# Patient Record
Sex: FEMALE | Race: WHITE | Employment: OTHER | URBAN - METROPOLITAN AREA
[De-identification: names, ages, dates, MRNs, and addresses within clinical notes are randomized per-mention and may not be internally consistent; named-entity substitution may affect disease eponyms.]

---

## 2017-01-24 ENCOUNTER — TELEPHONE (OUTPATIENT)
Dept: FAMILY MEDICINE CLINIC | Age: 77
End: 2017-01-24

## 2017-01-25 ENCOUNTER — NURSE ONLY (OUTPATIENT)
Dept: FAMILY MEDICINE CLINIC | Age: 77
End: 2017-01-25

## 2017-01-25 DIAGNOSIS — R73.9 HYPERGLYCEMIA: Primary | ICD-10-CM

## 2017-01-25 LAB — GLUCOSE BLD-MCNC: 109 MG/DL

## 2017-01-25 PROCEDURE — 82962 GLUCOSE BLOOD TEST: CPT | Performed by: FAMILY MEDICINE

## 2017-04-19 ENCOUNTER — OFFICE VISIT (OUTPATIENT)
Dept: FAMILY MEDICINE CLINIC | Age: 77
End: 2017-04-19

## 2017-04-19 VITALS
HEIGHT: 67 IN | SYSTOLIC BLOOD PRESSURE: 124 MMHG | RESPIRATION RATE: 16 BRPM | BODY MASS INDEX: 37.08 KG/M2 | WEIGHT: 236.25 LBS | HEART RATE: 68 BPM | DIASTOLIC BLOOD PRESSURE: 64 MMHG

## 2017-04-19 DIAGNOSIS — Z99.89 OBSTRUCTIVE SLEEP APNEA ON CPAP: ICD-10-CM

## 2017-04-19 DIAGNOSIS — I25.10 CORONARY ARTERY DISEASE INVOLVING NATIVE CORONARY ARTERY OF NATIVE HEART WITHOUT ANGINA PECTORIS: ICD-10-CM

## 2017-04-19 DIAGNOSIS — G47.33 OBSTRUCTIVE SLEEP APNEA ON CPAP: ICD-10-CM

## 2017-04-19 DIAGNOSIS — E78.5 HYPERLIPIDEMIA, UNSPECIFIED HYPERLIPIDEMIA TYPE: ICD-10-CM

## 2017-04-19 DIAGNOSIS — K21.9 GASTROESOPHAGEAL REFLUX DISEASE, ESOPHAGITIS PRESENCE NOT SPECIFIED: ICD-10-CM

## 2017-04-19 DIAGNOSIS — I10 ESSENTIAL HYPERTENSION: ICD-10-CM

## 2017-04-19 PROCEDURE — 99213 OFFICE O/P EST LOW 20 MIN: CPT | Performed by: FAMILY MEDICINE

## 2017-04-19 ASSESSMENT — ENCOUNTER SYMPTOMS
SHORTNESS OF BREATH: 0
VOMITING: 0
COUGH: 0
CONSTIPATION: 0
EYES NEGATIVE: 1
CHEST TIGHTNESS: 0
DIARRHEA: 0
ABDOMINAL PAIN: 0
NAUSEA: 0

## 2017-04-19 ASSESSMENT — PATIENT HEALTH QUESTIONNAIRE - PHQ9
SUM OF ALL RESPONSES TO PHQ QUESTIONS 1-9: 1
2. FEELING DOWN, DEPRESSED OR HOPELESS: 1
SUM OF ALL RESPONSES TO PHQ9 QUESTIONS 1 & 2: 1
1. LITTLE INTEREST OR PLEASURE IN DOING THINGS: 0

## 2017-06-09 ENCOUNTER — TELEPHONE (OUTPATIENT)
Dept: CARDIOLOGY | Age: 77
End: 2017-06-09

## 2017-06-09 DIAGNOSIS — I49.9 IRREGULAR HEART RHYTHM: ICD-10-CM

## 2017-06-09 DIAGNOSIS — Z98.890 S/P CARDIAC CATH: Primary | ICD-10-CM

## 2017-06-23 RX ORDER — LORAZEPAM 0.5 MG/1
0.5 TABLET ORAL EVERY 8 HOURS PRN
Qty: 20 TABLET | Refills: 0 | Status: SHIPPED | OUTPATIENT
Start: 2017-06-23 | End: 2017-08-16 | Stop reason: SDUPTHER

## 2017-07-13 ENCOUNTER — OFFICE VISIT (OUTPATIENT)
Dept: CARDIOLOGY | Age: 77
End: 2017-07-13

## 2017-07-13 VITALS
WEIGHT: 237.3 LBS | HEART RATE: 72 BPM | BODY MASS INDEX: 37.25 KG/M2 | DIASTOLIC BLOOD PRESSURE: 82 MMHG | SYSTOLIC BLOOD PRESSURE: 138 MMHG | HEIGHT: 67 IN

## 2017-07-13 DIAGNOSIS — I10 ESSENTIAL HYPERTENSION: ICD-10-CM

## 2017-07-13 DIAGNOSIS — I49.3 PVC (PREMATURE VENTRICULAR CONTRACTION): ICD-10-CM

## 2017-07-13 DIAGNOSIS — I42.9 CARDIOMYOPATHY (HCC): Primary | ICD-10-CM

## 2017-07-13 DIAGNOSIS — R94.30 EJECTION FRACTION < 50%: ICD-10-CM

## 2017-07-13 DIAGNOSIS — E66.01 MORBID OBESITY DUE TO EXCESS CALORIES (HCC): ICD-10-CM

## 2017-07-13 PROCEDURE — 1123F ACP DISCUSS/DSCN MKR DOCD: CPT | Performed by: NUCLEAR MEDICINE

## 2017-07-13 PROCEDURE — G8417 CALC BMI ABV UP PARAM F/U: HCPCS | Performed by: NUCLEAR MEDICINE

## 2017-07-13 PROCEDURE — 1036F TOBACCO NON-USER: CPT | Performed by: NUCLEAR MEDICINE

## 2017-07-13 PROCEDURE — G8400 PT W/DXA NO RESULTS DOC: HCPCS | Performed by: NUCLEAR MEDICINE

## 2017-07-13 PROCEDURE — 4040F PNEUMOC VAC/ADMIN/RCVD: CPT | Performed by: NUCLEAR MEDICINE

## 2017-07-13 PROCEDURE — 1090F PRES/ABSN URINE INCON ASSESS: CPT | Performed by: NUCLEAR MEDICINE

## 2017-07-13 PROCEDURE — G8427 DOCREV CUR MEDS BY ELIG CLIN: HCPCS | Performed by: NUCLEAR MEDICINE

## 2017-07-13 PROCEDURE — G8598 ASA/ANTIPLAT THER USED: HCPCS | Performed by: NUCLEAR MEDICINE

## 2017-07-13 PROCEDURE — 99214 OFFICE O/P EST MOD 30 MIN: CPT | Performed by: NUCLEAR MEDICINE

## 2017-07-13 RX ORDER — SIMVASTATIN 40 MG
TABLET ORAL
Qty: 90 TABLET | Refills: 3 | Status: SHIPPED | OUTPATIENT
Start: 2017-07-13 | End: 2017-08-28 | Stop reason: SDUPTHER

## 2017-07-13 RX ORDER — LOSARTAN POTASSIUM 25 MG/1
TABLET ORAL
Qty: 90 TABLET | Refills: 3 | Status: SHIPPED | OUTPATIENT
Start: 2017-07-13 | End: 2017-07-19

## 2017-07-13 RX ORDER — METOPROLOL SUCCINATE 25 MG/1
25 TABLET, EXTENDED RELEASE ORAL DAILY
Qty: 90 TABLET | Refills: 3 | Status: SHIPPED | OUTPATIENT
Start: 2017-07-13 | End: 2017-08-28 | Stop reason: SDUPTHER

## 2017-07-13 RX ORDER — ISOSORBIDE MONONITRATE 30 MG/1
15 TABLET, EXTENDED RELEASE ORAL DAILY
Qty: 45 TABLET | Refills: 3 | Status: SHIPPED | OUTPATIENT
Start: 2017-07-13 | End: 2017-08-28 | Stop reason: SDUPTHER

## 2017-07-17 ENCOUNTER — HOSPITAL ENCOUNTER (OUTPATIENT)
Dept: NON INVASIVE DIAGNOSTICS | Age: 77
Discharge: HOME | End: 2017-07-17
Payer: MEDICARE

## 2017-07-17 DIAGNOSIS — I49.3 PVC (PREMATURE VENTRICULAR CONTRACTION): ICD-10-CM

## 2017-07-17 DIAGNOSIS — I42.9 CARDIOMYOPATHY (HCC): ICD-10-CM

## 2017-07-17 DIAGNOSIS — E66.01 MORBID OBESITY DUE TO EXCESS CALORIES (HCC): ICD-10-CM

## 2017-07-17 LAB
LV EF: 33 %
LVEF MODALITY: NORMAL

## 2017-07-17 PROCEDURE — 93306 TTE W/DOPPLER COMPLETE: CPT

## 2017-07-17 PROCEDURE — 93307 TTE W/O DOPPLER COMPLETE: CPT

## 2017-07-24 ENCOUNTER — TELEPHONE (OUTPATIENT)
Dept: CARDIOLOGY CLINIC | Age: 77
End: 2017-07-24

## 2017-08-05 ENCOUNTER — APPOINTMENT (OUTPATIENT)
Dept: CT IMAGING | Age: 77
DRG: 389 | End: 2017-08-05
Payer: MEDICARE

## 2017-08-05 ENCOUNTER — HOSPITAL ENCOUNTER (INPATIENT)
Age: 77
LOS: 3 days | Discharge: HOME | DRG: 389 | End: 2017-08-08
Attending: INTERNAL MEDICINE | Admitting: INTERNAL MEDICINE
Payer: MEDICARE

## 2017-08-05 DIAGNOSIS — N39.0 URINARY TRACT INFECTION WITHOUT HEMATURIA, SITE UNSPECIFIED: ICD-10-CM

## 2017-08-05 DIAGNOSIS — R06.02 SHORTNESS OF BREATH: Primary | ICD-10-CM

## 2017-08-05 DIAGNOSIS — K56.609 UNSPECIFIED INTESTINAL OBSTRUCTION: ICD-10-CM

## 2017-08-05 DIAGNOSIS — D72.829 LEUKOCYTOSIS, UNSPECIFIED TYPE: ICD-10-CM

## 2017-08-05 PROBLEM — J44.9 COPD (CHRONIC OBSTRUCTIVE PULMONARY DISEASE) (HCC): Status: ACTIVE | Noted: 2017-08-05

## 2017-08-05 LAB
ALBUMIN SERPL-MCNC: 4 G/DL (ref 3.5–5.1)
ALLEN TEST: POSITIVE
ALP BLD-CCNC: 50 U/L (ref 38–126)
ALT SERPL-CCNC: 14 U/L (ref 11–66)
ANION GAP SERPL CALCULATED.3IONS-SCNC: 14 MEQ/L (ref 8–16)
AST SERPL-CCNC: 18 U/L (ref 5–40)
BACTERIA: ABNORMAL
BASE EXCESS (CALCULATED): 2.9 MMOL/L (ref -2.5–2.5)
BASOPHILS # BLD: 0.3 %
BASOPHILS # BLD: 0.4 %
BASOPHILS ABSOLUTE: 0 THOU/MM3 (ref 0–0.1)
BASOPHILS ABSOLUTE: 0.1 THOU/MM3 (ref 0–0.1)
BILIRUB SERPL-MCNC: 0.7 MG/DL (ref 0.3–1.2)
BILIRUBIN URINE: ABNORMAL
BLOOD, URINE: ABNORMAL
BUN BLDV-MCNC: 21 MG/DL (ref 7–22)
CALCIUM SERPL-MCNC: 9.2 MG/DL (ref 8.5–10.5)
CASTS: ABNORMAL /LPF
CASTS: ABNORMAL /LPF
CHARACTER, URINE: ABNORMAL
CHLORIDE BLD-SCNC: 103 MEQ/L (ref 98–111)
CO2: 25 MEQ/L (ref 23–33)
COLLECTED BY:: ABNORMAL
COLOR: ABNORMAL
CREAT SERPL-MCNC: 0.5 MG/DL (ref 0.4–1.2)
CRYSTALS: ABNORMAL
D-DIMER QUANTITATIVE: 556 NG/ML FEU (ref 0–500)
DEVICE: ABNORMAL
EOSINOPHIL # BLD: 0 %
EOSINOPHIL # BLD: 0.2 %
EOSINOPHILS ABSOLUTE: 0 THOU/MM3 (ref 0–0.4)
EOSINOPHILS ABSOLUTE: 0 THOU/MM3 (ref 0–0.4)
EPITHELIAL CELLS, UA: ABNORMAL /HPF
FECAL LEUKOCYTES: NORMAL
GFR SERPL CREATININE-BSD FRML MDRD: > 90 ML/MIN/1.73M2
GLUCOSE BLD-MCNC: 124 MG/DL (ref 70–108)
GLUCOSE, URINE: NEGATIVE MG/DL
HCO3: 26 MMOL/L (ref 23–28)
HCT VFR BLD CALC: 46.3 % (ref 37–47)
HCT VFR BLD CALC: 48 % (ref 37–47)
HEMOGLOBIN: 15.8 GM/DL (ref 12–16)
HEMOGLOBIN: 16.3 GM/DL (ref 12–16)
ICTOTEST: NEGATIVE
IFIO2: 4
INR BLD: 0.97 (ref 0.85–1.13)
KETONES, URINE: 15
LACTIC ACID: 1.1 MMOL/L (ref 0.5–2.2)
LEUKOCYTE EST, POC: ABNORMAL
LIPASE: 15.6 U/L (ref 5.6–51.3)
LYMPHOCYTES # BLD: 3 %
LYMPHOCYTES # BLD: 4.4 %
LYMPHOCYTES ABSOLUTE: 0.4 THOU/MM3 (ref 1–4.8)
LYMPHOCYTES ABSOLUTE: 0.7 THOU/MM3 (ref 1–4.8)
MCH RBC QN AUTO: 31.5 PG (ref 27–31)
MCH RBC QN AUTO: 31.7 PG (ref 27–31)
MCHC RBC AUTO-ENTMCNC: 34.1 GM/DL (ref 33–37)
MCHC RBC AUTO-ENTMCNC: 34.2 GM/DL (ref 33–37)
MCV RBC AUTO: 92.5 FL (ref 81–99)
MCV RBC AUTO: 92.8 FL (ref 81–99)
MISCELLANEOUS LAB TEST RESULT: ABNORMAL
MONOCYTES # BLD: 2.8 %
MONOCYTES # BLD: 3.6 %
MONOCYTES ABSOLUTE: 0.4 THOU/MM3 (ref 0.4–1.3)
MONOCYTES ABSOLUTE: 0.6 THOU/MM3 (ref 0.4–1.3)
NITRITE, URINE: POSITIVE
NUCLEATED RED BLOOD CELLS: 0 /100 WBC
NUCLEATED RED BLOOD CELLS: 0 /100 WBC
O2 SATURATION: 98 %
OSMOLALITY CALCULATION: 287.5 MOSMOL/KG (ref 275–300)
PCO2: 35 MMHG (ref 35–45)
PDW BLD-RTO: 13.2 % (ref 11.5–14.5)
PDW BLD-RTO: 13.5 % (ref 11.5–14.5)
PH BLOOD GAS: 7.48 (ref 7.35–7.45)
PH UA: 5.5
PLATELET # BLD: 151 THOU/MM3 (ref 130–400)
PLATELET # BLD: 173 THOU/MM3 (ref 130–400)
PMV BLD AUTO: 9.5 MCM (ref 7.4–10.4)
PMV BLD AUTO: 9.6 MCM (ref 7.4–10.4)
PO2: 98 MMHG (ref 71–104)
POTASSIUM SERPL-SCNC: 3.8 MEQ/L (ref 3.5–5.2)
PRO-BNP: 956.9 PG/ML (ref 0–1800)
PROTEIN UA: 30 MG/DL
RBC # BLD: 4.99 MILL/MM3 (ref 4.2–5.4)
RBC # BLD: 5.19 MILL/MM3 (ref 4.2–5.4)
RBC # BLD: NORMAL 10*6/UL
RBC # BLD: NORMAL 10*6/UL
RBC URINE: ABNORMAL /HPF
RENAL EPITHELIAL, UA: ABNORMAL
ROTAVIRUS ANTIGEN: NEGATIVE
SEG NEUTROPHILS: 91.4 %
SEG NEUTROPHILS: 93.9 %
SEGMENTED NEUTROPHILS ABSOLUTE COUNT: 11.8 THOU/MM3 (ref 1.8–7.7)
SEGMENTED NEUTROPHILS ABSOLUTE COUNT: 15.5 THOU/MM3 (ref 1.8–7.7)
SODIUM BLD-SCNC: 142 MEQ/L (ref 135–145)
SOURCE, BLOOD GAS: ABNORMAL
SPECIFIC GRAVITY UA: > 1.03 (ref 1–1.03)
TOTAL PROTEIN: 6.4 G/DL (ref 6.1–8)
TROPONIN T: < 0.01 NG/ML
UROBILINOGEN, URINE: 1 EU/DL
WBC # BLD: 12.6 THOU/MM3 (ref 4.8–10.8)
WBC # BLD: 17 THOU/MM3 (ref 4.8–10.8)
WBC UA: ABNORMAL /HPF
YEAST: ABNORMAL

## 2017-08-05 PROCEDURE — 74176 CT ABD & PELVIS W/O CONTRAST: CPT

## 2017-08-05 PROCEDURE — 85025 COMPLETE CBC W/AUTO DIFF WBC: CPT

## 2017-08-05 PROCEDURE — 93005 ELECTROCARDIOGRAM TRACING: CPT

## 2017-08-05 PROCEDURE — 6360000002 HC RX W HCPCS: Performed by: INTERNAL MEDICINE

## 2017-08-05 PROCEDURE — 80053 COMPREHEN METABOLIC PANEL: CPT

## 2017-08-05 PROCEDURE — 85610 PROTHROMBIN TIME: CPT

## 2017-08-05 PROCEDURE — 87186 SC STD MICRODIL/AGAR DIL: CPT

## 2017-08-05 PROCEDURE — 87040 BLOOD CULTURE FOR BACTERIA: CPT

## 2017-08-05 PROCEDURE — 96375 TX/PRO/DX INJ NEW DRUG ADDON: CPT

## 2017-08-05 PROCEDURE — 2580000003 HC RX 258: Performed by: INTERNAL MEDICINE

## 2017-08-05 PROCEDURE — 87077 CULTURE AEROBIC IDENTIFY: CPT

## 2017-08-05 PROCEDURE — 96361 HYDRATE IV INFUSION ADD-ON: CPT

## 2017-08-05 PROCEDURE — 89055 LEUKOCYTE ASSESSMENT FECAL: CPT

## 2017-08-05 PROCEDURE — 94640 AIRWAY INHALATION TREATMENT: CPT

## 2017-08-05 PROCEDURE — 83880 ASSAY OF NATRIURETIC PEPTIDE: CPT

## 2017-08-05 PROCEDURE — 83605 ASSAY OF LACTIC ACID: CPT

## 2017-08-05 PROCEDURE — 36600 WITHDRAWAL OF ARTERIAL BLOOD: CPT

## 2017-08-05 PROCEDURE — 85379 FIBRIN DEGRADATION QUANT: CPT

## 2017-08-05 PROCEDURE — 1200000003 HC TELEMETRY R&B

## 2017-08-05 PROCEDURE — 6370000000 HC RX 637 (ALT 250 FOR IP): Performed by: INTERNAL MEDICINE

## 2017-08-05 PROCEDURE — 87184 SC STD DISK METHOD PER PLATE: CPT

## 2017-08-05 PROCEDURE — 99285 EMERGENCY DEPT VISIT HI MDM: CPT

## 2017-08-05 PROCEDURE — 84484 ASSAY OF TROPONIN QUANT: CPT

## 2017-08-05 PROCEDURE — 96365 THER/PROPH/DIAG IV INF INIT: CPT

## 2017-08-05 PROCEDURE — 87086 URINE CULTURE/COLONY COUNT: CPT

## 2017-08-05 PROCEDURE — 81001 URINALYSIS AUTO W/SCOPE: CPT

## 2017-08-05 PROCEDURE — 83690 ASSAY OF LIPASE: CPT

## 2017-08-05 PROCEDURE — 36415 COLL VENOUS BLD VENIPUNCTURE: CPT

## 2017-08-05 PROCEDURE — 82803 BLOOD GASES ANY COMBINATION: CPT

## 2017-08-05 PROCEDURE — 87425 ROTAVIRUS AG IA: CPT

## 2017-08-05 RX ORDER — CIPROFLOXACIN 2 MG/ML
400 INJECTION, SOLUTION INTRAVENOUS EVERY 12 HOURS
Status: DISCONTINUED | OUTPATIENT
Start: 2017-08-06 | End: 2017-08-06 | Stop reason: SINTOL

## 2017-08-05 RX ORDER — 0.9 % SODIUM CHLORIDE 0.9 %
1000 INTRAVENOUS SOLUTION INTRAVENOUS ONCE
Status: COMPLETED | OUTPATIENT
Start: 2017-08-05 | End: 2017-08-05

## 2017-08-05 RX ORDER — IPRATROPIUM BROMIDE AND ALBUTEROL SULFATE 2.5; .5 MG/3ML; MG/3ML
1 SOLUTION RESPIRATORY (INHALATION) ONCE
Status: COMPLETED | OUTPATIENT
Start: 2017-08-05 | End: 2017-08-05

## 2017-08-05 RX ORDER — LEVOFLOXACIN 5 MG/ML
500 INJECTION, SOLUTION INTRAVENOUS ONCE
Status: COMPLETED | OUTPATIENT
Start: 2017-08-05 | End: 2017-08-05

## 2017-08-05 RX ORDER — PANTOPRAZOLE SODIUM 40 MG/10ML
40 INJECTION, POWDER, LYOPHILIZED, FOR SOLUTION INTRAVENOUS DAILY
Status: DISCONTINUED | OUTPATIENT
Start: 2017-08-06 | End: 2017-08-08 | Stop reason: HOSPADM

## 2017-08-05 RX ORDER — DEXTROSE, SODIUM CHLORIDE, SODIUM LACTATE, POTASSIUM CHLORIDE, AND CALCIUM CHLORIDE 5; .6; .31; .03; .02 G/100ML; G/100ML; G/100ML; G/100ML; G/100ML
INJECTION, SOLUTION INTRAVENOUS CONTINUOUS
Status: DISCONTINUED | OUTPATIENT
Start: 2017-08-05 | End: 2017-08-08

## 2017-08-05 RX ORDER — ONDANSETRON 2 MG/ML
INJECTION INTRAMUSCULAR; INTRAVENOUS
Status: DISPENSED
Start: 2017-08-05 | End: 2017-08-06

## 2017-08-05 RX ORDER — SODIUM CHLORIDE 0.9 % (FLUSH) 0.9 %
10 SYRINGE (ML) INJECTION EVERY 12 HOURS SCHEDULED
Status: DISCONTINUED | OUTPATIENT
Start: 2017-08-05 | End: 2017-08-08 | Stop reason: HOSPADM

## 2017-08-05 RX ORDER — ONDANSETRON 2 MG/ML
4 INJECTION INTRAMUSCULAR; INTRAVENOUS ONCE
Status: COMPLETED | OUTPATIENT
Start: 2017-08-05 | End: 2017-08-05

## 2017-08-05 RX ORDER — METOPROLOL TARTRATE 5 MG/5ML
5 INJECTION INTRAVENOUS EVERY 12 HOURS
Status: DISCONTINUED | OUTPATIENT
Start: 2017-08-05 | End: 2017-08-08 | Stop reason: HOSPADM

## 2017-08-05 RX ORDER — IPRATROPIUM BROMIDE AND ALBUTEROL SULFATE 2.5; .5 MG/3ML; MG/3ML
1 SOLUTION RESPIRATORY (INHALATION)
Status: DISCONTINUED | OUTPATIENT
Start: 2017-08-06 | End: 2017-08-08 | Stop reason: HOSPADM

## 2017-08-05 RX ORDER — SODIUM CHLORIDE 0.9 % (FLUSH) 0.9 %
10 SYRINGE (ML) INJECTION PRN
Status: DISCONTINUED | OUTPATIENT
Start: 2017-08-05 | End: 2017-08-08 | Stop reason: HOSPADM

## 2017-08-05 RX ORDER — ONDANSETRON 2 MG/ML
4 INJECTION INTRAMUSCULAR; INTRAVENOUS EVERY 6 HOURS PRN
Status: DISCONTINUED | OUTPATIENT
Start: 2017-08-05 | End: 2017-08-08 | Stop reason: HOSPADM

## 2017-08-05 RX ADMIN — LEVOFLOXACIN 500 MG: 5 INJECTION, SOLUTION INTRAVENOUS at 21:08

## 2017-08-05 RX ADMIN — CIPROFLOXACIN 400 MG: 2 INJECTION, SOLUTION INTRAVENOUS at 23:56

## 2017-08-05 RX ADMIN — IPRATROPIUM BROMIDE AND ALBUTEROL SULFATE 1 AMPULE: .5; 3 SOLUTION RESPIRATORY (INHALATION) at 18:12

## 2017-08-05 RX ADMIN — ONDANSETRON 4 MG: 2 INJECTION INTRAMUSCULAR; INTRAVENOUS at 18:10

## 2017-08-05 RX ADMIN — SODIUM CHLORIDE, SODIUM LACTATE, POTASSIUM CHLORIDE, CALCIUM CHLORIDE AND DEXTROSE MONOHYDRATE: 5; 600; 310; 30; 20 INJECTION, SOLUTION INTRAVENOUS at 23:56

## 2017-08-05 RX ADMIN — SODIUM CHLORIDE 1000 ML: 9 INJECTION, SOLUTION INTRAVENOUS at 18:10

## 2017-08-05 ASSESSMENT — ENCOUNTER SYMPTOMS
SORE THROAT: 0
VOMITING: 0
EYE PAIN: 0
DIARRHEA: 1
SHORTNESS OF BREATH: 1
ABDOMINAL PAIN: 0
CONSTIPATION: 0
WHEEZING: 0
COUGH: 0
RHINORRHEA: 0
BACK PAIN: 0
EYE DISCHARGE: 0
NAUSEA: 1

## 2017-08-06 LAB
ANION GAP SERPL CALCULATED.3IONS-SCNC: 11 MEQ/L (ref 8–16)
BASOPHILS # BLD: 0.1 %
BASOPHILS ABSOLUTE: 0 THOU/MM3 (ref 0–0.1)
BUN BLDV-MCNC: 18 MG/DL (ref 7–22)
CALCIUM SERPL-MCNC: 7.9 MG/DL (ref 8.5–10.5)
CHLORIDE BLD-SCNC: 102 MEQ/L (ref 98–111)
CO2: 27 MEQ/L (ref 23–33)
CREAT SERPL-MCNC: 0.4 MG/DL (ref 0.4–1.2)
EKG ATRIAL RATE: 86 BPM
EKG P AXIS: 61 DEGREES
EKG P-R INTERVAL: 188 MS
EKG Q-T INTERVAL: 428 MS
EKG QRS DURATION: 164 MS
EKG QTC CALCULATION (BAZETT): 512 MS
EKG R AXIS: 1 DEGREES
EKG T AXIS: 150 DEGREES
EKG VENTRICULAR RATE: 86 BPM
EOSINOPHIL # BLD: 0.1 %
EOSINOPHILS ABSOLUTE: 0 THOU/MM3 (ref 0–0.4)
GFR SERPL CREATININE-BSD FRML MDRD: > 90 ML/MIN/1.73M2
GLUCOSE BLD-MCNC: 112 MG/DL (ref 70–108)
HCT VFR BLD CALC: 45.1 % (ref 37–47)
HEMOGLOBIN: 15.5 GM/DL (ref 12–16)
LYMPHOCYTES # BLD: 5.2 %
LYMPHOCYTES ABSOLUTE: 0.5 THOU/MM3 (ref 1–4.8)
MAGNESIUM: 1.4 MG/DL (ref 1.6–2.4)
MCH RBC QN AUTO: 31.8 PG (ref 27–31)
MCHC RBC AUTO-ENTMCNC: 34.2 GM/DL (ref 33–37)
MCV RBC AUTO: 92.8 FL (ref 81–99)
MONOCYTES # BLD: 4.4 %
MONOCYTES ABSOLUTE: 0.4 THOU/MM3 (ref 0.4–1.3)
NUCLEATED RED BLOOD CELLS: 0 /100 WBC
PDW BLD-RTO: 13.3 % (ref 11.5–14.5)
PLATELET # BLD: 158 THOU/MM3 (ref 130–400)
PMV BLD AUTO: 9.6 MCM (ref 7.4–10.4)
POTASSIUM SERPL-SCNC: 3.7 MEQ/L (ref 3.5–5.2)
RBC # BLD: 4.86 MILL/MM3 (ref 4.2–5.4)
RBC # BLD: NORMAL 10*6/UL
SEG NEUTROPHILS: 90.2 %
SEGMENTED NEUTROPHILS ABSOLUTE COUNT: 8.1 THOU/MM3 (ref 1.8–7.7)
SODIUM BLD-SCNC: 140 MEQ/L (ref 135–145)
WBC # BLD: 9 THOU/MM3 (ref 4.8–10.8)

## 2017-08-06 PROCEDURE — 85025 COMPLETE CBC W/AUTO DIFF WBC: CPT

## 2017-08-06 PROCEDURE — 80048 BASIC METABOLIC PNL TOTAL CA: CPT

## 2017-08-06 PROCEDURE — 6360000002 HC RX W HCPCS: Performed by: INTERNAL MEDICINE

## 2017-08-06 PROCEDURE — 83735 ASSAY OF MAGNESIUM: CPT

## 2017-08-06 PROCEDURE — 2500000003 HC RX 250 WO HCPCS: Performed by: NURSE PRACTITIONER

## 2017-08-06 PROCEDURE — 36415 COLL VENOUS BLD VENIPUNCTURE: CPT

## 2017-08-06 PROCEDURE — 94640 AIRWAY INHALATION TREATMENT: CPT

## 2017-08-06 PROCEDURE — 99221 1ST HOSP IP/OBS SF/LOW 40: CPT | Performed by: SURGERY

## 2017-08-06 PROCEDURE — 1200000003 HC TELEMETRY R&B

## 2017-08-06 PROCEDURE — 2580000003 HC RX 258: Performed by: INTERNAL MEDICINE

## 2017-08-06 PROCEDURE — 87086 URINE CULTURE/COLONY COUNT: CPT

## 2017-08-06 PROCEDURE — 2500000003 HC RX 250 WO HCPCS: Performed by: INTERNAL MEDICINE

## 2017-08-06 PROCEDURE — C9113 INJ PANTOPRAZOLE SODIUM, VIA: HCPCS | Performed by: INTERNAL MEDICINE

## 2017-08-06 PROCEDURE — 6370000000 HC RX 637 (ALT 250 FOR IP): Performed by: INTERNAL MEDICINE

## 2017-08-06 RX ORDER — MORPHINE SULFATE 2 MG/ML
1 INJECTION, SOLUTION INTRAMUSCULAR; INTRAVENOUS
Status: DISCONTINUED | OUTPATIENT
Start: 2017-08-06 | End: 2017-08-08 | Stop reason: HOSPADM

## 2017-08-06 RX ORDER — MORPHINE SULFATE 2 MG/ML
2 INJECTION, SOLUTION INTRAMUSCULAR; INTRAVENOUS
Status: DISCONTINUED | OUTPATIENT
Start: 2017-08-06 | End: 2017-08-08 | Stop reason: HOSPADM

## 2017-08-06 RX ORDER — POTASSIUM CHLORIDE 7.45 MG/ML
10 INJECTION INTRAVENOUS
Status: COMPLETED | OUTPATIENT
Start: 2017-08-06 | End: 2017-08-06

## 2017-08-06 RX ORDER — DIPHENHYDRAMINE HYDROCHLORIDE 50 MG/ML
25 INJECTION INTRAMUSCULAR; INTRAVENOUS EVERY 6 HOURS PRN
Status: DISCONTINUED | OUTPATIENT
Start: 2017-08-06 | End: 2017-08-08 | Stop reason: HOSPADM

## 2017-08-06 RX ADMIN — POTASSIUM CHLORIDE 10 MEQ: 7.46 INJECTION, SOLUTION INTRAVENOUS at 16:44

## 2017-08-06 RX ADMIN — IPRATROPIUM BROMIDE AND ALBUTEROL SULFATE 1 AMPULE: .5; 3 SOLUTION RESPIRATORY (INHALATION) at 09:23

## 2017-08-06 RX ADMIN — METRONIDAZOLE 500 MG: 500 INJECTION, SOLUTION INTRAVENOUS at 23:23

## 2017-08-06 RX ADMIN — NITROGLYCERIN 0.5 INCH: 20 OINTMENT TOPICAL at 19:48

## 2017-08-06 RX ADMIN — IPRATROPIUM BROMIDE AND ALBUTEROL SULFATE 1 AMPULE: .5; 3 SOLUTION RESPIRATORY (INHALATION) at 20:26

## 2017-08-06 RX ADMIN — IPRATROPIUM BROMIDE AND ALBUTEROL SULFATE 1 AMPULE: .5; 3 SOLUTION RESPIRATORY (INHALATION) at 17:01

## 2017-08-06 RX ADMIN — NITROGLYCERIN 0.5 INCH: 20 OINTMENT TOPICAL at 00:40

## 2017-08-06 RX ADMIN — IPRATROPIUM BROMIDE AND ALBUTEROL SULFATE 1 AMPULE: .5; 3 SOLUTION RESPIRATORY (INHALATION) at 13:31

## 2017-08-06 RX ADMIN — ENOXAPARIN SODIUM 40 MG: 40 INJECTION SUBCUTANEOUS at 10:41

## 2017-08-06 RX ADMIN — POTASSIUM CHLORIDE 10 MEQ: 7.46 INJECTION, SOLUTION INTRAVENOUS at 19:04

## 2017-08-06 RX ADMIN — NITROGLYCERIN 0.5 INCH: 20 OINTMENT TOPICAL at 07:11

## 2017-08-06 RX ADMIN — MAGNESIUM SULFATE HEPTAHYDRATE 2 G: 500 INJECTION, SOLUTION INTRAMUSCULAR; INTRAVENOUS at 13:36

## 2017-08-06 RX ADMIN — MEROPENEM 1 G: 1 INJECTION, POWDER, FOR SOLUTION INTRAVENOUS at 23:20

## 2017-08-06 RX ADMIN — Medication 10 ML: at 00:41

## 2017-08-06 RX ADMIN — NITROGLYCERIN 0.5 INCH: 20 OINTMENT TOPICAL at 12:10

## 2017-08-06 RX ADMIN — METOPROLOL TARTRATE 5 MG: 5 INJECTION INTRAVENOUS at 00:40

## 2017-08-06 RX ADMIN — SODIUM CHLORIDE, SODIUM LACTATE, POTASSIUM CHLORIDE, CALCIUM CHLORIDE AND DEXTROSE MONOHYDRATE: 5; 600; 310; 30; 20 INJECTION, SOLUTION INTRAVENOUS at 17:41

## 2017-08-06 RX ADMIN — CIPROFLOXACIN 400 MG: 2 INJECTION, SOLUTION INTRAVENOUS at 11:14

## 2017-08-06 RX ADMIN — NITROGLYCERIN 0.5 INCH: 20 OINTMENT TOPICAL at 23:35

## 2017-08-06 RX ADMIN — PANTOPRAZOLE SODIUM 40 MG: 40 INJECTION, POWDER, FOR SOLUTION INTRAVENOUS at 10:41

## 2017-08-06 RX ADMIN — METRONIDAZOLE 500 MG: 500 INJECTION, SOLUTION INTRAVENOUS at 15:41

## 2017-08-07 ENCOUNTER — APPOINTMENT (OUTPATIENT)
Dept: GENERAL RADIOLOGY | Age: 77
DRG: 389 | End: 2017-08-07
Payer: MEDICARE

## 2017-08-07 LAB
ANION GAP SERPL CALCULATED.3IONS-SCNC: 10 MEQ/L (ref 8–16)
BASOPHILS # BLD: 0.2 %
BASOPHILS ABSOLUTE: 0 THOU/MM3 (ref 0–0.1)
BUN BLDV-MCNC: 14 MG/DL (ref 7–22)
CALCIUM SERPL-MCNC: 7.8 MG/DL (ref 8.5–10.5)
CHLORIDE BLD-SCNC: 103 MEQ/L (ref 98–111)
CO2: 28 MEQ/L (ref 23–33)
CREAT SERPL-MCNC: 0.4 MG/DL (ref 0.4–1.2)
EOSINOPHIL # BLD: 0.8 %
EOSINOPHILS ABSOLUTE: 0 THOU/MM3 (ref 0–0.4)
GFR SERPL CREATININE-BSD FRML MDRD: > 90 ML/MIN/1.73M2
GLUCOSE BLD-MCNC: 98 MG/DL (ref 70–108)
HCT VFR BLD CALC: 39.3 % (ref 37–47)
HEMOGLOBIN: 13.6 GM/DL (ref 12–16)
LYMPHOCYTES # BLD: 23.9 %
LYMPHOCYTES ABSOLUTE: 1 THOU/MM3 (ref 1–4.8)
MAGNESIUM: 1.8 MG/DL (ref 1.6–2.4)
MCH RBC QN AUTO: 31.9 PG (ref 27–31)
MCHC RBC AUTO-ENTMCNC: 34.6 GM/DL (ref 33–37)
MCV RBC AUTO: 92.2 FL (ref 81–99)
MONOCYTES # BLD: 11.7 %
MONOCYTES ABSOLUTE: 0.5 THOU/MM3 (ref 0.4–1.3)
NUCLEATED RED BLOOD CELLS: 0 /100 WBC
ORGANISM: ABNORMAL
ORGANISM: ABNORMAL
PDW BLD-RTO: 13.9 % (ref 11.5–14.5)
PLATELET # BLD: 143 THOU/MM3 (ref 130–400)
PMV BLD AUTO: 9.1 MCM (ref 7.4–10.4)
POTASSIUM SERPL-SCNC: 3.6 MEQ/L (ref 3.5–5.2)
POTASSIUM SERPL-SCNC: 3.7 MEQ/L (ref 3.5–5.2)
RBC # BLD: 4.26 MILL/MM3 (ref 4.2–5.4)
RBC # BLD: NORMAL 10*6/UL
SEG NEUTROPHILS: 63.4 %
SEGMENTED NEUTROPHILS ABSOLUTE COUNT: 2.6 THOU/MM3 (ref 1.8–7.7)
SODIUM BLD-SCNC: 141 MEQ/L (ref 135–145)
URINE CULTURE, ROUTINE: ABNORMAL
URINE CULTURE, ROUTINE: ABNORMAL
WBC # BLD: 4.1 THOU/MM3 (ref 4.8–10.8)

## 2017-08-07 PROCEDURE — 2580000003 HC RX 258: Performed by: INTERNAL MEDICINE

## 2017-08-07 PROCEDURE — 74000 XR ABDOMEN LIMITED (KUB): CPT

## 2017-08-07 PROCEDURE — 6370000000 HC RX 637 (ALT 250 FOR IP): Performed by: INTERNAL MEDICINE

## 2017-08-07 PROCEDURE — 80048 BASIC METABOLIC PNL TOTAL CA: CPT

## 2017-08-07 PROCEDURE — 84132 ASSAY OF SERUM POTASSIUM: CPT

## 2017-08-07 PROCEDURE — 83735 ASSAY OF MAGNESIUM: CPT

## 2017-08-07 PROCEDURE — C9113 INJ PANTOPRAZOLE SODIUM, VIA: HCPCS | Performed by: INTERNAL MEDICINE

## 2017-08-07 PROCEDURE — 36415 COLL VENOUS BLD VENIPUNCTURE: CPT

## 2017-08-07 PROCEDURE — 94640 AIRWAY INHALATION TREATMENT: CPT

## 2017-08-07 PROCEDURE — 2500000003 HC RX 250 WO HCPCS: Performed by: INTERNAL MEDICINE

## 2017-08-07 PROCEDURE — 2500000003 HC RX 250 WO HCPCS: Performed by: NURSE PRACTITIONER

## 2017-08-07 PROCEDURE — 99232 SBSQ HOSP IP/OBS MODERATE 35: CPT | Performed by: SURGERY

## 2017-08-07 PROCEDURE — 1200000003 HC TELEMETRY R&B

## 2017-08-07 PROCEDURE — 6360000002 HC RX W HCPCS: Performed by: INTERNAL MEDICINE

## 2017-08-07 PROCEDURE — 85025 COMPLETE CBC W/AUTO DIFF WBC: CPT

## 2017-08-07 RX ORDER — POTASSIUM CHLORIDE 7.45 MG/ML
10 INJECTION INTRAVENOUS
Status: COMPLETED | OUTPATIENT
Start: 2017-08-07 | End: 2017-08-07

## 2017-08-07 RX ADMIN — MEROPENEM 1 G: 1 INJECTION, POWDER, FOR SOLUTION INTRAVENOUS at 17:50

## 2017-08-07 RX ADMIN — Medication 10 ML: at 08:41

## 2017-08-07 RX ADMIN — PANTOPRAZOLE SODIUM 40 MG: 40 INJECTION, POWDER, FOR SOLUTION INTRAVENOUS at 08:41

## 2017-08-07 RX ADMIN — Medication 10 ML: at 23:25

## 2017-08-07 RX ADMIN — POTASSIUM CHLORIDE 10 MEQ: 7.46 INJECTION, SOLUTION INTRAVENOUS at 14:02

## 2017-08-07 RX ADMIN — ENOXAPARIN SODIUM 40 MG: 40 INJECTION SUBCUTANEOUS at 08:41

## 2017-08-07 RX ADMIN — METOPROLOL TARTRATE 5 MG: 5 INJECTION INTRAVENOUS at 23:25

## 2017-08-07 RX ADMIN — POTASSIUM CHLORIDE 10 MEQ: 7.46 INJECTION, SOLUTION INTRAVENOUS at 11:07

## 2017-08-07 RX ADMIN — NITROGLYCERIN 0.5 INCH: 20 OINTMENT TOPICAL at 11:12

## 2017-08-07 RX ADMIN — IPRATROPIUM BROMIDE AND ALBUTEROL SULFATE 1 AMPULE: .5; 3 SOLUTION RESPIRATORY (INHALATION) at 08:08

## 2017-08-07 RX ADMIN — IPRATROPIUM BROMIDE AND ALBUTEROL SULFATE 1 AMPULE: .5; 3 SOLUTION RESPIRATORY (INHALATION) at 12:22

## 2017-08-07 RX ADMIN — IPRATROPIUM BROMIDE AND ALBUTEROL SULFATE 1 AMPULE: .5; 3 SOLUTION RESPIRATORY (INHALATION) at 20:41

## 2017-08-07 RX ADMIN — IPRATROPIUM BROMIDE AND ALBUTEROL SULFATE 1 AMPULE: .5; 3 SOLUTION RESPIRATORY (INHALATION) at 16:26

## 2017-08-07 RX ADMIN — NITROGLYCERIN 0.5 INCH: 20 OINTMENT TOPICAL at 23:33

## 2017-08-07 RX ADMIN — MEROPENEM 1 G: 1 INJECTION, POWDER, FOR SOLUTION INTRAVENOUS at 07:13

## 2017-08-07 RX ADMIN — SODIUM CHLORIDE, SODIUM LACTATE, POTASSIUM CHLORIDE, CALCIUM CHLORIDE AND DEXTROSE MONOHYDRATE: 5; 600; 310; 30; 20 INJECTION, SOLUTION INTRAVENOUS at 17:50

## 2017-08-07 RX ADMIN — METOPROLOL TARTRATE 5 MG: 5 INJECTION INTRAVENOUS at 11:12

## 2017-08-07 RX ADMIN — NITROGLYCERIN 0.5 INCH: 20 OINTMENT TOPICAL at 06:00

## 2017-08-07 RX ADMIN — METRONIDAZOLE 500 MG: 500 INJECTION, SOLUTION INTRAVENOUS at 07:14

## 2017-08-07 RX ADMIN — NITROGLYCERIN 0.5 INCH: 20 OINTMENT TOPICAL at 17:50

## 2017-08-08 VITALS
HEIGHT: 67 IN | RESPIRATION RATE: 16 BRPM | SYSTOLIC BLOOD PRESSURE: 145 MMHG | BODY MASS INDEX: 37.21 KG/M2 | TEMPERATURE: 98.1 F | DIASTOLIC BLOOD PRESSURE: 79 MMHG | OXYGEN SATURATION: 95 % | HEART RATE: 68 BPM | WEIGHT: 237.1 LBS

## 2017-08-08 LAB
ANION GAP SERPL CALCULATED.3IONS-SCNC: 7 MEQ/L (ref 8–16)
BUN BLDV-MCNC: 8 MG/DL (ref 7–22)
CALCIUM SERPL-MCNC: 8.1 MG/DL (ref 8.5–10.5)
CHLORIDE BLD-SCNC: 107 MEQ/L (ref 98–111)
CO2: 29 MEQ/L (ref 23–33)
CREAT SERPL-MCNC: 0.4 MG/DL (ref 0.4–1.2)
GFR SERPL CREATININE-BSD FRML MDRD: > 90 ML/MIN/1.73M2
GLUCOSE BLD-MCNC: 92 MG/DL (ref 70–108)
POTASSIUM SERPL-SCNC: 3.7 MEQ/L (ref 3.5–5.2)
SODIUM BLD-SCNC: 143 MEQ/L (ref 135–145)

## 2017-08-08 PROCEDURE — 6370000000 HC RX 637 (ALT 250 FOR IP): Performed by: INTERNAL MEDICINE

## 2017-08-08 PROCEDURE — C9113 INJ PANTOPRAZOLE SODIUM, VIA: HCPCS | Performed by: INTERNAL MEDICINE

## 2017-08-08 PROCEDURE — 99232 SBSQ HOSP IP/OBS MODERATE 35: CPT | Performed by: SURGERY

## 2017-08-08 PROCEDURE — 36415 COLL VENOUS BLD VENIPUNCTURE: CPT

## 2017-08-08 PROCEDURE — 2500000003 HC RX 250 WO HCPCS: Performed by: INTERNAL MEDICINE

## 2017-08-08 PROCEDURE — 6360000002 HC RX W HCPCS: Performed by: INTERNAL MEDICINE

## 2017-08-08 PROCEDURE — 2580000003 HC RX 258: Performed by: INTERNAL MEDICINE

## 2017-08-08 PROCEDURE — 80048 BASIC METABOLIC PNL TOTAL CA: CPT

## 2017-08-08 PROCEDURE — 94640 AIRWAY INHALATION TREATMENT: CPT

## 2017-08-08 RX ORDER — POTASSIUM CHLORIDE 20 MEQ/1
20 TABLET, EXTENDED RELEASE ORAL ONCE
Status: COMPLETED | OUTPATIENT
Start: 2017-08-08 | End: 2017-08-08

## 2017-08-08 RX ADMIN — NITROGLYCERIN 0.5 INCH: 20 OINTMENT TOPICAL at 06:05

## 2017-08-08 RX ADMIN — METOPROLOL TARTRATE 5 MG: 5 INJECTION INTRAVENOUS at 11:37

## 2017-08-08 RX ADMIN — IPRATROPIUM BROMIDE AND ALBUTEROL SULFATE 1 AMPULE: .5; 3 SOLUTION RESPIRATORY (INHALATION) at 10:14

## 2017-08-08 RX ADMIN — IPRATROPIUM BROMIDE AND ALBUTEROL SULFATE 1 AMPULE: .5; 3 SOLUTION RESPIRATORY (INHALATION) at 14:30

## 2017-08-08 RX ADMIN — MEROPENEM 1 G: 1 INJECTION, POWDER, FOR SOLUTION INTRAVENOUS at 06:05

## 2017-08-08 RX ADMIN — NITROGLYCERIN 0.5 INCH: 20 OINTMENT TOPICAL at 11:37

## 2017-08-08 RX ADMIN — PANTOPRAZOLE SODIUM 40 MG: 40 INJECTION, POWDER, FOR SOLUTION INTRAVENOUS at 08:23

## 2017-08-08 RX ADMIN — POTASSIUM CHLORIDE 20 MEQ: 1500 TABLET, EXTENDED RELEASE ORAL at 06:39

## 2017-08-08 RX ADMIN — Medication 10 ML: at 08:23

## 2017-08-08 RX ADMIN — ENOXAPARIN SODIUM 40 MG: 40 INJECTION SUBCUTANEOUS at 08:23

## 2017-08-11 LAB
BLOOD CULTURE, ROUTINE: NORMAL
BLOOD CULTURE, ROUTINE: NORMAL

## 2017-08-16 ENCOUNTER — OFFICE VISIT (OUTPATIENT)
Dept: FAMILY MEDICINE CLINIC | Age: 77
End: 2017-08-16

## 2017-08-16 VITALS
HEART RATE: 72 BPM | BODY MASS INDEX: 35.69 KG/M2 | HEIGHT: 67 IN | DIASTOLIC BLOOD PRESSURE: 76 MMHG | SYSTOLIC BLOOD PRESSURE: 130 MMHG | WEIGHT: 227.4 LBS | RESPIRATION RATE: 16 BRPM

## 2017-08-16 DIAGNOSIS — E78.5 HYPERLIPIDEMIA, UNSPECIFIED HYPERLIPIDEMIA TYPE: ICD-10-CM

## 2017-08-16 DIAGNOSIS — K21.9 GASTROESOPHAGEAL REFLUX DISEASE, ESOPHAGITIS PRESENCE NOT SPECIFIED: ICD-10-CM

## 2017-08-16 DIAGNOSIS — I25.10 CORONARY ARTERY DISEASE INVOLVING NATIVE CORONARY ARTERY OF NATIVE HEART WITHOUT ANGINA PECTORIS: Primary | ICD-10-CM

## 2017-08-16 DIAGNOSIS — F41.9 ANXIETY: ICD-10-CM

## 2017-08-16 PROCEDURE — 99495 TRANSJ CARE MGMT MOD F2F 14D: CPT | Performed by: FAMILY MEDICINE

## 2017-08-16 RX ORDER — LORAZEPAM 0.5 MG/1
0.5 TABLET ORAL EVERY 8 HOURS PRN
Qty: 30 TABLET | Refills: 0 | Status: SHIPPED | OUTPATIENT
Start: 2017-08-16 | End: 2017-09-12 | Stop reason: SDUPTHER

## 2017-08-16 ASSESSMENT — ENCOUNTER SYMPTOMS
COUGH: 0
CONSTIPATION: 0
NAUSEA: 0
VOMITING: 0
SHORTNESS OF BREATH: 0
EYES NEGATIVE: 1
DIARRHEA: 0
ABDOMINAL PAIN: 0
CHEST TIGHTNESS: 0

## 2017-08-28 ENCOUNTER — OFFICE VISIT (OUTPATIENT)
Dept: CARDIOLOGY CLINIC | Age: 77
End: 2017-08-28
Payer: MEDICARE

## 2017-08-28 VITALS
WEIGHT: 229.9 LBS | BODY MASS INDEX: 36.01 KG/M2 | DIASTOLIC BLOOD PRESSURE: 60 MMHG | SYSTOLIC BLOOD PRESSURE: 118 MMHG | HEART RATE: 72 BPM

## 2017-08-28 DIAGNOSIS — E78.01 FAMILIAL HYPERCHOLESTEROLEMIA: ICD-10-CM

## 2017-08-28 DIAGNOSIS — I10 ESSENTIAL HYPERTENSION: ICD-10-CM

## 2017-08-28 DIAGNOSIS — I42.0 DILATED CARDIOMYOPATHY (HCC): Primary | ICD-10-CM

## 2017-08-28 DIAGNOSIS — I25.10 CORONARY ARTERY DISEASE INVOLVING NATIVE CORONARY ARTERY OF NATIVE HEART WITHOUT ANGINA PECTORIS: ICD-10-CM

## 2017-08-28 PROCEDURE — 99214 OFFICE O/P EST MOD 30 MIN: CPT | Performed by: NUCLEAR MEDICINE

## 2017-08-28 PROCEDURE — 1111F DSCHRG MED/CURRENT MED MERGE: CPT | Performed by: NUCLEAR MEDICINE

## 2017-08-28 PROCEDURE — G8427 DOCREV CUR MEDS BY ELIG CLIN: HCPCS | Performed by: NUCLEAR MEDICINE

## 2017-08-28 PROCEDURE — 1090F PRES/ABSN URINE INCON ASSESS: CPT | Performed by: NUCLEAR MEDICINE

## 2017-08-28 PROCEDURE — G8400 PT W/DXA NO RESULTS DOC: HCPCS | Performed by: NUCLEAR MEDICINE

## 2017-08-28 PROCEDURE — G8417 CALC BMI ABV UP PARAM F/U: HCPCS | Performed by: NUCLEAR MEDICINE

## 2017-08-28 PROCEDURE — 1123F ACP DISCUSS/DSCN MKR DOCD: CPT | Performed by: NUCLEAR MEDICINE

## 2017-08-28 PROCEDURE — 1036F TOBACCO NON-USER: CPT | Performed by: NUCLEAR MEDICINE

## 2017-08-28 PROCEDURE — 4040F PNEUMOC VAC/ADMIN/RCVD: CPT | Performed by: NUCLEAR MEDICINE

## 2017-08-28 PROCEDURE — G8598 ASA/ANTIPLAT THER USED: HCPCS | Performed by: NUCLEAR MEDICINE

## 2017-08-28 RX ORDER — SIMVASTATIN 40 MG
TABLET ORAL
Qty: 90 TABLET | Refills: 3 | Status: SHIPPED | OUTPATIENT
Start: 2017-08-28 | End: 2018-01-17 | Stop reason: SDUPTHER

## 2017-08-28 RX ORDER — METOPROLOL SUCCINATE 25 MG/1
25 TABLET, EXTENDED RELEASE ORAL DAILY
Qty: 90 TABLET | Refills: 3 | Status: SHIPPED | OUTPATIENT
Start: 2017-08-28 | End: 2018-01-17 | Stop reason: SDUPTHER

## 2017-08-28 RX ORDER — ISOSORBIDE MONONITRATE 30 MG/1
15 TABLET, EXTENDED RELEASE ORAL DAILY
Qty: 45 TABLET | Refills: 3 | Status: SHIPPED | OUTPATIENT
Start: 2017-08-28 | End: 2018-01-17 | Stop reason: SDUPTHER

## 2017-09-12 RX ORDER — LORAZEPAM 0.5 MG/1
0.5 TABLET ORAL EVERY 8 HOURS PRN
Qty: 30 TABLET | Refills: 2 | Status: SHIPPED | OUTPATIENT
Start: 2017-09-12 | End: 2017-10-12

## 2017-09-29 ENCOUNTER — TELEPHONE (OUTPATIENT)
Dept: FAMILY MEDICINE CLINIC | Age: 77
End: 2017-09-29

## 2017-09-29 DIAGNOSIS — R35.0 URINARY FREQUENCY: Primary | ICD-10-CM

## 2017-10-02 ENCOUNTER — HOSPITAL ENCOUNTER (OUTPATIENT)
Age: 77
Discharge: HOME | End: 2017-10-02
Payer: MEDICARE

## 2017-10-02 LAB
AMORPHOUS: ABNORMAL
BACTERIA: ABNORMAL /HPF
BILIRUBIN URINE: NEGATIVE
BLOOD, URINE: ABNORMAL
CASTS UA: ABNORMAL /LPF
CHARACTER, URINE: ABNORMAL
COLOR: YELLOW
CRYSTALS, UA: ABNORMAL
EPITHELIAL CELLS, UA: ABNORMAL /HPF
GLUCOSE URINE: NEGATIVE MG/DL
KETONES, URINE: NEGATIVE
LEUKOCYTE ESTERASE, URINE: ABNORMAL
MUCUS: ABNORMAL
NITRITE, URINE: NEGATIVE
PH UA: 6.5
PROTEIN UA: 30
RBC URINE: ABNORMAL /HPF
SPECIFIC GRAVITY, URINE: 1.02 (ref 1–1.03)
UROBILINOGEN, URINE: 1 EU/DL
WBC UA: > 200 /HPF

## 2017-10-02 PROCEDURE — 81001 URINALYSIS AUTO W/SCOPE: CPT

## 2017-10-02 PROCEDURE — 87086 URINE CULTURE/COLONY COUNT: CPT

## 2017-10-03 LAB
ORGANISM: ABNORMAL
URINE CULTURE REFLEX: ABNORMAL

## 2017-10-05 ENCOUNTER — TELEPHONE (OUTPATIENT)
Dept: FAMILY MEDICINE CLINIC | Age: 77
End: 2017-10-05

## 2017-10-05 DIAGNOSIS — R31.9 HEMATURIA: Primary | ICD-10-CM

## 2017-10-10 ENCOUNTER — TELEPHONE (OUTPATIENT)
Dept: UROLOGY | Age: 77
End: 2017-10-10

## 2017-10-10 NOTE — TELEPHONE ENCOUNTER
Spoke with pt regarding an appointment, she will call me back when she gets home to schedule an appointment.

## 2017-10-19 ENCOUNTER — HOSPITAL ENCOUNTER (OUTPATIENT)
Dept: NON INVASIVE DIAGNOSTICS | Age: 77
Discharge: HOME | End: 2017-10-19
Payer: MEDICARE

## 2017-10-19 DIAGNOSIS — I42.0 DILATED CARDIOMYOPATHY (HCC): ICD-10-CM

## 2017-10-19 DIAGNOSIS — E78.01 FAMILIAL HYPERCHOLESTEROLEMIA: ICD-10-CM

## 2017-10-19 DIAGNOSIS — I10 ESSENTIAL HYPERTENSION: ICD-10-CM

## 2017-10-19 DIAGNOSIS — I25.10 CORONARY ARTERY DISEASE INVOLVING NATIVE CORONARY ARTERY OF NATIVE HEART WITHOUT ANGINA PECTORIS: ICD-10-CM

## 2017-10-19 PROCEDURE — 93307 TTE W/O DOPPLER COMPLETE: CPT

## 2017-10-20 ENCOUNTER — TELEPHONE (OUTPATIENT)
Dept: CARDIOLOGY CLINIC | Age: 77
End: 2017-10-20

## 2017-10-24 ENCOUNTER — OFFICE VISIT (OUTPATIENT)
Dept: UROLOGY | Age: 77
End: 2017-10-24
Payer: MEDICARE

## 2017-10-24 ENCOUNTER — TELEPHONE (OUTPATIENT)
Dept: UROLOGY | Age: 77
End: 2017-10-24

## 2017-10-24 VITALS
WEIGHT: 228 LBS | BODY MASS INDEX: 35.79 KG/M2 | HEART RATE: 67 BPM | SYSTOLIC BLOOD PRESSURE: 121 MMHG | DIASTOLIC BLOOD PRESSURE: 66 MMHG | HEIGHT: 67 IN

## 2017-10-24 DIAGNOSIS — R31.29 MICROSCOPIC HEMATURIA: Primary | ICD-10-CM

## 2017-10-24 DIAGNOSIS — N39.46 MIXED INCONTINENCE: ICD-10-CM

## 2017-10-24 LAB
BILIRUBIN URINE: NEGATIVE
BLOOD URINE, POC: NORMAL
CHARACTER, URINE: CLEAR
COLOR, URINE: YELLOW
GLUCOSE URINE: NEGATIVE MG/DL
KETONES, URINE: NEGATIVE
LEUKOCYTE CLUMPS, URINE: NORMAL
NITRITE, URINE: NEGATIVE
PH, URINE: 5.5
POST VOID RESIDUAL (PVR): 38 ML
PROTEIN, URINE: NEGATIVE MG/DL
SPECIFIC GRAVITY, URINE: 1.02 (ref 1–1.03)
UROBILINOGEN, URINE: 1 EU/DL

## 2017-10-24 PROCEDURE — G8484 FLU IMMUNIZE NO ADMIN: HCPCS | Performed by: NURSE PRACTITIONER

## 2017-10-24 PROCEDURE — 51798 US URINE CAPACITY MEASURE: CPT | Performed by: NURSE PRACTITIONER

## 2017-10-24 PROCEDURE — G8400 PT W/DXA NO RESULTS DOC: HCPCS | Performed by: NURSE PRACTITIONER

## 2017-10-24 PROCEDURE — 1090F PRES/ABSN URINE INCON ASSESS: CPT | Performed by: NURSE PRACTITIONER

## 2017-10-24 PROCEDURE — 0509F URINE INCON PLAN DOCD: CPT | Performed by: NURSE PRACTITIONER

## 2017-10-24 PROCEDURE — 81003 URINALYSIS AUTO W/O SCOPE: CPT | Performed by: NURSE PRACTITIONER

## 2017-10-24 PROCEDURE — G8598 ASA/ANTIPLAT THER USED: HCPCS | Performed by: NURSE PRACTITIONER

## 2017-10-24 PROCEDURE — 1123F ACP DISCUSS/DSCN MKR DOCD: CPT | Performed by: NURSE PRACTITIONER

## 2017-10-24 PROCEDURE — G8427 DOCREV CUR MEDS BY ELIG CLIN: HCPCS | Performed by: NURSE PRACTITIONER

## 2017-10-24 PROCEDURE — G8417 CALC BMI ABV UP PARAM F/U: HCPCS | Performed by: NURSE PRACTITIONER

## 2017-10-24 PROCEDURE — 4040F PNEUMOC VAC/ADMIN/RCVD: CPT | Performed by: NURSE PRACTITIONER

## 2017-10-24 PROCEDURE — 99204 OFFICE O/P NEW MOD 45 MIN: CPT | Performed by: NURSE PRACTITIONER

## 2017-10-24 PROCEDURE — 1036F TOBACCO NON-USER: CPT | Performed by: NURSE PRACTITIONER

## 2017-10-24 RX ORDER — LORAZEPAM 0.5 MG/1
0.5 TABLET ORAL PRN
Status: ON HOLD | COMMUNITY
End: 2018-02-04 | Stop reason: HOSPADM

## 2017-10-24 NOTE — PROGRESS NOTES
[facility] PROFESSIONAL SERVS  [facility]  [address] [address] [address]  Dept: [phone]  Loc: [phone]  Visit Date: 10/24/2017      HPI:     Radha Dash is a 65 y.o. with past medical history of incontinence, osteopenia, hypertension, hyperlipidemia, COPD and CAD who present today for microhematuria and incontinence. Onset of microhematuria is unknown. She denies ever seeing any gross hematuria. Urinalysis today shows trace blood. Denies any history of kidney stones. She had a CT abdomen and pelvis without contrast on 8/5/17 which revealed no kidney stones, hydronephrosis or renal masses. Denies any history of recurrent UTI, had positive UTI on 08/05/17, culture grew Klebsiella pneumoniae. She reports that she leaks urine often throughout the day, with continuously leakage of urine. She does report worsening leakage of urine with sneezing, laughing or cough. She does wear protection, and has worn protection for greater than 20 years. History of 4 vaginal deliveries, with hysterectomy greater then 20-25 years ago. She also reports some type of surgery many years ago to help with the incontinence, however there was no improvement. Denies ever trying any medications for it. History is obtained from the patient and the medical record. She comes in today by herself.        Current Outpatient Prescriptions   Medication Sig Dispense Refill    LORazepam (ATIVAN) 0.5 MG tablet Take 0.5 mg by mouth as needed for Anxiety      Mirabegron ER (MYRBETRIQ) 25 MG TB24 Take 1 tablet by mouth daily 28 tablet 0    metoprolol succinate (TOPROL XL) 25 MG extended release tablet Take 1 tablet by mouth daily 90 tablet 3    simvastatin (ZOCOR) 40 MG tablet TAKE 1 TABLET BY MOUTH EVERY EVENING 90 tablet 3    isosorbide mononitrate (IMDUR) 30 MG extended release tablet Take 0.5 tablets by mouth daily 45 tablet 3    sacubitril-valsartan (ENTRESTO) 24-26 MG per tablet Take 1 tablet by mouth 2 10/24/17 1330   BP: 121/66   Pulse: 67   Weight: 228 lb (103.4 kg)   Height: 5' 7\" (1.702 m)       Constitutional: Alert and oriented times 3, no acute distress and cooperative to examination with appropriate mood and affect. HENT:   Head:        Normocephalic and atraumatic. Mouth/Throat:         Mucous membranes are normal.   Eyes:         EOM are normal. No scleral icterus. PERRLA. Neck:        Supple, symmetrical, trachea midline  Pulmonary/Chest:      Chest symmetric with normal A/P diameter,  CTA with no wheezes, rales, or rhonchi noted. Normal respiratory rate and rhthym. No use of accessory muscles. Abdominal:         Soft. No tenderness. Bowel sounds present. Musculoskeletal:         Normal range of motion. No edema or tenderness of lower extremities. Extremities: No cyanosis, clubbing, or edema present. Neurological:        Alert and oriented. No cranial nerve deficit. Canary Malady Psychiatric:        Normal mood and affect. Labs   Urine dip demonstrates   Results for POC orders placed in visit on 10/24/17   POCT Urinalysis No Micro (Auto)   Result Value Ref Range    Glucose, Ur Negative NEGATIVE mg/dl    Bilirubin Urine Negative     Ketones, Urine Negative NEGATIVE    Specific Gravity, Urine 1.020 1.002 - 1.03    Blood, UA POC Trace-intact NEGATIVE    pH, Urine 5.50 5.0 - 9.0    Protein, Urine Negative NEGATIVE mg/dl    Urobilinogen, Urine 1.00 0.0 - 1.0 eu/dl    Nitrite, Urine Negative NEGATIVE    Leukocyte Clumps, Urine Small NEGATIVE    Color, Urine Yellow YELLOW-STR    Character, Urine Clear [id number]   poct post void residual   Result Value Ref Range    post void residual 38 ml        Recent BUN/Creatinine:  Lab Results   Component Value Date    BUN 8 08/08/2017    CREATININE 0.4 08/08/2017       Radiology  No recent imaging        Assessment & Plan:     Microhematuria  Mixed incontinence     Estel Severs is a 65year old female with micro-scopic hematuria.  Denies ever seeing any gross hematuria. Unknown how long micro hematuria has been present. Denies recurrent UTI's, had + UTI in 08/17 which was treated. Urinalysis today shows trace blood, will send for bladderCX. No history of stone disease. Will order CT Urogram for further evaluation, and follow up in a few weeks to discuss results. May need cysto in the future to complete work up. Mixed incontinence-- will give samples of Myrbetriq 25 mg daily to see if this offers her improvement. Urine sent for culture as well. Return in about 4 weeks (around 11/21/2017) for Micro hematuria, CT prior, incontinence .     Juanita Gold [address] [facility]

## 2017-10-26 LAB
ORGANISM: ABNORMAL
URINE CULTURE, ROUTINE: ABNORMAL

## 2017-10-27 ENCOUNTER — TELEPHONE (OUTPATIENT)
Dept: UROLOGY | Age: 77
End: 2017-10-27

## 2017-10-27 RX ORDER — DOXYCYCLINE HYCLATE 100 MG
100 TABLET ORAL 2 TIMES DAILY
Qty: 20 TABLET | Refills: 0 | Status: SHIPPED | OUTPATIENT
Start: 2017-10-27 | End: 2017-11-06

## 2017-11-01 ENCOUNTER — OFFICE VISIT (OUTPATIENT)
Dept: CARDIOLOGY CLINIC | Age: 77
End: 2017-11-01
Payer: MEDICARE

## 2017-11-01 VITALS
SYSTOLIC BLOOD PRESSURE: 130 MMHG | DIASTOLIC BLOOD PRESSURE: 62 MMHG | BODY MASS INDEX: 35.72 KG/M2 | HEART RATE: 80 BPM | HEIGHT: 67 IN | WEIGHT: 227.6 LBS

## 2017-11-01 DIAGNOSIS — E78.5 HYPERLIPIDEMIA, UNSPECIFIED HYPERLIPIDEMIA TYPE: Primary | ICD-10-CM

## 2017-11-01 DIAGNOSIS — I25.10 CORONARY ARTERY DISEASE INVOLVING NATIVE CORONARY ARTERY OF NATIVE HEART WITHOUT ANGINA PECTORIS: ICD-10-CM

## 2017-11-01 PROCEDURE — G8598 ASA/ANTIPLAT THER USED: HCPCS | Performed by: INTERNAL MEDICINE

## 2017-11-01 PROCEDURE — G8417 CALC BMI ABV UP PARAM F/U: HCPCS | Performed by: INTERNAL MEDICINE

## 2017-11-01 PROCEDURE — 99213 OFFICE O/P EST LOW 20 MIN: CPT | Performed by: INTERNAL MEDICINE

## 2017-11-01 PROCEDURE — 1123F ACP DISCUSS/DSCN MKR DOCD: CPT | Performed by: INTERNAL MEDICINE

## 2017-11-01 PROCEDURE — G8484 FLU IMMUNIZE NO ADMIN: HCPCS | Performed by: INTERNAL MEDICINE

## 2017-11-01 PROCEDURE — 1090F PRES/ABSN URINE INCON ASSESS: CPT | Performed by: INTERNAL MEDICINE

## 2017-11-01 PROCEDURE — G8427 DOCREV CUR MEDS BY ELIG CLIN: HCPCS | Performed by: INTERNAL MEDICINE

## 2017-11-01 PROCEDURE — 4040F PNEUMOC VAC/ADMIN/RCVD: CPT | Performed by: INTERNAL MEDICINE

## 2017-11-01 PROCEDURE — G8400 PT W/DXA NO RESULTS DOC: HCPCS | Performed by: INTERNAL MEDICINE

## 2017-11-01 PROCEDURE — 1036F TOBACCO NON-USER: CPT | Performed by: INTERNAL MEDICINE

## 2017-11-01 NOTE — PROGRESS NOTES
Patient here to discuss ICD. Patient of Dr. Donna Sloan. Has Life vest.  Patient didn't wear Life vest to appointment today. Informed patient she needs to wear Life vest at all times. Patient denies SOB. Chest pain. Palpitations.

## 2017-11-01 NOTE — PROGRESS NOTES
[facility] PROFESSIONAL SERVS  [facility] [place]  [address]  [address]  [address] [facility] [address] [address] [address]  Dept: [phone]  Dept Fax: [phone]  Loc: [phone]    Visit Date: 11/1/2017      Ms. Jeremias Alfaro is a 65 y.o. who is here for follow up of Patient here to discuss ICD. Patient of Dr. Anthony Calderón. Has Life vest.  Patient didn't wear Life vest to appointment today. Informed patient she needs to wear Life vest at all times. Patient denies SOB. Chest pain. Palpitations  HPI:   Ms. Jeremias Alfaro is a 65 y.o. female who is seen today for follow up on the cardiac problems mentioned below.      MEDICAL DIAGNOSES  Patient Active Problem List    Diagnosis Date Noted    Obstructive sleep apnea on CPAP 11/05/2015     Priority: High    Morbid obesity with BMI of 40.0-44.9, [facility] 07/25/2013     Priority: Medium    S/P cardiac cath-Mild Nonobstructive CAD-patent Mid LAD stent 07/17/2012     Priority: Medium    Epigastric abdominal pain 07/16/2012     Priority: Medium    Stable angina ([facility] 07/16/2012     Priority: Medium    CAD (coronary artery disease) s/p PCI and stent Multilink 3 x 18  mm mid L[facility] 07/16/2012     Priority: Medium    Hyperlipidemia      Priority: Medium    SBO (small bowel obstruction) 08/05/2017    UTI (urinary tract infection) 08/05/2017    COPD (chronic obstructive pulmonary disease) ([facility] 08/05/2017    Breast CA ([facility] 07/16/2012    Stress incontinence, female        Allergies   Allergen Reactions    Ciprofloxacin Itching    Neomycin     Pcn [Penicillins] Hives     Pt reports having reaction 50 years ago    Levaquin [Levofloxacin] Itching     Benadryl was given for tx       Current Outpatient Prescriptions   Medication Sig Dispense Refill    doxycycline hyclate (VIBRA-TABS) 100 MG tablet Take 1 tablet by mouth 2 times daily for 10 days 20 tablet 0    LORazepam (ATIVAN) 0.5 MG tablet Take 0.5 mg by mouth as needed for Anxiety      Mirabegron ER (MYRBETRIQ) 25 MG

## 2017-11-01 NOTE — PROGRESS NOTES
extended release tablet Take 1 tablet by mouth daily 90 tablet 3    simvastatin (ZOCOR) 40 MG tablet TAKE 1 TABLET BY MOUTH EVERY EVENING 90 tablet 3    isosorbide mononitrate (IMDUR) 30 MG extended release tablet Take 0.5 tablets by mouth daily 45 tablet 3    sacubitril-valsartan (ENTRESTO) 24-26 MG per tablet Take 1 tablet by mouth 2 times daily 60 tablet 0    Multiple Vitamins-Minerals (HAIR/SKIN/NAILS PO) Take 2 capsules by mouth daily       omeprazole (PRILOSEC) 20 MG capsule Take 1 capsule by mouth daily. 90 capsule 1    Misc Natural Products (OSTEO BI-FLEX ADV TRIPLE ST) TABS Take 1 tablet by mouth daily       Calcium Carbonate-Vitamin D (CALCIUM 600 + D PO) Take by mouth daily       aspirin 81 MG EC tablet Take 81 mg by mouth daily. No current facility-administered medications for this visit. EKG: NSR with PVC. LBBB. Subjective:      Review of Systems   Constitutional: Negative. HENT: Negative. Eyes: Negative. Respiratory: Positive for shortness of breath. Cardiovascular: Negative. Gastrointestinal: Negative. Genitourinary: Negative. Musculoskeletal: Negative. Skin: Negative. Neurological: Negative. Psychiatric/Behavioral: Negative. Objective:     Physical Exam   Constitutional: She is oriented to person, place, and time. She appears well-developed and well-nourished. No distress. HENT:   Head: Normocephalic and atraumatic. Mouth/Throat: Oropharynx is clear and moist.   Eyes: Conjunctivae and EOM are normal. Pupils are equal, round, and reactive to light. No scleral icterus. Neck: Normal range of motion. Neck supple. No JVD present. No thyromegaly present. Cardiovascular: Normal rate, regular rhythm and normal heart sounds. Pulses:       Radial pulses are 2+ on the right side, and 2+ on the left side. Femoral pulses are 2+ on the right side, and 2+ on the left side.        Popliteal pulses are 2+ on the right side, and 2+ on the left side. Dorsalis pedis pulses are 2+ on the right side, and 2+ on the left side. Posterior tibial pulses are 2+ on the right side, and 2+ on the left side. Pulmonary/Chest: Effort normal and breath sounds normal.   Abdominal: Soft. Bowel sounds are normal. She exhibits no mass. There is no tenderness. Musculoskeletal: She exhibits no edema. Lymphadenopathy:     She has no cervical adenopathy. Neurological: She is alert and oriented to person, place, and time. She has normal reflexes. No cranial nerve deficit. Skin: Skin is warm. No rash noted. Psychiatric: She has a normal mood and affect.        /62   Pulse 80   Ht 5' 7\" (1.702 m)   Wt 227 lb 9.6 oz (103.2 kg)   BMI 35.65 kg/m²   Wt Readings from Last 3 Encounters:   11/01/17 227 lb 9.6 oz (103.2 kg)   10/24/17 228 lb (103.4 kg)   08/28/17 229 lb 14.4 oz (104.3 kg)     BP Readings from Last 3 Encounters:   11/01/17 130/62   10/24/17 121/66   08/28/17 118/60       Lab Data  CBC:   Lab Results   Component Value Date    WBC 4.1 08/07/2017    RBC 4.26 08/07/2017    HGB 13.6 08/07/2017    HGB 15.5 08/06/2017    HGB 15.8 08/05/2017    HCT 39.3 08/07/2017    MCV 92.2 08/07/2017    MCH 31.9 08/07/2017    MCHC 34.6 08/07/2017    RDW 13.9 08/07/2017     08/07/2017     08/06/2017     08/05/2017    MPV 9.1 08/07/2017     CMP:    Lab Results   Component Value Date     08/08/2017    K 3.7 08/08/2017     08/08/2017    CO2 29 08/08/2017    BUN 8 08/08/2017    CREATININE 0.4 08/08/2017    CREATININE 0.4 08/07/2017    CREATININE 0.4 08/06/2017    LABGLOM >90 08/08/2017    GLUCOSE 92 08/08/2017    PROT 6.4 08/05/2017    LABALBU 4.0 08/05/2017    CALCIUM 8.1 08/08/2017    BILITOT 0.7 08/05/2017    ALKPHOS 50 08/05/2017    AST 18 08/05/2017    ALT 14 08/05/2017     Hepatic Function Panel:    Lab Results   Component Value Date    ALKPHOS 50 08/05/2017    ALT 14 08/05/2017    AST 18 08/05/2017    PROT 6.4 08/05/2017

## 2017-11-02 ENCOUNTER — OFFICE VISIT (OUTPATIENT)
Dept: CARDIOLOGY CLINIC | Age: 77
End: 2017-11-02
Payer: MEDICARE

## 2017-11-02 VITALS
DIASTOLIC BLOOD PRESSURE: 72 MMHG | HEIGHT: 67 IN | SYSTOLIC BLOOD PRESSURE: 110 MMHG | HEART RATE: 83 BPM | WEIGHT: 230 LBS | BODY MASS INDEX: 36.1 KG/M2

## 2017-11-02 DIAGNOSIS — I42.8 NONISCHEMIC CARDIOMYOPATHY (HCC): Primary | ICD-10-CM

## 2017-11-02 PROCEDURE — 1036F TOBACCO NON-USER: CPT | Performed by: INTERNAL MEDICINE

## 2017-11-02 PROCEDURE — G8484 FLU IMMUNIZE NO ADMIN: HCPCS | Performed by: INTERNAL MEDICINE

## 2017-11-02 PROCEDURE — 1090F PRES/ABSN URINE INCON ASSESS: CPT | Performed by: INTERNAL MEDICINE

## 2017-11-02 PROCEDURE — G8400 PT W/DXA NO RESULTS DOC: HCPCS | Performed by: INTERNAL MEDICINE

## 2017-11-02 PROCEDURE — G8598 ASA/ANTIPLAT THER USED: HCPCS | Performed by: INTERNAL MEDICINE

## 2017-11-02 PROCEDURE — G8417 CALC BMI ABV UP PARAM F/U: HCPCS | Performed by: INTERNAL MEDICINE

## 2017-11-02 PROCEDURE — 99205 OFFICE O/P NEW HI 60 MIN: CPT | Performed by: INTERNAL MEDICINE

## 2017-11-02 PROCEDURE — 93000 ELECTROCARDIOGRAM COMPLETE: CPT | Performed by: INTERNAL MEDICINE

## 2017-11-02 PROCEDURE — 4040F PNEUMOC VAC/ADMIN/RCVD: CPT | Performed by: INTERNAL MEDICINE

## 2017-11-02 PROCEDURE — G8427 DOCREV CUR MEDS BY ELIG CLIN: HCPCS | Performed by: INTERNAL MEDICINE

## 2017-11-02 PROCEDURE — 1123F ACP DISCUSS/DSCN MKR DOCD: CPT | Performed by: INTERNAL MEDICINE

## 2017-11-02 ASSESSMENT — ENCOUNTER SYMPTOMS
CONSTIPATION: 0
DIARRHEA: 0
LEFT EYE: 0
ABDOMINAL PAIN: 0
VOMITING: 0
BLURRED VISION: 0
RIGHT EYE: 0
NAUSEA: 0
WHEEZING: 0
DOUBLE VISION: 0
SHORTNESS OF BREATH: 0
COUGH: 0
SORE THROAT: 0
BACK PAIN: 0

## 2017-11-02 NOTE — PROGRESS NOTES
[facility] [address] [place] [address], [address] [facility]  Dept: [phone]  Dept Fax: [phone]      CARDIAC ELECTROPHYSIOLOGY  CONSULTATION    11/2/2017      REFERRING PROVIDER:  Dr. Cary Osorio:  I have heart failure and need an ICD  Chief Complaint   Patient presents with   Edgar Grajeda Doctor     discuss biv icd Dr Jaskaran Mallory pt wearing life vest        HPI:  HPI  The patient is a 64 y/o female that [facility] [address] She is a patient of Dr. Alexx Inman and he has been treating her for nonischemic cardiomyopathy for several years. The patient is active and does well for the most part from a cardiac standpoint. She has not required any hospitalizations or ER visit for heart failure exacerbations. She does report having occasional shortness of breath with exertion, but she will become tired easily and has to rest frequently. The patient has had an EF that has been slowly declining despite medical therapy with Toprol XL, and Entresto. The patient was referred to Dr. Durell Halsted initially for an ICD, but she was noted to have a LBBB with a QRS duration of 170 ms on her ECG. She was therefore referred to me for evaluation for a biventricular pacemaker/ ICD. PMH:  History obtained from chart review and the patient.   Past Medical History:   Diagnosis Date    Breast CA ([address] 12/03    right    CAD (coronary artery disease) 2009    stent in [place]    Colon polyps 8/97; 3/11    COPD (chronic obstructive pulmonary disease) ([facility] 8/5/2017    Diverticulosis 1/06    Erosive gastritis 11/06    Esophageal stricture 3/11    Hyperlipidemia     Hypertension     Internal hemorrhoid 1/06    LUISA on CPAP     Osteopenia 1999    Stress incontinence, female        PSH:  Past Surgical History:   Procedure Laterality Date    BREAST [facility] axillary dissection    CHOLECYSTECTOMY  1993    COLONOSCOPY  3/2011    CORONARY ANGIOPLASTY WITH STENT tablet Take 81 mg by mouth daily. No current facility-administered medications for this visit. REVIEW OF SYSTEMS:    Review of Systems   Constitution: Positive for malaise/fatigue. Negative for fever, weight gain and weight loss. HENT: Negative for hearing loss and sore throat. Eyes: Negative for blurred vision, double vision, vision loss in left eye and vision loss in right eye. Cardiovascular: Positive for dyspnea on exertion. Negative for chest pain, irregular heartbeat, near-syncope, palpitations and syncope. Respiratory: Negative for cough, shortness of breath and wheezing. Endocrine: Negative for cold intolerance, heat intolerance and polyuria. Hematologic/Lymphatic: Negative for bleeding problem. Does not bruise/bleed easily. Skin: Negative for dry skin, itching and rash. Musculoskeletal: Negative for arthritis, back pain, joint pain and myalgias. Gastrointestinal: Negative for abdominal pain, constipation, diarrhea, nausea and vomiting. Genitourinary: Negative for dysuria, frequency, hematuria and urgency. Neurological: Negative for dizziness, headaches, light-headedness, numbness, paresthesias, seizures and vertigo. Psychiatric/Behavioral: Negative for depression and memory loss. The patient is not nervous/anxious. PHYSICAL EXAM:  /72   Pulse 83   Ht 5' 7\" (1.702 m)   Wt 230 lb (104.3 kg)   BMI 36.02 kg/m²     Physical Exam   Constitutional: She is oriented to person, place, and time. No distress. HENT:   Head: Normocephalic and atraumatic. Head is without contusion. Right Ear: Hearing and external ear normal. No decreased hearing is noted. Left Ear: Hearing and external ear normal. No decreased hearing is noted. Nose: Nose normal. No sinus tenderness. No epistaxis. Mouth/Throat: Oropharynx is clear and moist. Mucous membranes are not dry. No oropharyngeal exudate.    Eyes: Conjunctivae and EOM are normal. Pupils are equal, round, and a depressed mood. Nursing note and vitals reviewed. DIAGNOSTIC STUDIES & LABORATORY DATA:    I have personally reviewed and interpreted the results of the following diagnostic testing  CARDIAC HISTORY:  CATH: 2014  CONCLUSION:      1. Nonobstructive coronary artery disease. 2.    Cardiomyopathy of 35-40% with left bundle branch block and  mildly to moderately dilated ventricle. 3.    No complications. ECHO: 10/19/2017  Summary   Ejection fraction is visually estimated at 30-35%.   There was moderate global hypokinesis of the left ventricle.   Left Ventricular size is Mildly increased .   The aortic valve leaflets were not well visualized.   Aortic valve leaflets are somewhat thickened. EC2017 NSR, LBBB, QRS duration 170 ms, PVC  STRESS TEST: 07/15/2017  IMPRESSIONS:   -  Abnormal study. Sherwin Weinberg was a moderate-sized infarct in the anteroseptal region.  -  Left ventricular systolic function was reduced, with regional wall motion  abnormalities. -  Clinical correlation is recommended. Lab Results   Component Value Date    WBC 4.1 (L) 2017    HGB 13.6 2017    HCT 39.3 2017     2017    CHOL 153 2017    TRIG 149 2017    HDL 58 2017    ALT 14 2017    AST 18 2017     2017    K 3.7 2017     2017    CREATININE 0.4 2017    BUN 8 2017    CO2 29 2017    INR 0.97 2017          IMPRESSION:  Nonischemic cardiomyopathy:  The patient is a 64 y/o female with a history of nonischemic cardiomyopathy. She is on guideline directed medical therapy and her most recent ECHO shows an EF of 30-35%. The patient is a NYHA functional class II and has a LBBB with a QRS duration of 170 ms. I discussed with the patient the indication for an ICD and also the indication for a resynchronization device.   I explained to the patient the differences between the two devices and the advantages of

## 2017-11-06 ENCOUNTER — HOSPITAL ENCOUNTER (OUTPATIENT)
Age: 77
Discharge: HOME | End: 2017-11-06
Payer: MEDICARE

## 2017-11-06 DIAGNOSIS — N39.46 MIXED INCONTINENCE: ICD-10-CM

## 2017-11-06 DIAGNOSIS — R31.29 MICROSCOPIC HEMATURIA: ICD-10-CM

## 2017-11-06 LAB
CREAT SERPL-MCNC: 0.5 MG/DL (ref 0.4–1.2)
GFR SERPL CREATININE-BSD FRML MDRD: > 90 ML/MIN/1.73M2

## 2017-11-06 PROCEDURE — 82565 ASSAY OF CREATININE: CPT

## 2017-11-06 PROCEDURE — 36415 COLL VENOUS BLD VENIPUNCTURE: CPT

## 2017-11-09 ENCOUNTER — HOSPITAL ENCOUNTER (OUTPATIENT)
Dept: CT IMAGING | Age: 77
Discharge: HOME | End: 2017-11-09
Payer: MEDICARE

## 2017-11-09 DIAGNOSIS — N39.46 MIXED INCONTINENCE: ICD-10-CM

## 2017-11-09 DIAGNOSIS — R31.29 MICROSCOPIC HEMATURIA: ICD-10-CM

## 2017-11-09 PROCEDURE — 74178 CT ABD&PLV WO CNTR FLWD CNTR: CPT

## 2017-11-09 PROCEDURE — 6360000004 HC RX CONTRAST MEDICATION: Performed by: NURSE PRACTITIONER

## 2017-11-09 RX ADMIN — IOPAMIDOL 85 ML: 755 INJECTION, SOLUTION INTRAVENOUS at 08:21

## 2017-11-12 ASSESSMENT — ENCOUNTER SYMPTOMS
EYES NEGATIVE: 1
GASTROINTESTINAL NEGATIVE: 1
SHORTNESS OF BREATH: 1

## 2017-11-21 ENCOUNTER — OFFICE VISIT (OUTPATIENT)
Dept: UROLOGY | Age: 77
End: 2017-11-21
Payer: MEDICARE

## 2017-11-21 VITALS — WEIGHT: 228 LBS | BODY MASS INDEX: 35.79 KG/M2 | HEIGHT: 67 IN

## 2017-11-21 DIAGNOSIS — N39.46 MIXED INCONTINENCE: ICD-10-CM

## 2017-11-21 DIAGNOSIS — R31.29 OTHER MICROSCOPIC HEMATURIA: Primary | ICD-10-CM

## 2017-11-21 DIAGNOSIS — N39.0 RECURRENT UTI: ICD-10-CM

## 2017-11-21 LAB
BILIRUBIN URINE: NORMAL
BLOOD URINE, POC: NORMAL
CHARACTER, URINE: CLEAR
COLOR, URINE: YELLOW
GLUCOSE URINE: NEGATIVE MG/DL
KETONES, URINE: NORMAL
LEUKOCYTE CLUMPS, URINE: NORMAL
NITRITE, URINE: NEGATIVE
PH, URINE: 5.5
PROTEIN, URINE: NEGATIVE MG/DL
SPECIFIC GRAVITY, URINE: 1.01 (ref 1–1.03)
UROBILINOGEN, URINE: 2 EU/DL

## 2017-11-21 PROCEDURE — G8400 PT W/DXA NO RESULTS DOC: HCPCS | Performed by: NURSE PRACTITIONER

## 2017-11-21 PROCEDURE — G8427 DOCREV CUR MEDS BY ELIG CLIN: HCPCS | Performed by: NURSE PRACTITIONER

## 2017-11-21 PROCEDURE — 1090F PRES/ABSN URINE INCON ASSESS: CPT | Performed by: NURSE PRACTITIONER

## 2017-11-21 PROCEDURE — G8417 CALC BMI ABV UP PARAM F/U: HCPCS | Performed by: NURSE PRACTITIONER

## 2017-11-21 PROCEDURE — G8598 ASA/ANTIPLAT THER USED: HCPCS | Performed by: NURSE PRACTITIONER

## 2017-11-21 PROCEDURE — G8484 FLU IMMUNIZE NO ADMIN: HCPCS | Performed by: NURSE PRACTITIONER

## 2017-11-21 PROCEDURE — 81003 URINALYSIS AUTO W/O SCOPE: CPT | Performed by: NURSE PRACTITIONER

## 2017-11-21 PROCEDURE — 1123F ACP DISCUSS/DSCN MKR DOCD: CPT | Performed by: NURSE PRACTITIONER

## 2017-11-21 PROCEDURE — 99213 OFFICE O/P EST LOW 20 MIN: CPT | Performed by: NURSE PRACTITIONER

## 2017-11-21 PROCEDURE — 1036F TOBACCO NON-USER: CPT | Performed by: NURSE PRACTITIONER

## 2017-11-21 PROCEDURE — 4040F PNEUMOC VAC/ADMIN/RCVD: CPT | Performed by: NURSE PRACTITIONER

## 2017-11-21 PROCEDURE — 0509F URINE INCON PLAN DOCD: CPT | Performed by: NURSE PRACTITIONER

## 2017-11-21 RX ORDER — SULFAMETHOXAZOLE AND TRIMETHOPRIM 800; 160 MG/1; MG/1
1 TABLET ORAL 2 TIMES DAILY
Qty: 20 TABLET | Refills: 0 | Status: SHIPPED | OUTPATIENT
Start: 2017-11-21 | End: 2017-12-01

## 2017-11-21 NOTE — PROGRESS NOTES
[facility] PROFESSIONAL SERVS  [facility]  [address] [address] [address]  Dept: [phone]  Loc: [phone]  Visit Date: 11/21/2017      HPI:     Angel Trujillo follows up today for microhematuria, UTI and incontinence. Previous urine culture was positive for infection, and was treated with doxycycline. She still reports some intermittent dysuria, frequency, urgency and incontinence. Urine today shows small leukocytes, trace blood. She has taken Myrbetriq 25 mg daily, unknown if she reported benefit to the medication. She has dealt with incontinence for many years, has had 2 surgeries in the past to help, however no benefit was noted. She had CT urogram performed which showed mild diffuse circumferential bladder wall thickening of indeterminate etiology, cannot exclude cystitis. No evidence of nephrolithiasis. No focal renal masses identified. 8 mm left adrenal gland adenoma appears unchanged from prior examination from December 2010. History is obtained from the patient and the medical record. She comes in today by herself.        Current Outpatient Prescriptions   Medication Sig Dispense Refill    sulfamethoxazole-trimethoprim (BACTRIM DS) 800-160 MG per tablet Take 1 tablet by mouth 2 times daily for 10 days 20 tablet 0    Mirabegron ER (MYRBETRIQ) 50 MG TB24 Take 50 mg by mouth daily 28 tablet 0    metoprolol succinate (TOPROL XL) 25 MG extended release tablet Take 1 tablet by mouth daily 90 tablet 3    simvastatin (ZOCOR) 40 MG tablet TAKE 1 TABLET BY MOUTH EVERY EVENING 90 tablet 3    isosorbide mononitrate (IMDUR) 30 MG extended release tablet Take 0.5 tablets by mouth daily 45 tablet 3    sacubitril-valsartan (ENTRESTO) 24-26 MG per tablet Take 1 tablet by mouth 2 times daily 60 tablet 0    Multiple Vitamins-Minerals (HAIR/SKIN/NAILS PO) Take 2 capsules by mouth daily       Misc Natural Products (OSTEO BI-FLEX ADV TRIPLE ST) TABS Take 1 tablet by mouth daily       Calcium Carbonate-Vitamin D (CALCIUM 600 + D PO) Take by mouth daily       aspirin 81 MG EC tablet Take 81 mg by mouth daily.  LORazepam (ATIVAN) 0.5 MG tablet Take 0.5 mg by mouth as needed for Anxiety       No current facility-administered medications for this visit. Past Medical History  Warren Mccrary  has a past medical history of Breast CA [facility]); CAD (coronary artery disease); Colon polyps; COPD (chronic obstructive pulmonary disease) ([address] Diverticulosis; Erosive gastritis; Esophageal stricture; Hyperlipidemia; Hypertension; Internal hemorrhoid; LUISA on CPAP; Osteopenia; and Stress incontinence, female. Past Surgical History  The patient  has a past surgical history that includes Hysterectomy (1976 or 1977); Cholecystectomy (1993); Tonsillectomy (childhood); Coronary angioplasty with stent (2009); Breast lumpectomy (1/04); Colonoscopy (3/2011); and joint replacement (shoulder left). Family History  This patient's family history includes Cancer in her brother and father; Heart Disease in her brother, mother, and sister; High Blood Pressure in her mother. Social History  Warren Mccrary  reports that she quit smoking about 19 years ago. Her smoking use included Cigarettes. She has a 20.00 pack-year smoking history. She has never used smokeless tobacco. She reports that she drinks about 1.2 oz of alcohol per week . She reports that she does not use drugs. Subjective:     Review of Systems  No problems with ears, nose or throat. No problems with eyes. No chest pain, shortness of breath, abdominal pain, extremity pain or weakness, and no neurological deficits. No rashes.  symptoms per HPI. The remainder of the review of symptoms is negative. Objective:     PE:   Vitals:    11/21/17 0859   Weight: 228 lb (103.4 kg)   Height: 5' 7\" (1.702 m)       Constitutional: Alert and oriented times 3, no acute distress and cooperative to examination with appropriate mood and affect.    HENT: Head:        Normocephalic and atraumatic. Mouth/Throat:         Mucous membranes are normal.   Eyes:         EOM are normal. No scleral icterus. PERRLA. Neck:        Supple, symmetrical, trachea midline  Pulmonary/Chest:      Chest symmetric with normal A/P diameter, Normal respiratory rate and rhthym. No use of accessory muscles. Abdominal:         Soft. No tenderness. Bowel sounds present. Musculoskeletal:         Normal range of motion. No edema or tenderness of lower extremities. Extremities: No cyanosis, clubbing, or edema present. Neurological:        Alert and oriented. No cranial nerve deficit. [facility]:        Normal mood and affect. Labs   Urine dip demonstrates   Results for POC orders placed in visit on 11/21/17   POCT Urinalysis No Micro (Auto)   Result Value Ref Range    Glucose, Ur Negative NEGATIVE mg/dl    Bilirubin Urine Small     Ketones, Urine Trace NEGATIVE    Specific Gravity, Urine 1.015 1.002 - 1.03    Blood, UA POC Trace-intact NEGATIVE    pH, Urine 5.50 5.0 - 9.0    Protein, Urine Negative NEGATIVE mg/dl    Urobilinogen, Urine 2.00 0.0 - 1.0 eu/dl    Nitrite, Urine Negative NEGATIVE    Leukocyte Clumps, Urine Small NEGATIVE    Color, Urine Yellow YELLOW-STR    Character, Urine Clear [id number]        Recent BUN/Creatinine:  Lab Results   Component Value Date    BUN 8 08/08/2017    CREATININE 0.5 11/06/2017       Radiology  CT urogram 11/09/2017  FINDINGS:        Limited evaluation of the lung bases appears unremarkable.       The liver, spleen, and pancreas appear within normal limits. The gallbladder is surgically absent.       There is an 8 mm left adrenal gland nodule demonstrated on axial image 4 series 4. This demonstrates an average attenuation of 14 Hounsfield units. This appears unchanged from December 2010 and likely represents a lipid poor adrenal adenoma.       The kidneys enhance normally bilaterally. No definite renal masses are identified.  No hydronephrosis is seen. No hydroureter is demonstrated. No definite nephrolithiasis or ureterolithiasis is seen.       There is mild diffuse circumferential bladder wall thickening.       There is sigmoid diverticulosis without evidence of diverticulitis.       There is no evidence of bowel obstruction. No free air or free fluid is seen.       The uterus is surgically absent. The aorta demonstrates mild fusiform ectasia of the infrarenal abdominal aorta measuring 2.6 cm in diameter. Moderate partially calcified atherosclerotic plaque is demonstrated within the abdominal aorta and iliac    arteries.       No acute osseous findings are demonstrated. Lower lumbar facet arthrosis and degenerative disc disease is demonstrated. There is grade 1 anterolisthesis of L4 on L5 measuring 4 mm.       Calcified phleboliths are seen within the pelvis. Impression   1. Mild diffuse circumferential bladder wall thickening of indeterminate etiology. Correlate clinically. Cannot exclude cystitis. 2. No evidence of nephrolithiasis or ureterolithiasis. No hydronephrosis or hydroureter. 3. No focal renal masses are identified. 4. 8 mm left adrenal gland adenoma appears unchanged from prior examination from December 2010. Assessment & Plan:     Microhematuria  Mixed incontinence  Recurrent UTI     Estel Severs follows up today for microhematuria , incontinence and recurrent UTI. She is doing about the same. Complains of incontinence, unknown if Myrbetriq offered her relief, will give additional samples of Myrbetriq 50 mg daily to see if she notices improvement. CT Urogram was performed, suggested possible cystitis which she had positive UTI and treated with antibiotic. Urinalysis today shows small leukocytes and trace blood, will send for culture and start her on Bactrim as she has been experiencing  some dysuria. Return in about 3 months (around 2/21/2018) for Incontinence .     Monique Hood [address] [facility]

## 2017-12-06 ENCOUNTER — OFFICE VISIT (OUTPATIENT)
Dept: PULMONOLOGY | Age: 77
End: 2017-12-06
Payer: MEDICARE

## 2017-12-06 VITALS
HEART RATE: 76 BPM | WEIGHT: 222 LBS | SYSTOLIC BLOOD PRESSURE: 110 MMHG | DIASTOLIC BLOOD PRESSURE: 60 MMHG | RESPIRATION RATE: 16 BRPM | HEIGHT: 67 IN | BODY MASS INDEX: 34.84 KG/M2 | OXYGEN SATURATION: 95 %

## 2017-12-06 DIAGNOSIS — I50.1 HEART FAILURE, LEFT, WITH LVEF 31-40% (HCC): ICD-10-CM

## 2017-12-06 DIAGNOSIS — G47.33 OBSTRUCTIVE SLEEP APNEA ON CPAP: Primary | ICD-10-CM

## 2017-12-06 DIAGNOSIS — Z99.89 OBSTRUCTIVE SLEEP APNEA ON CPAP: Primary | ICD-10-CM

## 2017-12-06 PROCEDURE — G8484 FLU IMMUNIZE NO ADMIN: HCPCS | Performed by: PHYSICIAN ASSISTANT

## 2017-12-06 PROCEDURE — 4040F PNEUMOC VAC/ADMIN/RCVD: CPT | Performed by: PHYSICIAN ASSISTANT

## 2017-12-06 PROCEDURE — G8417 CALC BMI ABV UP PARAM F/U: HCPCS | Performed by: PHYSICIAN ASSISTANT

## 2017-12-06 PROCEDURE — G8400 PT W/DXA NO RESULTS DOC: HCPCS | Performed by: PHYSICIAN ASSISTANT

## 2017-12-06 PROCEDURE — 99213 OFFICE O/P EST LOW 20 MIN: CPT | Performed by: PHYSICIAN ASSISTANT

## 2017-12-06 PROCEDURE — G8427 DOCREV CUR MEDS BY ELIG CLIN: HCPCS | Performed by: PHYSICIAN ASSISTANT

## 2017-12-06 PROCEDURE — 1090F PRES/ABSN URINE INCON ASSESS: CPT | Performed by: PHYSICIAN ASSISTANT

## 2017-12-06 PROCEDURE — G8598 ASA/ANTIPLAT THER USED: HCPCS | Performed by: PHYSICIAN ASSISTANT

## 2017-12-06 PROCEDURE — 1036F TOBACCO NON-USER: CPT | Performed by: PHYSICIAN ASSISTANT

## 2017-12-06 PROCEDURE — 1123F ACP DISCUSS/DSCN MKR DOCD: CPT | Performed by: PHYSICIAN ASSISTANT

## 2017-12-06 NOTE — PROGRESS NOTES
[facility]      Emily Baig                                         [id number]  12/6/2017   Chief Complaint   Patient presents with    Sleep Apnea     LUISA on CPAP- 1 yr f/u with D/L. Pt wearing life vest. Have ICD placed January 8th. Pt of Dr. Kt Monroe    PAP Download:   Original or initial  AHI: 39.5     Date of initial study: 5/7/03    [x] Compliant  100%   []  Noncompliant 0 %     PAP Type CPAP   Level  9.5 cmH2O   Avg Hrs/Day 7:33:26  AHI: 1.1   Recorded compliance dates , 11/6/17 to 12/5/17  Machine/Mfg: Resmed Interface: Nasal    Provider:  [x][facility]  []Gee  []Lu  []Sylvie         [][facility] []Other:   Neck Size: 18  Mallampati 3  ESS:  5    Here is a scan of the most recent download:              Presentation:   Sinai Becker presents for sleep medicine follow up for obstructive sleep apnea  Since the last visit, Sinai Becker is doing reasonably well with their sleep machine. Other comments: She is doing well with PAP. She has a life vest and has been wearing it since July waiting on an AICD    Equipment issues: The pressure is  acceptable, the mask is acceptable and she  is  using the humidity. Sleep issues:  Do you feel better? Yes  More rested? Yes   Better concentration? yes    Progress History:   Since last visit any new medical issues? Yes LV failure  New ER or hospitlal visits? Yes Cardiopathy  Any new or changes in medicines? Yes   Any new sleep medicines?  No        Past Medical History:   Diagnosis Date    Breast CA ([facility] [address] 12/03    right    CAD (coronary artery disease) 2009    stent in [place]    Colon polyps 8/97; 3/11    COPD (chronic obstructive pulmonary disease) ([facility] [address] 8/5/2017    Diverticulosis 1/06    Erosive gastritis 11/06    Esophageal stricture 3/11    Hyperlipidemia     Hypertension     Internal hemorrhoid 1/06    LUISA on CPAP     Osteopenia 1999    Stress incontinence, female        Past Surgical History:   Procedure Laterality Date    BREAST LUMPECTOMY  1/04    wt axillary dissection    CHOLECYSTECTOMY  1993    COLONOSCOPY  3/2011    CORONARY ANGIOPLASTY WITH STENT PLACEMENT  2009    HYSTERECTOMY  1976 or 1977    bleeding    JOINT REPLACEMENT  shoulder left    TONSILLECTOMY  childhood       Social History   Substance Use Topics    Smoking status: Former Smoker     Packs/day: 0.50     Years: 40.00     Types: Cigarettes     Quit date: 1/1/1998    Smokeless tobacco: Never Used    Alcohol use 1.2 oz/week     2 Standard drinks or equivalent per week      Comment: occasional        Allergies   Allergen Reactions    Ciprofloxacin Itching    Neomycin     Pcn [Penicillins] Hives     Pt reports having reaction 50 years ago    Levaquin [Levofloxacin] Itching     Benadryl was given for tx       Current Outpatient Prescriptions   Medication Sig Dispense Refill    CALCIUM PO Take by mouth Raw Calcium      Mirabegron ER (MYRBETRIQ) 50 MG TB24 Take 50 mg by mouth daily 28 tablet 0    LORazepam (ATIVAN) 0.5 MG tablet Take 0.5 mg by mouth as needed for Anxiety      metoprolol succinate (TOPROL XL) 25 MG extended release tablet Take 1 tablet by mouth daily 90 tablet 3    simvastatin (ZOCOR) 40 MG tablet TAKE 1 TABLET BY MOUTH EVERY EVENING 90 tablet 3    isosorbide mononitrate (IMDUR) 30 MG extended release tablet Take 0.5 tablets by mouth daily 45 tablet 3    sacubitril-valsartan (ENTRESTO) 24-26 MG per tablet Take 1 tablet by mouth 2 times daily 60 tablet 0    Multiple Vitamins-Minerals (HAIR/SKIN/NAILS PO) Take 2 capsules by mouth daily       Misc Natural Products (OSTEO BI-FLEX ADV TRIPLE ST) TABS Take 1 tablet by mouth daily       Calcium Carbonate-Vitamin D (CALCIUM 600 + D PO) Take by mouth daily       aspirin 81 MG EC tablet Take 81 mg by mouth daily. No current facility-administered medications for this visit.         Family History   Problem Relation Age of Onset    Heart Disease Mother     High Blood Pressure Mother     Cancer Father     Heart Disease Sister     Cancer Brother     Heart Disease Brother         Review of Systems -   General ROS: lost 8 lbs over 1 month  ENT ROS: negative for - nasal congestion, oral lesions or sore throat  Hematological and Lymphatic ROS: negative  Endocrine ROS: negative  Respiratory ROS: no cough, shortness of breath, or wheezing  Cardiovascular ROS: no chest pain   Gastrointestinal ROS: no abdominal pain, change in bowel habits, or black or bloody stools  Musculoskeletal ROS: negative  Neurological ROS: negative    Physical Exam:    BMI:  Body mass index is 34.77 kg/m². Wt Readings from Last 3 Encounters:   12/06/17 222 lb (100.7 kg)   11/21/17 228 lb (103.4 kg)   11/02/17 230 lb (104.3 kg)     Vitals: /60   Pulse 76   Resp 16   Ht 5' 7\" (1.702 m)   Wt 222 lb (100.7 kg)   SpO2 95% Comment: R/A at rest  BMI 34.77 kg/m²       General appearance: alert and oriented to person, place and time, well-developed and well-nourished, in no acute distress  Nose: Nares normal. Septum midline. Mucosa normal. No drainage or sinus tenderness. Oropharynx:  negative  Lungs: clear to auscultation bilaterally  Heart: regular rate and rhythm, S1, S2 normal, no murmur, click, rub or gallop  Extremities: extremities normal, atraumatic, no cyanosis or edema  Neurologic: Mental status: Alert, oriented, thought content appropriate      ASSESSMENT/DIAGNOSIS    1. Obstructive sleep apnea on CPAP              Plan   Do you need any equipment today? Yes update supplies    - She  was advised to continue current positive airway pressure therapy with above described pressure. - She  advised to keep good compliance with current recommended pressure to get optimal results and clinical improvement  - Recommend 7-9 hours of sleep with PAP  - She was advised to call [employer] regarding supplies if needed.    - She call my office for earlier appointment if needed for worsening of sleep symptoms.   - She was instructed on weight loss  - Areli Ernandez was educated about my impression and plan. Patient verbalizes understanding.   We will see Suly Manriquez back in: 1 year with download    Bob Faustin PA-C, MPAS  12/6/2017

## 2017-12-18 ENCOUNTER — TELEPHONE (OUTPATIENT)
Dept: FAMILY MEDICINE CLINIC | Age: 77
End: 2017-12-18

## 2018-01-02 ENCOUNTER — HOSPITAL ENCOUNTER (OUTPATIENT)
Age: 78
Discharge: HOME | End: 2018-01-02
Payer: MEDICARE

## 2018-01-02 DIAGNOSIS — I42.8 NONISCHEMIC CARDIOMYOPATHY (HCC): ICD-10-CM

## 2018-01-02 LAB
ANION GAP SERPL CALCULATED.3IONS-SCNC: 12 MEQ/L (ref 8–16)
BASOPHILS # BLD: 0.5 %
BASOPHILS ABSOLUTE: 0 THOU/MM3 (ref 0–0.1)
BUN BLDV-MCNC: 22 MG/DL (ref 7–22)
CALCIUM SERPL-MCNC: 9.2 MG/DL (ref 8.5–10.5)
CHLORIDE BLD-SCNC: 105 MEQ/L (ref 98–111)
CO2: 27 MEQ/L (ref 23–33)
CREAT SERPL-MCNC: 0.6 MG/DL (ref 0.4–1.2)
EOSINOPHIL # BLD: 0.7 %
EOSINOPHILS ABSOLUTE: 0.1 THOU/MM3 (ref 0–0.4)
GFR SERPL CREATININE-BSD FRML MDRD: > 90 ML/MIN/1.73M2
GLUCOSE BLD-MCNC: 145 MG/DL (ref 70–108)
HCT VFR BLD CALC: 43.7 % (ref 37–47)
HEMOGLOBIN: 14.9 GM/DL (ref 12–16)
LYMPHOCYTES # BLD: 20.8 %
LYMPHOCYTES ABSOLUTE: 1.7 THOU/MM3 (ref 1–4.8)
MCH RBC QN AUTO: 32.5 PG (ref 27–31)
MCHC RBC AUTO-ENTMCNC: 34.2 GM/DL (ref 33–37)
MCV RBC AUTO: 95.1 FL (ref 81–99)
MONOCYTES # BLD: 4.7 %
MONOCYTES ABSOLUTE: 0.4 THOU/MM3 (ref 0.4–1.3)
NUCLEATED RED BLOOD CELLS: 0 /100 WBC
PDW BLD-RTO: 13 % (ref 11.5–14.5)
PLATELET # BLD: 193 THOU/MM3 (ref 130–400)
PMV BLD AUTO: 9.7 MCM (ref 7.4–10.4)
POTASSIUM SERPL-SCNC: 4.7 MEQ/L (ref 3.5–5.2)
RBC # BLD: 4.6 MILL/MM3 (ref 4.2–5.4)
SEG NEUTROPHILS: 73.3 %
SEGMENTED NEUTROPHILS ABSOLUTE COUNT: 5.9 THOU/MM3 (ref 1.8–7.7)
SODIUM BLD-SCNC: 144 MEQ/L (ref 135–145)
WBC # BLD: 8.1 THOU/MM3 (ref 4.8–10.8)

## 2018-01-02 PROCEDURE — 80048 BASIC METABOLIC PNL TOTAL CA: CPT

## 2018-01-02 PROCEDURE — 85025 COMPLETE CBC W/AUTO DIFF WBC: CPT

## 2018-01-02 PROCEDURE — 36415 COLL VENOUS BLD VENIPUNCTURE: CPT

## 2018-01-03 ENCOUNTER — OFFICE VISIT (OUTPATIENT)
Dept: FAMILY MEDICINE CLINIC | Age: 78
End: 2018-01-03

## 2018-01-03 VITALS
HEIGHT: 67 IN | WEIGHT: 228.4 LBS | RESPIRATION RATE: 14 BRPM | SYSTOLIC BLOOD PRESSURE: 138 MMHG | HEART RATE: 74 BPM | DIASTOLIC BLOOD PRESSURE: 88 MMHG | BODY MASS INDEX: 35.85 KG/M2

## 2018-01-03 DIAGNOSIS — I42.0 DILATED CARDIOMYOPATHY (HCC): ICD-10-CM

## 2018-01-03 DIAGNOSIS — G47.33 OBSTRUCTIVE SLEEP APNEA ON CPAP: ICD-10-CM

## 2018-01-03 DIAGNOSIS — R73.9 HYPERGLYCEMIA: ICD-10-CM

## 2018-01-03 DIAGNOSIS — Z99.89 OBSTRUCTIVE SLEEP APNEA ON CPAP: ICD-10-CM

## 2018-01-03 DIAGNOSIS — E78.5 HYPERLIPIDEMIA, UNSPECIFIED HYPERLIPIDEMIA TYPE: ICD-10-CM

## 2018-01-03 DIAGNOSIS — N39.3 STRESS INCONTINENCE, FEMALE: Primary | ICD-10-CM

## 2018-01-03 PROCEDURE — 99213 OFFICE O/P EST LOW 20 MIN: CPT | Performed by: FAMILY MEDICINE

## 2018-01-03 ASSESSMENT — ENCOUNTER SYMPTOMS
DIARRHEA: 0
ABDOMINAL PAIN: 0
NAUSEA: 0
VOMITING: 0
COUGH: 0
EYES NEGATIVE: 1
SHORTNESS OF BREATH: 1
CONSTIPATION: 0
CHEST TIGHTNESS: 0

## 2018-01-03 NOTE — PROGRESS NOTES
Visit Date: 1/3/2018    Jill Jonas is a 77 y.o. female who presents today for:  Chief Complaint   Patient presents with    Hypertension    Hyperlipidemia    Coronary Artery Disease  She is having surgery on Monday insertion of a biventricular pacemaker/ICD by Dr Dominga Sterling       HPI:     HPI    has a current medication list which includes the following prescription(s): calcium, lorazepam, metoprolol succinate, simvastatin, isosorbide mononitrate, sacubitril-valsartan, biotin w/ vitamins c & e, osteo bi-flex adv triple st, calcium carb-cholecalciferol, and aspirin.     Allergies   Allergen Reactions    Ciprofloxacin Itching    Neomycin     Pcn [Penicillins] Hives     Pt reports having reaction 50 years ago    Levaquin [Levofloxacin] Itching     Benadryl was given for tx       Social History   Substance Use Topics    Smoking status: Former Smoker     Packs/day: 0.50     Years: 40.00     Types: Cigarettes     Quit date: 1/1/1998    Smokeless tobacco: Never Used    Alcohol use 1.2 oz/week     2 Standard drinks or equivalent per week      Comment: occasional        Past Medical History:   Diagnosis Date    Breast CA ([facility] [address] 12/03    right    CAD (coronary artery disease) 2009    stent in [place]    Colon polyps 8/97; 3/11    COPD (chronic obstructive pulmonary disease) ([facility] [address] 8/5/2017    Diverticulosis 1/06    Erosive gastritis 11/06    Esophageal stricture 3/11    Hyperlipidemia     Hypertension     Internal hemorrhoid 1/06    LUISA on CPAP     Osteopenia 1999    Stress incontinence, female        Past Surgical History:   Procedure Laterality Date    BREAST [facility]UM[facility]  1/04    [facility] axillary dissection    CHOLECYSTECTOMY  1993    COLONOSCOPY  3/2011    CORONARY ANGIOPLASTY WITH STENT PLACEMENT  2009    HYSTERECTOMY  1976 or 1977    bleeding    JOINT REPLACEMENT  shoulder left    TONSILLECTOMY  childhood       Family History   Problem Relation Age of Onset    Heart Disease Mother     High Blood Pressure Mother     Cancer Father     Heart Disease Sister     Cancer Brother     Heart Disease Brother      Subjective:      Review of Systems   Constitutional: Negative for activity change, appetite change, diaphoresis, fatigue and fever. HENT: Negative. Eyes: Negative. Respiratory: Positive for shortness of breath (at times). Negative for cough and chest tightness. Cardiovascular: Negative for chest pain, palpitations and leg swelling. Gastrointestinal: Negative for abdominal pain, constipation, diarrhea, nausea and vomiting. Genitourinary: Negative. Musculoskeletal: Negative. Skin: Negative. Negative for rash. Neurological: Negative for dizziness, syncope, weakness, light-headedness, numbness and headaches. Psychiatric/Behavioral: Negative. Objective:   /88 (Site: Left Arm, Position: Sitting, Cuff Size: Large Adult)   Pulse 74   Resp 14   Ht 5' 7\" (1.702 m)   Wt 228 lb 6.4 oz (103.6 kg)   Breastfeeding? No   BMI 35.77 kg/m²   Wt Readings from Last 3 Encounters:   01/03/18 228 lb 6.4 oz (103.6 kg)   12/06/17 222 lb (100.7 kg)   11/21/17 228 lb (103.4 kg)     Physical Exam   Constitutional: She is oriented to person, place, and time. She appears well-developed and well-nourished. No distress. HENT:   Head: Normocephalic and atraumatic. Eyes: Conjunctivae and EOM are normal. Pupils are equal, round, and reactive to light. Right eye exhibits no discharge. Left eye exhibits no discharge. No scleral icterus. Neck: Normal range of motion. Neck supple. No JVD present. No thyromegaly present. Cardiovascular: Normal rate, regular rhythm and normal heart sounds. No murmur heard. Pulmonary/Chest: Breath sounds normal. No respiratory distress. She has no wheezes. She has no rhonchi. She has no rales. Abdominal: Soft. Bowel sounds are normal. She exhibits no distension and no mass. There is no hepatosplenomegaly. There is no tenderness.  There is no rebound

## 2018-01-08 ENCOUNTER — APPOINTMENT (OUTPATIENT)
Dept: CARDIAC CATH/INVASIVE PROCEDURES | Age: 78
End: 2018-01-08
Attending: INTERNAL MEDICINE
Payer: MEDICARE

## 2018-01-08 ENCOUNTER — ANESTHESIA (OUTPATIENT)
Dept: CARDIAC CATH/INVASIVE PROCEDURES | Age: 78
End: 2018-01-08
Payer: MEDICARE

## 2018-01-08 ENCOUNTER — HOSPITAL ENCOUNTER (OUTPATIENT)
Dept: INPATIENT UNIT | Age: 78
Discharge: HOME | End: 2018-01-09
Attending: INTERNAL MEDICINE | Admitting: INTERNAL MEDICINE
Payer: MEDICARE

## 2018-01-08 ENCOUNTER — ANESTHESIA EVENT (OUTPATIENT)
Dept: CARDIAC CATH/INVASIVE PROCEDURES | Age: 78
End: 2018-01-08
Payer: MEDICARE

## 2018-01-08 ENCOUNTER — APPOINTMENT (OUTPATIENT)
Dept: GENERAL RADIOLOGY | Age: 78
End: 2018-01-08
Attending: INTERNAL MEDICINE
Payer: MEDICARE

## 2018-01-08 VITALS — TEMPERATURE: 97.7 F | SYSTOLIC BLOOD PRESSURE: 155 MMHG | OXYGEN SATURATION: 98 % | DIASTOLIC BLOOD PRESSURE: 83 MMHG

## 2018-01-08 PROBLEM — Z95.810 S/P ICD (INTERNAL CARDIAC DEFIBRILLATOR) PROCEDURE: Status: ACTIVE | Noted: 2018-01-08

## 2018-01-08 PROCEDURE — 2500000003 HC RX 250 WO HCPCS

## 2018-01-08 PROCEDURE — 3700000001 HC ADD 15 MINUTES (ANESTHESIA)

## 2018-01-08 PROCEDURE — 2500000003 HC RX 250 WO HCPCS: Performed by: NURSE ANESTHETIST, CERTIFIED REGISTERED

## 2018-01-08 PROCEDURE — 6360000002 HC RX W HCPCS: Performed by: NURSE ANESTHETIST, CERTIFIED REGISTERED

## 2018-01-08 PROCEDURE — A4565 SLINGS: HCPCS

## 2018-01-08 PROCEDURE — 33225 L VENTRIC PACING LEAD ADD-ON: CPT | Performed by: INTERNAL MEDICINE

## 2018-01-08 PROCEDURE — 7100000001 HC PACU RECOVERY - ADDTL 15 MIN

## 2018-01-08 PROCEDURE — C1898 LEAD, PMKR, OTHER THAN TRANS: HCPCS

## 2018-01-08 PROCEDURE — C1895 LEAD, AICD, ENDO DUAL COIL: HCPCS

## 2018-01-08 PROCEDURE — 6360000002 HC RX W HCPCS: Performed by: ANESTHESIOLOGY

## 2018-01-08 PROCEDURE — C1893 INTRO/SHEATH, FIXED,NON-PEEL: HCPCS

## 2018-01-08 PROCEDURE — 93641 EP EVL 1/2CHMB PAC CVDFB TST: CPT | Performed by: INTERNAL MEDICINE

## 2018-01-08 PROCEDURE — 2720000010 HC SURG SUPPLY STERILE

## 2018-01-08 PROCEDURE — 2580000003 HC RX 258: Performed by: INTERNAL MEDICINE

## 2018-01-08 PROCEDURE — C1773 RET DEV, INSERTABLE: HCPCS

## 2018-01-08 PROCEDURE — 7100000000 HC PACU RECOVERY - FIRST 15 MIN

## 2018-01-08 PROCEDURE — 3700000000 HC ANESTHESIA ATTENDED CARE

## 2018-01-08 PROCEDURE — 6360000002 HC RX W HCPCS: Performed by: INTERNAL MEDICINE

## 2018-01-08 PROCEDURE — C1900 LEAD, CORONARY VENOUS: HCPCS

## 2018-01-08 PROCEDURE — 71045 X-RAY EXAM CHEST 1 VIEW: CPT

## 2018-01-08 PROCEDURE — C1894 INTRO/SHEATH, NON-LASER: HCPCS

## 2018-01-08 PROCEDURE — 6370000000 HC RX 637 (ALT 250 FOR IP): Performed by: INTERNAL MEDICINE

## 2018-01-08 PROCEDURE — 33249 INSJ/RPLCMT DEFIB W/LEAD(S): CPT

## 2018-01-08 PROCEDURE — 33249 INSJ/RPLCMT DEFIB W/LEAD(S): CPT | Performed by: INTERNAL MEDICINE

## 2018-01-08 PROCEDURE — C1769 GUIDE WIRE: HCPCS

## 2018-01-08 PROCEDURE — A6258 TRANSPARENT FILM >16<=48 IN: HCPCS

## 2018-01-08 PROCEDURE — 33225 L VENTRIC PACING LEAD ADD-ON: CPT

## 2018-01-08 PROCEDURE — C1882 AICD, OTHER THAN SING/DUAL: HCPCS

## 2018-01-08 RX ORDER — FENTANYL CITRATE 50 UG/ML
INJECTION, SOLUTION INTRAMUSCULAR; INTRAVENOUS
Status: DISPENSED
Start: 2018-01-08 | End: 2018-01-09

## 2018-01-08 RX ORDER — VANCOMYCIN HYDROCHLORIDE 1 G/200ML
1000 INJECTION, SOLUTION INTRAVENOUS ONCE
Status: COMPLETED | OUTPATIENT
Start: 2018-01-08 | End: 2018-01-08

## 2018-01-08 RX ORDER — METOPROLOL SUCCINATE 25 MG/1
25 TABLET, EXTENDED RELEASE ORAL DAILY
Status: DISCONTINUED | OUTPATIENT
Start: 2018-01-09 | End: 2018-01-09 | Stop reason: HOSPADM

## 2018-01-08 RX ORDER — PROPOFOL 10 MG/ML
INJECTION, EMULSION INTRAVENOUS PRN
Status: DISCONTINUED | OUTPATIENT
Start: 2018-01-08 | End: 2018-01-08 | Stop reason: SDUPTHER

## 2018-01-08 RX ORDER — SUCCINYLCHOLINE CHLORIDE 20 MG/ML
INJECTION INTRAMUSCULAR; INTRAVENOUS PRN
Status: DISCONTINUED | OUTPATIENT
Start: 2018-01-08 | End: 2018-01-08 | Stop reason: SDUPTHER

## 2018-01-08 RX ORDER — SODIUM CHLORIDE 0.9 % (FLUSH) 0.9 %
10 SYRINGE (ML) INJECTION EVERY 12 HOURS SCHEDULED
Status: DISCONTINUED | OUTPATIENT
Start: 2018-01-08 | End: 2018-01-08

## 2018-01-08 RX ORDER — NEOSTIGMINE METHYLSULFATE 1 MG/ML
INJECTION, SOLUTION INTRAVENOUS PRN
Status: DISCONTINUED | OUTPATIENT
Start: 2018-01-08 | End: 2018-01-08 | Stop reason: SDUPTHER

## 2018-01-08 RX ORDER — GLYCOPYRROLATE 0.2 MG/ML
INJECTION INTRAMUSCULAR; INTRAVENOUS PRN
Status: DISCONTINUED | OUTPATIENT
Start: 2018-01-08 | End: 2018-01-08 | Stop reason: SDUPTHER

## 2018-01-08 RX ORDER — ACETAMINOPHEN 325 MG/1
650 TABLET ORAL EVERY 4 HOURS PRN
Status: DISCONTINUED | OUTPATIENT
Start: 2018-01-08 | End: 2018-01-09 | Stop reason: HOSPADM

## 2018-01-08 RX ORDER — ONDANSETRON 2 MG/ML
INJECTION INTRAMUSCULAR; INTRAVENOUS PRN
Status: DISCONTINUED | OUTPATIENT
Start: 2018-01-08 | End: 2018-01-08 | Stop reason: SDUPTHER

## 2018-01-08 RX ORDER — DEXAMETHASONE SODIUM PHOSPHATE 4 MG/ML
INJECTION, SOLUTION INTRA-ARTICULAR; INTRALESIONAL; INTRAMUSCULAR; INTRAVENOUS; SOFT TISSUE PRN
Status: DISCONTINUED | OUTPATIENT
Start: 2018-01-08 | End: 2018-01-08 | Stop reason: SDUPTHER

## 2018-01-08 RX ORDER — LIDOCAINE HYDROCHLORIDE 20 MG/ML
INJECTION, SOLUTION INFILTRATION; PERINEURAL PRN
Status: DISCONTINUED | OUTPATIENT
Start: 2018-01-08 | End: 2018-01-08 | Stop reason: SDUPTHER

## 2018-01-08 RX ORDER — SODIUM CHLORIDE 0.9 % (FLUSH) 0.9 %
10 SYRINGE (ML) INJECTION PRN
Status: DISCONTINUED | OUTPATIENT
Start: 2018-01-08 | End: 2018-01-08

## 2018-01-08 RX ORDER — FENTANYL CITRATE 50 UG/ML
50 INJECTION, SOLUTION INTRAMUSCULAR; INTRAVENOUS EVERY 5 MIN PRN
Status: DISCONTINUED | OUTPATIENT
Start: 2018-01-08 | End: 2018-01-09 | Stop reason: HOSPADM

## 2018-01-08 RX ORDER — HYDROCODONE BITARTRATE AND ACETAMINOPHEN 5; 325 MG/1; MG/1
1 TABLET ORAL EVERY 4 HOURS PRN
Status: DISCONTINUED | OUTPATIENT
Start: 2018-01-08 | End: 2018-01-09 | Stop reason: HOSPADM

## 2018-01-08 RX ORDER — FENTANYL CITRATE 50 UG/ML
INJECTION, SOLUTION INTRAMUSCULAR; INTRAVENOUS PRN
Status: DISCONTINUED | OUTPATIENT
Start: 2018-01-08 | End: 2018-01-08 | Stop reason: SDUPTHER

## 2018-01-08 RX ORDER — ISOSORBIDE MONONITRATE 30 MG/1
15 TABLET, EXTENDED RELEASE ORAL DAILY
Status: DISCONTINUED | OUTPATIENT
Start: 2018-01-09 | End: 2018-01-08

## 2018-01-08 RX ORDER — HYDROCODONE BITARTRATE AND ACETAMINOPHEN 5; 325 MG/1; MG/1
2 TABLET ORAL EVERY 4 HOURS PRN
Status: DISCONTINUED | OUTPATIENT
Start: 2018-01-08 | End: 2018-01-09 | Stop reason: HOSPADM

## 2018-01-08 RX ORDER — LORAZEPAM 0.5 MG/1
0.5 TABLET ORAL PRN
Status: DISCONTINUED | OUTPATIENT
Start: 2018-01-08 | End: 2018-01-08 | Stop reason: CLARIF

## 2018-01-08 RX ORDER — METOPROLOL SUCCINATE 25 MG/1
25 TABLET, EXTENDED RELEASE ORAL DAILY
Status: DISCONTINUED | OUTPATIENT
Start: 2018-01-09 | End: 2018-01-08

## 2018-01-08 RX ORDER — ISOSORBIDE MONONITRATE 30 MG/1
15 TABLET, EXTENDED RELEASE ORAL DAILY
Status: DISCONTINUED | OUTPATIENT
Start: 2018-01-09 | End: 2018-01-09 | Stop reason: HOSPADM

## 2018-01-08 RX ORDER — EPHEDRINE SULFATE 50 MG/ML
INJECTION INTRAVENOUS PRN
Status: DISCONTINUED | OUTPATIENT
Start: 2018-01-08 | End: 2018-01-08 | Stop reason: SDUPTHER

## 2018-01-08 RX ORDER — ROCURONIUM BROMIDE 10 MG/ML
INJECTION, SOLUTION INTRAVENOUS PRN
Status: DISCONTINUED | OUTPATIENT
Start: 2018-01-08 | End: 2018-01-08 | Stop reason: SDUPTHER

## 2018-01-08 RX ORDER — SODIUM CHLORIDE 0.9 % (FLUSH) 0.9 %
10 SYRINGE (ML) INJECTION PRN
Status: DISCONTINUED | OUTPATIENT
Start: 2018-01-08 | End: 2018-01-09 | Stop reason: HOSPADM

## 2018-01-08 RX ORDER — SODIUM CHLORIDE 0.9 % (FLUSH) 0.9 %
10 SYRINGE (ML) INJECTION EVERY 12 HOURS SCHEDULED
Status: DISCONTINUED | OUTPATIENT
Start: 2018-01-08 | End: 2018-01-09 | Stop reason: HOSPADM

## 2018-01-08 RX ORDER — FENTANYL CITRATE 50 UG/ML
25 INJECTION, SOLUTION INTRAMUSCULAR; INTRAVENOUS EVERY 5 MIN PRN
Status: DISCONTINUED | OUTPATIENT
Start: 2018-01-08 | End: 2018-01-09 | Stop reason: HOSPADM

## 2018-01-08 RX ORDER — SODIUM CHLORIDE 9 MG/ML
INJECTION, SOLUTION INTRAVENOUS CONTINUOUS
Status: DISCONTINUED | OUTPATIENT
Start: 2018-01-08 | End: 2018-01-08

## 2018-01-08 RX ORDER — ONDANSETRON 2 MG/ML
4 INJECTION INTRAMUSCULAR; INTRAVENOUS EVERY 6 HOURS PRN
Status: DISCONTINUED | OUTPATIENT
Start: 2018-01-08 | End: 2018-01-09 | Stop reason: HOSPADM

## 2018-01-08 RX ADMIN — GLYCOPYRROLATE 0.6 MG: 0.2 INJECTION, SOLUTION INTRAMUSCULAR; INTRAVENOUS at 15:26

## 2018-01-08 RX ADMIN — Medication 10 ML: at 20:50

## 2018-01-08 RX ADMIN — NEOSTIGMINE METHYLSULFATE 3 MG: 1 INJECTION, SOLUTION INTRAVENOUS at 15:26

## 2018-01-08 RX ADMIN — LIDOCAINE HYDROCHLORIDE 40 MG: 20 INJECTION, SOLUTION INFILTRATION; PERINEURAL at 13:12

## 2018-01-08 RX ADMIN — EPHEDRINE SULFATE 5 MG: 50 INJECTION, SOLUTION INTRAVENOUS at 14:41

## 2018-01-08 RX ADMIN — EPHEDRINE SULFATE 5 MG: 50 INJECTION, SOLUTION INTRAVENOUS at 13:54

## 2018-01-08 RX ADMIN — FENTANYL CITRATE 50 MCG: 50 INJECTION INTRAMUSCULAR; INTRAVENOUS at 13:38

## 2018-01-08 RX ADMIN — Medication 5 MG: at 13:12

## 2018-01-08 RX ADMIN — FENTANYL CITRATE 50 MCG: 50 INJECTION INTRAMUSCULAR; INTRAVENOUS at 13:12

## 2018-01-08 RX ADMIN — ONDANSETRON 4 MG: 2 INJECTION INTRAMUSCULAR; INTRAVENOUS at 13:18

## 2018-01-08 RX ADMIN — PHENYLEPHRINE HYDROCHLORIDE 100 MCG: 10 INJECTION INTRAMUSCULAR; INTRAVENOUS; SUBCUTANEOUS at 14:11

## 2018-01-08 RX ADMIN — PHENYLEPHRINE HYDROCHLORIDE 100 MCG: 10 INJECTION INTRAMUSCULAR; INTRAVENOUS; SUBCUTANEOUS at 13:56

## 2018-01-08 RX ADMIN — SODIUM CHLORIDE: 9 INJECTION, SOLUTION INTRAVENOUS at 12:00

## 2018-01-08 RX ADMIN — Medication 5 MG: at 14:00

## 2018-01-08 RX ADMIN — FENTANYL CITRATE 25 MCG: 50 INJECTION, SOLUTION INTRAMUSCULAR; INTRAVENOUS at 16:42

## 2018-01-08 RX ADMIN — NEOSTIGMINE METHYLSULFATE 1 MG: 1 INJECTION, SOLUTION INTRAVENOUS at 15:42

## 2018-01-08 RX ADMIN — GLYCOPYRROLATE 0.2 MG: 0.2 INJECTION, SOLUTION INTRAMUSCULAR; INTRAVENOUS at 15:42

## 2018-01-08 RX ADMIN — Medication 20 MG: at 13:38

## 2018-01-08 RX ADMIN — EPHEDRINE SULFATE 10 MG: 50 INJECTION, SOLUTION INTRAVENOUS at 13:32

## 2018-01-08 RX ADMIN — PHENYLEPHRINE HYDROCHLORIDE 50 MCG: 10 INJECTION INTRAMUSCULAR; INTRAVENOUS; SUBCUTANEOUS at 13:54

## 2018-01-08 RX ADMIN — HYDROCODONE BITARTRATE AND ACETAMINOPHEN 1 TABLET: 5; 325 TABLET ORAL at 21:47

## 2018-01-08 RX ADMIN — PROPOFOL 140 MG: 10 INJECTION, EMULSION INTRAVENOUS at 13:12

## 2018-01-08 RX ADMIN — PHENYLEPHRINE HYDROCHLORIDE 50 MCG: 10 INJECTION INTRAMUSCULAR; INTRAVENOUS; SUBCUTANEOUS at 14:44

## 2018-01-08 RX ADMIN — EPHEDRINE SULFATE 5 MG: 50 INJECTION, SOLUTION INTRAVENOUS at 13:56

## 2018-01-08 RX ADMIN — PHENYLEPHRINE HYDROCHLORIDE 150 MCG: 10 INJECTION INTRAMUSCULAR; INTRAVENOUS; SUBCUTANEOUS at 13:23

## 2018-01-08 RX ADMIN — VANCOMYCIN HYDROCHLORIDE 1000 MG: 1 INJECTION, SOLUTION INTRAVENOUS at 12:21

## 2018-01-08 RX ADMIN — HYDROCODONE BITARTRATE AND ACETAMINOPHEN 1 TABLET: 5; 325 TABLET ORAL at 18:29

## 2018-01-08 RX ADMIN — DEXAMETHASONE SODIUM PHOSPHATE 8 MG: 4 INJECTION, SOLUTION INTRAMUSCULAR; INTRAVENOUS at 13:18

## 2018-01-08 RX ADMIN — SUCCINYLCHOLINE CHLORIDE 100 MG: 20 INJECTION, SOLUTION INTRAMUSCULAR; INTRAVENOUS at 13:12

## 2018-01-08 RX ADMIN — EPHEDRINE SULFATE 5 MG: 50 INJECTION, SOLUTION INTRAVENOUS at 14:44

## 2018-01-08 ASSESSMENT — PULMONARY FUNCTION TESTS
PIF_VALUE: 24
PIF_VALUE: 22
PIF_VALUE: 24
PIF_VALUE: 25
PIF_VALUE: 23
PIF_VALUE: 24
PIF_VALUE: 25
PIF_VALUE: 25
PIF_VALUE: 23
PIF_VALUE: 24
PIF_VALUE: 24
PIF_VALUE: 19
PIF_VALUE: 24
PIF_VALUE: 19
PIF_VALUE: 25
PIF_VALUE: 24
PIF_VALUE: 25
PIF_VALUE: 1
PIF_VALUE: 25
PIF_VALUE: 25
PIF_VALUE: 24
PIF_VALUE: 25
PIF_VALUE: 25
PIF_VALUE: 19
PIF_VALUE: 24
PIF_VALUE: 24
PIF_VALUE: 1
PIF_VALUE: 25
PIF_VALUE: 24
PIF_VALUE: 2
PIF_VALUE: 23
PIF_VALUE: 24
PIF_VALUE: 24
PIF_VALUE: 25
PIF_VALUE: 24
PIF_VALUE: 22
PIF_VALUE: 14
PIF_VALUE: 24
PIF_VALUE: 22
PIF_VALUE: 26
PIF_VALUE: 25
PIF_VALUE: 24
PIF_VALUE: 24
PIF_VALUE: 25
PIF_VALUE: 24
PIF_VALUE: 14
PIF_VALUE: 24
PIF_VALUE: 24
PIF_VALUE: 1
PIF_VALUE: 24
PIF_VALUE: 25
PIF_VALUE: 24
PIF_VALUE: 22
PIF_VALUE: 24
PIF_VALUE: 24
PIF_VALUE: 25
PIF_VALUE: 25
PIF_VALUE: 24
PIF_VALUE: 24
PIF_VALUE: 25
PIF_VALUE: 25
PIF_VALUE: 24
PIF_VALUE: 24
PIF_VALUE: 23
PIF_VALUE: 23
PIF_VALUE: 22
PIF_VALUE: 24
PIF_VALUE: 25
PIF_VALUE: 23
PIF_VALUE: 24
PIF_VALUE: 23
PIF_VALUE: 24
PIF_VALUE: 25
PIF_VALUE: 24
PIF_VALUE: 22
PIF_VALUE: 24
PIF_VALUE: 1
PIF_VALUE: 24
PIF_VALUE: 25
PIF_VALUE: 25
PIF_VALUE: 24
PIF_VALUE: 23
PIF_VALUE: 24
PIF_VALUE: 25
PIF_VALUE: 24
PIF_VALUE: 25
PIF_VALUE: 22
PIF_VALUE: 25
PIF_VALUE: 24
PIF_VALUE: 24
PIF_VALUE: 25
PIF_VALUE: 24
PIF_VALUE: 25
PIF_VALUE: 24
PIF_VALUE: 29
PIF_VALUE: 24
PIF_VALUE: 23
PIF_VALUE: 24
PIF_VALUE: 23
PIF_VALUE: 15
PIF_VALUE: 24
PIF_VALUE: 24
PIF_VALUE: 22
PIF_VALUE: 23
PIF_VALUE: 25
PIF_VALUE: 25
PIF_VALUE: 1
PIF_VALUE: 24
PIF_VALUE: 25
PIF_VALUE: 23
PIF_VALUE: 24
PIF_VALUE: 24
PIF_VALUE: 25
PIF_VALUE: 24
PIF_VALUE: 25
PIF_VALUE: 26
PIF_VALUE: 27
PIF_VALUE: 25
PIF_VALUE: 24
PIF_VALUE: 25
PIF_VALUE: 24
PIF_VALUE: 25
PIF_VALUE: 2
PIF_VALUE: 25
PIF_VALUE: 25
PIF_VALUE: 24
PIF_VALUE: 2
PIF_VALUE: 25
PIF_VALUE: 24
PIF_VALUE: 25
PIF_VALUE: 23
PIF_VALUE: 24
PIF_VALUE: 42
PIF_VALUE: 24
PIF_VALUE: 24

## 2018-01-08 ASSESSMENT — PAIN SCALES - GENERAL
PAINLEVEL_OUTOF10: 5
PAINLEVEL_OUTOF10: 3
PAINLEVEL_OUTOF10: 4
PAINLEVEL_OUTOF10: 5
PAINLEVEL_OUTOF10: 6

## 2018-01-08 NOTE — H&P
CHOLECYSTECTOMY   1993    COLONOSCOPY   3/2011    CORONARY ANGIOPLASTY WITH STENT PLACEMENT   2009    HYSTERECTOMY   1976 or 1977     bleeding    JOINT REPLACEMENT   shoulder left    TONSILLECTOMY   childhood            FAMILY HISTORY:  Family History         Family History   Problem Relation Age of Onset    Heart Disease Mother      High Blood Pressure Mother      Cancer Father      Heart Disease Sister      Cancer Brother      Heart Disease Brother              SOCIAL HISTORY:  Social History   Social History            Social History    Marital status:         Spouse name: N/A    Number of children: N/A    Years of education: N/A             Social History Main Topics    Smoking status: Former Smoker       Packs/day: 0.50       Years: 40.00       Types: Cigarettes       Quit date: 1/1/1998    Smokeless tobacco: Never Used    Alcohol use 1.2 oz/week       2 Standard drinks or equivalent per week         Comment: occasional     Drug use: No    Sexual activity: No           Other Topics Concern    None          Social History Narrative    None            MEDICATIONS:  Current Facility-Administered Medications          Current Outpatient Prescriptions   Medication Sig Dispense Refill    doxycycline hyclate (VIBRA-TABS) 100 MG tablet Take 1 tablet by mouth 2 times daily for 10 days 20 tablet 0    LORazepam (ATIVAN) 0.5 MG tablet Take 0.5 mg by mouth as needed for Anxiety        Mirabegron ER (MYRBETRIQ) 25 MG TB24 Take 1 tablet by mouth daily 28 tablet 0    metoprolol succinate (TOPROL XL) 25 MG extended release tablet Take 1 tablet by mouth daily 90 tablet 3    simvastatin (ZOCOR) 40 MG tablet TAKE 1 TABLET BY MOUTH EVERY EVENING 90 tablet 3    isosorbide mononitrate (IMDUR) 30 MG extended release tablet Take 0.5 tablets by mouth daily 45 tablet 3    sacubitril-valsartan (ENTRESTO) 24-26 MG per tablet Take 1 tablet by mouth 2 times daily 60 tablet 0    Multiple Vitamins-Minerals decreased hearing is noted. Nose: Nose normal. No sinus tenderness. No epistaxis. Mouth/Throat: Oropharynx is clear and moist. Mucous membranes are not dry. No oropharyngeal exudate. Eyes: Conjunctivae and EOM are normal. Pupils are equal, round, and reactive to light. Right eye exhibits no discharge. Left eye exhibits no discharge. Neck: Trachea normal. Neck supple. No JVD present. No edema present. No thyroid mass present. Cardiovascular: Normal rate, regular rhythm, normal heart sounds and normal pulses. No extrasystoles are present. Pulmonary/Chest: Effort normal and breath sounds normal. She exhibits no tenderness. Breasts are symmetrical.   Abdominal: Soft. Normal appearance and bowel sounds are normal. She exhibits no mass. There is no hepatosplenomegaly. There is no tenderness. No hernia. Musculoskeletal: Normal range of motion. Right shoulder: Normal. She exhibits normal range of motion and no swelling. Left shoulder: Normal. She exhibits normal range of motion and no swelling. Right hip: Normal. She exhibits normal range of motion and no swelling. Left hip: Normal. She exhibits normal range of motion and no swelling. Right knee: Normal. She exhibits normal range of motion and no swelling. Left knee: Normal. She exhibits normal range of motion and no swelling. Right upper arm: Normal.        Left upper arm: Normal.        Right upper leg: Normal.        Left upper leg: Normal.        Right lower leg: Normal.        Left lower leg: Normal.   Lymphadenopathy:        Head (right side): No submandibular adenopathy present. Head (left side): No submandibular adenopathy present. Neurological: She is alert and oriented to person, place, and time. She has normal strength. She displays normal reflexes. No cranial nerve deficit or sensory deficit. Coordination and gait normal.   Skin: Skin is warm and dry. No lesion and no rash noted.  She is is a NYHA functional class II and has a LBBB with a QRS duration of 170 ms. I discussed with the patient the indication for an ICD and also the indication for a resynchronization device. I explained to the patient the differences between the two devices and the advantages of each. The patient understood the information and consents to the plan of care.     PLAN:  Schedule the patient for insertion of a biventricular pacemaker/ ICD with [employer].

## 2018-01-08 NOTE — ANESTHESIA PRE PROCEDURE
left    TONSILLECTOMY  childhood       Social History:    Social History   Substance Use Topics    Smoking status: Former Smoker     Packs/day: 0.50     Years: 40.00     Types: Cigarettes     Quit date: 1/1/1998    Smokeless tobacco: Never Used    Alcohol use 1.2 oz/week     2 Standard drinks or equivalent per week      Comment: occasional                                 Counseling given: Not Answered      Vital Signs (Current):   Vitals:    01/04/18 0941   Weight: 228 lb (103.4 kg)   Height: 5' 7\" (1.702 m)                                              BP Readings from Last 3 Encounters:   01/03/18 138/88   12/06/17 110/60   11/02/17 110/72       NPO Status:                                                                                 BMI:   Wt Readings from Last 3 Encounters:   01/04/18 228 lb (103.4 kg)   01/03/18 228 lb 6.4 oz (103.6 kg)   12/06/17 222 lb (100.7 kg)     Body mass index is 35.71 kg/m². CBC:   Lab Results   Component Value Date    WBC 8.1 01/02/2018    RBC 4.60 01/02/2018    HGB 14.9 01/02/2018    HCT 43.7 01/02/2018    MCV 95.1 01/02/2018    RDW 13.0 01/02/2018     01/02/2018       CMP:   Lab Results   Component Value Date     01/02/2018    K 4.7 01/02/2018     01/02/2018    CO2 27 01/02/2018    BUN 22 01/02/2018    CREATININE 0.6 01/02/2018    LABGLOM >90 01/02/2018    GLUCOSE 145 01/02/2018    PROT 6.4 08/05/2017    CALCIUM 9.2 01/02/2018    BILITOT 0.7 08/05/2017    ALKPHOS 50 08/05/2017    AST 18 08/05/2017    ALT 14 08/05/2017       POC Tests: No results for input(s): POCGLU, POCNA, POCK, POCCL, POCBUN, POCHEMO, POCHCT in the last 72 hours.     Coags:   Lab Results   Component Value Date    INR 0.97 08/05/2017    APTT 37.1 07/16/2012       HCG (If Applicable): No results found for: PREGTESTUR, PREGSERUM, HCG, HCGQUANT     ABGs: No results found for: PHART, PO2ART, APK0PYF, BFC4BMF, BEART, C4UJTURS     Type & Screen (If Applicable):  Lab Results   Component Value Date    [address] 08/27/2014       Anesthesia Evaluation  Patient summary reviewed  Airway: Mallampati: III  TM distance: >3 FB   Neck ROM: full  Mouth opening: < 3 FB Dental:          Pulmonary:   (+) COPD:  sleep apnea:                             Cardiovascular:    (+) hypertension:, angina:, CAD:, CHF:,       ECG reviewed      Echocardiogram reviewed    Cleared by cardiology              Neuro/Psych:               GI/Hepatic/Renal:   (+) PUD,           Endo/Other:                     Abdominal:           Vascular:                                        Anesthesia Plan      general     ASA 3       Induction: intravenous and rapid sequence. MIPS: Prophylactic antiemetics administered. Anesthetic plan and risks discussed with patient and child/children. Plan discussed with CRNA. Renaldo Barney DO   1/8/2018

## 2018-01-08 NOTE — BRIEF OP NOTE
Brief Postoperative Note    Irina Barahona  YOB: 1940  [id number]    Pre-operative Diagnosis: Nonischemic Cardiomyopathy    Post-operative Diagnosis: Same    Procedure: Insertion of a Biventricular pacemaker/ ICD    Anesthesia: General    Surgeons/Assistants: Monie    Estimated Blood Loss: less than 50     Complications: None    Specimens: Was Not Obtained    Findings: Successful insertion of a Biventricular pacemaker/ ICD                  Successful device and lead testing w/ programming at the time of implant.     Electronically signed by Ulysses Buoy, MD on 1/8/2018 at 4:06 PM

## 2018-01-09 ENCOUNTER — APPOINTMENT (OUTPATIENT)
Dept: GENERAL RADIOLOGY | Age: 78
End: 2018-01-09
Attending: INTERNAL MEDICINE
Payer: MEDICARE

## 2018-01-09 VITALS
TEMPERATURE: 98.5 F | HEIGHT: 67 IN | HEART RATE: 67 BPM | OXYGEN SATURATION: 97 % | WEIGHT: 228 LBS | DIASTOLIC BLOOD PRESSURE: 69 MMHG | RESPIRATION RATE: 18 BRPM | BODY MASS INDEX: 35.79 KG/M2 | SYSTOLIC BLOOD PRESSURE: 153 MMHG

## 2018-01-09 LAB
EKG ATRIAL RATE: 67 BPM
EKG P AXIS: -23 DEGREES
EKG P-R INTERVAL: 128 MS
EKG Q-T INTERVAL: 480 MS
EKG QRS DURATION: 106 MS
EKG QTC CALCULATION (BAZETT): 507 MS
EKG R AXIS: -74 DEGREES
EKG T AXIS: 26 DEGREES
EKG VENTRICULAR RATE: 67 BPM

## 2018-01-09 PROCEDURE — 6360000002 HC RX W HCPCS: Performed by: INTERNAL MEDICINE

## 2018-01-09 PROCEDURE — 71046 X-RAY EXAM CHEST 2 VIEWS: CPT

## 2018-01-09 PROCEDURE — 93005 ELECTROCARDIOGRAM TRACING: CPT

## 2018-01-09 PROCEDURE — 2580000003 HC RX 258: Performed by: INTERNAL MEDICINE

## 2018-01-09 PROCEDURE — 6370000000 HC RX 637 (ALT 250 FOR IP): Performed by: INTERNAL MEDICINE

## 2018-01-09 RX ADMIN — METOPROLOL SUCCINATE 25 MG: 25 TABLET, EXTENDED RELEASE ORAL at 08:50

## 2018-01-09 RX ADMIN — ACETAMINOPHEN 650 MG: 325 TABLET ORAL at 00:25

## 2018-01-09 RX ADMIN — VANCOMYCIN HYDROCHLORIDE 1500 MG: 500 INJECTION, POWDER, LYOPHILIZED, FOR SOLUTION INTRAVENOUS at 00:20

## 2018-01-09 RX ADMIN — ACETAMINOPHEN 650 MG: 325 TABLET ORAL at 08:54

## 2018-01-09 RX ADMIN — ISOSORBIDE MONONITRATE 15 MG: 30 TABLET, EXTENDED RELEASE ORAL at 08:49

## 2018-01-09 RX ADMIN — Medication 10 ML: at 00:20

## 2018-01-09 ASSESSMENT — PAIN SCALES - GENERAL
PAINLEVEL_OUTOF10: 2
PAINLEVEL_OUTOF10: 2
PAINLEVEL_OUTOF10: 3

## 2018-01-09 NOTE — DISCHARGE SUMMARY
CARDIOLOGY ELECTROPHYSIOLOGY   DISCHARGE NOTE    DATE:  1/9/2018    SUBJECTIVE:  The patient did well overnight. There were no events reported by the nursing staff. The patient denies having any chest pain, pleurisy, palpitations, or dyspnea. EXAM:  Vitals:    01/08/18 2000 01/08/18 2100 01/08/18 2200 01/09/18 0500   BP: (!) 144/61 (!) 156/65 (!) 154/77 (!) 153/69   Pulse: 74 73 73 67   Resp: 18 16 14 18   Temp:    98.5 °F (36.9 °C)   TempSrc:       SpO2: 97% 95% 97% 97%   Weight:       Height:         Op Site: Dressing is dry, no hematomas  CV: RRR  RESPIRATIONS: CTA-B  EX: No edma    DIAGNOSTIC STUDIES:    ECG: Biventricular pacing    CXR PA/LAT:   NO pneumothorax, leads in good position. IMPRESSION:  The patient is s/p insertion of a biventricular pacemaker/ ICD. PLAN:  1. D/C: Home    2. F/U: 1. [facility] Call ([phone]) the office upon arrival at home to make a 7-10 day follow up appointment and a 6 week follow-up appointment will be made for a device check. 2. When visiting any hospital or physician, inform them that you have a device    3. MEDS:     Medication List      CONTINUE taking these medications    aspirin 81 MG EC tablet     CALCIUM 600 + D PO     CALCIUM PO     HAIR/SKIN/NAILS PO     isosorbide mononitrate 30 MG extended release tablet  Commonly known as:  IMDUR  Take 0.5 tablets by mouth daily     LORazepam 0.5 MG tablet  Commonly known as:  ATIVAN     metoprolol succinate 25 MG extended release tablet  Commonly known as:  TOPROL XL  Take 1 tablet by mouth daily     OSTEO BI-FLEX ADV TRIPLE ST Tabs     sacubitril-valsartan 24-26 MG per tablet  Commonly known as:  ENTRESTO  Take 1 tablet by mouth 2 times daily     simvastatin 40 MG tablet  Commonly known as:  ZOCOR  TAKE 1 TABLET BY MOUTH EVERY EVENING     UNABLE TO FIND            4. ACTIVITY: Follow discharge activity and wound care instructions. 5. DIET:  Resume previous diet.

## 2018-01-09 NOTE — PLAN OF CARE
Problem: Discharge Planning:  Goal: Discharged to appropriate level of care  Discharged to appropriate level of care   Outcome: Completed Date Met: 01/09/18  Discharge instructions given.      Problem: Gas Exchange - Impaired:  Goal: Ability to maintain normal pulse oximetry readings will improve to within specified parameters  Ability to maintain normal pulse oximetry readings will improve to within specified parameters   Outcome: Completed Date Met: 01/09/18  Pulse ox stable  Goal: Absence of hypoxia  Absence of hypoxia   Outcome: Completed Date Met: 01/09/18  Pulse ox stable  Goal: Will be able to breathe spontaneously, without ventilator support  Will be able to breathe spontaneously, without ventilator support   Respirations easy

## 2018-01-10 ENCOUNTER — TELEPHONE (OUTPATIENT)
Dept: CARDIOLOGY CLINIC | Age: 78
End: 2018-01-10

## 2018-01-11 ENCOUNTER — NURSE ONLY (OUTPATIENT)
Dept: CARDIOLOGY CLINIC | Age: 78
End: 2018-01-11

## 2018-01-11 ENCOUNTER — TELEPHONE (OUTPATIENT)
Dept: CARDIOLOGY CLINIC | Age: 78
End: 2018-01-11

## 2018-01-11 NOTE — TELEPHONE ENCOUNTER
Pt called the office and said she doesn't know if it is just her or it is in her head but she does not know if her pacer site is swollen. Ask pt if it looks infected. Is it red,warm to the touch, any drainage, any pain. Pt said she does not know. Told pt it she thinks that it is swollen she can use an ice pack and take ibuprofen or tylenol if she needs to .  Did offer this pt to come into our office today to have her site assessed and she said she thinks that she will try the tylenol, ice and ibuprofen first and if she thinks it is swollen, she will contact this office tomorrow

## 2018-01-16 ENCOUNTER — NURSE ONLY (OUTPATIENT)
Dept: CARDIOLOGY CLINIC | Age: 78
End: 2018-01-16
Payer: MEDICARE

## 2018-01-16 DIAGNOSIS — Z95.810 S/P ICD (INTERNAL CARDIAC DEFIBRILLATOR) PROCEDURE: Primary | ICD-10-CM

## 2018-01-16 PROCEDURE — 93284 PRGRMG EVAL IMPLANTABLE DFB: CPT | Performed by: INTERNAL MEDICINE

## 2018-01-17 ENCOUNTER — OFFICE VISIT (OUTPATIENT)
Dept: CARDIOLOGY CLINIC | Age: 78
End: 2018-01-17
Payer: MEDICARE

## 2018-01-17 VITALS
BODY MASS INDEX: 34.84 KG/M2 | WEIGHT: 222 LBS | HEIGHT: 67 IN | SYSTOLIC BLOOD PRESSURE: 116 MMHG | HEART RATE: 84 BPM | DIASTOLIC BLOOD PRESSURE: 72 MMHG

## 2018-01-17 DIAGNOSIS — E78.00 PURE HYPERCHOLESTEROLEMIA: ICD-10-CM

## 2018-01-17 DIAGNOSIS — I42.0 DILATED CARDIOMYOPATHY (HCC): Primary | ICD-10-CM

## 2018-01-17 DIAGNOSIS — I10 ESSENTIAL HYPERTENSION: ICD-10-CM

## 2018-01-17 PROCEDURE — 4040F PNEUMOC VAC/ADMIN/RCVD: CPT | Performed by: NUCLEAR MEDICINE

## 2018-01-17 PROCEDURE — G8484 FLU IMMUNIZE NO ADMIN: HCPCS | Performed by: NUCLEAR MEDICINE

## 2018-01-17 PROCEDURE — 99213 OFFICE O/P EST LOW 20 MIN: CPT | Performed by: NUCLEAR MEDICINE

## 2018-01-17 PROCEDURE — G8417 CALC BMI ABV UP PARAM F/U: HCPCS | Performed by: NUCLEAR MEDICINE

## 2018-01-17 PROCEDURE — 1036F TOBACCO NON-USER: CPT | Performed by: NUCLEAR MEDICINE

## 2018-01-17 PROCEDURE — G8427 DOCREV CUR MEDS BY ELIG CLIN: HCPCS | Performed by: NUCLEAR MEDICINE

## 2018-01-17 PROCEDURE — G8598 ASA/ANTIPLAT THER USED: HCPCS | Performed by: NUCLEAR MEDICINE

## 2018-01-17 PROCEDURE — 1090F PRES/ABSN URINE INCON ASSESS: CPT | Performed by: NUCLEAR MEDICINE

## 2018-01-17 PROCEDURE — 1123F ACP DISCUSS/DSCN MKR DOCD: CPT | Performed by: NUCLEAR MEDICINE

## 2018-01-17 PROCEDURE — G8400 PT W/DXA NO RESULTS DOC: HCPCS | Performed by: NUCLEAR MEDICINE

## 2018-01-17 RX ORDER — SIMVASTATIN 40 MG
TABLET ORAL
Qty: 90 TABLET | Refills: 3 | Status: ON HOLD | OUTPATIENT
Start: 2018-01-17 | End: 2018-03-06

## 2018-01-17 RX ORDER — METOPROLOL SUCCINATE 25 MG/1
25 TABLET, EXTENDED RELEASE ORAL DAILY
Qty: 90 TABLET | Refills: 3 | Status: ON HOLD | OUTPATIENT
Start: 2018-01-17 | End: 2018-03-06

## 2018-01-17 RX ORDER — ISOSORBIDE MONONITRATE 30 MG/1
15 TABLET, EXTENDED RELEASE ORAL DAILY
Qty: 45 TABLET | Refills: 3 | Status: ON HOLD | OUTPATIENT
Start: 2018-01-17 | End: 2018-03-06

## 2018-01-17 NOTE — PROGRESS NOTES
[facility]  [facility]  [address]  [address]  [address] [address]  Dept: [phone]  Dept Fax: [phone]  Loc: [phone]    Visit Date: 1/17/2018    Darius Doss is a 77 y.o. female who presents today for:  Chief Complaint   Patient presents with    6 Month Follow-Up    Coronary Artery Disease    Cardiomyopathy    Hypertension     Had a BIV ICD lately   Low Ef   No chest pain  Cardiomyopathy   No changes in breathing  Active  No complication from the ICD     HPI:  HPI  Past Medical History:   Diagnosis Date    Arthritis     general    Breast CA ([facility] 12/03    right    CAD (coronary artery disease) 2009    stent in [place]    CHF (congestive heart failure) ([facility]     Colon polyps 8/97; 3/11    COPD (chronic obstructive pulmonary disease) ([facility] [address] 8/5/2017    Diverticulosis 1/06    Erosive gastritis 11/06    Esophageal stricture 3/11    Hyperlipidemia     Hypertension     Internal hemorrhoid 1/06    LUISA on CPAP     Osteopenia 1999    Stress incontinence, female       Past Surgical History:   Procedure Laterality Date    BREAST LUMPECTOMY  1/04    wtih axillary dissection    CARDIAC DEFIBRILLATOR PLACEMENT      CHOLECYSTECTOMY  1993    COLONOSCOPY  3/2011    CORONARY ANGIOPLASTY WITH STENT PLACEMENT  2009    DILATATION, ESOPHAGUS  2011    HYSTERECTOMY  1976 or 1977    bleeding    JOINT REPLACEMENT  shoulder left    TONSILLECTOMY  childhood     Family History   Problem Relation Age of Onset    Heart Disease Mother     High Blood Pressure Mother     Cancer Father     Heart Disease Sister     Cancer Brother     Heart Disease Brother      Social History   Substance Use Topics    Smoking status: Former Smoker     Packs/day: 0.50     Years: 40.00     Types: Cigarettes     Quit date: 1/1/1998    Smokeless tobacco: Never Used    Alcohol use 1.2 oz/week     2 Standard drinks or equivalent per week      Comment: occasional       Current pulses      Objective:  Physical Exam  /72   Pulse 84   Ht 5' 7\" (1.702 m)   Wt 222 lb (100.7 kg)   BMI 34.77 kg/m²   General:   Well developed, well nourished  Lungs:   Clear to auscultation  Heart:    Normal S1 S2, Slight murmur. no rubs, no gallops  Abdomen:   Soft, non tender, no organomegalies, positive bowel sounds  Extremities:   No edema, no cyanosis, good peripheral pulses  Neurological:   Awake, alert, oriented. No obvious focal deficits  Musculoskelatal:  No obvious deformities    Assessment:     1. Dilated cardiomyopathy ([address]     2. Essential hypertension     3. Pure hypercholesterolemia     cardiac fair for now     Plan:  No Follow-up on file. As above  Continue risk factor modification and medical management  Thank you for allowing me to participate in the care of your patient. Please don't hesitate to contact me regarding any further issues related to the patient care    Orders Placed:  No orders of the defined types were placed in this encounter. Medications Prescribed:  No orders of the defined types were placed in this encounter. Discussed use, benefit, and side effects of prescribed medications. All patient questions answered. Pt voiced understanding. Instructed to continue current medications, diet and exercise. Continue risk factor modification and medical management. Patient agreed with treatment plan. Follow up as directed.     Electronically signed by Mariah Trujillo MD on 1/17/2018 at 10:49 AM

## 2018-01-25 ENCOUNTER — HOSPITAL ENCOUNTER (EMERGENCY)
Age: 78
Discharge: HOME | End: 2018-01-26
Attending: EMERGENCY MEDICINE
Payer: COMMERCIAL

## 2018-01-25 DIAGNOSIS — S29.012A UPPER BACK STRAIN, INITIAL ENCOUNTER: ICD-10-CM
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
## 2018-01-25 DIAGNOSIS — I10 PRIMARY HYPERTENSION: ICD-10-CM

## 2018-01-25 LAB
EKG ATRIAL RATE: 82 BPM
EKG P AXIS: 90 DEGREES
EKG P-R INTERVAL: 222 MS
EKG Q-T INTERVAL: 446 MS
EKG QRS DURATION: 154 MS
EKG QTC CALCULATION (BAZETT): 521 MS
EKG R AXIS: -88 DEGREES
EKG T AXIS: 81 DEGREES
EKG VENTRICULAR RATE: 82 BPM

## 2018-01-25 PROCEDURE — 99284 EMERGENCY DEPT VISIT MOD MDM: CPT

## 2018-01-25 PROCEDURE — 93005 ELECTROCARDIOGRAM TRACING: CPT

## 2018-01-25 ASSESSMENT — PAIN DESCRIPTION - PAIN TYPE: TYPE: ACUTE PAIN

## 2018-01-25 ASSESSMENT — PAIN DESCRIPTION - LOCATION: LOCATION: ABDOMEN;BACK

## 2018-01-25 ASSESSMENT — PAIN DESCRIPTION - DESCRIPTORS: DESCRIPTORS: ACHING

## 2018-01-25 ASSESSMENT — PAIN DESCRIPTION - ORIENTATION: ORIENTATION: RIGHT

## 2018-01-25 ASSESSMENT — PAIN SCALES - GENERAL: PAINLEVEL_OUTOF10: 8

## 2018-01-25 ASSESSMENT — PAIN DESCRIPTION - FREQUENCY: FREQUENCY: CONTINUOUS

## 2018-01-26 ENCOUNTER — APPOINTMENT (OUTPATIENT)
Dept: CT IMAGING | Age: 78
End: 2018-01-26
Payer: COMMERCIAL

## 2018-01-26 VITALS
HEART RATE: 74 BPM | BODY MASS INDEX: 34.84 KG/M2 | WEIGHT: 222 LBS | RESPIRATION RATE: 17 BRPM | SYSTOLIC BLOOD PRESSURE: 157 MMHG | HEIGHT: 67 IN | OXYGEN SATURATION: 96 % | DIASTOLIC BLOOD PRESSURE: 69 MMHG | TEMPERATURE: 97.4 F

## 2018-01-26 LAB
ANION GAP SERPL CALCULATED.3IONS-SCNC: 12 MEQ/L (ref 8–16)
BASOPHILS # BLD: 0.2 %
BASOPHILS ABSOLUTE: 0 THOU/MM3 (ref 0–0.1)
BUN BLDV-MCNC: 21 MG/DL (ref 7–22)
CALCIUM SERPL-MCNC: 9 MG/DL (ref 8.5–10.5)
CHLORIDE BLD-SCNC: 104 MEQ/L (ref 98–111)
CO2: 28 MEQ/L (ref 23–33)
CREAT SERPL-MCNC: 0.5 MG/DL (ref 0.4–1.2)
EOSINOPHIL # BLD: 0.7 %
EOSINOPHILS ABSOLUTE: 0.1 THOU/MM3 (ref 0–0.4)
GFR SERPL CREATININE-BSD FRML MDRD: > 90 ML/MIN/1.73M2
GLUCOSE BLD-MCNC: 104 MG/DL (ref 70–108)
HCT VFR BLD CALC: 40.5 % (ref 37–47)
HEMOGLOBIN: 13.7 GM/DL (ref 12–16)
LYMPHOCYTES # BLD: 14.2 %
LYMPHOCYTES ABSOLUTE: 1.4 THOU/MM3 (ref 1–4.8)
MCH RBC QN AUTO: 31.3 PG (ref 27–31)
MCHC RBC AUTO-ENTMCNC: 33.8 GM/DL (ref 33–37)
MCV RBC AUTO: 92.5 FL (ref 81–99)
MONOCYTES # BLD: 6.4 %
MONOCYTES ABSOLUTE: 0.6 THOU/MM3 (ref 0.4–1.3)
NUCLEATED RED BLOOD CELLS: 0 /100 WBC
OSMOLALITY CALCULATION: 290.1 MOSMOL/KG (ref 275–300)
PDW BLD-RTO: 13.8 % (ref 11.5–14.5)
PLATELET # BLD: 191 THOU/MM3 (ref 130–400)
PMV BLD AUTO: 8.9 MCM (ref 7.4–10.4)
POTASSIUM SERPL-SCNC: 4 MEQ/L (ref 3.5–5.2)
RBC # BLD: 4.38 MILL/MM3 (ref 4.2–5.4)
SEG NEUTROPHILS: 78.5 %
SEGMENTED NEUTROPHILS ABSOLUTE COUNT: 7.9 THOU/MM3 (ref 1.8–7.7)
SODIUM BLD-SCNC: 144 MEQ/L (ref 135–145)
WBC # BLD: 10.1 THOU/MM3 (ref 4.8–10.8)

## 2018-01-26 PROCEDURE — 6370000000 HC RX 637 (ALT 250 FOR IP): Performed by: EMERGENCY MEDICINE

## 2018-01-26 PROCEDURE — 36415 COLL VENOUS BLD VENIPUNCTURE: CPT

## 2018-01-26 PROCEDURE — 85025 COMPLETE CBC W/AUTO DIFF WBC: CPT

## 2018-01-26 PROCEDURE — 71260 CT THORAX DX C+: CPT

## 2018-01-26 PROCEDURE — 6360000004 HC RX CONTRAST MEDICATION: Performed by: EMERGENCY MEDICINE

## 2018-01-26 PROCEDURE — 76376 3D RENDER W/INTRP POSTPROCES: CPT

## 2018-01-26 PROCEDURE — 80048 BASIC METABOLIC PNL TOTAL CA: CPT

## 2018-01-26 RX ORDER — ACETAMINOPHEN 500 MG
1000 TABLET ORAL ONCE
Status: COMPLETED | OUTPATIENT
Start: 2018-01-26 | End: 2018-01-26

## 2018-01-26 RX ADMIN — ACETAMINOPHEN 1000 MG: 500 TABLET ORAL at 03:16

## 2018-01-26 RX ADMIN — IOPAMIDOL 80 ML: 755 INJECTION, SOLUTION INTRAVENOUS at 01:52

## 2018-01-26 ASSESSMENT — ENCOUNTER SYMPTOMS
EYE PAIN: 0
DIARRHEA: 0
COUGH: 0
NAUSEA: 0
ABDOMINAL PAIN: 0
BACK PAIN: 1
SORE THROAT: 0
VOMITING: 0
WHEEZING: 0
SHORTNESS OF BREATH: 0
EYE DISCHARGE: 0
RHINORRHEA: 0

## 2018-01-26 ASSESSMENT — PAIN SCALES - GENERAL: PAINLEVEL_OUTOF10: 8

## 2018-01-26 NOTE — ED NOTES
Pt resting on cot, respires easy and unlabored. Updated on POC. Will continue to monitor.       Hazel Davis RN  01/26/18 7200
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
## 2018-01-26 NOTE — ED NOTES
Pt resting on cot, respires easy and unlabored. Family at bedside.       Dalia Cazares RN  01/26/18 0025

## 2018-01-30 ENCOUNTER — PROCEDURE VISIT (OUTPATIENT)
Dept: CARDIOLOGY CLINIC | Age: 78
End: 2018-01-30

## 2018-01-30 DIAGNOSIS — Z95.810 S/P ICD (INTERNAL CARDIAC DEFIBRILLATOR) PROCEDURE: Primary | ICD-10-CM

## 2018-01-31 ENCOUNTER — OFFICE VISIT (OUTPATIENT)
Dept: FAMILY MEDICINE CLINIC | Age: 78
End: 2018-01-31

## 2018-01-31 VITALS
RESPIRATION RATE: 16 BRPM | HEIGHT: 67 IN | WEIGHT: 224.6 LBS | DIASTOLIC BLOOD PRESSURE: 74 MMHG | SYSTOLIC BLOOD PRESSURE: 122 MMHG | HEART RATE: 86 BPM | BODY MASS INDEX: 35.25 KG/M2

## 2018-01-31 DIAGNOSIS — I10 ESSENTIAL HYPERTENSION: ICD-10-CM

## 2018-01-31 DIAGNOSIS — M54.9 UPPER BACK PAIN: ICD-10-CM
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
## 2018-01-31 PROCEDURE — 99213 OFFICE O/P EST LOW 20 MIN: CPT | Performed by: FAMILY MEDICINE

## 2018-01-31 RX ORDER — TIZANIDINE 2 MG/1
2 TABLET ORAL EVERY 8 HOURS PRN
Qty: 30 TABLET | Refills: 0 | Status: SHIPPED | OUTPATIENT
Start: 2018-01-31 | End: 2018-01-31 | Stop reason: SDUPTHER

## 2018-01-31 RX ORDER — TIZANIDINE 2 MG/1
2 TABLET ORAL EVERY 8 HOURS PRN
Qty: 30 TABLET | Refills: 0 | Status: ON HOLD | OUTPATIENT
Start: 2018-01-31 | End: 2018-02-04 | Stop reason: HOSPADM

## 2018-01-31 ASSESSMENT — ENCOUNTER SYMPTOMS
ABDOMINAL PAIN: 0
SHORTNESS OF BREATH: 0
VOMITING: 0
NAUSEA: 0
CONSTIPATION: 0
EYES NEGATIVE: 1
BACK PAIN: 1
DIARRHEA: 0
COUGH: 0
CHEST TIGHTNESS: 0

## 2018-01-31 NOTE — PROGRESS NOTES
 Stress incontinence, female        Past Surgical History:   Procedure Laterality Date    BREAST LUMPECTOMY  1/04    wtih axillary dissection    CARDIAC DEFIBRILLATOR PLACEMENT      CHOLECYSTECTOMY  1993    COLONOSCOPY  3/2011    CORONARY ANGIOPLASTY WITH STENT PLACEMENT  2009    DILATATION, ESOPHAGUS  2011    HYSTERECTOMY  1976 or 1977    bleeding    JOINT REPLACEMENT  shoulder left    TONSILLECTOMY  childhood       Family History   Problem Relation Age of Onset    Heart Disease Mother     High Blood Pressure Mother     Cancer Father     Heart Disease Sister     Cancer Brother     Heart Disease Brother      Subjective:      Review of Systems   Constitutional: Negative for activity change, appetite change, diaphoresis, fatigue and fever. HENT: Negative. Eyes: Negative. Respiratory: Negative for cough, chest tightness and shortness of breath. Cardiovascular: Negative for chest pain, palpitations and leg swelling. Gastrointestinal: Negative for abdominal pain, constipation, diarrhea, nausea and vomiting. Genitourinary: Negative. Musculoskeletal: Positive for arthralgias and back pain. Skin: Negative. Negative for rash. Neurological: Negative for dizziness, syncope, weakness, light-headedness, numbness and headaches. Psychiatric/Behavioral: The patient is nervous/anxious. Objective:   /74 (Site: Left Arm, Position: Sitting, Cuff Size: Large Adult)   Pulse 86   Resp 16   Ht 5' 7\" (1.702 m)   Wt 224 lb 9.6 oz (101.9 kg)   Breastfeeding? No   BMI 35.18 kg/m²   Wt Readings from Last 3 Encounters:   01/31/18 224 lb 9.6 oz (101.9 kg)   01/25/18 222 lb (100.7 kg)   01/17/18 222 lb (100.7 kg)     Physical Exam   Constitutional: She is oriented to person, place, and time. She appears well-developed and well-nourished. No distress. HENT:   Head: Normocephalic and atraumatic. Eyes: Conjunctivae and EOM are normal. Pupils are equal, round, and reactive to light.

## 2018-02-03 ENCOUNTER — APPOINTMENT (OUTPATIENT)
Dept: GENERAL RADIOLOGY | Age: 78
End: 2018-02-03
Payer: MEDICARE

## 2018-02-03 ENCOUNTER — HOSPITAL ENCOUNTER (OUTPATIENT)
Age: 78
Setting detail: OBSERVATION
Discharge: HOME | End: 2018-02-04
Attending: INTERNAL MEDICINE | Admitting: FAMILY MEDICINE
Payer: MEDICARE

## 2018-02-03 ENCOUNTER — APPOINTMENT (OUTPATIENT)
Dept: CT IMAGING | Age: 78
End: 2018-02-03
Payer: MEDICARE

## 2018-02-03 DIAGNOSIS — S09.90XA CLOSED HEAD INJURY, INITIAL ENCOUNTER: ICD-10-CM

## 2018-02-03 DIAGNOSIS — S09.90XA INJURY OF HEAD, INITIAL ENCOUNTER: Primary | ICD-10-CM

## 2018-02-03 PROBLEM — S00.91XA ABRASION OF HEAD: Status: ACTIVE | Noted: 2018-02-03

## 2018-02-03 PROBLEM — S30.1XXA CONTUSION OF FLANK AND BACK: Status: ACTIVE | Noted: 2018-02-03

## 2018-02-03 PROBLEM — S20.229A CONTUSION OF FLANK AND BACK: Status: ACTIVE | Noted: 2018-02-03

## 2018-02-03 PROBLEM — S40.021A TRAUMATIC ECCHYMOSIS OF RIGHT UPPER ARM: Status: ACTIVE | Noted: 2018-02-03

## 2018-02-03 PROBLEM — S06.0XAA CLOSED HEAD INJURY WITH CONCUSSION: Status: ACTIVE | Noted: 2018-02-03

## 2018-02-03 PROBLEM — S00.03XA HEMATOMA OF SCALP: Status: ACTIVE | Noted: 2018-02-03

## 2018-02-03 LAB
ANION GAP SERPL CALCULATED.3IONS-SCNC: 14 MEQ/L (ref 8–16)
BASOPHILS # BLD: 0 %
BASOPHILS ABSOLUTE: 0 THOU/MM3 (ref 0–0.1)
BUN BLDV-MCNC: 18 MG/DL (ref 7–22)
CALCIUM SERPL-MCNC: 8.9 MG/DL (ref 8.5–10.5)
CHLORIDE BLD-SCNC: 103 MEQ/L (ref 98–111)
CO2: 25 MEQ/L (ref 23–33)
CREAT SERPL-MCNC: 0.3 MG/DL (ref 0.4–1.2)
EKG ATRIAL RATE: 74 BPM
EKG P AXIS: 62 DEGREES
EKG P-R INTERVAL: 98 MS
EKG Q-T INTERVAL: 472 MS
EKG QRS DURATION: 148 MS
EKG QTC CALCULATION (BAZETT): 523 MS
EKG R AXIS: -90 DEGREES
EKG T AXIS: 78 DEGREES
EKG VENTRICULAR RATE: 74 BPM
EOSINOPHIL # BLD: 0.6 %
EOSINOPHILS ABSOLUTE: 0.1 THOU/MM3 (ref 0–0.4)
GFR SERPL CREATININE-BSD FRML MDRD: > 90 ML/MIN/1.73M2
GLUCOSE BLD-MCNC: 122 MG/DL (ref 70–108)
HCT VFR BLD CALC: 38.5 % (ref 37–47)
HEMOGLOBIN: 13.3 GM/DL (ref 12–16)
INR BLD: 1 (ref 0.85–1.13)
LYMPHOCYTES # BLD: 7.5 %
LYMPHOCYTES ABSOLUTE: 0.9 THOU/MM3 (ref 1–4.8)
MCH RBC QN AUTO: 31.5 PG (ref 27–31)
MCHC RBC AUTO-ENTMCNC: 34.4 GM/DL (ref 33–37)
MCV RBC AUTO: 91.6 FL (ref 81–99)
MONOCYTES # BLD: 4.6 %
MONOCYTES ABSOLUTE: 0.5 THOU/MM3 (ref 0.4–1.3)
NUCLEATED RED BLOOD CELLS: 0 /100 WBC
OSMOLALITY CALCULATION: 286.3 MOSMOL/KG (ref 275–300)
PDW BLD-RTO: 13.4 % (ref 11.5–14.5)
PLATELET # BLD: 185 THOU/MM3 (ref 130–400)
PMV BLD AUTO: 9.2 FL (ref 7.4–10.4)
POTASSIUM SERPL-SCNC: 4 MEQ/L (ref 3.5–5.2)
RBC # BLD: 4.21 MILL/MM3 (ref 4.2–5.4)
SEG NEUTROPHILS: 87.3 %
SEGMENTED NEUTROPHILS ABSOLUTE COUNT: 10.3 THOU/MM3 (ref 1.8–7.7)
SODIUM BLD-SCNC: 142 MEQ/L (ref 135–145)
TROPONIN T: < 0.01 NG/ML
WBC # BLD: 11.8 THOU/MM3 (ref 4.8–10.8)

## 2018-02-03 PROCEDURE — 2580000003 HC RX 258: Performed by: FAMILY MEDICINE

## 2018-02-03 PROCEDURE — 70450 CT HEAD/BRAIN W/O DYE: CPT

## 2018-02-03 PROCEDURE — 6370000000 HC RX 637 (ALT 250 FOR IP): Performed by: FAMILY MEDICINE

## 2018-02-03 PROCEDURE — 96375 TX/PRO/DX INJ NEW DRUG ADDON: CPT

## 2018-02-03 PROCEDURE — A6252 ABSORPT DRG >16 <=48 W/O BDR: HCPCS

## 2018-02-03 PROCEDURE — 99219 PR INITIAL OBSERVATION CARE/DAY 50 MINUTES: CPT | Performed by: FAMILY MEDICINE

## 2018-02-03 PROCEDURE — A6446 CONFORM BAND S W>=3" <5"/YD: HCPCS

## 2018-02-03 PROCEDURE — 84484 ASSAY OF TROPONIN QUANT: CPT

## 2018-02-03 PROCEDURE — 73060 X-RAY EXAM OF HUMERUS: CPT

## 2018-02-03 PROCEDURE — 72125 CT NECK SPINE W/O DYE: CPT

## 2018-02-03 PROCEDURE — G0378 HOSPITAL OBSERVATION PER HR: HCPCS

## 2018-02-03 PROCEDURE — 71045 X-RAY EXAM CHEST 1 VIEW: CPT

## 2018-02-03 PROCEDURE — 96376 TX/PRO/DX INJ SAME DRUG ADON: CPT

## 2018-02-03 PROCEDURE — 99285 EMERGENCY DEPT VISIT HI MDM: CPT

## 2018-02-03 PROCEDURE — 90471 IMMUNIZATION ADMIN: CPT | Performed by: INTERNAL MEDICINE

## 2018-02-03 PROCEDURE — 6360000002 HC RX W HCPCS: Performed by: INTERNAL MEDICINE

## 2018-02-03 PROCEDURE — A6402 STERILE GAUZE <= 16 SQ IN: HCPCS

## 2018-02-03 PROCEDURE — 85610 PROTHROMBIN TIME: CPT

## 2018-02-03 PROCEDURE — 93000 ELECTROCARDIOGRAM COMPLETE: CPT | Performed by: INTERNAL MEDICINE

## 2018-02-03 PROCEDURE — 6360000002 HC RX W HCPCS: Performed by: FAMILY MEDICINE

## 2018-02-03 PROCEDURE — 93005 ELECTROCARDIOGRAM TRACING: CPT

## 2018-02-03 PROCEDURE — 96374 THER/PROPH/DIAG INJ IV PUSH: CPT

## 2018-02-03 PROCEDURE — 36415 COLL VENOUS BLD VENIPUNCTURE: CPT

## 2018-02-03 PROCEDURE — 90715 TDAP VACCINE 7 YRS/> IM: CPT | Performed by: INTERNAL MEDICINE

## 2018-02-03 PROCEDURE — 73030 X-RAY EXAM OF SHOULDER: CPT

## 2018-02-03 PROCEDURE — 6370000000 HC RX 637 (ALT 250 FOR IP): Performed by: INTERNAL MEDICINE

## 2018-02-03 PROCEDURE — 85025 COMPLETE CBC W/AUTO DIFF WBC: CPT

## 2018-02-03 PROCEDURE — 73502 X-RAY EXAM HIP UNI 2-3 VIEWS: CPT

## 2018-02-03 PROCEDURE — 80048 BASIC METABOLIC PNL TOTAL CA: CPT

## 2018-02-03 RX ORDER — HYDROCODONE BITARTRATE AND ACETAMINOPHEN 5; 325 MG/1; MG/1
2 TABLET ORAL EVERY 4 HOURS PRN
Status: DISCONTINUED | OUTPATIENT
Start: 2018-02-03 | End: 2018-02-03

## 2018-02-03 RX ORDER — POTASSIUM CHLORIDE 7.45 MG/ML
10 INJECTION INTRAVENOUS PRN
Status: DISCONTINUED | OUTPATIENT
Start: 2018-02-03 | End: 2018-02-04 | Stop reason: HOSPADM

## 2018-02-03 RX ORDER — KETOROLAC TROMETHAMINE 30 MG/ML
15 INJECTION, SOLUTION INTRAMUSCULAR; INTRAVENOUS EVERY 6 HOURS
Status: DISCONTINUED | OUTPATIENT
Start: 2018-02-03 | End: 2018-02-04 | Stop reason: HOSPADM

## 2018-02-03 RX ORDER — TIZANIDINE 4 MG/1
2 TABLET ORAL EVERY 8 HOURS PRN
Status: DISCONTINUED | OUTPATIENT
Start: 2018-02-03 | End: 2018-02-03

## 2018-02-03 RX ORDER — SIMVASTATIN 40 MG
40 TABLET ORAL NIGHTLY
Status: DISCONTINUED | OUTPATIENT
Start: 2018-02-03 | End: 2018-02-04 | Stop reason: HOSPADM

## 2018-02-03 RX ORDER — BUTALBITAL, ACETAMINOPHEN AND CAFFEINE 50; 325; 40 MG/1; MG/1; MG/1
1 TABLET ORAL EVERY 4 HOURS PRN
Status: DISCONTINUED | OUTPATIENT
Start: 2018-02-03 | End: 2018-02-04 | Stop reason: HOSPADM

## 2018-02-03 RX ORDER — TIZANIDINE 4 MG/1
1 TABLET ORAL EVERY 8 HOURS PRN
Status: DISCONTINUED | OUTPATIENT
Start: 2018-02-03 | End: 2018-02-04 | Stop reason: HOSPADM

## 2018-02-03 RX ORDER — ISOSORBIDE MONONITRATE 30 MG/1
15 TABLET, EXTENDED RELEASE ORAL DAILY
Status: DISCONTINUED | OUTPATIENT
Start: 2018-02-04 | End: 2018-02-04 | Stop reason: HOSPADM

## 2018-02-03 RX ORDER — LORAZEPAM 0.5 MG/1
0.5 TABLET ORAL PRN
Status: DISCONTINUED | OUTPATIENT
Start: 2018-02-03 | End: 2018-02-03

## 2018-02-03 RX ORDER — LORAZEPAM 0.5 MG/1
0.5 TABLET ORAL 2 TIMES DAILY PRN
Status: DISCONTINUED | OUTPATIENT
Start: 2018-02-03 | End: 2018-02-04

## 2018-02-03 RX ORDER — ONDANSETRON 2 MG/ML
4 INJECTION INTRAMUSCULAR; INTRAVENOUS EVERY 6 HOURS PRN
Status: DISCONTINUED | OUTPATIENT
Start: 2018-02-03 | End: 2018-02-04 | Stop reason: HOSPADM

## 2018-02-03 RX ORDER — OYSTER SHELL CALCIUM WITH VITAMIN D 500; 200 MG/1; [IU]/1
1 TABLET, FILM COATED ORAL DAILY
Status: DISCONTINUED | OUTPATIENT
Start: 2018-02-04 | End: 2018-02-04 | Stop reason: HOSPADM

## 2018-02-03 RX ORDER — MECLIZINE HYDROCHLORIDE CHEWABLE TABLETS 25 MG/1
25 TABLET, CHEWABLE ORAL ONCE
Status: COMPLETED | OUTPATIENT
Start: 2018-02-03 | End: 2018-02-03

## 2018-02-03 RX ORDER — ACETAMINOPHEN 325 MG/1
650 TABLET ORAL EVERY 4 HOURS PRN
Status: DISCONTINUED | OUTPATIENT
Start: 2018-02-03 | End: 2018-02-04 | Stop reason: HOSPADM

## 2018-02-03 RX ORDER — ASPIRIN 81 MG/1
81 TABLET ORAL DAILY
Status: DISCONTINUED | OUTPATIENT
Start: 2018-02-04 | End: 2018-02-04 | Stop reason: HOSPADM

## 2018-02-03 RX ORDER — KETOROLAC TROMETHAMINE 30 MG/ML
15 INJECTION, SOLUTION INTRAMUSCULAR; INTRAVENOUS ONCE
Status: COMPLETED | OUTPATIENT
Start: 2018-02-03 | End: 2018-02-03

## 2018-02-03 RX ORDER — SODIUM CHLORIDE 0.9 % (FLUSH) 0.9 %
10 SYRINGE (ML) INJECTION EVERY 12 HOURS SCHEDULED
Status: DISCONTINUED | OUTPATIENT
Start: 2018-02-03 | End: 2018-02-04 | Stop reason: HOSPADM

## 2018-02-03 RX ORDER — SODIUM CHLORIDE 0.9 % (FLUSH) 0.9 %
10 SYRINGE (ML) INJECTION PRN
Status: DISCONTINUED | OUTPATIENT
Start: 2018-02-03 | End: 2018-02-04 | Stop reason: HOSPADM

## 2018-02-03 RX ORDER — LORAZEPAM 2 MG/ML
0.5 INJECTION INTRAMUSCULAR ONCE
Status: COMPLETED | OUTPATIENT
Start: 2018-02-03 | End: 2018-02-03

## 2018-02-03 RX ORDER — METOPROLOL SUCCINATE 25 MG/1
25 TABLET, EXTENDED RELEASE ORAL DAILY
Status: DISCONTINUED | OUTPATIENT
Start: 2018-02-03 | End: 2018-02-04 | Stop reason: HOSPADM

## 2018-02-03 RX ORDER — HYDROCODONE BITARTRATE AND ACETAMINOPHEN 5; 325 MG/1; MG/1
1 TABLET ORAL EVERY 4 HOURS PRN
Status: DISCONTINUED | OUTPATIENT
Start: 2018-02-03 | End: 2018-02-03

## 2018-02-03 RX ADMIN — KETOROLAC TROMETHAMINE 15 MG: 30 INJECTION, SOLUTION INTRAMUSCULAR at 17:10

## 2018-02-03 RX ADMIN — KETOROLAC TROMETHAMINE 15 MG: 30 INJECTION, SOLUTION INTRAMUSCULAR at 20:52

## 2018-02-03 RX ADMIN — TETANUS TOXOID, REDUCED DIPHTHERIA TOXOID AND ACELLULAR PERTUSSIS VACCINE, ADSORBED 0.5 ML: 5; 2.5; 8; 8; 2.5 SUSPENSION INTRAMUSCULAR at 16:42

## 2018-02-03 RX ADMIN — SACUBITRIL AND VALSARTAN 1 TABLET: 24; 26 TABLET, FILM COATED ORAL at 21:12

## 2018-02-03 RX ADMIN — Medication 10 ML: at 21:05

## 2018-02-03 RX ADMIN — SIMVASTATIN 40 MG: 40 TABLET, FILM COATED ORAL at 21:12

## 2018-02-03 RX ADMIN — MECLIZINE HCL 25 MG: 25 TABLET, CHEWABLE ORAL at 16:41

## 2018-02-03 RX ADMIN — LORAZEPAM 0.5 MG: 2 INJECTION INTRAMUSCULAR; INTRAVENOUS at 14:20

## 2018-02-03 ASSESSMENT — PAIN DESCRIPTION - ORIENTATION
ORIENTATION: LEFT;POSTERIOR
ORIENTATION: MID

## 2018-02-03 ASSESSMENT — PAIN DESCRIPTION - DESCRIPTORS
DESCRIPTORS: SHARP
DESCRIPTORS: ACHING

## 2018-02-03 ASSESSMENT — PAIN SCALES - GENERAL
PAINLEVEL_OUTOF10: 10
PAINLEVEL_OUTOF10: 10
PAINLEVEL_OUTOF10: 7
PAINLEVEL_OUTOF10: 8

## 2018-02-03 ASSESSMENT — ENCOUNTER SYMPTOMS
NAUSEA: 0
WHEEZING: 0
DIARRHEA: 0
EYE DISCHARGE: 0
VOMITING: 0
COUGH: 0
SORE THROAT: 0
BACK PAIN: 1
SHORTNESS OF BREATH: 0
ABDOMINAL PAIN: 0
RHINORRHEA: 0
EYE PAIN: 0

## 2018-02-03 ASSESSMENT — PAIN DESCRIPTION - LOCATION
LOCATION: BACK
LOCATION: HEAD
LOCATION: BACK

## 2018-02-03 ASSESSMENT — PAIN DESCRIPTION - PAIN TYPE
TYPE: ACUTE PAIN
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
## 2018-02-04 VITALS
HEIGHT: 67 IN | HEART RATE: 74 BPM | OXYGEN SATURATION: 91 % | SYSTOLIC BLOOD PRESSURE: 102 MMHG | TEMPERATURE: 98.2 F | RESPIRATION RATE: 18 BRPM | DIASTOLIC BLOOD PRESSURE: 59 MMHG | WEIGHT: 224.6 LBS | BODY MASS INDEX: 35.25 KG/M2

## 2018-02-04 LAB
ALBUMIN SERPL-MCNC: 3.3 G/DL (ref 3.5–5.1)
ALP BLD-CCNC: 56 U/L (ref 38–126)
ALT SERPL-CCNC: 10 U/L (ref 11–66)
ANION GAP SERPL CALCULATED.3IONS-SCNC: 10 MEQ/L (ref 8–16)
AST SERPL-CCNC: 16 U/L (ref 5–40)
BASOPHILS # BLD: 0.4 %
BASOPHILS ABSOLUTE: 0 THOU/MM3 (ref 0–0.1)
BILIRUB SERPL-MCNC: 1.1 MG/DL (ref 0.3–1.2)
BUN BLDV-MCNC: 21 MG/DL (ref 7–22)
CALCIUM SERPL-MCNC: 8.8 MG/DL (ref 8.5–10.5)
CHLORIDE BLD-SCNC: 106 MEQ/L (ref 98–111)
CO2: 27 MEQ/L (ref 23–33)
CREAT SERPL-MCNC: 0.4 MG/DL (ref 0.4–1.2)
EOSINOPHIL # BLD: 1.6 %
EOSINOPHILS ABSOLUTE: 0.1 THOU/MM3 (ref 0–0.4)
GFR SERPL CREATININE-BSD FRML MDRD: > 90 ML/MIN/1.73M2
GLUCOSE BLD-MCNC: 165 MG/DL (ref 70–108)
GLUCOSE BLD-MCNC: 96 MG/DL (ref 70–108)
HCT VFR BLD CALC: 36.4 % (ref 37–47)
HEMOGLOBIN: 12.9 GM/DL (ref 12–16)
LYMPHOCYTES # BLD: 19.5 %
LYMPHOCYTES ABSOLUTE: 1.3 THOU/MM3 (ref 1–4.8)
MCH RBC QN AUTO: 32.8 PG (ref 27–31)
MCHC RBC AUTO-ENTMCNC: 35.5 GM/DL (ref 33–37)
MCV RBC AUTO: 92.5 FL (ref 81–99)
MONOCYTES # BLD: 7.9 %
MONOCYTES ABSOLUTE: 0.5 THOU/MM3 (ref 0.4–1.3)
NUCLEATED RED BLOOD CELLS: 0 /100 WBC
PDW BLD-RTO: 13.2 % (ref 11.5–14.5)
PLATELET # BLD: 158 THOU/MM3 (ref 130–400)
PMV BLD AUTO: 8.7 FL (ref 7.4–10.4)
POTASSIUM REFLEX MAGNESIUM: 3.6 MEQ/L (ref 3.5–5.2)
RBC # BLD: 3.93 MILL/MM3 (ref 4.2–5.4)
SEG NEUTROPHILS: 70.6 %
SEGMENTED NEUTROPHILS ABSOLUTE COUNT: 4.7 THOU/MM3 (ref 1.8–7.7)
SODIUM BLD-SCNC: 143 MEQ/L (ref 135–145)
TOTAL PROTEIN: 5.3 G/DL (ref 6.1–8)
WBC # BLD: 6.7 THOU/MM3 (ref 4.8–10.8)

## 2018-02-04 PROCEDURE — 96376 TX/PRO/DX INJ SAME DRUG ADON: CPT

## 2018-02-04 PROCEDURE — 6360000002 HC RX W HCPCS: Performed by: FAMILY MEDICINE

## 2018-02-04 PROCEDURE — 6370000000 HC RX 637 (ALT 250 FOR IP): Performed by: FAMILY MEDICINE

## 2018-02-04 PROCEDURE — 97530 THERAPEUTIC ACTIVITIES: CPT

## 2018-02-04 PROCEDURE — 99217 PR OBSERVATION CARE DISCHARGE MANAGEMENT: CPT | Performed by: FAMILY MEDICINE

## 2018-02-04 PROCEDURE — G0378 HOSPITAL OBSERVATION PER HR: HCPCS

## 2018-02-04 PROCEDURE — 36415 COLL VENOUS BLD VENIPUNCTURE: CPT

## 2018-02-04 PROCEDURE — 97165 OT EVAL LOW COMPLEX 30 MIN: CPT

## 2018-02-04 PROCEDURE — 82948 REAGENT STRIP/BLOOD GLUCOSE: CPT

## 2018-02-04 PROCEDURE — 97110 THERAPEUTIC EXERCISES: CPT

## 2018-02-04 PROCEDURE — G8987 SELF CARE CURRENT STATUS: HCPCS

## 2018-02-04 PROCEDURE — G8988 SELF CARE GOAL STATUS: HCPCS

## 2018-02-04 PROCEDURE — 80053 COMPREHEN METABOLIC PANEL: CPT

## 2018-02-04 PROCEDURE — 85025 COMPLETE CBC W/AUTO DIFF WBC: CPT

## 2018-02-04 PROCEDURE — 2580000003 HC RX 258: Performed by: FAMILY MEDICINE

## 2018-02-04 RX ORDER — ACETAMINOPHEN 325 MG/1
650 TABLET ORAL EVERY 4 HOURS PRN
Status: ON HOLD | COMMUNITY
Start: 2018-02-04 | End: 2018-02-08

## 2018-02-04 RX ORDER — TIZANIDINE 2 MG/1
1 TABLET ORAL EVERY 8 HOURS PRN
Status: ON HOLD | COMMUNITY
Start: 2018-02-04 | End: 2018-03-06 | Stop reason: HOSPADM

## 2018-02-04 RX ORDER — LORAZEPAM 0.5 MG/1
0.5 TABLET ORAL 2 TIMES DAILY
Status: ON HOLD | COMMUNITY
Start: 2018-02-04 | End: 2018-03-06 | Stop reason: HOSPADM

## 2018-02-04 RX ORDER — LORAZEPAM 0.5 MG/1
0.25 TABLET ORAL 2 TIMES DAILY
Status: DISCONTINUED | OUTPATIENT
Start: 2018-02-04 | End: 2018-02-04 | Stop reason: HOSPADM

## 2018-02-04 RX ORDER — KETOROLAC TROMETHAMINE 10 MG/1
10 TABLET, FILM COATED ORAL 3 TIMES DAILY
Qty: 15 TABLET | Refills: 0 | Status: ON HOLD | OUTPATIENT
Start: 2018-02-04 | End: 2018-02-14

## 2018-02-04 RX ORDER — KETOROLAC TROMETHAMINE 10 MG/1
10 TABLET, FILM COATED ORAL 3 TIMES DAILY
Qty: 15 TABLET | Refills: 0 | Status: SHIPPED | OUTPATIENT
Start: 2018-02-04 | End: 2018-02-04

## 2018-02-04 RX ADMIN — KETOROLAC TROMETHAMINE 15 MG: 30 INJECTION, SOLUTION INTRAMUSCULAR at 02:09

## 2018-02-04 RX ADMIN — TIZANIDINE 1 MG: 4 TABLET ORAL at 09:54

## 2018-02-04 RX ADMIN — KETOROLAC TROMETHAMINE 15 MG: 30 INJECTION, SOLUTION INTRAMUSCULAR at 14:49

## 2018-02-04 RX ADMIN — ASPIRIN 81 MG: 81 TABLET ORAL at 09:47

## 2018-02-04 RX ADMIN — ISOSORBIDE MONONITRATE 15 MG: 30 TABLET, EXTENDED RELEASE ORAL at 09:48

## 2018-02-04 RX ADMIN — Medication 10 ML: at 09:49

## 2018-02-04 RX ADMIN — LORAZEPAM 0.25 MG: 0.5 TABLET ORAL at 13:08

## 2018-02-04 RX ADMIN — KETOROLAC TROMETHAMINE 15 MG: 30 INJECTION, SOLUTION INTRAMUSCULAR at 09:50

## 2018-02-04 RX ADMIN — SACUBITRIL AND VALSARTAN 1 TABLET: 24; 26 TABLET, FILM COATED ORAL at 09:49

## 2018-02-04 RX ADMIN — METOPROLOL SUCCINATE 25 MG: 25 TABLET, EXTENDED RELEASE ORAL at 09:48

## 2018-02-04 ASSESSMENT — PAIN SCALES - GENERAL
PAINLEVEL_OUTOF10: 6
PAINLEVEL_OUTOF10: 4
PAINLEVEL_OUTOF10: 8
PAINLEVEL_OUTOF10: 6

## 2018-02-04 ASSESSMENT — PAIN DESCRIPTION - ORIENTATION: ORIENTATION: MID

## 2018-02-04 ASSESSMENT — PAIN DESCRIPTION - PAIN TYPE
TYPE: ACUTE PAIN
TYPE: ACUTE PAIN

## 2018-02-04 ASSESSMENT — PAIN DESCRIPTION - FREQUENCY
FREQUENCY: CONTINUOUS
FREQUENCY: CONTINUOUS

## 2018-02-04 ASSESSMENT — PAIN DESCRIPTION - LOCATION
LOCATION: BACK
LOCATION: BACK

## 2018-02-04 ASSESSMENT — PAIN DESCRIPTION - DESCRIPTORS: DESCRIPTORS: CONSTANT

## 2018-02-04 NOTE — PROGRESS NOTES
Patient: Yane Harmon  Unit/Bed: 8B-25/025-A  YOB: 1940  MRN: [id number] Acct: [de-identified]   Admitting Diagnosis: Closed head injury with concussion, with loss of consciousness of 30 minutes or less, initial encounter [S06.0X1A]  Admit Date:  2/3/2018  Hospital Day: 0    Assessment:     Active Problems:    CAD (coronary artery disease) s/p PCI and stent Multilink 3 x 18  mm mid LAD- in 07/2009 at [facility]    Stress incontinence, female    COPD (chronic obstructive pulmonary disease) ([facility] [address]    S/P ICD (internal cardiac defibrillator) procedure    [facility]    Closed head injury with concussion    Class 2 obesity due to excess calories with serious comorbidity and body mass index (BMI) of 35.0 to 35.9 in adult    Hematoma of scalp    Abrasion of head    Traumatic ecchymosis of right upper arm    Contusion of flank and back    Head injury      Plan:     Continue the current meds but make the ativan a lower dose and scheduled bid to see if it helps with anxiety without worsening other symptoms  OT working with her now and she was able to sit on the side of the bed with a great deal of dizziness and anxiety but eventually was able to settle down. Use a heating pad at the suggestion of OT  I asked the nurse to call me later this afternoon with an update for possible discharge. It may be that with her age, BMI, medical problems and need for meds that may have side effects of sedation/balance, she would not be able to go home today. Yane Harmon was evaluated today and a DME order was entered for a standard walker because she requires this to successfully complete daily living tasks of personal cares and ambulating. A standard walker is necessary due to the patient's impaired ambulation and mobility restrictions and she can ambulate only by using a walker instead of a lesser assistive device, such as a cane or crutch.   The need for this equipment was discussed Oral Nightly    sodium chloride flush  10 mL Intravenous 2 times per day    enoxaparin  40 mg Subcutaneous Q24H    ketorolac  15 mg Intravenous Q6H     Continuous Infusions:   PRN Meds:butalbital-acetaminophen-caffeine, sodium chloride flush, potassium chloride, acetaminophen, magnesium hydroxide, ondansetron, HYDROmorphone, tiZANidine, LORazepam    Review of Systems  Pertinent items are noted in HPI. Objective:     Patient Vitals for the past 8 hrs:   BP Temp Temp src Pulse Resp SpO2   02/04/18 0930 (!) 141/62 98 °F (36.7 °C) Oral 77 18 92 %     I/O last 3 completed shifts: In: 150 [P.O.:150]  Out: -   I/O this shift:  In: 10 [I.V.:10]  Out: 100 [Urine:100]    BP (!) 141/62   Pulse 77   Temp 98 °F (36.7 °C) (Oral)   Resp 18   Ht 5' 7\" (1.702 m)   Wt 224 lb 9.6 oz (101.9 kg)   SpO2 92%   BMI 35.18 kg/m²     BP (!) 141/62   Pulse 77   Temp 98 °F (36.7 °C) (Oral)   Resp 18   Ht 5' 7\" (1.702 m)   Wt 224 lb 9.6 oz (101.9 kg)   SpO2 92%   BMI 35.18 kg/m²   General appearance: alert, appears stated age, cooperative, moderately obese and anxious/fearful  Head: Normocephalic, without obvious abnormality, scalp contusion to the posterior scalp with out bleeding and the hematoma from yesterday is decreased in size  Lungs: clear to auscultation bilaterally  Heart: regular rate and rhythm, S1, S2 normal, no murmur, click, rub or gallop  Abdomen: soft, non-tender; bowel sounds normal; no masses,  no organomegaly  Extremities: extremities normal, atraumatic, no cyanosis or edema  Skin: Skin color, texture, turgor normal. No rashes or lesions  Neurologic: weak and anxious no skin changes across the back seen    ECG:   tele NSR    Data Review:   Results for Michael Rodriguez (MRN [id number]) as of 2/4/2018 12:10   Ref.  Range 2/3/2018 15:05 2/4/2018 06:00   Sodium Latest Ref Range: 135 - 145 meq/L 142 143   Potassium Latest Ref Range: 3.5 - 5.2 meq/L 4.0 3.6   Chloride Latest Ref Range: 98 - 111 meq/L 103 106   CO2 Latest Ref Range: 23 - 33 meq/L 25 27   BUN Latest Ref Range: 7 - 22 mg/dL 18 21   Creatinine Latest Ref Range: 0.4 - 1.2 mg/dL 0.3 (L) 0.4   Anion Gap Latest Ref Range: 8.0 - 16.0 meq/L 14.0 10.0   Est, Glom Filt Rate Latest Units: ml/min/1.73m2 >90 >90   Glucose Latest Ref Range: 70 - 108 mg/dL 122 (H) 96   Calcium Latest Ref Range: 8.5 - 10.5 mg/dL 8.9 8.8   Osmolality Calc Latest Ref Range: 275.0 - 300 mOsmol/kg 286. 3    Total Protein Latest Ref Range: 6.1 - 8.0 g/dL  5.3 (L)   Troponin T Latest Units: ng/ml < 0.010    Albumin Latest Ref Range: 3.5 - 5.1 g/dL  3.3 (L)   Alk Phos Latest Ref Range: 38 - 126 U/L  56   ALT Latest Ref Range: 11 - 66 U/L  10 (L)   AST Latest Ref Range: 5 - 40 U/L  16   Bilirubin Latest Ref Range: 0.3 - 1.2 mg/dL  1.1   WBC Latest Ref Range: 4.8 - 10.8 thou/mm3 11.8 (H) 6.7   RBC Latest Ref Range: 4.20 - 5.40 mill/mm3 4.21 3.93 (L)   Hemoglobin Quant Latest Ref Range: 12.0 - 16.0 gm/dl 13.3 12.9   Hematocrit Latest Ref Range: 37.0 - 47.0 % 38.5 36.4 (L)   MCV Latest Ref Range: 81.0 - 99.0 fL 91.6 92.5   MCH Latest Ref Range: 27.0 - 31.0 pg 31.5 (H) 32.8 (H)   MCHC Latest Ref Range: 33.0 - 37.0 gm/dl 34.4 35.5   MPV Latest Ref Range: 7.4 - 10.4 fL 9.2 8.7   RDW Latest Ref Range: 11.5 - 14.5 % 13.4 13.2   Platelet Count Latest Ref Range: 130 - 400 thou/mm3 185 158   Lymphocytes # Latest Ref Range: 1.0 - 4.8 thou/mm3 0.9 (L) 1.3   Monocytes # Latest Ref Range: 0.4 - 1.3 thou/mm3 0.5 0.5   Eosinophils # Latest Ref Range: 0.0 - 0.4 thou/mm3 0.1 0.1   Basophils # Latest Ref Range: 0.0 - 0.1 thou/mm3 0.0 0.0   Seg Neutrophils Latest Units: % 87.3 70.6   Segs Absolute Latest Ref Range: 1.8 - 7.7 thou/mm3 10.3 (H) 4.7   Lymphocytes Latest Units: % 7.5 19.5   Monocytes Latest Units: % 4.6 7.9   Eosinophils Latest Units: % 0.6 1.6   Basophils Latest Units: % 0.0 0.4   Nucleated Red Blood Cells Latest Units: /100 wbc 0 0   INR Latest Ref Range: 0.85 - 1.13  1.00        Electronically

## 2018-02-04 NOTE — H&P
tablet by mouth every 8 hours as needed (muscle spasms)  aspirin 81 MG EC tablet, Take 81 mg by mouth daily. Allergies:  Ciprofloxacin; Neomycin; Pcn [penicillins]; and Levaquin [levofloxacin]    Social History:   MARITAL STATUS:      Family History:       Problem Relation Age of Onset    Heart Disease Mother     High Blood Pressure Mother     Cancer Father     Heart Disease Sister     Cancer Brother     Heart Disease Brother      REVIEW OF SYSTEMS:  CONSTITUTIONAL:  negative for  fevers and chills  EYES:  negative for  blurred vision  HEENT:  negative for  nasal congestion  RESPIRATORY:  negative for  dyspnea  CARDIOVASCULAR:  negative for  chest pain  GASTROINTESTINAL:  negative for abdominal pain  GENITOURINARY:  negative for frequency  INTEGUMENT/BREAST:  negative for rash  HEMATOLOGIC/LYMPHATIC:  negative for easy bruising  ALLERGIC/IMMUNOLOGIC:  negative for recurrent infections  ENDOCRINE:  negative for tremor  MUSCULOSKELETAL:  positive for  myalgias, arthralgias, pain, decreased range of motion, muscle weakness and bone pain  NEUROLOGICAL:  positive for weakness and pain  BEHAVIOR/PSYCH:  negative for increased agitation  PHYSICAL EXAM:    Vitals:  BP (!) 159/95   Pulse 77   Temp 97.9 °F (36.6 °C) (Oral)   Resp 16   Ht 5' 7\" (1.702 m)   SpO2 93%     CONSTITUTIONAL:  awake, alert, cooperative and mild distress  EYES:  sclera clear and conjunctiva normal  ENT:  normocepalic, without obvious abnormality. Abrasion to the posterior scalp without foreign body or active bleeding, hematoma approx 2 cm in diameter  NECK:  supple, symmetrical, trachea midline  HEMATOLOGIC/LYMPHATICS:  no cervical lymphadenopathy and no supraclavicular lymphadenopathy  BACK:  symmetric and no point tenderness on heavy palpation.  There was soreness to the soft tissues of the lower flanks  LUNGS:  no increased work of breathing and clear to auscultation  CARDIOVASCULAR:  normal S1 and S2, no murmur noted and no Latest Units: % 4.6   Eosinophils Latest Units: % 0.6   Basophils Latest Units: % 0.0   Nucleated Red Blood Cells Latest Units: /100 wbc 0   INR Latest Ref Range: 0.85 - 1.13  1.00     Narrative   PROCEDURE: CT HEAD WO CONTRAST       CLINICAL INFORMATION: Trauma. Fall.       COMPARISON: None available.       TECHNIQUE: Noncontrast 5 mm axial images were obtained through the brain.       All CT scans at this facility use dose modulation, iterative reconstruction, and/or weight-based dosing when appropriate to reduce radiation dose to as low as reasonably achievable.       FINDINGS:       There is prominence of the sulcal spaces and ventricular system secondary to generalized, age-related parenchymal volume loss. Patchy low-attenuation is present within the periventricular and deep cerebral white matter which likely corresponds to sequela    of chronic microvascular ischemic change. Focal fat is noted along the falx. No intracranial hemorrhage is seen. No mass, mass effect or extra-axial fluid collection is identified. The ventricles are midline without evidence of hydrocephalus. The basal    cisterns are patent. Vascular calcifications are present at the skull base.       The visualized orbits, temporal bone structures are unremarkable. Mild mucosal thickening is present within the paranasal sinuses. The calvarium is intact without acute fracture or aggressive, bony destructive process. There is a large scalp contusion at    the left parietal region with subcutaneous emphysema and hematoma suggesting associated scalp laceration.           Impression       1. No acute intracranial traumatic abnormality is identified. Senescent changes are present as described in the findings.       2. There is a large left parietal scalp contusion and hematoma. Subcutaneous emphysema is also noted suggesting an associated scalp laceration.                **This report has been created using voice recognition software.  It may contain
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
## 2018-02-05 ENCOUNTER — OFFICE VISIT (OUTPATIENT)
Dept: FAMILY MEDICINE CLINIC | Age: 78
End: 2018-02-05

## 2018-02-05 ENCOUNTER — HOSPITAL ENCOUNTER (OUTPATIENT)
Age: 78
Discharge: HOME | DRG: 493 | End: 2018-02-05
Payer: MEDICARE

## 2018-02-05 ENCOUNTER — HOSPITAL ENCOUNTER (OUTPATIENT)
Dept: GENERAL RADIOLOGY | Age: 78
Discharge: HOME | DRG: 493 | End: 2018-02-05
Payer: MEDICARE

## 2018-02-05 VITALS
HEIGHT: 67 IN | DIASTOLIC BLOOD PRESSURE: 70 MMHG | RESPIRATION RATE: 20 BRPM | SYSTOLIC BLOOD PRESSURE: 130 MMHG | HEART RATE: 68 BPM

## 2018-02-05 DIAGNOSIS — S30.1XXD CONTUSION OF FLANK, SUBSEQUENT ENCOUNTER: ICD-10-CM
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
## 2018-02-05 DIAGNOSIS — G47.33 OBSTRUCTIVE SLEEP APNEA ON CPAP: ICD-10-CM

## 2018-02-05 DIAGNOSIS — S00.03XD HEMATOMA OF SCALP, SUBSEQUENT ENCOUNTER: ICD-10-CM

## 2018-02-05 DIAGNOSIS — M54.50 ACUTE BILATERAL LOW BACK PAIN WITHOUT SCIATICA: ICD-10-CM
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
## 2018-02-05 DIAGNOSIS — Z99.89 OBSTRUCTIVE SLEEP APNEA ON CPAP: ICD-10-CM

## 2018-02-05 PROCEDURE — 99214 OFFICE O/P EST MOD 30 MIN: CPT | Performed by: FAMILY MEDICINE

## 2018-02-05 PROCEDURE — 72072 X-RAY EXAM THORAC SPINE 3VWS: CPT

## 2018-02-05 PROCEDURE — 72100 X-RAY EXAM L-S SPINE 2/3 VWS: CPT

## 2018-02-05 ASSESSMENT — ENCOUNTER SYMPTOMS
DIARRHEA: 0
SHORTNESS OF BREATH: 0
CONSTIPATION: 0
CHEST TIGHTNESS: 0
BACK PAIN: 1
VOMITING: 0
NAUSEA: 0
COUGH: 0
EYES NEGATIVE: 1
ABDOMINAL PAIN: 0
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
## 2018-02-06 ENCOUNTER — TELEPHONE (OUTPATIENT)
Dept: FAMILY MEDICINE CLINIC | Age: 78
End: 2018-02-06

## 2018-02-06 ENCOUNTER — HOSPITAL ENCOUNTER (OUTPATIENT)
Dept: CT IMAGING | Age: 78
Discharge: HOME | DRG: 493 | End: 2018-02-06
Payer: MEDICARE

## 2018-02-06 DIAGNOSIS — S32.010D CLOSED COMPRESSION FRACTURE OF L1 LUMBAR VERTEBRA WITH ROUTINE HEALING, SUBSEQUENT ENCOUNTER: Primary | ICD-10-CM

## 2018-02-06 DIAGNOSIS — S32.010D CLOSED COMPRESSION FRACTURE OF L1 LUMBAR VERTEBRA WITH ROUTINE HEALING, SUBSEQUENT ENCOUNTER: ICD-10-CM

## 2018-02-06 PROCEDURE — 72131 CT LUMBAR SPINE W/O DYE: CPT

## 2018-02-07 ENCOUNTER — APPOINTMENT (OUTPATIENT)
Dept: CT IMAGING | Age: 78
DRG: 493 | End: 2018-02-07
Payer: MEDICARE

## 2018-02-07 ENCOUNTER — APPOINTMENT (OUTPATIENT)
Dept: GENERAL RADIOLOGY | Age: 78
DRG: 493 | End: 2018-02-07
Payer: MEDICARE

## 2018-02-07 ENCOUNTER — HOSPITAL ENCOUNTER (INPATIENT)
Age: 78
LOS: 7 days | Discharge: NURSING, REHAB OR LONG-TERM CARE | DRG: 493 | End: 2018-02-14
Attending: EMERGENCY MEDICINE | Admitting: EMERGENCY MEDICINE
Payer: MEDICARE

## 2018-02-07 ENCOUNTER — TELEPHONE (OUTPATIENT)
Dept: CARDIOLOGY CLINIC | Age: 78
End: 2018-02-07

## 2018-02-07 DIAGNOSIS — Z91.89 AT HIGH RISK FOR PAIN FROM PROCEDURE: ICD-10-CM

## 2018-02-07 DIAGNOSIS — S42.352A CLOSED DISPLACED COMMINUTED FRACTURE OF SHAFT OF LEFT HUMERUS, INITIAL ENCOUNTER: ICD-10-CM
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
## 2018-02-07 DIAGNOSIS — S09.90XA CLOSED HEAD INJURY, INITIAL ENCOUNTER: ICD-10-CM

## 2018-02-07 DIAGNOSIS — S42.322A CLOSED DISPLACED TRANSVERSE FRACTURE OF SHAFT OF LEFT HUMERUS, INITIAL ENCOUNTER: ICD-10-CM

## 2018-02-07 DIAGNOSIS — S32.010A CLOSED COMPRESSION FRACTURE OF FIRST LUMBAR VERTEBRA, INITIAL ENCOUNTER: ICD-10-CM

## 2018-02-07 PROBLEM — S00.91XA ABRASION OF HEAD: Status: RESOLVED | Noted: 2018-02-03 | Resolved: 2018-02-07

## 2018-02-07 PROBLEM — R55 SYNCOPE AND COLLAPSE: Status: ACTIVE | Noted: 2018-02-07

## 2018-02-07 PROBLEM — S42.302A CLOSED FRACTURE OF SHAFT OF LEFT HUMERUS: Status: ACTIVE | Noted: 2018-02-07

## 2018-02-07 PROBLEM — K56.609 SBO (SMALL BOWEL OBSTRUCTION) (HCC): Status: RESOLVED | Noted: 2017-08-05 | Resolved: 2018-02-07

## 2018-02-07 PROBLEM — R29.6 RECURRENT FALLS: Status: ACTIVE | Noted: 2018-02-07

## 2018-02-07 PROBLEM — R55 SYNCOPE AND COLLAPSE: Status: RESOLVED | Noted: 2018-02-07 | Resolved: 2018-02-07

## 2018-02-07 PROBLEM — N39.0 UTI (URINARY TRACT INFECTION): Status: RESOLVED | Noted: 2017-08-05 | Resolved: 2018-02-07

## 2018-02-07 LAB
BASOPHILS # BLD: 0.2 %
BASOPHILS ABSOLUTE: 0 THOU/MM3 (ref 0–0.1)
EKG ATRIAL RATE: 96 BPM
EKG P AXIS: 79 DEGREES
EKG P-R INTERVAL: 150 MS
EKG Q-T INTERVAL: 430 MS
EKG QRS DURATION: 152 MS
EKG QTC CALCULATION (BAZETT): 543 MS
EKG R AXIS: -72 DEGREES
EKG T AXIS: 81 DEGREES
EKG VENTRICULAR RATE: 96 BPM
EOSINOPHIL # BLD: 0.1 %
EOSINOPHILS ABSOLUTE: 0 THOU/MM3 (ref 0–0.4)
HCT VFR BLD CALC: 36.2 % (ref 37–47)
HEMOGLOBIN: 12.7 GM/DL (ref 12–16)
LYMPHOCYTES # BLD: 8.2 %
LYMPHOCYTES ABSOLUTE: 0.8 THOU/MM3 (ref 1–4.8)
MCH RBC QN AUTO: 32.4 PG (ref 27–31)
MCHC RBC AUTO-ENTMCNC: 35 GM/DL (ref 33–37)
MCV RBC AUTO: 92.6 FL (ref 81–99)
MONOCYTES # BLD: 5.2 %
MONOCYTES ABSOLUTE: 0.5 THOU/MM3 (ref 0.4–1.3)
NUCLEATED RED BLOOD CELLS: 0 /100 WBC
PDW BLD-RTO: 13.1 % (ref 11.5–14.5)
PLATELET # BLD: 182 THOU/MM3 (ref 130–400)
PMV BLD AUTO: 8.8 FL (ref 7.4–10.4)
RBC # BLD: 3.91 MILL/MM3 (ref 4.2–5.4)
SEG NEUTROPHILS: 86.3 %
SEGMENTED NEUTROPHILS ABSOLUTE COUNT: 8.8 THOU/MM3 (ref 1.8–7.7)
WBC # BLD: 10.2 THOU/MM3 (ref 4.8–10.8)

## 2018-02-07 PROCEDURE — 99285 EMERGENCY DEPT VISIT HI MDM: CPT

## 2018-02-07 PROCEDURE — 85025 COMPLETE CBC W/AUTO DIFF WBC: CPT

## 2018-02-07 PROCEDURE — 96374 THER/PROPH/DIAG INJ IV PUSH: CPT

## 2018-02-07 PROCEDURE — 70450 CT HEAD/BRAIN W/O DYE: CPT

## 2018-02-07 PROCEDURE — 73060 X-RAY EXAM OF HUMERUS: CPT

## 2018-02-07 PROCEDURE — 72125 CT NECK SPINE W/O DYE: CPT

## 2018-02-07 PROCEDURE — 93005 ELECTROCARDIOGRAM TRACING: CPT | Performed by: PHYSICIAN ASSISTANT

## 2018-02-07 PROCEDURE — 1200000003 HC TELEMETRY R&B

## 2018-02-07 PROCEDURE — 6370000000 HC RX 637 (ALT 250 FOR IP): Performed by: EMERGENCY MEDICINE

## 2018-02-07 PROCEDURE — 2580000003 HC RX 258: Performed by: EMERGENCY MEDICINE

## 2018-02-07 PROCEDURE — 73090 X-RAY EXAM OF FOREARM: CPT

## 2018-02-07 PROCEDURE — 36415 COLL VENOUS BLD VENIPUNCTURE: CPT

## 2018-02-07 PROCEDURE — 72131 CT LUMBAR SPINE W/O DYE: CPT

## 2018-02-07 PROCEDURE — 96372 THER/PROPH/DIAG INJ SC/IM: CPT

## 2018-02-07 PROCEDURE — 6360000002 HC RX W HCPCS: Performed by: FAMILY MEDICINE

## 2018-02-07 PROCEDURE — 6360000002 HC RX W HCPCS: Performed by: PHYSICIAN ASSISTANT

## 2018-02-07 PROCEDURE — 93010 ELECTROCARDIOGRAM REPORT: CPT | Performed by: INTERNAL MEDICINE

## 2018-02-07 PROCEDURE — 71045 X-RAY EXAM CHEST 1 VIEW: CPT

## 2018-02-07 RX ORDER — SODIUM CHLORIDE 0.9 % (FLUSH) 0.9 %
10 SYRINGE (ML) INJECTION PRN
Status: DISCONTINUED | OUTPATIENT
Start: 2018-02-07 | End: 2018-02-14 | Stop reason: HOSPADM

## 2018-02-07 RX ORDER — OXYCODONE HYDROCHLORIDE AND ACETAMINOPHEN 5; 325 MG/1; MG/1
2 TABLET ORAL EVERY 6 HOURS PRN
Status: DISCONTINUED | OUTPATIENT
Start: 2018-02-07 | End: 2018-02-14 | Stop reason: HOSPADM

## 2018-02-07 RX ORDER — OYSTER SHELL CALCIUM WITH VITAMIN D 500; 200 MG/1; [IU]/1
1 TABLET, FILM COATED ORAL 2 TIMES DAILY
Status: DISCONTINUED | OUTPATIENT
Start: 2018-02-07 | End: 2018-02-14 | Stop reason: HOSPADM

## 2018-02-07 RX ORDER — METOPROLOL SUCCINATE 25 MG/1
25 TABLET, EXTENDED RELEASE ORAL DAILY
Status: DISCONTINUED | OUTPATIENT
Start: 2018-02-07 | End: 2018-02-09

## 2018-02-07 RX ORDER — TIZANIDINE 4 MG/1
1 TABLET ORAL EVERY 8 HOURS PRN
Status: DISCONTINUED | OUTPATIENT
Start: 2018-02-07 | End: 2018-02-14 | Stop reason: HOSPADM

## 2018-02-07 RX ORDER — SIMVASTATIN 40 MG
40 TABLET ORAL NIGHTLY
Status: DISCONTINUED | OUTPATIENT
Start: 2018-02-07 | End: 2018-02-14 | Stop reason: HOSPADM

## 2018-02-07 RX ORDER — ONDANSETRON 2 MG/ML
4 INJECTION INTRAMUSCULAR; INTRAVENOUS EVERY 6 HOURS PRN
Status: DISCONTINUED | OUTPATIENT
Start: 2018-02-07 | End: 2018-02-14 | Stop reason: HOSPADM

## 2018-02-07 RX ORDER — ISOSORBIDE MONONITRATE 30 MG/1
15 TABLET, EXTENDED RELEASE ORAL DAILY
Status: DISCONTINUED | OUTPATIENT
Start: 2018-02-07 | End: 2018-02-09

## 2018-02-07 RX ORDER — OXYCODONE HYDROCHLORIDE AND ACETAMINOPHEN 5; 325 MG/1; MG/1
1 TABLET ORAL EVERY 6 HOURS PRN
Status: DISCONTINUED | OUTPATIENT
Start: 2018-02-07 | End: 2018-02-07 | Stop reason: SDUPTHER

## 2018-02-07 RX ORDER — ACETAMINOPHEN 325 MG/1
650 TABLET ORAL EVERY 4 HOURS PRN
Status: DISCONTINUED | OUTPATIENT
Start: 2018-02-07 | End: 2018-02-14 | Stop reason: HOSPADM

## 2018-02-07 RX ORDER — HYDROMORPHONE HYDROCHLORIDE 2 MG/1
1 TABLET ORAL
Status: DISCONTINUED | OUTPATIENT
Start: 2018-02-07 | End: 2018-02-07

## 2018-02-07 RX ORDER — ONDANSETRON 4 MG/1
4 TABLET, ORALLY DISINTEGRATING ORAL ONCE
Status: COMPLETED | OUTPATIENT
Start: 2018-02-07 | End: 2018-02-07

## 2018-02-07 RX ORDER — SODIUM CHLORIDE 0.9 % (FLUSH) 0.9 %
10 SYRINGE (ML) INJECTION EVERY 12 HOURS SCHEDULED
Status: DISCONTINUED | OUTPATIENT
Start: 2018-02-07 | End: 2018-02-14 | Stop reason: HOSPADM

## 2018-02-07 RX ORDER — ASPIRIN 81 MG/1
81 TABLET ORAL DAILY
Status: DISCONTINUED | OUTPATIENT
Start: 2018-02-07 | End: 2018-02-14 | Stop reason: HOSPADM

## 2018-02-07 RX ORDER — LORAZEPAM 0.5 MG/1
0.25 TABLET ORAL 2 TIMES DAILY
Status: DISCONTINUED | OUTPATIENT
Start: 2018-02-07 | End: 2018-02-10

## 2018-02-07 RX ORDER — HYDROMORPHONE HYDROCHLORIDE 2 MG/1
1 TABLET ORAL
Status: ACTIVE | OUTPATIENT
Start: 2018-02-07 | End: 2018-02-08

## 2018-02-07 RX ORDER — DEXTROSE AND SODIUM CHLORIDE 5; .9 G/100ML; G/100ML
INJECTION, SOLUTION INTRAVENOUS CONTINUOUS
Status: DISCONTINUED | OUTPATIENT
Start: 2018-02-07 | End: 2018-02-10

## 2018-02-07 RX ADMIN — SACUBITRIL AND VALSARTAN 1 TABLET: 24; 26 TABLET, FILM COATED ORAL at 21:46

## 2018-02-07 RX ADMIN — METOPROLOL SUCCINATE 25 MG: 25 TABLET, FILM COATED, EXTENDED RELEASE ORAL at 21:47

## 2018-02-07 RX ADMIN — CALCIUM CARBONATE-VITAMIN D TAB 500 MG-200 UNIT 1 TABLET: 500-200 TAB at 21:46

## 2018-02-07 RX ADMIN — HYDROMORPHONE HYDROCHLORIDE 1 MG: 1 INJECTION, SOLUTION INTRAMUSCULAR; INTRAVENOUS; SUBCUTANEOUS at 13:13

## 2018-02-07 RX ADMIN — SIMVASTATIN 40 MG: 40 TABLET, FILM COATED ORAL at 21:47

## 2018-02-07 RX ADMIN — LORAZEPAM 0.25 MG: 0.5 TABLET ORAL at 23:33

## 2018-02-07 RX ADMIN — HYDROMORPHONE HYDROCHLORIDE 0.5 MG: 1 INJECTION, SOLUTION INTRAMUSCULAR; INTRAVENOUS; SUBCUTANEOUS at 16:51

## 2018-02-07 RX ADMIN — HYDROMORPHONE HYDROCHLORIDE 1 MG: 1 INJECTION, SOLUTION INTRAMUSCULAR; INTRAVENOUS; SUBCUTANEOUS at 09:09

## 2018-02-07 RX ADMIN — DEXTROSE AND SODIUM CHLORIDE: 5; 900 INJECTION, SOLUTION INTRAVENOUS at 19:29

## 2018-02-07 RX ADMIN — ISOSORBIDE MONONITRATE 15 MG: 30 TABLET ORAL at 21:47

## 2018-02-07 RX ADMIN — OXYCODONE HYDROCHLORIDE AND ACETAMINOPHEN 1 TABLET: 5; 325 TABLET ORAL at 19:32

## 2018-02-07 RX ADMIN — ASPIRIN 81 MG: 81 TABLET, COATED ORAL at 21:47

## 2018-02-07 RX ADMIN — ONDANSETRON 4 MG: 4 TABLET, ORALLY DISINTEGRATING ORAL at 09:10

## 2018-02-07 ASSESSMENT — ENCOUNTER SYMPTOMS
VOMITING: 0
SHORTNESS OF BREATH: 0
DIARRHEA: 0
SORE THROAT: 0
NAUSEA: 0
RHINORRHEA: 0
COUGH: 0
EYE DISCHARGE: 0
WHEEZING: 0
EYE PAIN: 0
BACK PAIN: 1

## 2018-02-07 ASSESSMENT — PAIN DESCRIPTION - ORIENTATION
ORIENTATION: LEFT
ORIENTATION: LEFT

## 2018-02-07 ASSESSMENT — PAIN DESCRIPTION - DESCRIPTORS
DESCRIPTORS: ACHING;CONSTANT
DESCRIPTORS: ACHING;CONSTANT
DESCRIPTORS: ACHING

## 2018-02-07 ASSESSMENT — PAIN SCALES - GENERAL
PAINLEVEL_OUTOF10: 4
PAINLEVEL_OUTOF10: 2
PAINLEVEL_OUTOF10: 8
PAINLEVEL_OUTOF10: 7
PAINLEVEL_OUTOF10: 8
PAINLEVEL_OUTOF10: 8
PAINLEVEL_OUTOF10: 4
PAINLEVEL_OUTOF10: 4

## 2018-02-07 ASSESSMENT — PAIN DESCRIPTION - FREQUENCY
FREQUENCY: CONTINUOUS

## 2018-02-07 ASSESSMENT — PAIN DESCRIPTION - LOCATION
LOCATION: SHOULDER
LOCATION: SHOULDER
LOCATION: ARM;BACK

## 2018-02-07 ASSESSMENT — PAIN DESCRIPTION - PAIN TYPE
TYPE: ACUTE PAIN

## 2018-02-08 ENCOUNTER — TELEPHONE (OUTPATIENT)
Dept: FAMILY MEDICINE CLINIC | Age: 78
End: 2018-02-08

## 2018-02-08 ENCOUNTER — APPOINTMENT (OUTPATIENT)
Dept: INTERVENTIONAL RADIOLOGY/VASCULAR | Age: 78
DRG: 493 | End: 2018-02-08
Payer: MEDICARE

## 2018-02-08 PROCEDURE — 51702 INSERT TEMP BLADDER CATH: CPT

## 2018-02-08 PROCEDURE — 97166 OT EVAL MOD COMPLEX 45 MIN: CPT

## 2018-02-08 PROCEDURE — 6360000002 HC RX W HCPCS: Performed by: EMERGENCY MEDICINE

## 2018-02-08 PROCEDURE — 99211 OFF/OP EST MAY X REQ PHY/QHP: CPT

## 2018-02-08 PROCEDURE — 2580000003 HC RX 258: Performed by: EMERGENCY MEDICINE

## 2018-02-08 PROCEDURE — 1200000003 HC TELEMETRY R&B

## 2018-02-08 PROCEDURE — 97110 THERAPEUTIC EXERCISES: CPT

## 2018-02-08 PROCEDURE — 97116 GAIT TRAINING THERAPY: CPT

## 2018-02-08 PROCEDURE — 6360000002 HC RX W HCPCS: Performed by: FAMILY MEDICINE

## 2018-02-08 PROCEDURE — 2700000000 HC OXYGEN THERAPY PER DAY

## 2018-02-08 PROCEDURE — G8987 SELF CARE CURRENT STATUS: HCPCS

## 2018-02-08 PROCEDURE — G8988 SELF CARE GOAL STATUS: HCPCS

## 2018-02-08 PROCEDURE — G8979 MOBILITY GOAL STATUS: HCPCS

## 2018-02-08 PROCEDURE — 6370000000 HC RX 637 (ALT 250 FOR IP): Performed by: EMERGENCY MEDICINE

## 2018-02-08 PROCEDURE — 6820000001 HC L2 TRAUMA SURGERY EVALUATION

## 2018-02-08 PROCEDURE — G8978 MOBILITY CURRENT STATUS: HCPCS

## 2018-02-08 PROCEDURE — 97162 PT EVAL MOD COMPLEX 30 MIN: CPT

## 2018-02-08 RX ADMIN — HYDROMORPHONE HYDROCHLORIDE 0.5 MG: 1 INJECTION, SOLUTION INTRAMUSCULAR; INTRAVENOUS; SUBCUTANEOUS at 04:22

## 2018-02-08 RX ADMIN — DEXTROSE AND SODIUM CHLORIDE: 5; 900 INJECTION, SOLUTION INTRAVENOUS at 10:06

## 2018-02-08 RX ADMIN — SIMVASTATIN 40 MG: 40 TABLET, FILM COATED ORAL at 22:06

## 2018-02-08 RX ADMIN — METOPROLOL SUCCINATE 25 MG: 25 TABLET, FILM COATED, EXTENDED RELEASE ORAL at 10:07

## 2018-02-08 RX ADMIN — OXYCODONE HYDROCHLORIDE AND ACETAMINOPHEN 2 TABLET: 5; 325 TABLET ORAL at 07:32

## 2018-02-08 RX ADMIN — LORAZEPAM 0.25 MG: 0.5 TABLET ORAL at 22:14

## 2018-02-08 RX ADMIN — Medication 10 ML: at 10:10

## 2018-02-08 RX ADMIN — ASPIRIN 81 MG: 81 TABLET, COATED ORAL at 10:07

## 2018-02-08 RX ADMIN — HYDROMORPHONE HYDROCHLORIDE 0.5 MG: 1 INJECTION, SOLUTION INTRAMUSCULAR; INTRAVENOUS; SUBCUTANEOUS at 10:06

## 2018-02-08 RX ADMIN — OXYCODONE HYDROCHLORIDE AND ACETAMINOPHEN 2 TABLET: 5; 325 TABLET ORAL at 18:14

## 2018-02-08 RX ADMIN — SACUBITRIL AND VALSARTAN 1 TABLET: 24; 26 TABLET, FILM COATED ORAL at 22:06

## 2018-02-08 RX ADMIN — ISOSORBIDE MONONITRATE 15 MG: 30 TABLET ORAL at 10:07

## 2018-02-08 RX ADMIN — CALCIUM CARBONATE-VITAMIN D TAB 500 MG-200 UNIT 1 TABLET: 500-200 TAB at 22:06

## 2018-02-08 RX ADMIN — CALCIUM CARBONATE-VITAMIN D TAB 500 MG-200 UNIT 1 TABLET: 500-200 TAB at 10:07

## 2018-02-08 RX ADMIN — ENOXAPARIN SODIUM 30 MG: 30 INJECTION SUBCUTANEOUS at 22:21

## 2018-02-08 RX ADMIN — LORAZEPAM 0.25 MG: 0.5 TABLET ORAL at 10:12

## 2018-02-08 RX ADMIN — SACUBITRIL AND VALSARTAN 1 TABLET: 24; 26 TABLET, FILM COATED ORAL at 10:07

## 2018-02-08 ASSESSMENT — PAIN DESCRIPTION - PAIN TYPE: TYPE: ACUTE PAIN

## 2018-02-08 ASSESSMENT — PAIN SCALES - GENERAL
PAINLEVEL_OUTOF10: 6
PAINLEVEL_OUTOF10: 6
PAINLEVEL_OUTOF10: 8
PAINLEVEL_OUTOF10: 4
PAINLEVEL_OUTOF10: 4
PAINLEVEL_OUTOF10: 8
PAINLEVEL_OUTOF10: 6
PAINLEVEL_OUTOF10: 4
PAINLEVEL_OUTOF10: 8
PAINLEVEL_OUTOF10: 7

## 2018-02-08 ASSESSMENT — PAIN DESCRIPTION - LOCATION: LOCATION: SHOULDER

## 2018-02-09 PROCEDURE — 99024 POSTOP FOLLOW-UP VISIT: CPT | Performed by: NURSE PRACTITIONER

## 2018-02-09 PROCEDURE — 6370000000 HC RX 637 (ALT 250 FOR IP): Performed by: EMERGENCY MEDICINE

## 2018-02-09 PROCEDURE — 99222 1ST HOSP IP/OBS MODERATE 55: CPT | Performed by: INTERNAL MEDICINE

## 2018-02-09 PROCEDURE — 2700000000 HC OXYGEN THERAPY PER DAY

## 2018-02-09 PROCEDURE — 97110 THERAPEUTIC EXERCISES: CPT

## 2018-02-09 PROCEDURE — 1200000003 HC TELEMETRY R&B

## 2018-02-09 PROCEDURE — 2580000003 HC RX 258: Performed by: EMERGENCY MEDICINE

## 2018-02-09 PROCEDURE — 6360000002 HC RX W HCPCS: Performed by: EMERGENCY MEDICINE

## 2018-02-09 PROCEDURE — 97535 SELF CARE MNGMENT TRAINING: CPT

## 2018-02-09 PROCEDURE — 97116 GAIT TRAINING THERAPY: CPT

## 2018-02-09 RX ORDER — METOPROLOL SUCCINATE 50 MG/1
50 TABLET, EXTENDED RELEASE ORAL DAILY
Status: DISCONTINUED | OUTPATIENT
Start: 2018-02-10 | End: 2018-02-14 | Stop reason: HOSPADM

## 2018-02-09 RX ADMIN — SACUBITRIL AND VALSARTAN 1 TABLET: 24; 26 TABLET, FILM COATED ORAL at 22:13

## 2018-02-09 RX ADMIN — LORAZEPAM 0.25 MG: 0.5 TABLET ORAL at 08:39

## 2018-02-09 RX ADMIN — OXYCODONE HYDROCHLORIDE AND ACETAMINOPHEN 2 TABLET: 5; 325 TABLET ORAL at 01:55

## 2018-02-09 RX ADMIN — ENOXAPARIN SODIUM 30 MG: 30 INJECTION SUBCUTANEOUS at 22:14

## 2018-02-09 RX ADMIN — OXYCODONE HYDROCHLORIDE AND ACETAMINOPHEN 2 TABLET: 5; 325 TABLET ORAL at 17:37

## 2018-02-09 RX ADMIN — CALCIUM CARBONATE-VITAMIN D TAB 500 MG-200 UNIT 1 TABLET: 500-200 TAB at 08:36

## 2018-02-09 RX ADMIN — LORAZEPAM 0.25 MG: 0.5 TABLET ORAL at 22:13

## 2018-02-09 RX ADMIN — SIMVASTATIN 40 MG: 40 TABLET, FILM COATED ORAL at 22:15

## 2018-02-09 RX ADMIN — METOPROLOL SUCCINATE 25 MG: 25 TABLET, FILM COATED, EXTENDED RELEASE ORAL at 08:39

## 2018-02-09 RX ADMIN — DEXTROSE AND SODIUM CHLORIDE: 5; 900 INJECTION, SOLUTION INTRAVENOUS at 16:02

## 2018-02-09 RX ADMIN — DEXTROSE AND SODIUM CHLORIDE: 5; 900 INJECTION, SOLUTION INTRAVENOUS at 01:56

## 2018-02-09 RX ADMIN — CALCIUM CARBONATE-VITAMIN D TAB 500 MG-200 UNIT 1 TABLET: 500-200 TAB at 22:13

## 2018-02-09 RX ADMIN — ASPIRIN 81 MG: 81 TABLET, COATED ORAL at 08:39

## 2018-02-09 RX ADMIN — TIZANIDINE 1 MG: 4 TABLET ORAL at 06:29

## 2018-02-09 RX ADMIN — SACUBITRIL AND VALSARTAN 1 TABLET: 24; 26 TABLET, FILM COATED ORAL at 08:39

## 2018-02-09 RX ADMIN — ISOSORBIDE MONONITRATE 15 MG: 30 TABLET ORAL at 08:38

## 2018-02-09 RX ADMIN — OXYCODONE HYDROCHLORIDE AND ACETAMINOPHEN 2 TABLET: 5; 325 TABLET ORAL at 11:36

## 2018-02-09 ASSESSMENT — PAIN DESCRIPTION - FREQUENCY
FREQUENCY: CONTINUOUS

## 2018-02-09 ASSESSMENT — PAIN DESCRIPTION - LOCATION
LOCATION: BACK
LOCATION: BACK
LOCATION: SHOULDER

## 2018-02-09 ASSESSMENT — PAIN DESCRIPTION - ORIENTATION
ORIENTATION: LOWER
ORIENTATION: LOWER
ORIENTATION: LEFT
ORIENTATION: LEFT

## 2018-02-09 ASSESSMENT — PAIN SCALES - GENERAL
PAINLEVEL_OUTOF10: 4
PAINLEVEL_OUTOF10: 7
PAINLEVEL_OUTOF10: 6
PAINLEVEL_OUTOF10: 3
PAINLEVEL_OUTOF10: 2
PAINLEVEL_OUTOF10: 4
PAINLEVEL_OUTOF10: 7
PAINLEVEL_OUTOF10: 2

## 2018-02-09 ASSESSMENT — PAIN DESCRIPTION - PAIN TYPE
TYPE: ACUTE PAIN

## 2018-02-09 ASSESSMENT — PAIN DESCRIPTION - DESCRIPTORS
DESCRIPTORS: ACHING

## 2018-02-10 PROCEDURE — 6360000002 HC RX W HCPCS: Performed by: EMERGENCY MEDICINE

## 2018-02-10 PROCEDURE — 2580000003 HC RX 258: Performed by: EMERGENCY MEDICINE

## 2018-02-10 PROCEDURE — 6370000000 HC RX 637 (ALT 250 FOR IP): Performed by: NURSE PRACTITIONER

## 2018-02-10 PROCEDURE — 1200000003 HC TELEMETRY R&B

## 2018-02-10 PROCEDURE — 6370000000 HC RX 637 (ALT 250 FOR IP): Performed by: EMERGENCY MEDICINE

## 2018-02-10 RX ORDER — LORAZEPAM 0.5 MG/1
0.25 TABLET ORAL NIGHTLY
Status: DISCONTINUED | OUTPATIENT
Start: 2018-02-11 | End: 2018-02-14

## 2018-02-10 RX ADMIN — ASPIRIN 81 MG: 81 TABLET, COATED ORAL at 08:19

## 2018-02-10 RX ADMIN — Medication 10 ML: at 20:17

## 2018-02-10 RX ADMIN — TIZANIDINE 1 MG: 4 TABLET ORAL at 16:54

## 2018-02-10 RX ADMIN — ENOXAPARIN SODIUM 30 MG: 30 INJECTION SUBCUTANEOUS at 20:17

## 2018-02-10 RX ADMIN — CALCIUM CARBONATE-VITAMIN D TAB 500 MG-200 UNIT 1 TABLET: 500-200 TAB at 20:16

## 2018-02-10 RX ADMIN — SACUBITRIL AND VALSARTAN 1 TABLET: 24; 26 TABLET, FILM COATED ORAL at 08:19

## 2018-02-10 RX ADMIN — CALCIUM CARBONATE-VITAMIN D TAB 500 MG-200 UNIT 1 TABLET: 500-200 TAB at 08:19

## 2018-02-10 RX ADMIN — SACUBITRIL AND VALSARTAN 1 TABLET: 24; 26 TABLET, FILM COATED ORAL at 20:16

## 2018-02-10 RX ADMIN — OXYCODONE HYDROCHLORIDE AND ACETAMINOPHEN 2 TABLET: 5; 325 TABLET ORAL at 13:36

## 2018-02-10 RX ADMIN — OXYCODONE HYDROCHLORIDE AND ACETAMINOPHEN 2 TABLET: 5; 325 TABLET ORAL at 20:16

## 2018-02-10 RX ADMIN — OXYCODONE HYDROCHLORIDE AND ACETAMINOPHEN 2 TABLET: 5; 325 TABLET ORAL at 05:10

## 2018-02-10 RX ADMIN — METOPROLOL SUCCINATE 50 MG: 50 TABLET, FILM COATED, EXTENDED RELEASE ORAL at 08:19

## 2018-02-10 RX ADMIN — TIZANIDINE 1 MG: 4 TABLET ORAL at 08:37

## 2018-02-10 RX ADMIN — DEXTROSE AND SODIUM CHLORIDE: 5; 900 INJECTION, SOLUTION INTRAVENOUS at 08:21

## 2018-02-10 RX ADMIN — SIMVASTATIN 40 MG: 40 TABLET, FILM COATED ORAL at 20:16

## 2018-02-10 ASSESSMENT — PAIN DESCRIPTION - FREQUENCY
FREQUENCY: CONTINUOUS

## 2018-02-10 ASSESSMENT — PAIN DESCRIPTION - PAIN TYPE
TYPE: ACUTE PAIN

## 2018-02-10 ASSESSMENT — PAIN SCALES - GENERAL
PAINLEVEL_OUTOF10: 5
PAINLEVEL_OUTOF10: 1
PAINLEVEL_OUTOF10: 6
PAINLEVEL_OUTOF10: 3
PAINLEVEL_OUTOF10: 3
PAINLEVEL_OUTOF10: 8

## 2018-02-10 ASSESSMENT — PAIN DESCRIPTION - DESCRIPTORS
DESCRIPTORS: ACHING

## 2018-02-10 ASSESSMENT — PAIN DESCRIPTION - LOCATION
LOCATION: BACK

## 2018-02-10 ASSESSMENT — PAIN DESCRIPTION - ORIENTATION
ORIENTATION: LOWER

## 2018-02-11 LAB
ANION GAP SERPL CALCULATED.3IONS-SCNC: 9 MEQ/L (ref 8–16)
BASOPHILS # BLD: 0.7 %
BASOPHILS ABSOLUTE: 0 THOU/MM3 (ref 0–0.1)
BUN BLDV-MCNC: 10 MG/DL (ref 7–22)
CALCIUM SERPL-MCNC: 9.3 MG/DL (ref 8.5–10.5)
CHLORIDE BLD-SCNC: 103 MEQ/L (ref 98–111)
CO2: 30 MEQ/L (ref 23–33)
CREAT SERPL-MCNC: 0.3 MG/DL (ref 0.4–1.2)
EOSINOPHIL # BLD: 3.7 %
EOSINOPHILS ABSOLUTE: 0.2 THOU/MM3 (ref 0–0.4)
GFR SERPL CREATININE-BSD FRML MDRD: > 90 ML/MIN/1.73M2
GLUCOSE BLD-MCNC: 89 MG/DL (ref 70–108)
HCT VFR BLD CALC: 34.5 % (ref 37–47)
HEMOGLOBIN: 11.9 GM/DL (ref 12–16)
LYMPHOCYTES # BLD: 28 %
LYMPHOCYTES ABSOLUTE: 1.8 THOU/MM3 (ref 1–4.8)
MCH RBC QN AUTO: 31.9 PG (ref 27–31)
MCHC RBC AUTO-ENTMCNC: 34.5 GM/DL (ref 33–37)
MCV RBC AUTO: 92.5 FL (ref 81–99)
MONOCYTES # BLD: 9 %
MONOCYTES ABSOLUTE: 0.6 THOU/MM3 (ref 0.4–1.3)
NUCLEATED RED BLOOD CELLS: 0 /100 WBC
PDW BLD-RTO: 13.1 % (ref 11.5–14.5)
PLATELET # BLD: 196 THOU/MM3 (ref 130–400)
PMV BLD AUTO: 8.2 FL (ref 7.4–10.4)
POTASSIUM SERPL-SCNC: 3.9 MEQ/L (ref 3.5–5.2)
RBC # BLD: 3.73 MILL/MM3 (ref 4.2–5.4)
SEG NEUTROPHILS: 58.6 %
SEGMENTED NEUTROPHILS ABSOLUTE COUNT: 3.8 THOU/MM3 (ref 1.8–7.7)
SODIUM BLD-SCNC: 142 MEQ/L (ref 135–145)
WBC # BLD: 6.4 THOU/MM3 (ref 4.8–10.8)

## 2018-02-11 PROCEDURE — 80048 BASIC METABOLIC PNL TOTAL CA: CPT

## 2018-02-11 PROCEDURE — 2580000003 HC RX 258: Performed by: EMERGENCY MEDICINE

## 2018-02-11 PROCEDURE — 1200000003 HC TELEMETRY R&B

## 2018-02-11 PROCEDURE — 6370000000 HC RX 637 (ALT 250 FOR IP): Performed by: FAMILY MEDICINE

## 2018-02-11 PROCEDURE — 6360000002 HC RX W HCPCS: Performed by: ORTHOPAEDIC SURGERY

## 2018-02-11 PROCEDURE — 6370000000 HC RX 637 (ALT 250 FOR IP): Performed by: EMERGENCY MEDICINE

## 2018-02-11 PROCEDURE — 85025 COMPLETE CBC W/AUTO DIFF WBC: CPT

## 2018-02-11 PROCEDURE — 36415 COLL VENOUS BLD VENIPUNCTURE: CPT

## 2018-02-11 PROCEDURE — 6370000000 HC RX 637 (ALT 250 FOR IP): Performed by: NURSE PRACTITIONER

## 2018-02-11 RX ORDER — AMLODIPINE BESYLATE 5 MG/1
5 TABLET ORAL ONCE
Status: COMPLETED | OUTPATIENT
Start: 2018-02-11 | End: 2018-02-11

## 2018-02-11 RX ADMIN — CALCIUM CARBONATE-VITAMIN D TAB 500 MG-200 UNIT 1 TABLET: 500-200 TAB at 08:00

## 2018-02-11 RX ADMIN — SACUBITRIL AND VALSARTAN 1 TABLET: 24; 26 TABLET, FILM COATED ORAL at 08:00

## 2018-02-11 RX ADMIN — LORAZEPAM 0.25 MG: 0.5 TABLET ORAL at 20:35

## 2018-02-11 RX ADMIN — Medication 10 ML: at 20:36

## 2018-02-11 RX ADMIN — TIZANIDINE 1 MG: 4 TABLET ORAL at 16:08

## 2018-02-11 RX ADMIN — OXYCODONE HYDROCHLORIDE AND ACETAMINOPHEN 2 TABLET: 5; 325 TABLET ORAL at 23:35

## 2018-02-11 RX ADMIN — TIZANIDINE 1 MG: 4 TABLET ORAL at 07:54

## 2018-02-11 RX ADMIN — ASPIRIN 81 MG: 81 TABLET, COATED ORAL at 07:59

## 2018-02-11 RX ADMIN — SACUBITRIL AND VALSARTAN 1 TABLET: 49; 51 TABLET, FILM COATED ORAL at 20:36

## 2018-02-11 RX ADMIN — CEFAZOLIN SODIUM 2 G: 2 SOLUTION INTRAVENOUS at 13:03

## 2018-02-11 RX ADMIN — CALCIUM CARBONATE-VITAMIN D TAB 500 MG-200 UNIT 1 TABLET: 500-200 TAB at 20:35

## 2018-02-11 RX ADMIN — OXYCODONE HYDROCHLORIDE AND ACETAMINOPHEN 2 TABLET: 5; 325 TABLET ORAL at 11:14

## 2018-02-11 RX ADMIN — AMLODIPINE BESYLATE 5 MG: 5 TABLET ORAL at 02:09

## 2018-02-11 RX ADMIN — METOPROLOL SUCCINATE 50 MG: 50 TABLET, FILM COATED, EXTENDED RELEASE ORAL at 07:59

## 2018-02-11 RX ADMIN — OXYCODONE HYDROCHLORIDE AND ACETAMINOPHEN 2 TABLET: 5; 325 TABLET ORAL at 04:35

## 2018-02-11 RX ADMIN — Medication 10 ML: at 13:07

## 2018-02-11 RX ADMIN — OXYCODONE HYDROCHLORIDE AND ACETAMINOPHEN 2 TABLET: 5; 325 TABLET ORAL at 17:34

## 2018-02-11 RX ADMIN — SIMVASTATIN 40 MG: 40 TABLET, FILM COATED ORAL at 20:35

## 2018-02-11 ASSESSMENT — PAIN SCALES - GENERAL
PAINLEVEL_OUTOF10: 6
PAINLEVEL_OUTOF10: 7
PAINLEVEL_OUTOF10: 7
PAINLEVEL_OUTOF10: 6
PAINLEVEL_OUTOF10: 6
PAINLEVEL_OUTOF10: 4
PAINLEVEL_OUTOF10: 8

## 2018-02-11 ASSESSMENT — PAIN DESCRIPTION - ORIENTATION
ORIENTATION: LOWER

## 2018-02-11 ASSESSMENT — PAIN DESCRIPTION - DESCRIPTORS
DESCRIPTORS: ACHING

## 2018-02-11 ASSESSMENT — PAIN DESCRIPTION - LOCATION
LOCATION: BACK

## 2018-02-11 ASSESSMENT — PAIN DESCRIPTION - PAIN TYPE
TYPE: ACUTE PAIN

## 2018-02-11 ASSESSMENT — PAIN DESCRIPTION - FREQUENCY
FREQUENCY: CONTINUOUS
FREQUENCY: CONTINUOUS

## 2018-02-12 ENCOUNTER — APPOINTMENT (OUTPATIENT)
Dept: GENERAL RADIOLOGY | Age: 78
DRG: 493 | End: 2018-02-12
Payer: MEDICARE

## 2018-02-12 ENCOUNTER — ANESTHESIA (OUTPATIENT)
Dept: OPERATING ROOM | Age: 78
DRG: 493 | End: 2018-02-12
Payer: MEDICARE

## 2018-02-12 ENCOUNTER — ANESTHESIA EVENT (OUTPATIENT)
Dept: OPERATING ROOM | Age: 78
DRG: 493 | End: 2018-02-12
Payer: MEDICARE

## 2018-02-12 VITALS
RESPIRATION RATE: 12 BRPM | TEMPERATURE: 98.6 F | OXYGEN SATURATION: 99 % | SYSTOLIC BLOOD PRESSURE: 156 MMHG | DIASTOLIC BLOOD PRESSURE: 68 MMHG

## 2018-02-12 PROCEDURE — 3700000001 HC ADD 15 MINUTES (ANESTHESIA): Performed by: ORTHOPAEDIC SURGERY

## 2018-02-12 PROCEDURE — A4565 SLINGS: HCPCS | Performed by: ORTHOPAEDIC SURGERY

## 2018-02-12 PROCEDURE — 2500000003 HC RX 250 WO HCPCS: Performed by: NURSE ANESTHETIST, CERTIFIED REGISTERED

## 2018-02-12 PROCEDURE — 6360000002 HC RX W HCPCS: Performed by: NURSE PRACTITIONER

## 2018-02-12 PROCEDURE — 6370000000 HC RX 637 (ALT 250 FOR IP): Performed by: INTERNAL MEDICINE

## 2018-02-12 PROCEDURE — 7100000000 HC PACU RECOVERY - FIRST 15 MIN: Performed by: ORTHOPAEDIC SURGERY

## 2018-02-12 PROCEDURE — 2720000010 HC SURG SUPPLY STERILE: Performed by: ORTHOPAEDIC SURGERY

## 2018-02-12 PROCEDURE — 2700000000 HC OXYGEN THERAPY PER DAY

## 2018-02-12 PROCEDURE — 2500000003 HC RX 250 WO HCPCS: Performed by: ORTHOPAEDIC SURGERY

## 2018-02-12 PROCEDURE — 6360000002 HC RX W HCPCS

## 2018-02-12 PROCEDURE — 71045 X-RAY EXAM CHEST 1 VIEW: CPT

## 2018-02-12 PROCEDURE — 97530 THERAPEUTIC ACTIVITIES: CPT

## 2018-02-12 PROCEDURE — 6370000000 HC RX 637 (ALT 250 FOR IP): Performed by: EMERGENCY MEDICINE

## 2018-02-12 PROCEDURE — 6360000002 HC RX W HCPCS: Performed by: ANESTHESIOLOGY

## 2018-02-12 PROCEDURE — 2580000003 HC RX 258: Performed by: EMERGENCY MEDICINE

## 2018-02-12 PROCEDURE — 6370000000 HC RX 637 (ALT 250 FOR IP): Performed by: NURSE PRACTITIONER

## 2018-02-12 PROCEDURE — 6360000002 HC RX W HCPCS: Performed by: NURSE ANESTHETIST, CERTIFIED REGISTERED

## 2018-02-12 PROCEDURE — 97116 GAIT TRAINING THERAPY: CPT

## 2018-02-12 PROCEDURE — 3600000014 HC SURGERY LEVEL 4 ADDTL 15MIN: Performed by: ORTHOPAEDIC SURGERY

## 2018-02-12 PROCEDURE — 97110 THERAPEUTIC EXERCISES: CPT

## 2018-02-12 PROCEDURE — C1713 ANCHOR/SCREW BN/BN,TIS/BN: HCPCS | Performed by: ORTHOPAEDIC SURGERY

## 2018-02-12 PROCEDURE — 6370000000 HC RX 637 (ALT 250 FOR IP): Performed by: FAMILY MEDICINE

## 2018-02-12 PROCEDURE — 0PSG04Z REPOSITION LEFT HUMERAL SHAFT WITH INTERNAL FIXATION DEVICE, OPEN APPROACH: ICD-10-PCS | Performed by: ORTHOPAEDIC SURGERY

## 2018-02-12 PROCEDURE — 73060 X-RAY EXAM OF HUMERUS: CPT

## 2018-02-12 PROCEDURE — 7100000001 HC PACU RECOVERY - ADDTL 15 MIN: Performed by: ORTHOPAEDIC SURGERY

## 2018-02-12 PROCEDURE — 3209999900 FLUORO FOR SURGICAL PROCEDURES

## 2018-02-12 PROCEDURE — 94762 N-INVAS EAR/PLS OXIMTRY CONT: CPT

## 2018-02-12 PROCEDURE — 3700000000 HC ANESTHESIA ATTENDED CARE: Performed by: ORTHOPAEDIC SURGERY

## 2018-02-12 PROCEDURE — 2500000003 HC RX 250 WO HCPCS: Performed by: ANESTHESIOLOGY

## 2018-02-12 PROCEDURE — 1200000003 HC TELEMETRY R&B

## 2018-02-12 PROCEDURE — 2580000003 HC RX 258: Performed by: NURSE ANESTHETIST, CERTIFIED REGISTERED

## 2018-02-12 PROCEDURE — 3600000004 HC SURGERY LEVEL 4 BASE: Performed by: ORTHOPAEDIC SURGERY

## 2018-02-12 DEVICE — PERI-LOC 3.5MM PROXIMAL HUMERUS                                    LOCKING PLATE 11 HOLE LEFT 191MM
Type: IMPLANTABLE DEVICE | Status: FUNCTIONAL
Brand: PERI-LOC

## 2018-02-12 DEVICE — PERI-LOC 3.5MM T20 LOCKING SCREW                                    10MM SELF-TAPPING
Type: IMPLANTABLE DEVICE | Status: FUNCTIONAL
Brand: PERI-LOC

## 2018-02-12 DEVICE — PERI-LOC 3.5MM T20 CORTEX SCREW                                    18MM SELF-TAPPING
Type: IMPLANTABLE DEVICE | Status: FUNCTIONAL
Brand: PERI-LOC

## 2018-02-12 DEVICE — PERI-LOC 3.5MM T20 LOCKING SCREW                                    20MM SELF-TAPPING
Type: IMPLANTABLE DEVICE | Status: FUNCTIONAL
Brand: PERI-LOC

## 2018-02-12 DEVICE — PERI-LOC 3.5MM T20 LOCKING SCREW                                    12MM SELF-TAPPING
Type: IMPLANTABLE DEVICE | Status: FUNCTIONAL
Brand: PERI-LOC

## 2018-02-12 DEVICE — PERI-LOC 3.5MM T20 LOCKING SCREW                                    28MM SELF-TAPPING
Type: IMPLANTABLE DEVICE | Status: FUNCTIONAL
Brand: PERI-LOC

## 2018-02-12 DEVICE — PERI-LOC 3.5MM T20 CORTEX SCREW                                    20MM SELF-TAPPING
Type: IMPLANTABLE DEVICE | Status: FUNCTIONAL
Brand: PERI-LOC

## 2018-02-12 DEVICE — PERI-LOC 3.5MM T20 LOCKING SCREW                                    18MM SELF-TAPPING
Type: IMPLANTABLE DEVICE | Status: FUNCTIONAL
Brand: PERI-LOC

## 2018-02-12 DEVICE — PERI-LOC 3.5MM T20 LOCKING SCREW                                    16MM SELF-TAPPING
Type: IMPLANTABLE DEVICE | Status: FUNCTIONAL
Brand: PERI-LOC

## 2018-02-12 RX ORDER — HYDRALAZINE HYDROCHLORIDE 50 MG/1
50 TABLET, FILM COATED ORAL 3 TIMES DAILY PRN
Status: DISCONTINUED | OUTPATIENT
Start: 2018-02-12 | End: 2018-02-14 | Stop reason: HOSPADM

## 2018-02-12 RX ORDER — DEXAMETHASONE SODIUM PHOSPHATE 4 MG/ML
INJECTION, SOLUTION INTRA-ARTICULAR; INTRALESIONAL; INTRAMUSCULAR; INTRAVENOUS; SOFT TISSUE PRN
Status: DISCONTINUED | OUTPATIENT
Start: 2018-02-12 | End: 2018-02-12 | Stop reason: SDUPTHER

## 2018-02-12 RX ORDER — OXYCODONE HYDROCHLORIDE AND ACETAMINOPHEN 5; 325 MG/1; MG/1
1-2 TABLET ORAL EVERY 6 HOURS PRN
Qty: 70 TABLET | Refills: 0 | Status: ON HOLD | OUTPATIENT
Start: 2018-02-12 | End: 2018-03-06 | Stop reason: HOSPADM

## 2018-02-12 RX ORDER — HYDRALAZINE HYDROCHLORIDE 50 MG/1
50 TABLET, FILM COATED ORAL 3 TIMES DAILY PRN
Status: DISCONTINUED | OUTPATIENT
Start: 2018-02-12 | End: 2018-02-12

## 2018-02-12 RX ORDER — LIDOCAINE HYDROCHLORIDE 20 MG/ML
INJECTION, SOLUTION INFILTRATION; PERINEURAL PRN
Status: DISCONTINUED | OUTPATIENT
Start: 2018-02-12 | End: 2018-02-12 | Stop reason: SDUPTHER

## 2018-02-12 RX ORDER — ONDANSETRON 2 MG/ML
INJECTION INTRAMUSCULAR; INTRAVENOUS PRN
Status: DISCONTINUED | OUTPATIENT
Start: 2018-02-12 | End: 2018-02-12 | Stop reason: SDUPTHER

## 2018-02-12 RX ORDER — SODIUM CHLORIDE 9 MG/ML
INJECTION, SOLUTION INTRAVENOUS CONTINUOUS PRN
Status: DISCONTINUED | OUTPATIENT
Start: 2018-02-12 | End: 2018-02-12 | Stop reason: SDUPTHER

## 2018-02-12 RX ORDER — BUPIVACAINE HYDROCHLORIDE AND EPINEPHRINE 5; 5 MG/ML; UG/ML
INJECTION, SOLUTION EPIDURAL; INTRACAUDAL; PERINEURAL PRN
Status: DISCONTINUED | OUTPATIENT
Start: 2018-02-12 | End: 2018-02-12 | Stop reason: HOSPADM

## 2018-02-12 RX ORDER — CEFAZOLIN SODIUM 1 G/3ML
INJECTION, POWDER, FOR SOLUTION INTRAMUSCULAR; INTRAVENOUS PRN
Status: DISCONTINUED | OUTPATIENT
Start: 2018-02-12 | End: 2018-02-12 | Stop reason: SDUPTHER

## 2018-02-12 RX ORDER — FENTANYL CITRATE 50 UG/ML
25 INJECTION, SOLUTION INTRAMUSCULAR; INTRAVENOUS EVERY 5 MIN PRN
Status: DISCONTINUED | OUTPATIENT
Start: 2018-02-12 | End: 2018-02-12 | Stop reason: HOSPADM

## 2018-02-12 RX ORDER — PROPOFOL 10 MG/ML
INJECTION, EMULSION INTRAVENOUS PRN
Status: DISCONTINUED | OUTPATIENT
Start: 2018-02-12 | End: 2018-02-12 | Stop reason: SDUPTHER

## 2018-02-12 RX ORDER — FENTANYL CITRATE 50 UG/ML
INJECTION, SOLUTION INTRAMUSCULAR; INTRAVENOUS PRN
Status: DISCONTINUED | OUTPATIENT
Start: 2018-02-12 | End: 2018-02-12 | Stop reason: SDUPTHER

## 2018-02-12 RX ORDER — MORPHINE SULFATE 2 MG/ML
2 INJECTION, SOLUTION INTRAMUSCULAR; INTRAVENOUS
Status: DISCONTINUED | OUTPATIENT
Start: 2018-02-12 | End: 2018-02-14 | Stop reason: HOSPADM

## 2018-02-12 RX ORDER — FENTANYL CITRATE 50 UG/ML
50 INJECTION, SOLUTION INTRAMUSCULAR; INTRAVENOUS EVERY 5 MIN PRN
Status: DISCONTINUED | OUTPATIENT
Start: 2018-02-12 | End: 2018-02-12 | Stop reason: HOSPADM

## 2018-02-12 RX ORDER — MEPERIDINE HYDROCHLORIDE 25 MG/ML
12.5 INJECTION INTRAMUSCULAR; INTRAVENOUS; SUBCUTANEOUS EVERY 5 MIN PRN
Status: DISCONTINUED | OUTPATIENT
Start: 2018-02-12 | End: 2018-02-12 | Stop reason: HOSPADM

## 2018-02-12 RX ORDER — MORPHINE SULFATE 2 MG/ML
4 INJECTION, SOLUTION INTRAMUSCULAR; INTRAVENOUS
Status: DISCONTINUED | OUTPATIENT
Start: 2018-02-12 | End: 2018-02-14 | Stop reason: HOSPADM

## 2018-02-12 RX ORDER — LABETALOL HYDROCHLORIDE 5 MG/ML
5 INJECTION, SOLUTION INTRAVENOUS EVERY 10 MIN PRN
Status: DISCONTINUED | OUTPATIENT
Start: 2018-02-12 | End: 2018-02-12 | Stop reason: HOSPADM

## 2018-02-12 RX ORDER — SUCCINYLCHOLINE CHLORIDE 20 MG/ML
INJECTION INTRAMUSCULAR; INTRAVENOUS PRN
Status: DISCONTINUED | OUTPATIENT
Start: 2018-02-12 | End: 2018-02-12 | Stop reason: SDUPTHER

## 2018-02-12 RX ORDER — PROMETHAZINE HYDROCHLORIDE 25 MG/ML
12.5 INJECTION, SOLUTION INTRAMUSCULAR; INTRAVENOUS
Status: DISCONTINUED | OUTPATIENT
Start: 2018-02-12 | End: 2018-02-12 | Stop reason: HOSPADM

## 2018-02-12 RX ADMIN — METOPROLOL SUCCINATE 50 MG: 50 TABLET, FILM COATED, EXTENDED RELEASE ORAL at 08:33

## 2018-02-12 RX ADMIN — PROPOFOL 90 MG: 10 INJECTION, EMULSION INTRAVENOUS at 14:12

## 2018-02-12 RX ADMIN — SACUBITRIL AND VALSARTAN 1 TABLET: 49; 51 TABLET, FILM COATED ORAL at 20:35

## 2018-02-12 RX ADMIN — SUCCINYLCHOLINE CHLORIDE 140 MG: 20 INJECTION, SOLUTION INTRAMUSCULAR; INTRAVENOUS at 14:12

## 2018-02-12 RX ADMIN — DEXAMETHASONE SODIUM PHOSPHATE 8 MG: 4 INJECTION, SOLUTION INTRAMUSCULAR; INTRAVENOUS at 14:25

## 2018-02-12 RX ADMIN — CEFAZOLIN SODIUM 1 G: 1 INJECTION, SOLUTION INTRAVENOUS at 23:06

## 2018-02-12 RX ADMIN — SACUBITRIL AND VALSARTAN 1 TABLET: 49; 51 TABLET, FILM COATED ORAL at 08:33

## 2018-02-12 RX ADMIN — HYDROMORPHONE HYDROCHLORIDE 0.5 MG: 1 INJECTION, SOLUTION INTRAMUSCULAR; INTRAVENOUS; SUBCUTANEOUS at 15:45

## 2018-02-12 RX ADMIN — PROPOFOL 50 MG: 10 INJECTION, EMULSION INTRAVENOUS at 14:45

## 2018-02-12 RX ADMIN — CALCIUM CARBONATE-VITAMIN D TAB 500 MG-200 UNIT 1 TABLET: 500-200 TAB at 08:33

## 2018-02-12 RX ADMIN — ONDANSETRON 4 MG: 2 INJECTION INTRAMUSCULAR; INTRAVENOUS at 14:25

## 2018-02-12 RX ADMIN — Medication 10 ML: at 08:33

## 2018-02-12 RX ADMIN — LABETALOL HYDROCHLORIDE 5 MG: 5 INJECTION INTRAVENOUS at 15:50

## 2018-02-12 RX ADMIN — CEFAZOLIN 2000 MG: 1 INJECTION, POWDER, FOR SOLUTION INTRAMUSCULAR; INTRAVENOUS; PARENTERAL at 14:25

## 2018-02-12 RX ADMIN — FENTANYL CITRATE 100 MCG: 50 INJECTION INTRAMUSCULAR; INTRAVENOUS at 14:12

## 2018-02-12 RX ADMIN — HYDROMORPHONE HYDROCHLORIDE 0.5 MG: 1 INJECTION, SOLUTION INTRAMUSCULAR; INTRAVENOUS; SUBCUTANEOUS at 15:50

## 2018-02-12 RX ADMIN — LIDOCAINE HYDROCHLORIDE 60 MG: 20 INJECTION, SOLUTION INFILTRATION; PERINEURAL at 14:12

## 2018-02-12 RX ADMIN — MORPHINE SULFATE 4 MG: 2 INJECTION, SOLUTION INTRAMUSCULAR; INTRAVENOUS at 17:50

## 2018-02-12 RX ADMIN — FENTANYL CITRATE 50 MCG: 50 INJECTION INTRAMUSCULAR; INTRAVENOUS at 15:25

## 2018-02-12 RX ADMIN — TIZANIDINE 1 MG: 4 TABLET ORAL at 04:18

## 2018-02-12 RX ADMIN — SIMVASTATIN 40 MG: 40 TABLET, FILM COATED ORAL at 20:35

## 2018-02-12 RX ADMIN — OXYCODONE HYDROCHLORIDE AND ACETAMINOPHEN 2 TABLET: 5; 325 TABLET ORAL at 08:33

## 2018-02-12 RX ADMIN — FENTANYL CITRATE 25 MCG: 50 INJECTION INTRAMUSCULAR; INTRAVENOUS at 15:15

## 2018-02-12 RX ADMIN — HYDRALAZINE HYDROCHLORIDE 50 MG: 25 TABLET, FILM COATED ORAL at 17:49

## 2018-02-12 RX ADMIN — FENTANYL CITRATE 25 MCG: 50 INJECTION INTRAMUSCULAR; INTRAVENOUS at 14:56

## 2018-02-12 RX ADMIN — PHENYLEPHRINE HYDROCHLORIDE 100 MCG: 10 INJECTION INTRAMUSCULAR; INTRAVENOUS; SUBCUTANEOUS at 15:11

## 2018-02-12 RX ADMIN — MORPHINE SULFATE 4 MG: 2 INJECTION, SOLUTION INTRAMUSCULAR; INTRAVENOUS at 20:36

## 2018-02-12 RX ADMIN — FENTANYL CITRATE 50 MCG: 50 INJECTION INTRAMUSCULAR; INTRAVENOUS at 16:10

## 2018-02-12 RX ADMIN — LABETALOL HYDROCHLORIDE 5 MG: 5 INJECTION INTRAVENOUS at 15:55

## 2018-02-12 RX ADMIN — SODIUM CHLORIDE: 9 INJECTION, SOLUTION INTRAVENOUS at 14:12

## 2018-02-12 RX ADMIN — TIZANIDINE 1 MG: 4 TABLET ORAL at 12:54

## 2018-02-12 RX ADMIN — MORPHINE SULFATE 4 MG: 2 INJECTION, SOLUTION INTRAMUSCULAR; INTRAVENOUS at 23:06

## 2018-02-12 ASSESSMENT — PAIN DESCRIPTION - FREQUENCY
FREQUENCY: CONTINUOUS

## 2018-02-12 ASSESSMENT — PULMONARY FUNCTION TESTS
PIF_VALUE: 2
PIF_VALUE: 20
PIF_VALUE: 21
PIF_VALUE: 25
PIF_VALUE: 14
PIF_VALUE: 16
PIF_VALUE: 21
PIF_VALUE: 1
PIF_VALUE: 16
PIF_VALUE: 21
PIF_VALUE: 16
PIF_VALUE: 2
PIF_VALUE: 21
PIF_VALUE: 21
PIF_VALUE: 24
PIF_VALUE: 21
PIF_VALUE: 20
PIF_VALUE: 21
PIF_VALUE: 24
PIF_VALUE: 1
PIF_VALUE: 2
PIF_VALUE: 24
PIF_VALUE: 16
PIF_VALUE: 21
PIF_VALUE: 24
PIF_VALUE: 6
PIF_VALUE: 24
PIF_VALUE: 16
PIF_VALUE: 21
PIF_VALUE: 21
PIF_VALUE: 16
PIF_VALUE: 24
PIF_VALUE: 21
PIF_VALUE: 16
PIF_VALUE: 17
PIF_VALUE: 21
PIF_VALUE: 20
PIF_VALUE: 24
PIF_VALUE: 21
PIF_VALUE: 2
PIF_VALUE: 21
PIF_VALUE: 21
PIF_VALUE: 24
PIF_VALUE: 20
PIF_VALUE: 21
PIF_VALUE: 21
PIF_VALUE: 24
PIF_VALUE: 21
PIF_VALUE: 2
PIF_VALUE: 21
PIF_VALUE: 19
PIF_VALUE: 21
PIF_VALUE: 21
PIF_VALUE: 24
PIF_VALUE: 22
PIF_VALUE: 11
PIF_VALUE: 21
PIF_VALUE: 3
PIF_VALUE: 24
PIF_VALUE: 21
PIF_VALUE: 2
PIF_VALUE: 21
PIF_VALUE: 2
PIF_VALUE: 1
PIF_VALUE: 19
PIF_VALUE: 21
PIF_VALUE: 19
PIF_VALUE: 24
PIF_VALUE: 19
PIF_VALUE: 2
PIF_VALUE: 1
PIF_VALUE: 23

## 2018-02-12 ASSESSMENT — PAIN SCALES - GENERAL
PAINLEVEL_OUTOF10: 7
PAINLEVEL_OUTOF10: 10
PAINLEVEL_OUTOF10: 3
PAINLEVEL_OUTOF10: 10
PAINLEVEL_OUTOF10: 6
PAINLEVEL_OUTOF10: 5
PAINLEVEL_OUTOF10: 10
PAINLEVEL_OUTOF10: 6
PAINLEVEL_OUTOF10: 10
PAINLEVEL_OUTOF10: 8
PAINLEVEL_OUTOF10: 10
PAINLEVEL_OUTOF10: 7
PAINLEVEL_OUTOF10: 2
PAINLEVEL_OUTOF10: 6
PAINLEVEL_OUTOF10: 7

## 2018-02-12 ASSESSMENT — PAIN DESCRIPTION - DESCRIPTORS
DESCRIPTORS: ACHING

## 2018-02-12 ASSESSMENT — PAIN DESCRIPTION - ORIENTATION
ORIENTATION: LEFT
ORIENTATION: LOWER
ORIENTATION: LEFT
ORIENTATION: LOWER

## 2018-02-12 ASSESSMENT — PAIN DESCRIPTION - PAIN TYPE
TYPE: ACUTE PAIN
TYPE: SURGICAL PAIN
TYPE: ACUTE PAIN
TYPE: SURGICAL PAIN
TYPE: ACUTE PAIN
TYPE: ACUTE PAIN
TYPE: SURGICAL PAIN
TYPE: ACUTE PAIN
TYPE: SURGICAL PAIN

## 2018-02-12 ASSESSMENT — PAIN DESCRIPTION - LOCATION
LOCATION: BACK
LOCATION: ARM
LOCATION: ARM
LOCATION: BACK
LOCATION: ARM
LOCATION: BACK
LOCATION: BACK
LOCATION: ARM
LOCATION: BACK
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
## 2018-02-13 LAB
ANION GAP SERPL CALCULATED.3IONS-SCNC: 13 MEQ/L (ref 8–16)
BASOPHILS # BLD: 0.1 %
BASOPHILS ABSOLUTE: 0 THOU/MM3 (ref 0–0.1)
BUN BLDV-MCNC: 19 MG/DL (ref 7–22)
CALCIUM SERPL-MCNC: 8.6 MG/DL (ref 8.5–10.5)
CHLORIDE BLD-SCNC: 98 MEQ/L (ref 98–111)
CO2: 26 MEQ/L (ref 23–33)
CREAT SERPL-MCNC: 0.4 MG/DL (ref 0.4–1.2)
EOSINOPHIL # BLD: 0 %
EOSINOPHILS ABSOLUTE: 0 THOU/MM3 (ref 0–0.4)
GFR SERPL CREATININE-BSD FRML MDRD: > 90 ML/MIN/1.73M2
GLUCOSE BLD-MCNC: 117 MG/DL (ref 70–108)
HCT VFR BLD CALC: 36.7 % (ref 37–47)
HEMOGLOBIN: 12.5 GM/DL (ref 12–16)
LYMPHOCYTES # BLD: 7.9 %
LYMPHOCYTES ABSOLUTE: 0.7 THOU/MM3 (ref 1–4.8)
MCH RBC QN AUTO: 31.7 PG (ref 27–31)
MCHC RBC AUTO-ENTMCNC: 34.1 GM/DL (ref 33–37)
MCV RBC AUTO: 92.8 FL (ref 81–99)
MONOCYTES # BLD: 5.4 %
MONOCYTES ABSOLUTE: 0.5 THOU/MM3 (ref 0.4–1.3)
NUCLEATED RED BLOOD CELLS: 0 /100 WBC
PDW BLD-RTO: 13.1 % (ref 11.5–14.5)
PLATELET # BLD: 258 THOU/MM3 (ref 130–400)
PMV BLD AUTO: 8 FL (ref 7.4–10.4)
POTASSIUM SERPL-SCNC: 4.2 MEQ/L (ref 3.5–5.2)
RBC # BLD: 3.96 MILL/MM3 (ref 4.2–5.4)
SEG NEUTROPHILS: 86.6 %
SEGMENTED NEUTROPHILS ABSOLUTE COUNT: 8.1 THOU/MM3 (ref 1.8–7.7)
SODIUM BLD-SCNC: 137 MEQ/L (ref 135–145)
WBC # BLD: 9.3 THOU/MM3 (ref 4.8–10.8)

## 2018-02-13 PROCEDURE — 95992 CANALITH REPOSITIONING PROC: CPT

## 2018-02-13 PROCEDURE — 2580000003 HC RX 258: Performed by: EMERGENCY MEDICINE

## 2018-02-13 PROCEDURE — 6370000000 HC RX 637 (ALT 250 FOR IP): Performed by: NURSE PRACTITIONER

## 2018-02-13 PROCEDURE — 6370000000 HC RX 637 (ALT 250 FOR IP): Performed by: EMERGENCY MEDICINE

## 2018-02-13 PROCEDURE — 97110 THERAPEUTIC EXERCISES: CPT

## 2018-02-13 PROCEDURE — 97530 THERAPEUTIC ACTIVITIES: CPT

## 2018-02-13 PROCEDURE — 97535 SELF CARE MNGMENT TRAINING: CPT

## 2018-02-13 PROCEDURE — 97116 GAIT TRAINING THERAPY: CPT

## 2018-02-13 PROCEDURE — 6370000000 HC RX 637 (ALT 250 FOR IP): Performed by: FAMILY MEDICINE

## 2018-02-13 PROCEDURE — 80048 BASIC METABOLIC PNL TOTAL CA: CPT

## 2018-02-13 PROCEDURE — 6360000002 HC RX W HCPCS: Performed by: NURSE PRACTITIONER

## 2018-02-13 PROCEDURE — 85025 COMPLETE CBC W/AUTO DIFF WBC: CPT

## 2018-02-13 PROCEDURE — 92523 SPEECH SOUND LANG COMPREHEN: CPT

## 2018-02-13 PROCEDURE — 1200000003 HC TELEMETRY R&B

## 2018-02-13 PROCEDURE — 36415 COLL VENOUS BLD VENIPUNCTURE: CPT

## 2018-02-13 RX ORDER — POLYETHYLENE GLYCOL 3350 17 G/17G
17 POWDER, FOR SOLUTION ORAL DAILY
Status: DISCONTINUED | OUTPATIENT
Start: 2018-02-13 | End: 2018-02-14 | Stop reason: HOSPADM

## 2018-02-13 RX ORDER — SENNA AND DOCUSATE SODIUM 50; 8.6 MG/1; MG/1
2 TABLET, FILM COATED ORAL DAILY PRN
Status: DISCONTINUED | OUTPATIENT
Start: 2018-02-13 | End: 2018-02-14 | Stop reason: HOSPADM

## 2018-02-13 RX ADMIN — DOCUSATE SODIUM AND SENNOSIDES 2 TABLET: 8.6; 5 TABLET, FILM COATED ORAL at 12:30

## 2018-02-13 RX ADMIN — MORPHINE SULFATE 4 MG: 2 INJECTION, SOLUTION INTRAMUSCULAR; INTRAVENOUS at 04:19

## 2018-02-13 RX ADMIN — OXYCODONE HYDROCHLORIDE AND ACETAMINOPHEN 2 TABLET: 5; 325 TABLET ORAL at 22:24

## 2018-02-13 RX ADMIN — OXYCODONE HYDROCHLORIDE AND ACETAMINOPHEN 2 TABLET: 5; 325 TABLET ORAL at 16:13

## 2018-02-13 RX ADMIN — MORPHINE SULFATE 2 MG: 2 INJECTION, SOLUTION INTRAMUSCULAR; INTRAVENOUS at 09:39

## 2018-02-13 RX ADMIN — SACUBITRIL AND VALSARTAN 1 TABLET: 49; 51 TABLET, FILM COATED ORAL at 20:47

## 2018-02-13 RX ADMIN — TIZANIDINE 1 MG: 4 TABLET ORAL at 20:45

## 2018-02-13 RX ADMIN — SACUBITRIL AND VALSARTAN 1 TABLET: 49; 51 TABLET, FILM COATED ORAL at 08:20

## 2018-02-13 RX ADMIN — Medication 10 ML: at 08:30

## 2018-02-13 RX ADMIN — MORPHINE SULFATE 2 MG: 2 INJECTION, SOLUTION INTRAMUSCULAR; INTRAVENOUS at 14:31

## 2018-02-13 RX ADMIN — SIMVASTATIN 40 MG: 40 TABLET, FILM COATED ORAL at 20:45

## 2018-02-13 RX ADMIN — METOPROLOL SUCCINATE 50 MG: 50 TABLET, FILM COATED, EXTENDED RELEASE ORAL at 08:19

## 2018-02-13 RX ADMIN — MORPHINE SULFATE 4 MG: 2 INJECTION, SOLUTION INTRAMUSCULAR; INTRAVENOUS at 01:48

## 2018-02-13 RX ADMIN — Medication 10 ML: at 20:48

## 2018-02-13 RX ADMIN — ASPIRIN 81 MG: 81 TABLET, COATED ORAL at 08:19

## 2018-02-13 RX ADMIN — POLYETHYLENE GLYCOL (3350) 17 G: 17 POWDER, FOR SOLUTION ORAL at 14:24

## 2018-02-13 RX ADMIN — CALCIUM CARBONATE-VITAMIN D TAB 500 MG-200 UNIT 1 TABLET: 500-200 TAB at 08:19

## 2018-02-13 RX ADMIN — OXYCODONE HYDROCHLORIDE AND ACETAMINOPHEN 2 TABLET: 5; 325 TABLET ORAL at 08:13

## 2018-02-13 RX ADMIN — CEFAZOLIN SODIUM 1 G: 1 INJECTION, SOLUTION INTRAVENOUS at 06:13

## 2018-02-13 RX ADMIN — TIZANIDINE 1 MG: 4 TABLET ORAL at 12:21

## 2018-02-13 ASSESSMENT — PAIN DESCRIPTION - DESCRIPTORS
DESCRIPTORS: ACHING
DESCRIPTORS: ACHING

## 2018-02-13 ASSESSMENT — PAIN SCALES - GENERAL
PAINLEVEL_OUTOF10: 10
PAINLEVEL_OUTOF10: 4
PAINLEVEL_OUTOF10: 9
PAINLEVEL_OUTOF10: 6
PAINLEVEL_OUTOF10: 8
PAINLEVEL_OUTOF10: 6
PAINLEVEL_OUTOF10: 7
PAINLEVEL_OUTOF10: 2
PAINLEVEL_OUTOF10: 7
PAINLEVEL_OUTOF10: 4

## 2018-02-13 ASSESSMENT — PAIN DESCRIPTION - ORIENTATION
ORIENTATION: LEFT

## 2018-02-13 ASSESSMENT — PAIN DESCRIPTION - LOCATION
LOCATION: ARM
LOCATION: BACK
LOCATION: ARM
LOCATION: ARM
LOCATION: BACK
LOCATION: ARM

## 2018-02-13 ASSESSMENT — PAIN DESCRIPTION - FREQUENCY
FREQUENCY: CONTINUOUS

## 2018-02-13 ASSESSMENT — PAIN DESCRIPTION - PAIN TYPE
TYPE: SURGICAL PAIN

## 2018-02-14 ENCOUNTER — HOSPITAL ENCOUNTER (INPATIENT)
Age: 78
LOS: 20 days | Discharge: NURSING, REHAB OR LONG-TERM CARE | DRG: 560 | End: 2018-03-06
Attending: PHYSICAL MEDICINE & REHABILITATION | Admitting: PHYSICAL MEDICINE & REHABILITATION
Payer: MEDICARE

## 2018-02-14 ENCOUNTER — APPOINTMENT (OUTPATIENT)
Dept: INTERVENTIONAL RADIOLOGY/VASCULAR | Age: 78
DRG: 493 | End: 2018-02-14
Payer: MEDICARE

## 2018-02-14 VITALS
HEART RATE: 72 BPM | HEIGHT: 67 IN | OXYGEN SATURATION: 92 % | TEMPERATURE: 97.6 F | DIASTOLIC BLOOD PRESSURE: 53 MMHG | BODY MASS INDEX: 37.18 KG/M2 | SYSTOLIC BLOOD PRESSURE: 114 MMHG | WEIGHT: 236.9 LBS | RESPIRATION RATE: 16 BRPM

## 2018-02-14 PROBLEM — S06.9X0A TRAUMATIC BRAIN INJURY WITHOUT LOSS OF CONSCIOUSNESS (HCC): Status: ACTIVE | Noted: 2018-02-14

## 2018-02-14 PROCEDURE — 6370000000 HC RX 637 (ALT 250 FOR IP): Performed by: NURSE PRACTITIONER

## 2018-02-14 PROCEDURE — 97110 THERAPEUTIC EXERCISES: CPT

## 2018-02-14 PROCEDURE — 6370000000 HC RX 637 (ALT 250 FOR IP): Performed by: EMERGENCY MEDICINE

## 2018-02-14 PROCEDURE — 2580000003 HC RX 258: Performed by: EMERGENCY MEDICINE

## 2018-02-14 PROCEDURE — 97530 THERAPEUTIC ACTIVITIES: CPT

## 2018-02-14 PROCEDURE — 95992 CANALITH REPOSITIONING PROC: CPT

## 2018-02-14 PROCEDURE — 6370000000 HC RX 637 (ALT 250 FOR IP): Performed by: PHYSICAL MEDICINE & REHABILITATION

## 2018-02-14 PROCEDURE — 93970 EXTREMITY STUDY: CPT

## 2018-02-14 PROCEDURE — 1180000000 HC REHAB R&B

## 2018-02-14 PROCEDURE — 6370000000 HC RX 637 (ALT 250 FOR IP): Performed by: FAMILY MEDICINE

## 2018-02-14 RX ORDER — SENNA AND DOCUSATE SODIUM 50; 8.6 MG/1; MG/1
2 TABLET, FILM COATED ORAL DAILY PRN
Status: CANCELLED | OUTPATIENT
Start: 2018-02-14

## 2018-02-14 RX ORDER — POLYETHYLENE GLYCOL 3350 17 G/17G
17 POWDER, FOR SOLUTION ORAL DAILY
Status: CANCELLED | OUTPATIENT
Start: 2018-02-15

## 2018-02-14 RX ORDER — ASPIRIN 81 MG/1
81 TABLET ORAL DAILY
Status: CANCELLED | OUTPATIENT
Start: 2018-02-15

## 2018-02-14 RX ORDER — LIDOCAINE 50 MG/G
1 PATCH TOPICAL DAILY
Status: DISCONTINUED | OUTPATIENT
Start: 2018-02-15 | End: 2018-03-06 | Stop reason: HOSPADM

## 2018-02-14 RX ORDER — METOPROLOL SUCCINATE 50 MG/1
50 TABLET, EXTENDED RELEASE ORAL DAILY
Status: DISCONTINUED | OUTPATIENT
Start: 2018-02-15 | End: 2018-03-06 | Stop reason: HOSPADM

## 2018-02-14 RX ORDER — TIZANIDINE 4 MG/1
1 TABLET ORAL EVERY 8 HOURS PRN
Status: DISCONTINUED | OUTPATIENT
Start: 2018-02-14 | End: 2018-02-17

## 2018-02-14 RX ORDER — OYSTER SHELL CALCIUM WITH VITAMIN D 500; 200 MG/1; [IU]/1
1 TABLET, FILM COATED ORAL 2 TIMES DAILY
Status: DISCONTINUED | OUTPATIENT
Start: 2018-02-14 | End: 2018-03-06 | Stop reason: HOSPADM

## 2018-02-14 RX ORDER — SODIUM CHLORIDE 0.9 % (FLUSH) 0.9 %
10 SYRINGE (ML) INJECTION EVERY 12 HOURS SCHEDULED
Status: DISCONTINUED | OUTPATIENT
Start: 2018-02-14 | End: 2018-02-19

## 2018-02-14 RX ORDER — METOPROLOL SUCCINATE 50 MG/1
50 TABLET, EXTENDED RELEASE ORAL DAILY
Status: CANCELLED | OUTPATIENT
Start: 2018-02-15

## 2018-02-14 RX ORDER — POLYETHYLENE GLYCOL 3350 17 G/17G
17 POWDER, FOR SOLUTION ORAL 2 TIMES DAILY
Status: DISCONTINUED | OUTPATIENT
Start: 2018-02-14 | End: 2018-02-17

## 2018-02-14 RX ORDER — SENNA AND DOCUSATE SODIUM 50; 8.6 MG/1; MG/1
2 TABLET, FILM COATED ORAL DAILY PRN
Status: DISCONTINUED | OUTPATIENT
Start: 2018-02-14 | End: 2018-02-14

## 2018-02-14 RX ORDER — OYSTER SHELL CALCIUM WITH VITAMIN D 500; 200 MG/1; [IU]/1
1 TABLET, FILM COATED ORAL 2 TIMES DAILY
Status: CANCELLED | OUTPATIENT
Start: 2018-02-14

## 2018-02-14 RX ORDER — ASPIRIN 81 MG/1
81 TABLET ORAL DAILY
Status: DISCONTINUED | OUTPATIENT
Start: 2018-02-15 | End: 2018-03-06 | Stop reason: HOSPADM

## 2018-02-14 RX ORDER — POLYETHYLENE GLYCOL 3350 17 G/17G
17 POWDER, FOR SOLUTION ORAL DAILY
Status: DISCONTINUED | OUTPATIENT
Start: 2018-02-15 | End: 2018-02-14

## 2018-02-14 RX ORDER — SODIUM CHLORIDE 0.9 % (FLUSH) 0.9 %
10 SYRINGE (ML) INJECTION PRN
Status: CANCELLED | OUTPATIENT
Start: 2018-02-14

## 2018-02-14 RX ORDER — OXYCODONE HYDROCHLORIDE AND ACETAMINOPHEN 5; 325 MG/1; MG/1
2 TABLET ORAL EVERY 6 HOURS PRN
Status: CANCELLED | OUTPATIENT
Start: 2018-02-14

## 2018-02-14 RX ORDER — ACETAMINOPHEN 325 MG/1
650 TABLET ORAL EVERY 4 HOURS PRN
Status: CANCELLED | OUTPATIENT
Start: 2018-02-14

## 2018-02-14 RX ORDER — SIMVASTATIN 40 MG
40 TABLET ORAL NIGHTLY
Status: DISCONTINUED | OUTPATIENT
Start: 2018-02-14 | End: 2018-03-06 | Stop reason: HOSPADM

## 2018-02-14 RX ORDER — SODIUM CHLORIDE 0.9 % (FLUSH) 0.9 %
10 SYRINGE (ML) INJECTION PRN
Status: DISCONTINUED | OUTPATIENT
Start: 2018-02-14 | End: 2018-03-06 | Stop reason: HOSPADM

## 2018-02-14 RX ORDER — DOCUSATE SODIUM 100 MG/1
100 CAPSULE, LIQUID FILLED ORAL 3 TIMES DAILY
Status: DISCONTINUED | OUTPATIENT
Start: 2018-02-14 | End: 2018-02-17

## 2018-02-14 RX ORDER — OXYCODONE HYDROCHLORIDE AND ACETAMINOPHEN 5; 325 MG/1; MG/1
2 TABLET ORAL EVERY 6 HOURS PRN
Status: DISCONTINUED | OUTPATIENT
Start: 2018-02-14 | End: 2018-02-15

## 2018-02-14 RX ORDER — SENNA PLUS 8.6 MG/1
4 TABLET ORAL NIGHTLY
Status: DISCONTINUED | OUTPATIENT
Start: 2018-02-14 | End: 2018-02-17

## 2018-02-14 RX ORDER — ONDANSETRON 2 MG/ML
4 INJECTION INTRAMUSCULAR; INTRAVENOUS EVERY 6 HOURS PRN
Status: CANCELLED | OUTPATIENT
Start: 2018-02-14

## 2018-02-14 RX ORDER — SIMVASTATIN 40 MG
40 TABLET ORAL NIGHTLY
Status: CANCELLED | OUTPATIENT
Start: 2018-02-14

## 2018-02-14 RX ORDER — TIZANIDINE 4 MG/1
1 TABLET ORAL EVERY 8 HOURS PRN
Status: CANCELLED | OUTPATIENT
Start: 2018-02-14

## 2018-02-14 RX ORDER — ONDANSETRON 2 MG/ML
4 INJECTION INTRAMUSCULAR; INTRAVENOUS EVERY 6 HOURS PRN
Status: DISCONTINUED | OUTPATIENT
Start: 2018-02-14 | End: 2018-02-14

## 2018-02-14 RX ORDER — SODIUM CHLORIDE 0.9 % (FLUSH) 0.9 %
10 SYRINGE (ML) INJECTION EVERY 12 HOURS SCHEDULED
Status: CANCELLED | OUTPATIENT
Start: 2018-02-14

## 2018-02-14 RX ORDER — ACETAMINOPHEN 325 MG/1
650 TABLET ORAL EVERY 4 HOURS PRN
Status: DISCONTINUED | OUTPATIENT
Start: 2018-02-14 | End: 2018-03-01

## 2018-02-14 RX ADMIN — OXYCODONE HYDROCHLORIDE AND ACETAMINOPHEN 2 TABLET: 5; 325 TABLET ORAL at 17:07

## 2018-02-14 RX ADMIN — SIMVASTATIN 40 MG: 40 TABLET, FILM COATED ORAL at 22:41

## 2018-02-14 RX ADMIN — Medication 10 ML: at 13:44

## 2018-02-14 RX ADMIN — ASPIRIN 81 MG: 81 TABLET, COATED ORAL at 09:59

## 2018-02-14 RX ADMIN — POLYETHYLENE GLYCOL (3350) 17 G: 17 POWDER, FOR SOLUTION ORAL at 09:59

## 2018-02-14 RX ADMIN — POLYETHYLENE GLYCOL (3350) 17 G: 17 POWDER, FOR SOLUTION ORAL at 22:40

## 2018-02-14 RX ADMIN — METOPROLOL SUCCINATE 50 MG: 50 TABLET, FILM COATED, EXTENDED RELEASE ORAL at 09:59

## 2018-02-14 RX ADMIN — DOCUSATE SODIUM 100 MG: 100 CAPSULE ORAL at 22:41

## 2018-02-14 RX ADMIN — OXYCODONE HYDROCHLORIDE AND ACETAMINOPHEN 2 TABLET: 5; 325 TABLET ORAL at 09:58

## 2018-02-14 RX ADMIN — Medication 10 ML: at 22:56

## 2018-02-14 RX ADMIN — TIZANIDINE 1 MG: 4 TABLET ORAL at 22:39

## 2018-02-14 RX ADMIN — SENNOSIDES 34.4 MG: 8.6 TABLET, FILM COATED ORAL at 22:41

## 2018-02-14 RX ADMIN — TIZANIDINE 1 MG: 4 TABLET ORAL at 13:43

## 2018-02-14 RX ADMIN — SACUBITRIL AND VALSARTAN 1 TABLET: 49; 51 TABLET, FILM COATED ORAL at 22:53

## 2018-02-14 RX ADMIN — SACUBITRIL AND VALSARTAN 1 TABLET: 49; 51 TABLET, FILM COATED ORAL at 10:00

## 2018-02-14 RX ADMIN — OXYCODONE HYDROCHLORIDE AND ACETAMINOPHEN 2 TABLET: 5; 325 TABLET ORAL at 23:31

## 2018-02-14 RX ADMIN — CALCIUM CARBONATE-VITAMIN D TAB 500 MG-200 UNIT 1 TABLET: 500-200 TAB at 22:41

## 2018-02-14 ASSESSMENT — PAIN DESCRIPTION - FREQUENCY: FREQUENCY: CONTINUOUS

## 2018-02-14 ASSESSMENT — PAIN SCALES - GENERAL
PAINLEVEL_OUTOF10: 4
PAINLEVEL_OUTOF10: 7
PAINLEVEL_OUTOF10: 6
PAINLEVEL_OUTOF10: 4

## 2018-02-14 ASSESSMENT — PAIN DESCRIPTION - PAIN TYPE: TYPE: SURGICAL PAIN

## 2018-02-14 ASSESSMENT — PAIN DESCRIPTION - LOCATION: LOCATION: ARM

## 2018-02-14 ASSESSMENT — PAIN DESCRIPTION - ORIENTATION: ORIENTATION: LEFT

## 2018-02-14 ASSESSMENT — PAIN DESCRIPTION - DESCRIPTORS: DESCRIPTORS: ACHING

## 2018-02-15 ENCOUNTER — APPOINTMENT (OUTPATIENT)
Dept: GENERAL RADIOLOGY | Age: 78
DRG: 560 | End: 2018-02-15
Attending: PHYSICAL MEDICINE & REHABILITATION
Payer: MEDICARE

## 2018-02-15 LAB
BACTERIA: ABNORMAL /HPF
BILIRUBIN URINE: NEGATIVE
BLOOD, URINE: NEGATIVE
CASTS 2: ABNORMAL /LPF
CASTS UA: ABNORMAL /LPF
CHARACTER, URINE: CLEAR
COLOR: YELLOW
CRYSTALS, UA: ABNORMAL
EPITHELIAL CELLS, UA: ABNORMAL /HPF
GLUCOSE URINE: NEGATIVE MG/DL
KETONES, URINE: NEGATIVE
LEUKOCYTE ESTERASE, URINE: ABNORMAL
MISCELLANEOUS 2: ABNORMAL
NITRITE, URINE: NEGATIVE
PH UA: 8.5
PROTEIN UA: NEGATIVE
RBC URINE: ABNORMAL /HPF
RENAL EPITHELIAL, UA: ABNORMAL
SPECIFIC GRAVITY, URINE: 1.02 (ref 1–1.03)
UROBILINOGEN, URINE: 1 EU/DL
WBC UA: ABNORMAL /HPF
YEAST: ABNORMAL

## 2018-02-15 PROCEDURE — 1180000000 HC REHAB R&B

## 2018-02-15 PROCEDURE — 97163 PT EVAL HIGH COMPLEX 45 MIN: CPT

## 2018-02-15 PROCEDURE — 97110 THERAPEUTIC EXERCISES: CPT

## 2018-02-15 PROCEDURE — 87086 URINE CULTURE/COLONY COUNT: CPT

## 2018-02-15 PROCEDURE — 74018 RADEX ABDOMEN 1 VIEW: CPT

## 2018-02-15 PROCEDURE — 97530 THERAPEUTIC ACTIVITIES: CPT

## 2018-02-15 PROCEDURE — 6360000002 HC RX W HCPCS: Performed by: PHYSICAL MEDICINE & REHABILITATION

## 2018-02-15 PROCEDURE — 2580000003 HC RX 258: Performed by: EMERGENCY MEDICINE

## 2018-02-15 PROCEDURE — 97167 OT EVAL HIGH COMPLEX 60 MIN: CPT

## 2018-02-15 PROCEDURE — 97535 SELF CARE MNGMENT TRAINING: CPT

## 2018-02-15 PROCEDURE — 97112 NEUROMUSCULAR REEDUCATION: CPT

## 2018-02-15 PROCEDURE — 97116 GAIT TRAINING THERAPY: CPT

## 2018-02-15 PROCEDURE — 6370000000 HC RX 637 (ALT 250 FOR IP): Performed by: PHYSICAL MEDICINE & REHABILITATION

## 2018-02-15 PROCEDURE — 81001 URINALYSIS AUTO W/SCOPE: CPT

## 2018-02-15 PROCEDURE — 92523 SPEECH SOUND LANG COMPREHEN: CPT | Performed by: SPEECH-LANGUAGE PATHOLOGIST

## 2018-02-15 PROCEDURE — 6370000000 HC RX 637 (ALT 250 FOR IP): Performed by: EMERGENCY MEDICINE

## 2018-02-15 RX ORDER — OXYCODONE HYDROCHLORIDE AND ACETAMINOPHEN 5; 325 MG/1; MG/1
1 TABLET ORAL EVERY 6 HOURS PRN
Status: DISCONTINUED | OUTPATIENT
Start: 2018-02-15 | End: 2018-02-19

## 2018-02-15 RX ORDER — FAMOTIDINE 20 MG/1
20 TABLET, FILM COATED ORAL 2 TIMES DAILY
Status: DISCONTINUED | OUTPATIENT
Start: 2018-02-15 | End: 2018-03-06 | Stop reason: HOSPADM

## 2018-02-15 RX ORDER — OXYCODONE HYDROCHLORIDE AND ACETAMINOPHEN 5; 325 MG/1; MG/1
2 TABLET ORAL EVERY 6 HOURS PRN
Status: DISCONTINUED | OUTPATIENT
Start: 2018-02-15 | End: 2018-02-19

## 2018-02-15 RX ADMIN — DOCUSATE SODIUM 100 MG: 100 CAPSULE ORAL at 13:30

## 2018-02-15 RX ADMIN — SACUBITRIL AND VALSARTAN 1 TABLET: 49; 51 TABLET, FILM COATED ORAL at 08:04

## 2018-02-15 RX ADMIN — SENNOSIDES 34.4 MG: 8.6 TABLET, FILM COATED ORAL at 22:01

## 2018-02-15 RX ADMIN — OXYCODONE HYDROCHLORIDE AND ACETAMINOPHEN 2 TABLET: 5; 325 TABLET ORAL at 13:30

## 2018-02-15 RX ADMIN — SACUBITRIL AND VALSARTAN 1 TABLET: 49; 51 TABLET, FILM COATED ORAL at 22:01

## 2018-02-15 RX ADMIN — METOPROLOL SUCCINATE 50 MG: 50 TABLET, FILM COATED, EXTENDED RELEASE ORAL at 08:04

## 2018-02-15 RX ADMIN — ASPIRIN 81 MG: 81 TABLET ORAL at 08:04

## 2018-02-15 RX ADMIN — OXYCODONE HYDROCHLORIDE AND ACETAMINOPHEN 2 TABLET: 5; 325 TABLET ORAL at 07:07

## 2018-02-15 RX ADMIN — DOCUSATE SODIUM 100 MG: 100 CAPSULE ORAL at 08:04

## 2018-02-15 RX ADMIN — POLYETHYLENE GLYCOL (3350) 17 G: 17 POWDER, FOR SOLUTION ORAL at 08:04

## 2018-02-15 RX ADMIN — METHYLNALTREXONE BROMIDE 12 MG: 12 INJECTION, SOLUTION SUBCUTANEOUS at 15:20

## 2018-02-15 RX ADMIN — Medication 10 ML: at 22:03

## 2018-02-15 RX ADMIN — SIMVASTATIN 40 MG: 40 TABLET, FILM COATED ORAL at 22:01

## 2018-02-15 RX ADMIN — DOCUSATE SODIUM 100 MG: 100 CAPSULE ORAL at 22:01

## 2018-02-15 RX ADMIN — FAMOTIDINE 20 MG: 20 TABLET, FILM COATED ORAL at 22:01

## 2018-02-15 RX ADMIN — OXYCODONE HYDROCHLORIDE AND ACETAMINOPHEN 2 TABLET: 5; 325 TABLET ORAL at 20:02

## 2018-02-15 RX ADMIN — Medication 10 ML: at 08:04

## 2018-02-15 RX ADMIN — CALCIUM CARBONATE-VITAMIN D TAB 500 MG-200 UNIT 1 TABLET: 500-200 TAB at 08:04

## 2018-02-15 ASSESSMENT — PAIN DESCRIPTION - ORIENTATION
ORIENTATION: LEFT

## 2018-02-15 ASSESSMENT — PAIN DESCRIPTION - PAIN TYPE
TYPE: SURGICAL PAIN
TYPE: SURGICAL PAIN;ACUTE PAIN
TYPE: SURGICAL PAIN

## 2018-02-15 ASSESSMENT — PAIN DESCRIPTION - DESCRIPTORS
DESCRIPTORS: ACHING

## 2018-02-15 ASSESSMENT — PAIN DESCRIPTION - FREQUENCY: FREQUENCY: CONTINUOUS

## 2018-02-15 ASSESSMENT — PAIN DESCRIPTION - LOCATION
LOCATION: BACK
LOCATION: BACK
LOCATION: ARM
LOCATION: BACK
LOCATION: BACK;ARM
LOCATION: BACK
LOCATION: BACK

## 2018-02-15 ASSESSMENT — PAIN SCALES - GENERAL
PAINLEVEL_OUTOF10: 10
PAINLEVEL_OUTOF10: 9
PAINLEVEL_OUTOF10: 5
PAINLEVEL_OUTOF10: 4
PAINLEVEL_OUTOF10: 4
PAINLEVEL_OUTOF10: 6
PAINLEVEL_OUTOF10: 6
PAINLEVEL_OUTOF10: 8
PAINLEVEL_OUTOF10: 4
PAINLEVEL_OUTOF10: 7

## 2018-02-15 NOTE — PROGRESS NOTES
accuracy noted with basic math tasks. Immediate recall and divergent thinking are relative strengths. Verbal expression and auditory comprehension are relatively intact. Pt would benefit from continued ST services to improve cognition to a supervision/modified independent level to return to home environment. FIMS: Communication  Comprehension: 5 - Patient understands basic needs (hungry/hot/pain)  Expression: 5 - Expresses basic ideas/needs only (hungry/hot/pain)  Social Cognition  Social Interaction: 5 - Patient is appropriate with supervision/cues  Problem Solvin - Patient solves simple/routine tasks 75-90%+   Memory: 4 - Patient remembers 75-90%+ of the time      REHAB POTENTIAL: [x] Excellent [x] Good [] Fair  [] Poor    EDUCATION:   Learner: [x]Patient [] Significant other [] Son/Daughter [] Parent     [] Other:   Education: [x] Reviewed results and recommendations of this evaluation     [] Reviewed diet and strategies     [] Reviewed signs, symptoms and risk of aspiration     [] Demonstrated how to thick liquid appropriately. [x] Reviewed goals and Plan of Care     [] OTHER:   Method: [x] Discussion [] Demonstration [] Hand-out     [] OTHER:   Evaluation of Education:     [x] Verbalizes understanding [] Demonstrates with assistance     [] Demonstrates without assistance [x]Needs further instruction     [] No evidence of learning  [x] Family not present    PLAN / TREATMENT RECOMMENDATIONS:  [] No further speech therapy services indicated. [] Speech Therapy evaluation to assess speech, language, cognition and/or voice  [] Recommendations pending MBS  [x] Skilled SLP intervention on IP Rehab or TCU 30 minutes per day 5 days per week.   Specific interventions for next session may include: Cognitive intervention       SHORT TERM GOALS:  Short-term Goals  Timeframe for Short-term Goals: 2 weeks  Goal 1: Pt will complete working memory and delayed recall  tasks w/ 70% accuracy and mod cues in order to
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
## 2018-02-15 NOTE — PLAN OF CARE
Problem: Nutrition  Goal: Optimal nutrition therapy  Outcome: Ongoing  Nutrition Problem: Increased nutrient needs (protein, MVI & minerals)  Intervention: Food and/or Nutrient Delivery: Continue current ONS  Nutritional Goals: 75% or more of meal intake during LOS

## 2018-02-15 NOTE — PROGRESS NOTES
Assistance (for controlled descent. Multiple trials to commode and wc. Verbal cues for hand placement. )  Stand Pivot Transfers: Contact guard assistance (with hemiwalker. Slow moving, steady with AD. Verbal cues for technique. )    WB Status: NWB LUE in sling  Ambulation 1  Surface: level tile  Device: Hemiwalker  Assistance: Contact guard assistance  Quality of Gait: Decreased gordon with short, shuffling steps requiring hemiwalker advancement every 4-5 steps. No dizziness or LOB noted. Distance: 27'    EXERCISES:  The following exercises were completed to improve functional mobility:   All therex performed in supine with verbal cues for technique  Heel slides x10 BLE  Ankle pumps x10 BLE  Hip Abd/add x10 BLE  Short arc quads x10 L LE with 3\" hold    Activity Tolerance:  Activity Tolerance: Patient limited by pain; Patient limited by endurance    Treatment Initiated: Transfers, balance, therex    Assessment: Body structures, Functions, Activity limitations: Decreased functional mobility , Decreased ROM, Decreased strength, Decreased endurance, Decreased balance  Assessment: Fair tolerance of session due to pain limitations and constipation. Pt is slow moving and very anxious, requiring extra time and reassurance for all activities. Pt will benefit from cont skilled therapy to improve strength, balance, gait mechanics and functional mobility.    Prognosis: Good    Clinical Presentation: Moderate - Evolving with Changing Characteristics: Based on past medical history, levels of assist on evaluation and current diagnosis,    Decision Making: High Complexitybased on patient assessment and decision making process of determining plan of care and establishing reasonable expectations for measurable functional outcomes    Discharge Recommendations: Continue to assess pending progress, Patient would benefit from continued therapy after discharge    Patient Education:  Patient Education: POC, transfers, balance, bed

## 2018-02-15 NOTE — PROGRESS NOTES
Admitted to the Inpatient Rehabilitation Unit via wheelchair. Patient was then oriented to room and unit. Education provided on the rehabilitation routine: three hours of therapy five days per week and dining room for lunch. Care plan was created with patient's input and goals were agreed upon. Admission folder provided with education regarding patients diagnoses, fall prevention, skin care, and M in the box. \"Data Collection Information Summary for Patients in Inpatient Rehabilitation Facilities\" and \"Privacy Act Statement - Health Care Records\" provided. Please refer to the admission navigator for further information.
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
## 2018-02-15 NOTE — PROGRESS NOTES
Family Medicine Progress Notes/Coverage    Today's Date: 2/15/18  [id number]  Medical Record # [id number]  Account # [de-identified]      Ms. Bonifacio Weldon admitted on 2/14/2018        Subjective / Interval History :     Patient being transferred from acute care to rehabilitation yesterday  Patient's feels overwhelmed, wore off  Denies any shortness of breath or chest pain  Continued to have left posterior parietal hematoma, bruises on the face and neck is improving  Reviewed notes, consults, laboratory and radiology results,    Objective:       Physical Exam:  Patient Vitals for the past 24 hrs:   BP Temp Temp src Pulse Resp SpO2 Height Weight   02/15/18 0755 (!) 159/75 98 °F (36.7 °C) Oral 80 18 94 % - -   02/14/18 2220 (!) 154/70 - - - - - - -   02/14/18 2218 (!) 171/75 97.2 °F (36.2 °C) Oral 78 17 92 % - -   02/14/18 1730 (!) 119/58 97.9 °F (36.6 °C) Oral 77 16 92 % 5' 7\" (1.702 m) 229 lb 11.2 oz (104.2 kg)       General Appearance:  Alert, cooperative, no distress, appears stated age   HEENT:  Neck: Posterior parietal hematoma, bruises on the face and neck improving  No neck rigidity   Chest/Lungs:  Heart: Clear to auscultation  Regular rate and rhythm   Abdomen:  Back: Globular soft and nontender  Not assessed   Extremities:  Neurological Exam: No edema , left upper extremity positive dressing  Within normal limits       Assessment:     Active Hospital Problems    Diagnosis Date Noted    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

## 2018-02-15 NOTE — PROGRESS NOTES
Pt c/o pain in lower back,percocet given at HS,pillow support given,states still having a lot of pain. L arm remains in sling,incision,sutures intact,no s/s of infection,dry drsg applied. pt states no BM in last 10 days,colace,senna,miralax given per orders. pt has Hx of bowel obstruction. patient slept t/o night,cpap in place,garza patent,draining yellow colored urine. Pt is a R limb alert,B/P to be done in L lower leg only. .bedside table,call light within reach

## 2018-02-15 NOTE — PLAN OF CARE
Problem: Pain Control  Goal: Maintain pain level at or below patient's acceptable level (or 5 if patient is unable to determine acceptable level)  Outcome: Ongoing  Patient with complaints of pain. PRN medications given per patient request. Encouraging ice and repositioning for better pain control. Problem: Bleeding:  Goal: Will show no signs and symptoms of excessive bleeding  Will show no signs and symptoms of excessive bleeding   Outcome: Ongoing  Patient on baby aspirin. No active bleeding noted. Problem: Mobility - Impaired:  Goal: Able to ambulate with minimal assistance  Able to ambulate with minimal assistance   Outcome: Ongoing  Patient participates with therapy to improve mobility. Problem: Respiratory Function - Compromised:  Goal: Ability to maintain a clear airway will improve  Ability to maintain a clear airway will improve   Outcome: Ongoing  Lung sounds clear but diminished, encouraging incentive spirometry. Problem: Bowel Function - Altered:  Goal: Bowel elimination is within specified parameters  Bowel elimination is within specified parameters   Outcome: Ongoing  Patient with very small bowel movement today. Scheduled medications given as ordered. Problem: Self-Care Deficit:  Goal: Able to perform ADL with assistance  Able to perform ADL with assistance   Outcome: Ongoing  Patient participates with OT to complete ADLs. Problem: Pain:  Goal: Pain level will decrease  Pain level will decrease   Outcome: Completed Date Met: 02/15/18    Goal: Control of acute pain  Control of acute pain   Outcome: Completed Date Met: 02/15/18    Goal: Control of chronic pain  Control of chronic pain   Outcome: Completed Date Met: 02/15/18      Problem: Falls - Risk of  Goal: Absence of falls  Outcome: Ongoing  No falls this shift. Falling star program and alarms in use. Patient uses call light appropriately.        Problem: Infection - Surgical Site:  Goal: Will show no infection signs and symptoms  Will show no infection signs and symptoms   Outcome: Ongoing  Afebrile. Patient shows no signs or symptoms of infection. Problem: GI  Goal: No bowel complications  Outcome: Completed Date Met: 02/15/18    Goal: Bowel movement at least every other day  Outcome: Completed Date Met: 02/15/18      Problem: Skin Integrity/Risk  Goal: No skin breakdown during hospitalization  Outcome: Ongoing  Skin assessed every shift. Encouraging repositioning for pressure ulcer prevention. Waffle cushion to chair. Comments: Care plan reviewed with patient. Patient verbalizes understanding of the plan of care and contributes to goal setting.
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
## 2018-02-15 NOTE — PLAN OF CARE
Minutes per day at least 5 out of 7 days per week for a total of 8 days during estimated length of stay    Anticipated interventions may include speech/language/communication therapy, cognitive training, group therapy, education, and/or dysphagia therapy based on the above goals. CASE MANAGEMENT:  Goals:   Assist patient/family with discharge planning, patient/family counseling,  and coordination with insurance during the inpatient rehabilitation stay. Other members of the multidisciplinary rehabilitation team that will be involved in the patient's plan of care include recreational therapy, dietary, respiratory therapy, and neuropsychology. Medical issues being managed closely and that require 24 hour availability of a physician:  Bowel/Bladder function, Weight bearing precautions, Wound care, Pain management, Infection protection, DVT prophylaxis, Fall precautions, Nutritional status, Respiratory needs and History of heart disease                                           Physician anticipated functional outcomes: Improved independence with functional measures   Estimated length of stay for this admission 10 days. Medical Prognosis: Good  Anticipated disposition: Home with [address] [place] [address]. The potential to achieve the above medical and rehabilitative goals is very good. This plan of care has been developed with the assistance and input of the multidisciplinary rehabilitation team.  The plan was reviewed with the patient. The patient has had the opportunity to provide input to the therapy team.    I have reviewed this Individualized Plan of Care and agree with its contents. Above documentation has been expanded, modified, adjusted to reflect the findings of my evaluations and goals for the patient.     Physician:  Nicholas Pham MD

## 2018-02-15 NOTE — PROGRESS NOTES
In bed, resting after therapy and visit with dietician. States pain of 4 in lower back. Denies any needs at this time.

## 2018-02-16 ENCOUNTER — APPOINTMENT (OUTPATIENT)
Dept: GENERAL RADIOLOGY | Age: 78
DRG: 560 | End: 2018-02-16
Attending: PHYSICAL MEDICINE & REHABILITATION
Payer: MEDICARE

## 2018-02-16 LAB
ALBUMIN SERPL-MCNC: 3.7 G/DL (ref 3.5–5.1)
ALP BLD-CCNC: 90 U/L (ref 38–126)
ALT SERPL-CCNC: 8 U/L (ref 11–66)
ANION GAP SERPL CALCULATED.3IONS-SCNC: 13 MEQ/L (ref 8–16)
AST SERPL-CCNC: 19 U/L (ref 5–40)
BILIRUB SERPL-MCNC: 0.9 MG/DL (ref 0.3–1.2)
BUN BLDV-MCNC: 19 MG/DL (ref 7–22)
CALCIUM SERPL-MCNC: 9.1 MG/DL (ref 8.5–10.5)
CHLORIDE BLD-SCNC: 99 MEQ/L (ref 98–111)
CO2: 27 MEQ/L (ref 23–33)
CREAT SERPL-MCNC: 0.3 MG/DL (ref 0.4–1.2)
GFR SERPL CREATININE-BSD FRML MDRD: > 90 ML/MIN/1.73M2
GLUCOSE BLD-MCNC: 149 MG/DL (ref 70–108)
HCT VFR BLD CALC: 35.7 % (ref 37–47)
HEMOGLOBIN: 12.1 GM/DL (ref 12–16)
MCH RBC QN AUTO: 31.3 PG (ref 27–31)
MCHC RBC AUTO-ENTMCNC: 34.1 GM/DL (ref 33–37)
MCV RBC AUTO: 91.9 FL (ref 81–99)
ORGANISM: ABNORMAL
PDW BLD-RTO: 14.2 % (ref 11.5–14.5)
PLATELET # BLD: 263 THOU/MM3 (ref 130–400)
PMV BLD AUTO: 8.3 FL (ref 7.4–10.4)
POTASSIUM SERPL-SCNC: 4.3 MEQ/L (ref 3.5–5.2)
RBC # BLD: 3.88 MILL/MM3 (ref 4.2–5.4)
SODIUM BLD-SCNC: 139 MEQ/L (ref 135–145)
TOTAL PROTEIN: 5.8 G/DL (ref 6.1–8)
URINE CULTURE REFLEX: ABNORMAL
VITAMIN D 25-HYDROXY: 30 NG/ML (ref 30–100)
WBC # BLD: 9.8 THOU/MM3 (ref 4.8–10.8)

## 2018-02-16 PROCEDURE — 80053 COMPREHEN METABOLIC PANEL: CPT

## 2018-02-16 PROCEDURE — 97535 SELF CARE MNGMENT TRAINING: CPT

## 2018-02-16 PROCEDURE — 1180000000 HC REHAB R&B

## 2018-02-16 PROCEDURE — 71045 X-RAY EXAM CHEST 1 VIEW: CPT

## 2018-02-16 PROCEDURE — 6360000002 HC RX W HCPCS: Performed by: PHYSICAL MEDICINE & REHABILITATION

## 2018-02-16 PROCEDURE — 97110 THERAPEUTIC EXERCISES: CPT

## 2018-02-16 PROCEDURE — 6370000000 HC RX 637 (ALT 250 FOR IP): Performed by: PHYSICAL MEDICINE & REHABILITATION

## 2018-02-16 PROCEDURE — 97116 GAIT TRAINING THERAPY: CPT

## 2018-02-16 PROCEDURE — 97530 THERAPEUTIC ACTIVITIES: CPT

## 2018-02-16 PROCEDURE — G0515 COGNITIVE SKILLS DEVELOPMENT: HCPCS | Performed by: SPEECH-LANGUAGE PATHOLOGIST

## 2018-02-16 PROCEDURE — 36415 COLL VENOUS BLD VENIPUNCTURE: CPT

## 2018-02-16 PROCEDURE — 6370000000 HC RX 637 (ALT 250 FOR IP): Performed by: EMERGENCY MEDICINE

## 2018-02-16 PROCEDURE — 82306 VITAMIN D 25 HYDROXY: CPT

## 2018-02-16 PROCEDURE — 94640 AIRWAY INHALATION TREATMENT: CPT

## 2018-02-16 PROCEDURE — 2580000003 HC RX 258: Performed by: EMERGENCY MEDICINE

## 2018-02-16 PROCEDURE — 85027 COMPLETE CBC AUTOMATED: CPT

## 2018-02-16 RX ORDER — VALSARTAN 80 MG/1
40 TABLET ORAL DAILY
Status: DISCONTINUED | OUTPATIENT
Start: 2018-02-16 | End: 2018-03-06 | Stop reason: HOSPADM

## 2018-02-16 RX ORDER — LISINOPRIL 5 MG/1
5 TABLET ORAL DAILY
Status: DISCONTINUED | OUTPATIENT
Start: 2018-02-16 | End: 2018-02-16 | Stop reason: ALTCHOICE

## 2018-02-16 RX ORDER — HYDRALAZINE HYDROCHLORIDE 25 MG/1
25 TABLET, FILM COATED ORAL ONCE
Status: COMPLETED | OUTPATIENT
Start: 2018-02-16 | End: 2018-02-16

## 2018-02-16 RX ORDER — ALBUTEROL SULFATE 2.5 MG/3ML
2.5 SOLUTION RESPIRATORY (INHALATION) ONCE
Status: COMPLETED | OUTPATIENT
Start: 2018-02-16 | End: 2018-02-16

## 2018-02-16 RX ADMIN — SENNOSIDES 34.4 MG: 8.6 TABLET, FILM COATED ORAL at 22:13

## 2018-02-16 RX ADMIN — POLYETHYLENE GLYCOL (3350) 17 G: 17 POWDER, FOR SOLUTION ORAL at 08:07

## 2018-02-16 RX ADMIN — DOCUSATE SODIUM 100 MG: 100 CAPSULE ORAL at 13:10

## 2018-02-16 RX ADMIN — OXYCODONE HYDROCHLORIDE AND ACETAMINOPHEN 2 TABLET: 5; 325 TABLET ORAL at 05:18

## 2018-02-16 RX ADMIN — FAMOTIDINE 20 MG: 20 TABLET, FILM COATED ORAL at 08:07

## 2018-02-16 RX ADMIN — Medication 10 ML: at 08:22

## 2018-02-16 RX ADMIN — TIZANIDINE 1 MG: 4 TABLET ORAL at 13:30

## 2018-02-16 RX ADMIN — Medication 10 ML: at 22:08

## 2018-02-16 RX ADMIN — FAMOTIDINE 20 MG: 20 TABLET, FILM COATED ORAL at 22:07

## 2018-02-16 RX ADMIN — DOCUSATE SODIUM 100 MG: 100 CAPSULE ORAL at 22:07

## 2018-02-16 RX ADMIN — METOPROLOL SUCCINATE 50 MG: 50 TABLET, FILM COATED, EXTENDED RELEASE ORAL at 08:07

## 2018-02-16 RX ADMIN — ALBUTEROL SULFATE 2.5 MG: 2.5 SOLUTION RESPIRATORY (INHALATION) at 16:12

## 2018-02-16 RX ADMIN — CALCIUM CARBONATE-VITAMIN D TAB 500 MG-200 UNIT 1 TABLET: 500-200 TAB at 08:07

## 2018-02-16 RX ADMIN — SACUBITRIL AND VALSARTAN 1 TABLET: 49; 51 TABLET, FILM COATED ORAL at 08:07

## 2018-02-16 RX ADMIN — SIMVASTATIN 40 MG: 40 TABLET, FILM COATED ORAL at 22:07

## 2018-02-16 RX ADMIN — DOCUSATE SODIUM 100 MG: 100 CAPSULE ORAL at 08:07

## 2018-02-16 RX ADMIN — OXYCODONE HYDROCHLORIDE AND ACETAMINOPHEN 2 TABLET: 5; 325 TABLET ORAL at 11:19

## 2018-02-16 RX ADMIN — TIZANIDINE 1 MG: 4 TABLET ORAL at 22:07

## 2018-02-16 RX ADMIN — HYDRALAZINE HYDROCHLORIDE 25 MG: 25 TABLET, FILM COATED ORAL at 22:08

## 2018-02-16 RX ADMIN — VALSARTAN 40 MG: 80 TABLET ORAL at 13:27

## 2018-02-16 RX ADMIN — NALOXEGOL OXALATE 25 MG: 25 TABLET, FILM COATED ORAL at 08:22

## 2018-02-16 RX ADMIN — ASPIRIN 81 MG: 81 TABLET ORAL at 08:07

## 2018-02-16 RX ADMIN — OXYCODONE HYDROCHLORIDE AND ACETAMINOPHEN 1 TABLET: 5; 325 TABLET ORAL at 15:43

## 2018-02-16 RX ADMIN — SACUBITRIL AND VALSARTAN 1 TABLET: 49; 51 TABLET, FILM COATED ORAL at 22:08

## 2018-02-16 ASSESSMENT — PAIN DESCRIPTION - LOCATION
LOCATION: BACK
LOCATION: BACK;SHOULDER

## 2018-02-16 ASSESSMENT — PAIN SCALES - GENERAL
PAINLEVEL_OUTOF10: 0
PAINLEVEL_OUTOF10: 5
PAINLEVEL_OUTOF10: 5
PAINLEVEL_OUTOF10: 8
PAINLEVEL_OUTOF10: 5
PAINLEVEL_OUTOF10: 5
PAINLEVEL_OUTOF10: 4
PAINLEVEL_OUTOF10: 8
PAINLEVEL_OUTOF10: 7
PAINLEVEL_OUTOF10: 5
PAINLEVEL_OUTOF10: 2
PAINLEVEL_OUTOF10: 2

## 2018-02-16 ASSESSMENT — PAIN DESCRIPTION - DESCRIPTORS
DESCRIPTORS: ACHING;DISCOMFORT
DESCRIPTORS: ACHING

## 2018-02-16 ASSESSMENT — PAIN DESCRIPTION - PAIN TYPE
TYPE: SURGICAL PAIN
TYPE: ACUTE PAIN
TYPE: ACUTE PAIN

## 2018-02-16 ASSESSMENT — PAIN DESCRIPTION - FREQUENCY
FREQUENCY: CONTINUOUS
FREQUENCY: CONTINUOUS

## 2018-02-16 ASSESSMENT — PAIN DESCRIPTION - ORIENTATION: ORIENTATION: LEFT

## 2018-02-16 NOTE — PROGRESS NOTES
Alert, oriented to person, place time. Pupil, pinpoint, no reaction. Mucous membranes pink moist. Bilateral caitlin orbital edema. Skin warm, dry. Dark purple, yellow ecchymosis from occipital down side of neck on left side. Small nodule on left back side of head, no drainage noted. Heart rate normal. Respirations easy, deep, unlabored. Lung sounds wheezes upper lobes. Diminished in lower lobes. Abdomen is soft, rounded, slight tenderness with palpation. Bowel sounds in all 4 quadrants. Last unassisted bowel movement 12 days ago. Hesitant to take pain medication. Radial pulses equal and strong. Hand grasp strong bilaterally. Capillary refill less than 3 seconds. Skin turgor, brisk. Pedal pulses equal and strong. Non pitting edema bilateral lower extremities. Pedal push and pull strong bilaterally. Negative Lisa sign. Up to chair for breakfast. Call light and bedside table within reach. Denies any needs at this time.

## 2018-02-16 NOTE — PROGRESS NOTES
reps to improve distal strength to 4+/5. Long term goals  Time Frame for Long term goals : 3 weeks  Long term goal 1: Pt will don UB dressing tasks with Min A and min vcs for NWBing for ease of dressing tasks. Long term goal 2: Pt will complete grooming tasks standing at sink x4 minutes with SBA to improve indep with self care tasks.
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
## 2018-02-16 NOTE — PROGRESS NOTES
seatbelt. Past Medical History:   Diagnosis Date    Arthritis     general    Breast CA ([facility] [address] 12/03    right    CAD (coronary artery disease) 2009    stent in [place]    CHF (congestive heart failure) ([facility])     Closed compression fracture of first lumbar vertebra ([facility] [address] 2/7/2018    Colon polyps 8/97; 3/11    COPD (chronic obstructive pulmonary disease) ([facility] [address] 8/5/2017    Diverticulosis 1/06    Erosive gastritis 11/06    Esophageal stricture 3/11    Hyperlipidemia     Hypertension     Internal hemorrhoid 1/06    LUISA on CPAP     Osteopenia determined by x-ray 1999    Stress incontinence, female      Past Surgical History:   Procedure Laterality Date    BREAST LUMPECTOMY  1/04    wtih axillary dissection    CARDIAC DEFIBRILLATOR PLACEMENT  01/08/2018    Biventricular pacemaker ICD, Dr. Christie Suero  3/2011    CORONARY ANGIOPLASTY WITH STENT PLACEMENT  2009    DILATATION, ESOPHAGUS  2011   [address] or 1977    bleeding    JOINT REPLACEMENT Left     Shoulder    [place] OPEN FIXATN MID HUMERUS FRACTURE Right 2/12/2018    LEFT HUMERUS OPEN REDUCTION INTERNAL FIXATION performed by Tito Sharma MD at [facility]       Restrictions/Precautions:  Restrictions/Precautions: General Precautions, Fall Risk, Weight Bearing       Left Upper Extremity Weight Bearing: Non Weight Bearing    Position Activity Restriction  Other position/activity restrictions: L1 compression fx    Required Braces or Orthoses  Left Upper Extremity Brace/Splint: Sling    Subjective:     Subjective: Patient sitting up in bedside chair, pleasant and agreeable to session. Less anxious today vs yesterday. Pain:  Yes.   Pain Assessment  Pain Assessment: 0-10  Pain Level: 2 (left arm and low back)       Social/Functional:  Lives With: Alone  Type of Home: House  Home Layout: Two level, Performs ADL's on one level  Home Access: Stairs to enter with rails  Entrance

## 2018-02-16 NOTE — PROGRESS NOTES
SHORT TERM GOAL #3:  Goal 3: Patient will complete selective and alternating attention and tasks w/ 70% accuracy and mod cues in order to improve overall functioning. INTERVENTIONS: Did not address due to focus on other goals. SHORT TERM GOAL #4:   Goal 4: Complete BSE and add goals as appropriate. INTERVENTIONS: Eucharistic  in to offer patient communion. Pt agreed but asked for a 1/4 piece of the host due to difficulty with swallowing. When asked about swallow function, patient stated she was doing fine, but tends to get \"phlegmy. \" Recommend formal assessment.       Communication  Comprehension: 4 - Patient understands basic needs 75-90%+ of the time  Expression: 5 - Expresses basic ideas/needs only (hungry/hot/pain)  Social Cognition  Social Interaction: 4 - Patient appropriate 75-90%+ of the time  Problem Solving: 3 - Patient solves simple/routine tasks 50%-74%  Memory: 4 - Patient remembers 75-90%+ of the time    ASSESSMENT:  Assessment: [x]Progressing towards goals          []Not Progressing towards goals       Patient Tolerance of Treatment:   [x]Tolerated well []Tolerated fair []Required rest breaks []Fatigued  Plan for Next Session: BSE  Education:  Learner:  [x]Patient          []Significant Other          []Other  Education provided regarding:  [x]Goals and POC   []Diet and swallowing precautions    []Home Exercise Program  [x]Progress and/or discharge information  Method of Education:  [x]Discussion          []Demonstration          []Handout          []Other  Evaluation of Education:   [x]Verbalized understanding   []Demonstrates without assistance  []Demonstrates with assistance  [x]Needs further instruction     []No evidence of learning                  [x]No family present      Plan: [x]Continue with current plan of care    []Modify current plan of care as follows:    []Discharge patient    Discharge Location:    Services/Supervision Recommended:     [x]Patient continues to require

## 2018-02-16 NOTE — PROGRESS NOTES
Obesity  · BMI Classification: BMI 35.0 - 39.9 Obese Class II (35.6)  · Comparative Standards (Estimated Nutrition Needs):  · Estimated Daily Total Kcal: 5062-0383 kcals  · Estimated Daily Protein (g): 109 -120 grams    Estimated Intake vs Estimated Needs: Intake Less Than Needs    Nutrition Risk Level: Moderate    Nutrition Interventions:   Continue current diet, Modify current ONS (Modified ONS to Ensure Clear TID. Recommend starting an appetite stimulant daily d/t ongoing poor po intake)  Continued Inpatient Monitoring, Education Initiated (Encouraged adeqaute po intake of meals at best effort)    Nutrition Evaluation:   · Evaluation: Progressing toward goals   · Goals: 75% or more of meal intake during LOS     · Monitoring: Meal Intake, Supplement Intake, Diet Tolerance, Skin Integrity, Wound Healing, Ascites/Edema, Fluid Balance, Weight, Pertinent Labs, Constipation    See Adult Nutrition Doc Flowsheet for more detail.      Electronically signed by Christal Cole RD, SHANTE on 2/16/18 at 2:08 PM    Contact Number: [phone]

## 2018-02-16 NOTE — PLAN OF CARE
Problem: Nutrition  Goal: Optimal nutrition therapy  Outcome: Ongoing  Nutrition Problem: Increased nutrient needs (protein, MVI & minerals)  Intervention: Food and/or Nutrient Delivery: Continue current diet, Modify current ONS (Modified ONS to Ensure Clear TID.  Recommend starting an appetite stimulant daily d/t ongoing poor po intake)  Nutritional Goals: 75% or more of meal intake during LOS

## 2018-02-17 PROCEDURE — 97530 THERAPEUTIC ACTIVITIES: CPT

## 2018-02-17 PROCEDURE — 6370000000 HC RX 637 (ALT 250 FOR IP): Performed by: PHYSICAL MEDICINE & REHABILITATION

## 2018-02-17 PROCEDURE — 6370000000 HC RX 637 (ALT 250 FOR IP): Performed by: EMERGENCY MEDICINE

## 2018-02-17 PROCEDURE — 97542 WHEELCHAIR MNGMENT TRAINING: CPT

## 2018-02-17 PROCEDURE — 97110 THERAPEUTIC EXERCISES: CPT

## 2018-02-17 PROCEDURE — 1180000000 HC REHAB R&B

## 2018-02-17 PROCEDURE — 97116 GAIT TRAINING THERAPY: CPT

## 2018-02-17 PROCEDURE — 97535 SELF CARE MNGMENT TRAINING: CPT

## 2018-02-17 PROCEDURE — 92610 EVALUATE SWALLOWING FUNCTION: CPT

## 2018-02-17 RX ORDER — SENNA PLUS 8.6 MG/1
2 TABLET ORAL NIGHTLY
Status: DISCONTINUED | OUTPATIENT
Start: 2018-02-17 | End: 2018-02-19

## 2018-02-17 RX ORDER — DOCUSATE SODIUM 100 MG/1
100 CAPSULE, LIQUID FILLED ORAL 2 TIMES DAILY
Status: DISCONTINUED | OUTPATIENT
Start: 2018-02-17 | End: 2018-02-19

## 2018-02-17 RX ORDER — POLYETHYLENE GLYCOL 3350 17 G/17G
17 POWDER, FOR SOLUTION ORAL DAILY PRN
Status: DISCONTINUED | OUTPATIENT
Start: 2018-02-17 | End: 2018-02-19

## 2018-02-17 RX ORDER — TIZANIDINE 4 MG/1
1 TABLET ORAL
Status: DISCONTINUED | OUTPATIENT
Start: 2018-02-17 | End: 2018-02-23

## 2018-02-17 RX ADMIN — SIMVASTATIN 40 MG: 40 TABLET, FILM COATED ORAL at 22:11

## 2018-02-17 RX ADMIN — SACUBITRIL AND VALSARTAN 1 TABLET: 49; 51 TABLET, FILM COATED ORAL at 22:11

## 2018-02-17 RX ADMIN — OXYCODONE HYDROCHLORIDE AND ACETAMINOPHEN 2 TABLET: 5; 325 TABLET ORAL at 00:16

## 2018-02-17 RX ADMIN — SACUBITRIL AND VALSARTAN 1 TABLET: 49; 51 TABLET, FILM COATED ORAL at 09:01

## 2018-02-17 RX ADMIN — TIZANIDINE 1 MG: 4 TABLET ORAL at 13:52

## 2018-02-17 RX ADMIN — FAMOTIDINE 20 MG: 20 TABLET, FILM COATED ORAL at 09:01

## 2018-02-17 RX ADMIN — DOCUSATE SODIUM 100 MG: 100 CAPSULE ORAL at 09:01

## 2018-02-17 RX ADMIN — VALSARTAN 40 MG: 80 TABLET ORAL at 09:00

## 2018-02-17 RX ADMIN — FAMOTIDINE 20 MG: 20 TABLET, FILM COATED ORAL at 22:11

## 2018-02-17 RX ADMIN — OXYCODONE HYDROCHLORIDE AND ACETAMINOPHEN 2 TABLET: 5; 325 TABLET ORAL at 09:02

## 2018-02-17 RX ADMIN — TIZANIDINE 1 MG: 4 TABLET ORAL at 22:11

## 2018-02-17 RX ADMIN — CALCIUM CARBONATE-VITAMIN D TAB 500 MG-200 UNIT 1 TABLET: 500-200 TAB at 09:01

## 2018-02-17 RX ADMIN — OXYCODONE HYDROCHLORIDE AND ACETAMINOPHEN 2 TABLET: 5; 325 TABLET ORAL at 22:11

## 2018-02-17 RX ADMIN — ASPIRIN 81 MG: 81 TABLET ORAL at 09:00

## 2018-02-17 RX ADMIN — NALOXEGOL OXALATE 25 MG: 25 TABLET, FILM COATED ORAL at 09:02

## 2018-02-17 RX ADMIN — METOPROLOL SUCCINATE 50 MG: 50 TABLET, FILM COATED, EXTENDED RELEASE ORAL at 09:01

## 2018-02-17 ASSESSMENT — PAIN DESCRIPTION - FREQUENCY
FREQUENCY: CONTINUOUS
FREQUENCY: CONTINUOUS

## 2018-02-17 ASSESSMENT — PAIN SCALES - GENERAL
PAINLEVEL_OUTOF10: 10
PAINLEVEL_OUTOF10: 7
PAINLEVEL_OUTOF10: 10
PAINLEVEL_OUTOF10: 6
PAINLEVEL_OUTOF10: 9
PAINLEVEL_OUTOF10: 9
PAINLEVEL_OUTOF10: 10

## 2018-02-17 ASSESSMENT — PAIN DESCRIPTION - DESCRIPTORS
DESCRIPTORS: ACHING
DESCRIPTORS: ACHING

## 2018-02-17 ASSESSMENT — PAIN DESCRIPTION - LOCATION
LOCATION: BACK
LOCATION: BACK;SHOULDER
LOCATION: BACK

## 2018-02-17 ASSESSMENT — PAIN DESCRIPTION - ORIENTATION
ORIENTATION: LOWER
ORIENTATION: LOWER

## 2018-02-17 ASSESSMENT — PAIN DESCRIPTION - PAIN TYPE
TYPE: CHRONIC PAIN
TYPE: CHRONIC PAIN
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
## 2018-02-17 NOTE — PROGRESS NOTES
(1.702 m)   Wt 227 lb 1.6 oz (103 kg)   SpO2 96%   BMI 35.57 kg/m²     awake  Orientation:   person, place, time, situation  Mood: dysthymic  Affect: calm  General appearance: in no acute distress, LEFT arm sling and +Martinez, up in chair     Memory:   grossly normal  Attention/Concentration: normal  Language:  abnormal - occasional paraphasias, decreased over interval     ROM:  abnormal - LEFT humeral fracture  Motor Exam:  Motor exam is symmetrical 5 out of 5 upper extremities bilaterally  Motor exam is symmetrical 5 out of 5 lower extremities bilaterally     Tone:  Normal LEFT arm not tested  Coordination:   Normal, LEFT arm not tested  Deep Tendon Reflexes:  Reflexes are intact and symmetrical bilaterally, LEFT arm not tested     Skin: warm and dry, no rash or erythema and ecchymoses noted on RIGHT frontal and LEFT posterior scalp and neck  Peripheral vascular: Pulses: Normal upper and lower extremity pulses; Edema: 2+     Diagnostics:   Recent Results (from the past 24 hour(s))   CBC    Collection Time: 02/16/18  9:18 AM   Result Value Ref Range    WBC 9.8 4.8 - 10.8 thou/mm3    RBC 3.88 (L) 4.20 - 5.40 mill/mm3    Hemoglobin 12.1 12.0 - 16.0 gm/dl    Hematocrit 35.7 (L) 37.0 - 47.0 %    MCV 91.9 81.0 - 99.0 fL    MCH 31.3 (H) 27.0 - 31.0 pg    MCHC 34.1 33.0 - 37.0 gm/dl    RDW 14.2 11.5 - 14.5 %    Platelets 991 058 - 156 thou/mm3    MPV 8.3 7.4 - 10.4 fL   Vitamin D 25 Hydroxy    Collection Time: 02/16/18  9:18 AM   Result Value Ref Range    Vit D, 25-Hydroxy 30 30 - 100 ng/ml   Comprehensive Metabolic Panel    Collection Time: 02/16/18  9:18 AM   Result Value Ref Range    Glucose 149 (H) 70 - 108 mg/dL    CREATININE 0.3 (L) 0.4 - 1.2 mg/dL    BUN 19 7 - 22 mg/dL    Sodium 139 135 - 145 meq/L    Potassium 4.3 3.5 - 5.2 meq/L    Chloride 99 98 - 111 meq/L    CO2 27 23 - 33 meq/L    Calcium 9.1 8.5 - 10.5 mg/dL    AST 19 5 - 40 U/L    Alkaline Phosphatase 90 38 - 126 U/L    Total Protein 5.8 (L) 6.1 - 8.0 multiple family members with noted issues; will defer TBI protocol for now. 5. Frequent falls secondary to syncopal events. 1. BIV/ICD in place. 6.  Constipation. 1. Senna 4 tablets. 2. Glycolax BID. 3. Colace TID. 4. Decrease once having BMs. 5. Relistor given 2/15 with 3 small BMS (smears)  6. Movantik daily starting 2/16 x 4 days. 7. Nutrition:  Consultation to dietician for nutritional counseling and recommendations. TotP 5.8, alb 3.7, Vitamin 25OH level of 30 on 2/16. 1. OsCal-500 BID. 8. Bladder: Martinez in place due to poor ability to mobilize. 1. UA on 2/15 was positive only for small LE. 2. Discontinue as mobility improves. 9. Bowel: Senna, colace, MOM Last BM 2/16 after disimpaction  10. Rehabilitation nursing will be involved for bowel, bladder, skin, and pain management. Nursing will also provide education and training to patient and family. 11. Prophylaxis:  DVT: SCDs. GI: Pepcid. 12.  and case management consultations for coordination of care and discharge planning.     Chronic Problems:  1. Lumbar spondylosis with stable 4 mm anterolisthesis of L5 on L6; multilevel disc space narrowing most severe at L6-S1; multilevel broad-based disc bulges are seen throughout the lumbar spine; multilevel posterior facet arthropathy most severe at L5-L6 and L6-S1.  1. Pain control as above. 2. Left bundle branch block/Nonischemic dilated cardiomyopathy/Chronic systolic congestive heart failure. 1. Placement of biventricular pacemaker/ICD on 1/8/2018 by Dr. José Miguel Porter. 2. Family to bring in interrogation unit  3. Coronary artery disease status post PCI and stent/Hypertension/Hyperlipidemia. 1. Toprol XL. 2. Entresto. 3. Zocor. 4. Diovan added 2/16. Due to persistent HTN. 4. Stress incontinence. 1. Monitor. 2. Evaluate after Martinez removal.  5. Obstructive sleep apnea on CPAP. 1. Home CPAP. 6. Erosive gastritis with history of esophageal stricture.   1. Pepcid BID.  7. Osteopenia

## 2018-02-18 PROCEDURE — 1180000000 HC REHAB R&B

## 2018-02-18 PROCEDURE — 6370000000 HC RX 637 (ALT 250 FOR IP): Performed by: PHYSICAL MEDICINE & REHABILITATION

## 2018-02-18 PROCEDURE — 6370000000 HC RX 637 (ALT 250 FOR IP): Performed by: EMERGENCY MEDICINE

## 2018-02-18 RX ADMIN — NALOXEGOL OXALATE 25 MG: 25 TABLET, FILM COATED ORAL at 09:48

## 2018-02-18 RX ADMIN — OXYCODONE HYDROCHLORIDE AND ACETAMINOPHEN 2 TABLET: 5; 325 TABLET ORAL at 06:16

## 2018-02-18 RX ADMIN — DOCUSATE SODIUM 100 MG: 100 CAPSULE ORAL at 09:48

## 2018-02-18 RX ADMIN — FAMOTIDINE 20 MG: 20 TABLET, FILM COATED ORAL at 22:10

## 2018-02-18 RX ADMIN — FAMOTIDINE 20 MG: 20 TABLET, FILM COATED ORAL at 09:48

## 2018-02-18 RX ADMIN — SIMVASTATIN 40 MG: 40 TABLET, FILM COATED ORAL at 21:44

## 2018-02-18 RX ADMIN — TIZANIDINE 1 MG: 4 TABLET ORAL at 14:07

## 2018-02-18 RX ADMIN — TIZANIDINE 1 MG: 4 TABLET ORAL at 06:16

## 2018-02-18 RX ADMIN — VALSARTAN 40 MG: 80 TABLET ORAL at 09:49

## 2018-02-18 RX ADMIN — METOPROLOL SUCCINATE 50 MG: 50 TABLET, FILM COATED, EXTENDED RELEASE ORAL at 09:48

## 2018-02-18 RX ADMIN — OXYCODONE HYDROCHLORIDE AND ACETAMINOPHEN 2 TABLET: 5; 325 TABLET ORAL at 10:22

## 2018-02-18 RX ADMIN — ASPIRIN 81 MG: 81 TABLET ORAL at 09:48

## 2018-02-18 RX ADMIN — SENNOSIDES 17.2 MG: 8.6 TABLET, FILM COATED ORAL at 21:38

## 2018-02-18 RX ADMIN — DOCUSATE SODIUM 100 MG: 100 CAPSULE ORAL at 21:38

## 2018-02-18 RX ADMIN — SACUBITRIL AND VALSARTAN 1 TABLET: 49; 51 TABLET, FILM COATED ORAL at 21:37

## 2018-02-18 RX ADMIN — CALCIUM CARBONATE-VITAMIN D TAB 500 MG-200 UNIT 1 TABLET: 500-200 TAB at 21:43

## 2018-02-18 RX ADMIN — TIZANIDINE 1 MG: 4 TABLET ORAL at 22:10

## 2018-02-18 RX ADMIN — OXYCODONE HYDROCHLORIDE AND ACETAMINOPHEN 1 TABLET: 5; 325 TABLET ORAL at 21:35

## 2018-02-18 RX ADMIN — SACUBITRIL AND VALSARTAN 1 TABLET: 49; 51 TABLET, FILM COATED ORAL at 09:49

## 2018-02-18 ASSESSMENT — PAIN SCALES - GENERAL
PAINLEVEL_OUTOF10: 4
PAINLEVEL_OUTOF10: 7
PAINLEVEL_OUTOF10: 4
PAINLEVEL_OUTOF10: 7
PAINLEVEL_OUTOF10: 6
PAINLEVEL_OUTOF10: 0

## 2018-02-18 ASSESSMENT — PAIN DESCRIPTION - ORIENTATION
ORIENTATION: LOWER
ORIENTATION: LOWER

## 2018-02-18 ASSESSMENT — PAIN DESCRIPTION - DESCRIPTORS
DESCRIPTORS: ACHING
DESCRIPTORS: ACHING

## 2018-02-18 ASSESSMENT — PAIN DESCRIPTION - FREQUENCY
FREQUENCY: CONTINUOUS
FREQUENCY: CONTINUOUS

## 2018-02-18 ASSESSMENT — PAIN DESCRIPTION - LOCATION
LOCATION: BACK
LOCATION: BACK

## 2018-02-18 ASSESSMENT — PAIN DESCRIPTION - PAIN TYPE
TYPE: CHRONIC PAIN
TYPE: CHRONIC PAIN

## 2018-02-18 NOTE — PLAN OF CARE
Problem: Pain Control  Goal: Maintain pain level at or below patient's acceptable level (or 5 if patient is unable to determine acceptable level)  Outcome: Ongoing      Problem: Bleeding:  Goal: Will show no signs and symptoms of excessive bleeding  Will show no signs and symptoms of excessive bleeding   Outcome: Met This Shift  No signs or symptoms of  Excessive bleeding noted    Problem: Mobility - Impaired:  Goal: Able to ambulate with minimal assistance  Able to ambulate with minimal assistance   Outcome: Ongoing  Working with therapies for maximin improvement      Problem: Respiratory Function - Compromised:  Goal: Ability to maintain a clear airway will improve  Ability to maintain a clear airway will improve   Outcome: Ongoing  Receives breathing treatments  Does well with them    Problem: Bowel Function - Altered:  Goal: Bowel elimination is within specified parameters  Bowel elimination is within specified parameters   Outcome: Ongoing  Taking bowel medications and encouraged to drink water and keep active       Problem: Falls - Risk of  Goal: Absence of falls  Outcome: Not Met This Shift  Fall precautions in place. Alarms on both the bed and chair. Two hours bathroom checks. Falling star on door and fall armband on pt. Problem: Infection - Surgical Site:  Goal: Will show no infection signs and symptoms  Will show no infection signs and symptoms   Outcome: Met This Shift  No signs or symptoms of infection noted      Problem: Skin Integrity/Risk  Goal: No skin breakdown during hospitalization  Outcome: Ongoing  Skin assessment done every shift and bottom checked after each bathroom use. Encouraged to turned every two hours and too shift weight while in the chair. Comments: Care plan reviewed with patient . Patient  verbalize understanding of the plan of care and contribute to goal setting.

## 2018-02-18 NOTE — PROGRESS NOTES
Family Medicine Progress Notes/Coverage    Today's Date: 2/18/18  7E-66/066-A  Medical Record # [id number]  Account # [de-identified]      Ms. Martín Membreno admitted on 2/14/2018        Subjective / Interval History :     Sleeping when seen  Reviewed notes, consults, laboratory and radiology results,    Objective:       Physical Exam:  Patient Vitals for the past 24 hrs:   BP Temp Temp src Pulse Resp SpO2   02/18/18 0802 (!) 143/69 97.1 °F (36.2 °C) Oral 69 16 98 %   02/17/18 2205 (!) 149/67 97.6 °F (36.4 °C) Oral 75 18 94 %   02/17/18 0900 (!) 175/89 96.5 °F (35.8 °C) Oral 87 16 -       General Appearance:  Alert, cooperative, no distress, appears stated age   [de-identified]:  Neck:      Chest/Lungs:  Heart:    Abdomen:  Back:    Extremities:  Neurological Exam:        Assessment:     Active Hospital Problems    Diagnosis Date Noted    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 ([address] [S06.9X0A] 02/14/2018     Priority: High    Syncope and collapse [R55] 02/07/2018     Priority: High    Recurrent falls [R29.6] 02/07/2018     Priority: High    Closed fracture of shaft of left humerus [S42.302A] 02/07/2018     Priority: High    Closed head injury with concussion [S06.0X9A] 02/03/2018     Priority: High    Hypertension [I10]      Priority: Medium    Closed compression fracture of first lumbar vertebra ([facility] [S32.010A] 02/07/2018     Priority: Medium    Traumatic ecchymosis of right upper arm [S40.021A] 02/03/2018     Priority: Medium    Hematoma of scalp [S00.03XA] 02/03/2018     Priority: Medium    Class 2 obesity due to excess calories with serious comorbidity and body mass index (BMI) of 35.0 to 35.9 in adult [E66.09, Z68.35] 02/03/2018     Priority: Medium    Contusion of flank and back [S30.1XXA] 02/03/2018     Priority: Medium    Head injury [S09.90XA]      Priority: Medium    S/P ICD (internal cardiac defibrillator) procedure [Z95.810] 01/08/2018     Priority: Medium    Nonischemic cardiomyopathy ([facility] [I42.8]      Priority: Medium    COPD (chronic obstructive pulmonary disease) ([facility] [J44.9] 08/05/2017     Priority: Medium    Obstructive sleep apnea on CPAP [G47.33, Z99.89] 11/05/2015     Priority: Medium    CAD (coronary artery disease) s/p PCI and stent Multilink 3 x 18  mm mid LAD- in 07/2009 at [facility] [I25.10] 07/16/2012     Priority: Medium       Plan:     Discussed plans with the nursing staff  Continue  rehab    Medications, Laboratories and Imaging results:    Scheduled Meds:   senna  2 tablet Oral Nightly    docusate sodium  100 mg Oral BID    tiZANidine  1 mg Oral 3 times per day    magnesium citrate  296 mL Oral Once    valsartan  40 mg Oral Daily    naloxegol  25 mg Oral QAM    famotidine  20 mg Oral BID    sodium chloride flush  10 mL Intravenous 2 times per day    aspirin  81 mg Oral Daily    calcium-vitamin D  1 tablet Oral BID    metoprolol succinate  50 mg Oral Daily    sacubitril-valsartan  1 tablet Oral BID    simvastatin  40 mg Oral Nightly    lidocaine  1 patch Transdermal Daily     Continuous Infusions:   PRN Meds:polyethylene glycol, oxyCODONE-acetaminophen **OR** oxyCODONE-acetaminophen, acetaminophen, sodium chloride flush, magnesium hydroxide    Imaging:    Lab Review :    Lab Results   Component Value Date    WBC 9.8 02/16/2018    HGB 12.1 02/16/2018    HCT 35.7 (L) 02/16/2018    MCV 91.9 02/16/2018     02/16/2018     Lab Results   Component Value Date    CREATININE 0.3 (L) 02/16/2018    BUN 19 02/16/2018     02/16/2018    K 4.3 02/16/2018    CL 99 02/16/2018    CO2 27 02/16/2018     Lab Results   Component Value Date    CKTOTAL 26 (L) 04/22/2013    TROPONINI <0.006 07/16/2012     Lab Results   Component Value Date    ALT 8 (L) 02/16/2018    AST 19 02/16/2018    ALKPHOS 90 02/16/2018    BILITOT 0.9 02/16/2018     Lab

## 2018-02-19 ENCOUNTER — APPOINTMENT (OUTPATIENT)
Dept: GENERAL RADIOLOGY | Age: 78
DRG: 560 | End: 2018-02-19
Attending: PHYSICAL MEDICINE & REHABILITATION
Payer: MEDICARE

## 2018-02-19 PROCEDURE — 97116 GAIT TRAINING THERAPY: CPT

## 2018-02-19 PROCEDURE — 97110 THERAPEUTIC EXERCISES: CPT

## 2018-02-19 PROCEDURE — 6370000000 HC RX 637 (ALT 250 FOR IP): Performed by: PHYSICAL MEDICINE & REHABILITATION

## 2018-02-19 PROCEDURE — 74230 X-RAY XM SWLNG FUNCJ C+: CPT

## 2018-02-19 PROCEDURE — G0515 COGNITIVE SKILLS DEVELOPMENT: HCPCS

## 2018-02-19 PROCEDURE — 92526 ORAL FUNCTION THERAPY: CPT

## 2018-02-19 PROCEDURE — 1180000000 HC REHAB R&B

## 2018-02-19 PROCEDURE — 97535 SELF CARE MNGMENT TRAINING: CPT

## 2018-02-19 PROCEDURE — 92611 MOTION FLUOROSCOPY/SWALLOW: CPT

## 2018-02-19 PROCEDURE — 6370000000 HC RX 637 (ALT 250 FOR IP): Performed by: EMERGENCY MEDICINE

## 2018-02-19 PROCEDURE — 2500000003 HC RX 250 WO HCPCS: Performed by: PHYSICAL MEDICINE & REHABILITATION

## 2018-02-19 PROCEDURE — 97530 THERAPEUTIC ACTIVITIES: CPT

## 2018-02-19 RX ORDER — DOCUSATE SODIUM 100 MG/1
100 CAPSULE, LIQUID FILLED ORAL 2 TIMES DAILY
Status: DISCONTINUED | OUTPATIENT
Start: 2018-02-19 | End: 2018-02-20

## 2018-02-19 RX ORDER — POLYETHYLENE GLYCOL 3350 17 G/17G
17 POWDER, FOR SOLUTION ORAL DAILY
Status: DISCONTINUED | OUTPATIENT
Start: 2018-02-19 | End: 2018-02-19

## 2018-02-19 RX ORDER — POLYETHYLENE GLYCOL 3350 17 G/17G
17 POWDER, FOR SOLUTION ORAL DAILY PRN
Status: DISCONTINUED | OUTPATIENT
Start: 2018-02-19 | End: 2018-02-20

## 2018-02-19 RX ORDER — SENNA PLUS 8.6 MG/1
2 TABLET ORAL NIGHTLY
Status: DISCONTINUED | OUTPATIENT
Start: 2018-02-19 | End: 2018-02-20

## 2018-02-19 RX ORDER — TRAMADOL HYDROCHLORIDE 50 MG/1
50 TABLET ORAL
Status: DISCONTINUED | OUTPATIENT
Start: 2018-02-19 | End: 2018-02-24

## 2018-02-19 RX ORDER — DOCUSATE SODIUM 100 MG/1
100 CAPSULE, LIQUID FILLED ORAL 2 TIMES DAILY PRN
Status: DISCONTINUED | OUTPATIENT
Start: 2018-02-19 | End: 2018-02-19

## 2018-02-19 RX ORDER — SENNA PLUS 8.6 MG/1
2 TABLET ORAL NIGHTLY PRN
Status: DISCONTINUED | OUTPATIENT
Start: 2018-02-19 | End: 2018-02-19

## 2018-02-19 RX ORDER — HYDROCODONE BITARTRATE AND ACETAMINOPHEN 7.5; 325 MG/1; MG/1
1 TABLET ORAL
Status: DISCONTINUED | OUTPATIENT
Start: 2018-02-19 | End: 2018-02-24

## 2018-02-19 RX ADMIN — OXYCODONE HYDROCHLORIDE AND ACETAMINOPHEN 1 TABLET: 5; 325 TABLET ORAL at 07:51

## 2018-02-19 RX ADMIN — TIZANIDINE 1 MG: 4 TABLET ORAL at 06:40

## 2018-02-19 RX ADMIN — DOCUSATE SODIUM 100 MG: 100 CAPSULE, LIQUID FILLED ORAL at 21:31

## 2018-02-19 RX ADMIN — METOPROLOL SUCCINATE 50 MG: 50 TABLET, FILM COATED, EXTENDED RELEASE ORAL at 07:52

## 2018-02-19 RX ADMIN — SIMVASTATIN 40 MG: 40 TABLET, FILM COATED ORAL at 21:10

## 2018-02-19 RX ADMIN — TIZANIDINE 1 MG: 4 TABLET ORAL at 13:48

## 2018-02-19 RX ADMIN — TRAMADOL HYDROCHLORIDE 50 MG: 50 TABLET, COATED ORAL at 16:14

## 2018-02-19 RX ADMIN — BARIUM SULFATE 20 ML: 400 PASTE ORAL at 10:35

## 2018-02-19 RX ADMIN — SACUBITRIL AND VALSARTAN 1 TABLET: 49; 51 TABLET, FILM COATED ORAL at 07:51

## 2018-02-19 RX ADMIN — ASPIRIN 81 MG: 81 TABLET ORAL at 07:51

## 2018-02-19 RX ADMIN — NALOXEGOL OXALATE 25 MG: 25 TABLET, FILM COATED ORAL at 07:51

## 2018-02-19 RX ADMIN — TIZANIDINE 1 MG: 4 TABLET ORAL at 21:31

## 2018-02-19 RX ADMIN — FAMOTIDINE 20 MG: 20 TABLET, FILM COATED ORAL at 07:52

## 2018-02-19 RX ADMIN — TRAMADOL HYDROCHLORIDE 50 MG: 50 TABLET, COATED ORAL at 21:30

## 2018-02-19 RX ADMIN — BARIUM SULFATE 20 ML: 400 SUSPENSION ORAL at 10:36

## 2018-02-19 RX ADMIN — SACUBITRIL AND VALSARTAN 1 TABLET: 49; 51 TABLET, FILM COATED ORAL at 21:30

## 2018-02-19 RX ADMIN — ACETAMINOPHEN 650 MG: 325 TABLET ORAL at 13:48

## 2018-02-19 RX ADMIN — CALCIUM CARBONATE-VITAMIN D TAB 500 MG-200 UNIT 1 TABLET: 500-200 TAB at 21:10

## 2018-02-19 RX ADMIN — DOCUSATE SODIUM 100 MG: 100 CAPSULE ORAL at 07:52

## 2018-02-19 RX ADMIN — CALCIUM CARBONATE-VITAMIN D TAB 500 MG-200 UNIT 1 TABLET: 500-200 TAB at 07:51

## 2018-02-19 RX ADMIN — FAMOTIDINE 20 MG: 20 TABLET, FILM COATED ORAL at 21:10

## 2018-02-19 RX ADMIN — SENNOSIDES 17.2 MG: 8.6 TABLET, FILM COATED ORAL at 21:10

## 2018-02-19 RX ADMIN — VALSARTAN 40 MG: 80 TABLET ORAL at 07:52

## 2018-02-19 RX ADMIN — BARIUM SULFATE 60 ML: 0.81 POWDER, FOR SUSPENSION ORAL at 10:35

## 2018-02-19 RX ADMIN — HYDROCODONE BITARTRATE AND ACETAMINOPHEN 1 TABLET: 7.5; 325 TABLET ORAL at 18:25

## 2018-02-19 ASSESSMENT — PAIN SCALES - GENERAL
PAINLEVEL_OUTOF10: 4
PAINLEVEL_OUTOF10: 7
PAINLEVEL_OUTOF10: 3
PAINLEVEL_OUTOF10: 10
PAINLEVEL_OUTOF10: 4
PAINLEVEL_OUTOF10: 0
PAINLEVEL_OUTOF10: 5
PAINLEVEL_OUTOF10: 6
PAINLEVEL_OUTOF10: 7
PAINLEVEL_OUTOF10: 4
PAINLEVEL_OUTOF10: 10
PAINLEVEL_OUTOF10: 10

## 2018-02-19 ASSESSMENT — PAIN DESCRIPTION - LOCATION
LOCATION: SHOULDER
LOCATION: SHOULDER
LOCATION: BACK

## 2018-02-19 ASSESSMENT — PAIN DESCRIPTION - ORIENTATION
ORIENTATION: LEFT
ORIENTATION: LEFT
ORIENTATION: LOWER

## 2018-02-19 ASSESSMENT — PAIN DESCRIPTION - FREQUENCY: FREQUENCY: CONTINUOUS

## 2018-02-19 ASSESSMENT — PAIN DESCRIPTION - PAIN TYPE: TYPE: CHRONIC PAIN

## 2018-02-19 ASSESSMENT — PAIN DESCRIPTION - DESCRIPTORS: DESCRIPTORS: ACHING
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
## 2018-02-19 NOTE — PROGRESS NOTES
right   [] Residue in the lateral sulcus  left [x] Uncoordinated AP movement   [x] Slow AP movement   [] Decreased lingual elevation   [] Decreased tongue base retraction [] Uncontrolled bolus / diffuse fall lover tongue base   [] Piecemeal deglutition    [] OTHER:     ORAL PHASE IGGY SCORE: (Dysphagia outcome and severity scale)  [x] 6 = WFL / Mod I; Normal diet; May have mild oral delay    PHARYNGEAL PHASE: [] WFL  [x] Impaired   [] Absent Swallow  [] Delayed Swallow [x] Decreased airway protection   [x] Decreased epiglottic inversion [x] Decreased hyolaryngeal elevation   [x] Residue in the valleculae - min  [] Residue in the pyriform sinus    [] Cricopharyngeal dysfunction  [] Residue along posterior pharyngeal wall   [] Residue along the ariepiglottic folds. [] OTHER:    PHARYNGEAL PHASE IGGY SCORE: (Dysphagia outcome and severity scale)  [x] 4 = Mild  Moderate dysphagia: One or two consistencies restricted; May exhibit one or more of the following:  Residue clears with cue; Aspiration of one consistency with weak or no reflexive cough; Laryngeal penetration to the vocal cords with cough with two consistencies; Laryngeal penetration to the vocal cords without cough on one consistency.       SIGNS AND SYMPTOMS OF LARYNGEAL PENETRATION / ASPIRATION:  [x] No evidence of aspiration  [x] Laryngeal penetration evident with: thin liquids via cup    PENETRATION-ASPIRATION SCALE (PAS):  [x] 4 = Material enters the airway, contacts the vocal folds, and is ejected from the airway    ESOPHAGEAL PHASE:   [x] No significant findings  [] See Radiology Report for details  [] Recommend further esophageal testing    ATTEMPTED TECHNIQUES:      Comments:  [x]Small bolus size [x] Effective [] Not Effective    []Straw [] Effective [] Not Effective    [x]Cup [] Effective [x] Not Effective    [x]Chin tuck [x] Effective [] Not Effective    []Head Turn [] Effective [] Not Effective    [x]Spoon presentations [x] Effective [] Not Effective    [x] Volitional cough [x] Effective [] Not Effective    [] Spontaneous cough [] Effective [] Not Effective    []OTHER: [] Effective [] Not Effective      DIAGNOSTIC IMPRESSIONS:  Pt presents with oral phase that is essentially WFL, mild pharyngeal dysphagia as outlined by findings above. Appropriate oral initiation with adequate labial seal on utensil and cup for transferring bolus to oral cavity. Min deficits related to cohesive bolus formation and manipulation, resulting in delayed AP transit. Min oral residuals spread diffusely throughout cavity, clearance of stasis upon liquid assist. Min premature spillage to the level of the level of the valleculae. Decreased hyolaryngeal elevation and anterior excursion, resulting in incomplete epiglottic inversion and UES opening. Consistent laryngeal penetration of thin liquids via cup with multiple trials presented; pt NOT able to sense material at the level of the vocal folds and required cues to cough and re-swallow for clearance. Improved safe toleration of thin liquids utilizing strategy of chin tuck to maximize airway protection, however, decreased coordination and timeliness for strategy to be utilized independently. No observations of aspiration evidenced within this study. Recommend diet level of regular solids with nectar thick liquids at this time. Anticipate diet advancement to thin liquids given instruction/strategy training for usage of chin tuck to promote generalization across all settings.          DIET RECOMMENDATIONS:  Regular with nectar thick liquids    STRATEGIES:   [x] Full upright position  [x] Small bite/sip   [x] No Straw   [] Multiple Swallow  [x] Chin tuck   [] Head turn   [x] Pulmonary monitoring [] Oral care after all meals  [] Supervision   [] Medication in applesauce   []Direct 1:1 Supervision [] Spoon all liquids   [x] Alternate solid / liquid [x] Limit distractions   [] Monitor for fatigue  [] PMV in place for all po   []

## 2018-02-19 NOTE — PROGRESS NOTES
[address]    TIME   SLP Individual Minutes  Time In: 1200  Time Out: 4927  Minutes: 30  Timed Code Treatment Minutes: 16 Minutes   Dysphagia therapy: 9864-1794  Cognitive therapy: 8825-9585       []Daily Note  [x]Progress Note  []Discharge Note    Date: 2018  Patient Name: Iam Dougherty        MRN: [id number]    : 1940  (77 y.o.)  Gender: female   Primary Provider: Fredy Ernandez MD  Admitting Diagnosis: TBI  Secondary Diagnosis: Cognitive deficits, dysphagia  Precautions: Fall risk  Swallowing Status/Diet: Regular diet with nectar thick liquids  Swallowing Strategies: Full upright position, no straw, chin tuck, pulmonary monitoring, alternate solid/liquids, small bite/sip, limit distractions  DATE of last MBS:  18  Pain:  5/10 in back/shoulder    Subjective: Pt awake and alert, continued complaint of back/shoulder pain. High level of tearfulness due to current changes s/p TBI; pt verbalized, \"I just want to go home\"; counseling within scope of practice provided with improved affect obtained. Son present at bedside, permitting education as necessary. Cooperative with POC. *Education provided to son re: results from formal instrumentation MBS recommending liquid levels of nectar thick viscosity; concerns related to aspiration conveyed with improved airway protection observed with implementation of chin tuck strategy; verbal comprehension obtained with son receptive for education provided    SHORT TERM GOAL #1:  Goal 1: Pt will complete working memory and delayed recall  tasks w/ 70% accuracy and mod cues in order to improve retention of functional and novel information.  GOAL NOT MET, CONTINUE  INTERVENTIONS: Orientation:  independently  Biographical information: pt independent for verbalizing birthdate, address, and phone number    Recall of clinician's name and department title:  Immediate memory:  below    SHORT TERM GOAL #5:  Goal 5: Pt will safely tolerate advanced liquid trials x10 utilizing strategy of CHIN TUCK to permit potential advancement to least restrictive diet. GOAL NOT MET, CONTINUE  INTERVENTIONS: Advanced liquid trials of thin conducted at snack. Physical maneuvering in recliner completed to permit 90 degree postural positioning/hip flexion to maximize safety of intake. Trials x10 consumed with pt requiring mod modeling and cues from clinician for coordinating chin tuck strategy and permitting appropriate placement; min carryover exhibited as trials progressed. Immediate cough on trial number 8/10 with pt encouraged to continue \"coughing\" as necessary; no changes in vocal quality with no further s/s aspiration evidenced. Recommend continuation of nectar thick liquids at this time with further focus to be on thin liquid trials in attempts to establish least restrictive diet. Long-term Goals  Timeframe for Long-term Goals: 3 weeks  Goal 1: Pt will improve cognitive skills to a supervision/modified independent level to maximize level of independence for return to independent living in home environment. ONGOING  Goal 2: Pt will safely tolerate least restrictive diet 98% of the time given compensatory swallowing strategies from trained staff/caregivers to maximize nutrition/hydration measures.  ONGOING    ST FIM ASSESSMENT:     Admission score Current score Goal Status   COMMUNICATION 5 - Patient understands basic needs (hungry/hot/pain)   4 - Patient understands basic needs 75-90%+ of the time   Not progressing, decline in performance; anticipate progress   EXPRESSION 5 - Expresses basic ideas/needs only (hungry/hot/pain)     5 - Expresses basic ideas/needs only (hungry/hot/pain)   Stable performance   SOCIAL INTERACTION 5 - Patient is appropriate with supervision/cues   4 - Patient appropriate 75-90%+ of the time   Not progressing, decline in performance; anticipate progress   PROBLEM SOLVING
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
## 2018-02-19 NOTE — PROGRESS NOTES
further, and achieving more activity within the hour sessions. Pt will continue to benefit from skilled therapy to improve overall functional mobility for safe home going. Equipment Recommendations:  Equipment Needed: Yes  Other: Cont to assess    SPEECH THERAPY  Communication  Comprehension: 4 - Patient understands basic needs 75-90%+ of the time  Expression: 5 - Expresses basic ideas/needs only (hungry/hot/pain)  Social Cognition  Social Interaction: 4 - Patient appropriate 75-90%+ of the time  Problem Solving: 3 - Patient solves simple/routine tasks 50%-74%  Memory: 4 - Patient remembers 75-90%+ of the time    Summary: For this therapy reporting, 0/5 STGs achieved; recent POC developed on 2/15/18 impacting potential goal attainment. Pt continues to present with at least mild-moderate cognitive linguistic deficits related to recall, working memory, reasoning, problem solving, thought organization, executive function, and complex attention. Basic spoken language comprehension/expression skills relatively intact for wants/needs. Hyperverbal and tangential speech tendencies exhibited during conversational speech with pt requiring re-direction cues as necessary. Additionally, pt presents with mild pharyngeal dysphagia as evidenced though MBS completed on 2/19/18. Formal instrumentation revealed consistent penetration of thin liquids to the level of the vocal folds with pt NOT able to sense material; strategy of chin tuck with improved airway protection, however, pt will require instruction for generalization of strategy across contexts. Current diet level of regular with nectar thick liquids established. Progress foreseen with continued services; barriers to progress include high level of anxiety. Recommendations at this time include assistance for management of medications/finacnes upon discharge with further recommendations to be conveyed.  Skilled ST services are medically necessary to decrease risk of medical
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
## 2018-02-19 NOTE — PROGRESS NOTES
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 Dior Watkins:    []Hold treatment per nursing request  []Patient refusal  []Family declined treatment  []Patient at testing and/or off the floor  []Patient unavailable, with PT/OT/nursing, etc.  [x]Other attempted RT evaluation but pt just finished with P.T.-she has not finished her lunch completely yet and family in visiting and she has O.T. Soon- will attempt again on Wednesday -pt will see if her beautician can come in and do her hair while she is here  Edwar Sheffield, CTRS    2/19/2018

## 2018-02-20 PROBLEM — F43.23 ADJUSTMENT DISORDER WITH MIXED ANXIETY AND DEPRESSED MOOD: Status: ACTIVE | Noted: 2018-02-20

## 2018-02-20 PROBLEM — F43.21 COMPLICATED GRIEF: Status: ACTIVE | Noted: 2018-02-20

## 2018-02-20 LAB
BACTERIA: ABNORMAL /HPF
BILIRUBIN URINE: NEGATIVE
BLOOD, URINE: ABNORMAL
CASTS 2: ABNORMAL /LPF
CASTS UA: ABNORMAL /LPF
CHARACTER, URINE: ABNORMAL
COLOR: YELLOW
CRYSTALS, UA: ABNORMAL
EPITHELIAL CELLS, UA: ABNORMAL /HPF
GLUCOSE URINE: NEGATIVE MG/DL
KETONES, URINE: NEGATIVE
LEUKOCYTE ESTERASE, URINE: ABNORMAL
MISCELLANEOUS 2: ABNORMAL
NITRITE, URINE: NEGATIVE
PH UA: 6
PROTEIN UA: NEGATIVE
RBC URINE: ABNORMAL /HPF
RENAL EPITHELIAL, UA: ABNORMAL
SPECIFIC GRAVITY, URINE: 1.01 (ref 1–1.03)
UROBILINOGEN, URINE: 0.2 EU/DL
WBC UA: ABNORMAL /HPF
YEAST: ABNORMAL

## 2018-02-20 PROCEDURE — 97535 SELF CARE MNGMENT TRAINING: CPT

## 2018-02-20 PROCEDURE — 87184 SC STD DISK METHOD PER PLATE: CPT

## 2018-02-20 PROCEDURE — 90791 PSYCH DIAGNOSTIC EVALUATION: CPT | Performed by: PSYCHOLOGIST

## 2018-02-20 PROCEDURE — 1180000000 HC REHAB R&B

## 2018-02-20 PROCEDURE — 87086 URINE CULTURE/COLONY COUNT: CPT

## 2018-02-20 PROCEDURE — 87186 SC STD MICRODIL/AGAR DIL: CPT

## 2018-02-20 PROCEDURE — 97110 THERAPEUTIC EXERCISES: CPT

## 2018-02-20 PROCEDURE — 97530 THERAPEUTIC ACTIVITIES: CPT

## 2018-02-20 PROCEDURE — G0515 COGNITIVE SKILLS DEVELOPMENT: HCPCS | Performed by: SPEECH-LANGUAGE PATHOLOGIST

## 2018-02-20 PROCEDURE — 6370000000 HC RX 637 (ALT 250 FOR IP): Performed by: PHYSICAL MEDICINE & REHABILITATION

## 2018-02-20 PROCEDURE — 87077 CULTURE AEROBIC IDENTIFY: CPT

## 2018-02-20 PROCEDURE — 81001 URINALYSIS AUTO W/SCOPE: CPT

## 2018-02-20 PROCEDURE — 97116 GAIT TRAINING THERAPY: CPT

## 2018-02-20 PROCEDURE — 92526 ORAL FUNCTION THERAPY: CPT | Performed by: SPEECH-LANGUAGE PATHOLOGIST

## 2018-02-20 PROCEDURE — 6370000000 HC RX 637 (ALT 250 FOR IP): Performed by: EMERGENCY MEDICINE

## 2018-02-20 RX ORDER — POLYETHYLENE GLYCOL 3350 17 G/17G
17 POWDER, FOR SOLUTION ORAL DAILY
Status: DISCONTINUED | OUTPATIENT
Start: 2018-02-21 | End: 2018-02-21

## 2018-02-20 RX ORDER — SENNA PLUS 8.6 MG/1
2 TABLET ORAL NIGHTLY
Status: DISCONTINUED | OUTPATIENT
Start: 2018-02-20 | End: 2018-02-21

## 2018-02-20 RX ORDER — DOCUSATE SODIUM 100 MG/1
100 CAPSULE, LIQUID FILLED ORAL 2 TIMES DAILY
Status: DISCONTINUED | OUTPATIENT
Start: 2018-02-20 | End: 2018-02-21

## 2018-02-20 RX ORDER — DOCUSATE SODIUM 100 MG/1
100 CAPSULE, LIQUID FILLED ORAL 3 TIMES DAILY
Status: DISCONTINUED | OUTPATIENT
Start: 2018-02-20 | End: 2018-02-20

## 2018-02-20 RX ORDER — SENNA PLUS 8.6 MG/1
4 TABLET ORAL NIGHTLY
Status: DISCONTINUED | OUTPATIENT
Start: 2018-02-20 | End: 2018-02-20

## 2018-02-20 RX ORDER — POLYETHYLENE GLYCOL 3350 17 G/17G
17 POWDER, FOR SOLUTION ORAL 2 TIMES DAILY
Status: DISCONTINUED | OUTPATIENT
Start: 2018-02-20 | End: 2018-02-20

## 2018-02-20 RX ADMIN — NALOXEGOL OXALATE 25 MG: 25 TABLET, FILM COATED ORAL at 07:55

## 2018-02-20 RX ADMIN — TIZANIDINE 1 MG: 4 TABLET ORAL at 06:28

## 2018-02-20 RX ADMIN — TRAMADOL HYDROCHLORIDE 50 MG: 50 TABLET, COATED ORAL at 14:55

## 2018-02-20 RX ADMIN — METOPROLOL SUCCINATE 50 MG: 50 TABLET, FILM COATED, EXTENDED RELEASE ORAL at 07:55

## 2018-02-20 RX ADMIN — POLYETHYLENE GLYCOL 3350 17 G: 17 POWDER, FOR SOLUTION ORAL at 07:57

## 2018-02-20 RX ADMIN — SACUBITRIL AND VALSARTAN 1 TABLET: 49; 51 TABLET, FILM COATED ORAL at 20:27

## 2018-02-20 RX ADMIN — TRAMADOL HYDROCHLORIDE 50 MG: 50 TABLET, COATED ORAL at 03:01

## 2018-02-20 RX ADMIN — VALSARTAN 40 MG: 80 TABLET ORAL at 07:56

## 2018-02-20 RX ADMIN — DOCUSATE SODIUM 100 MG: 100 CAPSULE ORAL at 07:56

## 2018-02-20 RX ADMIN — SACUBITRIL AND VALSARTAN 1 TABLET: 49; 51 TABLET, FILM COATED ORAL at 11:52

## 2018-02-20 RX ADMIN — FAMOTIDINE 20 MG: 20 TABLET, FILM COATED ORAL at 20:27

## 2018-02-20 RX ADMIN — SIMVASTATIN 40 MG: 40 TABLET, FILM COATED ORAL at 20:27

## 2018-02-20 RX ADMIN — SENNOSIDES 17.2 MG: 8.6 TABLET, FILM COATED ORAL at 20:27

## 2018-02-20 RX ADMIN — FAMOTIDINE 20 MG: 20 TABLET, FILM COATED ORAL at 07:56

## 2018-02-20 RX ADMIN — HYDROCODONE BITARTRATE AND ACETAMINOPHEN 1 TABLET: 7.5; 325 TABLET ORAL at 18:43

## 2018-02-20 RX ADMIN — TRAMADOL HYDROCHLORIDE 50 MG: 50 TABLET, COATED ORAL at 20:27

## 2018-02-20 RX ADMIN — ASPIRIN 81 MG: 81 TABLET ORAL at 07:55

## 2018-02-20 RX ADMIN — CALCIUM CARBONATE-VITAMIN D TAB 500 MG-200 UNIT 1 TABLET: 500-200 TAB at 20:27

## 2018-02-20 RX ADMIN — HYDROCODONE BITARTRATE AND ACETAMINOPHEN 1 TABLET: 7.5; 325 TABLET ORAL at 00:25

## 2018-02-20 RX ADMIN — TIZANIDINE 1 MG: 4 TABLET ORAL at 14:27

## 2018-02-20 RX ADMIN — HYDROCODONE BITARTRATE AND ACETAMINOPHEN 1 TABLET: 7.5; 325 TABLET ORAL at 06:28

## 2018-02-20 RX ADMIN — TRAMADOL HYDROCHLORIDE 50 MG: 50 TABLET, COATED ORAL at 07:57

## 2018-02-20 RX ADMIN — DOCUSATE SODIUM 100 MG: 100 CAPSULE ORAL at 20:27

## 2018-02-20 RX ADMIN — TIZANIDINE 1 MG: 4 TABLET ORAL at 20:27

## 2018-02-20 RX ADMIN — HYDROCODONE BITARTRATE AND ACETAMINOPHEN 1 TABLET: 7.5; 325 TABLET ORAL at 12:03

## 2018-02-20 RX ADMIN — HYDROCODONE BITARTRATE AND ACETAMINOPHEN 1 TABLET: 7.5; 325 TABLET ORAL at 23:31

## 2018-02-20 RX ADMIN — CALCIUM CARBONATE-VITAMIN D TAB 500 MG-200 UNIT 1 TABLET: 500-200 TAB at 07:55

## 2018-02-20 ASSESSMENT — PAIN DESCRIPTION - LOCATION
LOCATION: BACK

## 2018-02-20 ASSESSMENT — PAIN SCALES - GENERAL
PAINLEVEL_OUTOF10: 2
PAINLEVEL_OUTOF10: 3
PAINLEVEL_OUTOF10: 2
PAINLEVEL_OUTOF10: 0
PAINLEVEL_OUTOF10: 0
PAINLEVEL_OUTOF10: 2
PAINLEVEL_OUTOF10: 3
PAINLEVEL_OUTOF10: 2
PAINLEVEL_OUTOF10: 1
PAINLEVEL_OUTOF10: 6
PAINLEVEL_OUTOF10: 6
PAINLEVEL_OUTOF10: 1
PAINLEVEL_OUTOF10: 2
PAINLEVEL_OUTOF10: 3
PAINLEVEL_OUTOF10: 0
PAINLEVEL_OUTOF10: 3

## 2018-02-20 ASSESSMENT — PAIN DESCRIPTION - ORIENTATION
ORIENTATION: LOWER

## 2018-02-20 ASSESSMENT — PAIN DESCRIPTION - PAIN TYPE
TYPE: CHRONIC PAIN

## 2018-02-20 ASSESSMENT — PAIN DESCRIPTION - FREQUENCY
FREQUENCY: CONTINUOUS
FREQUENCY: CONTINUOUS

## 2018-02-20 ASSESSMENT — PAIN DESCRIPTION - DESCRIPTORS
DESCRIPTORS: ACHING
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
## 2018-02-20 NOTE — PROGRESS NOTES
Physical Medicine & Rehabilitation  Progress Note      Chief Complaint:  LEFT periprosthetic humerus fracture/TBI    Subjective:  MBS today with orders for nectar thicks. Family requested pain regiment be adjusted since patient was very limited this AM with mobilizing due to no pain medications being given overnight. Current pain 2/10 at rest.  No other specific concerns. Rehabilitation:  Physical therapy: FIMS:  Bed Mobility: Scooting: Contact guard assistance (to scoot forward and back in sitting position)    Transfers: Sit to Stand: Contact guard assistance (from recliner and WC, good hand placement)  Stand to sit: Contact guard assistance (to WC and recliner, cue to control descent)  Stand Pivot Transfers: Contact guard assistance (with hemiwalker), WB Status: NWB LUE in sling  Ambulation 1  Surface: level tile  Device: Hemiwalker  Assistance: Contact guard assistance  Quality of Gait: slowed gordon, forward flexed posture, short shuffled steps- receptive to cues/feedback, cue for how to hold hemiwalker and placement- pt attempting to drag walker along  Distance: 50'x1 10'x1  Comments: Better stability in PM session. , Stairs  # Steps : 2  Stairs Height: 4\"  Rails:  (parallel bar)  Assistance: Contact guard assistance  Comment: Pt completed with cues to ascend with stronger leg. FIMS: Bed, Chair, Wheel Chair: 2 - Requires 50-74% assistance to transfer  Walk: 1 - Total Assistance Walks/operates wheelchair < 50 feet OR requires two or more people OR patient performs < 25% of locomotion effort  Distance Walked: 45 feet   Wheel Chair: 1 - Total Assistance Walks/operates wheelchair < 50 feet OR requires two or more people OR patient performs < 25% of locomotion effort  Distance Traveled in Wheel Chair: 25 feet   Stairs: 0 - Activity Does not Occur ( 0 only for the admission assessment), PT Equipment Recommendations  Equipment Needed: Yes  Other: Cont to assess, Assessment: Pt tolerated session well.  Pt

## 2018-02-20 NOTE — PROGRESS NOTES
BSC with CGA of 1 using hemiwalker to improve indep with toileting in bathroom. Short term goal 5: Pt will complete complete LUE distal exs and RUE light strengthening task x10 reps to improve distal strength to 4+/5. Long term goals  Time Frame for Long term goals : 3 weeks  Long term goal 1: Pt will don UB dressing tasks with Min A and min vcs for NWBing for ease of dressing tasks. Long term goal 2: Pt will complete grooming tasks standing at sink x4 minutes with SBA to improve indep with self care tasks.

## 2018-02-20 NOTE — PROGRESS NOTES
ANGIOPLASTY WITH STENT PLACEMENT  2009    DILATATION, ESOPHAGUS  2011    HYSTERECTOMY  1976 or 1977    bleeding    JOINT REPLACEMENT Left     Shoulder    [place] OPEN FIXATN MID HUMERUS FRACTURE Right 2/12/2018    LEFT HUMERUS OPEN REDUCTION INTERNAL FIXATION performed by Stephie Chatterjee MD at [facility]           Subjective    Subjective: Pt seated in chair      Pain:  Pain Assessment  Patient Currently in Pain: Yes (4/10 back)       Objective    Balance  Sitting Balance: Stand by assistance (seated at edge of chair)    Comment: Patient guided in PROM shoulder flexion in graded ranges, achieving 40 degrees at most x6 reps with rest breaks following each exs due to pain and anxiety. Pt guarding shoulder, max vcs for deep breathing exerices to relax and allow for PROM, pt tolerated PROM ER to 10 degrees, AAROM in elbow flexion with min assist from therapist at end range, AROM in wrist fleixon/extension and digits and wirst circimduction to improve ROM for ADLs. Activity Tolerance:  Activity Tolerance: Patient limited by pain; Patient limited by fatigue    Assessment:     Performance deficits / Impairments: Decreased functional mobility , Decreased ADL status, Decreased endurance, Decreased balance, Decreased safe awareness, Decreased cognition, Decreased strength  Prognosis: Fair  Discharge Recommendations: 24 hour supervision or assist, Patient would benefit from continued therapy after discharge    Patient Education:  Patient Education: UE therex,     Equipment Recommendations: Other: Continue to assess pending progress    Safety:  Safety Devices in place: Yes  Type of devices:  All fall risk precautions in place, Call light within reach, Left in chair, Chair alarm in place, Nurse notified, Patient at risk for falls, Left wth PTA Gina at end of OT session     Plan:  Times per week: 5x/wk for 90 min and 1x/wk for 30 min    Current Treatment Recommendations: Balance Training,

## 2018-02-21 PROCEDURE — G0515 COGNITIVE SKILLS DEVELOPMENT: HCPCS | Performed by: SPEECH-LANGUAGE PATHOLOGIST

## 2018-02-21 PROCEDURE — 97530 THERAPEUTIC ACTIVITIES: CPT

## 2018-02-21 PROCEDURE — 97110 THERAPEUTIC EXERCISES: CPT

## 2018-02-21 PROCEDURE — 97535 SELF CARE MNGMENT TRAINING: CPT

## 2018-02-21 PROCEDURE — 6370000000 HC RX 637 (ALT 250 FOR IP): Performed by: EMERGENCY MEDICINE

## 2018-02-21 PROCEDURE — 1180000000 HC REHAB R&B

## 2018-02-21 PROCEDURE — 51798 US URINE CAPACITY MEASURE: CPT

## 2018-02-21 PROCEDURE — 97116 GAIT TRAINING THERAPY: CPT

## 2018-02-21 PROCEDURE — 6370000000 HC RX 637 (ALT 250 FOR IP): Performed by: PHYSICAL MEDICINE & REHABILITATION

## 2018-02-21 RX ORDER — DOCUSATE SODIUM 100 MG/1
100 CAPSULE, LIQUID FILLED ORAL 2 TIMES DAILY PRN
Status: DISCONTINUED | OUTPATIENT
Start: 2018-02-21 | End: 2018-03-06 | Stop reason: HOSPADM

## 2018-02-21 RX ORDER — POLYETHYLENE GLYCOL 3350 17 G/17G
17 POWDER, FOR SOLUTION ORAL DAILY PRN
Status: DISCONTINUED | OUTPATIENT
Start: 2018-02-21 | End: 2018-03-06 | Stop reason: HOSPADM

## 2018-02-21 RX ORDER — SENNA PLUS 8.6 MG/1
2 TABLET ORAL NIGHTLY PRN
Status: DISCONTINUED | OUTPATIENT
Start: 2018-02-21 | End: 2018-03-06 | Stop reason: HOSPADM

## 2018-02-21 RX ORDER — GRANULES FOR ORAL 3 G/1
3 POWDER ORAL
Status: COMPLETED | OUTPATIENT
Start: 2018-02-21 | End: 2018-02-27

## 2018-02-21 RX ADMIN — HYDROCODONE BITARTRATE AND ACETAMINOPHEN 1 TABLET: 7.5; 325 TABLET ORAL at 05:35

## 2018-02-21 RX ADMIN — TIZANIDINE 1 MG: 4 TABLET ORAL at 05:46

## 2018-02-21 RX ADMIN — SIMVASTATIN 40 MG: 40 TABLET, FILM COATED ORAL at 21:42

## 2018-02-21 RX ADMIN — FAMOTIDINE 20 MG: 20 TABLET, FILM COATED ORAL at 21:43

## 2018-02-21 RX ADMIN — TRAMADOL HYDROCHLORIDE 50 MG: 50 TABLET, COATED ORAL at 03:15

## 2018-02-21 RX ADMIN — FOSFOMYCIN TROMETHAMINE 1 PACKET: 3 POWDER ORAL at 15:14

## 2018-02-21 RX ADMIN — TRAMADOL HYDROCHLORIDE 50 MG: 50 TABLET, COATED ORAL at 15:14

## 2018-02-21 RX ADMIN — FAMOTIDINE 20 MG: 20 TABLET, FILM COATED ORAL at 07:55

## 2018-02-21 RX ADMIN — HYDROCODONE BITARTRATE AND ACETAMINOPHEN 1 TABLET: 7.5; 325 TABLET ORAL at 13:06

## 2018-02-21 RX ADMIN — TIZANIDINE 1 MG: 4 TABLET ORAL at 15:13

## 2018-02-21 RX ADMIN — TRAMADOL HYDROCHLORIDE 50 MG: 50 TABLET, COATED ORAL at 07:55

## 2018-02-21 RX ADMIN — VALSARTAN 40 MG: 80 TABLET ORAL at 07:55

## 2018-02-21 RX ADMIN — DOCUSATE SODIUM 100 MG: 100 CAPSULE ORAL at 07:55

## 2018-02-21 RX ADMIN — SACUBITRIL AND VALSARTAN 1 TABLET: 49; 51 TABLET, FILM COATED ORAL at 21:43

## 2018-02-21 RX ADMIN — NALOXEGOL OXALATE 25 MG: 25 TABLET, FILM COATED ORAL at 07:55

## 2018-02-21 RX ADMIN — POLYETHYLENE GLYCOL 3350 17 G: 17 POWDER, FOR SOLUTION ORAL at 07:55

## 2018-02-21 RX ADMIN — TIZANIDINE 1 MG: 4 TABLET ORAL at 21:44

## 2018-02-21 RX ADMIN — ASPIRIN 81 MG: 81 TABLET ORAL at 07:56

## 2018-02-21 RX ADMIN — TRAMADOL HYDROCHLORIDE 50 MG: 50 TABLET, COATED ORAL at 21:42

## 2018-02-21 RX ADMIN — HYDROCODONE BITARTRATE AND ACETAMINOPHEN 1 TABLET: 7.5; 325 TABLET ORAL at 18:26

## 2018-02-21 RX ADMIN — SACUBITRIL AND VALSARTAN 1 TABLET: 49; 51 TABLET, FILM COATED ORAL at 07:56

## 2018-02-21 RX ADMIN — METOPROLOL SUCCINATE 50 MG: 50 TABLET, FILM COATED, EXTENDED RELEASE ORAL at 07:56

## 2018-02-21 RX ADMIN — CALCIUM CARBONATE-VITAMIN D TAB 500 MG-200 UNIT 1 TABLET: 500-200 TAB at 07:56

## 2018-02-21 RX ADMIN — CALCIUM CARBONATE-VITAMIN D TAB 500 MG-200 UNIT 1 TABLET: 500-200 TAB at 21:43

## 2018-02-21 ASSESSMENT — PAIN DESCRIPTION - ORIENTATION
ORIENTATION: LOWER

## 2018-02-21 ASSESSMENT — PAIN DESCRIPTION - DESCRIPTORS
DESCRIPTORS: CRAMPING
DESCRIPTORS: ACHING
DESCRIPTORS: OTHER (COMMENT)
DESCRIPTORS: ACHING
DESCRIPTORS: ACHING

## 2018-02-21 ASSESSMENT — PAIN SCALES - GENERAL
PAINLEVEL_OUTOF10: 2
PAINLEVEL_OUTOF10: 3
PAINLEVEL_OUTOF10: 3
PAINLEVEL_OUTOF10: 2
PAINLEVEL_OUTOF10: 4
PAINLEVEL_OUTOF10: 4
PAINLEVEL_OUTOF10: 3
PAINLEVEL_OUTOF10: 2
PAINLEVEL_OUTOF10: 3
PAINLEVEL_OUTOF10: 8
PAINLEVEL_OUTOF10: 3
PAINLEVEL_OUTOF10: 4
PAINLEVEL_OUTOF10: 4
PAINLEVEL_OUTOF10: 0
PAINLEVEL_OUTOF10: 3
PAINLEVEL_OUTOF10: 0
PAINLEVEL_OUTOF10: 0

## 2018-02-21 ASSESSMENT — PAIN DESCRIPTION - LOCATION
LOCATION: BACK
LOCATION: LEG

## 2018-02-21 ASSESSMENT — PAIN DESCRIPTION - PAIN TYPE
TYPE: CHRONIC PAIN
TYPE: ACUTE PAIN
TYPE: CHRONIC PAIN

## 2018-02-21 ASSESSMENT — PAIN DESCRIPTION - FREQUENCY
FREQUENCY: CONTINUOUS
FREQUENCY: INTERMITTENT

## 2018-02-21 NOTE — PROGRESS NOTES
arthralgias, pain, decreased range of motion, muscle weakness and bone pain  NEUROLOGICAL:  positive for weakness and pain, dysphagia.   BEHAVIOR/PSYCH:  negative  10 point system review otherwise negative    Objective:  BP (!) 152/65   Pulse 60   Temp 96.8 °F (36 °C) (Oral)   Resp 18   Ht 5' 7\" (1.702 m)   Wt 225 lb 8 oz (102.3 kg)   SpO2 93%   BMI 35.32 kg/m²     awake  Orientation:   person, place, time, situation  Mood: dysthymic  Affect: calm  General appearance: in no acute distress, LEFT arm sling and +Martinez, up in chair     Memory:   grossly normal  Attention/Concentration: normal  Language:  abnormal - occasional paraphasias, decreased over interval     ROM:  abnormal - LEFT humeral fracture  Motor Exam:  Motor exam is symmetrical 5 out of 5 upper extremities bilaterally  Motor exam is symmetrical 5 out of 5 lower extremities bilaterally     Tone:  Normal LEFT arm not tested  Coordination:   Normal, LEFT arm not tested  Deep Tendon Reflexes:  Reflexes are intact and symmetrical bilaterally, LEFT arm not tested     Skin: warm and dry, no rash or erythema and ecchymoses noted on RIGHT frontal and LEFT posterior scalp and neck  Peripheral vascular: Pulses: Normal upper and lower extremity pulses; Edema: 2+     Diagnostics:   Recent Results (from the past 24 hour(s))   Urine with Reflexed Micro    Collection Time: 02/20/18  7:17 PM   Result Value Ref Range    Glucose, Ur NEGATIVE NEGATIVE mg/dl    Bilirubin Urine NEGATIVE NEGATIVE    Ketones, Urine NEGATIVE NEGATIVE    Specific Gravity, Urine 1.013 1.002 - 1.03    Blood, Urine SMALL (A) NEGATIVE    pH, UA 6.0 5.0 - 9.0    Protein, UA NEGATIVE NEGATIVE    Urobilinogen, Urine 0.2 0.0 - 1.0 eu/dl    Nitrite, Urine NEGATIVE NEGATIVE    Leukocyte Esterase, Urine LARGE (A) NEGATIVE    Color, UA YELLOW STRAW-YELL    Character, Urine CLOUDY (A) CLEAR-SL C    RBC, UA 5-10 0-2/hpf /hpf    WBC, UA >200W/CLUMPS 0-4/hpf /hpf    Epi Cells 10-15 3-5/hpf /hpf
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
## 2018-02-21 NOTE — PROGRESS NOTES
Next Session: Advanced liquid trials, complex attention   Education:  Learner:  [x]Patient          []Significant Other          []Other: son  Education provided regarding:  [x]Goals and POC   []Diet and swallowing precautions    []Home Exercise Program  [x]Progress and/or discharge information  Method of Education:  [x]Discussion          []Demonstration          []Handout          []Other  Evaluation of Education:   [x]Verbalized understanding   []Demonstrates without assistance  []Demonstrates with assistance  [x]Needs further instruction     []No evidence of learning                  [x]No family present      Plan: [x]Continue with current plan of care    []Modify current plan of care as follows:    []Discharge patient    Discharge Location:    Services/Supervision Recommended:     [x]Patient continues to require treatment by a licensed therapist to address functional deficits as outlined in the established plan of care. Neha Looney.  [address], [address]

## 2018-02-21 NOTE — PROGRESS NOTES
enter with rails  Entrance Stairs - Number of Steps: 2  Entrance Stairs - Rails: Both  Home Equipment: Standard walker     Objective:       Transfers  Sit to Stand: Contact guard assistance (From BS chair and commode. )  Stand to sit: Contact guard assistance       Ambulation 1  Surface: level tile  Device: Hemiwalker  Other Apparatus: Wheelchair follow (lumbar corset, L arm sling)  Assistance: Contact guard assistance  Quality of Gait: Decreased gordon and velocity. Short step lengths with minimal foot clearance. Slightly forward flexed posture. Pt. slightly unsteady but with no LOB. Distance: 79' x 1, 5' x 2      Balance  Comments: Pt. stood at [employer] before/after toileting to manage pants/depends. Pt. requires help with management on L side but manages R side well. Pt. slightly unsteady but with no LOB. Exercises:  Exercises  Comments: Performed seated BLE ther ex 10x each consisting of ankle pumps, glute/quad sets, marches, long arc quads, hip abd/add, and straight leg raises all to increase strength for improved functional mobility. Pt. fatigues easily and requires rest breaks throughout. Activity Tolerance:  Activity Tolerance: Patient Tolerated treatment well;Patient limited by endurance; Patient limited by pain; Patient limited by fatigue    Assessment: Body structures, Functions, Activity limitations: Decreased functional mobility , Decreased strength, Decreased endurance, Decreased balance  Assessment: Pt. tolerated session fairly well. Pt. fatigued throughout session and demos increased SOB. Pt. slightly unsteady on feet but with no LOB. Pt. would benefit from continued skilled PT to increase strength and endurance to enhance functional mobility.    Prognosis: Good  Discharge Recommendations: Continue to assess pending progress, Patient would benefit from continued therapy after discharge    Patient Education:  Patient Education: Transfers, gait, ther ex, POC    Equipment

## 2018-02-21 NOTE — PROGRESS NOTES
Family Medicine Progress Notes/Coverage    Today's Date: 2/21/18  7E-66/066-A  Medical Record # [id number]  Account # [de-identified]      Ms. Andreina Meza admitted on 2/14/2018        Subjective / Interval History :     Bathe yseterday  Back pain 2/10, LUE no pain unless she moves it  Eating fair  Reviewed notes, consults, laboratory and radiology results,    Objective:       Physical Exam:  Patient Vitals for the past 24 hrs:   BP Temp Temp src Pulse Resp SpO2 Weight   02/21/18 0748 (!) 152/65 96.8 °F (36 °C) Oral 60 18 93 % -   02/21/18 0147 - - - - - - 225 lb 8 oz (102.3 kg)   02/20/18 2015 137/69 97.8 °F (36.6 °C) Oral 74 16 97 % -   02/20/18 1200 (!) 176/75 - - 74 - 93 % -       General Appearance:  Alert, cooperative, no distress, appears stated age   HEENT:  Neck: wnl  Bruise resolving, suppe   Chest/Lungs:  Heart: CTA  RRR   Abdomen:  Back: Soft non tender   Extremities:  Neurological Exam: No edema  WNL       Assessment:     Active Hospital Problems    Diagnosis Date Noted    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 ([facility] [address] [S06.9X0A] 02/14/2018     Priority: High    Syncope and collapse [R55] 02/07/2018     Priority: High    Recurrent falls [R29.6] 02/07/2018     Priority: High    Closed fracture of shaft of left humerus [S42.302A] 02/07/2018     Priority: High    Closed head injury with concussion [S06.0X9A] 02/03/2018     Priority: High    Adjustment disorder with mixed anxiety and depressed mood [F43.23] 02/20/2018     Priority: Medium    Complicated grief [Z89.24, Z63.4] 02/20/2018     Priority: Medium    Hypertension [I10]      Priority: Medium    Closed compression fracture of first lumbar vertebra ([facility] [address] [S32.010A] 02/07/2018     Priority: Medium    Traumatic ecchymosis of right upper arm [S40.021A] 02/03/2018     Priority: Medium    Hematoma of scalp [S00.03XA] 02/03/2018     Priority: Medium    Class 2 obesity due to excess calories with serious comorbidity and body mass index (BMI) of 35.0 to 35.9 in adult [E66.09, Z68.35] 02/03/2018     Priority: Medium    Contusion of flank and back [S30.1XXA] 02/03/2018     Priority: Medium    S/P ICD (internal cardiac defibrillator) procedure [Z95.810] 01/08/2018     Priority: Medium    Nonischemic cardiomyopathy ([facility] [address] [I42.8]      Priority: Medium    COPD (chronic obstructive pulmonary disease) ([facility] [address] [J44.9] 08/05/2017     Priority: Medium    Obstructive sleep apnea on CPAP [G47.33, Z99.89] 11/05/2015     Priority: Medium    CAD (coronary artery disease) s/p PCI and stent Multilink 3 x 18  mm mid LAD- in 07/2009 at [facility] [I25.10] 07/16/2012     Priority: Medium       Plan:     Discussed plans with the nursing staff  Continue PT/OT    Medications, Laboratories and Imaging results:    Scheduled Meds:   docusate sodium  100 mg Oral BID    polyethylene glycol  17 g Oral Daily    senna  2 tablet Oral Nightly    HYDROcodone-acetaminophen  1 tablet Oral 4 times per day    And    traMADol  50 mg Oral 4 times per day    naloxegol  25 mg Oral QAM    tiZANidine  1 mg Oral 3 times per day    valsartan  40 mg Oral Daily    famotidine  20 mg Oral BID    aspirin  81 mg Oral Daily    calcium-vitamin D  1 tablet Oral BID    metoprolol succinate  50 mg Oral Daily    sacubitril-valsartan  1 tablet Oral BID    simvastatin  40 mg Oral Nightly    lidocaine  1 patch Transdermal Daily     Continuous Infusions:   PRN Meds:acetaminophen, sodium chloride flush, magnesium hydroxide    Imaging:    Lab Review :    Lab Results   Component Value Date    WBC 9.8 02/16/2018    HGB 12.1 02/16/2018    HCT 35.7 (L) 02/16/2018    MCV 91.9 02/16/2018     02/16/2018     Lab Results   Component Value Date    CREATININE 0.3 (L) 02/16/2018    BUN 19 02/16/2018     02/16/2018    K 4.3 02/16/2018    CL 99 02/16/2018    CO2

## 2018-02-21 NOTE — PROGRESS NOTES
[address]    TIME   SLP Individual Minutes  Time In: 1100  Time Out: 1130  Minutes: 30  Timed Code Treatment Minutes: 30 Minutes          [x]Daily Note  []Progress Note  []Discharge Note    Date: 2018  Patient Name: Juan Whitehead        MRN: [id number]    : 1940  (77 y.o.)  Gender: female   Primary Provider: Monroe Ding MD  Admitting Diagnosis: TBI  Secondary Diagnosis: Cognitive deficits, dysphagia  Precautions: Fall risk  Swallowing Status/Diet: Regular diet with nectar thick liquids  Swallowing Strategies: Full upright position, no straw, chin tuck, pulmonary monitoring, alternate solid/liquids, small bite/sip, limit distractions  DATE of last MBS:  18  Pain:  2/10 in back    Subjective: Pt seen sitting upright in chair, agreeable to therapy. No family present. SHORT TERM GOAL #1:  Goal 1: Pt will complete working memory and delayed recall  tasks w/ 70% accuracy and mod cues in order to improve retention of functional and novel information. INTERVENTIONS: Recall of information related to 2 appointments:  -Immediate recall-  independently  -Delayed recall- / independently, / with mod cues to review written notes    SHORT TERM GOAL #2:  Goal 2: Pt will complete thought organization and verbal/visual reasoning tasks (including finance and medication management) w/ 70% accuracy and mod cues in order to improve ability to ensure safety and perform ADLs. INTERVENTIONS: Identifying target information on a billing statement- 9/10 independently, /10 with mod cues. -Increased time needed for scanning. SHORT TERM GOAL #3:  Goal 3: Patient will complete selective and alternating attention and tasks w/ 70% accuracy and mod cues in order to improve overall functioning. INTERVENTIONS:Addressed selective attention via single digit cancellation- 100% x2 trials.     Divided attention (simulatenous completion of
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
## 2018-02-21 NOTE — PROGRESS NOTES
Recommendations:  Equipment Needed: Yes  Other: Cont to assess    Safety:  Type of devices: All fall risk precautions in place, Call light within reach, Chair alarm in place, Gait belt, Patient at risk for falls, Left in chair    Plan:  Times per week: 5x/ week 90 min, 1x/ week 30 min  Specific instructions for Next Treatment: bed mobility, transfers, gait, balance, therex  Current Treatment Recommendations: Strengthening, ROM, Endurance Training, Functional Mobility Training, Pain Management, Equipment Evaluation, Education, & procurement, Patient/Caregiver Education & Training, Safety Education & Training, Home Exercise Program, Balance Training, Transfer Training, Gait Training    Goals:  Patient goals : Goal to return home and move better    Short term goals  Time Frame for Short term goals: 1 week  Short term goal 1: Pt will perform bed mobility min A to get in and out of bed.    Short term goal 2: Pt will perform sit<>stand SBA to hemiwalker to get up from bed  Short term goal 3: Pt will amb 48' CGA with hemiwalker for household mobility  Short term goal 4: Pt will perform car transfer mod A for transportation needs    Long term goals  Time Frame for Long term goals : 3 weeks  Long term goal 1: Pt will perform bed mobility mod I to get in and out of bed  Long term goal 2: Pt will perform sit<>stand mod I to hemiwalker to get up from bed  Long term goal 3: Pt will amb 150' mod I with hemiwalker for community mobility  Long term goal 4: Pt will perform car transfer with supervision for transportation needs  Long term goal 5: Pt will negotiate 2+ steps with bilat HR and supervision to enter home

## 2018-02-22 PROBLEM — N30.00 ACUTE CYSTITIS: Status: ACTIVE | Noted: 2018-02-22

## 2018-02-22 LAB
ORGANISM: ABNORMAL
URINE CULTURE REFLEX: ABNORMAL

## 2018-02-22 PROCEDURE — 1180000000 HC REHAB R&B

## 2018-02-22 PROCEDURE — 97116 GAIT TRAINING THERAPY: CPT

## 2018-02-22 PROCEDURE — G0515 COGNITIVE SKILLS DEVELOPMENT: HCPCS | Performed by: SPEECH-LANGUAGE PATHOLOGIST

## 2018-02-22 PROCEDURE — 97530 THERAPEUTIC ACTIVITIES: CPT

## 2018-02-22 PROCEDURE — 97110 THERAPEUTIC EXERCISES: CPT

## 2018-02-22 PROCEDURE — 6370000000 HC RX 637 (ALT 250 FOR IP): Performed by: EMERGENCY MEDICINE

## 2018-02-22 PROCEDURE — 6370000000 HC RX 637 (ALT 250 FOR IP): Performed by: PHYSICAL MEDICINE & REHABILITATION

## 2018-02-22 PROCEDURE — 90832 PSYTX W PT 30 MINUTES: CPT | Performed by: PSYCHOLOGIST

## 2018-02-22 PROCEDURE — 97535 SELF CARE MNGMENT TRAINING: CPT

## 2018-02-22 RX ORDER — SULFAMETHOXAZOLE AND TRIMETHOPRIM 800; 160 MG/1; MG/1
1 TABLET ORAL EVERY 12 HOURS SCHEDULED
Status: DISCONTINUED | OUTPATIENT
Start: 2018-02-22 | End: 2018-02-22

## 2018-02-22 RX ORDER — HYDRALAZINE HYDROCHLORIDE 25 MG/1
25 TABLET, FILM COATED ORAL ONCE
Status: COMPLETED | OUTPATIENT
Start: 2018-02-22 | End: 2018-02-22

## 2018-02-22 RX ADMIN — TRAMADOL HYDROCHLORIDE 50 MG: 50 TABLET, COATED ORAL at 03:05

## 2018-02-22 RX ADMIN — VALSARTAN 40 MG: 80 TABLET ORAL at 08:49

## 2018-02-22 RX ADMIN — METOPROLOL SUCCINATE 50 MG: 50 TABLET, FILM COATED, EXTENDED RELEASE ORAL at 08:48

## 2018-02-22 RX ADMIN — HYDROCODONE BITARTRATE AND ACETAMINOPHEN 1 TABLET: 7.5; 325 TABLET ORAL at 00:44

## 2018-02-22 RX ADMIN — CALCIUM CARBONATE-VITAMIN D TAB 500 MG-200 UNIT 1 TABLET: 500-200 TAB at 22:24

## 2018-02-22 RX ADMIN — HYDROCODONE BITARTRATE AND ACETAMINOPHEN 1 TABLET: 7.5; 325 TABLET ORAL at 05:37

## 2018-02-22 RX ADMIN — HYDROCODONE BITARTRATE AND ACETAMINOPHEN 1 TABLET: 7.5; 325 TABLET ORAL at 23:16

## 2018-02-22 RX ADMIN — HYDROCODONE BITARTRATE AND ACETAMINOPHEN 1 TABLET: 7.5; 325 TABLET ORAL at 12:43

## 2018-02-22 RX ADMIN — HYDRALAZINE HYDROCHLORIDE 25 MG: 25 TABLET, FILM COATED ORAL at 23:16

## 2018-02-22 RX ADMIN — SACUBITRIL AND VALSARTAN 1 TABLET: 49; 51 TABLET, FILM COATED ORAL at 08:49

## 2018-02-22 RX ADMIN — TRAMADOL HYDROCHLORIDE 50 MG: 50 TABLET, COATED ORAL at 08:51

## 2018-02-22 RX ADMIN — TIZANIDINE 1 MG: 4 TABLET ORAL at 22:24

## 2018-02-22 RX ADMIN — HYDROCODONE BITARTRATE AND ACETAMINOPHEN 1 TABLET: 7.5; 325 TABLET ORAL at 17:43

## 2018-02-22 RX ADMIN — SIMVASTATIN 40 MG: 40 TABLET, FILM COATED ORAL at 22:24

## 2018-02-22 RX ADMIN — TIZANIDINE 1 MG: 4 TABLET ORAL at 05:37

## 2018-02-22 RX ADMIN — FAMOTIDINE 20 MG: 20 TABLET, FILM COATED ORAL at 08:49

## 2018-02-22 RX ADMIN — TRAMADOL HYDROCHLORIDE 50 MG: 50 TABLET, COATED ORAL at 22:27

## 2018-02-22 RX ADMIN — NALOXEGOL OXALATE 25 MG: 25 TABLET, FILM COATED ORAL at 08:48

## 2018-02-22 RX ADMIN — CALCIUM CARBONATE-VITAMIN D TAB 500 MG-200 UNIT 1 TABLET: 500-200 TAB at 08:48

## 2018-02-22 RX ADMIN — SACUBITRIL AND VALSARTAN 1 TABLET: 49; 51 TABLET, FILM COATED ORAL at 22:22

## 2018-02-22 RX ADMIN — FAMOTIDINE 20 MG: 20 TABLET, FILM COATED ORAL at 22:24

## 2018-02-22 RX ADMIN — TRAMADOL HYDROCHLORIDE 50 MG: 50 TABLET, COATED ORAL at 15:38

## 2018-02-22 RX ADMIN — ASPIRIN 81 MG: 81 TABLET ORAL at 08:48

## 2018-02-22 ASSESSMENT — PAIN SCALES - GENERAL
PAINLEVEL_OUTOF10: 0
PAINLEVEL_OUTOF10: 3
PAINLEVEL_OUTOF10: 3
PAINLEVEL_OUTOF10: 2
PAINLEVEL_OUTOF10: 3
PAINLEVEL_OUTOF10: 2
PAINLEVEL_OUTOF10: 2
PAINLEVEL_OUTOF10: 5
PAINLEVEL_OUTOF10: 1
PAINLEVEL_OUTOF10: 5
PAINLEVEL_OUTOF10: 0
PAINLEVEL_OUTOF10: 6
PAINLEVEL_OUTOF10: 0
PAINLEVEL_OUTOF10: 7
PAINLEVEL_OUTOF10: 3

## 2018-02-22 ASSESSMENT — PAIN DESCRIPTION - LOCATION: LOCATION: BACK
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
## 2018-02-22 NOTE — PROGRESS NOTES
[address] [address]  INPATIENT SPEECH THERAPY  [facility] [place]   SLP Individual Minutes  Time In: 0930  Time Out: 1000  Minutes: 30  Timed Code Treatment Minutes: 30 Minutes          [x]Daily Note  []Progress Note  []Discharge Note    Date: 2018  Patient Name: Ramez Apple        MRN: [id number]    : 1940  (77 y.o.)  Gender: female   Primary Provider: Barb Golden MD  Admitting Diagnosis: TBI  Secondary Diagnosis: Cognitive deficits, dysphagia  Precautions: Fall risk  Swallowing Status/Diet: Regular diet with nectar thick liquids  Swallowing Strategies: Full upright position, no straw, chin tuck, pulmonary monitoring, alternate solid/liquids, small bite/sip, limit distractions  DATE of last MBS:  18  Pain:  3/10 in back, improved with re-positioning     Subjective: Pt seen sitting upright in recliner, motivated and attentive throughout treatment session. SHORT TERM GOAL #1:  Goal 1: Pt will complete working memory and delayed recall  tasks w/ 70% accuracy and mod cues in order to improve retention of functional and novel information. INTERVENTIONS: Working memory (Identifying item that did not belong from auditory field of 4)- 9/10 independently, 1/10 with min cues. Immediate recall of information regarding a weather report- 7/10 independently, 1/10 with min cues, 2/10 incorrect with need to review written article to obtain correct information. *Pt continues to demonstrate difficulty retaining multiple pieces of information presented orally, or within written context. SHORT TERM GOAL #2:  Goal 2: Pt will complete thought organization and verbal/visual reasoning tasks (including finance and medication management) w/ 70% accuracy and mod cues in order to improve ability to ensure safety and perform ADLs. INTERVENTIONS: Check writing task- 6/6 independently, x1 errors with spelling, wrote \"four\" for 'forty' but was able to self correct.    Checkbook organization task- 12/12 independently with x1 self correction. All information accurately transcribed. Math computation for checkbook balance- 3/3 independently, x1 prompting need for reassurance, but patient had computed information correctly. *Significant improvement when compared to performance last week with same task. SHORT TERM GOAL #3:  Goal 3: Patient will complete selective and alternating attention and tasks w/ 70% accuracy and mod cues in order to improve overall functioning. INTERVENTIONS:Did not address due to focus on other goals. SHORT TERM GOAL #4:  Goal 4: Pt will safely tolerate regular and nectar thick diet given compensatory swallowing strategies from trained staff/caregivers to maximize nutrition/hydation measures. INTERVENTIONS:  Completed the following pharyngeal swallow exercises to maximize airway protection during swallow:  -Effortful swallow- 2 sets of 10 with good success  -Suzanne swallow- 2 sets of 5 with good success    SHORT TERM GOAL #5:  Goal 5: Pt will safely tolerate advanced liquid trials x10 utilizing strategy of CHIN TUCK to permit potential advancement to least restrictive diet. INTERVENTIONS: Completed diet texture analysis with thin liquids x10. Cueing provided for oral bolus holding prior to swallow initiation, as well as for taking small sips. Pt able to follow directives well. No overt difficulty or s/s of aspiration observed. Discussed refraining from use of straws. Pt expressed understanding. Will continue to complete additional trials with thin liquids prior to diet advancement. Long-term Goals  Timeframe for Long-term Goals: 3 weeks  Goal 1: Pt will improve cognitive skills to a supervision/modified independent level to maximize level of independence for return to independent living in home environment.  ONGOING  Goal 2: Pt will safely tolerate least restrictive diet 98% of the time given compensatory swallowing strategies from trained staff/caregivers to maximize nutrition/hydration measures. ONGOING      Communication  Comprehension: 5 - Patient understands basic needs (hungry/hot/pain)  Expression: 5 - Expresses basic ideas/needs only (hungry/hot/pain)  Social Cognition  Social Interaction: 5 - Patient is appropriate with supervision/cues  Problem Solvin - Patient solves simple/routine tasks 75-90%+   Memory: 4 - Patient remembers 75-90%+ of the time    ASSESSMENT:  Assessment: [x]Progressing towards goals          []Not Progressing towards goals    Patient Tolerance of Treatment:   [x]Tolerated well []Tolerated fair []Required rest breaks []Fatigued  Plan for Next Session: Advanced liquid trials, complex attention   Education:  Learner:  [x]Patient          []Significant Other          []Other: son  Education provided regarding:  [x]Goals and POC   []Diet and swallowing precautions    []Home Exercise Program  [x]Progress and/or discharge information  Method of Education:  [x]Discussion          []Demonstration          []Handout          []Other  Evaluation of Education:   [x]Verbalized understanding   []Demonstrates without assistance  []Demonstrates with assistance  [x]Needs further instruction     []No evidence of learning                  [x]No family present      Plan: [x]Continue with current plan of care    []Modify current plan of care as follows:    []Discharge patient    Discharge Location:    Services/Supervision Recommended:     [x]Patient continues to require treatment by a licensed therapist to address functional deficits as outlined in the established plan of care. Angel Lara.  [address] [facility], [address]

## 2018-02-22 NOTE — PROGRESS NOTES
Dressing-Lower: 1 - Total assist with lower body dressing (due to fatigue)                                                                               Toiletin - Able to perform 1 task only (e.g. hygiene) (able to complete hygiene care, assist for clothing management)                                                                               Toilet Transfer: 4 - Requires steadying assistance only < 25% assist (from [facility])                                                                                                                                                          Shower Transfer: 4 - Minimal contact assistance, pt. expends 75% or more effort (from tub bench)                                                                                                                                                              Activity Tolerance:  Activity Tolerance: Patient Tolerated treatment well;Patient limited by fatigue  Activity Tolerance: Pt fatigued easily with activity and requires frequent rest breaks    Assessment:     Performance deficits / Impairments: Decreased functional mobility , Decreased ADL status, Decreased endurance, Decreased balance, Decreased safe awareness, Decreased cognition, Decreased strength  Prognosis: Fair  Discharge Recommendations: Patient would benefit from continued therapy after discharge    Patient Education:  Patient Education: LEE coyle, role of cyrotherapy     Equipment Recommendations: Other: Continue to assess pending progress    Safety:  Safety Devices in place: Yes  Type of devices:  All fall risk precautions in place, Call light within reach, Left in chair, Chair alarm in place, Nurse notified, Patient at risk for falls, Gait belt    Plan:  Times per week: 5x/wk for 90 min and 1x/wk for 30 min    Current Treatment Recommendations: Balance Training, Functional Mobility Training, Endurance Training, Self-Care / ADL, Safety Education & Training, Patient/Caregiver Education & Training, Equipment Evaluation, Education, & procurement, Cognitive Reorientation    Goals:  Patient goals : \" Go to the bathroom, walk around, take care of myself \"    Short term goals  Time Frame for Short term goals: 1 week   Short term goal 1: Pt will recall zuleyka dressing technique to don shirt with max assist consistently and min vcs to improve indep with daily dressing tasks. Short term goal 2: Pt will tolerate 4 min standing tasks while release RUE with CGA consistently with no cues for spinal precautions for ease of sinkside grooming. Short term goal 3: Pt will complete LB dressing tasks with mod A using LHAE with mod vcs for spinal precautions for ease of self care. Short term goal 4: Pt will ambulate to [facility] with CGA of 1 using hemiwalker to improve indep with toileting in bathroom. Short term goal 5: Pt will complete complete LUE distal exs and RUE light strengthening task x10 reps to improve distal strength to 4+/5. Long term goals  Time Frame for Long term goals : 3 weeks  Long term goal 1: Pt will don UB dressing tasks with Min A and min vcs for NWBing for ease of dressing tasks. Long term goal 2: Pt will complete grooming tasks standing at sink x4 minutes with SBA to improve indep with self care tasks.
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
## 2018-02-22 NOTE — PROGRESS NOTES
Priority: Medium    Hematoma of scalp [S00.03XA] 02/03/2018     Priority: Medium    Class 2 obesity due to excess calories with serious comorbidity and body mass index (BMI) of 35.0 to 35.9 in adult [E66.09, Z68.35] 02/03/2018     Priority: Medium    Contusion of flank and back [S30.1XXA] 02/03/2018     Priority: Medium    S/P ICD (internal cardiac defibrillator) procedure [Z95.810] 01/08/2018     Priority: Medium    Nonischemic cardiomyopathy ([facility] [address] [I42.8]      Priority: Medium    COPD (chronic obstructive pulmonary disease) ([facility] [address] [J44.9] 08/05/2017     Priority: Medium    Obstructive sleep apnea on CPAP [G47.33, Z99.89] 11/05/2015     Priority: Medium    CAD (coronary artery disease) s/p PCI and stent Multilink 3 x 18  mm mid LAD- in 07/2009 at [facility] [I25.10] 07/16/2012     Priority: Medium       Plan:     Discussed plans with the nursing staff  Fosfomycin been started  Continue PT/OT    Medications, Laboratories and Imaging results:    Scheduled Meds:   fosfomycin tromethamine  3 g Oral Q3 Days    HYDROcodone-acetaminophen  1 tablet Oral 4 times per day    And    traMADol  50 mg Oral 4 times per day    naloxegol  25 mg Oral QAM    tiZANidine  1 mg Oral 3 times per day    valsartan  40 mg Oral Daily    famotidine  20 mg Oral BID    aspirin  81 mg Oral Daily    calcium-vitamin D  1 tablet Oral BID    metoprolol succinate  50 mg Oral Daily    sacubitril-valsartan  1 tablet Oral BID    simvastatin  40 mg Oral Nightly    lidocaine  1 patch Transdermal Daily     Continuous Infusions:   PRN Meds:docusate sodium, polyethylene glycol, senna, acetaminophen, sodium chloride flush, magnesium hydroxide    Imaging:    Lab Review :    Lab Results   Component Value Date    WBC 9.8 02/16/2018    HGB 12.1 02/16/2018    HCT 35.7 (L) 02/16/2018    MCV 91.9 02/16/2018     02/16/2018     Lab Results   Component Value Date    CREATININE 0.3 (L) 02/16/2018    BUN 19 02/16/2018     02/16/2018    K 4.3 02/16/2018    CL 99 02/16/2018    CO2 27 02/16/2018     Lab Results   Component Value Date    CKTOTAL 26 (L) 04/22/2013    TROPONINI <0.006 07/16/2012     Lab Results   Component Value Date    ALT 8 (L) 02/16/2018    AST 19 02/16/2018    ALKPHOS 90 02/16/2018    BILITOT 0.9 02/16/2018     Lab Results   Component Value Date    LIPASE 15.6 08/05/2017         Electronically signed by Bao Carvajal MD on 2/22/18 at 7:31 Jaden Cano M.D.
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
## 2018-02-22 NOTE — PROGRESS NOTES
[address] [address]  INPATIENT SPEECH THERAPY  [facility]   SLP Individual Minutes  Time In: 1400  Time Out: 1430  Minutes: 30  Timed Code Treatment Minutes: 30 Minutes          [x]Daily Note  []Progress Note  []Discharge Note    Date: 2018  Patient Name: Tyrel Salcedo        MRN: [id number]    : 1940  (77 y.o.)  Gender: female   Primary Provider: Talisha Amado MD  Admitting Diagnosis: TBI  Secondary Diagnosis: Cognitive deficits, dysphagia  Precautions: Fall risk  Swallowing Status/Diet: Regular diet with nectar thick liquids  Swallowing Strategies: Full upright position, no straw, chin tuck, pulmonary monitoring, alternate solid/liquids, small bite/sip, limit distractions  DATE of last MBS:  18  Pain:  3/10 in back, improved with re-positioning     Subjective: Pt pleasant and attentive, agreeable to treatment. No visitors present. SHORT TERM GOAL #1:  Goal 1: Pt will complete working memory and delayed recall  tasks w/ 70% accuracy and mod cues in order to improve retention of functional and novel information. INTERVENTIONS: Working memory (Category inclusion with 4 unit list)- 2/5 independently, 1/5 with self correction, 1/5 with min cues, 1/5 with mod cues. *Performance waned with successive attempts. *Benefited from cues to repeat word list prior to determining inclusive items. SHORT TERM GOAL #2:  Goal 2: Pt will complete thought organization and verbal/visual reasoning tasks (including finance and medication management) w/ 70% accuracy and mod cues in order to improve ability to ensure safety and perform ADLs. INTERVENTIONS: Deductive puzzle (organizing items in a closet)- Min-mod cues required to complete. *Good deductive thinking noted  *Required cueing for use of organizational strategies to assist with determining items already completed.   *Errors related to omitting portion of prompts (patient would complete initial portion of Progressing towards goals    Patient Tolerance of Treatment:   [x]Tolerated well []Tolerated fair []Required rest breaks []Fatigued  Plan for Next Session: Advanced liquid trials, Pharyngeal strengthening  Education:  Learner:  [x]Patient          []Significant Other          []Other: son  Education provided regarding:  [x]Goals and POC   []Diet and swallowing precautions    []Home Exercise Program  []Progress and/or discharge information  Method of Education:  [x]Discussion          []Demonstration          []Handout          []Other  Evaluation of Education:   [x]Verbalized understanding   []Demonstrates without assistance  []Demonstrates with assistance  [x]Needs further instruction     []No evidence of learning                  [x]No family present      Plan: [x]Continue with current plan of care    []Modify current plan of care as follows:    []Discharge patient    Discharge Location:    Services/Supervision Recommended:     [x]Patient continues to require treatment by a licensed therapist to address functional deficits as outlined in the established plan of care. Roselia Zepeda.  [address] [facility], [address]

## 2018-02-22 NOTE — PLAN OF CARE
Problem: Pain Control  Goal: Maintain pain level at or below patient's acceptable level (or 5 if patient is unable to determine acceptable level)  Outcome: Ongoing  Has prescribed pain medication per provider order that is scheduled around the clock. Has chronic back pain. Problem: Bleeding:  Goal: Will show no signs and symptoms of excessive bleeding  Will show no signs and symptoms of excessive bleeding   Outcome: Ongoing  No signs or symptoms of bleeding observed by this nurse or indicated via labs. DVT prophylaxis       Problem: Mobility - Impaired:  Goal: Able to ambulate with minimal assistance  Able to ambulate with minimal assistance   Outcome: Ongoing  Moves with assist of one with gait belt and walker. Problem: Bowel Function - Altered:  Goal: Bowel elimination is within specified parameters  Bowel elimination is within specified parameters   Outcome: Met This Shift  See last bowel movement. Problem: Self-Care Deficit:  Goal: Able to perform ADL with assistance  Able to perform ADL with assistance   Outcome: Ongoing      Problem: Falls - Risk of  Goal: Absence of falls  Outcome: Ongoing  Patient was free from falls this shift. Is on falling star program, staff members hourly rounding. Bed and chair alarms on at all times. Problem: Infection - Surgical Site:  Goal: Will show no infection signs and symptoms  Will show no infection signs and symptoms   Outcome: Ongoing  Patient shows no signs or symptoms of infection. Problem: Skin Integrity/Risk  Goal: No skin breakdown during hospitalization  Outcome: Ongoing  Skin warm and dry, intact. No new skin breakdown during hospital stay. Problem: Nutrition  Goal: Optimal nutrition therapy  Outcome: Ongoing  See intake. Has inadequate intake. See percentages in flow sheet. Comments: Care plan reviewed with patient and family. Patient and family verbalize understanding of the plan of care and contribute to goal setting.

## 2018-02-23 PROCEDURE — 6370000000 HC RX 637 (ALT 250 FOR IP): Performed by: PHYSICAL MEDICINE & REHABILITATION

## 2018-02-23 PROCEDURE — 97116 GAIT TRAINING THERAPY: CPT

## 2018-02-23 PROCEDURE — 97535 SELF CARE MNGMENT TRAINING: CPT

## 2018-02-23 PROCEDURE — 92526 ORAL FUNCTION THERAPY: CPT | Performed by: SPEECH-LANGUAGE PATHOLOGIST

## 2018-02-23 PROCEDURE — 97530 THERAPEUTIC ACTIVITIES: CPT

## 2018-02-23 PROCEDURE — 97110 THERAPEUTIC EXERCISES: CPT

## 2018-02-23 PROCEDURE — 6370000000 HC RX 637 (ALT 250 FOR IP): Performed by: EMERGENCY MEDICINE

## 2018-02-23 PROCEDURE — G0515 COGNITIVE SKILLS DEVELOPMENT: HCPCS | Performed by: SPEECH-LANGUAGE PATHOLOGIST

## 2018-02-23 PROCEDURE — 1180000000 HC REHAB R&B

## 2018-02-23 RX ORDER — HYDRALAZINE HYDROCHLORIDE 25 MG/1
25 TABLET, FILM COATED ORAL EVERY 6 HOURS PRN
Status: DISCONTINUED | OUTPATIENT
Start: 2018-02-23 | End: 2018-03-05

## 2018-02-23 RX ORDER — HYDRALAZINE HYDROCHLORIDE 25 MG/1
25 TABLET, FILM COATED ORAL ONCE
Status: COMPLETED | OUTPATIENT
Start: 2018-02-23 | End: 2018-02-23

## 2018-02-23 RX ORDER — TIZANIDINE 4 MG/1
1 TABLET ORAL EVERY 8 HOURS PRN
Status: DISCONTINUED | OUTPATIENT
Start: 2018-02-23 | End: 2018-03-05

## 2018-02-23 RX ADMIN — HYDROCODONE BITARTRATE AND ACETAMINOPHEN 1 TABLET: 7.5; 325 TABLET ORAL at 17:31

## 2018-02-23 RX ADMIN — TRAMADOL HYDROCHLORIDE 50 MG: 50 TABLET, COATED ORAL at 09:39

## 2018-02-23 RX ADMIN — CALCIUM CARBONATE-VITAMIN D TAB 500 MG-200 UNIT 1 TABLET: 500-200 TAB at 09:37

## 2018-02-23 RX ADMIN — ASPIRIN 81 MG: 81 TABLET ORAL at 09:37

## 2018-02-23 RX ADMIN — HYDROCODONE BITARTRATE AND ACETAMINOPHEN 1 TABLET: 7.5; 325 TABLET ORAL at 05:58

## 2018-02-23 RX ADMIN — SACUBITRIL AND VALSARTAN 1 TABLET: 49; 51 TABLET, FILM COATED ORAL at 20:52

## 2018-02-23 RX ADMIN — HYDRALAZINE HYDROCHLORIDE 25 MG: 25 TABLET, FILM COATED ORAL at 20:52

## 2018-02-23 RX ADMIN — SIMVASTATIN 40 MG: 40 TABLET, FILM COATED ORAL at 20:52

## 2018-02-23 RX ADMIN — SACUBITRIL AND VALSARTAN 1 TABLET: 49; 51 TABLET, FILM COATED ORAL at 09:39

## 2018-02-23 RX ADMIN — TIZANIDINE 1 MG: 4 TABLET ORAL at 06:01

## 2018-02-23 RX ADMIN — FAMOTIDINE 20 MG: 20 TABLET, FILM COATED ORAL at 09:38

## 2018-02-23 RX ADMIN — NALOXEGOL OXALATE 12.5 MG: 12.5 TABLET, FILM COATED ORAL at 09:38

## 2018-02-23 RX ADMIN — CALCIUM CARBONATE-VITAMIN D TAB 500 MG-200 UNIT 1 TABLET: 500-200 TAB at 20:52

## 2018-02-23 RX ADMIN — TRAMADOL HYDROCHLORIDE 50 MG: 50 TABLET, COATED ORAL at 15:17

## 2018-02-23 RX ADMIN — FAMOTIDINE 20 MG: 20 TABLET, FILM COATED ORAL at 20:52

## 2018-02-23 RX ADMIN — TRAMADOL HYDROCHLORIDE 50 MG: 50 TABLET, COATED ORAL at 03:59

## 2018-02-23 RX ADMIN — HYDROCODONE BITARTRATE AND ACETAMINOPHEN 1 TABLET: 7.5; 325 TABLET ORAL at 12:58

## 2018-02-23 RX ADMIN — METOPROLOL SUCCINATE 50 MG: 50 TABLET, FILM COATED, EXTENDED RELEASE ORAL at 09:37

## 2018-02-23 RX ADMIN — VALSARTAN 40 MG: 80 TABLET ORAL at 09:37

## 2018-02-23 RX ADMIN — TIZANIDINE 1 MG: 4 TABLET ORAL at 15:17

## 2018-02-23 ASSESSMENT — PAIN SCALES - GENERAL
PAINLEVEL_OUTOF10: 2
PAINLEVEL_OUTOF10: 1
PAINLEVEL_OUTOF10: 2
PAINLEVEL_OUTOF10: 2
PAINLEVEL_OUTOF10: 4
PAINLEVEL_OUTOF10: 3
PAINLEVEL_OUTOF10: 3
PAINLEVEL_OUTOF10: 2
PAINLEVEL_OUTOF10: 3
PAINLEVEL_OUTOF10: 2
PAINLEVEL_OUTOF10: 5
PAINLEVEL_OUTOF10: 0
PAINLEVEL_OUTOF10: 5

## 2018-02-23 ASSESSMENT — PAIN DESCRIPTION - ORIENTATION
ORIENTATION: LEFT
ORIENTATION: LEFT

## 2018-02-23 ASSESSMENT — PAIN DESCRIPTION - LOCATION
LOCATION: ARM;BACK
LOCATION: ARM
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
## 2018-02-23 NOTE — PROGRESS NOTES
02/16/2018    MCV 91.9 02/16/2018     02/16/2018     Lab Results   Component Value Date    CREATININE 0.3 (L) 02/16/2018    BUN 19 02/16/2018     02/16/2018    K 4.3 02/16/2018    CL 99 02/16/2018    CO2 27 02/16/2018     Lab Results   Component Value Date    CKTOTAL 26 (L) 04/22/2013    TROPONINI <0.006 07/16/2012     Lab Results   Component Value Date    ALT 8 (L) 02/16/2018    AST 19 02/16/2018    ALKPHOS 90 02/16/2018    BILITOT 0.9 02/16/2018     Lab Results   Component Value Date    LIPASE 15.6 08/05/2017         Electronically signed by Hermann Pal MD on 2/23/18 at 11:19 AM                                                                                           Kimberly Hernandez M.D.

## 2018-02-23 NOTE — PROGRESS NOTES
25% of locomotion effort  Distance Traveled in Wheel Chair: 25 feet   Stairs: 0 - Activity Does not Occur ( 0 only for the admission assessment), PT Equipment Recommendations  Equipment Needed: Yes  Other: Cont to assess, Assessment: Pt progressed to ambulation with straight cane today, limited distance due to back pain and overall fatigue in standing position. Discussed positions on comfort to alleviate back pain. Occupational therapy: FIMS:  Eatin - Feeds self with setup/supervision/cues and/or requires only setup/supervision/cues to perform tube feedings  Groomin - Requires setup/cues to do all tasks (seated in chair to comb hair)  Bathin - Able to bathe 3-4 areas (seated on tub bench in shower)  Dressing-Upper: 2 - Requires assist with 3 tasks (doffing and donning robe x 1 trial and button down shirt x 2 trials with max cues needed to prevent pt from actively moving L shoulder)  Dressing-Lower: 1 - Total assist with lower body dressing (due to fatigue)  Toiletin - Able to perform 1 task only (e.g. hygiene) (able to complete hygiene care, assist for clothing management)  Toilet Transfer: 4 - Requires steadying assistance only < 25% assist (from [facility])  Shower Transfer: 4 - Minimal contact assistance, pt. expends 75% or more effort (from tub bench),  , Assessment: Patient is making good progress towards OT goals. She has progressed to requiring CGA for functional transfers. She has improved her standing endurance x 4 min during OT activities. She requires max A for UB ADLs and mod A for LB ADLs. She is limited by her fatigue and by her anxious behaviors and fear of falling. She would greatly benefit from additional skilled OT services to progress to a more independent level with ADLs & IADLs, to improve s tanding endurance & balance all to decrease fall risk and risk of future injury, and continue with her shoulder protocol on L UE to improve ROM for UB ADLs.      Speech therapy: FIMS: Comprehension: 5 - Patient understands basic needs (hungry/hot/pain)  Expression: 6 - Device used to express complex ideas/needs  Social Interaction: 5 - Patient is appropriate with supervision/cues  Problem Solvin - Patient solves simple/routine tasks 75-90%+   Memory: 5 - Patient requires prompting with stress/unfamiliar situations    DIAGNOSTIC IMPRESSIONS:  Pt presents with oral phase that is essentially WFL, mild pharyngeal dysphagia as outlined by findings above. Appropriate oral initiation with adequate labial seal on utensil and cup for transferring bolus to oral cavity. Min deficits related to cohesive bolus formation and manipulation, resulting in delayed AP transit. Min oral residuals spread diffusely throughout cavity, clearance of stasis upon liquid assist. Min premature spillage to the level of the level of the valleculae. Decreased hyolaryngeal elevation and anterior excursion, resulting in incomplete epiglottic inversion and UES opening. Consistent laryngeal penetration of thin liquids via cup with multiple trials presented; pt NOT able to sense material at the level of the vocal folds and required cues to cough and re-swallow for clearance. Improved safe toleration of thin liquids utilizing strategy of chin tuck to maximize airway protection, however, decreased coordination and timeliness for strategy to be utilized independently. No observations of aspiration evidenced within this study. Recommend diet level of regular solids with nectar thick liquids at this time. Anticipate diet advancement to thin liquids given instruction/strategy training for usage of chin tuck to promote generalization across all settings.      Review of Systems:  CONSTITUTIONAL:  positive for fatigue  EYES:  negative  HEENT:  negative  RESPIRATORY:  negative  CARDIOVASCULAR:  positive for  dyspnea, fatigue  GASTROINTESTINAL:  negative  GENITOURINARY:  negative  SKIN:  bruising  HEMATOLOGIC/LYMPHATIC:
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
## 2018-02-23 NOTE — PROGRESS NOTES
w/ 70% accuracy and mod cues in order to improve ability to ensure safety and perform ADLs. INTERVENTIONS: Initiated a complex checkbook organization task involving ordering of information chronologically by date with inclusion of all pertinent information. Pt required min cues to initiate task, as well as prompts to remember to utilize organizational strategies. Remained of task left for patient to complete during break or next treatment session. SHORT TERM GOAL #3:  Goal 3: Patient will complete selective and alternating attention and tasks w/ 70% accuracy and mod cues in order to improve overall functioning. INTERVENTIONS:Divided attention (sorting cards by suit while simultaneously answering moderate level yes/no questions):  -Yes/no questions- 19/20 independently   -Sorting card- 100%  *Self correction noted x1   Overall good success with task. SHORT TERM GOAL #4:  Goal 4: Pt will safely tolerate regular and nectar thick diet given compensatory swallowing strategies from trained staff/caregivers to maximize nutrition/hydation measures. INTERVENTIONS:  Did not address due to focus on other goals. SHORT TERM GOAL #5:  Goal 5: Pt will safely tolerate advanced liquid trials x10 utilizing strategy of CHIN TUCK to permit potential advancement to least restrictive diet. INTERVENTIONS: Did not address due to focus on other goals. Long-term Goals  Timeframe for Long-term Goals: 3 weeks  Goal 1: Pt will improve cognitive skills to a supervision/modified independent level to maximize level of independence for return to independent living in home environment. ONGOING  Goal 2: Pt will safely tolerate least restrictive diet 98% of the time given compensatory swallowing strategies from trained staff/caregivers to maximize nutrition/hydration measures.  ONGOING      Communication  Comprehension: 5 - Patient understands basic needs (hungry/hot/pain)  Expression: 6 - Device used to express complex

## 2018-02-23 NOTE — PROGRESS NOTES
Two level, Performs ADL's on one level  Home Access: Stairs to enter with rails  Entrance Stairs - Number of Steps: 2  Entrance Stairs - Rails: Both  Home Equipment: Standard walker     Objective:  Scooting: Supervision (to scoot forward and back in sitting position)    Transfers  Sit to Stand: Contact guard assistance; Moderate Assistance (CGA from bedside chair, Mod A from toilet with use of grab bar)  Stand to sit: Contact guard assistance (cues for safe hand placement with while holding straight cane)  Stand Pivot Transfers: Contact guard assistance (with straight cane)    WB Status: NWB LUE in sling  Ambulation 1  Surface: level tile  Device: Hemiwalker;Single point cane  Other Apparatus:  (L arm sling, and abdominal binder)  Assistance: Contact guard assistance  Quality of Gait: Pt with slowed gordon, shortened steps with wide LUIZ. Cues for correct sequencuing with straight cane. Step-to pattern noted with cane. With hemiwalker, pt contacting floor only with 1 leg of walker, so advanced patient to straight cane instead. Distance: 15 feet to bathroom with hemiwalker + 45 feet + 20 feet with straight cane    Balance  Comments: Pt stood at [employer] after attempt to toilet at close SBA while therapist managed pulling up depends and pants. Pt able to assist some with RUE, but unable to complete without assist.     Exercises:  Exercises  Comments: Seated Dusty LE resisted ankle dorsiflexion with green T-band, heel slides, long arc quads with 2lb ankle weight, hamstring curls with green T-band, and resisted hip abduction/adduction with green T-band x 10-15 reps each in order to increase strength/endurance for improved functional mobility. Activity Tolerance:  Activity Tolerance: Patient Tolerated treatment well    Assessment:   Body structures, Functions, Activity limitations: Decreased functional mobility , Decreased strength, Decreased endurance, Decreased balance  Assessment: Pt progressed to ambulation with and supervision to enter home

## 2018-02-23 NOTE — PLAN OF CARE
Problem: Pain Control  Goal: Maintain pain level at or below patient's acceptable level (or 5 if patient is unable to determine acceptable level)  Outcome: Ongoing  Pain managed appropriately with PRN medication per order. No issues noted at this time. Problem: Bleeding:  Goal: Will show no signs and symptoms of excessive bleeding  Will show no signs and symptoms of excessive bleeding   Outcome: Ongoing  No s/s noted at this time. Will continue to monitor. Problem: Falls - Risk of  Goal: Absence of falls  Outcome: Ongoing  Free from falls at this time. Fall band, sign, and nonskid footwear in place. Problem: Infection - Surgical Site:  Goal: Will show no infection signs and symptoms  Will show no infection signs and symptoms   Outcome: Ongoing  No s/s noted at this time. Will continue to monitor. Problem: Skin Integrity/Risk  Goal: No skin breakdown during hospitalization  Outcome: Ongoing  No new skin issues noted at this time. Repositioned frequently. See flow sheet for findings. Problem: Nutrition  Goal: Optimal nutrition therapy  Outcome: Ongoing  Appetite appropriate, fluids encouraged.
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
## 2018-02-24 PROCEDURE — 51798 US URINE CAPACITY MEASURE: CPT

## 2018-02-24 PROCEDURE — 97110 THERAPEUTIC EXERCISES: CPT

## 2018-02-24 PROCEDURE — 6370000000 HC RX 637 (ALT 250 FOR IP): Performed by: PHYSICAL MEDICINE & REHABILITATION

## 2018-02-24 PROCEDURE — 6370000000 HC RX 637 (ALT 250 FOR IP): Performed by: EMERGENCY MEDICINE

## 2018-02-24 PROCEDURE — 97530 THERAPEUTIC ACTIVITIES: CPT

## 2018-02-24 PROCEDURE — 99231 SBSQ HOSP IP/OBS SF/LOW 25: CPT | Performed by: FAMILY MEDICINE

## 2018-02-24 PROCEDURE — 97116 GAIT TRAINING THERAPY: CPT

## 2018-02-24 PROCEDURE — 1180000000 HC REHAB R&B

## 2018-02-24 RX ORDER — TRAMADOL HYDROCHLORIDE 50 MG/1
50 TABLET ORAL
Status: DISCONTINUED | OUTPATIENT
Start: 2018-02-24 | End: 2018-02-27

## 2018-02-24 RX ORDER — HYDROCODONE BITARTRATE AND ACETAMINOPHEN 5; 325 MG/1; MG/1
1 TABLET ORAL
Status: DISCONTINUED | OUTPATIENT
Start: 2018-02-25 | End: 2018-02-27

## 2018-02-24 RX ADMIN — VALSARTAN 40 MG: 80 TABLET ORAL at 08:39

## 2018-02-24 RX ADMIN — TRAMADOL HYDROCHLORIDE 50 MG: 50 TABLET, COATED ORAL at 22:22

## 2018-02-24 RX ADMIN — FAMOTIDINE 20 MG: 20 TABLET, FILM COATED ORAL at 20:23

## 2018-02-24 RX ADMIN — FAMOTIDINE 20 MG: 20 TABLET, FILM COATED ORAL at 08:14

## 2018-02-24 RX ADMIN — HYDROCODONE BITARTRATE AND ACETAMINOPHEN 1 TABLET: 7.5; 325 TABLET ORAL at 11:43

## 2018-02-24 RX ADMIN — NALOXEGOL OXALATE 12.5 MG: 12.5 TABLET, FILM COATED ORAL at 08:16

## 2018-02-24 RX ADMIN — CALCIUM CARBONATE-VITAMIN D TAB 500 MG-200 UNIT 1 TABLET: 500-200 TAB at 20:23

## 2018-02-24 RX ADMIN — METOPROLOL SUCCINATE 50 MG: 50 TABLET, FILM COATED, EXTENDED RELEASE ORAL at 08:17

## 2018-02-24 RX ADMIN — FOSFOMYCIN TROMETHAMINE 1 PACKET: 3 POWDER ORAL at 14:32

## 2018-02-24 RX ADMIN — HYDROCODONE BITARTRATE AND ACETAMINOPHEN 1 TABLET: 7.5; 325 TABLET ORAL at 01:15

## 2018-02-24 RX ADMIN — SIMVASTATIN 40 MG: 40 TABLET, FILM COATED ORAL at 20:23

## 2018-02-24 RX ADMIN — SENNOSIDES 17.2 MG: 8.6 TABLET, FILM COATED ORAL at 20:22

## 2018-02-24 RX ADMIN — CALCIUM CARBONATE-VITAMIN D TAB 500 MG-200 UNIT 1 TABLET: 500-200 TAB at 08:14

## 2018-02-24 RX ADMIN — TRAMADOL HYDROCHLORIDE 50 MG: 50 TABLET, COATED ORAL at 04:06

## 2018-02-24 RX ADMIN — SACUBITRIL AND VALSARTAN 1 TABLET: 49; 51 TABLET, FILM COATED ORAL at 20:23

## 2018-02-24 RX ADMIN — TRAMADOL HYDROCHLORIDE 50 MG: 50 TABLET, COATED ORAL at 08:20

## 2018-02-24 RX ADMIN — HYDROCODONE BITARTRATE AND ACETAMINOPHEN 1 TABLET: 7.5; 325 TABLET ORAL at 06:18

## 2018-02-24 RX ADMIN — TRAMADOL HYDROCHLORIDE 50 MG: 50 TABLET, COATED ORAL at 14:32

## 2018-02-24 RX ADMIN — ASPIRIN 81 MG: 81 TABLET ORAL at 08:14

## 2018-02-24 RX ADMIN — SACUBITRIL AND VALSARTAN 1 TABLET: 49; 51 TABLET, FILM COATED ORAL at 08:16

## 2018-02-24 ASSESSMENT — PAIN DESCRIPTION - LOCATION
LOCATION: SHOULDER

## 2018-02-24 ASSESSMENT — PAIN SCALES - GENERAL
PAINLEVEL_OUTOF10: 1
PAINLEVEL_OUTOF10: 6
PAINLEVEL_OUTOF10: 3
PAINLEVEL_OUTOF10: 2
PAINLEVEL_OUTOF10: 4
PAINLEVEL_OUTOF10: 3
PAINLEVEL_OUTOF10: 6
PAINLEVEL_OUTOF10: 1
PAINLEVEL_OUTOF10: 2
PAINLEVEL_OUTOF10: 6
PAINLEVEL_OUTOF10: 4

## 2018-02-24 ASSESSMENT — PAIN DESCRIPTION - ORIENTATION
ORIENTATION: LEFT

## 2018-02-24 ASSESSMENT — PAIN DESCRIPTION - DESCRIPTORS: DESCRIPTORS: ACHING

## 2018-02-24 ASSESSMENT — PAIN DESCRIPTION - PAIN TYPE
TYPE: SURGICAL PAIN

## 2018-02-24 NOTE — PROGRESS NOTES
Pt. Having difficulty with having more sleepiness in the afternoon and more confusion especially after awakened from a nap. Per pt. Request held sched. [place] for 1800.

## 2018-02-24 NOTE — PROGRESS NOTES
Patient was extremely legatheric and was called down by family stating [place] was acting right\" Son stated she had been sleeping on and off since 3ish  When he had arrived. Patient was noted to having an elevated blood pressure, and received new orders from Dr. Benton Camacho. Although patient neuro appeared to be within her norm, her Zanaflex was made prn, and held the tramadol as patient was unable to take to stay awake. Patient was much more aware when given Norco but will continue to monitor patient.

## 2018-02-24 NOTE — PROGRESS NOTES
Good  Discharge Recommendations: Continue to assess pending progress, Patient would benefit from continued therapy after discharge    Patient Education:  Patient Education: gait, transfers, balance    Equipment Recommendations:  Equipment Needed: Yes  Other: Cont to assess    Safety:  Type of devices: All fall risk precautions in place, Left in chair, Call light within reach, Chair alarm in place, Gait belt    Plan:  Times per week: 5x/ week 90 min, 1x/ week 30 min  Specific instructions for Next Treatment: bed mobility, transfers, gait, balance, therex  Current Treatment Recommendations: Strengthening, ROM, Endurance Training, Functional Mobility Training, Pain Management, Equipment Evaluation, Education, & procurement, Patient/Caregiver Education & Training, Safety Education & Training, Home Exercise Program, Balance Training, Transfer Training, Gait Training    Goals:  Patient goals : Goal to return home and move better    Short term goals  Time Frame for Short term goals: 1 week  Short term goal 1: Pt will perform bed mobility min A to get in and out of bed.    Short term goal 2: Pt will perform sit<>stand SBA to hemiwalker to get up from bed  Short term goal 3: Pt will amb 48' CGA with hemiwalker for household mobility  Short term goal 4: Pt will perform car transfer mod A for transportation needs    Long term goals  Time Frame for Long term goals : 3 weeks  Long term goal 1: Pt will perform bed mobility mod I to get in and out of bed  Long term goal 2: Pt will perform sit<>stand mod I to hemiwalker to get up from bed  Long term goal 3: Pt will amb 150' mod I with hemiwalker for community mobility  Long term goal 4: Pt will perform car transfer with supervision for transportation needs  Long term goal 5: Pt will negotiate 2+ steps with bilat HR and supervision to enter home

## 2018-02-24 NOTE — PROGRESS NOTES
COLONOSCOPY  3/2011    CORONARY ANGIOPLASTY WITH STENT PLACEMENT  2009    DILATATION, ESOPHAGUS  2011    HYSTERECTOMY  1976 or 1977    bleeding    JOINT REPLACEMENT Left     Shoulder    TX OPEN FIXATN MID HUMERUS FRACTURE Right 2/12/2018    LEFT HUMERUS OPEN REDUCTION INTERNAL FIXATION performed by Whitney Biggs MD at [facility]           Subjective       Subjective: pt seated in recliner when arrived. Pt agreeable to OT      Pain:  Pain Assessment  Patient Currently in Pain: Yes  Pain Level: 4  Pain Type: Surgical pain  Pain Location: Shoulder  Pain Orientation: Left       Objective     Transfers  Sit to stand: Contact guard assistance  Stand to sit: Contact guard assistance       Balance  Sitting Balance: Stand by assistance  Standing Balance: Contact guard assistance     Time: x1 min   Activity: prep for mobility      Functional Mobility  Functional - Mobility Device: Skyscraperwalker  Activity: Other  Assist Level: Contact guard assistance  Functional Mobility Comments: pt ambulated short distance in hallway of unit with no LOB   Apparatus Needs:  (sling on LUE )        Type of ROM/Therapeutic Exercise: PROM;AROM  Comment: Patient guided in LUE PROM shoulder flexion 30 degrees, ER to 10 degrees, AROM in elbow flexion, AROM in wrist fleixon/extension and digits to improve ROM for ADLs.  Completed RUE exercises with 2# weight x10 all joints all planes, completed to increase strength and endurance for ADLs            Activity Tolerance: pt tolerated therapy well        Assessment:     Performance deficits / Impairments: Decreased functional mobility , Decreased ADL status, Decreased endurance, Decreased balance, Decreased safe awareness, Decreased cognition, Decreased strength  Prognosis: Fair  Discharge Recommendations: Patient would benefit from continued therapy after discharge    Patient Education:  Patient Education: UE therex, safety with transfers     Equipment Recommendations: Other: Continue to assess pending progress    Safety:  Safety Devices in place: Yes  Type of devices: All fall risk precautions in place, Call light within reach, Gait belt, Left in chair, Chair alarm in place, Nurse notified    Plan:  Times per week: 5x/wk for 90 min and 1x/wk for 30 min    Current Treatment Recommendations: Balance Training, Functional Mobility Training, Endurance Training, Self-Care / ADL, Safety Education & Training, Patient/Caregiver Education & Training, Equipment Evaluation, Education, & procurement, Cognitive Reorientation    Goals:  Patient goals : \" Go to the bathroom, walk around, take care of myself \"    Short term goals  Time Frame for Short term goals: 1 week   Short term goal 1: Pt will recall zuleyka dressing technique to don shirt with max assist consistently and min vcs to improve indep with daily dressing tasks. Short term goal 2: Pt will tolerate 4 min standing tasks while release RUE with CGA consistently with no cues for spinal precautions for ease of sinkside grooming. Short term goal 3: Pt will complete LB dressing tasks with mod A using LHAE with mod vcs for spinal precautions for ease of self care. Short term goal 4: Pt will ambulate to [facility] with CGA of 1 using hemiwalker to improve indep with toileting in bathroom. Short term goal 5: Pt will complete complete LUE distal exs and RUE light strengthening task x10 reps to improve distal strength to 4+/5. Long term goals  Time Frame for Long term goals : 3 weeks  Long term goal 1: Pt will don UB dressing tasks with Min A and min vcs for NWBing for ease of dressing tasks. Long term goal 2: Pt will complete grooming tasks standing at sink x4 minutes with SBA to improve indep with self care tasks.

## 2018-02-25 PROCEDURE — 1180000000 HC REHAB R&B

## 2018-02-25 PROCEDURE — 6370000000 HC RX 637 (ALT 250 FOR IP): Performed by: PHYSICAL MEDICINE & REHABILITATION

## 2018-02-25 PROCEDURE — 6370000000 HC RX 637 (ALT 250 FOR IP): Performed by: EMERGENCY MEDICINE

## 2018-02-25 PROCEDURE — 99231 SBSQ HOSP IP/OBS SF/LOW 25: CPT | Performed by: FAMILY MEDICINE

## 2018-02-25 PROCEDURE — 51798 US URINE CAPACITY MEASURE: CPT

## 2018-02-25 RX ADMIN — ASPIRIN 81 MG: 81 TABLET ORAL at 08:34

## 2018-02-25 RX ADMIN — FAMOTIDINE 20 MG: 20 TABLET, FILM COATED ORAL at 08:34

## 2018-02-25 RX ADMIN — HYDROCODONE BITARTRATE AND ACETAMINOPHEN 1 TABLET: 5; 325 TABLET ORAL at 17:35

## 2018-02-25 RX ADMIN — CALCIUM CARBONATE-VITAMIN D TAB 500 MG-200 UNIT 1 TABLET: 500-200 TAB at 08:34

## 2018-02-25 RX ADMIN — HYDROCODONE BITARTRATE AND ACETAMINOPHEN 1 TABLET: 5; 325 TABLET ORAL at 12:56

## 2018-02-25 RX ADMIN — NALOXEGOL OXALATE 12.5 MG: 12.5 TABLET, FILM COATED ORAL at 08:35

## 2018-02-25 RX ADMIN — TRAMADOL HYDROCHLORIDE 50 MG: 50 TABLET, COATED ORAL at 08:39

## 2018-02-25 RX ADMIN — CALCIUM CARBONATE-VITAMIN D TAB 500 MG-200 UNIT 1 TABLET: 500-200 TAB at 21:48

## 2018-02-25 RX ADMIN — HYDROCODONE BITARTRATE AND ACETAMINOPHEN 1 TABLET: 5; 325 TABLET ORAL at 00:32

## 2018-02-25 RX ADMIN — VALSARTAN 40 MG: 80 TABLET ORAL at 08:34

## 2018-02-25 RX ADMIN — METOPROLOL SUCCINATE 50 MG: 50 TABLET, FILM COATED, EXTENDED RELEASE ORAL at 08:34

## 2018-02-25 RX ADMIN — FAMOTIDINE 20 MG: 20 TABLET, FILM COATED ORAL at 21:48

## 2018-02-25 RX ADMIN — TRAMADOL HYDROCHLORIDE 50 MG: 50 TABLET, COATED ORAL at 21:47

## 2018-02-25 RX ADMIN — SACUBITRIL AND VALSARTAN 1 TABLET: 49; 51 TABLET, FILM COATED ORAL at 08:34

## 2018-02-25 RX ADMIN — TRAMADOL HYDROCHLORIDE 50 MG: 50 TABLET, COATED ORAL at 03:25

## 2018-02-25 RX ADMIN — SACUBITRIL AND VALSARTAN 1 TABLET: 49; 51 TABLET, FILM COATED ORAL at 21:47

## 2018-02-25 RX ADMIN — SIMVASTATIN 40 MG: 40 TABLET, FILM COATED ORAL at 21:48

## 2018-02-25 ASSESSMENT — PAIN SCALES - GENERAL
PAINLEVEL_OUTOF10: 5
PAINLEVEL_OUTOF10: 3
PAINLEVEL_OUTOF10: 4
PAINLEVEL_OUTOF10: 3
PAINLEVEL_OUTOF10: 4
PAINLEVEL_OUTOF10: 0
PAINLEVEL_OUTOF10: 4
PAINLEVEL_OUTOF10: 6

## 2018-02-26 ENCOUNTER — TELEPHONE (OUTPATIENT)
Dept: FAMILY MEDICINE CLINIC | Age: 78
End: 2018-02-26

## 2018-02-26 PROCEDURE — 6370000000 HC RX 637 (ALT 250 FOR IP): Performed by: EMERGENCY MEDICINE

## 2018-02-26 PROCEDURE — 6370000000 HC RX 637 (ALT 250 FOR IP): Performed by: PHYSICAL MEDICINE & REHABILITATION

## 2018-02-26 PROCEDURE — G0515 COGNITIVE SKILLS DEVELOPMENT: HCPCS

## 2018-02-26 PROCEDURE — 92526 ORAL FUNCTION THERAPY: CPT

## 2018-02-26 PROCEDURE — 51798 US URINE CAPACITY MEASURE: CPT

## 2018-02-26 PROCEDURE — 97110 THERAPEUTIC EXERCISES: CPT

## 2018-02-26 PROCEDURE — 97116 GAIT TRAINING THERAPY: CPT

## 2018-02-26 PROCEDURE — 97530 THERAPEUTIC ACTIVITIES: CPT

## 2018-02-26 PROCEDURE — 1180000000 HC REHAB R&B

## 2018-02-26 PROCEDURE — 97535 SELF CARE MNGMENT TRAINING: CPT

## 2018-02-26 RX ADMIN — CALCIUM CARBONATE-VITAMIN D TAB 500 MG-200 UNIT 1 TABLET: 500-200 TAB at 22:14

## 2018-02-26 RX ADMIN — METOPROLOL SUCCINATE 50 MG: 50 TABLET, FILM COATED, EXTENDED RELEASE ORAL at 09:28

## 2018-02-26 RX ADMIN — SIMVASTATIN 40 MG: 40 TABLET, FILM COATED ORAL at 22:13

## 2018-02-26 RX ADMIN — TRAMADOL HYDROCHLORIDE 50 MG: 50 TABLET, COATED ORAL at 09:31

## 2018-02-26 RX ADMIN — HYDROCODONE BITARTRATE AND ACETAMINOPHEN 1 TABLET: 5; 325 TABLET ORAL at 06:37

## 2018-02-26 RX ADMIN — HYDROCODONE BITARTRATE AND ACETAMINOPHEN 1 TABLET: 5; 325 TABLET ORAL at 18:44

## 2018-02-26 RX ADMIN — CALCIUM CARBONATE-VITAMIN D TAB 500 MG-200 UNIT 1 TABLET: 500-200 TAB at 09:28

## 2018-02-26 RX ADMIN — SACUBITRIL AND VALSARTAN 1 TABLET: 49; 51 TABLET, FILM COATED ORAL at 22:14

## 2018-02-26 RX ADMIN — HYDROCODONE BITARTRATE AND ACETAMINOPHEN 1 TABLET: 5; 325 TABLET ORAL at 00:27

## 2018-02-26 RX ADMIN — VALSARTAN 40 MG: 80 TABLET ORAL at 09:28

## 2018-02-26 RX ADMIN — SACUBITRIL AND VALSARTAN 1 TABLET: 49; 51 TABLET, FILM COATED ORAL at 09:29

## 2018-02-26 RX ADMIN — TIZANIDINE 1 MG: 4 TABLET ORAL at 09:32

## 2018-02-26 RX ADMIN — FAMOTIDINE 20 MG: 20 TABLET, FILM COATED ORAL at 09:28

## 2018-02-26 RX ADMIN — FAMOTIDINE 20 MG: 20 TABLET, FILM COATED ORAL at 22:13

## 2018-02-26 RX ADMIN — NALOXEGOL OXALATE 12.5 MG: 12.5 TABLET, FILM COATED ORAL at 09:29

## 2018-02-26 RX ADMIN — ASPIRIN 81 MG: 81 TABLET ORAL at 09:28

## 2018-02-26 RX ADMIN — TRAMADOL HYDROCHLORIDE 50 MG: 50 TABLET, COATED ORAL at 22:13

## 2018-02-26 RX ADMIN — TRAMADOL HYDROCHLORIDE 50 MG: 50 TABLET, COATED ORAL at 03:15

## 2018-02-26 ASSESSMENT — PAIN SCALES - GENERAL
PAINLEVEL_OUTOF10: 3
PAINLEVEL_OUTOF10: 2
PAINLEVEL_OUTOF10: 4
PAINLEVEL_OUTOF10: 2
PAINLEVEL_OUTOF10: 8
PAINLEVEL_OUTOF10: 3
PAINLEVEL_OUTOF10: 5
PAINLEVEL_OUTOF10: 2
PAINLEVEL_OUTOF10: 7
PAINLEVEL_OUTOF10: 0
PAINLEVEL_OUTOF10: 0
PAINLEVEL_OUTOF10: 4
PAINLEVEL_OUTOF10: 4

## 2018-02-26 ASSESSMENT — PAIN DESCRIPTION - DESCRIPTORS: DESCRIPTORS: ACHING

## 2018-02-26 ASSESSMENT — PAIN DESCRIPTION - LOCATION: LOCATION: BACK;SHOULDER

## 2018-02-26 ASSESSMENT — PAIN DESCRIPTION - ORIENTATION: ORIENTATION: MID;LOWER;LEFT

## 2018-02-26 ASSESSMENT — PAIN DESCRIPTION - PAIN TYPE: TYPE: CHRONIC PAIN

## 2018-02-26 ASSESSMENT — PAIN DESCRIPTION - FREQUENCY: FREQUENCY: CONTINUOUS

## 2018-02-26 NOTE — PROGRESS NOTES
oz (99.6 kg) / Body mass index is 34.38 kg/m². Please see nutrition note for details. NURSING  FIMS:  Bowel: 6 - Modified Independent. Bladder:  1 - Dependent.   Wounds/Incisions/Ulcers: Incision healing well  Self-Med Program: Patient currently unable to manage medications and family being educated  Pain: Patient's pain is currently controlled with Norco, tramadol, Lidoderm     Consultations/Labs/X-rays: Orthopedic Surgery, Cardiology, Family Medicine, Psychology, Physical Medicine    Family Education: Need to make contact with family to initiate education  Fall Risk:  Falling star program initiated    Risk for Readmission:  Patient meets high risk for readmission criteria  Critical Items:   Readmission Risk              Readmission Risk:        22.5       Age 72 or Greater:  1    Admitted from SNF or Requires Paid or Family Care:  0    Currently has CHF,COPD,ARF,CRI,or is on dialysis:  0    Takes more than 5 Prescription Medications:  4    Takes Digoxin,Insulin,Anticoagulants,Narcotics or ASA/Plavix:  [address] in Past 12 Months:  10    On Disability:  3    Patient Considers own Health:  2.5        If High Risk, consider the following recommendations:  [x]Home Health Nursing [x] Follow-Up with PCP in one week  [] Follow-Up phone call 48 hours post discharge  [] Other interventions:     Discharge Plan   Estimated Length of Stay: 3/6/2018  Destination: undetermined at this time  Appropriate to trial functional independence apartment stay: No   Pass: No  Services at Discharge: undetermined at this time  Equipment at Discharge: undetermined at this time  Factors facilitating achievement of predicted outcomes: Family support, Cooperative, Pleasant and Good insight into deficits  Barriers to the achievement of predicted outcomes: Pain, Confusion, Cognitive deficit, Decreased endurance and NWB VICKY    Team Members Present at Conference:  : Dania Sanderson   Occupational Therapist: Loli Alfaro
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
## 2018-02-26 NOTE — PROGRESS NOTES
[address] [address]  INPATIENT SPEECH THERAPY  [facility]    TIME   SLP Individual Minutes  Time In: 1104  Time Out: 5084  Minutes: 30  Timed Code Treatment Minutes: 30 Minutes          [x]Daily Note  []Progress Note  []Discharge Note    Date: 2018  Patient Name: Ramez Apple        MRN: [id number]    : 1940  (77 y.o.)  Gender: female   Primary Provider: Barb Golden MD  Admitting Diagnosis: TBI  Secondary Diagnosis: Cognitive deficits, dysphagia  Precautions: Fall risk  Swallowing Status/Diet: Regular diet with nectar thick liquids  Swallowing Strategies: Full upright position, no straw, chin tuck, pulmonary monitoring, alternate solid/liquids, small bite/sip, limit distractions  DATE of last MBS:  18  Pain:  No pain/discomfort per pt; pt verbalized having \"blurry vision\" with LPN Isatu Batters notifeid    Subjective: Upon arrival, pt with high level of anxiety, stating, \"I almost just fell out of the chair\"; questioning revealed pt was sleeping and jolted awake, scaring her and jumping in the chair. Anxiety decreased as session progressed, however, increasing lethargy noted. High level of perseverations exhibited with tx session. SHORT TERM GOAL #1:  Goal 1: Pt will complete working memory and delayed recall  tasks w/ 70% accuracy and mod cues in order to improve retention of functional and novel information. INTERVENTIONS: Orientation:  independently, / given logical cues for date  Biographical information: personal address, phone number, and birthdate conveyed independently    Instructed pt on rehearsal for recall of mixed associations word list. Modeling provided to ensure appropriate usage for encoding and retention of information. Immediate memory: 4/10 independently  Delay x10 minutes: 3/10 independently  *highly suspect fatigue level impacted performance with task; pt stated, \"this usually isn't this hard for me\".      Working memory: word list retention and categorical inclusion  Retention x4 items, immediate memory - 4/10 independently, 3/10 given second repetition of word set, 3/10 max cues  Categorical inclusion - 9/10 independently, 1/10 given max cues  *perseverative speech x5  *neologism x1    SHORT TERM GOAL #2:  Goal 2: Pt will complete thought organization and verbal/visual reasoning tasks (including finance and medication management) w/ 70% accuracy and mod cues in order to improve ability to ensure safety and perform ADLs. INTERVENTIONS:Stating similarities vs differences given F02 objects; similarities with 3/5 independently, 2/5 given mod cues; differences with 4/5 independently, 1/5 given min cues  *decreased mental flexibility with task, resulting in increased processing time for task progression to completion    Thought organization of divergent naming, in which pt was directed to name 10 items within 1 minute time span:  Trial 1 - closet: 10 items, 43 seconds, no cues  Trial 2 - medicine cabinet: 3 items, 1 minute, no cues    SHORT TERM GOAL #3:  Goal 3: Patient will complete selective and alternating attention and tasks w/ 70% accuracy and mod cues in order to improve overall functioning. INTERVENTIONS: DNT directly due to focus on additional STGs; pt required multiple re-direction cues throughout session due to pt closing of eyes; responded positively to cues given     Prior session: divided attention (sorting cards by suit while simultaneously answering moderate level yes/no questions):  -Yes/no questions- 19/20 independently   -Sorting card- 100%  *Self correction noted x1   Overall good success with task. SHORT TERM GOAL #4:  Goal 4: Pt will safely tolerate regular and nectar thick diet given compensatory swallowing strategies from trained staff/caregivers to maximize nutrition/hydation measures.    INTERVENTIONS:  DNT due to focus on additional STGs    SHORT TERM GOAL #5:  Goal 5: Pt will safely tolerate advanced liquid

## 2018-02-26 NOTE — PROGRESS NOTES
seatbelt. Past Medical History:   Diagnosis Date    Arthritis     general    Breast CA ([facility] 12/03    right    CAD (coronary artery disease) 2009    stent in [place]    [facility]     Closed compression fracture of first lumbar vertebra ([facility] 2/7/2018    Colon polyps 8/97; 3/11    COPD (chronic obstructive pulmonary disease) ([facility] 8/5/2017    Diverticulosis 1/06    Erosive gastritis 11/06    Esophageal stricture 03/2011    Three times, Dr. Jacobo Later    Hyperlipidemia     Hypertension     Internal hemorrhoid 1/06    LUISA on CPAP     Osteopenia determined by x-ray 1999    Stress incontinence, female      Past Surgical History:   Procedure Laterality Date    BREAST LUMPECTOMY  1/04    wt axillary dissection    CARDIAC DEFIBRILLATOR PLACEMENT  01/08/2018    Biventricular pacemaker ICD, Dr. Deb Hayes  3/2011    CORONARY ANGIOPLASTY WITH STENT PLACEMENT  2009    DILATATION, ESOPHAGUS  2011   [place] or 1977    bleeding    JOINT REPLACEMENT Left     Shoulder    [place] OPEN FIXATN MID HUMERUS FRACTURE Right 2/12/2018    LEFT HUMERUS OPEN REDUCTION INTERNAL FIXATION performed by Juan Davenport MD at [facility]       Restrictions/Precautions:  Restrictions/Precautions: General Precautions, Fall Risk, Weight Bearing       Left Upper Extremity Weight Bearing: Non Weight Bearing    Position Activity Restriction  Other position/activity restrictions: L1 compression fx    Required Braces or Orthoses  Spinal:  (Abdominal Binder)  Left Upper Extremity Brace/Splint: Sling    Subjective:     Subjective: Pt reporting and appearing generally ill feeling this date. Nursing informed. Pt very sleepy during session.      Pain:   .       number not given  Social/Functional:  Lives With: Alone  Type of Home: House  Home Layout: Two level, Performs ADL's on one level  Home Access: Stairs to enter with rails  Entrance Stairs - Number of Steps: 2  Entrance Stairs - Rails: Both  Home Equipment: Standard walker     Objective:  Supine to Sit: Moderate assistance (on and off mat table;  pt not tolerating supine well this date; did not stay in supine long this date )  Sit to Supine: Moderate assistance    Transfers  Sit to Stand: Minimal Assistance (ed for proper sit to stands with attending cane herself)  Stand to sit: Minimal Assistance (w/c, commode over toilet, bs chair, EOM )  Car Transfer: Contact guard assistance (simulated SUV at high mat table; pt reporting she never rides in low car and politely declined practicing at [facility]; PTA demo'd proper technique at [facility])       Ambulation 1  Surface: level tile  Device: Single point cane  Assistance: Contact guard assistance  Quality of Gait: shuffeling steps with poor foot clearance and short stride; pt fatigued quickly this date; increased lateral sway , forward posture and downward gaze ; lengthly RBs to recover   Distance: approx 55 feet x 1; 15 feet x 2 and 25 feet x 1     Stairs  # Steps : 2  Stairs Height: 6\"  Rails: Left ascending  Assistance: Contact guard assistance  Comment: pt anxious with steps                 Exercises:  Exercises  Comments: p toileted before leaving room; pt cleaned self after BM  and manged half of her pants with assist from PTA ; seated and reclined himanshu LE therex; AP, glute sets, LAQ, marches, hip ABD/ADD and heelslides all x 10 reps each for improved strength and ROM with gait training                                  Activity Tolerance:  Activity Tolerance: Patient Tolerated treatment well;Treatment limited secondary to medical complications (free text)    Assessment: Body structures, Functions, Activity limitations: Decreased functional mobility , Decreased strength, Decreased endurance, Decreased balance  Assessment: recommend continued skilled therapy to focus on endurnace and overall strength.   Pt is showing steady progress, meeting 2 short

## 2018-02-26 NOTE — PROGRESS NOTES
[address]    TIME   SLP Individual Minutes  Time In: 1400  Time Out: 7010  Minutes: 32  Timed Code Treatment Minutes: 11 Minutes   [id number]  Cognitive therapy:[id number]          []Daily Note  [x]Progress Note  []Discharge Note    Date: 2018  Patient Name: Jill Jonas        MRN: [id number]    : 1940  (77 y.o.)  Gender: female   Primary Provider: Quinton Martinez MD  Admitting Diagnosis: TBI  Secondary Diagnosis: Cognitive deficits, dysphagia  Precautions: Fall risk  Swallowing Status/Diet: Regular diet with nectar thick liquids  Swallowing Strategies: Full upright position, no straw, chin tuck, pulmonary monitoring, alternate solid/liquids, small bite/sip, limit distractions  DATE of last MBS:  18  Pain:  No pain/discomfort per pt    Subjective: Pt awake and alert with confusion; pt aware of confusion, stating, \"this isn't me\"; LPN Mara indicated administration of novel medication, questioning its side effects. Min encouragement required for participation in tx. Pt with usage of personal cell phone x1 to return a text message; pt displayed decreased navigation and manipulation of personal device and requested mod-max assistance from ST as necessary; will continue to address utilization in continued sessions. SHORT TERM GOAL #1:  Goal 1: Pt will complete working memory and delayed recall  tasks w/ 70% accuracy and mod cues in order to improve retention of functional and novel information.  GOAL NOT MET CONSISTENTLY, CONTINUE  INTERVENTIONS: Recall of clinician's name/department title: / independently     Prior session:  Orientation:  independently, /7 given logical cues for date  Biographical information: personal address, phone number, and birthdate conveyed independently    Instructed pt on rehearsal for recall of mixed associations word list. Modeling provided to ensure appropriate usage for encoding and retention of information. Immediate memory: 4/10 independently  Delay x10 minutes: 3/10 independently  *highly suspect fatigue level impacted performance with task; pt stated, \"this usually isn't this hard for me\". Working memory: word list retention and categorical inclusion  Retention x4 items, immediate memory - 4/10 independently, 3/10 given second repetition of word set, 3/10 max cues  Categorical inclusion - 9/10 independently, 1/10 given max cues  *perseverative speech x5  *neologism x1    SHORT TERM GOAL #2:  Goal 2: Pt will complete thought organization and verbal/visual reasoning tasks (including finance and medication management) w/ 70% accuracy and mod cues in order to improve ability to ensure safety and perform ADLs. GOAL NOT MET CONSISTENTLY, CONTINUE  INTERVENTIONS: DNT due to focus on additional STGs    Prior session:  Stating similarities vs differences given F02 objects; similarities with 3/5 independently, 2/5 given mod cues; differences with 4/5 independently, 1/5 given min cues  *decreased mental flexibility with task, resulting in increased processing time for task progression to completion    Thought organization of divergent naming, in which pt was directed to name 10 items within 1 minute time span:  Trial 1 - closet: 10 items, 43 seconds, no cues  Trial 2 - medicine cabinet: 3 items, 1 minute, no cues    SHORT TERM GOAL #3:  Goal 3: Patient will complete selective and alternating attention and tasks w/ 70% accuracy and mod cues in order to improve overall functioning. GOAL NOT MET, CONTINUE  INTERVENTIONS: Structured divided attention task. Pt directed to visually cancel single letter while simultaneously attending to auditory stimuli to record verbal utterance of a personal name. Task completed in 2 minutes, 33 seconds respectively with no re-direction cues required. 4 cancellation errors exhibited with 8 auditory errors calculated.  Pt with decreased navigation of left

## 2018-02-26 NOTE — PROGRESS NOTES
around, take care of myself \"    Short term goals  Time Frame for Short term goals: 1 week   Short term goal 1: Pt will recall zuleyka dressing technique to don shirt with mod assist consistently and min vcs to improve indep with daily dressing tasks. Short term goal 2: Pt will tolerate x5 min standing tasks while release RUE with SBA consistently with no cues for spinal precautions for ease of sinkside grooming. Short term goal 3: Pt will complete LB dressing tasks with min A using LHAE with mod vcs for spinal precautions for ease of self care. Short term goal 4: Pt will ambulate to [facility] with SBA of 1 using hemiwalker to improve indep with toileting in bathroom. Short term goal 5: Pt will complete complete LUE distal exs and RUE light strengthening task x10 reps to improve distal strength to 4+/5. Long term goals  Time Frame for Long term goals : 3 weeks  Long term goal 1: Pt will don UB dressing tasks with Min A and min vcs for NWBing for ease of dressing tasks. Long term goal 2: Pt will complete grooming tasks standing at sink x4 minutes with SBA to improve indep with self care tasks.
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
## 2018-02-26 NOTE — PROGRESS NOTES
Family Medicine Progress Notes/Coverage    Today's Date: 2/26/18  7E-66/066-A  Medical Record # [id number]  Account # [de-identified]      Ms. Fatemeh Arboleda admitted on 2/14/2018        Subjective / Interval History :     Seen with her daughter and granddaughter at bedside  Patient needs 1 assist in getting up from her chair, and needs human assistance to stabilize  No shortness of breath or chest pain  Still with hypertension  Reviewed notes, consults, laboratory and radiology results,    Objective:       Physical Exam:  Patient Vitals for the past 24 hrs:   BP Temp Temp src Pulse Resp SpO2 Weight   02/26/18 0802 (!) 181/85 97.7 °F (36.5 °C) Oral 70 20 96 % -   02/26/18 0556 - - - - - - 227 lb 9.6 oz (103.2 kg)   02/25/18 2127 (!) 164/72 98.2 °F (36.8 °C) Oral 74 14 94 % -       General Appearance:  Alert, cooperative, no distress, appears stated age   HEENT:  Neck: Scabs on the left posterior parietal  Resolving ecchymosis in the left neck   Chest/Lungs:  Heart: Clear auscultation  Regular rhythm.    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

## 2018-02-26 NOTE — PROGRESS NOTES
seatbelt. Past Medical History:   Diagnosis Date    Arthritis     general    Breast CA ([facility] [address] 12/03    right    CAD (coronary artery disease) 2009    stent in [place]    [facility] failure) [facility]     Closed compression fracture of first lumbar vertebra ([facility] [address] 2/7/2018    Colon polyps 8/97; 3/11    COPD (chronic obstructive pulmonary disease) ([facility] [address] 8/5/2017    Diverticulosis 1/06    Erosive gastritis 11/06    Esophageal stricture 03/2011    Three times, Dr. Kesha Velez    Hyperlipidemia     Hypertension     Internal hemorrhoid 1/06    LUISA on CPAP     Osteopenia determined by x-ray 1999    Stress incontinence, female      Past Surgical History:   Procedure Laterality Date    BREAST LUMPECTOMY  1/04    wt axillary dissection    CARDIAC DEFIBRILLATOR PLACEMENT  01/08/2018    Biventricular pacemaker ICD, Dr. Candy Leung  3/2011    CORONARY ANGIOPLASTY WITH STENT PLACEMENT  2009    DILATATION, ESOPHAGUS  2011   [address] or 1977    bleeding    JOINT REPLACEMENT Left     Shoulder    [place] OPEN FIXATN MID HUMERUS FRACTURE Right 2/12/2018    LEFT HUMERUS OPEN REDUCTION INTERNAL FIXATION performed by Malcom Watson MD at [facility]       Restrictions/Precautions:  Restrictions/Precautions: General Precautions, Fall Risk, Weight Bearing       Left Upper Extremity Weight Bearing: Non Weight Bearing    Position Activity Restriction  Other position/activity restrictions: L1 compression fx    Required Braces or Orthoses  Spinal:  (Abdominal Binder)  Left Upper Extremity Brace/Splint: Sling    Subjective:     Subjective: Pt pleasant and cooperative. Offerred toileting on onset of session, pt declined. Pt requested toileting upon return to room at end of session.      Pain:   .      denies    Social/Functional:  Lives With: Alone  Type of Home: House  Home Layout: Two level, Performs ADL's on one level  Home Access: Stairs to

## 2018-02-27 PROCEDURE — 1180000000 HC REHAB R&B

## 2018-02-27 PROCEDURE — 97112 NEUROMUSCULAR REEDUCATION: CPT

## 2018-02-27 PROCEDURE — 6370000000 HC RX 637 (ALT 250 FOR IP): Performed by: EMERGENCY MEDICINE

## 2018-02-27 PROCEDURE — G0515 COGNITIVE SKILLS DEVELOPMENT: HCPCS | Performed by: SPEECH-LANGUAGE PATHOLOGIST

## 2018-02-27 PROCEDURE — 92526 ORAL FUNCTION THERAPY: CPT | Performed by: SPEECH-LANGUAGE PATHOLOGIST

## 2018-02-27 PROCEDURE — 97530 THERAPEUTIC ACTIVITIES: CPT

## 2018-02-27 PROCEDURE — 97110 THERAPEUTIC EXERCISES: CPT

## 2018-02-27 PROCEDURE — 97535 SELF CARE MNGMENT TRAINING: CPT

## 2018-02-27 PROCEDURE — 97116 GAIT TRAINING THERAPY: CPT

## 2018-02-27 PROCEDURE — 6370000000 HC RX 637 (ALT 250 FOR IP): Performed by: PHYSICAL MEDICINE & REHABILITATION

## 2018-02-27 PROCEDURE — 96150 PR HEAL & BEHAV ASSESS,EA 15 MIN,INIT: CPT | Performed by: PSYCHOLOGIST

## 2018-02-27 RX ORDER — HYDROCODONE BITARTRATE AND ACETAMINOPHEN 5; 325 MG/1; MG/1
0.5 TABLET ORAL
Status: DISCONTINUED | OUTPATIENT
Start: 2018-02-27 | End: 2018-03-01

## 2018-02-27 RX ORDER — TRAMADOL HYDROCHLORIDE 50 MG/1
50 TABLET ORAL
Status: DISCONTINUED | OUTPATIENT
Start: 2018-02-27 | End: 2018-03-01

## 2018-02-27 RX ADMIN — TRAMADOL HYDROCHLORIDE 50 MG: 50 TABLET, FILM COATED ORAL at 22:32

## 2018-02-27 RX ADMIN — HYDROCODONE BITARTRATE AND ACETAMINOPHEN 1 TABLET: 5; 325 TABLET ORAL at 01:40

## 2018-02-27 RX ADMIN — HYDROCODONE BITARTRATE AND ACETAMINOPHEN 1 TABLET: 5; 325 TABLET ORAL at 12:54

## 2018-02-27 RX ADMIN — METOPROLOL SUCCINATE 50 MG: 50 TABLET, FILM COATED, EXTENDED RELEASE ORAL at 08:01

## 2018-02-27 RX ADMIN — ASPIRIN 81 MG: 81 TABLET ORAL at 08:01

## 2018-02-27 RX ADMIN — TRAMADOL HYDROCHLORIDE 50 MG: 50 TABLET, COATED ORAL at 08:01

## 2018-02-27 RX ADMIN — SIMVASTATIN 40 MG: 40 TABLET, FILM COATED ORAL at 22:31

## 2018-02-27 RX ADMIN — SACUBITRIL AND VALSARTAN 1 TABLET: 49; 51 TABLET, FILM COATED ORAL at 22:32

## 2018-02-27 RX ADMIN — HYDROCODONE BITARTRATE AND ACETAMINOPHEN 1 TABLET: 5; 325 TABLET ORAL at 06:39

## 2018-02-27 RX ADMIN — TRAMADOL HYDROCHLORIDE 50 MG: 50 TABLET, COATED ORAL at 14:10

## 2018-02-27 RX ADMIN — CALCIUM CARBONATE-VITAMIN D TAB 500 MG-200 UNIT 1 TABLET: 500-200 TAB at 22:31

## 2018-02-27 RX ADMIN — FOSFOMYCIN TROMETHAMINE 1 PACKET: 3 POWDER ORAL at 14:11

## 2018-02-27 RX ADMIN — CALCIUM CARBONATE-VITAMIN D TAB 500 MG-200 UNIT 1 TABLET: 500-200 TAB at 08:01

## 2018-02-27 RX ADMIN — NALOXEGOL OXALATE 12.5 MG: 12.5 TABLET, FILM COATED ORAL at 08:07

## 2018-02-27 RX ADMIN — TRAMADOL HYDROCHLORIDE 50 MG: 50 TABLET, COATED ORAL at 03:49

## 2018-02-27 RX ADMIN — FAMOTIDINE 20 MG: 20 TABLET, FILM COATED ORAL at 08:01

## 2018-02-27 RX ADMIN — VALSARTAN 40 MG: 80 TABLET ORAL at 08:01

## 2018-02-27 RX ADMIN — FAMOTIDINE 20 MG: 20 TABLET, FILM COATED ORAL at 22:31

## 2018-02-27 RX ADMIN — SACUBITRIL AND VALSARTAN 1 TABLET: 49; 51 TABLET, FILM COATED ORAL at 08:04

## 2018-02-27 ASSESSMENT — PAIN SCALES - GENERAL
PAINLEVEL_OUTOF10: 0
PAINLEVEL_OUTOF10: 2
PAINLEVEL_OUTOF10: 0
PAINLEVEL_OUTOF10: 3
PAINLEVEL_OUTOF10: 5
PAINLEVEL_OUTOF10: 5
PAINLEVEL_OUTOF10: 6
PAINLEVEL_OUTOF10: 3
PAINLEVEL_OUTOF10: 0
PAINLEVEL_OUTOF10: 3
PAINLEVEL_OUTOF10: 0

## 2018-02-27 ASSESSMENT — PAIN DESCRIPTION - ONSET: ONSET: GRADUAL

## 2018-02-27 ASSESSMENT — PAIN DESCRIPTION - PAIN TYPE
TYPE: ACUTE PAIN
TYPE: ACUTE PAIN

## 2018-02-27 ASSESSMENT — PAIN DESCRIPTION - LOCATION
LOCATION: BACK

## 2018-02-27 ASSESSMENT — PAIN DESCRIPTION - FREQUENCY: FREQUENCY: CONTINUOUS

## 2018-02-27 NOTE — PROGRESS NOTES
pain to her seatbelt. Past Medical History:   Diagnosis Date    Arthritis     general    Breast CA ([facility] [address] 12/03    right    CAD (coronary artery disease) 2009    stent in [place]    CH[facility] (congestive heart failure) ([facility]     Closed compression fracture of first lumbar vertebra ([facility] [address] 2/7/2018    Colon polyps 8/97; 3/11    COPD (chronic obstructive pulmonary disease) ([facility] [address] 8/5/2017    Diverticulosis 1/06    Erosive gastritis 11/06    Esophageal stricture 03/2011    Three times, Dr. Nidhi Martinez    Hyperlipidemia     Hypertension     Internal hemorrhoid 1/06    LUISA on CPAP     Osteopenia determined by x-ray 1999    Stress incontinence, female      Past Surgical History:   Procedure Laterality Date    BREAST LUMPECTOMY  1/04    wtih axillary dissection    CARDIAC DEFIBRILLATOR PLACEMENT  01/08/2018    Biventricular pacemaker ICD, Dr. Ruthie Wolfe  3/2011    CORONARY ANGIOPLASTY WITH STENT PLACEMENT  2009    DILATATION, ESOPHAGUS  2011   [address] or 1977    bleeding    JOINT REPLACEMENT Left     Shoulder    [place] OPEN FIXATN MID HUMERUS FRACTURE Right 2/12/2018    LEFT HUMERUS OPEN REDUCTION INTERNAL FIXATION performed by Whitney Biggs MD at [facility]       Restrictions/Precautions:  Restrictions/Precautions: General Precautions, Fall Risk, Weight Bearing       Left Upper Extremity Weight Bearing: Non Weight Bearing    Position Activity Restriction  Other position/activity restrictions: L1 compression fx    Required Braces or Orthoses  Spinal:  (Abdominal Binder)  Left Upper Extremity Brace/Splint: Sling    Subjective:  Chart Reviewed: Yes  Family / Caregiver Present: No  Subjective: Pt resting in bedside chair upon arrival, pleasant and agreeable to therapy. Used restroom at beginning of session, able to void. Pain:  Yes.   Pain Assessment  Pain Level: 3  Pain Type: Acute pain  Pain Location: Back Social/Functional:  Lives With: Alone  Type of Home: House  Home Layout: Two level, Performs ADL's on one level  Home Access: Stairs to enter with rails  Entrance Stairs - Number of Steps: 2  Entrance Stairs - Rails: Both  Home Equipment: Standard walker     Objective:       Transfers  Sit to Stand: Contact guard assistance (Initially required one cue to hold cane in opposite hand to be able to push from chair with R UE. )  Stand to sit: Contact guard assistance  Stand Pivot Transfers: Contact guard assistance (with straight cane)    WB Status: NWB LUE in sling  Ambulation 1  Surface: level tile;uneven  Device: Single point cane  Other Apparatus:  (L arm sling and abdominal binder)  Assistance: Contact guard assistance  Quality of Gait: Slow gordon. Variable cane placement throughout gait. Min instability but no LOB  Distance: 125 feet x1 20 feet x1   Comments: Pt walked over gravel surface    Stairs  # Steps : 4  Stairs Height: 6\"  Rails: Right ascending  Device: No Device  Assistance: Contact guard assistance    Balance  Comments: Dynamic  bathroom to manage depends and pants and to complete caitlin clean up requiring contact guard assist for safety. Pt also completed functional reaching activity including reach to L and R to edge of LUIZ with R UE while maintaining safe stable balance. Pt requiring clsoe stand by to contact guard assist for safety. Exercises:  Exercises  Comments: Pt completed BLE standing heel raises, hip flex, hip abd/add, hs curl, marches all x12 reps to increase strength for improved functional mobility. Activity Tolerance:  Activity Tolerance: Patient Tolerated treatment well    Assessment: Body structures, Functions, Activity limitations: Decreased functional mobility , Decreased strength, Decreased endurance, Decreased balance  Assessment: Pt tolerated session fairly well. Limited by decreased endurance and decreased balance. Would benefit fro mcontinued therapy.

## 2018-02-27 NOTE — PROGRESS NOTES
3/2011    CORONARY ANGIOPLASTY WITH STENT PLACEMENT      DILATATION, ESOPHAGUS      HYSTERECTOMY  1976 or 1977    bleeding    JOINT REPLACEMENT Left     Shoulder    [place] OPEN FIXATN MID HUMERUS FRACTURE Right 2018    LEFT HUMERUS OPEN REDUCTION INTERNAL FIXATION performed by Justina Andre MD at [facility]     Subjective  Subjective: Patient seated in recliner upon arrival, agreeable to OT.  Disorientation noted this AM.     Pain:  Pain Assessment  Patient Currently in Pain: Yes  Pain Level: 3  Pain Location: Back    Objective  Overall Cognitive Status: Exceptions    Transfers  Sit to stand: Contact guard assistance (recliner, shower chair, ETS )  Stand to sit: Stand by assistance    Balance  Sitting Balance: Supervision  Standing Balance: Stand by assistance   Time: in prep to ambulate, x3-4 min during BADL     Functional Mobility  Functional - Mobility Device: Cane  Assist Level: Stand by assistance  Functional Mobility Comments: To/from shower room and within room no LOB     Groomin - Requires setup/cues to do all tasks                                                     Bathing: 3 - Able to bathe 5-7 areas                                                                              Dressing-Upper: 3 - Requires assist with 2 tasks                                                    Dressing-Lower: 3 - Requires assist with 2-3 parts of dressing (pt able to thread BLEs, min A to manage over L hip & assist for socks )                                                          Toileting: 3 - Able to perform 2 tasks                                                                               Toilet Transfer: 5 - Requires setup/supervision/cues                                                                                                                 Shower Transfer: 4 - Minimal contact assistance, pt. expends 75% or more effort toileting in bathroom. Short term goal 5: Pt will complete complete LUE distal exs and RUE light strengthening task x10 reps to improve distal strength to 4+/5. Long term goals  Time Frame for Long term goals : 3 weeks  Long term goal 1: Pt will don UB dressing tasks with Min A and min vcs for NWBing for ease of dressing tasks. Long term goal 2: Pt will complete grooming tasks standing at sink x4 minutes with SBA to improve indep with self care tasks.
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
## 2018-02-27 NOTE — PROGRESS NOTES
Family Medicine Progress Notes/Coverage    Today's Date: 2/27/18  7E-66/066-A  Medical Record # [id number]  Account # [de-identified]      Ms. Ran Walls admitted on 2/14/2018        Subjective / Interval History :     Slept good  + back pain, otherwise doing well, No SOB, No chest pain , No fatigue.  No nausea nor abdominal pain  Reviewed notes, consults, laboratory and radiology results,    Objective:       Physical Exam:  Patient Vitals for the past 24 hrs:   BP Temp Temp src Pulse Resp SpO2 Weight   02/27/18 0206 - - - - - - 219 lb 8 oz (99.6 kg)   02/26/18 2208 131/65 98 °F (36.7 °C) Oral 76 16 96 % -   02/26/18 1400 (!) 147/78 - - 74 - - -   02/26/18 0802 - 97.7 °F (36.5 °C) Oral 70 20 96 % -       General Appearance:  Alert, cooperative, no distress, appears stated age   [de-identified]:  Neck: Crusty scab left post parietal area     Chest/Lungs:  Heart: CTA  RRR   Abdomen:  Back:    Extremities:  Neurological Exam: No edema  WNL       Assessment:     Active Hospital Problems    Diagnosis Date Noted    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 ([facility] [address] [S06.9X0A] 02/14/2018     Priority: High    Syncope and collapse [R55] 02/07/2018     Priority: High    Recurrent falls [R29.6] 02/07/2018     Priority: High    Closed fracture of shaft of left humerus [S42.302A] 02/07/2018     Priority: High    Closed head injury with concussion [S06.0X9A] 02/03/2018     Priority: High    Acute cystitis [N30.00] 02/22/2018     Priority: Medium    Adjustment disorder with mixed anxiety and depressed mood [F43.23] 02/20/2018     Priority: Medium    Complicated grief [R93.07, Z63.4] 02/20/2018     Priority: Medium    Hypertension [I10]      Priority: Medium    Closed compression fracture of first lumbar vertebra ([facility] [address] [S32.010A] 02/07/2018     Priority: Medium    Traumatic ecchymosis of
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
## 2018-02-27 NOTE — PROGRESS NOTES
Location:    Services/Supervision Recommended:     [x]Patient continues to require treatment by a licensed therapist to address functional deficits as outlined in the established plan of care. Nahid Randhawa.  [address], [address], [address]

## 2018-02-27 NOTE — PROGRESS NOTES
and think through where to place \"daily\" pills, as well as for completeness with pills prescribed to be taken multiple times per day. Improved success noted with prompts to re-read instructions. SHORT TERM GOAL #3:  Goal 3: Patient will complete selective and alternating attention and tasks w/ 70% accuracy and mod cues in order to improve overall functioning. INTERVENTIONS: Did not address due to focus on other goals. SHORT TERM GOAL #4:  Goal 4: Pt will safely tolerate regular and nectar thick diet given compensatory swallowing strategies from trained staff/caregivers to maximize nutrition/hydation measures. INTERVENTIONS:  Did not address due to focus on other goals. SHORT TERM GOAL #5:  Goal 5:Pt will tolerate advanced liquid trials x10 utilizing strategy of CHIN TUCK to permit potential advancement to least restrictive diet; complete pharyngeal strengthening exercises to improve airway protection for safety of intake. INTERVENTIONS: Did not address due to focus on other goals. Long-term Goals  Timeframe for Long-term Goals: 2 weeks  Goal 1: Pt will improve cognitive skills to a supervision/modified independent level to maximize level of independence for return to independent living in home environment. ONGOING  Goal 2: Pt will safely tolerate least restrictive diet 98% of the time given compensatory swallowing strategies from trained staff/caregivers to maximize nutrition/hydration measures.  ONGOING      Communication  Comprehension: 5 - Patient understands basic needs (hungry/hot/pain)  Expression: 6 - Device used to express complex ideas/needs  Social Cognition  Social Interaction: 6 - Patient requires medication for mood and/or effect  Problem Solvin - Patient solves simple/routine tasks 75-90%+   Memory: 5 - Patient requires prompting with stress/unfamiliar situations    ASSESSMENT:  Assessment: [x]Progressing towards goals          []Not Progressing towards goals    Patient

## 2018-02-28 LAB
BILIRUBIN URINE: NEGATIVE
BLOOD, URINE: NEGATIVE
CHARACTER, URINE: CLEAR
COLOR: YELLOW
GLUCOSE URINE: NEGATIVE MG/DL
KETONES, URINE: NEGATIVE
LEUKOCYTE ESTERASE, URINE: NEGATIVE
NITRITE, URINE: NEGATIVE
PH UA: 6.5
PROTEIN UA: NEGATIVE
SPECIFIC GRAVITY, URINE: 1.01 (ref 1–1.03)
UROBILINOGEN, URINE: 1 EU/DL

## 2018-02-28 PROCEDURE — G0515 COGNITIVE SKILLS DEVELOPMENT: HCPCS

## 2018-02-28 PROCEDURE — 97110 THERAPEUTIC EXERCISES: CPT

## 2018-02-28 PROCEDURE — 6370000000 HC RX 637 (ALT 250 FOR IP): Performed by: EMERGENCY MEDICINE

## 2018-02-28 PROCEDURE — 92507 TX SP LANG VOICE COMM INDIV: CPT | Performed by: SPEECH-LANGUAGE PATHOLOGIST

## 2018-02-28 PROCEDURE — 95992 CANALITH REPOSITIONING PROC: CPT

## 2018-02-28 PROCEDURE — 1180000000 HC REHAB R&B

## 2018-02-28 PROCEDURE — 81003 URINALYSIS AUTO W/O SCOPE: CPT

## 2018-02-28 PROCEDURE — 97530 THERAPEUTIC ACTIVITIES: CPT

## 2018-02-28 PROCEDURE — 6370000000 HC RX 637 (ALT 250 FOR IP): Performed by: PHYSICAL MEDICINE & REHABILITATION

## 2018-02-28 PROCEDURE — 97116 GAIT TRAINING THERAPY: CPT

## 2018-02-28 PROCEDURE — 97535 SELF CARE MNGMENT TRAINING: CPT

## 2018-02-28 RX ADMIN — VALSARTAN 40 MG: 80 TABLET ORAL at 07:51

## 2018-02-28 RX ADMIN — TRAMADOL HYDROCHLORIDE 50 MG: 50 TABLET, FILM COATED ORAL at 03:43

## 2018-02-28 RX ADMIN — FAMOTIDINE 20 MG: 20 TABLET, FILM COATED ORAL at 07:50

## 2018-02-28 RX ADMIN — HYDROCODONE BITARTRATE AND ACETAMINOPHEN 0.5 TABLET: 5; 325 TABLET ORAL at 18:05

## 2018-02-28 RX ADMIN — HYDROCODONE BITARTRATE AND ACETAMINOPHEN 0.5 TABLET: 5; 325 TABLET ORAL at 00:44

## 2018-02-28 RX ADMIN — TRAMADOL HYDROCHLORIDE 50 MG: 50 TABLET, FILM COATED ORAL at 07:50

## 2018-02-28 RX ADMIN — FAMOTIDINE 20 MG: 20 TABLET, FILM COATED ORAL at 21:54

## 2018-02-28 RX ADMIN — SACUBITRIL AND VALSARTAN 1 TABLET: 49; 51 TABLET, FILM COATED ORAL at 21:54

## 2018-02-28 RX ADMIN — HYDROCODONE BITARTRATE AND ACETAMINOPHEN 0.5 TABLET: 5; 325 TABLET ORAL at 12:44

## 2018-02-28 RX ADMIN — TRAMADOL HYDROCHLORIDE 50 MG: 50 TABLET, FILM COATED ORAL at 21:54

## 2018-02-28 RX ADMIN — METOPROLOL SUCCINATE 50 MG: 50 TABLET, FILM COATED, EXTENDED RELEASE ORAL at 07:50

## 2018-02-28 RX ADMIN — SACUBITRIL AND VALSARTAN 1 TABLET: 49; 51 TABLET, FILM COATED ORAL at 07:51

## 2018-02-28 RX ADMIN — CALCIUM CARBONATE-VITAMIN D TAB 500 MG-200 UNIT 1 TABLET: 500-200 TAB at 07:50

## 2018-02-28 RX ADMIN — CALCIUM CARBONATE-VITAMIN D TAB 500 MG-200 UNIT 1 TABLET: 500-200 TAB at 21:54

## 2018-02-28 RX ADMIN — NALOXEGOL OXALATE 12.5 MG: 12.5 TABLET, FILM COATED ORAL at 07:51

## 2018-02-28 RX ADMIN — TRAMADOL HYDROCHLORIDE 50 MG: 50 TABLET, FILM COATED ORAL at 16:19

## 2018-02-28 RX ADMIN — SIMVASTATIN 40 MG: 40 TABLET, FILM COATED ORAL at 21:54

## 2018-02-28 RX ADMIN — ASPIRIN 81 MG: 81 TABLET ORAL at 07:50

## 2018-02-28 RX ADMIN — HYDROCODONE BITARTRATE AND ACETAMINOPHEN 0.5 TABLET: 5; 325 TABLET ORAL at 06:49

## 2018-02-28 ASSESSMENT — PAIN DESCRIPTION - LOCATION
LOCATION: BACK

## 2018-02-28 ASSESSMENT — PAIN SCALES - GENERAL
PAINLEVEL_OUTOF10: 3
PAINLEVEL_OUTOF10: 0
PAINLEVEL_OUTOF10: 0
PAINLEVEL_OUTOF10: 4
PAINLEVEL_OUTOF10: 4
PAINLEVEL_OUTOF10: 3
PAINLEVEL_OUTOF10: 4
PAINLEVEL_OUTOF10: 2
PAINLEVEL_OUTOF10: 4
PAINLEVEL_OUTOF10: 3
PAINLEVEL_OUTOF10: 3
PAINLEVEL_OUTOF10: 6
PAINLEVEL_OUTOF10: 4

## 2018-02-28 ASSESSMENT — PAIN DESCRIPTION - PAIN TYPE
TYPE: ACUTE PAIN

## 2018-02-28 ASSESSMENT — PAIN DESCRIPTION - ORIENTATION
ORIENTATION: LOWER

## 2018-02-28 ASSESSMENT — PAIN DESCRIPTION - DESCRIPTORS
DESCRIPTORS: ACHING
DESCRIPTORS: ACHING;SORE
DESCRIPTORS: ACHING;SORE
DESCRIPTORS: ACHING
DESCRIPTORS: SORE
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
## 2018-02-28 NOTE — PROGRESS NOTES
which pt was given 3-unit word list and directed to re-organize in alphabetical order from auditory presentation. 4/10 independently, 2/10 given mod cues, 3/10 given max cues, 4/13 unable to elicit  *perseverative speech x3 with task  *increased processing time required with task    SHORT TERM GOAL #2:  Goal 2: Pt will complete thought organization and verbal/visual reasoning tasks (including finance and medication management) w/ 70% accuracy and mod cues in order to improve ability to ensure safety and perform ADLs. INTERVENTIONS: Severe deficits with thought organization when engaging in conversation. Multiple instance of anomia and reduced mental focus. Sequencing for steps to stand to go to the restroom- Max verbal directives provided. Pt stating,  \"I don't know how to stand. \" With verbal and physical assist of 2, patient successful with standing, pivoting and sitting on the commode to use the restroom. SHORT TERM GOAL #3:  Goal 3: Patient will complete selective and alternating attention and tasks w/ 70% accuracy and mod cues in order to improve overall functioning. INTERVENTIONS: Severe deficits with mental focus today due to fatigue. Unable to complete cognitive tasks. SHORT TERM GOAL #4:  Goal 4: Pt will safely tolerate regular and nectar thick diet given compensatory swallowing strategies from trained staff/caregivers to maximize nutrition/hydation measures. GOAL MET- NO NEW GOAL  INTERVENTIONS:    Pt seen for diet texture analysis during noon meal with mashed potatoes, roast beef and nectar thick liquids. Appropriate mastication and bolus manipulation noted, as well as timely posterior transit and swallow initiation. No overt s/s of aspiration throughout trials. Pt independently employing use of safe swallow strategies.      SHORT TERM GOAL #5:  Goal 5:Pt will tolerate advanced liquid trials x10 utilizing strategy of CHIN TUCK to permit potential advancement to least restrictive diet; complete pharyngeal strengthening exercises to improve airway protection for safety of intake. INTERVENTIONS: DNT due to focus on cognition and fatigue level presenting    Long-term Goals  Timeframe for Long-term Goals: 2 weeks  Goal 1: Pt will improve cognitive skills to a supervision/modified independent level to maximize level of independence for return to independent living in home environment. ONGOING  Goal 2: Pt will safely tolerate least restrictive diet 98% of the time given compensatory swallowing strategies from trained staff/caregivers to maximize nutrition/hydration measures. ONGOING      Communication  Comprehension: 3 - Patient understands basic needs 50-74% of the time  Expression: 3 - Expresses basic ideas/needs 50-74% of the time  Social Cognition  Social Interaction: 3 - Patient appropriate  50%-74% of the time  Problem Solvin - Patient solves simple/routine tasks 25%-49%  Memory: 3 - Patient remembers 50%-74% of the time    ASSESSMENT:  Assessment: []Progressing towards goals          [x]Not Progressing towards goals    Patient Tolerance of Treatment:   []Tolerated well [x]Tolerated fair [x]Required rest breaks [x]Fatigued  Plan for Next Session:  Thin liquid trials, pharyngeal exercises  Education:  Learner:  [x]Patient          []Significant Other          []Other: son  Education provided regarding:  [x]Goals and POC   []Diet and swallowing precautions    []Home Exercise Program  [x]Progress and/or discharge information  Method of Education:  [x]Discussion          [x]Demonstration          []Handout          []Other  Evaluation of Education:   [x]Verbalized understanding   []Demonstrates without assistance  []Demonstrates with assistance  [x]Needs further instruction     []No evidence of learning                  [x]No family present      Plan: [x]Continue with current plan of care    []Modify current plan of care as follows:    []Discharge patient    Discharge
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
## 2018-02-28 NOTE — PROGRESS NOTES
[address] [address]  INPATIENT SPEECH THERAPY  [facility]    TIME   SLP Individual Minutes  Time In: 2264  Time Out: 6975  Minutes: 30  Timed Code Treatment Minutes: 30 Minutes          [x]Daily Note  []Progress Note  []Discharge Note    Date: 2018  Patient Name: Peter Rivera        MRN: [id number]    : 1940  (77 y.o.)  Gender: female   Primary Provider: Gareth Lo MD  Admitting Diagnosis: TBI  Secondary Diagnosis: Cognitive deficits, dysphagia  Precautions: Fall risk  Swallowing Status/Diet: Regular diet with nectar thick liquids  Swallowing Strategies: Full upright position, no straw, chin tuck, pulmonary monitoring, alternate solid/liquids, small bite/sip, limit distractions  DATE of last MBS:  18  Pain:  No pain/discomfort per pt    Subjective: Pt alert and pleasant, communicating significant level of fatigue; pt indicated having completed vestibular testing prior to ST arrival with RN Moira Gonzalez confirming; pt required continual verbal and tactile cues as session progressed to maintain adequate alertness for participation. High level of mental fatigue noted at date, impacting performance with cognitive tasks. SHORT TERM GOAL #1:  Goal 1: Pt will complete working memory and delayed recall  tasks w/ 70% accuracy and mod cues in order to improve retention of functional and novel information. INTERVENTIONS: Instructed pt on strategies of rehearsal and visual highlighting for delayed retention of paragraph details. Modeling given to ensure accurate usage with rationale provided for functionality of strategies in home setting. Delay x10 minutes: 9/10 independently, 1/10 given min cues    Working [employer] task, in which pt was given 3-unit word list and directed to re-organize in alphabetical order from auditory presentation.  4/10 independently, 2/10 given mod cues, 3/10 given max cues, / unable to elicit  *perseverative speech x3 with task  *increased 4: Pt will safely tolerate regular and nectar thick diet given compensatory swallowing strategies from trained staff/caregivers to maximize nutrition/hydation measures. INTERVENTIONS:  DNT due to focus on cognition and fatigue level presenting    Prior session:   Pt seen for diet texture analysis during noon meal with turkey sandwich (toast, lettuce and tomato) and nectar thick liquids. Appropriate mastication and bolus manipulation noted, as well as timely posterior transit and swallow initiation. No overt s/s of aspiration throughout trials. Pt continues to report decreased appetite, but ate greater than 50% of meal with SLP in the room and was continuing meal as therapist left. Nurse Conception [place] in to provide pain medication, no difficulty with swallowing medication whole, one at a time. Independent use of chin tuck with medication pass. SHORT TERM GOAL #5:  Goal 5:Pt will tolerate advanced liquid trials x10 utilizing strategy of CHIN TUCK to permit potential advancement to least restrictive diet; complete pharyngeal strengthening exercises to improve airway protection for safety of intake. INTERVENTIONS: DNT due to focus on cognition and fatigue level presenting    Prior session:    Completed the following pharyngeal swallow to improve degree of hyolaryngeal elevation for adequate airway protection:  -Effortful swallow- 3 sets of 10 with good success. Completed 4 ounce trial of thin liquids. Cueing needed for use of chin tuck, and slight oral bolus holding to reduce episodes of premature pharyngeal entry. Wet vocal quality noted x1 and latent throat clear x2 during trials this session. Improvements evident when patient was cued to engage in deeper level chin tuck. Will need to continue to habitualize use of strategy prior to diet advancement. Pt would benefit from repeat MBS to re-assess pharyngeal swallow function, will complete close to discharge.      Long-term Goals  Timeframe for Long-term Goals: 2

## 2018-02-28 NOTE — PROGRESS NOTES
Nutrition Assessment    Type and Reason for Visit: Reassess (PO & Supplement Monitor)    Nutrition Recommendations: Recommend starting an appetite stimulant and Multivitamin w/minerals daily d/t ongoing poor appetite. Continue current diet and ONS as ordered. *Encourage oral intake of meals and ONS daily. *Continue routine weight checks. Malnutrition Assessment:  · Malnutrition Status: At risk for malnutrition (Currently does not meeting ASPEN criteria)    Nutrition Diagnosis:   · Problem: Increased nutrient needs (protein, MVI & minerals)  · Etiology: related to Acute injury/trauma     Signs and symptoms:  as evidenced by Presence of wounds    Nutrition Assessment:  · Subjective Assessment: Pt admitted s/p fall with TBI with L1 fx & humeral fx s/p OR 2/12. Pt seen- asleep during lunch which she did not tough. Pt states she isn't hungry for lunch. She states she is trying to consume at least 50% of meals but she has no desire to eat and does not feel hungry. Noted- pt's weight is down 9 lbs since admission. Pt does have edema, so question if it is fluid or po intake related. Pt reports she doesn't care for the Ensure Clear but will drink it occasionally. Pt is amenable to consume Carmen-Ming with meals. Pt denies N/V/D/C. Last BM x1 today.   Meds include: Calcium & Vitamin D.   · Wound Type: Surgical Wound (2/2 open left humeral shaft fx)  · Current Nutrition Therapies:  · Oral Diet Orders: General, [place] Thick   · Oral Diet intake: 26-50%, 51-75%, %  · Oral Nutrition Supplement (ONS) Orders: Clear Liquid Oral Supplement (Ensure Clear BID; Magic Cup BID)  · ONS intake:  (per pt she is taking & trying to drink)  · Anthropometric Measures:  · Ht: 5' 7\" (170.2 cm)   · Current Body Wt: 220 lb 1.6 oz (99.8 kg) (2/28; +1 pitting edema BLE; +trace, non-pitting edema BUE)  · Admission Body Wt: 229 lb 11.5 oz (104.2 kg) (2/14/17 nonpitting edema)  · Usual Body Wt: 224 lb 10.4 oz (101.9 kg) (2/3/18 per

## 2018-03-01 PROCEDURE — 6370000000 HC RX 637 (ALT 250 FOR IP): Performed by: PHYSICAL MEDICINE & REHABILITATION

## 2018-03-01 PROCEDURE — 1180000000 HC REHAB R&B

## 2018-03-01 PROCEDURE — 97530 THERAPEUTIC ACTIVITIES: CPT

## 2018-03-01 PROCEDURE — 92526 ORAL FUNCTION THERAPY: CPT | Performed by: SPEECH-LANGUAGE PATHOLOGIST

## 2018-03-01 PROCEDURE — 97535 SELF CARE MNGMENT TRAINING: CPT

## 2018-03-01 PROCEDURE — 97110 THERAPEUTIC EXERCISES: CPT

## 2018-03-01 PROCEDURE — 97116 GAIT TRAINING THERAPY: CPT

## 2018-03-01 PROCEDURE — 6370000000 HC RX 637 (ALT 250 FOR IP): Performed by: EMERGENCY MEDICINE

## 2018-03-01 PROCEDURE — G0515 COGNITIVE SKILLS DEVELOPMENT: HCPCS

## 2018-03-01 PROCEDURE — G0515 COGNITIVE SKILLS DEVELOPMENT: HCPCS | Performed by: SPEECH-LANGUAGE PATHOLOGIST

## 2018-03-01 RX ORDER — TRAMADOL HYDROCHLORIDE 50 MG/1
50 TABLET ORAL
Status: DISCONTINUED | OUTPATIENT
Start: 2018-03-02 | End: 2018-03-04

## 2018-03-01 RX ORDER — ACETAMINOPHEN 325 MG/1
650 TABLET ORAL
Status: DISCONTINUED | OUTPATIENT
Start: 2018-03-02 | End: 2018-03-05

## 2018-03-01 RX ADMIN — CALCIUM CARBONATE-VITAMIN D TAB 500 MG-200 UNIT 1 TABLET: 500-200 TAB at 21:27

## 2018-03-01 RX ADMIN — TRAMADOL HYDROCHLORIDE 50 MG: 50 TABLET, FILM COATED ORAL at 03:12

## 2018-03-01 RX ADMIN — FAMOTIDINE 20 MG: 20 TABLET, FILM COATED ORAL at 09:51

## 2018-03-01 RX ADMIN — TRAMADOL HYDROCHLORIDE 50 MG: 50 TABLET, FILM COATED ORAL at 14:35

## 2018-03-01 RX ADMIN — HYDROCODONE BITARTRATE AND ACETAMINOPHEN 0.5 TABLET: 5; 325 TABLET ORAL at 06:09

## 2018-03-01 RX ADMIN — ASPIRIN 81 MG: 81 TABLET ORAL at 09:51

## 2018-03-01 RX ADMIN — HYDROCODONE BITARTRATE AND ACETAMINOPHEN 0.5 TABLET: 5; 325 TABLET ORAL at 00:24

## 2018-03-01 RX ADMIN — METOPROLOL SUCCINATE 50 MG: 50 TABLET, FILM COATED, EXTENDED RELEASE ORAL at 09:51

## 2018-03-01 RX ADMIN — SACUBITRIL AND VALSARTAN 1 TABLET: 49; 51 TABLET, FILM COATED ORAL at 09:51

## 2018-03-01 RX ADMIN — TRAMADOL HYDROCHLORIDE 50 MG: 50 TABLET, FILM COATED ORAL at 08:59

## 2018-03-01 RX ADMIN — CALCIUM CARBONATE-VITAMIN D TAB 500 MG-200 UNIT 1 TABLET: 500-200 TAB at 09:51

## 2018-03-01 RX ADMIN — VALSARTAN 40 MG: 80 TABLET ORAL at 09:52

## 2018-03-01 RX ADMIN — HYDROCODONE BITARTRATE AND ACETAMINOPHEN 0.5 TABLET: 5; 325 TABLET ORAL at 11:53

## 2018-03-01 RX ADMIN — ACETAMINOPHEN 650 MG: 325 TABLET ORAL at 23:57

## 2018-03-01 RX ADMIN — TRAMADOL HYDROCHLORIDE 50 MG: 50 TABLET, FILM COATED ORAL at 21:27

## 2018-03-01 RX ADMIN — FAMOTIDINE 20 MG: 20 TABLET, FILM COATED ORAL at 21:27

## 2018-03-01 RX ADMIN — SACUBITRIL AND VALSARTAN 1 TABLET: 49; 51 TABLET, FILM COATED ORAL at 21:27

## 2018-03-01 RX ADMIN — HYDROCODONE BITARTRATE AND ACETAMINOPHEN 0.5 TABLET: 5; 325 TABLET ORAL at 18:13

## 2018-03-01 RX ADMIN — SIMVASTATIN 40 MG: 40 TABLET, FILM COATED ORAL at 21:27

## 2018-03-01 RX ADMIN — NALOXEGOL OXALATE 12.5 MG: 12.5 TABLET, FILM COATED ORAL at 09:51

## 2018-03-01 ASSESSMENT — PAIN DESCRIPTION - ORIENTATION
ORIENTATION: LOWER

## 2018-03-01 ASSESSMENT — PAIN DESCRIPTION - PAIN TYPE
TYPE: ACUTE PAIN

## 2018-03-01 ASSESSMENT — PAIN DESCRIPTION - DESCRIPTORS
DESCRIPTORS: ACHING
DESCRIPTORS: SORE
DESCRIPTORS: ACHING

## 2018-03-01 ASSESSMENT — PAIN SCALES - GENERAL
PAINLEVEL_OUTOF10: 0
PAINLEVEL_OUTOF10: 2
PAINLEVEL_OUTOF10: 2
PAINLEVEL_OUTOF10: 0
PAINLEVEL_OUTOF10: 2
PAINLEVEL_OUTOF10: 2
PAINLEVEL_OUTOF10: 3
PAINLEVEL_OUTOF10: 0
PAINLEVEL_OUTOF10: 5
PAINLEVEL_OUTOF10: 4
PAINLEVEL_OUTOF10: 1
PAINLEVEL_OUTOF10: 1
PAINLEVEL_OUTOF10: 6
PAINLEVEL_OUTOF10: 0
PAINLEVEL_OUTOF10: 2
PAINLEVEL_OUTOF10: 5
PAINLEVEL_OUTOF10: 1
PAINLEVEL_OUTOF10: 4
PAINLEVEL_OUTOF10: 5
PAINLEVEL_OUTOF10: 0

## 2018-03-01 ASSESSMENT — PAIN DESCRIPTION - LOCATION
LOCATION: BACK

## 2018-03-01 NOTE — PROGRESS NOTES
Family Medicine Progress Notes/Coverage    Today's Date: 3/1/18  [id number]-[id number]  Medical Record # [id number]  Account # [de-identified]      Ms. Ila Hodgkins admitted on 2/14/2018        Subjective / Interval History :     doing well, No SOB, No chest pain , No fatigue.  No nausea nor abdominal pain  Still taking Norco 4 times a day and Tramadol 2 times a day  Pain on the back is 2/10  Reviewed notes, consults, laboratory and radiology results,    Objective:       Physical Exam:  Patient Vitals for the past 24 hrs:   BP Temp Temp src Pulse Resp SpO2   02/28/18 2148 127/64 98.1 °F (36.7 °C) Oral 71 16 95 %       General Appearance:  Alert, cooperative, no distress, appears stated age   [de-identified]:  Neck:      Chest/Lungs:  Heart: CTA  RRR   Abdomen:  Back:    Extremities:  Neurological Exam: No edema  No deficit       Assessment:     Active Hospital Problems    Diagnosis Date Noted    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 ([facility] [address] [S06.9X0A] 02/14/2018     Priority: High    Syncope and collapse [R55] 02/07/2018     Priority: High    Recurrent falls [R29.6] 02/07/2018     Priority: High    Closed fracture of shaft of left humerus [S42.302A] 02/07/2018     Priority: High    Closed head injury with concussion [S06.0X9A] 02/03/2018     Priority: High    Acute cystitis [N30.00] 02/22/2018     Priority: Medium    Adjustment disorder with mixed anxiety and depressed mood [F43.23] 02/20/2018     Priority: Medium    Complicated grief [Y71.87, Z63.4] 02/20/2018     Priority: Medium    Hypertension [I10]      Priority: Medium    Closed compression fracture of first lumbar vertebra ([facility] [address] [S32.010A] 02/07/2018     Priority: Medium    Traumatic ecchymosis of right upper arm [S40.021A] 02/03/2018     Priority: Medium    Hematoma of scalp [S00.03XA] 02/03/2018     Priority: Medium   
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
## 2018-03-01 NOTE — PROGRESS NOTES
3/2011    CORONARY ANGIOPLASTY WITH STENT PLACEMENT  2009    DILATATION, ESOPHAGUS  2011    HYSTERECTOMY  1976 or 1977    bleeding    JOINT REPLACEMENT Left     Shoulder    [place] OPEN FIXATN MID HUMERUS FRACTURE Right 2/12/2018    LEFT HUMERUS OPEN REDUCTION INTERNAL FIXATION performed by Daren Gr MD at [facility]           Subjective  Subjective: Pt seated in recliner, stating she cannot get comfortable. Pt agreeable to OT. Pain:  Pain Assessment  Patient Currently in Pain: Yes  Pain Level: 1  Pain Type: Acute pain  Pain Location: Back       Objective  Overall Cognitive Status: Exceptions  Cognition Comment: Pt reports feeling foggy, \"but it varies\"   Instrumental ADLs: Yes  Meal Prep  Meal Preparation: IADL meal prep task of making cup of cofee with min vcs to sequence activity using keurig. \"This his how I usually make coffee at my house. \"     IADL light housekeeping  Pt used cane with CGA for balance while completing simple IADL task of putting away dishes. Tolerated well.        Transfers  Sit to stand: Contact guard assistance (needing 2 attempts to stand )  Stand to sit: Contact guard assistance       Balance  Sitting Balance: Supervision  Standing Balance: Stand by assistance     Time: x3 minutes during IADL      Functional Mobility  Functional - Mobility Device: Cane  Activity: Retrieve items;Transport items  Assist Level: Contact guard assistance  Functional Mobility Comments: Within rehab apartment, within room      Activity Tolerance:  Activity Tolerance: Patient Tolerated treatment well    Assessment:  Performance deficits / Impairments: Decreased functional mobility , Decreased ADL status, Decreased endurance, Decreased balance, Decreased safe awareness, Decreased cognition, Decreased strength  Prognosis: Fair  Discharge Recommendations: Patient would benefit from continued therapy after discharge    Patient Education:  Patient Education: IADLs     Equipment Recommendations: Other: Continue to assess pending progress    Safety:  Safety Devices in place: Yes  Type of devices: All fall risk precautions in place, Call light within reach, Gait belt, Left in chair, Chair alarm in place, Nurse notified    Plan:  Times per week: 5x/wk for 90 min and 1x/wk for 30 min    Current Treatment Recommendations: Balance Training, Functional Mobility Training, Endurance Training, Self-Care / ADL, Safety Education & Training, Patient/Caregiver Education & Training, Equipment Evaluation, Education, & procurement, Cognitive Reorientation    Goals:  Patient goals : \" Go to the bathroom, walk around, take care of myself \"    Short term goals  Time Frame for Short term goals: 1 week   Short term goal 1: Pt will recall zuleyka dressing technique to don shirt with mod assist consistently and min vcs to improve indep with daily dressing tasks. Short term goal 2: Pt will tolerate x5 min standing tasks while release RUE with SBA consistently with no cues for spinal precautions for ease of sinkside grooming. Short term goal 3: Pt will complete LB dressing tasks with min A using LHAE with mod vcs for spinal precautions for ease of self care. Short term goal 4: Pt will ambulate to [facility] with SBA of 1 using hemiwalker to improve indep with toileting in bathroom. Short term goal 5: Pt will complete complete LUE distal exs and RUE light strengthening task x10 reps to improve distal strength to 4+/5. Long term goals  Time Frame for Long term goals : 3 weeks  Long term goal 1: Pt will don UB dressing tasks with Min A and min vcs for NWBing for ease of dressing tasks. Long term goal 2: Pt will complete grooming tasks standing at sink x4 minutes with SBA to improve indep with self care tasks.

## 2018-03-01 NOTE — PROGRESS NOTES
[address]    TIME   SLP Individual Minutes  Time In: 0900  Time Out: 0930  Minutes: 30  Timed Code Treatment Minutes: 20 Minutes          [x]Daily Note  []Progress Note  []Discharge Note    Date: 3/1/2018  Patient Name: Stephie Mendez        MRN: [id number]    : 1940  (77 y.o.)  Gender: female   Primary Provider: Radha Randhawa MD  Admitting Diagnosis: TBI  Secondary Diagnosis: Cognitive deficits, dysphagia  Precautions: Fall risk  Swallowing Status/Diet: Regular diet with nectar thick liquids  Swallowing Strategies: Full upright position, no straw, chin tuck, pulmonary monitoring, alternate solid/liquids, small bite/sip, limit distractions  DATE of last MBS:  18  Pain:  8/10 - lower back - reports she was just given pain medication prior to arrival    Subjective: Patient just finished with OT and was seen sitting up in the chair. Patient seemed concerned about the swallowing issues/expectorating she has been doing after meals. Discussed this with the patient and will follow up with the primary therapist.      Cathi Moss 1277 #1:  Goal 1: Pt will complete working memory and delayed recall  tasks w/ 70% accuracy and mod cues in order to improve retention of functional and novel information. INTERVENTIONS:   Working memory task:  Given 3 words presented auditorily, patient mentally manipulated the words into reverse order with 2/5 completed independently, 2/5 with min cues and 1/5 with max cues. Patient was asked to repeat the list of words in the same order as they were presented prior to manipulating them into reverse order. Patient completed this correctly on 9/10 trials.     *perseverative speech x 2 with task    SHORT TERM GOAL #2:  Goal 2: Pt will complete thought organization and verbal/visual reasoning tasks (including finance and medication management) w/ 70% accuracy and mod cues in order to improve ability to ensure further testing is necessary. Long-term Goals  Timeframe for Long-term Goals: 2 weeks  Goal 1: Pt will improve cognitive skills to a supervision/modified independent level to maximize level of independence for return to independent living in home environment. ONGOING  Goal 2: Pt will safely tolerate least restrictive diet 98% of the time given compensatory swallowing strategies from trained staff/caregivers to maximize nutrition/hydration measures. ONGOING    ASSESSMENT:  Assessment: [x]Progressing towards goals          []Not Progressing towards goals    Patient Tolerance of Treatment:   [x]Tolerated well []Tolerated fair []Required rest breaks []Fatigued  Plan for Next Session: Thin liquid trials, pharyngeal exercises, working memory task, reasoning tasks, selective attention task  Education:  Learner:  [x]Patient          []Significant Other          []Other: son  Education provided regarding:  [x]Goals and POC   [x]Diet and swallowing precautions    []Home Exercise Program  [x]Progress and/or discharge information  Method of Education:  [x]Discussion          [x]Demonstration          []Handout          []Other  Evaluation of Education:   [x]Verbalized understanding   []Demonstrates without assistance  []Demonstrates with assistance  [x]Needs further instruction     []No evidence of learning                  [x]No family present      Plan: [x]Continue with current plan of care    []Modify current plan of care as follows:    []Discharge patient    Discharge Location:    Services/Supervision Recommended:     [x]Patient continues to require treatment by a licensed therapist to address functional deficits as outlined in the established plan of care.       Floresita Burton M.S. [address] [address]

## 2018-03-01 NOTE — PROGRESS NOTES
3/2011    CORONARY ANGIOPLASTY WITH STENT PLACEMENT      DILATATION, ESOPHAGUS      HYSTERECTOMY  1976 or 1977    bleeding    JOINT REPLACEMENT Left     Shoulder    [place] OPEN FIXATN MID HUMERUS FRACTURE Right 2018    LEFT HUMERUS OPEN REDUCTION INTERNAL FIXATION performed by Amelia Couch MD at [facility]           Subjective       Subjective: pt seated in chair when arrived. Pt agreeable to shower today with OT     Overall Orientation Status: Within Normal Limits         Pain:  Pain Assessment  Patient Currently in Pain: Yes  Pain Level: 4  Pain Type: Acute pain  Pain Location: Back       Objective  Overall Cognitive Status: Exceptions  Cognition Comment: pt stated she just wasnt thinking clear this am           Transfers  Sit to stand: Contact guard assistance (needing x2 attempts and sometimes 3 attempts to stand )  Stand to sit: Contact guard assistance       Balance  Sitting Balance: Supervision  Standing Balance: Stand by assistance     Time: x3  minutes x2 trials   Activity: ADLS      Functional Mobility  Functional - Mobility Device: Cane  Activity: Other  Assist Level: Contact guard assistance  Functional Mobility Comments: pt ambulated across hallway to shower room on unit from her room x2 due to having to go to BR once at shower went back to room then back to shower room    Apparatus Needs:  (LUE sling )           Type of ROM/Therapeutic Exercise: PROM;AROM  Comment: patient participated in LUE PROM shoulder flexion 60-65 degrees .  ER to 10 degrees and AROM in elbow flexion, AROM in wrist flexion/extension, supination and pronation and digits to improve ROM for ADLS        Groomin - Requires setup/cues to do all tasks (SBA washing face and combing her hair and applying makeup seated in recliner )                                                                                Bathin - Able to bathe 8-9 areas (A with feet ) Dressing-Upper: 3 - Requires assist with 2 tasks                                                                               Dressing-Lower: 3 - Requires assist with 2-3 parts of dressing                                                                               Toiletin - Requires steadying assistance only                                                                               Toilet Transfer: 5 - Requires setup/supervision/cues                                                                          Shower transfer :4-Requires min contact assist pt.expending 75% of effort        Activity Tolerance:  Activity Tolerance: Patient Tolerated treatment well;Patient limited by fatigue    Assessment:     Performance deficits / Impairments: Decreased functional mobility , Decreased ADL status, Decreased endurance, Decreased balance, Decreased safe awareness, Decreased cognition, Decreased strength  Prognosis: Fair  Discharge Recommendations: Patient would benefit from continued therapy after discharge    Patient Education:  Patient Education: ADLS, ROM with RUE     Equipment Recommendations: Other: Continue to assess pending progress    Safety:  Safety Devices in place: Yes  Type of devices:  All fall risk precautions in place, Call light within reach, Chair alarm in place, Left in chair, Gait belt    Plan:  Times per week: 5x/wk for 90 min and 1x/wk for 30 min    Current Treatment Recommendations: Balance Training, Functional Mobility Training, Endurance Training, Self-Care / ADL, Safety Education & Training, Patient/Caregiver Education & Training, Equipment Evaluation, Education, & procurement, Cognitive Reorientation    Goals:  Patient goals : \" Go to the bathroom, walk around, take care of myself \"    Short term goals  Time Frame for Short term goals: 1 week   Short term goal 1: Pt will recall zuleyka dressing technique to don shirt with mod assist consistently

## 2018-03-01 NOTE — PROGRESS NOTES
mobility.   Long term goal 4: Pt will perform car transfer with supervision for transportation needs  Long term goal 5: Pt will negotiate 2+ steps with bilat HR and supervision to enter home

## 2018-03-01 NOTE — PLAN OF CARE
Problem: Pain Control  Goal: Maintain pain level at or below patient's acceptable level (or 5 if patient is unable to determine acceptable level)  Outcome: Ongoing  Patient with complaints of pain. PRN medications given per patient request. Encouraging ice and repositioning for better pain control. Problem: Bleeding:  Goal: Will show no signs and symptoms of excessive bleeding  Will show no signs and symptoms of excessive bleeding   Outcome: Ongoing  Patient on anticoagulants. No active bleeding noted. Problem: Mobility - Impaired:  Goal: Able to ambulate with minimal assistance  Able to ambulate with minimal assistance   Outcome: Ongoing  Patient participates with therapy to improve mobility. Problem: Bowel Function - Altered:  Goal: Bowel elimination is within specified parameters  Bowel elimination is within specified parameters   Outcome: Ongoing  Patient had bowel movement yesterday. Scheduled bowel medications given as ordered. Problem: Self-Care Deficit:  Goal: Able to perform ADL with assistance  Able to perform ADL with assistance   Outcome: Ongoing  Patient participates with OT to complete ADLs. Problem: Falls - Risk of  Goal: Absence of falls  Outcome: Ongoing  No falls this shift. Falling star program and alarms in use. Patient uses call light appropriately. Problem: Infection - Surgical Site:  Goal: Will show no infection signs and symptoms  Will show no infection signs and symptoms   Outcome: Ongoing  Afebrile. Patient shows no signs or symptoms of infection. Problem: Skin Integrity/Risk  Goal: No skin breakdown during hospitalization  Outcome: Ongoing  Skin assessed every shift. Encouraging repositioning for pressure ulcer prevention. Waffle cushion to chair. Comments: Care plan reviewed with patient. Patient verbalizes understanding of the plan of care and contributes to goal setting.

## 2018-03-02 PROCEDURE — 97110 THERAPEUTIC EXERCISES: CPT

## 2018-03-02 PROCEDURE — 1180000000 HC REHAB R&B

## 2018-03-02 PROCEDURE — G0515 COGNITIVE SKILLS DEVELOPMENT: HCPCS

## 2018-03-02 PROCEDURE — 97535 SELF CARE MNGMENT TRAINING: CPT

## 2018-03-02 PROCEDURE — 97116 GAIT TRAINING THERAPY: CPT

## 2018-03-02 PROCEDURE — 97530 THERAPEUTIC ACTIVITIES: CPT

## 2018-03-02 PROCEDURE — 95992 CANALITH REPOSITIONING PROC: CPT

## 2018-03-02 PROCEDURE — 92526 ORAL FUNCTION THERAPY: CPT

## 2018-03-02 PROCEDURE — 6370000000 HC RX 637 (ALT 250 FOR IP): Performed by: PHYSICAL MEDICINE & REHABILITATION

## 2018-03-02 PROCEDURE — 6370000000 HC RX 637 (ALT 250 FOR IP): Performed by: EMERGENCY MEDICINE

## 2018-03-02 RX ADMIN — ACETAMINOPHEN 650 MG: 325 TABLET ORAL at 13:22

## 2018-03-02 RX ADMIN — VALSARTAN 40 MG: 80 TABLET ORAL at 10:17

## 2018-03-02 RX ADMIN — ASPIRIN 81 MG: 81 TABLET ORAL at 10:17

## 2018-03-02 RX ADMIN — SACUBITRIL AND VALSARTAN 1 TABLET: 49; 51 TABLET, FILM COATED ORAL at 10:18

## 2018-03-02 RX ADMIN — TRAMADOL HYDROCHLORIDE 50 MG: 50 TABLET, FILM COATED ORAL at 15:28

## 2018-03-02 RX ADMIN — FAMOTIDINE 20 MG: 20 TABLET, FILM COATED ORAL at 22:32

## 2018-03-02 RX ADMIN — NALOXEGOL OXALATE 12.5 MG: 12.5 TABLET, FILM COATED ORAL at 10:19

## 2018-03-02 RX ADMIN — ACETAMINOPHEN 650 MG: 325 TABLET ORAL at 23:45

## 2018-03-02 RX ADMIN — TRAMADOL HYDROCHLORIDE 50 MG: 50 TABLET, FILM COATED ORAL at 04:20

## 2018-03-02 RX ADMIN — METOPROLOL SUCCINATE 50 MG: 50 TABLET, FILM COATED, EXTENDED RELEASE ORAL at 10:17

## 2018-03-02 RX ADMIN — SIMVASTATIN 40 MG: 40 TABLET, FILM COATED ORAL at 22:32

## 2018-03-02 RX ADMIN — TRAMADOL HYDROCHLORIDE 50 MG: 50 TABLET, FILM COATED ORAL at 10:18

## 2018-03-02 RX ADMIN — TRAMADOL HYDROCHLORIDE 50 MG: 50 TABLET, FILM COATED ORAL at 22:31

## 2018-03-02 RX ADMIN — ACETAMINOPHEN 650 MG: 325 TABLET ORAL at 17:58

## 2018-03-02 RX ADMIN — CALCIUM CARBONATE-VITAMIN D TAB 500 MG-200 UNIT 1 TABLET: 500-200 TAB at 22:33

## 2018-03-02 RX ADMIN — ACETAMINOPHEN 650 MG: 325 TABLET ORAL at 07:36

## 2018-03-02 RX ADMIN — FAMOTIDINE 20 MG: 20 TABLET, FILM COATED ORAL at 10:17

## 2018-03-02 RX ADMIN — CALCIUM CARBONATE-VITAMIN D TAB 500 MG-200 UNIT 1 TABLET: 500-200 TAB at 10:17

## 2018-03-02 RX ADMIN — SACUBITRIL AND VALSARTAN 1 TABLET: 49; 51 TABLET, FILM COATED ORAL at 22:33

## 2018-03-02 ASSESSMENT — PAIN SCALES - GENERAL
PAINLEVEL_OUTOF10: 0
PAINLEVEL_OUTOF10: 3
PAINLEVEL_OUTOF10: 1
PAINLEVEL_OUTOF10: 1
PAINLEVEL_OUTOF10: 3
PAINLEVEL_OUTOF10: 2
PAINLEVEL_OUTOF10: 2
PAINLEVEL_OUTOF10: 4
PAINLEVEL_OUTOF10: 0
PAINLEVEL_OUTOF10: 4
PAINLEVEL_OUTOF10: 4
PAINLEVEL_OUTOF10: 3
PAINLEVEL_OUTOF10: 3
PAINLEVEL_OUTOF10: 2
PAINLEVEL_OUTOF10: 3
PAINLEVEL_OUTOF10: 0
PAINLEVEL_OUTOF10: 4
PAINLEVEL_OUTOF10: 2

## 2018-03-02 ASSESSMENT — PAIN DESCRIPTION - PAIN TYPE
TYPE: ACUTE PAIN
TYPE: ACUTE PAIN

## 2018-03-02 ASSESSMENT — PAIN DESCRIPTION - FREQUENCY: FREQUENCY: CONTINUOUS

## 2018-03-02 ASSESSMENT — PAIN DESCRIPTION - ONSET: ONSET: GRADUAL

## 2018-03-02 ASSESSMENT — PAIN DESCRIPTION - LOCATION
LOCATION: BACK
LOCATION: BACK

## 2018-03-02 ASSESSMENT — PAIN DESCRIPTION - ORIENTATION: ORIENTATION: LOWER

## 2018-03-02 ASSESSMENT — PAIN DESCRIPTION - DESCRIPTORS: DESCRIPTORS: ACHING
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
## 2018-03-02 NOTE — PROGRESS NOTES
Pain Assessment  Pain Assessment: 0-10  Pain Level: 4  Pain Type: Acute pain  Pain Location: Back       Social/Functional:  Lives With: Alone  Type of Home: House  Home Layout: Two level, Performs ADL's on one level  Home Access: Stairs to enter with rails  Entrance Stairs - Number of Steps: 2  Entrance Stairs - Rails: Both  Home Equipment: Standard walker     Objective:  Transfers  Sit to Stand: Contact guard assistance;Minimal Assistance (CGA from [facility] chair. Min A from toilet)  Stand to sit: Contact guard assistance    WB Status: NWB LUE in sling  Ambulation 1  Surface: level tile  Device: Single point cane  Assistance: Contact guard assistance  Quality of Gait: Decreased gordon and velocity. Decreased step length. Cues to increase R LE step length with fair carryover. Pt. limites step length secondary to back pain. Increased LUIZ. Pt. with no LOB. Distance: 200' x 1       Balance  Comments: Pt. stood at sink to wash hands after toileting. Pt. also stood after toileting to pull up depends and pants. Pt. requires assist and cues to avoid using L UE. Exercises:  Exercises  Comments: Pt. performed seated BLE ther ex 10x each consisting of ankle pumps, glute/quad sets, heel slides, hip abd/add, long arc quads, and marches all to increase strength and endurance to enhance functional mobility. Activity Tolerance:  Activity Tolerance: Patient Tolerated treatment well    Assessment: Body structures, Functions, Activity limitations: Decreased functional mobility , Decreased strength, Decreased endurance, Decreased balance  Assessment: Pt. tolerated session well. Pt. pleasant and cooperative throughout session. Pt. denies any dizziness this session. Pt. would benefit from continued skilled PT to increase strength and endurance to enhance functional mobility.    Prognosis: Good  Discharge Recommendations: Continue to assess pending progress, Patient would benefit from continued therapy after discharge    Patient Education:  Patient Education: Ther ex, Gait, POC, Transfers    Equipment Recommendations:  Equipment Needed: Yes  Other: Cont to assess    Safety:  Type of devices: All fall risk precautions in place, Call light within reach, Chair alarm in place, Left in chair, Patient at risk for falls, Gait belt, Nurse notified    Plan:  Times per week: 5x/ week 90 min, 1x/ week 30 min  Specific instructions for Next Treatment: bed mobility, transfers, gait, balance, therex  Current Treatment Recommendations: Strengthening, ROM, Endurance Training, Functional Mobility Training, Pain Management, Equipment Evaluation, Education, & procurement, Patient/Caregiver Education & Training, Safety Education & Training, Home Exercise Program, Balance Training, Transfer Training, Gait Training    Goals:  Patient goals : Goal to return home and move better    Short term goals  Time Frame for Short term goals: 1 week  Short term goal 1: Pt will perform bed mobility min A to get in and out of bed. Short term goal 2: Pt will perform sit<>stand SBA to cane to get up from bed  Short term goal 3: Pt will amb 48' CGA with hemiwalker for household mobility  Short term goal 4: Pt will perform car transfer mod A for transportation needs    Long term goals  Time Frame for Long term goals : 2 wks  Long term goal 1: Pt will perform bed mobility mod I to get in and out of bed  Long term goal 2: Pt will perform sit<>stand mod I with cane to get up from bed  Long term goal 3: Pt to ambulate with standard cane >= 50 ft , Mod I for home mobility and >= 150 ft, SBA for community mobility.   Long term goal 4: Pt will perform car transfer with supervision for transportation needs  Long term goal 5: Pt will negotiate 2+ steps with bilat HR and supervision to enter home
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
## 2018-03-02 NOTE — PROGRESS NOTES
the assistance of two people, or goes up and down fewer than 4 stairs, PT Equipment Recommendations  Equipment Needed: Yes  Other: Cont to assess, Assessment: Pt. tolerated session well. Pt. pleasant and cooperative throughout session. Pt. denies any dizziness this session. Pt. would benefit from continued skilled PT to increase strength and endurance to enhance functional mobility. Occupational therapy: FIMS:  Eatin - Feeds self with setup/supervision/cues and/or requires only setup/supervision/cues to perform tube feedings  Groomin - Requires setup/cues to do all tasks (SBA standing x 5 min for oral care & make-up application )  Bathin - Able to bathe 8-9 areas (A with feet )  Dressing-Upper: 3 - Requires assist with 2 tasks  Dressing-Lower: 3 - Requires assist with 2-3 parts of dressing  Toiletin - Requires steadying assistance only  Toilet Transfer: 4 - Requires steadying assistance only < 25% assist  Shower Transfer: 4 - Minimal contact assistance, pt. expends 75% or more effort,  , Assessment: Patient is making steady gains in OT. She has progressed to requiring SBA during functional transfers. She has improved her standing endurance to x 6 min during ADL and IADL activities. She requires mod A for UB dressing which is noted improvement. She presents with less guarding behaviors with L UE this date in comparison to previous sessions. She continues to be limited by her endurance however her anxious behaviors have significantly improved and she is able to tolerate therapy well. She requires CGA during her IADL activities. She would greatly benefit from additional skilled OT services on an IP Rehab setting to progress independence and safety with ADLs and IADLs, improving endurance and balance, and improving overall safety to decrease risk of falls as well as to continue with shoulder protocol to L UE to improve functional use for ADLs.      Speech therapy: FIMS: Comprehension: 3 - Patient understands basic needs 50-74% of the time  Expression: 3 - Expresses basic ideas/needs 50-74% of the time  Social Interaction: 5 - Patient is appropriate with supervision/cues  Problem Solvin - Patient solves simple/routine tasks 25%-49%  Memory: 3 - Patient remembers 50%-74% of the time    DIAGNOSTIC IMPRESSIONS:  Pt presents with oral phase that is essentially WFL, mild pharyngeal dysphagia as outlined by findings above. Appropriate oral initiation with adequate labial seal on utensil and cup for transferring bolus to oral cavity. Min deficits related to cohesive bolus formation and manipulation, resulting in delayed AP transit. Min oral residuals spread diffusely throughout cavity, clearance of stasis upon liquid assist. Min premature spillage to the level of the level of the valleculae. Decreased hyolaryngeal elevation and anterior excursion, resulting in incomplete epiglottic inversion and UES opening. Consistent laryngeal penetration of thin liquids via cup with multiple trials presented; pt NOT able to sense material at the level of the vocal folds and required cues to cough and re-swallow for clearance. Improved safe toleration of thin liquids utilizing strategy of chin tuck to maximize airway protection, however, decreased coordination and timeliness for strategy to be utilized independently. No observations of aspiration evidenced within this study. Recommend diet level of regular solids with nectar thick liquids at this time. Anticipate diet advancement to thin liquids given instruction/strategy training for usage of chin tuck to promote generalization across all settings.      Review of Systems:  CONSTITUTIONAL:  positive for fatigue  EYES:  negative  HEENT:  negative  RESPIRATORY:  negative  CARDIOVASCULAR:  positive for [employer], fatigue  GASTROINTESTINAL:  negative  GENITOURINARY:  negative  SKIN:  bruising  HEMATOLOGIC/LYMPHATIC:  negative  MUSCULOSKELETAL:  positive for  myalgias, arthralgias,
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
## 2018-03-02 NOTE — PLAN OF CARE
Problem: Pain Control  Goal: Maintain pain level at or below patient's acceptable level (or 5 if patient is unable to determine acceptable level)  Outcome: Ongoing  Has prescribed pain medication per provider order that requests scheduled tylenol and tramadol      Problem: Bleeding:  Goal: Will show no signs and symptoms of excessive bleeding  Will show no signs and symptoms of excessive bleeding   Outcome: Ongoing  No signs or symptoms of bleeding observed by this nurse or indicated via labs. DVT prophylaxis     Problem: Mobility - Impaired:  Goal: Able to ambulate with minimal assistance  Able to ambulate with minimal assistance   Outcome: Ongoing  Participating with therapy. Ambulating with staff using zuleyka walker and gait belt. Problem: Bowel Function - Altered:  Goal: Bowel elimination is within specified parameters  Bowel elimination is within specified parameters   Outcome: Ongoing  Bowel medications in place. Bowel movement yesterday. Will monitor    Problem: Falls - Risk of  Goal: Absence of falls  Outcome: Ongoing  No falls this shift. Patient uses call light when needing assistance. Falling star program in place. Bed and chair alarms used at all times. Will continue to monitor and perform hourly rounding. Problem: Infection - Surgical Site:  Goal: Will show no infection signs and symptoms  Will show no infection signs and symptoms   Outcome: Ongoing  No signs of infection. Problem: Skin Integrity/Risk  Goal: No skin breakdown during hospitalization  Outcome: Ongoing  No skin breakdown during hospitalization. Waffle cushion in chair. Encouraging repositioning of weight to alleviate pressure. Skin assessment q shift. Comments: Care plan reviewed with patient. Patient  verbalize understanding of the plan of care and contribute to goal setting.

## 2018-03-03 PROCEDURE — 6370000000 HC RX 637 (ALT 250 FOR IP): Performed by: EMERGENCY MEDICINE

## 2018-03-03 PROCEDURE — 6370000000 HC RX 637 (ALT 250 FOR IP): Performed by: PHYSICAL MEDICINE & REHABILITATION

## 2018-03-03 PROCEDURE — 97116 GAIT TRAINING THERAPY: CPT

## 2018-03-03 PROCEDURE — 97535 SELF CARE MNGMENT TRAINING: CPT

## 2018-03-03 PROCEDURE — 1180000000 HC REHAB R&B

## 2018-03-03 PROCEDURE — 97110 THERAPEUTIC EXERCISES: CPT

## 2018-03-03 RX ADMIN — TRAMADOL HYDROCHLORIDE 50 MG: 50 TABLET, FILM COATED ORAL at 03:25

## 2018-03-03 RX ADMIN — SIMVASTATIN 40 MG: 40 TABLET, FILM COATED ORAL at 20:04

## 2018-03-03 RX ADMIN — ACETAMINOPHEN 650 MG: 325 TABLET ORAL at 12:25

## 2018-03-03 RX ADMIN — CALCIUM CARBONATE-VITAMIN D TAB 500 MG-200 UNIT 1 TABLET: 500-200 TAB at 09:37

## 2018-03-03 RX ADMIN — FAMOTIDINE 20 MG: 20 TABLET, FILM COATED ORAL at 09:37

## 2018-03-03 RX ADMIN — ACETAMINOPHEN 650 MG: 325 TABLET ORAL at 05:52

## 2018-03-03 RX ADMIN — SACUBITRIL AND VALSARTAN 1 TABLET: 49; 51 TABLET, FILM COATED ORAL at 20:04

## 2018-03-03 RX ADMIN — METOPROLOL SUCCINATE 50 MG: 50 TABLET, FILM COATED, EXTENDED RELEASE ORAL at 09:37

## 2018-03-03 RX ADMIN — ACETAMINOPHEN 650 MG: 325 TABLET ORAL at 17:44

## 2018-03-03 RX ADMIN — ASPIRIN 81 MG: 81 TABLET ORAL at 09:37

## 2018-03-03 RX ADMIN — CALCIUM CARBONATE-VITAMIN D TAB 500 MG-200 UNIT 1 TABLET: 500-200 TAB at 20:04

## 2018-03-03 RX ADMIN — SACUBITRIL AND VALSARTAN 1 TABLET: 49; 51 TABLET, FILM COATED ORAL at 09:37

## 2018-03-03 RX ADMIN — FAMOTIDINE 20 MG: 20 TABLET, FILM COATED ORAL at 20:04

## 2018-03-03 RX ADMIN — VALSARTAN 40 MG: 80 TABLET ORAL at 09:37

## 2018-03-03 RX ADMIN — TRAMADOL HYDROCHLORIDE 50 MG: 50 TABLET, FILM COATED ORAL at 20:04

## 2018-03-03 RX ADMIN — TRAMADOL HYDROCHLORIDE 50 MG: 50 TABLET, FILM COATED ORAL at 15:21

## 2018-03-03 ASSESSMENT — PAIN DESCRIPTION - FREQUENCY
FREQUENCY: CONTINUOUS
FREQUENCY: CONTINUOUS

## 2018-03-03 ASSESSMENT — PAIN DESCRIPTION - DESCRIPTORS
DESCRIPTORS: ACHING
DESCRIPTORS: ACHING;CRAMPING

## 2018-03-03 ASSESSMENT — PAIN SCALES - GENERAL
PAINLEVEL_OUTOF10: 8
PAINLEVEL_OUTOF10: 0
PAINLEVEL_OUTOF10: 2
PAINLEVEL_OUTOF10: 3
PAINLEVEL_OUTOF10: 2
PAINLEVEL_OUTOF10: 0
PAINLEVEL_OUTOF10: 1
PAINLEVEL_OUTOF10: 2
PAINLEVEL_OUTOF10: 2
PAINLEVEL_OUTOF10: 3

## 2018-03-03 ASSESSMENT — PAIN DESCRIPTION - LOCATION
LOCATION: BACK
LOCATION: BACK

## 2018-03-03 ASSESSMENT — PAIN DESCRIPTION - ORIENTATION
ORIENTATION: LOWER
ORIENTATION: LOWER

## 2018-03-03 ASSESSMENT — PAIN DESCRIPTION - PAIN TYPE
TYPE: ACUTE PAIN
TYPE: CHRONIC PAIN

## 2018-03-03 ASSESSMENT — PAIN DESCRIPTION - ONSET: ONSET: GRADUAL

## 2018-03-03 NOTE — PROGRESS NOTES
Output: Producing urine and producing stool. HEENT: Normal HEENT exam.    Lungs:  Normal effort and normal respiratory rate. Breath sounds clear to auscultation. Heart: Normal rate. Regular rhythm. S1 normal and S2 normal.  No murmur. Abdomen: Abdomen is soft. Bowel sounds are normal.   There is no abdominal tenderness. Extremities: Normal range of motion. (Some low back pain to palpation. The left arm in sling post surgery stable)  Neurological: Patient is alert and oriented to person, place and time. Patient has normal reflexes, normal muscle tone and normal coordination. Pupils:  Pupils are equal, round, and reactive to light. Assessment:    Condition: In stable condition. Improving. (Hypertension stable    History of CHF stable    COPD remained stable    Sleep apnea and continues to use CPAP    Status post surgery  left  Humerus fot  Fracture  continues with therapy    Prior compression fracture lumbar spine with less pain). Plan:   Per physical therapy. Consults: physical therapy and occupational therapy. Advance diet as tolerated. Administer medications as ordered. (Continues with rehabilitation with PT and OT    He'll he had some mild confusion better now    Recently treated for UTI and recheck urine clear    We'll check blood work in a.m.).        Sj Foley MD  3/3/2018

## 2018-03-03 NOTE — PLAN OF CARE
Problem: Pain Control  Goal: Maintain pain level at or below patient's acceptable level (or 5 if patient is unable to determine acceptable level)  Outcome: Ongoing  Has prescribed pain medication per provider order that requests scheduled tylenol and ultram    Problem: Bleeding:  Goal: Will show no signs and symptoms of excessive bleeding  Will show no signs and symptoms of excessive bleeding   Outcome: Ongoing  No signs or symptoms of bleeding observed by this nurse or indicated via labs. DVT prophylaxis     Problem: Mobility - Impaired:  Goal: Able to ambulate with minimal assistance  Able to ambulate with minimal assistance   Outcome: Ongoing  Participating in therapies, ambulating to bathroom with staff using zuleyka walker    Problem: Bowel Function - Altered:  Goal: Bowel elimination is within specified parameters  Bowel elimination is within specified parameters   Outcome: Ongoing  Bowel medications in place. WIll monitor    Problem: Falls - Risk of  Goal: Absence of falls  Outcome: Ongoing  No falls this shift. Patient uses call light when needing assistance. Falling star program in place. Bed and chair alarms used at all times. Will continue to monitor and perform hourly rounding. Problem: Infection - Surgical Site:  Goal: Will show no infection signs and symptoms  Will show no infection signs and symptoms   Outcome: Ongoing  No signs of infection noted. Vital signs and assessment q shift. Problem: Skin Integrity/Risk  Goal: No skin breakdown during hospitalization  Outcome: Ongoing  No skin breakdown during hospitalization. Waffle cushion in place. Skin assessment q shift. Will monitor    Comments: Care plan reviewed with patient. Patient  verbalize understanding of the plan of care and contribute to goal setting.
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
## 2018-03-04 LAB
ANION GAP SERPL CALCULATED.3IONS-SCNC: 11 MEQ/L (ref 8–16)
BASOPHILS # BLD: 0.7 %
BASOPHILS ABSOLUTE: 0 THOU/MM3 (ref 0–0.1)
BUN BLDV-MCNC: 17 MG/DL (ref 7–22)
CALCIUM SERPL-MCNC: 9.4 MG/DL (ref 8.5–10.5)
CHLORIDE BLD-SCNC: 99 MEQ/L (ref 98–111)
CO2: 30 MEQ/L (ref 23–33)
CREAT SERPL-MCNC: 0.4 MG/DL (ref 0.4–1.2)
EOSINOPHIL # BLD: 2.9 %
EOSINOPHILS ABSOLUTE: 0.2 THOU/MM3 (ref 0–0.4)
GFR SERPL CREATININE-BSD FRML MDRD: > 90 ML/MIN/1.73M2
GLUCOSE BLD-MCNC: 91 MG/DL (ref 70–108)
HCT VFR BLD CALC: 36.9 % (ref 37–47)
HEMOGLOBIN: 12.7 GM/DL (ref 12–16)
LYMPHOCYTES # BLD: 29.3 %
LYMPHOCYTES ABSOLUTE: 1.7 THOU/MM3 (ref 1–4.8)
MCH RBC QN AUTO: 32.3 PG (ref 27–31)
MCHC RBC AUTO-ENTMCNC: 34.5 GM/DL (ref 33–37)
MCV RBC AUTO: 93.5 FL (ref 81–99)
MONOCYTES # BLD: 8.6 %
MONOCYTES ABSOLUTE: 0.5 THOU/MM3 (ref 0.4–1.3)
NUCLEATED RED BLOOD CELLS: 0 /100 WBC
PDW BLD-RTO: 13.6 % (ref 11.5–14.5)
PLATELET # BLD: 183 THOU/MM3 (ref 130–400)
PMV BLD AUTO: 9 FL (ref 7.4–10.4)
POTASSIUM SERPL-SCNC: 3.9 MEQ/L (ref 3.5–5.2)
RBC # BLD: 3.94 MILL/MM3 (ref 4.2–5.4)
SEG NEUTROPHILS: 58.5 %
SEGMENTED NEUTROPHILS ABSOLUTE COUNT: 3.4 THOU/MM3 (ref 1.8–7.7)
SODIUM BLD-SCNC: 140 MEQ/L (ref 135–145)
WBC # BLD: 5.8 THOU/MM3 (ref 4.8–10.8)

## 2018-03-04 PROCEDURE — 6370000000 HC RX 637 (ALT 250 FOR IP): Performed by: EMERGENCY MEDICINE

## 2018-03-04 PROCEDURE — 36415 COLL VENOUS BLD VENIPUNCTURE: CPT

## 2018-03-04 PROCEDURE — 1180000000 HC REHAB R&B

## 2018-03-04 PROCEDURE — 85025 COMPLETE CBC W/AUTO DIFF WBC: CPT

## 2018-03-04 PROCEDURE — 6370000000 HC RX 637 (ALT 250 FOR IP): Performed by: PHYSICAL MEDICINE & REHABILITATION

## 2018-03-04 PROCEDURE — 80048 BASIC METABOLIC PNL TOTAL CA: CPT

## 2018-03-04 RX ORDER — GINSENG 100 MG
CAPSULE ORAL 3 TIMES DAILY
Status: DISCONTINUED | OUTPATIENT
Start: 2018-03-04 | End: 2018-03-06 | Stop reason: HOSPADM

## 2018-03-04 RX ADMIN — SACUBITRIL AND VALSARTAN 1 TABLET: 49; 51 TABLET, FILM COATED ORAL at 20:49

## 2018-03-04 RX ADMIN — METOPROLOL SUCCINATE 50 MG: 50 TABLET, FILM COATED, EXTENDED RELEASE ORAL at 08:13

## 2018-03-04 RX ADMIN — BACITRACIN: 500 OINTMENT TOPICAL at 22:41

## 2018-03-04 RX ADMIN — ACETAMINOPHEN 650 MG: 325 TABLET ORAL at 05:26

## 2018-03-04 RX ADMIN — SACUBITRIL AND VALSARTAN 1 TABLET: 49; 51 TABLET, FILM COATED ORAL at 08:16

## 2018-03-04 RX ADMIN — CALCIUM CARBONATE-VITAMIN D TAB 500 MG-200 UNIT 1 TABLET: 500-200 TAB at 08:13

## 2018-03-04 RX ADMIN — SIMVASTATIN 40 MG: 40 TABLET, FILM COATED ORAL at 20:49

## 2018-03-04 RX ADMIN — ACETAMINOPHEN 650 MG: 325 TABLET ORAL at 00:27

## 2018-03-04 RX ADMIN — CALCIUM CARBONATE-VITAMIN D TAB 500 MG-200 UNIT 1 TABLET: 500-200 TAB at 20:49

## 2018-03-04 RX ADMIN — FAMOTIDINE 20 MG: 20 TABLET, FILM COATED ORAL at 20:49

## 2018-03-04 RX ADMIN — TRAMADOL HYDROCHLORIDE 50 MG: 50 TABLET, FILM COATED ORAL at 03:19

## 2018-03-04 RX ADMIN — ACETAMINOPHEN 650 MG: 325 TABLET ORAL at 17:52

## 2018-03-04 RX ADMIN — FAMOTIDINE 20 MG: 20 TABLET, FILM COATED ORAL at 08:13

## 2018-03-04 RX ADMIN — VALSARTAN 40 MG: 80 TABLET ORAL at 08:13

## 2018-03-04 RX ADMIN — ASPIRIN 81 MG: 81 TABLET ORAL at 08:13

## 2018-03-04 RX ADMIN — TRAMADOL HYDROCHLORIDE 50 MG: 50 TABLET, FILM COATED ORAL at 08:14

## 2018-03-04 RX ADMIN — NALOXEGOL OXALATE 12.5 MG: 12.5 TABLET, FILM COATED ORAL at 08:15

## 2018-03-04 RX ADMIN — TRAMADOL HYDROCHLORIDE 50 MG: 50 TABLET, FILM COATED ORAL at 15:19

## 2018-03-04 ASSESSMENT — PAIN SCALES - GENERAL
PAINLEVEL_OUTOF10: 0
PAINLEVEL_OUTOF10: 3
PAINLEVEL_OUTOF10: 0
PAINLEVEL_OUTOF10: 1
PAINLEVEL_OUTOF10: 2
PAINLEVEL_OUTOF10: 3
PAINLEVEL_OUTOF10: 3
PAINLEVEL_OUTOF10: 2
PAINLEVEL_OUTOF10: 2

## 2018-03-04 ASSESSMENT — PAIN DESCRIPTION - LOCATION
LOCATION: BACK
LOCATION: BACK

## 2018-03-04 ASSESSMENT — PAIN DESCRIPTION - DESCRIPTORS
DESCRIPTORS: ACHING;CRAMPING
DESCRIPTORS: ACHING

## 2018-03-04 ASSESSMENT — PAIN DESCRIPTION - FREQUENCY
FREQUENCY: CONTINUOUS
FREQUENCY: CONTINUOUS

## 2018-03-04 ASSESSMENT — PAIN DESCRIPTION - ORIENTATION
ORIENTATION: LOWER
ORIENTATION: LOWER

## 2018-03-04 ASSESSMENT — PAIN DESCRIPTION - ONSET: ONSET: GRADUAL

## 2018-03-04 ASSESSMENT — PAIN DESCRIPTION - PAIN TYPE
TYPE: CHRONIC PAIN
TYPE: CHRONIC PAIN

## 2018-03-04 NOTE — PROGRESS NOTES
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 ([facility] [address]  Active Problems:    Closed fracture of shaft of left humerus    CAD (coronary artery disease) s/p PCI and stent Multilink 3 x 18  mm mid LAD- in 07/2009 at [facility]    Obstructive sleep apnea on CPAP    COPD (chronic obstructive pulmonary disease) ([facility]    S/P ICD (internal cardiac defibrillator) procedure    Nonischemic cardiomyopathy ([facility] [address]    Closed head injury with concussion    Class 2 obesity due to excess calories with serious comorbidity and body mass index (BMI) of 35.0 to 35.9 in adult    Hematoma of scalp    Traumatic ecchymosis of right upper arm    Contusion of flank and back    Syncope and collapse    Recurrent falls    Closed compression fracture of first lumbar vertebra (HCC)    Hypertension    Adjustment disorder with mixed anxiety and depressed mood    Complicated grief    Acute cystitis  Resolved Problems:    Head injury    Blood pressure 129/62, pulse 63, temperature 97.8 °F (36.6 °C), temperature source Oral, resp. rate 16, height 5' 7\" (1.702 m), weight 220 lb (99.8 kg), SpO2 94 %, not currently breastfeeding. Subjective:  Symptoms:  Stable. Diet:  Adequate intake. Activity level: Returning to normal.    Pain:  She complains of pain that is mild. ( More  Back pain  Than  The  Left  Humerus  pain). Objective:  General Appearance:  Comfortable. Vital signs: (most recent): Blood pressure 129/62, pulse 63, temperature 97.8 °F (36.6 °C), temperature source Oral, resp.  rate 16, height 5' 7\" (1.702 m), weight 220 lb (99.8 kg), SpO2 94 %, not

## 2018-03-04 NOTE — PROGRESS NOTES
Oral, BID  metoprolol succinate (TOPROL XL) extended release tablet 50 mg, 50 mg, Oral, Daily  sacubitril-valsartan (ENTRESTO) 49-51 MG per tablet 1 tablet, 1 tablet, Oral, BID  simvastatin (ZOCOR) tablet 40 mg, 40 mg, Oral, Nightly  magnesium hydroxide (MILK OF MAGNESIA) 400 MG/5ML suspension 30 mL, 30 mL, Oral, Daily PRN  lidocaine (LIDODERM) 5 % 1 patch, 1 patch, Transdermal, Daily        PLAN    Ok to remove stitches at this time  F/U in office in 3 weeks for xrays left shoulder at that time

## 2018-03-05 PROCEDURE — 97535 SELF CARE MNGMENT TRAINING: CPT

## 2018-03-05 PROCEDURE — 97116 GAIT TRAINING THERAPY: CPT

## 2018-03-05 PROCEDURE — 1180000000 HC REHAB R&B

## 2018-03-05 PROCEDURE — 97530 THERAPEUTIC ACTIVITIES: CPT

## 2018-03-05 PROCEDURE — 97542 WHEELCHAIR MNGMENT TRAINING: CPT

## 2018-03-05 PROCEDURE — 6370000000 HC RX 637 (ALT 250 FOR IP): Performed by: PHYSICAL MEDICINE & REHABILITATION

## 2018-03-05 PROCEDURE — G0515 COGNITIVE SKILLS DEVELOPMENT: HCPCS

## 2018-03-05 PROCEDURE — 6370000000 HC RX 637 (ALT 250 FOR IP): Performed by: EMERGENCY MEDICINE

## 2018-03-05 PROCEDURE — 92526 ORAL FUNCTION THERAPY: CPT

## 2018-03-05 RX ORDER — ACETAMINOPHEN 500 MG
1000 TABLET ORAL
Status: DISCONTINUED | OUTPATIENT
Start: 2018-03-05 | End: 2018-03-06 | Stop reason: HOSPADM

## 2018-03-05 RX ORDER — ACETAMINOPHEN 325 MG/1
650 TABLET ORAL
Status: DISCONTINUED | OUTPATIENT
Start: 2018-03-05 | End: 2018-03-06 | Stop reason: HOSPADM

## 2018-03-05 RX ADMIN — ACETAMINOPHEN 650 MG: 325 TABLET ORAL at 06:36

## 2018-03-05 RX ADMIN — ASPIRIN 81 MG: 81 TABLET ORAL at 08:31

## 2018-03-05 RX ADMIN — BACITRACIN: 500 OINTMENT TOPICAL at 20:48

## 2018-03-05 RX ADMIN — NALOXEGOL OXALATE 12.5 MG: 12.5 TABLET, FILM COATED ORAL at 08:31

## 2018-03-05 RX ADMIN — ACETAMINOPHEN 650 MG: 325 TABLET ORAL at 17:24

## 2018-03-05 RX ADMIN — SACUBITRIL AND VALSARTAN 1 TABLET: 49; 51 TABLET, FILM COATED ORAL at 20:48

## 2018-03-05 RX ADMIN — TIZANIDINE 1 MG: 4 TABLET ORAL at 08:31

## 2018-03-05 RX ADMIN — BACITRACIN: 500 OINTMENT TOPICAL at 08:36

## 2018-03-05 RX ADMIN — FAMOTIDINE 20 MG: 20 TABLET, FILM COATED ORAL at 20:48

## 2018-03-05 RX ADMIN — BACITRACIN: 500 OINTMENT TOPICAL at 13:39

## 2018-03-05 RX ADMIN — SIMVASTATIN 40 MG: 40 TABLET, FILM COATED ORAL at 20:48

## 2018-03-05 RX ADMIN — CALCIUM CARBONATE-VITAMIN D TAB 500 MG-200 UNIT 1 TABLET: 500-200 TAB at 20:48

## 2018-03-05 RX ADMIN — SACUBITRIL AND VALSARTAN 1 TABLET: 49; 51 TABLET, FILM COATED ORAL at 08:31

## 2018-03-05 RX ADMIN — ACETAMINOPHEN 1000 MG: 500 TABLET ORAL at 23:06

## 2018-03-05 RX ADMIN — ACETAMINOPHEN 650 MG: 325 TABLET ORAL at 00:48

## 2018-03-05 RX ADMIN — VALSARTAN 40 MG: 80 TABLET ORAL at 08:31

## 2018-03-05 RX ADMIN — FAMOTIDINE 20 MG: 20 TABLET, FILM COATED ORAL at 08:31

## 2018-03-05 RX ADMIN — ACETAMINOPHEN 650 MG: 325 TABLET ORAL at 13:38

## 2018-03-05 RX ADMIN — CALCIUM CARBONATE-VITAMIN D TAB 500 MG-200 UNIT 1 TABLET: 500-200 TAB at 08:31

## 2018-03-05 RX ADMIN — METOPROLOL SUCCINATE 50 MG: 50 TABLET, FILM COATED, EXTENDED RELEASE ORAL at 08:31

## 2018-03-05 ASSESSMENT — PAIN SCALES - GENERAL
PAINLEVEL_OUTOF10: 0
PAINLEVEL_OUTOF10: 3
PAINLEVEL_OUTOF10: 0
PAINLEVEL_OUTOF10: 3
PAINLEVEL_OUTOF10: 0
PAINLEVEL_OUTOF10: 4
PAINLEVEL_OUTOF10: 0
PAINLEVEL_OUTOF10: 5
PAINLEVEL_OUTOF10: 6

## 2018-03-05 ASSESSMENT — PAIN DESCRIPTION - PAIN TYPE: TYPE: CHRONIC PAIN
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
## 2018-03-05 NOTE — PROGRESS NOTES
Physical Medicine & Rehabilitation  Progress Note      Chief Complaint:  LEFT periprosthetic humerus fracture/TBI    Subjective:  Pain 4/10 with an ache over lower back. Tramadol was discontinued tody with now only on Tylenol for pain. Patient became confused and somnolent after 0.25mg dose of Tizanidine; this cleared after medication wore off. \"I'm getting a shower later today. \"  Scab from her head wound dislodged yesterday. Sutures from arm incision removed yesterday. No other specific concerns.     Rehabilitation:  Physical therapy: FIMS:  Bed Mobility: Scooting: Contact guard assistance (to scoot forward and back in sitting position, CGA to scoot laterally on EOB)    Transfers: Sit to Stand: Contact guard assistance (from chair)  Stand to sit: Contact guard assistance (cues for eccentric control)  Stand Pivot Transfers: Contact guard assistance (with straight cane), WB Status: NWB LUE in sling  Ambulation 1  Surface: level tile  Device: Single point cane  Other Apparatus:  (L arm sling and abdominal binder)  Assistance: Contact guard assistance, Stand by assistance (CGA to close SBA)  Quality of Gait: decreased gordon, decreased step length B and varied step length, mild sway, no LOB  Distance: 150ft x1  Comments: patient complained of increased back pain and requested to sit down, Stairs  # Steps : 4  Stairs Height: 6\"  Rails: Right ascending  Curbs: 4\"  Device: No Device  Assistance: Contact guard assistance  Comment: pt anxious with steps     FIMS: Bed, Chair, Wheel Chair: 4 - Requires steadying assistance only <25% assist  and/or requires assist with one leg only (mod difficulty completing t/f from STS while maintaining NWB protocol)  Walk: 4 - Contact Guard/Minimal Assistance Requires up to Contact Guard or Minimal Assistance to walk/operate wheelchair at least 150 feet  Distance Walked: 150  Wheel Chair: 1 - Total Assistance Walks/operates wheelchair < 50 feet OR requires two or more people OR well as to continue with shoulder protocol to L UE to improve functional use for ADLs. Speech therapy: FIMS: Comprehension: 3 - Patient understands basic needs 50-74% of the time  Expression: 3 - Expresses basic ideas/needs 50-74% of the time  Social Interaction: 5 - Patient is appropriate with supervision/cues  Problem Solvin - Patient solves simple/routine tasks 25%-49%  Memory: 3 - Patient remembers 50%-74% of the time    DIAGNOSTIC IMPRESSIONS:  Pt presents with oral phase that is essentially WFL, mild pharyngeal dysphagia as outlined by findings above. Appropriate oral initiation with adequate labial seal on utensil and cup for transferring bolus to oral cavity. Min deficits related to cohesive bolus formation and manipulation, resulting in delayed AP transit. Min oral residuals spread diffusely throughout cavity, clearance of stasis upon liquid assist. Min premature spillage to the level of the level of the valleculae. Decreased hyolaryngeal elevation and anterior excursion, resulting in incomplete epiglottic inversion and UES opening. Consistent laryngeal penetration of thin liquids via cup with multiple trials presented; pt NOT able to sense material at the level of the vocal folds and required cues to cough and re-swallow for clearance. Improved safe toleration of thin liquids utilizing strategy of chin tuck to maximize airway protection, however, decreased coordination and timeliness for strategy to be utilized independently. No observations of aspiration evidenced within this study. Recommend diet level of regular solids with nectar thick liquids at this time. Anticipate diet advancement to thin liquids given instruction/strategy training for usage of chin tuck to promote generalization across all settings.      Review of Systems:  CONSTITUTIONAL:  positive for fatigue  EYES:  negative  HEENT:  negative  RESPIRATORY:  negative  CARDIOVASCULAR:  positive for  dyspnea, fatigue  GASTROINTESTINAL:  negative  GENITOURINARY:  negative  SKIN:  bruising  HEMATOLOGIC/LYMPHATIC:  negative  MUSCULOSKELETAL:  positive for  myalgias, arthralgias, pain, decreased range of motion, muscle weakness and bone pain  NEUROLOGICAL:  positive for weakness and pain, dysphagia. BEHAVIOR/PSYCH: positive for anxiety  10 point system review otherwise negative    Objective:  /60   Pulse 73   Temp 98.1 °F (36.7 °C) (Oral)   Resp 16   Ht 5' 7\" (1.702 m)   Wt 217 lb 6.4 oz (98.6 kg)   SpO2 93%   BMI 34.05 kg/m²     awake  Orientation:   person, place, time, situation  Mood: within normal limits  Affect: calm  General appearance: in no acute distress, LEFT arm sling and up in chair     Memory:   grossly normal  Attention/Concentration: normal  Language:  grossly normal     ROM:  abnormal - LEFT humeral fracture  Motor Exam:  Motor exam is symmetrical 5 out of 5 upper extremities bilaterally with only LEFT hand  tested  Motor exam is symmetrical 5 out of 5 lower extremities bilaterally     Tone:  Normal LEFT arm not tested  Coordination:   Normal, LEFT arm not tested  Deep Tendon Reflexes:  Reflexes are intact and symmetrical bilaterally, LEFT arm not tested     Skin: warm and dry, no rash or erythema and ecchymoses noted on RIGHT frontal and LEFT posterior scalp and neck  Peripheral vascular: Pulses: Normal upper and lower extremity pulses; Edema: 2+     Diagnostics:   No results found for this or any previous visit (from the past 24 hour(s)). Impression:  1. L1 compression fracture (6 lumbar vertebra present on imaging) new since 11/9/2017 Due to the patient's acute left humeral fracture and severe associated pain, we're unable to place her on the fluoroscopic table at this time to perform percussion and palpation. 2. LEFT humerus periprosthetic fracture.   3. Status post open treatment with plate and screws of LEFT closed displaced humeral shaft fracture, periprosthetic by Dr. Lg Juárez on

## 2018-03-05 NOTE — PLAN OF CARE
Problem: DISCHARGE BARRIERS  Goal: Patient's continuum of care needs are met    Intervention: INVOLVE PATIENT/S.O. [address] [facility]  Physical Medicine Case Management Assessment    [x] Inpatient Rehabilitation Unit  [] Transitional Care Unit    Patient Name: Tyrel Salcedo        MRN: [id number]    : 1940  (77 y.o.)  Gender: female     Date of Admission: 2018     Family/Social/Home Environment: Prior to hospitalization, patient reports independence with ADL's and personal care. Patient lives alone. Patient has four children that all work full time. Three children (Miracle Orantes, and Susi Vasquez) live out of town. Son, Shawna Vela, lives in [place]. Patient active volunteer three days a week [facility] [address] [address]. Patient was not using any medical equipment prior to hospitalization, but reports having access to canes. Patient has cpap machine supplied through [address] [place] [address]. Patient was completing own meal preparation, laundry, light housekeeping, errands, driving, managing finances, and managing medications. Patient also has private duty  every other week. Patient lives in two story home. Patient has two steps to enter home through garage with hand rail on the right ascending. Patient has one step onto porch and one step into home at front door entrance. Patient has walk-in shower with glass door and shower stool with no back. Patient has standard height toilet. Patient reports supportive family, but no availability to provide twenty four hour care.      Social/Functional History  Lives With: Alone  Type of Home: House  Home Layout: Two level, Able to Live on Main level with bedroom/bathroom  Home Access: Stairs to enter with rails  Entrance Stairs - Number of Steps: 2 garage entrance, 1 step onto porch then one step into home front door entrance    Entrance Stairs - Rails: Right (ascending)  Bathroom Shower/Tub: Walk-in shower, Doors, Shower chair Patient was not using any medical equipment prior to hospitalization, but reports having access to canes. Patient has cpap machine supplied through [facility]. Patient was completing own meal preparation, laundry, light housekeeping, errands, driving, managing finances, and managing medications. Patient also has private duty  every other week. Patient lives in two story home. Patient has two steps to enter home through garage with hand rail on the right ascending. Patient has one step onto porch and one step into home at front door entrance. Patient has walk-in shower with glass door and shower stool with no back. Patient has standard height toilet. Patient reports supportive family, but no availability to provide twenty four hour care. Anticipate patient would benefit from medical equipment and home health care services upon discharge. SW reviewed with patient team conference on Tuesday, 02/20/2018, to further discuss progress and discharge recommendations. SW to follow and maintain involvement in discharge planning.          IMM Letter  IMM Letter given to Patient/Family/Significant other/Guardian/POA/by[de-identified] Rudolfo Duverney, LSW  IMM Letter date given[de-identified] 02/16/18  IMM Letter time given[de-identified] 5640      Discharge Planning  Living Arrangements: Alone  Support Systems: Children, Family Members, Friends/Neighbors  Potential Assistance Needed: Durable Medical Equipment, Emergency Call  System, Home Care  Potential Assistance Purchasing Medications: No  Does patient want to participate in local refill/meds to beds program?: No  DME: Bedside Commode, Wheelchair, Shower Chair, Walker  Type of [address] Aide Services, OT, PT, Nursing Services, Skilled Therapy  Patient expects to be discharged to[de-identified]  (Home)  Expected Discharge Date: 03/06/18  Follow Up Appointment: Best Day/Time : Monday AM    Patient signed, Francine Díaz Important Message from Medicare About Your Rights\" and was given \"Data

## 2018-03-05 NOTE — PROGRESS NOTES
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 Belkis Smith:    []Hold treatment per nursing request  []Patient refusal  []Family declined treatment  []Patient at testing and/or off the floor  []Patient unavailable, with PT/OT/nursing, etc.  [x]Other pt feeling better this afternoon but too fatigued to play cards with peers in dining room -assisted pt to bathroom with sit to stand from recliner with CGA-ambulated to bathroom with zuleyka walker and CGA-P.T.  In to work with pt   Carlos Verduzco, [address]     3/5/2018

## 2018-03-05 NOTE — PROGRESS NOTES
[address]    TIME   SLP Individual Minutes  Time In: 2529  Time Out: 5824  Minutes: 30  Timed Code Treatment Minutes: 16 Minutes     Dysphagia therapy: 4973-8933  Cognitive therapy: 5451-8051    []Daily Note  []Progress Note  [x]Discharge Note    Date: 3/5/2018  Patient Name: Wu Anderson        MRN: [id number]    : 1940  (77 y.o.)  Gender: female   Primary Provider: Lord Kemi MD  Admitting Diagnosis: TBI  Secondary Diagnosis: Cognitive deficits, dysphagia  Precautions: Fall risk  Swallowing Status/Diet: Regular diet with nectar thick liquids  Swallowing Strategies: Full upright position, no straw, chin tuck, pulmonary monitoring, alternate solid/liquids, small bite/sip, limit distractions  DATE of last MBS:  18  Pain:  3/10 low back; LIZ Tee aware and addressing concerns    Subjective: Improved mentation at PM session. Pt alert and pleasant, questioning, \"what happened? \" during AM session. Positive participation with meaningful engagement in tx. SHORT TERM GOAL #1:  Goal 1: Pt will complete working memory and delayed recall  tasks w/ 70% accuracy and mod cues in order to improve retention of functional and novel information. GOAL NOT MET CONSISTENTLY  INTERVENTIONS: AM session: Orientation: 7 independently, 1/ self correction, 1/ given logical cues for KAI    Instructed pt on strategies of visual highlighting and rehearsal/repetition for retention of paragraph level details. Max modeling and cues required to ensure appropriate usage.   Immediate memory: 5/10 independently, 3/10 given max cues,  unable to elicit  Delay M26 minutes: task abandoned due to limited alertness level  *decreased reading skills at date with poor ability to visually scan left-right  *perseverative speech x4; non-sensical verbalizations x6  *highly suspect fatigue and confusion level impacted performance with task     SHORT TERM GOAL MET  INTERVENTIONS: DNT due to focus on additional STGs    Prior session:   Complex attention targeted via structured task. Addition math computation while simultaneously answering posed questions presented by the clinician; high stim setting implemented with increased television volume and open door for external distractions. Task completed in 53 seconds with no cues required for re-direction. 1 mathematical error exhibited with no errors for answering general information questions. Decreased organizational thought exhibited with pt independently identifying following completion (i.e. \"I should have done it this way\"). Improved insight and performance at this date with task; highly recommend continued addressing of domain due to fluctuating performance. Clinician with implementation of high stim setting throughout entirety of tx session by leaving the room door open. Pt required re-direction cues x1 with decreased sustained attention evidenced by eye gaze shifting periodically to background noise. SHORT TERM GOAL #4:  Goal 4:  NO NEW GOAL  INTERVENTIONS:  n/a    SHORT TERM GOAL #5:  Goal 5:Pt will tolerate advanced liquid trials x10 utilizing strategy of CHIN TUCK to permit potential advancement to least restrictive diet; complete pharyngeal strengthening exercises to improve airway protection for safety of intake. GOAL NOT MET  INTERVENTIONS: Thin liquid trials via cup conducted at snack. 8 oz consumed with improved generalization for usage of chin tuck strategy; adequate movement of chin-chest. Continue with thin liquid trials with focus on habiutalization of strategy and consistency for safe toleration prior to diet advancement. Pharyngeal exercises completed to improve overall strength and timing of swallow function. Rationale given re: necessity of exercise with comprehension obtained.    Effortful swallow: x10 produced, good success  Suzanne: x5 produced, good success, mod cues required    Long-term Goals  Timeframe for Long-term Goals: 1 weeks  Goal 1: Pt will improve cognitive skills to a supervision/modified independent level to maximize level of independence for return to independent living in home environment. GOAL NOT MET  Goal 2: Pt will safely tolerate least restrictive diet 98% of the time given compensatory swallowing strategies from trained staff/caregivers to maximize nutrition/hydration measures. GOAL NOT MET    ST FIM ASSESSMENT:     Admission score Current score Goal Status   COMMUNICATION 5 - Patient understands basic needs (hungry/hot/pain)   4 - Patient understands basic needs 75-90%+ of the time   Decline in performance    EXPRESSION 5 - Expresses basic ideas/needs only (hungry/hot/pain)     4 - Expresses basic ideas/needs 75-90%+ of the time   Decline in performance   SOCIAL INTERACTION 5 - Patient is appropriate with supervision/cues   5 - Patient is appropriate with supervision/cues   Stable performance   PROBLEM SOLVING 4 - Patient solves simple/routine tasks 75-90%+    3 - Patient solves simple/routine tasks 50%-74%   Decline in performance   MEMORY 4 - Patient remembers 75-90%+ of the time   3 - Patient remembers 50%-74% of the time   Decline in performance          Communication  Comprehension: 4 - Patient understands basic needs 75-90%+ of the time  Expression: 4 - Expresses basic ideas/needs 75-90%+ of the time  Social Cognition  Social Interaction: 5 - Patient is appropriate with supervision/cues  Problem Solving: 3 - Patient solves simple/routine tasks 50%-74%  Memory: 3 - Patient remembers 50%-74% of the time     ASSESSMENT:  Assessment: [x]Progressing towards goals          []Not Progressing towards goals    Summary: For this therapy reporting period, 0/4 STGs achieved with inconsistent progress obtained for improved level of functionality; x1 prior total goal attainment with no new goal generated. Pt continues to present with at least at least mild-moderate cognitive linguistic

## 2018-03-05 NOTE — PROGRESS NOTES
Speech therapist notes that pt is very confused; speech is nonsensical (ie counting 1,2,3,A,B,C...). Asking if she is in the shower when she is sitting in her recliner in her room. Had taken a dose of Zanaflex 1 mg about 30 min before.

## 2018-03-05 NOTE — PROGRESS NOTES
supervision/cues  Problem Solving: 3 - Patient solves simple/routine tasks 50%-74%  Memory: 3 - Patient remembers 50%-74% of the time     Summary: For this therapy reporting period, 0/4 STGs achieved with inconsistent progress obtained for improved level of functionality; x1 prior total goal attainment with no new goal generated. Pt continues to present with at least at least mild-moderate cognitive linguistic deficits related to recall, working memory, reasoning, problem solving, thought organization, executive function, and complex attention. Basic spoken language comprehension/expression skills relatively intact for wants/needs. Hyperverbal and tangential speech tendencies exhibited during conversational speech with pt requiring re-direction cues as necessary. Independent divergent thinking episodes starting to exhibit, permitting improved level of reasoning. High level of fluctuating performance with mental fatigue exhibited. Additionally, pt presents with mild pharyngeal dysphagia as evidenced though MBS completed on 2/19/18. Formal instrumentation revealed consistent penetration of thin liquids to the level of the vocal folds with pt NOT able to sense material; strategy of chin tuck with improved airway protection. Improved generalization of strategy across contexts with min models and cues required to ensure accuracy of placement for optimal success. Current diet level of regular with nectar thick liquids established; pt with decreased appetite, resulting in limited meal consumption and ability to maintain nutrition measures. Progress foreseen with continued services; barriers to progress include high level of anxiety and fatigue. Recommendations at this time include assistance for management of medications/finances upon discharge and no driving until clearance obtained by MD; further ST services recommended to assist in obtaining optimal level of functionality. Discharge at this date due to transfer to SNF.

## 2018-03-05 NOTE — PROGRESS NOTES
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 Tracy Lugo:    []Hold treatment per nursing request  []Patient refusal  []Family declined treatment  []Patient at testing and/or off the floor  []Patient unavailable, with PT/OT/nursing, etc.  [x]Other pt sound asleep in recliner this am and did not play bingo with peers in dining room   Peter Alejandra, [address]    3/5/2018

## 2018-03-05 NOTE — PLAN OF CARE
Problem: DISCHARGE BARRIERS  Goal: Patient's continuum of care needs are met    Intervention: INVOLVE PATIENT/S.O. IN DISCHARGE PLANNING PROCESS  Team Conference held this morning. Recommendations of the team were explained to the patient and son, Ramirez Whitaker, by MINA Tran, and Dr. Diana Goldstein. Team is recommending that patient continue on acute inpatient rehab for two more weeks, with expected discharge date of Tuesday, 03/06/2018. Following discharge, team is recommending twenty four hour care due to physical and cognitive deficits. Await further progress to determine additional discharge needs. Care plan reviewed with patient and son, Ramirez Whitaker. Patient and son, Ramirez Whitaker, verbalized understanding of the plan of care and contributed to goal setting. SW to follow and maintain involvement in discharge planning.

## 2018-03-05 NOTE — PROGRESS NOTES
[facility]  INPATIENT SPEECH THERAPY  [facility] [place]   SLP Individual Minutes  Time In: 1625  Time Out: 0930  Minutes: 26  Timed Code Treatment Minutes: 26 Minutes      [x]Daily Note  []Progress Note  []Discharge Note    Date: 3/5/2018  Patient Name: Rajesh Claros        MRN: [id number]    : 1940  (77 y.o.)  Gender: female   Primary Provider: Roopa Pereira MD  Admitting Diagnosis: TBI  Secondary Diagnosis: Cognitive deficits, dysphagia  Precautions: Fall risk  Swallowing Status/Diet: Regular diet with nectar thick liquids  Swallowing Strategies: Full upright position, no straw, chin tuck, pulmonary monitoring, alternate solid/liquids, small bite/sip, limit distractions  DATE of last MBS:  18  Pain:  4/10 R hip; LIZ Herbetr Se notified    Subjective: Upon arrival, pt resting at bedside with eyes closed. Max cues required to improve alertness with significant confusion detected; pt verbalized, \"am I in the shower? \"; limited improved orientation obtained despite max cues provided. Non-sensical verbalizations with pt unable to complete sentences for communicative exchanges. Fleeting alertness. LIZ Herbert Se consulted re: concerns indicating novel administration of muscle relaxer prior to ST arrival. Limited meaningful participation in tx session at this time due to impacted mentation. SHORT TERM GOAL #1:  Goal 1: Pt will complete working memory and delayed recall  tasks w/ 70% accuracy and mod cues in order to improve retention of functional and novel information. INTERVENTIONS: Orientation:  independently, 1/ self correction, 1/7 given logical cues for KAI    Instructed pt on strategies of visual highlighting and rehearsal/repetition for retention of paragraph level details. Max modeling and cues required to ensure appropriate usage.   Immediate memory: 5/10 independently, 3/10 given max cues,  unable to elicit  Delay M91 minutes: task abandoned due to limited date with task; highly recommend continued addressing of domain due to fluctuating performance. Clinician with implementation of high stim setting throughout entirety of tx session by leaving the room door open. Pt required re-direction cues x1 with decreased sustained attention evidenced by eye gaze shifting periodically to background noise. SHORT TERM GOAL #4:  Goal 4:  NO NEW GOAL  INTERVENTIONS:  n/a    SHORT TERM GOAL #5:  Goal 5:Pt will tolerate advanced liquid trials x10 utilizing strategy of CHIN TUCK to permit potential advancement to least restrictive diet; complete pharyngeal strengthening exercises to improve airway protection for safety of intake. INTERVENTIONS:  DNT due to focus on additional STGs. Pt deemed not safe for PO intake at time of ST session due to decreased alertness and confusion presenting. Prior session: Thin liquid trials via cup conducted at snack. x10 trials consumed with audible swallow x2 detected, indicating slight swallow delay with oral mechanism attempting to overcompensate. No further s/s aspiration evidenced with no cues required for completion of chin tuck in conjunction with thin liquids; adequate movement of chin-chest. Continue with thin liquid trials with focus on generalization of strategy. Pharyngeal exercises completed to improve overall strength and timing of swallow function. Rationale given re: necessity of exercise with comprehension obtained. Effortful swallow: x10 produced, good success    *pt with continued reports of \"food feeling stuck\"; pt indicated having prior EGD completed, but unable to provide timeline. Will continue to monitor and consult as necessary. Long-term Goals  Timeframe for Long-term Goals: 2 weeks  Goal 1: Pt will improve cognitive skills to a supervision/modified independent level to maximize level of independence for return to independent living in home environment.  ONGOING  Goal 2: Pt will safely tolerate least restrictive diet 98% of the time given compensatory swallowing strategies from trained staff/caregivers to maximize nutrition/hydration measures. ONGOING    Communication  Comprehension: 3 - Patient understands basic needs 50-74% of the time  Expression: 3 - Expresses basic ideas/needs 50-74% of the time  Social Cognition  Social Interaction: 5 - Patient is appropriate with supervision/cues  Problem Solvin - Patient solves simple/routine tasks 25%-49%  Memory: 3 - Patient remembers 50%-74% of the time     ASSESSMENT:  Assessment: [x]Progressing towards goals          []Not Progressing towards goals    Patient Tolerance of Treatment:   []Tolerated well []Tolerated fair []Required rest breaks [x]Fatigued  Plan for Next Session: thin liquid trials, complex attention   Learner:  [x]Patient          []Significant Other          []Other: son  Education provided regarding:  [x]Goals and POC   []Diet and swallowing precautions    []Home Exercise Program  [x]Progress and/or discharge information  Method of Education:  [x]Discussion          []Demonstration          []Handout          []Other  Evaluation of Education:   [x]Verbalized understanding   []Demonstrates without assistance  []Demonstrates with assistance  [x]Needs further instruction     []No evidence of learning                  [x]No family present      Plan: [x]Continue with current plan of care    []Modify current plan of care as follows:    []Discharge patient    Discharge Location:    Services/Supervision Recommended:     [x]Patient continues to require treatment by a licensed therapist to address functional deficits as outlined in the established plan of care.       Boom German M.A., [address] [place] [address]

## 2018-03-05 NOTE — PLAN OF CARE
Problem: DISCHARGE BARRIERS  Goal: Patient's continuum of care needs are met    Intervention: INVOLVE PATIENT/S.O. IN DISCHARGE PLANNING PROCESS  After further discussion with patient and family, resources not available to support twenty four hour care upon discharge. Therefore, patient and family requesting transition to SNF. Patient and family provided with SNF choice list for options. Patient and family reviewed list and [facility]. SW contacted facility regarding female bed availability for Tuesday, 03/06/2018. Per. Admission coordinator, Violetta Garcia, facility will not have available bed until 03/15/2018 or 03/16/2018. Admission coordinator, Violetta Garcia, to contact patient's daughter, Lili Johnson, to inform of no bed availability. SW to follow and maintain involvement in discharge planning.

## 2018-03-05 NOTE — PROGRESS NOTES
Family Medicine Progress Notes/Coverage    Today's Date: 3/5/18  [id number]  Medical Record # [id number]  Account # [de-identified]      Ms. Miryam Luke admitted on 2/14/2018        Subjective / Interval History :     Very sleepy when seen, confused, was given Zanaflex,   Reviewed notes, consults, laboratory and radiology results,    Objective:       Physical Exam:  Patient Vitals for the past 24 hrs:   BP Temp Temp src Pulse Resp SpO2 Weight   03/05/18 0745 129/60 98.1 °F (36.7 °C) Oral 73 16 93 % -   03/05/18 0126 - - - - - - 217 lb 6.4 oz (98.6 kg)   03/04/18 2043 133/63 98.4 °F (36.9 °C) Oral 72 18 93 % -   03/04/18 1638 - 97.8 °F (36.6 °C) - 63 16 - -       General Appearance:  sleepy, no distress, appears stated age   HEENT:  Neck:      Chest/Lungs:  Heart: CTA  RRR   Abdomen:  Back: soft   Extremities:  Neurological Exam: No edema         Assessment:     Active Hospital Problems    Diagnosis Date Noted    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 ([facility] [address] [S06.9X0A] 02/14/2018     Priority: High    Syncope and collapse [R55] 02/07/2018     Priority: High    Recurrent falls [R29.6] 02/07/2018     Priority: High    Closed fracture of shaft of left humerus [S42.302A] 02/07/2018     Priority: High    Closed head injury with concussion [S06.0X9A] 02/03/2018     Priority: High    Acute cystitis [N30.00] 02/22/2018     Priority: Medium    Adjustment disorder with mixed anxiety and depressed mood [F43.23] 02/20/2018     Priority: Medium    Complicated grief [X52.52, Z63.4] 02/20/2018     Priority: Medium    Hypertension [I10]      Priority: Medium    Closed compression fracture of first lumbar vertebra ([facility] [S32.010A] 02/07/2018     Priority: Medium    Traumatic ecchymosis of right upper arm [S40.021A] 02/03/2018     Priority: Medium    Hematoma of scalp [S00.03XA] 02/03/2018     Priority: Medium    Class 2 obesity due to excess calories with serious comorbidity and body mass index (BMI) of 35.0 to 35.9 in adult [E66.09, Z68.35] 02/03/2018     Priority: Medium    Contusion of flank and back [S30.1XXA] 02/03/2018     Priority: Medium    S/P ICD (internal cardiac defibrillator) procedure [Z95.810] 01/08/2018     Priority: Medium    Nonischemic cardiomyopathy ([facility] [address] [I42.8]      Priority: Medium    COPD (chronic obstructive pulmonary disease) ([facility] [J44.9] 08/05/2017     Priority: Medium    Obstructive sleep apnea on CPAP [G47.33, Z99.89] 11/05/2015     Priority: Medium    CAD (coronary artery disease) s/p PCI and stent Multilink 3 x 18  mm mid LAD- in 07/2009 at [facility] [I25.10] 07/16/2012     Priority: Medium       Plan:     Discussed plans with the nursing staff  D/C Apresoline has not used this at all  D/C Zanaflex due to somnolense    Medications, Laboratories and Imaging results:    Scheduled Meds:   bacitracin   Topical TID    acetaminophen  650 mg Oral 4 times per day    naloxegol  12.5 mg Oral QAM    valsartan  40 mg Oral Daily    famotidine  20 mg Oral BID    aspirin  81 mg Oral Daily    calcium-vitamin D  1 tablet Oral BID    metoprolol succinate  50 mg Oral Daily    sacubitril-valsartan  1 tablet Oral BID    simvastatin  40 mg Oral Nightly    lidocaine  1 patch Transdermal Daily     Continuous Infusions:  PRN Meds:docusate sodium, polyethylene glycol, senna, sodium chloride flush, magnesium hydroxide    Imaging:    Lab Review :    Lab Results   Component Value Date    WBC 5.8 03/04/2018    HGB 12.7 03/04/2018    HCT 36.9 (L) 03/04/2018    MCV 93.5 03/04/2018     03/04/2018     Lab Results   Component Value Date    CREATININE 0.4 03/04/2018    BUN 17 03/04/2018     03/04/2018    K 3.9 03/04/2018    CL 99 03/04/2018    CO2 30 03/04/2018     Lab Results   Component Value Date    CKTOTAL 26 (L) 04/22/2013    TROPONINI <0.006 07/16/2012

## 2018-03-06 ENCOUNTER — APPOINTMENT (OUTPATIENT)
Dept: GENERAL RADIOLOGY | Age: 78
DRG: 560 | End: 2018-03-06
Attending: PHYSICAL MEDICINE & REHABILITATION
Payer: MEDICARE

## 2018-03-06 VITALS
OXYGEN SATURATION: 93 % | BODY MASS INDEX: 34.88 KG/M2 | HEART RATE: 79 BPM | HEIGHT: 67 IN | WEIGHT: 222.2 LBS | DIASTOLIC BLOOD PRESSURE: 81 MMHG | TEMPERATURE: 96.4 F | SYSTOLIC BLOOD PRESSURE: 183 MMHG | RESPIRATION RATE: 16 BRPM

## 2018-03-06 PROBLEM — S20.229A CONTUSION OF FLANK AND BACK: Status: RESOLVED | Noted: 2018-02-03 | Resolved: 2018-03-06

## 2018-03-06 PROBLEM — S30.1XXA CONTUSION OF FLANK AND BACK: Status: RESOLVED | Noted: 2018-02-03 | Resolved: 2018-03-06

## 2018-03-06 PROBLEM — N30.00 ACUTE CYSTITIS: Status: RESOLVED | Noted: 2018-02-22 | Resolved: 2018-03-06

## 2018-03-06 PROBLEM — S40.021A TRAUMATIC ECCHYMOSIS OF RIGHT UPPER ARM: Status: RESOLVED | Noted: 2018-02-03 | Resolved: 2018-03-06

## 2018-03-06 PROBLEM — R55 SYNCOPE AND COLLAPSE: Status: RESOLVED | Noted: 2018-02-07 | Resolved: 2018-03-06

## 2018-03-06 LAB
BACTERIA: ABNORMAL /HPF
BILIRUBIN URINE: NEGATIVE
BLOOD, URINE: NEGATIVE
CASTS 2: ABNORMAL /LPF
CASTS UA: ABNORMAL /LPF
CHARACTER, URINE: CLEAR
COLOR: ABNORMAL
CRYSTALS, UA: ABNORMAL
EPITHELIAL CELLS, UA: ABNORMAL /HPF
GLUCOSE URINE: NEGATIVE MG/DL
KETONES, URINE: NEGATIVE
LEUKOCYTE ESTERASE, URINE: NEGATIVE
MISCELLANEOUS 2: ABNORMAL
NITRITE, URINE: NEGATIVE
PH UA: 8
PROTEIN UA: ABNORMAL
RBC URINE: ABNORMAL /HPF
RENAL EPITHELIAL, UA: ABNORMAL
SPECIFIC GRAVITY, URINE: 1.02 (ref 1–1.03)
UROBILINOGEN, URINE: 1 EU/DL
WBC UA: ABNORMAL /HPF
YEAST: ABNORMAL

## 2018-03-06 PROCEDURE — 6370000000 HC RX 637 (ALT 250 FOR IP): Performed by: EMERGENCY MEDICINE

## 2018-03-06 PROCEDURE — 6370000000 HC RX 637 (ALT 250 FOR IP): Performed by: PHYSICAL MEDICINE & REHABILITATION

## 2018-03-06 PROCEDURE — 81001 URINALYSIS AUTO W/SCOPE: CPT

## 2018-03-06 PROCEDURE — 74230 X-RAY XM SWLNG FUNCJ C+: CPT

## 2018-03-06 PROCEDURE — 2500000003 HC RX 250 WO HCPCS: Performed by: PHYSICAL MEDICINE & REHABILITATION

## 2018-03-06 PROCEDURE — 92611 MOTION FLUOROSCOPY/SWALLOW: CPT

## 2018-03-06 RX ORDER — ACETAMINOPHEN 325 MG/1
650 TABLET ORAL EVERY 6 HOURS
Qty: 120 TABLET | Refills: 0 | DISCHARGE
Start: 2018-03-06

## 2018-03-06 RX ORDER — ASPIRIN 81 MG/1
81 TABLET ORAL DAILY
Qty: 30 TABLET | Refills: 3 | DISCHARGE
Start: 2018-03-07 | End: 2021-08-23 | Stop reason: SDUPTHER

## 2018-03-06 RX ORDER — SIMVASTATIN 40 MG
TABLET ORAL
Qty: 90 TABLET | Refills: 3 | DISCHARGE
Start: 2018-03-06 | End: 2018-12-05 | Stop reason: ALTCHOICE

## 2018-03-06 RX ORDER — VALSARTAN 40 MG/1
40 TABLET ORAL DAILY
Qty: 30 TABLET | Refills: 3 | DISCHARGE
Start: 2018-03-07 | End: 2018-07-25 | Stop reason: ALTCHOICE

## 2018-03-06 RX ORDER — GINSENG 100 MG
CAPSULE ORAL
Refills: 3 | DISCHARGE
Start: 2018-03-06 | End: 2018-03-16

## 2018-03-06 RX ORDER — LIDOCAINE 50 MG/G
1 PATCH TOPICAL DAILY
Qty: 30 PATCH | Refills: 0 | DISCHARGE
Start: 2018-03-07 | End: 2018-07-25 | Stop reason: ALTCHOICE

## 2018-03-06 RX ORDER — SENNA PLUS 8.6 MG/1
2 TABLET ORAL NIGHTLY PRN
DISCHARGE
Start: 2018-03-06 | End: 2018-04-05

## 2018-03-06 RX ORDER — ACETAMINOPHEN 500 MG
1000 TABLET ORAL EVERY 6 HOURS
Qty: 120 TABLET | Refills: 3 | Status: ON HOLD | DISCHARGE
Start: 2018-03-06 | End: 2018-06-15 | Stop reason: HOSPADM

## 2018-03-06 RX ORDER — METOPROLOL SUCCINATE 25 MG/1
25 TABLET, EXTENDED RELEASE ORAL DAILY
Qty: 90 TABLET | Refills: 3 | DISCHARGE
Start: 2018-03-06 | End: 2018-08-13 | Stop reason: SDUPTHER

## 2018-03-06 RX ORDER — ISOSORBIDE MONONITRATE 30 MG/1
15 TABLET, EXTENDED RELEASE ORAL DAILY
Qty: 45 TABLET | Refills: 3 | DISCHARGE
Start: 2018-03-06 | End: 2019-01-30 | Stop reason: SDUPTHER

## 2018-03-06 RX ORDER — POLYETHYLENE GLYCOL 3350 17 G/17G
17 POWDER, FOR SOLUTION ORAL DAILY PRN
Qty: 527 G | Refills: 1 | DISCHARGE
Start: 2018-03-06 | End: 2018-04-05

## 2018-03-06 RX ORDER — OYSTER SHELL CALCIUM WITH VITAMIN D 500; 200 MG/1; [IU]/1
1 TABLET, FILM COATED ORAL 2 TIMES DAILY
Qty: 30 TABLET | Refills: 3 | DISCHARGE
Start: 2018-03-06

## 2018-03-06 RX ORDER — PSEUDOEPHEDRINE HCL 30 MG
100 TABLET ORAL 2 TIMES DAILY PRN
Status: ON HOLD | DISCHARGE
Start: 2018-03-06 | End: 2019-09-10 | Stop reason: HOSPADM

## 2018-03-06 RX ORDER — FAMOTIDINE 20 MG/1
20 TABLET, FILM COATED ORAL 2 TIMES DAILY
Qty: 60 TABLET | Refills: 3 | DISCHARGE
Start: 2018-03-06 | End: 2018-07-25 | Stop reason: ALTCHOICE

## 2018-03-06 RX ADMIN — FAMOTIDINE 20 MG: 20 TABLET, FILM COATED ORAL at 08:37

## 2018-03-06 RX ADMIN — BARIUM SULFATE 70 ML: 0.81 POWDER, FOR SUSPENSION ORAL at 09:12

## 2018-03-06 RX ADMIN — BARIUM SULFATE 20 ML: 400 PASTE ORAL at 09:13

## 2018-03-06 RX ADMIN — BARIUM SULFATE 20 ML: 400 SUSPENSION ORAL at 09:13

## 2018-03-06 RX ADMIN — ACETAMINOPHEN 1000 MG: 500 TABLET ORAL at 08:38

## 2018-03-06 RX ADMIN — CALCIUM CARBONATE-VITAMIN D TAB 500 MG-200 UNIT 1 TABLET: 500-200 TAB at 08:37

## 2018-03-06 RX ADMIN — VALSARTAN 40 MG: 80 TABLET ORAL at 08:37

## 2018-03-06 RX ADMIN — BACITRACIN: 500 OINTMENT TOPICAL at 08:40

## 2018-03-06 RX ADMIN — ASPIRIN 81 MG: 81 TABLET ORAL at 08:37

## 2018-03-06 RX ADMIN — METOPROLOL SUCCINATE 50 MG: 50 TABLET, FILM COATED, EXTENDED RELEASE ORAL at 08:37

## 2018-03-06 RX ADMIN — ACETAMINOPHEN 1000 MG: 500 TABLET ORAL at 14:04

## 2018-03-06 RX ADMIN — SACUBITRIL AND VALSARTAN 1 TABLET: 49; 51 TABLET, FILM COATED ORAL at 08:37

## 2018-03-06 RX ADMIN — BACITRACIN: 500 OINTMENT TOPICAL at 14:08

## 2018-03-06 ASSESSMENT — PAIN SCALES - GENERAL
PAINLEVEL_OUTOF10: 3
PAINLEVEL_OUTOF10: 4
PAINLEVEL_OUTOF10: 7
PAINLEVEL_OUTOF10: 8

## 2018-03-06 ASSESSMENT — PAIN DESCRIPTION - DESCRIPTORS: DESCRIPTORS: ACHING

## 2018-03-06 ASSESSMENT — PAIN DESCRIPTION - FREQUENCY: FREQUENCY: CONTINUOUS

## 2018-03-06 ASSESSMENT — PAIN DESCRIPTION - PAIN TYPE: TYPE: SURGICAL PAIN

## 2018-03-06 ASSESSMENT — PAIN DESCRIPTION - ORIENTATION: ORIENTATION: LEFT

## 2018-03-06 ASSESSMENT — PAIN DESCRIPTION - LOCATION: LOCATION: SHOULDER
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
## 2018-03-06 NOTE — PROGRESS NOTES
PORTABLE PATIENT PROFILE  Akosua Snell  [id number]    MEDICAL DIAGNOSIS/CONDITION:   Patient Active Problem List   Diagnosis    Hyperlipidemia    Stress incontinence, female    [facility]    CAD (coronary artery disease) s/p PCI and stent Multilink 3 x 18  mm mid LAD- in 07/2009 at [facility]    Obstructive sleep apnea on CPAP    COPD (chronic obstructive pulmonary disease) ([facility]    S/P ICD (internal cardiac defibrillator) procedure    Nonischemic cardiomyopathy ([facility]    Closed head injury with concussion    Class 2 obesity due to excess calories with serious comorbidity and body mass index (BMI) of 35.0 to 35.9 in adult    Hematoma of scalp    Traumatic ecchymosis of right upper arm    Contusion of flank and back    Syncope and collapse    Closed fracture of shaft of left humerus    Recurrent falls    Closed compression fracture of first lumbar vertebra (HCC)    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 ([facility]    Hypertension    Adjustment disorder with mixed anxiety and depressed mood    Complicated grief    Acute cystitis       INSURANCE INFORMATION:  Payor: MEDICARE /  /  /     ADVANCED DIRECTIVES:   Advance Directives (For Healthcare)  Healthcare Directive: Yes, patient has an advance directive for healthcare treatment  Type of Healthcare Directive: Durable power of  for health care  Full Code     EMERGENCY CONTACT:       RISK FACTORS:   Social History   Substance Use Topics    Smoking status: Former Smoker     Packs/day: 0.50     Years: 40.00     Types: Cigarettes     Quit date: 1/1/1998    Smokeless tobacco: Never Used    Alcohol use 1.2 oz/week     2 Standard drinks or equivalent per week      Comment: occasional        ALLERGIES:  Allergies   Allergen Reactions    Ciprofloxacin Itching    Neomycin     Pcn [Penicillins] Hives     Pt reports having reaction 50 years ago    Zanaflex [Tizanidine Hcl] Other (See Comments)     Causes confusion    Levaquin [Levofloxacin] Itching     Benadryl was given for tx       FUNCTIONAL STATUS:  Eatin - Feeds self with setup/supervision/cues and/or requires only setup/supervision/cues to perform tube feedings  Groomin - Requires setup/cues to do all tasks (SBA x 4 min during oral care )  Bathin - Able to bathe all 10 areas with setup/sup/cues (close SBA)  Dressing-Upper: 3 - Requires assist with 2 tasks  Dressing-Lower: 4 - Requires assist with buttons/zippers/shoelaces and/or assist with shoes only (A with socks)  Toiletin - Requires setup/supervision/cues (SBA during clothing management )    Sphincter Control  Bladder: 7 - Patient urinates in toilet independently  Bowel: 7 - Patient voids bowels on toilet independently    Bed, Chair, Wheel Chair: 4 - Requires steadying assistance only <25% assist  and/or requires assist with one leg only  Toilet Transfer: 4 - Requires steadying assistance only < 25% assist (CGA, STS)  Shower Transfer: 5 - Supervision, set-up, cues (SBA walk in shower)    Locomotion  Primary Mode: Walk  Distance Walked: 150 feet   Distance Traveled in Wheel Chair: 25 feet   Walk: 4 - Contact Guard/Minimal Assistance Requires up to Contact Guard or Minimal Assistance to walk/operate wheelchair at least 150 feet  Wheel Chair: 1 - Total Assistance Walks/operates wheelchair < 50 feet OR requires two or more people OR patient performs < 25% of locomotion effort  Stairs: 2- Maximal Assistance Performs 25-49% of the effort to go up and down 4 to 6 stairs and requires the assistance of one person only    Communication  Comprehension: 4 - Patient understands basic needs 75-90%+ of the time  Expression: 4 - Expresses basic ideas/needs 75-90%+ of the time    Social Cognition  Social Interaction: 5 - Patient is appropriate with supervision/cues  Problem Solving: 3 - Patient solves simple/routine tasks 50%-74%  Memory: 3 - Patient remembers 50%-74% of the time    EQUIPMENT AND DEVICES:  Assistive

## 2018-03-06 NOTE — PROGRESS NOTES
scale)  [x] 6 = WFL / Mod I; Normal diet; May have mild oral delay    PHARYNGEAL PHASE: [x] Impaired       [x] Delayed Swallow [x] Decreased airway protection   [x] Decreased epiglottic inversion [x] Decreased hyolaryngeal elevation   [x] Residue in the valleculae     PHARYNGEAL PHASE IGGY SCORE: (Dysphagia outcome and severity scale)  [x] 5 = Mild dysphagia:  May need one consistency restricted; May have one or more of the following:  Aspiration with thin  cough to clear; Airway penetration midway to the vocal cords with one or more consistency or to the vocal folds with one consistency, but clears spontaneously; Residue in the pharynx clears spontaneously. SIGNS AND SYMPTOMS OF LARYNGEAL PENETRATION / ASPIRATION:  [x] No evidence of aspiration  [x] Laryngeal penetration evident with: thin liquids    PENETRATION-ASPIRATION SCALE (PAS):  [x] 2 = Material enters the airway, remains above the vocal folds, and is ejected from the airway    ESOPHAGEAL PHASE:   [x] No significant findings    ATTEMPTED TECHNIQUES:    [x]Small bolus size [x] Effective   [x]Cup [x] Effective   [x]Chin tuck [x] Effective   [x]Spoon presentations [x] Effective   [x] Volitional cough [x] Effective     DIAGNOSTIC IMPRESSIONS:  Pt presents with oral phase that is essentially WFL, mild pharyngeal dysphagia as outlined by findings above. Appropriate oral initiation with adequate labial seal on utensil and cup for transferring bolus to oral cavity. Min deficits related to cohesive bolus formation and manipulation, resulting in delayed AP transit. Due to lethargy exhibited, pt required intermittent verbal cues of \"swallow\" to elicit trigger. Min oral residuals spread diffusely throughout cavity, clearance of stasis upon liquid assist. Min premature spillage to the level of the level of the valleculae. Decreased hyolaryngeal elevation and anterior excursion, resulting in incomplete epiglottic inversion.  Consistent flash penetration of thin liquids via cup with multiple trials presented; usage of a chin tuck improved airway protection for safe toleration of thin liquids; pt exhibiting generalization of strategy on skilled rehab floor to promote independent carryover. NO observations of aspiration evidenced within this study. Recommend diet level of regular solids with thin liquids at this time; pt notes with fluctuating performance during ST intervention and would benefit from pulmonary monitoring and diet analysis to ensure safe toleration of established diet level; please see additional swallow strategies listed below.         DIET RECOMMENDATIONS:  Regular with thin liquids; chin tuck with liquids    STRATEGIES:   [x] Full upright position  [x] Small bite/sip   [x] No Straw  [x] Pulmonary monitoring    [x] Alternate solid / liquid [x] Limit distractions   [x] Monitor for fatigue    EDUCATION:   Learner: [x]Patient    Education: [x] Reviewed results and recommendations of this evaluation     [x] Reviewed diet and strategies     [x] Reviewed signs, symptoms and risk of aspiration     [x] Reviewed goals and Plan of Care   Method: [x] Discussion    Evaluation of Education:     [x] Verbalizes understanding     [x]Needs further instruction     [x] Family not present    PATIENT GOALS:     [x] Return to previous level of function         PLAN / TREATMENT RECOMMENDATIONS:  [x] No further speech therapy services indicated due to discharge to [facility] for continued rehabilitation services. Recommend targeting pharyngeal strengthening exercises to continue improve airway protection. Continue with training of compensatory swallowing strategies to improve safety of intake.           Wil Ram M.A., [address] [place] [address]

## 2018-03-06 NOTE — PROGRESS NOTES
[address] [address]  Recreational Therapy  Discharge Note  Inpatient Rehabilitation Unit           Date:  3/6/2018       Patient Name: Matthew Lovelace      MRN: [id number]       YOB: 1940 (77 y.o.)       Gender: female  Diagnosis: Traumatic Brain Injury without loss of consciousness, sequela   Referring Practitioner: Dr. Jocelyn Umana     Patient discharged from Recreational Therapy at this time. See recreational therapy notes for details.     Electronically signed by: ROXANN Cruz  Date: 3/6/2018

## 2018-03-06 NOTE — PLAN OF CARE
Problem: Pain Control  Goal: Maintain pain level at or below patient's acceptable level (or 5 if patient is unable to determine acceptable level)  Outcome: Completed Date Met: 03/06/18      Problem: Bleeding:  Goal: Will show no signs and symptoms of excessive bleeding  Will show no signs and symptoms of excessive bleeding   Outcome: Completed Date Met: 03/06/18      Problem: Mobility - Impaired:  Goal: Able to ambulate with minimal assistance  Able to ambulate with minimal assistance   Outcome: Completed Date Met: 03/06/18      Problem: Bowel Function - Altered:  Goal: Bowel elimination is within specified parameters  Bowel elimination is within specified parameters   Outcome: Completed Date Met: 03/06/18      Problem: Self-Care Deficit:  Goal: Able to perform ADL with assistance  Able to perform ADL with assistance   Outcome: Completed Date Met: 03/06/18      Problem: Falls - Risk of  Goal: Absence of falls  Outcome: Completed Date Met: 03/06/18      Problem: Skin Integrity/Risk  Goal: No skin breakdown during hospitalization  Outcome: Completed Date Met: 03/06/18      Problem: Nutrition  Goal: Optimal nutrition therapy  Outcome: Completed Date Met: 03/06/18      Comments: Care plan reviewed with patient. Patient verbalize understanding of the plan of care and contribute to goal setting.

## 2018-03-07 ENCOUNTER — TELEPHONE (OUTPATIENT)
Dept: FAMILY MEDICINE CLINIC | Age: 78
End: 2018-03-07
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
## 2018-03-08 ENCOUNTER — TELEPHONE (OUTPATIENT)
Dept: FAMILY MEDICINE CLINIC | Age: 78
End: 2018-03-08

## 2018-03-28 ENCOUNTER — NURSE ONLY (OUTPATIENT)
Dept: CARDIOLOGY CLINIC | Age: 78
End: 2018-03-28
Payer: MEDICARE

## 2018-03-28 DIAGNOSIS — Z95.810 S/P ICD (INTERNAL CARDIAC DEFIBRILLATOR) PROCEDURE: Primary | ICD-10-CM

## 2018-03-28 PROCEDURE — 93289 INTERROG DEVICE EVAL HEART: CPT | Performed by: INTERNAL MEDICINE
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
## 2018-05-02 ENCOUNTER — TELEPHONE (OUTPATIENT)
Dept: CARDIOLOGY CLINIC | Age: 78
End: 2018-05-02

## 2018-05-04 ENCOUNTER — OFFICE VISIT (OUTPATIENT)
Dept: FAMILY MEDICINE CLINIC | Age: 78
End: 2018-05-04

## 2018-05-04 VITALS
DIASTOLIC BLOOD PRESSURE: 70 MMHG | BODY MASS INDEX: 33.02 KG/M2 | RESPIRATION RATE: 16 BRPM | SYSTOLIC BLOOD PRESSURE: 130 MMHG | WEIGHT: 210.4 LBS | HEART RATE: 82 BPM | HEIGHT: 67 IN

## 2018-05-04 DIAGNOSIS — I10 ESSENTIAL HYPERTENSION: Primary | ICD-10-CM

## 2018-05-04 DIAGNOSIS — G47.33 OBSTRUCTIVE SLEEP APNEA ON CPAP: ICD-10-CM

## 2018-05-04 DIAGNOSIS — S32.010D CLOSED COMPRESSION FRACTURE OF L1 LUMBAR VERTEBRA WITH ROUTINE HEALING, SUBSEQUENT ENCOUNTER: ICD-10-CM

## 2018-05-04 DIAGNOSIS — E78.5 HYPERLIPIDEMIA, UNSPECIFIED HYPERLIPIDEMIA TYPE: ICD-10-CM

## 2018-05-04 DIAGNOSIS — S42.322D CLOSED DISPLACED TRANSVERSE FRACTURE OF SHAFT OF LEFT HUMERUS WITH ROUTINE HEALING, SUBSEQUENT ENCOUNTER: ICD-10-CM

## 2018-05-04 DIAGNOSIS — Z99.89 OBSTRUCTIVE SLEEP APNEA ON CPAP: ICD-10-CM

## 2018-05-04 PROCEDURE — 99214 OFFICE O/P EST MOD 30 MIN: CPT | Performed by: FAMILY MEDICINE

## 2018-05-04 PROCEDURE — 1101F PT FALLS ASSESS-DOCD LE1/YR: CPT | Performed by: FAMILY MEDICINE

## 2018-05-04 RX ORDER — LANOLIN ALCOHOL/MO/W.PET/CERES
3 CREAM (GRAM) TOPICAL DAILY
COMMUNITY
End: 2018-07-25

## 2018-05-04 RX ORDER — ONDANSETRON 4 MG/1
4 TABLET, ORALLY DISINTEGRATING ORAL EVERY 8 HOURS PRN
COMMUNITY
End: 2018-07-25

## 2018-05-04 RX ORDER — TRAMADOL HYDROCHLORIDE 50 MG/1
50 TABLET ORAL EVERY 6 HOURS PRN
COMMUNITY
End: 2018-06-08 | Stop reason: SDUPTHER

## 2018-05-04 RX ORDER — LOSARTAN POTASSIUM 25 MG/1
25 TABLET ORAL DAILY
Status: ON HOLD | COMMUNITY
End: 2018-06-15 | Stop reason: HOSPADM

## 2018-05-04 RX ORDER — SENNA PLUS 8.6 MG/1
1 TABLET ORAL DAILY
Status: ON HOLD | COMMUNITY
End: 2018-06-15 | Stop reason: HOSPADM

## 2018-05-04 RX ORDER — POLYETHYLENE GLYCOL 3350 17 G/17G
17 POWDER, FOR SOLUTION ORAL DAILY
COMMUNITY
End: 2018-07-25

## 2018-05-04 ASSESSMENT — ENCOUNTER SYMPTOMS
NAUSEA: 0
CONSTIPATION: 0
ABDOMINAL PAIN: 0
DIARRHEA: 0
CHEST TIGHTNESS: 0
VOMITING: 0
COUGH: 0
SHORTNESS OF BREATH: 0

## 2018-05-04 ASSESSMENT — PATIENT HEALTH QUESTIONNAIRE - PHQ9
SUM OF ALL RESPONSES TO PHQ9 QUESTIONS 1 & 2: 0
1. LITTLE INTEREST OR PLEASURE IN DOING THINGS: 0
2. FEELING DOWN, DEPRESSED OR HOPELESS: 0
SUM OF ALL RESPONSES TO PHQ QUESTIONS 1-9: 0

## 2018-05-04 NOTE — PROGRESS NOTES
Visit Date: 5/4/2018  Here with her daughter in law Layman Ed wife. Fernando Bullock is a 77 y.o. female who presents today for:  Chief Complaint   Patient presents with    Hyperlipidemia    Hypertension       HPI:     HPI    has a current medication list which includes the following prescription(s): losartan, melatonin, ondansetron, polyethylene glycol, senna, tramadol, acetaminophen, acetaminophen, isosorbide mononitrate, simvastatin, valsartan, metoprolol succinate, sacubitril-valsartan, lidocaine, docusate, magnesium hydroxide, calcium-vitamin d, famotidine, and aspirin.     Allergies   Allergen Reactions    Ciprofloxacin Itching    Neomycin     Pcn [Penicillins] Hives     Pt reports having reaction 50 years ago    Zanaflex [Tizanidine Hcl] Other (See Comments)     Causes confusion    Levaquin [Levofloxacin] Itching     Benadryl was given for tx       Social History   Substance Use Topics    Smoking status: Former Smoker     Packs/day: 0.50     Years: 40.00     Types: Cigarettes     Quit date: 1/1/1998    Smokeless tobacco: Never Used    Alcohol use 1.2 oz/week     2 Standard drinks or equivalent per week      Comment: occasional        Past Medical History:   Diagnosis Date    Arthritis     general    Breast CA ([facility] 12/03    right    CAD (coronary artery disease) 2009    stent in [place]    [facility]est[facility]     Closed compression fracture of first lumbar vertebra ([facility] 2/7/2018    Colon polyps 8/97; 3/11    COPD (chronic obstructive pulmonary disease) ([facility] 8/5/2017    Diverticulosis 1/06    Erosive gastritis 11/06    Esophageal stricture 03/2011    Three times, Dr. Rayshawn Roche    Hyperlipidemia     Hypertension     Internal hemorrhoid 1/06    LUISA on CPAP     Osteopenia determined by x-ray 1999    Stress incontinence, female        Past Surgical History:   Procedure Laterality Date    BREAST LUMPECTOMY  1/04    [facility] axillary dissection    CARDIAC well-developed and well-nourished. No distress. HENT:   Head: Normocephalic and atraumatic. Eyes: Conjunctivae and EOM are normal. Pupils are equal, round, and reactive to light. Right eye exhibits no discharge. Left eye exhibits no discharge. No scleral icterus. Neck: Normal range of motion. Neck supple. No JVD present. No thyromegaly present. Cardiovascular: Normal rate, regular rhythm and normal heart sounds. No murmur heard. Pulmonary/Chest: Breath sounds normal. No respiratory distress. She has no wheezes. She has no rhonchi. She has no rales. Abdominal: Soft. Bowel sounds are normal. She exhibits no distension and no mass. There is no hepatosplenomegaly. There is no tenderness. There is no rebound and no guarding. Musculoskeletal: Normal range of motion. Lymphadenopathy:     She has no cervical adenopathy. Neurological: She is alert and oriented to person, place, and time. Skin: Skin is warm and dry. No rash noted. She is not diaphoretic. Psychiatric: She has a normal mood and affect. Her behavior is normal.   Nursing note and vitals reviewed. Assessment:       Diagnosis Orders   1. Essential hypertension     2. Hyperlipidemia, unspecified hyperlipidemia type     3. Obstructive sleep apnea on CPAP     4. Closed displaced transverse fracture of shaft of left humerus with routine healing, subsequent encounter     5.  Closed compression fracture of L1 lumbar vertebra with routine healing, subsequent encounter         Plan:      Requested Prescriptions      No prescriptions requested or ordered in this encounter     Current Outpatient Prescriptions   Medication Sig Dispense Refill    losartan (COZAAR) 25 MG tablet Take 25 mg by mouth daily      melatonin 3 MG TABS tablet Take 3 mg by mouth daily      ondansetron (ZOFRAN-ODT) 4 MG disintegrating tablet Take 4 mg by mouth every 8 hours as needed for Nausea or Vomiting      polyethylene glycol (GLYCOLAX) powder Take 17 g by mouth daily

## 2018-05-08 ENCOUNTER — TELEPHONE (OUTPATIENT)
Dept: FAMILY MEDICINE CLINIC | Age: 78
End: 2018-05-08

## 2018-05-10 ENCOUNTER — HOSPITAL ENCOUNTER (OUTPATIENT)
Dept: INTERVENTIONAL RADIOLOGY/VASCULAR | Age: 78
Discharge: HOME | End: 2018-05-10
Payer: MEDICARE

## 2018-05-10 PROCEDURE — 76000 FLUOROSCOPY <1 HR PHYS/QHP: CPT

## 2018-05-11 ENCOUNTER — HOSPITAL ENCOUNTER (OUTPATIENT)
Dept: CT IMAGING | Age: 78
Discharge: HOME | End: 2018-05-11
Payer: MEDICARE

## 2018-05-11 DIAGNOSIS — H53.2 DIPLOPIA: ICD-10-CM

## 2018-05-11 LAB — POC CREATININE WHOLE BLOOD: 0.7 MG/DL (ref 0.5–1.2)

## 2018-05-11 PROCEDURE — 70470 CT HEAD/BRAIN W/O & W/DYE: CPT

## 2018-05-11 PROCEDURE — 82565 ASSAY OF CREATININE: CPT

## 2018-05-11 PROCEDURE — 6360000004 HC RX CONTRAST MEDICATION: Performed by: OPHTHALMOLOGY

## 2018-05-11 PROCEDURE — 70482 CT ORBIT/EAR/FOSSA W/O&W/DYE: CPT

## 2018-05-11 RX ADMIN — IOPAMIDOL 65 ML: 755 INJECTION, SOLUTION INTRAVENOUS at 16:52

## 2018-05-18 ENCOUNTER — TELEPHONE (OUTPATIENT)
Dept: FAMILY MEDICINE CLINIC | Age: 78
End: 2018-05-18

## 2018-05-18 DIAGNOSIS — M85.80 OSTEOPENIA DETERMINED BY X-RAY: ICD-10-CM

## 2018-05-18 DIAGNOSIS — M54.5 CHRONIC MIDLINE LOW BACK PAIN, WITH SCIATICA PRESENCE UNSPECIFIED: ICD-10-CM

## 2018-05-18 DIAGNOSIS — Z78.0 POSTMENOPAUSAL: ICD-10-CM

## 2018-05-18 DIAGNOSIS — G89.29 CHRONIC MIDLINE LOW BACK PAIN, WITH SCIATICA PRESENCE UNSPECIFIED: ICD-10-CM

## 2018-05-18 DIAGNOSIS — S32.010G CLOSED COMPRESSION FRACTURE OF L1 LUMBAR VERTEBRA WITH DELAYED HEALING, SUBSEQUENT ENCOUNTER: Primary | ICD-10-CM

## 2018-05-23 RX ORDER — TRAMADOL HYDROCHLORIDE 50 MG/1
TABLET ORAL
Qty: 28 TABLET | Refills: 0 | OUTPATIENT
Start: 2018-05-23

## 2018-05-25 ENCOUNTER — TELEPHONE (OUTPATIENT)
Dept: FAMILY MEDICINE CLINIC | Age: 78
End: 2018-05-25

## 2018-05-25 DIAGNOSIS — S22.089D CLOSED FRACTURE OF TWELFTH THORACIC VERTEBRA WITH ROUTINE HEALING, UNSPECIFIED FRACTURE MORPHOLOGY, SUBSEQUENT ENCOUNTER: ICD-10-CM

## 2018-05-25 DIAGNOSIS — Z85.3 HISTORY OF RIGHT BREAST CANCER: Primary | ICD-10-CM

## 2018-05-25 DIAGNOSIS — S32.010D CLOSED COMPRESSION FRACTURE OF L1 LUMBAR VERTEBRA WITH ROUTINE HEALING, SUBSEQUENT ENCOUNTER: ICD-10-CM

## 2018-05-30 RX ORDER — TRAMADOL HYDROCHLORIDE 50 MG/1
50 TABLET ORAL EVERY 6 HOURS PRN
Status: CANCELLED | OUTPATIENT
Start: 2018-05-30

## 2018-06-01 RX ORDER — TRAMADOL HYDROCHLORIDE 50 MG/1
50 TABLET ORAL EVERY 6 HOURS PRN
Qty: 30 TABLET | Refills: 0 | OUTPATIENT
Start: 2018-06-01

## 2018-06-04 ENCOUNTER — HOSPITAL ENCOUNTER (OUTPATIENT)
Dept: CT IMAGING | Age: 78
Discharge: HOME | End: 2018-06-04
Payer: MEDICARE

## 2018-06-04 ENCOUNTER — HOSPITAL ENCOUNTER (OUTPATIENT)
Dept: WOMENS IMAGING | Age: 78
Discharge: HOME | End: 2018-06-04
Payer: MEDICARE

## 2018-06-04 DIAGNOSIS — G89.29 CHRONIC MIDLINE LOW BACK PAIN, WITH SCIATICA PRESENCE UNSPECIFIED: ICD-10-CM

## 2018-06-04 DIAGNOSIS — Z78.0 POSTMENOPAUSAL: ICD-10-CM

## 2018-06-04 DIAGNOSIS — M81.0 OSTEOPOROSIS OF VERTEBRA: ICD-10-CM

## 2018-06-04 DIAGNOSIS — S32.010G CLOSED COMPRESSION FRACTURE OF L1 LUMBAR VERTEBRA WITH DELAYED HEALING, SUBSEQUENT ENCOUNTER: ICD-10-CM

## 2018-06-04 DIAGNOSIS — M54.5 CHRONIC MIDLINE LOW BACK PAIN, WITH SCIATICA PRESENCE UNSPECIFIED: ICD-10-CM

## 2018-06-04 DIAGNOSIS — M85.80 OSTEOPENIA DETERMINED BY X-RAY: ICD-10-CM

## 2018-06-04 PROCEDURE — 77080 DXA BONE DENSITY AXIAL: CPT

## 2018-06-04 PROCEDURE — 72131 CT LUMBAR SPINE W/O DYE: CPT

## 2018-06-04 PROCEDURE — 72128 CT CHEST SPINE W/O DYE: CPT

## 2018-06-07 ENCOUNTER — TELEPHONE (OUTPATIENT)
Dept: FAMILY MEDICINE CLINIC | Age: 78
End: 2018-06-07

## 2018-06-08 ENCOUNTER — OFFICE VISIT (OUTPATIENT)
Dept: FAMILY MEDICINE CLINIC | Age: 78
End: 2018-06-08

## 2018-06-08 VITALS
HEIGHT: 67 IN | WEIGHT: 199.25 LBS | BODY MASS INDEX: 31.27 KG/M2 | SYSTOLIC BLOOD PRESSURE: 120 MMHG | RESPIRATION RATE: 16 BRPM | DIASTOLIC BLOOD PRESSURE: 60 MMHG | HEART RATE: 60 BPM

## 2018-06-08 DIAGNOSIS — R35.0 URINARY FREQUENCY: ICD-10-CM

## 2018-06-08 DIAGNOSIS — M80.00XD AGE-RELATED OSTEOPOROSIS WITH CURRENT PATHOLOGICAL FRACTURE WITH ROUTINE HEALING, SUBSEQUENT ENCOUNTER: Primary | ICD-10-CM

## 2018-06-08 DIAGNOSIS — N39.0 URINARY TRACT INFECTION WITHOUT HEMATURIA, SITE UNSPECIFIED: ICD-10-CM

## 2018-06-08 DIAGNOSIS — S32.010D CLOSED COMPRESSION FRACTURE OF L1 LUMBAR VERTEBRA WITH ROUTINE HEALING, SUBSEQUENT ENCOUNTER: ICD-10-CM

## 2018-06-08 LAB
BACTERIA URINE, POC: ABNORMAL
BILIRUBIN URINE: 0 MG/DL
BLOOD, URINE: POSITIVE
CASTS URINE, POC: ABNORMAL
CLARITY: ABNORMAL
COLOR: YELLOW
CRYSTALS URINE, POC: ABNORMAL
EPI CELLS URINE, POC: ABNORMAL
GLUCOSE URINE: NEGATIVE
KETONES, URINE: NEGATIVE
LEUKOCYTE EST, POC: ABNORMAL
NITRITE, URINE: POSITIVE
PH UA: 5 (ref 4.5–8)
PROTEIN UA: POSITIVE
RBC URINE, POC: ABNORMAL
SPECIFIC GRAVITY UA: 1.01 (ref 1–1.03)
UROBILINOGEN, URINE: NORMAL
WBC URINE, POC: ABNORMAL
YEAST URINE, POC: ABNORMAL

## 2018-06-08 PROCEDURE — 99215 OFFICE O/P EST HI 40 MIN: CPT | Performed by: FAMILY MEDICINE

## 2018-06-08 PROCEDURE — 1101F PT FALLS ASSESS-DOCD LE1/YR: CPT | Performed by: FAMILY MEDICINE

## 2018-06-08 PROCEDURE — 81000 URINALYSIS NONAUTO W/SCOPE: CPT | Performed by: FAMILY MEDICINE

## 2018-06-08 RX ORDER — TRAMADOL HYDROCHLORIDE 50 MG/1
50 TABLET ORAL EVERY 6 HOURS PRN
Qty: 60 TABLET | Refills: 0 | Status: SHIPPED | OUTPATIENT
Start: 2018-06-08 | End: 2018-07-08

## 2018-06-08 RX ORDER — CALCITONIN SALMON 200 [IU]/.09ML
1 SPRAY, METERED NASAL DAILY
Qty: 1 BOTTLE | Refills: 3 | Status: SHIPPED | OUTPATIENT
Start: 2018-06-08 | End: 2019-03-31 | Stop reason: SDUPTHER

## 2018-06-08 RX ORDER — NITROFURANTOIN 25; 75 MG/1; MG/1
100 CAPSULE ORAL 2 TIMES DAILY
Qty: 14 CAPSULE | Refills: 0 | Status: ON HOLD | OUTPATIENT
Start: 2018-06-08 | End: 2018-06-15 | Stop reason: HOSPADM

## 2018-06-08 ASSESSMENT — ENCOUNTER SYMPTOMS
BACK PAIN: 1
ABDOMINAL PAIN: 0
SHORTNESS OF BREATH: 0
COUGH: 0
DIARRHEA: 0
NAUSEA: 0
CHEST TIGHTNESS: 0
EYES NEGATIVE: 1
CONSTIPATION: 0
VOMITING: 0

## 2018-06-09 LAB
APPEARANCE: ABNORMAL
BILIRUBIN: NEGATIVE
COLOR: ABNORMAL
GLUCOSE BLD-MCNC: NEGATIVE MG/DL
KETONES, URINE: ABNORMAL
LEUKOCYTE ESTERASE, URINE: ABNORMAL
NITRITE, URINE: POSITIVE
OCCULT BLOOD,URINE: ABNORMAL
PH: 6 (ref 5–9)
PROTEIN, URINE: ABNORMAL
SP GRAVITY MISCELLANEOUS: 1.01 (ref 1–1.03)
UROBILINOGEN, URINE: NORMAL
WBC: >50 PER HPF (ref 0–5)

## 2018-06-11 ENCOUNTER — APPOINTMENT (OUTPATIENT)
Dept: GENERAL RADIOLOGY | Age: 78
DRG: 871 | End: 2018-06-11
Payer: MEDICARE

## 2018-06-11 ENCOUNTER — HOSPITAL ENCOUNTER (INPATIENT)
Age: 78
LOS: 4 days | Discharge: HOME | DRG: 871 | End: 2018-06-15
Attending: FAMILY MEDICINE | Admitting: FAMILY MEDICINE
Payer: MEDICARE

## 2018-06-11 DIAGNOSIS — N39.0 URINARY TRACT INFECTION WITHOUT HEMATURIA, SITE UNSPECIFIED: Primary | ICD-10-CM

## 2018-06-11 DIAGNOSIS — R91.8 PULMONARY INFILTRATE IN LEFT LUNG ON CHEST X-RAY: ICD-10-CM

## 2018-06-11 PROBLEM — S06.9X0A TRAUMATIC BRAIN INJURY WITHOUT LOSS OF CONSCIOUSNESS (HCC): Status: RESOLVED | Noted: 2018-02-14 | Resolved: 2018-06-11

## 2018-06-11 PROBLEM — S00.03XA HEMATOMA OF SCALP: Status: RESOLVED | Noted: 2018-02-03 | Resolved: 2018-06-11

## 2018-06-11 PROBLEM — E43 SEVERE MALNUTRITION (HCC): Chronic | Status: ACTIVE | Noted: 2018-06-11

## 2018-06-11 PROBLEM — R29.6 RECURRENT FALLS: Status: RESOLVED | Noted: 2018-02-07 | Resolved: 2018-06-11

## 2018-06-11 PROBLEM — B96.20 E. COLI UTI (URINARY TRACT INFECTION): Status: ACTIVE | Noted: 2018-06-11

## 2018-06-11 PROBLEM — F43.21 COMPLICATED GRIEF: Status: RESOLVED | Noted: 2018-02-20 | Resolved: 2018-06-11

## 2018-06-11 PROBLEM — S06.0XAA CLOSED HEAD INJURY WITH CONCUSSION: Status: RESOLVED | Noted: 2018-02-03 | Resolved: 2018-06-11

## 2018-06-11 LAB
ACINETOBACTER BAUMANNII FILM ARRAY: NOT DETECTED
ALBUMIN SERPL-MCNC: 3.4 G/DL (ref 3.5–5.1)
ALP BLD-CCNC: 65 U/L (ref 38–126)
ALT SERPL-CCNC: 7 U/L (ref 11–66)
AMORPHOUS: ABNORMAL
ANION GAP SERPL CALCULATED.3IONS-SCNC: 13 MEQ/L (ref 8–16)
APTT: 23.3 SECONDS (ref 22–38)
AST SERPL-CCNC: 12 U/L (ref 5–40)
BACTERIA: ABNORMAL /HPF
BASOPHILS # BLD: 0.1 %
BASOPHILS ABSOLUTE: 0 THOU/MM3 (ref 0–0.1)
BILIRUB SERPL-MCNC: 0.9 MG/DL (ref 0.3–1.2)
BILIRUBIN URINE: NEGATIVE
BLOOD, URINE: NEGATIVE
BOTTLE TYPE: ABNORMAL
BUN BLDV-MCNC: 22 MG/DL (ref 7–22)
CALCIUM SERPL-MCNC: 9.2 MG/DL (ref 8.5–10.5)
CANDIDA ALBICANS FILM ARRAY: NOT DETECTED
CANDIDA GLABRATA FILM ARRAY: NOT DETECTED
CANDIDA KRUSEI FILM ARRAY: NOT DETECTED
CANDIDA PARAPSILOSIS FILM ARRAY: NOT DETECTED
CANDIDA TROPICALIS FILM ARRAY: NOT DETECTED
CARBAPENEM RESITANT FILM ARRAY: ABNORMAL
CASTS 2: ABNORMAL /LPF
CASTS UA: ABNORMAL /LPF
CHARACTER, URINE: ABNORMAL
CHLORIDE BLD-SCNC: 98 MEQ/L (ref 98–111)
CO2: 25 MEQ/L (ref 23–33)
COLOR: YELLOW
CREAT SERPL-MCNC: 0.5 MG/DL (ref 0.4–1.2)
CRYSTALS, UA: ABNORMAL
EKG ATRIAL RATE: 99 BPM
EKG P AXIS: 48 DEGREES
EKG P-R INTERVAL: 134 MS
EKG Q-T INTERVAL: 406 MS
EKG QRS DURATION: 158 MS
EKG QTC CALCULATION (BAZETT): 521 MS
EKG R AXIS: -83 DEGREES
EKG T AXIS: 84 DEGREES
EKG VENTRICULAR RATE: 99 BPM
ENTERBACTER CLOACAE FILM ARRAY: NOT DETECTED
ENTERBACTERIACEAE FILM ARRAY: DETECTED
ENTEROCOCCUS FILM ARRAY: NOT DETECTED
EOSINOPHIL # BLD: 0.5 %
EOSINOPHILS ABSOLUTE: 0 THOU/MM3 (ref 0–0.4)
EPITHELIAL CELLS, UA: ABNORMAL /HPF
ESCHERICHIA COLI FILM ARRAY: DETECTED
GFR SERPL CREATININE-BSD FRML MDRD: > 90 ML/MIN/1.73M2
GLUCOSE BLD-MCNC: 116 MG/DL (ref 70–108)
GLUCOSE URINE: NEGATIVE MG/DL
HAEMOPHILUS INFLUENZA FILM ARRAY: NOT DETECTED
HCT VFR BLD CALC: 43.4 % (ref 37–47)
HEMOGLOBIN: 15 GM/DL (ref 12–16)
INR BLD: 0.95 (ref 0.85–1.13)
KETONES, URINE: ABNORMAL
KLEBSIELLA OXYTOCA FILM ARRAY: NOT DETECTED
KLEBSIELLA PNEUMONIAE FILM ARRAY: NOT DETECTED
LACTIC ACID: 1.5 MMOL/L (ref 0.5–2.2)
LEUKOCYTE ESTERASE, URINE: ABNORMAL
LIPASE: 11.3 U/L (ref 5.6–51.3)
LISTERIA MONOCYTOGENES FILM ARRAY: NOT DETECTED
LYMPHOCYTES # BLD: 10.4 %
LYMPHOCYTES ABSOLUTE: 0.3 THOU/MM3 (ref 1–4.8)
MCH RBC QN AUTO: 31.8 PG (ref 27–31)
MCHC RBC AUTO-ENTMCNC: 34.6 GM/DL (ref 33–37)
MCV RBC AUTO: 91.7 FL (ref 81–99)
METHICILLIN RESISTANT FILM ARRAY: ABNORMAL
MISCELLANEOUS 2: ABNORMAL
MONOCYTES # BLD: 0.3 %
MONOCYTES ABSOLUTE: 0 THOU/MM3 (ref 0.4–1.3)
NEISSERIA MENIGITIDIS FILM ARRAY: NOT DETECTED
NITRITE, URINE: NEGATIVE
NUCLEATED RED BLOOD CELLS: 0 /100 WBC
OSMOLALITY CALCULATION: 276.3 MOSMOL/KG (ref 275–300)
PATHOLOGIST REVIEW: ABNORMAL
PDW BLD-RTO: 13.4 % (ref 11.5–14.5)
PH UA: 5
PLATELET # BLD: 167 THOU/MM3 (ref 130–400)
PLATELET ESTIMATE: ADEQUATE
PMV BLD AUTO: 8.5 FL (ref 7.4–10.4)
POTASSIUM REFLEX MAGNESIUM: 3.7 MEQ/L (ref 3.5–5.2)
PRO-BNP: 722.1 PG/ML (ref 0–1800)
PROCALCITONIN: 18.23 NG/ML (ref 0.01–0.09)
PROTEIN UA: NEGATIVE
PROTEUS FILM ARRAY: NOT DETECTED
PSEUDOMONAS AERUGINOSA FILM ARRAY: NOT DETECTED
RBC # BLD: 4.73 MILL/MM3 (ref 4.2–5.4)
RBC URINE: ABNORMAL /HPF
RENAL EPITHELIAL, UA: ABNORMAL
SCAN OF BLOOD SMEAR: NORMAL
SEG NEUTROPHILS: 88.7 %
SEGMENTED NEUTROPHILS ABSOLUTE COUNT: 2.7 THOU/MM3 (ref 1.8–7.7)
SERRATIA MARCESCENS FILM ARRAY: NOT DETECTED
SODIUM BLD-SCNC: 136 MEQ/L (ref 135–145)
SOURCE OF BLOOD CULTURE: ABNORMAL
SPECIFIC GRAVITY, URINE: 1.01 (ref 1–1.03)
STAPH AUREUS FILM ARRAY: NOT DETECTED
STAPHYLOCOCCUS FILM ARRAY: NOT DETECTED
STREP AGALACTIAE FILM ARRAY: NOT DETECTED
STREP PNEUMONIAE FILM ARRAY: NOT DETECTED
STREP PYOCGENES FILM ARRAY: NOT DETECTED
STREPTOCOCCUS FILM ARRAY: NOT DETECTED
TOTAL PROTEIN: 6.5 G/DL (ref 6.1–8)
TROPONIN T: < 0.01 NG/ML
TSH SERPL DL<=0.05 MIU/L-ACNC: 1.71 UIU/ML (ref 0.4–4.2)
URINE CULTURE, ROUTINE: ABNORMAL
UROBILINOGEN, URINE: 0.2 EU/DL
VANCOMYCIN RESISTANT FILM ARRAY: ABNORMAL
WBC # BLD: 3 THOU/MM3 (ref 4.8–10.8)
WBC UA: ABNORMAL /HPF
YEAST: ABNORMAL

## 2018-06-11 PROCEDURE — 97110 THERAPEUTIC EXERCISES: CPT

## 2018-06-11 PROCEDURE — 83690 ASSAY OF LIPASE: CPT

## 2018-06-11 PROCEDURE — 85025 COMPLETE CBC W/AUTO DIFF WBC: CPT

## 2018-06-11 PROCEDURE — 2500000003 HC RX 250 WO HCPCS: Performed by: FAMILY MEDICINE

## 2018-06-11 PROCEDURE — 6360000002 HC RX W HCPCS: Performed by: EMERGENCY MEDICINE

## 2018-06-11 PROCEDURE — 83880 ASSAY OF NATRIURETIC PEPTIDE: CPT

## 2018-06-11 PROCEDURE — 96365 THER/PROPH/DIAG IV INF INIT: CPT

## 2018-06-11 PROCEDURE — 71045 X-RAY EXAM CHEST 1 VIEW: CPT

## 2018-06-11 PROCEDURE — 99285 EMERGENCY DEPT VISIT HI MDM: CPT

## 2018-06-11 PROCEDURE — 6370000000 HC RX 637 (ALT 250 FOR IP): Performed by: EMERGENCY MEDICINE

## 2018-06-11 PROCEDURE — 6370000000 HC RX 637 (ALT 250 FOR IP): Performed by: FAMILY MEDICINE

## 2018-06-11 PROCEDURE — 36415 COLL VENOUS BLD VENIPUNCTURE: CPT

## 2018-06-11 PROCEDURE — 87086 URINE CULTURE/COLONY COUNT: CPT

## 2018-06-11 PROCEDURE — 85610 PROTHROMBIN TIME: CPT

## 2018-06-11 PROCEDURE — 2580000003 HC RX 258: Performed by: EMERGENCY MEDICINE

## 2018-06-11 PROCEDURE — C9113 INJ PANTOPRAZOLE SODIUM, VIA: HCPCS | Performed by: EMERGENCY MEDICINE

## 2018-06-11 PROCEDURE — 84443 ASSAY THYROID STIM HORMONE: CPT

## 2018-06-11 PROCEDURE — 85730 THROMBOPLASTIN TIME PARTIAL: CPT

## 2018-06-11 PROCEDURE — 81001 URINALYSIS AUTO W/SCOPE: CPT

## 2018-06-11 PROCEDURE — 87077 CULTURE AEROBIC IDENTIFY: CPT

## 2018-06-11 PROCEDURE — 84145 PROCALCITONIN (PCT): CPT

## 2018-06-11 PROCEDURE — 97161 PT EVAL LOW COMPLEX 20 MIN: CPT

## 2018-06-11 PROCEDURE — 87186 SC STD MICRODIL/AGAR DIL: CPT

## 2018-06-11 PROCEDURE — 6360000002 HC RX W HCPCS: Performed by: FAMILY MEDICINE

## 2018-06-11 PROCEDURE — 2580000003 HC RX 258: Performed by: FAMILY MEDICINE

## 2018-06-11 PROCEDURE — G8978 MOBILITY CURRENT STATUS: HCPCS

## 2018-06-11 PROCEDURE — 87040 BLOOD CULTURE FOR BACTERIA: CPT

## 2018-06-11 PROCEDURE — 93005 ELECTROCARDIOGRAM TRACING: CPT | Performed by: FAMILY MEDICINE

## 2018-06-11 PROCEDURE — G8979 MOBILITY GOAL STATUS: HCPCS

## 2018-06-11 PROCEDURE — 87801 DETECT AGNT MULT DNA AMPLI: CPT

## 2018-06-11 PROCEDURE — 83605 ASSAY OF LACTIC ACID: CPT

## 2018-06-11 PROCEDURE — 1200000003 HC TELEMETRY R&B

## 2018-06-11 PROCEDURE — 87184 SC STD DISK METHOD PER PLATE: CPT

## 2018-06-11 PROCEDURE — 2500000003 HC RX 250 WO HCPCS: Performed by: EMERGENCY MEDICINE

## 2018-06-11 PROCEDURE — 84484 ASSAY OF TROPONIN QUANT: CPT

## 2018-06-11 PROCEDURE — 80053 COMPREHEN METABOLIC PANEL: CPT

## 2018-06-11 RX ORDER — UREA 10 %
3 LOTION (ML) TOPICAL DAILY
Status: DISCONTINUED | OUTPATIENT
Start: 2018-06-11 | End: 2018-06-15 | Stop reason: HOSPADM

## 2018-06-11 RX ORDER — FAMOTIDINE 20 MG/1
20 TABLET, FILM COATED ORAL 2 TIMES DAILY
Status: DISCONTINUED | OUTPATIENT
Start: 2018-06-11 | End: 2018-06-11

## 2018-06-11 RX ORDER — ASPIRIN 81 MG/1
81 TABLET ORAL DAILY
Status: DISCONTINUED | OUTPATIENT
Start: 2018-06-11 | End: 2018-06-15 | Stop reason: HOSPADM

## 2018-06-11 RX ORDER — SODIUM CHLORIDE 0.9 % (FLUSH) 0.9 %
10 SYRINGE (ML) INJECTION PRN
Status: DISCONTINUED | OUTPATIENT
Start: 2018-06-11 | End: 2018-06-15 | Stop reason: HOSPADM

## 2018-06-11 RX ORDER — POLYETHYLENE GLYCOL 3350 17 G/17G
17 POWDER, FOR SOLUTION ORAL DAILY
Status: DISCONTINUED | OUTPATIENT
Start: 2018-06-11 | End: 2018-06-15 | Stop reason: HOSPADM

## 2018-06-11 RX ORDER — TRAMADOL HYDROCHLORIDE 50 MG/1
50 TABLET ORAL EVERY 6 HOURS PRN
Status: DISCONTINUED | OUTPATIENT
Start: 2018-06-11 | End: 2018-06-15 | Stop reason: HOSPADM

## 2018-06-11 RX ORDER — 0.9 % SODIUM CHLORIDE 0.9 %
1000 INTRAVENOUS SOLUTION INTRAVENOUS ONCE
Status: COMPLETED | OUTPATIENT
Start: 2018-06-11 | End: 2018-06-11

## 2018-06-11 RX ORDER — PANTOPRAZOLE SODIUM 40 MG/10ML
40 INJECTION, POWDER, LYOPHILIZED, FOR SOLUTION INTRAVENOUS 2 TIMES DAILY
Status: DISCONTINUED | OUTPATIENT
Start: 2018-06-11 | End: 2018-06-13

## 2018-06-11 RX ORDER — SENNA PLUS 8.6 MG/1
1 TABLET ORAL DAILY
Status: DISCONTINUED | OUTPATIENT
Start: 2018-06-11 | End: 2018-06-15 | Stop reason: HOSPADM

## 2018-06-11 RX ORDER — POLYETHYLENE GLYCOL 3350 17 G/17G
17 POWDER, FOR SOLUTION ORAL DAILY
Status: DISCONTINUED | OUTPATIENT
Start: 2018-06-11 | End: 2018-06-11 | Stop reason: CLARIF

## 2018-06-11 RX ORDER — CALCITONIN SALMON 200 [IU]/.09ML
1 SPRAY, METERED NASAL DAILY
Status: DISCONTINUED | OUTPATIENT
Start: 2018-06-11 | End: 2018-06-15 | Stop reason: HOSPADM

## 2018-06-11 RX ORDER — VALSARTAN 80 MG/1
40 TABLET ORAL DAILY
Status: DISCONTINUED | OUTPATIENT
Start: 2018-06-11 | End: 2018-06-11

## 2018-06-11 RX ORDER — DOCUSATE SODIUM 100 MG/1
100 CAPSULE, LIQUID FILLED ORAL 2 TIMES DAILY PRN
Status: DISCONTINUED | OUTPATIENT
Start: 2018-06-11 | End: 2018-06-15 | Stop reason: HOSPADM

## 2018-06-11 RX ORDER — ISOSORBIDE MONONITRATE 30 MG/1
15 TABLET, EXTENDED RELEASE ORAL DAILY
Status: DISCONTINUED | OUTPATIENT
Start: 2018-06-11 | End: 2018-06-15 | Stop reason: HOSPADM

## 2018-06-11 RX ORDER — SIMVASTATIN 40 MG
80 TABLET ORAL NIGHTLY
Status: DISCONTINUED | OUTPATIENT
Start: 2018-06-11 | End: 2018-06-11 | Stop reason: SDUPTHER

## 2018-06-11 RX ORDER — SODIUM CHLORIDE 9 MG/ML
INJECTION, SOLUTION INTRAVENOUS CONTINUOUS
Status: DISCONTINUED | OUTPATIENT
Start: 2018-06-11 | End: 2018-06-14

## 2018-06-11 RX ORDER — OYSTER SHELL CALCIUM WITH VITAMIN D 500; 200 MG/1; [IU]/1
1 TABLET, FILM COATED ORAL 2 TIMES DAILY
Status: DISCONTINUED | OUTPATIENT
Start: 2018-06-11 | End: 2018-06-15 | Stop reason: HOSPADM

## 2018-06-11 RX ORDER — POTASSIUM CHLORIDE 7.45 MG/ML
10 INJECTION INTRAVENOUS PRN
Status: DISCONTINUED | OUTPATIENT
Start: 2018-06-11 | End: 2018-06-15 | Stop reason: HOSPADM

## 2018-06-11 RX ORDER — LIDOCAINE 50 MG/G
1 PATCH TOPICAL DAILY
Status: DISCONTINUED | OUTPATIENT
Start: 2018-06-11 | End: 2018-06-15 | Stop reason: HOSPADM

## 2018-06-11 RX ORDER — ACETAMINOPHEN 325 MG/1
650 TABLET ORAL EVERY 6 HOURS
Status: DISCONTINUED | OUTPATIENT
Start: 2018-06-11 | End: 2018-06-11

## 2018-06-11 RX ORDER — ACETAMINOPHEN 500 MG
1000 TABLET ORAL EVERY 6 HOURS PRN
Status: DISCONTINUED | OUTPATIENT
Start: 2018-06-11 | End: 2018-06-15 | Stop reason: HOSPADM

## 2018-06-11 RX ORDER — SIMVASTATIN 40 MG
80 TABLET ORAL NIGHTLY
Status: DISCONTINUED | OUTPATIENT
Start: 2018-06-11 | End: 2018-06-15 | Stop reason: HOSPADM

## 2018-06-11 RX ORDER — METOPROLOL SUCCINATE 25 MG/1
25 TABLET, EXTENDED RELEASE ORAL DAILY
Status: DISCONTINUED | OUTPATIENT
Start: 2018-06-11 | End: 2018-06-15 | Stop reason: HOSPADM

## 2018-06-11 RX ORDER — ONDANSETRON 2 MG/ML
4 INJECTION INTRAMUSCULAR; INTRAVENOUS EVERY 6 HOURS PRN
Status: DISCONTINUED | OUTPATIENT
Start: 2018-06-11 | End: 2018-06-15 | Stop reason: HOSPADM

## 2018-06-11 RX ORDER — ONDANSETRON 4 MG/1
4 TABLET, ORALLY DISINTEGRATING ORAL EVERY 8 HOURS PRN
Status: DISCONTINUED | OUTPATIENT
Start: 2018-06-11 | End: 2018-06-15 | Stop reason: HOSPADM

## 2018-06-11 RX ORDER — SODIUM CHLORIDE 0.9 % (FLUSH) 0.9 %
10 SYRINGE (ML) INJECTION EVERY 12 HOURS SCHEDULED
Status: DISCONTINUED | OUTPATIENT
Start: 2018-06-11 | End: 2018-06-15 | Stop reason: HOSPADM

## 2018-06-11 RX ORDER — LOSARTAN POTASSIUM 25 MG/1
25 TABLET ORAL DAILY
Status: DISCONTINUED | OUTPATIENT
Start: 2018-06-11 | End: 2018-06-11 | Stop reason: CLARIF

## 2018-06-11 RX ADMIN — ENOXAPARIN SODIUM 40 MG: 40 INJECTION, SOLUTION INTRAVENOUS; SUBCUTANEOUS at 11:49

## 2018-06-11 RX ADMIN — SULFAMETHOXAZOLE AND TRIMETHOPRIM 472 MG: 80; 16 INJECTION, SOLUTION, CONCENTRATE INTRAVENOUS at 06:16

## 2018-06-11 RX ADMIN — TRAMADOL HYDROCHLORIDE 50 MG: 50 TABLET, FILM COATED ORAL at 10:27

## 2018-06-11 RX ADMIN — AZTREONAM 1 G: 1 INJECTION, POWDER, LYOPHILIZED, FOR SOLUTION INTRAMUSCULAR; INTRAVENOUS at 10:07

## 2018-06-11 RX ADMIN — CALCIUM CARBONATE-VITAMIN D TAB 500 MG-200 UNIT 1 TABLET: 500-200 TAB at 10:07

## 2018-06-11 RX ADMIN — Medication 3 MG: at 21:26

## 2018-06-11 RX ADMIN — SODIUM CHLORIDE 1000 ML: 9 INJECTION, SOLUTION INTRAVENOUS at 01:53

## 2018-06-11 RX ADMIN — AZTREONAM 1 G: 1 INJECTION, POWDER, LYOPHILIZED, FOR SOLUTION INTRAMUSCULAR; INTRAVENOUS at 17:38

## 2018-06-11 RX ADMIN — SACUBITRIL AND VALSARTAN 1 TABLET: 49; 51 TABLET, FILM COATED ORAL at 21:28

## 2018-06-11 RX ADMIN — ACETAMINOPHEN 1000 MG: 500 TABLET ORAL at 16:09

## 2018-06-11 RX ADMIN — SODIUM CHLORIDE: 9 INJECTION, SOLUTION INTRAVENOUS at 18:50

## 2018-06-11 RX ADMIN — ISOSORBIDE MONONITRATE 15 MG: 30 TABLET ORAL at 10:07

## 2018-06-11 RX ADMIN — SACUBITRIL AND VALSARTAN 1 TABLET: 49; 51 TABLET, FILM COATED ORAL at 11:49

## 2018-06-11 RX ADMIN — ASPIRIN 81 MG: 81 TABLET ORAL at 10:07

## 2018-06-11 RX ADMIN — TRAMADOL HYDROCHLORIDE 50 MG: 50 TABLET, FILM COATED ORAL at 21:24

## 2018-06-11 RX ADMIN — SIMVASTATIN 80 MG: 40 TABLET, FILM COATED ORAL at 21:26

## 2018-06-11 RX ADMIN — CALCIUM CARBONATE-VITAMIN D TAB 500 MG-200 UNIT 1 TABLET: 500-200 TAB at 21:24

## 2018-06-11 RX ADMIN — FAMOTIDINE 20 MG: 20 TABLET ORAL at 10:06

## 2018-06-11 RX ADMIN — ACETAMINOPHEN 650 MG: 325 TABLET ORAL at 10:06

## 2018-06-11 RX ADMIN — SENNA 8.6 MG: 8.6 TABLET, COATED ORAL at 10:07

## 2018-06-11 RX ADMIN — PANTOPRAZOLE SODIUM 40 MG: 40 INJECTION, POWDER, FOR SOLUTION INTRAVENOUS at 21:28

## 2018-06-11 RX ADMIN — METOPROLOL SUCCINATE 25 MG: 25 TABLET, FILM COATED, EXTENDED RELEASE ORAL at 10:07

## 2018-06-11 RX ADMIN — AZTREONAM 1 G: 1 INJECTION, POWDER, LYOPHILIZED, FOR SOLUTION INTRAMUSCULAR; INTRAVENOUS at 02:40

## 2018-06-11 ASSESSMENT — ENCOUNTER SYMPTOMS
COLOR CHANGE: 0
CHOKING: 0
ABDOMINAL PAIN: 0
WHEEZING: 0
COUGH: 0
DIARRHEA: 0
BLOOD IN STOOL: 0
SORE THROAT: 0
RHINORRHEA: 0
ABDOMINAL DISTENTION: 0
NAUSEA: 0
CONSTIPATION: 0
SINUS PRESSURE: 0
VOMITING: 0
BACK PAIN: 0
SHORTNESS OF BREATH: 0

## 2018-06-11 ASSESSMENT — PAIN DESCRIPTION - ORIENTATION
ORIENTATION: LOWER;LEFT
ORIENTATION_2: LEFT
ORIENTATION: LOWER
ORIENTATION_2: LEFT
ORIENTATION: LOWER

## 2018-06-11 ASSESSMENT — PAIN DESCRIPTION - LOCATION
LOCATION: BACK
LOCATION_2: ARM
LOCATION_2: ARM
LOCATION: BACK
LOCATION: BACK;SHOULDER
LOCATION: BACK

## 2018-06-11 ASSESSMENT — PAIN DESCRIPTION - PAIN TYPE
TYPE: CHRONIC PAIN
TYPE_2: ACUTE PAIN
TYPE: ACUTE PAIN
TYPE: CHRONIC PAIN
TYPE_2: ACUTE PAIN
TYPE: CHRONIC PAIN
TYPE: CHRONIC PAIN

## 2018-06-11 ASSESSMENT — PAIN SCALES - GENERAL
PAINLEVEL_OUTOF10: 2
PAINLEVEL_OUTOF10: 6
PAINLEVEL_OUTOF10: 6
PAINLEVEL_OUTOF10: 8
PAINLEVEL_OUTOF10: 2
PAINLEVEL_OUTOF10: 8
PAINLEVEL_OUTOF10: 2
PAINLEVEL_OUTOF10: 2
PAINLEVEL_OUTOF10: 6
PAINLEVEL_OUTOF10: 2
PAINLEVEL_OUTOF10: 2
PAINLEVEL_OUTOF10: 3

## 2018-06-11 ASSESSMENT — PAIN DESCRIPTION - DESCRIPTORS
DESCRIPTORS_2: ACHING
DESCRIPTORS_2: ACHING
DESCRIPTORS: ACHING
DESCRIPTORS: ACHING

## 2018-06-11 ASSESSMENT — PAIN DESCRIPTION - INTENSITY
RATING_2: 8
RATING_2: 2

## 2018-06-12 ENCOUNTER — APPOINTMENT (OUTPATIENT)
Dept: NUCLEAR MEDICINE | Age: 78
DRG: 871 | End: 2018-06-12
Payer: MEDICARE

## 2018-06-12 PROBLEM — A41.51 SEPSIS DUE TO ESCHERICHIA COLI (HCC): Status: ACTIVE | Noted: 2018-06-12

## 2018-06-12 LAB
ANION GAP SERPL CALCULATED.3IONS-SCNC: 14 MEQ/L (ref 8–16)
BASOPHILS # BLD: 0.4 %
BASOPHILS ABSOLUTE: 0 THOU/MM3 (ref 0–0.1)
BLOOD CULTURE, ROUTINE: ABNORMAL
BLOOD CULTURE, ROUTINE: ABNORMAL
BUN BLDV-MCNC: 25 MG/DL (ref 7–22)
CALCIUM SERPL-MCNC: 7.9 MG/DL (ref 8.5–10.5)
CHLORIDE BLD-SCNC: 100 MEQ/L (ref 98–111)
CO2: 21 MEQ/L (ref 23–33)
CREAT SERPL-MCNC: 0.7 MG/DL (ref 0.4–1.2)
EOSINOPHIL # BLD: 0.1 %
EOSINOPHILS ABSOLUTE: 0 THOU/MM3 (ref 0–0.4)
GFR SERPL CREATININE-BSD FRML MDRD: 81 ML/MIN/1.73M2
GLUCOSE BLD-MCNC: 90 MG/DL (ref 70–108)
HCT VFR BLD CALC: 38 % (ref 37–47)
HEMOGLOBIN: 13 GM/DL (ref 12–16)
LYMPHOCYTES # BLD: 10.7 %
LYMPHOCYTES ABSOLUTE: 0.7 THOU/MM3 (ref 1–4.8)
MAGNESIUM: 1.4 MG/DL (ref 1.6–2.4)
MCH RBC QN AUTO: 31.5 PG (ref 27–31)
MCHC RBC AUTO-ENTMCNC: 34.3 GM/DL (ref 33–37)
MCV RBC AUTO: 91.7 FL (ref 81–99)
MONOCYTES # BLD: 5.4 %
MONOCYTES ABSOLUTE: 0.4 THOU/MM3 (ref 0.4–1.3)
NUCLEATED RED BLOOD CELLS: 0 /100 WBC
ORGANISM: ABNORMAL
PDW BLD-RTO: 13.5 % (ref 11.5–14.5)
PLATELET # BLD: 142 THOU/MM3 (ref 130–400)
PMV BLD AUTO: 8.9 FL (ref 7.4–10.4)
POTASSIUM SERPL-SCNC: 4 MEQ/L (ref 3.5–5.2)
RBC # BLD: 4.14 MILL/MM3 (ref 4.2–5.4)
SEG NEUTROPHILS: 83.4 %
SEGMENTED NEUTROPHILS ABSOLUTE COUNT: 5.7 THOU/MM3 (ref 1.8–7.7)
SODIUM BLD-SCNC: 135 MEQ/L (ref 135–145)
WBC # BLD: 6.8 THOU/MM3 (ref 4.8–10.8)

## 2018-06-12 PROCEDURE — 2580000003 HC RX 258: Performed by: EMERGENCY MEDICINE

## 2018-06-12 PROCEDURE — 6360000002 HC RX W HCPCS: Performed by: EMERGENCY MEDICINE

## 2018-06-12 PROCEDURE — 6360000002 HC RX W HCPCS: Performed by: FAMILY MEDICINE

## 2018-06-12 PROCEDURE — 2500000003 HC RX 250 WO HCPCS: Performed by: FAMILY MEDICINE

## 2018-06-12 PROCEDURE — A9503 TC99M MEDRONATE: HCPCS | Performed by: EMERGENCY MEDICINE

## 2018-06-12 PROCEDURE — 6370000000 HC RX 637 (ALT 250 FOR IP): Performed by: FAMILY MEDICINE

## 2018-06-12 PROCEDURE — 78306 BONE IMAGING WHOLE BODY: CPT

## 2018-06-12 PROCEDURE — 83735 ASSAY OF MAGNESIUM: CPT

## 2018-06-12 PROCEDURE — 2580000003 HC RX 258: Performed by: FAMILY MEDICINE

## 2018-06-12 PROCEDURE — 1200000003 HC TELEMETRY R&B

## 2018-06-12 PROCEDURE — 6370000000 HC RX 637 (ALT 250 FOR IP): Performed by: EMERGENCY MEDICINE

## 2018-06-12 PROCEDURE — C9113 INJ PANTOPRAZOLE SODIUM, VIA: HCPCS | Performed by: EMERGENCY MEDICINE

## 2018-06-12 PROCEDURE — G8987 SELF CARE CURRENT STATUS: HCPCS

## 2018-06-12 PROCEDURE — 1200000000 HC SEMI PRIVATE

## 2018-06-12 PROCEDURE — 80048 BASIC METABOLIC PNL TOTAL CA: CPT

## 2018-06-12 PROCEDURE — 93010 ELECTROCARDIOGRAM REPORT: CPT | Performed by: INTERNAL MEDICINE

## 2018-06-12 PROCEDURE — 97535 SELF CARE MNGMENT TRAINING: CPT

## 2018-06-12 PROCEDURE — 85025 COMPLETE CBC W/AUTO DIFF WBC: CPT

## 2018-06-12 PROCEDURE — 97166 OT EVAL MOD COMPLEX 45 MIN: CPT

## 2018-06-12 PROCEDURE — 36415 COLL VENOUS BLD VENIPUNCTURE: CPT

## 2018-06-12 PROCEDURE — 3430000000 HC RX DIAGNOSTIC RADIOPHARMACEUTICAL: Performed by: EMERGENCY MEDICINE

## 2018-06-12 PROCEDURE — G8988 SELF CARE GOAL STATUS: HCPCS

## 2018-06-12 RX ORDER — TC 99M MEDRONATE 20 MG/10ML
25 INJECTION, POWDER, LYOPHILIZED, FOR SOLUTION INTRAVENOUS
Status: COMPLETED | OUTPATIENT
Start: 2018-06-12 | End: 2018-06-12

## 2018-06-12 RX ADMIN — PANTOPRAZOLE SODIUM 40 MG: 40 INJECTION, POWDER, FOR SOLUTION INTRAVENOUS at 21:57

## 2018-06-12 RX ADMIN — DOCUSATE SODIUM 100 MG: 100 CAPSULE, LIQUID FILLED ORAL at 08:29

## 2018-06-12 RX ADMIN — POLYETHYLENE GLYCOL 3350 17 G: 17 POWDER, FOR SOLUTION ORAL at 08:27

## 2018-06-12 RX ADMIN — Medication 25.8 MILLICURIE: at 07:12

## 2018-06-12 RX ADMIN — METOPROLOL SUCCINATE 25 MG: 25 TABLET, FILM COATED, EXTENDED RELEASE ORAL at 08:29

## 2018-06-12 RX ADMIN — ISOSORBIDE MONONITRATE 15 MG: 30 TABLET ORAL at 08:29

## 2018-06-12 RX ADMIN — SACUBITRIL AND VALSARTAN 1 TABLET: 49; 51 TABLET, FILM COATED ORAL at 21:57

## 2018-06-12 RX ADMIN — SODIUM CHLORIDE: 9 INJECTION, SOLUTION INTRAVENOUS at 22:04

## 2018-06-12 RX ADMIN — SODIUM CHLORIDE: 9 INJECTION, SOLUTION INTRAVENOUS at 10:19

## 2018-06-12 RX ADMIN — ACETAMINOPHEN 1000 MG: 500 TABLET ORAL at 14:10

## 2018-06-12 RX ADMIN — Medication 3 MG: at 21:56

## 2018-06-12 RX ADMIN — AZTREONAM 1 G: 1 INJECTION, POWDER, LYOPHILIZED, FOR SOLUTION INTRAMUSCULAR; INTRAVENOUS at 08:42

## 2018-06-12 RX ADMIN — AZTREONAM 1 G: 1 INJECTION, POWDER, LYOPHILIZED, FOR SOLUTION INTRAMUSCULAR; INTRAVENOUS at 16:33

## 2018-06-12 RX ADMIN — SIMVASTATIN 80 MG: 40 TABLET, FILM COATED ORAL at 21:56

## 2018-06-12 RX ADMIN — SACUBITRIL AND VALSARTAN 1 TABLET: 49; 51 TABLET, FILM COATED ORAL at 08:29

## 2018-06-12 RX ADMIN — CALCITONIN SALMON 1 SPRAY: 200 SPRAY, METERED NASAL at 08:30

## 2018-06-12 RX ADMIN — SENNA 8.6 MG: 8.6 TABLET, COATED ORAL at 08:29

## 2018-06-12 RX ADMIN — PANTOPRAZOLE SODIUM 40 MG: 40 INJECTION, POWDER, FOR SOLUTION INTRAVENOUS at 08:29

## 2018-06-12 RX ADMIN — ENOXAPARIN SODIUM 40 MG: 40 INJECTION, SOLUTION INTRAVENOUS; SUBCUTANEOUS at 13:55

## 2018-06-12 RX ADMIN — CALCIUM CARBONATE-VITAMIN D TAB 500 MG-200 UNIT 1 TABLET: 500-200 TAB at 08:29

## 2018-06-12 RX ADMIN — Medication 10 ML: at 21:57

## 2018-06-12 RX ADMIN — TRAMADOL HYDROCHLORIDE 50 MG: 50 TABLET, FILM COATED ORAL at 17:15

## 2018-06-12 RX ADMIN — AZTREONAM 1 G: 1 INJECTION, POWDER, LYOPHILIZED, FOR SOLUTION INTRAMUSCULAR; INTRAVENOUS at 01:10

## 2018-06-12 RX ADMIN — CALCIUM CARBONATE-VITAMIN D TAB 500 MG-200 UNIT 1 TABLET: 500-200 TAB at 21:57

## 2018-06-12 ASSESSMENT — PAIN DESCRIPTION - LOCATION
LOCATION_2: ARM
LOCATION_2: ARM
LOCATION: BACK
LOCATION_2: ARM

## 2018-06-12 ASSESSMENT — PAIN DESCRIPTION - PAIN TYPE
TYPE: CHRONIC PAIN
TYPE_2: ACUTE PAIN
TYPE: CHRONIC PAIN
TYPE_2: ACUTE PAIN
TYPE: CHRONIC PAIN
TYPE_2: ACUTE PAIN
TYPE: ACUTE PAIN

## 2018-06-12 ASSESSMENT — PAIN DESCRIPTION - PROGRESSION
CLINICAL_PROGRESSION: NOT CHANGED
CLINICAL_PROGRESSION: NOT CHANGED

## 2018-06-12 ASSESSMENT — PAIN SCALES - GENERAL
PAINLEVEL_OUTOF10: 2
PAINLEVEL_OUTOF10: 2
PAINLEVEL_OUTOF10: 5
PAINLEVEL_OUTOF10: 0
PAINLEVEL_OUTOF10: 8
PAINLEVEL_OUTOF10: 3
PAINLEVEL_OUTOF10: 3
PAINLEVEL_OUTOF10: 2
PAINLEVEL_OUTOF10: 4
PAINLEVEL_OUTOF10: 3
PAINLEVEL_OUTOF10: 0

## 2018-06-12 ASSESSMENT — PAIN DESCRIPTION - DESCRIPTORS
DESCRIPTORS_2: ACHING
DESCRIPTORS: ACHING
DESCRIPTORS_2: ACHING
DESCRIPTORS: ACHING
DESCRIPTORS_2: ACHING
DESCRIPTORS: ACHING

## 2018-06-12 ASSESSMENT — PAIN DESCRIPTION - ORIENTATION
ORIENTATION: LOWER
ORIENTATION_2: LEFT
ORIENTATION: LOWER
ORIENTATION: LOWER
ORIENTATION_2: LEFT
ORIENTATION_2: LEFT

## 2018-06-12 ASSESSMENT — PAIN DESCRIPTION - INTENSITY
RATING_2: 2
RATING_2: 2

## 2018-06-13 LAB
BLOOD CULTURE, ROUTINE: ABNORMAL
ORGANISM: ABNORMAL

## 2018-06-13 PROCEDURE — 6360000002 HC RX W HCPCS: Performed by: FAMILY MEDICINE

## 2018-06-13 PROCEDURE — 6370000000 HC RX 637 (ALT 250 FOR IP): Performed by: EMERGENCY MEDICINE

## 2018-06-13 PROCEDURE — 97530 THERAPEUTIC ACTIVITIES: CPT

## 2018-06-13 PROCEDURE — 97110 THERAPEUTIC EXERCISES: CPT

## 2018-06-13 PROCEDURE — 2580000003 HC RX 258: Performed by: FAMILY MEDICINE

## 2018-06-13 PROCEDURE — 1200000003 HC TELEMETRY R&B

## 2018-06-13 PROCEDURE — 97535 SELF CARE MNGMENT TRAINING: CPT

## 2018-06-13 PROCEDURE — 6360000002 HC RX W HCPCS: Performed by: EMERGENCY MEDICINE

## 2018-06-13 PROCEDURE — 6370000000 HC RX 637 (ALT 250 FOR IP): Performed by: FAMILY MEDICINE

## 2018-06-13 PROCEDURE — C9113 INJ PANTOPRAZOLE SODIUM, VIA: HCPCS | Performed by: EMERGENCY MEDICINE

## 2018-06-13 PROCEDURE — 2500000003 HC RX 250 WO HCPCS: Performed by: FAMILY MEDICINE

## 2018-06-13 PROCEDURE — 97116 GAIT TRAINING THERAPY: CPT

## 2018-06-13 RX ORDER — PANTOPRAZOLE SODIUM 40 MG/1
40 TABLET, DELAYED RELEASE ORAL ONCE
Status: DISCONTINUED | OUTPATIENT
Start: 2018-06-13 | End: 2018-06-15 | Stop reason: HOSPADM

## 2018-06-13 RX ORDER — MAGNESIUM SULFATE IN WATER 40 MG/ML
2 INJECTION, SOLUTION INTRAVENOUS ONCE
Status: COMPLETED | OUTPATIENT
Start: 2018-06-13 | End: 2018-06-13

## 2018-06-13 RX ADMIN — ISOSORBIDE MONONITRATE 15 MG: 30 TABLET ORAL at 10:27

## 2018-06-13 RX ADMIN — Medication 3 MG: at 21:09

## 2018-06-13 RX ADMIN — CALCIUM CARBONATE-VITAMIN D TAB 500 MG-200 UNIT 1 TABLET: 500-200 TAB at 10:27

## 2018-06-13 RX ADMIN — SIMVASTATIN 80 MG: 40 TABLET, FILM COATED ORAL at 21:09

## 2018-06-13 RX ADMIN — MAGNESIUM SULFATE HEPTAHYDRATE 2 G: 40 INJECTION, SOLUTION INTRAVENOUS at 13:57

## 2018-06-13 RX ADMIN — TRAMADOL HYDROCHLORIDE 50 MG: 50 TABLET, FILM COATED ORAL at 07:35

## 2018-06-13 RX ADMIN — SENNA 8.6 MG: 8.6 TABLET, COATED ORAL at 10:27

## 2018-06-13 RX ADMIN — AZTREONAM 1 G: 1 INJECTION, POWDER, LYOPHILIZED, FOR SOLUTION INTRAMUSCULAR; INTRAVENOUS at 10:02

## 2018-06-13 RX ADMIN — MAGNESIUM GLUCONATE 500 MG ORAL TABLET 400 MG: 500 TABLET ORAL at 12:34

## 2018-06-13 RX ADMIN — ACETAMINOPHEN 1000 MG: 500 TABLET ORAL at 17:17

## 2018-06-13 RX ADMIN — METOPROLOL SUCCINATE 25 MG: 25 TABLET, FILM COATED, EXTENDED RELEASE ORAL at 10:27

## 2018-06-13 RX ADMIN — CALCITONIN SALMON 1 SPRAY: 200 SPRAY, METERED NASAL at 10:28

## 2018-06-13 RX ADMIN — PANTOPRAZOLE SODIUM 40 MG: 40 INJECTION, POWDER, FOR SOLUTION INTRAVENOUS at 10:27

## 2018-06-13 RX ADMIN — ASPIRIN 81 MG: 81 TABLET ORAL at 10:27

## 2018-06-13 RX ADMIN — POLYETHYLENE GLYCOL 3350 17 G: 17 POWDER, FOR SOLUTION ORAL at 10:32

## 2018-06-13 RX ADMIN — SACUBITRIL AND VALSARTAN 1 TABLET: 49; 51 TABLET, FILM COATED ORAL at 21:09

## 2018-06-13 RX ADMIN — AZTREONAM 1 G: 1 INJECTION, POWDER, LYOPHILIZED, FOR SOLUTION INTRAMUSCULAR; INTRAVENOUS at 17:17

## 2018-06-13 RX ADMIN — ENOXAPARIN SODIUM 40 MG: 40 INJECTION, SOLUTION INTRAVENOUS; SUBCUTANEOUS at 10:02

## 2018-06-13 RX ADMIN — CALCIUM CARBONATE-VITAMIN D TAB 500 MG-200 UNIT 1 TABLET: 500-200 TAB at 21:09

## 2018-06-13 RX ADMIN — AZTREONAM 1 G: 1 INJECTION, POWDER, LYOPHILIZED, FOR SOLUTION INTRAMUSCULAR; INTRAVENOUS at 00:56

## 2018-06-13 RX ADMIN — TRAMADOL HYDROCHLORIDE 50 MG: 50 TABLET, FILM COATED ORAL at 13:57

## 2018-06-13 RX ADMIN — ACETAMINOPHEN 1000 MG: 500 TABLET ORAL at 10:02

## 2018-06-13 RX ADMIN — SACUBITRIL AND VALSARTAN 1 TABLET: 49; 51 TABLET, FILM COATED ORAL at 10:27

## 2018-06-13 ASSESSMENT — PAIN SCALES - GENERAL
PAINLEVEL_OUTOF10: 4
PAINLEVEL_OUTOF10: 10
PAINLEVEL_OUTOF10: 4
PAINLEVEL_OUTOF10: 6
PAINLEVEL_OUTOF10: 4
PAINLEVEL_OUTOF10: 1
PAINLEVEL_OUTOF10: 0
PAINLEVEL_OUTOF10: 3
PAINLEVEL_OUTOF10: 0
PAINLEVEL_OUTOF10: 6
PAINLEVEL_OUTOF10: 4
PAINLEVEL_OUTOF10: 3

## 2018-06-13 ASSESSMENT — PAIN DESCRIPTION - PAIN TYPE
TYPE: CHRONIC PAIN

## 2018-06-13 ASSESSMENT — PAIN DESCRIPTION - DESCRIPTORS: DESCRIPTORS: ACHING

## 2018-06-13 ASSESSMENT — PAIN DESCRIPTION - LOCATION
LOCATION: BACK

## 2018-06-13 ASSESSMENT — PAIN DESCRIPTION - ORIENTATION
ORIENTATION: LOWER
ORIENTATION: LOWER

## 2018-06-14 LAB
ALBUMIN SERPL-MCNC: 2.8 G/DL (ref 3.5–5.1)
ALP BLD-CCNC: 49 U/L (ref 38–126)
ALT SERPL-CCNC: 12 U/L (ref 11–66)
ANION GAP SERPL CALCULATED.3IONS-SCNC: 10 MEQ/L (ref 8–16)
AST SERPL-CCNC: 15 U/L (ref 5–40)
BILIRUB SERPL-MCNC: 0.3 MG/DL (ref 0.3–1.2)
BUN BLDV-MCNC: 12 MG/DL (ref 7–22)
CALCIUM SERPL-MCNC: 8.5 MG/DL (ref 8.5–10.5)
CHLORIDE BLD-SCNC: 104 MEQ/L (ref 98–111)
CO2: 26 MEQ/L (ref 23–33)
CREAT SERPL-MCNC: 0.3 MG/DL (ref 0.4–1.2)
GFR SERPL CREATININE-BSD FRML MDRD: > 90 ML/MIN/1.73M2
GLUCOSE BLD-MCNC: 97 MG/DL (ref 70–108)
HCT VFR BLD CALC: 38 % (ref 37–47)
HEMOGLOBIN: 13.2 GM/DL (ref 12–16)
MAGNESIUM: 1.8 MG/DL (ref 1.6–2.4)
MCH RBC QN AUTO: 31.8 PG (ref 27–31)
MCHC RBC AUTO-ENTMCNC: 34.7 GM/DL (ref 33–37)
MCV RBC AUTO: 91.7 FL (ref 81–99)
PDW BLD-RTO: 13.9 % (ref 11.5–14.5)
PLATELET # BLD: 139 THOU/MM3 (ref 130–400)
PMV BLD AUTO: 8.6 FL (ref 7.4–10.4)
POTASSIUM SERPL-SCNC: 4.1 MEQ/L (ref 3.5–5.2)
RBC # BLD: 4.14 MILL/MM3 (ref 4.2–5.4)
SODIUM BLD-SCNC: 140 MEQ/L (ref 135–145)
TOTAL PROTEIN: 5.4 G/DL (ref 6.1–8)
URINE CULTURE REFLEX: NORMAL
WBC # BLD: 5.1 THOU/MM3 (ref 4.8–10.8)

## 2018-06-14 PROCEDURE — 6370000000 HC RX 637 (ALT 250 FOR IP): Performed by: FAMILY MEDICINE

## 2018-06-14 PROCEDURE — 95992 CANALITH REPOSITIONING PROC: CPT

## 2018-06-14 PROCEDURE — 36415 COLL VENOUS BLD VENIPUNCTURE: CPT

## 2018-06-14 PROCEDURE — 2580000003 HC RX 258: Performed by: FAMILY MEDICINE

## 2018-06-14 PROCEDURE — 83735 ASSAY OF MAGNESIUM: CPT

## 2018-06-14 PROCEDURE — 97116 GAIT TRAINING THERAPY: CPT

## 2018-06-14 PROCEDURE — 80053 COMPREHEN METABOLIC PANEL: CPT

## 2018-06-14 PROCEDURE — 87040 BLOOD CULTURE FOR BACTERIA: CPT

## 2018-06-14 PROCEDURE — 6360000002 HC RX W HCPCS: Performed by: FAMILY MEDICINE

## 2018-06-14 PROCEDURE — 6370000000 HC RX 637 (ALT 250 FOR IP): Performed by: EMERGENCY MEDICINE

## 2018-06-14 PROCEDURE — 2500000003 HC RX 250 WO HCPCS: Performed by: FAMILY MEDICINE

## 2018-06-14 PROCEDURE — 1200000003 HC TELEMETRY R&B

## 2018-06-14 PROCEDURE — 85027 COMPLETE CBC AUTOMATED: CPT

## 2018-06-14 RX ORDER — SULFAMETHOXAZOLE AND TRIMETHOPRIM 800; 160 MG/1; MG/1
1 TABLET ORAL EVERY 12 HOURS SCHEDULED
Status: DISCONTINUED | OUTPATIENT
Start: 2018-06-14 | End: 2018-06-15 | Stop reason: HOSPADM

## 2018-06-14 RX ADMIN — SACUBITRIL AND VALSARTAN 1 TABLET: 49; 51 TABLET, FILM COATED ORAL at 21:53

## 2018-06-14 RX ADMIN — SACUBITRIL AND VALSARTAN 1 TABLET: 49; 51 TABLET, FILM COATED ORAL at 08:14

## 2018-06-14 RX ADMIN — AZTREONAM 1 G: 1 INJECTION, POWDER, LYOPHILIZED, FOR SOLUTION INTRAMUSCULAR; INTRAVENOUS at 01:30

## 2018-06-14 RX ADMIN — TRAMADOL HYDROCHLORIDE 50 MG: 50 TABLET, FILM COATED ORAL at 14:34

## 2018-06-14 RX ADMIN — Medication 3 MG: at 21:53

## 2018-06-14 RX ADMIN — ASPIRIN 81 MG: 81 TABLET ORAL at 08:13

## 2018-06-14 RX ADMIN — MAGNESIUM GLUCONATE 500 MG ORAL TABLET 400 MG: 500 TABLET ORAL at 08:13

## 2018-06-14 RX ADMIN — SULFAMETHOXAZOLE AND TRIMETHOPRIM 1 TABLET: 800; 160 TABLET ORAL at 21:53

## 2018-06-14 RX ADMIN — CALCITONIN SALMON 1 SPRAY: 200 SPRAY, METERED NASAL at 08:17

## 2018-06-14 RX ADMIN — ISOSORBIDE MONONITRATE 15 MG: 30 TABLET ORAL at 08:14

## 2018-06-14 RX ADMIN — CALCIUM CARBONATE-VITAMIN D TAB 500 MG-200 UNIT 1 TABLET: 500-200 TAB at 21:53

## 2018-06-14 RX ADMIN — SIMVASTATIN 80 MG: 40 TABLET, FILM COATED ORAL at 21:53

## 2018-06-14 RX ADMIN — ACETAMINOPHEN 1000 MG: 500 TABLET ORAL at 23:35

## 2018-06-14 RX ADMIN — ACETAMINOPHEN 1000 MG: 500 TABLET ORAL at 05:58

## 2018-06-14 RX ADMIN — SENNA 8.6 MG: 8.6 TABLET, COATED ORAL at 08:13

## 2018-06-14 RX ADMIN — POLYETHYLENE GLYCOL 3350 17 G: 17 POWDER, FOR SOLUTION ORAL at 08:14

## 2018-06-14 RX ADMIN — CALCIUM CARBONATE-VITAMIN D TAB 500 MG-200 UNIT 1 TABLET: 500-200 TAB at 08:13

## 2018-06-14 RX ADMIN — Medication 10 ML: at 21:55

## 2018-06-14 RX ADMIN — ENOXAPARIN SODIUM 40 MG: 40 INJECTION, SOLUTION INTRAVENOUS; SUBCUTANEOUS at 11:09

## 2018-06-14 RX ADMIN — METOPROLOL SUCCINATE 25 MG: 25 TABLET, FILM COATED, EXTENDED RELEASE ORAL at 08:13

## 2018-06-14 RX ADMIN — SULFAMETHOXAZOLE AND TRIMETHOPRIM 1 TABLET: 800; 160 TABLET ORAL at 11:09

## 2018-06-14 ASSESSMENT — PAIN SCALES - GENERAL
PAINLEVEL_OUTOF10: 3
PAINLEVEL_OUTOF10: 7
PAINLEVEL_OUTOF10: 3
PAINLEVEL_OUTOF10: 5

## 2018-06-15 VITALS
TEMPERATURE: 97.6 F | SYSTOLIC BLOOD PRESSURE: 148 MMHG | BODY MASS INDEX: 31.39 KG/M2 | DIASTOLIC BLOOD PRESSURE: 70 MMHG | RESPIRATION RATE: 18 BRPM | OXYGEN SATURATION: 95 % | HEIGHT: 67 IN | HEART RATE: 74 BPM | WEIGHT: 200 LBS

## 2018-06-15 PROBLEM — S42.302A CLOSED FRACTURE OF SHAFT OF LEFT HUMERUS: Status: RESOLVED | Noted: 2018-02-07 | Resolved: 2018-06-15

## 2018-06-15 PROCEDURE — 6370000000 HC RX 637 (ALT 250 FOR IP): Performed by: EMERGENCY MEDICINE

## 2018-06-15 PROCEDURE — 6370000000 HC RX 637 (ALT 250 FOR IP): Performed by: FAMILY MEDICINE

## 2018-06-15 PROCEDURE — 2580000003 HC RX 258: Performed by: FAMILY MEDICINE

## 2018-06-15 RX ORDER — SULFAMETHOXAZOLE AND TRIMETHOPRIM 800; 160 MG/1; MG/1
1 TABLET ORAL EVERY 12 HOURS SCHEDULED
Qty: 14 TABLET | Refills: 0 | Status: SHIPPED | OUTPATIENT
Start: 2018-06-15 | End: 2018-06-22

## 2018-06-15 RX ADMIN — METOPROLOL SUCCINATE 25 MG: 25 TABLET, FILM COATED, EXTENDED RELEASE ORAL at 09:11

## 2018-06-15 RX ADMIN — ACETAMINOPHEN 1000 MG: 500 TABLET ORAL at 06:09

## 2018-06-15 RX ADMIN — POLYETHYLENE GLYCOL 3350 17 G: 17 POWDER, FOR SOLUTION ORAL at 09:14

## 2018-06-15 RX ADMIN — SACUBITRIL AND VALSARTAN 1 TABLET: 49; 51 TABLET, FILM COATED ORAL at 09:14

## 2018-06-15 RX ADMIN — SULFAMETHOXAZOLE AND TRIMETHOPRIM 1 TABLET: 800; 160 TABLET ORAL at 09:11

## 2018-06-15 RX ADMIN — Medication 10 ML: at 09:14

## 2018-06-15 RX ADMIN — MAGNESIUM GLUCONATE 500 MG ORAL TABLET 400 MG: 500 TABLET ORAL at 09:11

## 2018-06-15 RX ADMIN — CALCITONIN SALMON 1 SPRAY: 200 SPRAY, METERED NASAL at 09:11

## 2018-06-15 RX ADMIN — ASPIRIN 81 MG: 81 TABLET ORAL at 09:11

## 2018-06-15 RX ADMIN — SENNA 8.6 MG: 8.6 TABLET, COATED ORAL at 09:14

## 2018-06-15 RX ADMIN — CALCIUM CARBONATE-VITAMIN D TAB 500 MG-200 UNIT 1 TABLET: 500-200 TAB at 09:11

## 2018-06-15 RX ADMIN — ISOSORBIDE MONONITRATE 15 MG: 30 TABLET ORAL at 09:11

## 2018-06-15 ASSESSMENT — PAIN SCALES - GENERAL: PAINLEVEL_OUTOF10: 3

## 2018-06-16 ENCOUNTER — CARE COORDINATION (OUTPATIENT)
Dept: CASE MANAGEMENT | Age: 78
End: 2018-06-16

## 2018-06-16 DIAGNOSIS — E43 SEVERE MALNUTRITION (HCC): Primary | Chronic | ICD-10-CM

## 2018-06-16 PROCEDURE — 1111F DSCHRG MED/CURRENT MED MERGE: CPT

## 2018-06-18 ENCOUNTER — TELEPHONE (OUTPATIENT)
Dept: FAMILY MEDICINE CLINIC | Age: 78
End: 2018-06-18

## 2018-06-18 DIAGNOSIS — R42 VERTIGO: Primary | ICD-10-CM

## 2018-06-19 ENCOUNTER — CARE COORDINATION (OUTPATIENT)
Dept: CASE MANAGEMENT | Age: 78
End: 2018-06-19

## 2018-06-19 LAB — BLOOD CULTURE, ROUTINE: NORMAL

## 2018-06-20 ENCOUNTER — HOSPITAL ENCOUNTER (OUTPATIENT)
Dept: PHYSICAL THERAPY | Age: 78
Setting detail: THERAPIES SERIES
Discharge: HOME | End: 2018-06-20
Payer: COMMERCIAL

## 2018-06-20 ENCOUNTER — OFFICE VISIT (OUTPATIENT)
Dept: FAMILY MEDICINE CLINIC | Age: 78
End: 2018-06-20

## 2018-06-20 VITALS
HEART RATE: 76 BPM | RESPIRATION RATE: 16 BRPM | SYSTOLIC BLOOD PRESSURE: 138 MMHG | WEIGHT: 200.2 LBS | DIASTOLIC BLOOD PRESSURE: 86 MMHG | HEIGHT: 67 IN | BODY MASS INDEX: 31.42 KG/M2

## 2018-06-20 DIAGNOSIS — N39.0 URINARY TRACT INFECTION WITHOUT HEMATURIA, SITE UNSPECIFIED: Primary | ICD-10-CM

## 2018-06-20 DIAGNOSIS — E78.5 HYPERLIPIDEMIA, UNSPECIFIED HYPERLIPIDEMIA TYPE: ICD-10-CM

## 2018-06-20 DIAGNOSIS — I10 ESSENTIAL HYPERTENSION: ICD-10-CM

## 2018-06-20 DIAGNOSIS — M81.0 OSTEOPOROSIS OF VERTEBRA: ICD-10-CM

## 2018-06-20 PROCEDURE — G8990 OTHER PT/OT CURRENT STATUS: HCPCS

## 2018-06-20 PROCEDURE — 95992 CANALITH REPOSITIONING PROC: CPT

## 2018-06-20 PROCEDURE — G8991 OTHER PT/OT GOAL STATUS: HCPCS

## 2018-06-20 PROCEDURE — 99496 TRANSJ CARE MGMT HIGH F2F 7D: CPT | Performed by: FAMILY MEDICINE

## 2018-06-20 PROCEDURE — 97161 PT EVAL LOW COMPLEX 20 MIN: CPT

## 2018-06-20 PROCEDURE — 1111F DSCHRG MED/CURRENT MED MERGE: CPT | Performed by: FAMILY MEDICINE

## 2018-06-20 ASSESSMENT — ENCOUNTER SYMPTOMS
CHEST TIGHTNESS: 0
EYES NEGATIVE: 1
DIARRHEA: 0
ABDOMINAL PAIN: 0
VOMITING: 0
SHORTNESS OF BREATH: 0
NAUSEA: 0
CONSTIPATION: 0
BACK PAIN: 1
COUGH: 0

## 2018-06-20 ASSESSMENT — PAIN DESCRIPTION - PAIN TYPE: TYPE: ACUTE PAIN

## 2018-06-20 ASSESSMENT — PAIN DESCRIPTION - LOCATION: LOCATION: BACK

## 2018-06-20 ASSESSMENT — PAIN SCALES - GENERAL: PAINLEVEL_OUTOF10: 3

## 2018-06-22 ENCOUNTER — CARE COORDINATION (OUTPATIENT)
Dept: CASE MANAGEMENT | Age: 78
End: 2018-06-22

## 2018-06-25 ENCOUNTER — CARE COORDINATION (OUTPATIENT)
Dept: CASE MANAGEMENT | Age: 78
End: 2018-06-25

## 2018-06-25 ENCOUNTER — HOSPITAL ENCOUNTER (OUTPATIENT)
Age: 78
Discharge: HOME | End: 2018-06-25
Payer: MEDICARE

## 2018-06-25 DIAGNOSIS — N39.0 URINARY TRACT INFECTION WITHOUT HEMATURIA, SITE UNSPECIFIED: ICD-10-CM

## 2018-06-25 LAB
BILIRUBIN URINE: NEGATIVE
BLOOD, URINE: NEGATIVE
CHARACTER, URINE: CLEAR
COLOR: YELLOW
GLUCOSE URINE: NEGATIVE MG/DL
KETONES, URINE: NEGATIVE
LEUKOCYTE ESTERASE, URINE: NEGATIVE
NITRITE, URINE: NEGATIVE
PH UA: 5.5
PROTEIN UA: NEGATIVE
SPECIFIC GRAVITY, URINE: 1.01 (ref 1–1.03)
UROBILINOGEN, URINE: 0.2 EU/DL

## 2018-06-25 PROCEDURE — 81003 URINALYSIS AUTO W/O SCOPE: CPT

## 2018-06-27 ENCOUNTER — HOSPITAL ENCOUNTER (OUTPATIENT)
Dept: PHYSICAL THERAPY | Age: 78
Setting detail: THERAPIES SERIES
Discharge: HOME | End: 2018-06-27
Payer: COMMERCIAL

## 2018-06-27 PROCEDURE — 95992 CANALITH REPOSITIONING PROC: CPT

## 2018-06-27 ASSESSMENT — PAIN DESCRIPTION - LOCATION: LOCATION: BACK

## 2018-06-28 ENCOUNTER — HOSPITAL ENCOUNTER (OUTPATIENT)
Age: 78
Discharge: HOME | End: 2018-06-28
Payer: MEDICARE

## 2018-06-28 PROCEDURE — 83516 IMMUNOASSAY NONANTIBODY: CPT

## 2018-06-28 PROCEDURE — 84238 ASSAY NONENDOCRINE RECEPTOR: CPT

## 2018-06-28 PROCEDURE — 36415 COLL VENOUS BLD VENIPUNCTURE: CPT

## 2018-06-30 LAB
ACETYLCHOLINE BLOCKING AB: 0 % (ref 0–26)
ACETYLCHOLINE RECEPTOR: NORMAL

## 2018-07-02 ENCOUNTER — CARE COORDINATION (OUTPATIENT)
Dept: CASE MANAGEMENT | Age: 78
End: 2018-07-02

## 2018-07-02 LAB — ACETYLCHOL MODUL AB: NORMAL

## 2018-07-02 NOTE — CARE COORDINATION
[facility] [phone].

## 2018-07-03 ENCOUNTER — CARE COORDINATION (OUTPATIENT)
Dept: CASE MANAGEMENT | Age: 78
End: 2018-07-03

## 2018-07-06 ENCOUNTER — APPOINTMENT (OUTPATIENT)
Dept: PHYSICAL THERAPY | Age: 78
End: 2018-07-06
Payer: MEDICARE

## 2018-07-17 ENCOUNTER — TELEPHONE (OUTPATIENT)
Dept: FAMILY MEDICINE CLINIC | Age: 78
End: 2018-07-17

## 2018-07-17 NOTE — TELEPHONE ENCOUNTER
[place], daughter called ([phone]) - has a few things that she would like to maybe get done for her mom. 1)  Wondering if she could get an order for therapy for her back. Said that she goes to therapy for her arm and she can't always do all of the therapy because of her back pain. Said that she has seen doctor for her back before. [address] Therapy is where she gets her therapy for her arm. 2)  Dr. Mac Painter had given her a muscle relaxer, flexeril and it is to strong. All she does is sleep. Is there something else that she could use, maybe Baclofen or Methogarbamol? That would not make her so sleepy. 3)  Short term memory is horrible - said that she had speech therapy down in [place] and when she was at [address] [place] [address] and it seemed to help her with her short term memory.     [facility])

## 2018-07-18 RX ORDER — BACLOFEN 10 MG/1
5 TABLET ORAL 3 TIMES DAILY
Qty: 15 TABLET | Refills: 0 | Status: SHIPPED | OUTPATIENT
Start: 2018-07-18 | End: 2018-12-05 | Stop reason: ALTCHOICE

## 2018-07-18 NOTE — TELEPHONE ENCOUNTER
For therapy for her back we need to make sure that Dr Roni Morfin is ok with that since her fractures. He probably should order for her as well. We can try Baclofen and see how she tolerates. Rx done  For her memory she will need to be evaluated. Not sure if all the problems she had and all the stress she has been under is playing a role?

## 2018-07-19 ENCOUNTER — PROCEDURE VISIT (OUTPATIENT)
Dept: CARDIOLOGY CLINIC | Age: 78
End: 2018-07-19
Payer: COMMERCIAL

## 2018-07-19 DIAGNOSIS — Z95.810 S/P ICD (INTERNAL CARDIAC DEFIBRILLATOR) PROCEDURE: Primary | ICD-10-CM

## 2018-07-19 PROCEDURE — 93295 DEV INTERROG REMOTE 1/2/MLT: CPT | Performed by: INTERNAL MEDICINE

## 2018-07-19 PROCEDURE — 93296 REM INTERROG EVL PM/IDS: CPT | Performed by: INTERNAL MEDICINE

## 2018-07-19 NOTE — PROGRESS NOTES
DR Khanna Lav  PT  AFIB BURDEN <1%  MODE SWITHC X 3 OR <1%    ST YAIMA BIV ICD REMOTE TRANSMISSION   BATTERY 5.8-6.2 YRS REMAINING  ATRIAL IMPEDENCE 450  RV IMPEDENCE 390  LV IMPEDENCE 490  HV 43  P WAVES 1.1  RV WAVES NOT MEASURED   DDDR   A PACED 5.1%  V PACED >99%  DID HAVE 2 AMS EPISODES IN MAY  [facility]

## 2018-07-20 ENCOUNTER — HOSPITAL ENCOUNTER (OUTPATIENT)
Dept: PHYSICAL THERAPY | Age: 78
Setting detail: THERAPIES SERIES
Discharge: HOME | End: 2018-07-20
Payer: MEDICARE

## 2018-07-20 PROCEDURE — 95992 CANALITH REPOSITIONING PROC: CPT

## 2018-07-20 PROCEDURE — G8991 OTHER PT/OT GOAL STATUS: HCPCS

## 2018-07-20 PROCEDURE — G8990 OTHER PT/OT CURRENT STATUS: HCPCS

## 2018-07-20 PROCEDURE — 97110 THERAPEUTIC EXERCISES: CPT

## 2018-07-20 ASSESSMENT — PAIN DESCRIPTION - PAIN TYPE: TYPE: ACUTE PAIN

## 2018-07-20 ASSESSMENT — PAIN SCALES - GENERAL: PAINLEVEL_OUTOF10: 5

## 2018-07-20 ASSESSMENT — PAIN DESCRIPTION - LOCATION: LOCATION: BACK

## 2018-07-20 NOTE — PROGRESS NOTES
due to back pain and patient fear of falling off the bed. Activity Tolerance:  Activity Tolerance: Patient Tolerated treatment well, Patient limited by pain    Assessment: Body structures, Functions, Activity limitations: Decreased functional mobility   Assessment: Patient still having positive testing for BPPV which warrants further hterapy. Patient's symptoms less intense today and not lasting as long. Prognosis: Good  Discharge Recommendations: Continue to assess pending progress    Patient Education:  Patient Education: 24 hour precautions                      Plan:  Times per week: 1x/week  Plan weeks: 2 weeks  Specific instructions for Next Treatment: vestibular testing and treatment  Plan Comment: Continue with POC    Goals:  Patient goals : To get rid of the dizziness    Short term goals  Time Frame for Short term goals: 6 weeks  Short term goal 1: See LTG  Short term goal 2:    Short term goal 3:      Long term goals  Time Frame for Long term goals : 6 weeks  Long term goal 1: Patient to have negative testing for Ravenel-Hallpike to be able to roll over in bed without dizziness - NOT MET  Long term goal 2: Patient to have negative testing for Roll test to perform all bed mobility without dizziness - NOT MET  Long term goal 3: PT to test for any gaze stability issues and treat as needed - NOT MET Unable to test yet due to still having positive testing for BPPV    PT G-Codes  Functional Assessment Tool Used: Dizziness Handicap Inventory  Score: 8/100  Functional Limitation: Other PT primary  Other PT Primary Current Status ():  At least 1 percent but less than 20 percent impaired, limited or restricted  Other PT Primary Goal Status (): 0 percent impaired, limited or restricted    [address], [address]

## 2018-07-21 ENCOUNTER — TELEPHONE (OUTPATIENT)
Dept: FAMILY MEDICINE CLINIC | Age: 78
End: 2018-07-21

## 2018-07-21 ENCOUNTER — HOSPITAL ENCOUNTER (OUTPATIENT)
Age: 78
Discharge: HOME | End: 2018-07-21
Payer: MEDICARE

## 2018-07-21 DIAGNOSIS — N30.00 ACUTE CYSTITIS WITHOUT HEMATURIA: Primary | ICD-10-CM

## 2018-07-21 DIAGNOSIS — N30.00 ACUTE CYSTITIS WITHOUT HEMATURIA: ICD-10-CM

## 2018-07-21 LAB
AMORPHOUS: ABNORMAL
BACTERIA: ABNORMAL /HPF
BILIRUBIN URINE: NEGATIVE
BLOOD, URINE: ABNORMAL
CASTS UA: ABNORMAL /LPF
CHARACTER, URINE: ABNORMAL
COLOR: YELLOW
CRYSTALS, UA: ABNORMAL
EPITHELIAL CELLS, UA: ABNORMAL /HPF
GLUCOSE URINE: NEGATIVE MG/DL
KETONES, URINE: NEGATIVE
LEUKOCYTE ESTERASE, URINE: ABNORMAL
NITRITE, URINE: NEGATIVE
PH UA: 7
PROTEIN UA: 30
RBC URINE: ABNORMAL /HPF
SPECIFIC GRAVITY, URINE: 1.01 (ref 1–1.03)
UROBILINOGEN, URINE: 1 EU/DL
WBC UA: ABNORMAL /HPF

## 2018-07-21 PROCEDURE — 87184 SC STD DISK METHOD PER PLATE: CPT

## 2018-07-21 PROCEDURE — 87086 URINE CULTURE/COLONY COUNT: CPT

## 2018-07-21 PROCEDURE — 81001 URINALYSIS AUTO W/SCOPE: CPT

## 2018-07-21 PROCEDURE — 87186 SC STD MICRODIL/AGAR DIL: CPT

## 2018-07-21 PROCEDURE — 87077 CULTURE AEROBIC IDENTIFY: CPT

## 2018-07-21 RX ORDER — SULFAMETHOXAZOLE AND TRIMETHOPRIM 800; 160 MG/1; MG/1
1 TABLET ORAL 2 TIMES DAILY
Qty: 20 TABLET | Refills: 0 | Status: SHIPPED | OUTPATIENT
Start: 2018-07-21 | End: 2018-07-25 | Stop reason: ALTCHOICE

## 2018-07-21 NOTE — TELEPHONE ENCOUNTER
Recurrent  uti and  Sample  To  [place] side and start on bactrim and see  Dr Lauryn Hu next week

## 2018-07-23 ENCOUNTER — TELEPHONE (OUTPATIENT)
Dept: FAMILY MEDICINE CLINIC | Age: 78
End: 2018-07-23

## 2018-07-23 NOTE — TELEPHONE ENCOUNTER
7-21-18  Thoughts of UTI.   Did eventually call in [address] and she had a sample taken to [facility] for eval.  ET/lm

## 2018-07-24 LAB
ORGANISM: ABNORMAL
URINE CULTURE REFLEX: ABNORMAL

## 2018-07-25 ENCOUNTER — OFFICE VISIT (OUTPATIENT)
Dept: CARDIOLOGY CLINIC | Age: 78
End: 2018-07-25
Payer: MEDICARE

## 2018-07-25 ENCOUNTER — TELEPHONE (OUTPATIENT)
Dept: FAMILY MEDICINE CLINIC | Age: 78
End: 2018-07-25

## 2018-07-25 VITALS
WEIGHT: 198 LBS | DIASTOLIC BLOOD PRESSURE: 86 MMHG | HEIGHT: 65 IN | SYSTOLIC BLOOD PRESSURE: 154 MMHG | HEART RATE: 80 BPM | BODY MASS INDEX: 32.99 KG/M2

## 2018-07-25 DIAGNOSIS — E78.01 FAMILIAL HYPERCHOLESTEROLEMIA: ICD-10-CM

## 2018-07-25 DIAGNOSIS — Z95.810 ICD (IMPLANTABLE CARDIOVERTER-DEFIBRILLATOR) IN PLACE: ICD-10-CM

## 2018-07-25 DIAGNOSIS — I10 ESSENTIAL HYPERTENSION: ICD-10-CM

## 2018-07-25 DIAGNOSIS — I42.0 DILATED CARDIOMYOPATHY (HCC): Primary | ICD-10-CM

## 2018-07-25 DIAGNOSIS — N39.0 URINARY TRACT INFECTION WITHOUT HEMATURIA, SITE UNSPECIFIED: Primary | ICD-10-CM

## 2018-07-25 DIAGNOSIS — R35.0 URINARY FREQUENCY: ICD-10-CM

## 2018-07-25 PROCEDURE — G8417 CALC BMI ABV UP PARAM F/U: HCPCS | Performed by: NUCLEAR MEDICINE

## 2018-07-25 PROCEDURE — G8399 PT W/DXA RESULTS DOCUMENT: HCPCS | Performed by: NUCLEAR MEDICINE

## 2018-07-25 PROCEDURE — 99213 OFFICE O/P EST LOW 20 MIN: CPT | Performed by: NUCLEAR MEDICINE

## 2018-07-25 PROCEDURE — 1123F ACP DISCUSS/DSCN MKR DOCD: CPT | Performed by: NUCLEAR MEDICINE

## 2018-07-25 PROCEDURE — 1090F PRES/ABSN URINE INCON ASSESS: CPT | Performed by: NUCLEAR MEDICINE

## 2018-07-25 PROCEDURE — 4040F PNEUMOC VAC/ADMIN/RCVD: CPT | Performed by: NUCLEAR MEDICINE

## 2018-07-25 PROCEDURE — 1101F PT FALLS ASSESS-DOCD LE1/YR: CPT | Performed by: NUCLEAR MEDICINE

## 2018-07-25 PROCEDURE — 1036F TOBACCO NON-USER: CPT | Performed by: NUCLEAR MEDICINE

## 2018-07-25 PROCEDURE — G8427 DOCREV CUR MEDS BY ELIG CLIN: HCPCS | Performed by: NUCLEAR MEDICINE

## 2018-07-25 PROCEDURE — G8598 ASA/ANTIPLAT THER USED: HCPCS | Performed by: NUCLEAR MEDICINE

## 2018-07-25 RX ORDER — TRAMADOL HYDROCHLORIDE 50 MG/1
50 TABLET ORAL EVERY 6 HOURS PRN
COMMUNITY
End: 2018-08-06 | Stop reason: SDUPTHER

## 2018-07-25 RX ORDER — NITROFURANTOIN 25; 75 MG/1; MG/1
100 CAPSULE ORAL 2 TIMES DAILY
Qty: 14 CAPSULE | Refills: 0 | Status: SHIPPED | OUTPATIENT
Start: 2018-07-25 | End: 2018-08-01

## 2018-07-25 RX ORDER — PANTOPRAZOLE SODIUM 40 MG/1
40 TABLET, DELAYED RELEASE ORAL DAILY
COMMUNITY
End: 2019-01-30 | Stop reason: ALTCHOICE

## 2018-07-25 RX ORDER — NITROFURANTOIN MACROCRYSTALS 100 MG/1
100 CAPSULE ORAL 2 TIMES DAILY
COMMUNITY
End: 2018-09-10 | Stop reason: ALTCHOICE

## 2018-07-25 NOTE — PROGRESS NOTES
medications for this visit. Allergies   Allergen Reactions    Ciprofloxacin Itching    Neomycin     Pcn [Penicillins] Hives     Pt reports having reaction 50 years ago    Zanaflex [Tizanidine Hcl] Other (See Comments)     Causes confusion    Levaquin [Levofloxacin] Itching     Benadryl was given for tx     Health Maintenance   Topic Date Due    Shingles Vaccine (1 of 2 - 2 Dose Series) 01/01/1989    Pneumococcal low/med risk (1 of 2 - PCV13) 01/01/2004    Flu vaccine (1) 09/01/2018    Potassium monitoring  06/14/2019    Creatinine monitoring  06/14/2019    Colon cancer screen colonoscopy  06/08/2027    DTaP/Tdap/Td vaccine (2 - Td) 02/03/2028    DEXA (modify frequency per FRAX score)  Completed       Subjective:  Review of Systems  General:   No fever, no chills, No fatigue or weight loss  Pulmonary:    some dyspnea, no wheezing  Cardiac:    Denies recent chest pain,   GI:     No nausea or vomiting, no abdominal pain  Neuro:    No dizziness or light headedness,   Musculoskeletal:  No recent active issues  Extremities:   No edema, good peripheral pulses      Objective:  Physical Exam  BP (!) 154/86   Pulse 80   Ht 5' 4.5\" (1.638 m)   Wt 198 lb (89.8 kg)   BMI 33.46 kg/m²   General:   Well developed, well nourished  Lungs:   Clear to auscultation  Heart:    Normal S1 S2, Slight murmur. no rubs, no gallops  Abdomen:   Soft, non tender, no organomegalies, positive bowel sounds  Extremities:   No edema, no cyanosis, good peripheral pulses  Neurological:   Awake, alert, oriented. No obvious focal deficits  Musculoskelatal:  No obvious deformities    Assessment:      Diagnosis Orders   1. Dilated cardiomyopathy ([address]     2. Essential hypertension     3. Familial hypercholesterolemia     4. ICD (implantable cardioverter-defibrillator) in place     cardiac fair for now   Higher Bp  Back limitation     Plan:  No Follow-up on file.   Increase entresto   Monitor BP  Consider increasing beta blockers  Continue risk factor modification and medical management  Thank you for allowing me to participate in the care of your patient. Please don't hesitate to contact me regarding any further issues related to the patient care      Orders Placed:  No orders of the defined types were placed in this encounter. Medications Prescribed:  No orders of the defined types were placed in this encounter. Discussed use, benefit, and side effects of prescribed medications. All patient questions answered. Pt voiced understanding. Instructed to continue current medications, diet and exercise. Continue risk factor modification and medical management. Patient agreed with treatment plan. Follow up as directed.     Electronically signed by Bill Rivera MD on 7/25/2018 at 11:26 AM

## 2018-07-25 NOTE — TELEPHONE ENCOUNTER
----- Message from Taylor Gross MD sent at 7/25/2018  2:20 AM EDT -----  Should  Be  On macrobid .   Results to dr Sammy Abbott

## 2018-07-27 ENCOUNTER — HOSPITAL ENCOUNTER (OUTPATIENT)
Dept: PHYSICAL THERAPY | Age: 78
Setting detail: THERAPIES SERIES
Discharge: HOME | End: 2018-07-27
Payer: MEDICARE

## 2018-07-27 PROCEDURE — 95992 CANALITH REPOSITIONING PROC: CPT

## 2018-07-27 PROCEDURE — 97110 THERAPEUTIC EXERCISES: CPT

## 2018-07-27 ASSESSMENT — PAIN DESCRIPTION - LOCATION: LOCATION: BACK

## 2018-07-27 ASSESSMENT — PAIN DESCRIPTION - PAIN TYPE: TYPE: ACUTE PAIN

## 2018-07-27 ASSESSMENT — PAIN SCALES - GENERAL: PAINLEVEL_OUTOF10: 5

## 2018-07-27 NOTE — PROGRESS NOTES
** PLEASE SIGN, DATE AND TIME CERTIFICATION BELOW AND RETURN TO [facility] OUTPATIENT REHABILITATION (FAX #: [phone]). ATTEST/CO-SIGN IF ACCESSING VIA valuescope. THANK YOU.**    I certify that I have examined the patient below and determined that Physical Medicine and Rehabilitation service is necessary and that I approve the established plan of care for up to 90 days or as specifically noted. Attestation, signature or co-signature of physician indicates approval of certification requirements.    ________________________ ____________ __________  Physician Signature   Date   Time       Zev Vanitaseun     Time In: 0170  Time Out: 1055  Minutes: 25  Timed Code Treatment Minutes: 25 Minutes     Date: 2018  Patient Name: Bigg Leary,  Gender:  female        CSN: [id number]   : 1940  (77 y.o.)       Referring Practitioner: Dr Amee Rawls      Diagnosis: Vertigo  Treatment Diagnosis: BPPV   Additional Pertinent Hx: No restrictions from doctor. Hx: vertigo, left shoulder/arm surgery and fractures, high BP, pacemaker, osteoporosis, cancer (breast), compression fx in low back -no surgery                  General:  PT Visit Information  PT Insurance Information: Medicare - [place] Medicare supplement is secondary. Aquatics and modalities except Ionto and HP/CP covered. Total # of Visits to Date: 4  Plan of Care/Certification Expiration Date: 18  Progress Note Counter:                Subjective:  Chart Reviewed: Yes  Patient assessed for rehabilitation services?: Yes  Family / Caregiver Present: No  Comments: Unsure of next follow up with doctor. BP taken today in left forearm at 10:37 and was 158/98. Subjective: Patient reports has not had any lasting dizziness since last treatment and if has had any it has been a quick flash. States was able to sleep on \"bad\" side last night.  States doctor wants BP checked while he is gone because it was up at office visit the other day. Pain:  Patient Currently in Pain: Yes  Pain Assessment: 0-10  Pain Level: 5  Pain Type: Acute pain  Pain Location: Back      Objective    Exercises  Exercise 1: [place]-Hallpike positive to the right with upbeating nystagmus lasting <30 seconds. Neagtive to the left  Exercise 2: Roll test: Negative bilaterally  Exercise 3: Epley to treat right ear. Unable to get patient all the way to her left side wsith head looking at the floor due to back pain and patient fear of falling off the bed. Activity Tolerance:  Activity Tolerance: Patient Tolerated treatment well, Patient limited by pain    Assessment: Body structures, Functions, Activity limitations: Decreased functional mobility   Assessment: Patient with all negative testing today except for right ear during Mane-Hallpike. Prognosis: Good  Discharge Recommendations: Continue to assess pending progress    Patient Education:  Patient Education: 24 hour precautions                      Plan:  Times per week: 1x/week  Plan weeks: 2 weeks  Specific instructions for Next Treatment: vestibular testing and treatment  Plan Comment: Continue with POC    Goals:  Patient goals :  To get rid of the dizziness    Short term goals  Time Frame for Short term goals: 6 weeks  Short term goal 1: See LTG  Short term goal 2:    Short term goal 3:      Long term goals  Time Frame for Long term goals : 6 weeks  Long term goal 1: Patient to have negative testing for [place]-Hallpike to be able to roll over in bed without dizziness  Long term goal 2: Patient to have negative testing for Roll test to perform all bed mobility without dizziness  Long term goal 3: PT to test for any gaze stability issues and treat as needed         [address], [address]

## 2018-08-01 ENCOUNTER — HOSPITAL ENCOUNTER (OUTPATIENT)
Age: 78
Discharge: HOME | End: 2018-08-01
Payer: MEDICARE

## 2018-08-01 LAB
BILIRUBIN URINE: NEGATIVE
BLOOD, URINE: NEGATIVE
CHARACTER, URINE: CLEAR
COLOR: YELLOW
GLUCOSE URINE: NEGATIVE MG/DL
KETONES, URINE: ABNORMAL
LEUKOCYTE ESTERASE, URINE: NEGATIVE
NITRITE, URINE: NEGATIVE
PH UA: 6.5
PROTEIN UA: NEGATIVE
SPECIFIC GRAVITY, URINE: 1.01 (ref 1–1.03)
UROBILINOGEN, URINE: 0.2 EU/DL

## 2018-08-01 PROCEDURE — 81003 URINALYSIS AUTO W/O SCOPE: CPT

## 2018-08-02 ENCOUNTER — ANESTHESIA EVENT (OUTPATIENT)
Dept: ENDOSCOPY | Age: 78
End: 2018-08-02
Payer: MEDICARE

## 2018-08-02 ENCOUNTER — HOSPITAL ENCOUNTER (OUTPATIENT)
Age: 78
Setting detail: OUTPATIENT SURGERY
Discharge: HOME | End: 2018-08-02
Attending: INTERNAL MEDICINE | Admitting: INTERNAL MEDICINE
Payer: MEDICARE

## 2018-08-02 ENCOUNTER — TELEPHONE (OUTPATIENT)
Dept: FAMILY MEDICINE CLINIC | Age: 78
End: 2018-08-02

## 2018-08-02 ENCOUNTER — ANESTHESIA (OUTPATIENT)
Dept: ENDOSCOPY | Age: 78
End: 2018-08-02
Payer: MEDICARE

## 2018-08-02 VITALS
HEIGHT: 67 IN | OXYGEN SATURATION: 94 % | HEART RATE: 75 BPM | BODY MASS INDEX: 31.14 KG/M2 | WEIGHT: 198.4 LBS | RESPIRATION RATE: 16 BRPM | SYSTOLIC BLOOD PRESSURE: 159 MMHG | TEMPERATURE: 97.7 F | DIASTOLIC BLOOD PRESSURE: 75 MMHG

## 2018-08-02 VITALS
DIASTOLIC BLOOD PRESSURE: 97 MMHG | RESPIRATION RATE: 18 BRPM | SYSTOLIC BLOOD PRESSURE: 208 MMHG | OXYGEN SATURATION: 95 %

## 2018-08-02 PROCEDURE — 2500000003 HC RX 250 WO HCPCS: Performed by: SPECIALIST

## 2018-08-02 PROCEDURE — 3700000000 HC ANESTHESIA ATTENDED CARE: Performed by: INTERNAL MEDICINE

## 2018-08-02 PROCEDURE — 2709999900 HC NON-CHARGEABLE SUPPLY: Performed by: INTERNAL MEDICINE

## 2018-08-02 PROCEDURE — 3700000001 HC ADD 15 MINUTES (ANESTHESIA): Performed by: INTERNAL MEDICINE

## 2018-08-02 PROCEDURE — 3609017700 HC EGD DILATION GASTRIC/DUODENAL STRICTURE: Performed by: INTERNAL MEDICINE

## 2018-08-02 PROCEDURE — 6360000002 HC RX W HCPCS: Performed by: SPECIALIST

## 2018-08-02 PROCEDURE — 2580000003 HC RX 258: Performed by: INTERNAL MEDICINE

## 2018-08-02 PROCEDURE — 7100000001 HC PACU RECOVERY - ADDTL 15 MIN: Performed by: INTERNAL MEDICINE

## 2018-08-02 PROCEDURE — 7100000000 HC PACU RECOVERY - FIRST 15 MIN: Performed by: INTERNAL MEDICINE

## 2018-08-02 PROCEDURE — 88305 TISSUE EXAM BY PATHOLOGIST: CPT

## 2018-08-02 PROCEDURE — 3609012400 HC EGD TRANSORAL BIOPSY SINGLE/MULTIPLE: Performed by: INTERNAL MEDICINE

## 2018-08-02 RX ORDER — GLYCOPYRROLATE 1 MG/5 ML
SYRINGE (ML) INTRAVENOUS PRN
Status: DISCONTINUED | OUTPATIENT
Start: 2018-08-02 | End: 2018-08-02 | Stop reason: SDUPTHER

## 2018-08-02 RX ORDER — SODIUM CHLORIDE 450 MG/100ML
INJECTION, SOLUTION INTRAVENOUS CONTINUOUS
Status: DISCONTINUED | OUTPATIENT
Start: 2018-08-02 | End: 2018-08-02 | Stop reason: HOSPADM

## 2018-08-02 RX ORDER — LIDOCAINE HYDROCHLORIDE 20 MG/ML
INJECTION, SOLUTION INFILTRATION; PERINEURAL PRN
Status: DISCONTINUED | OUTPATIENT
Start: 2018-08-02 | End: 2018-08-02 | Stop reason: SDUPTHER

## 2018-08-02 RX ADMIN — LIDOCAINE HYDROCHLORIDE 100 MG: 20 INJECTION, SOLUTION INFILTRATION; PERINEURAL at 08:37

## 2018-08-02 RX ADMIN — Medication 0.1 MG: at 08:37

## 2018-08-02 RX ADMIN — PROPOFOL 100 MG: 10 INJECTION, EMULSION INTRAVENOUS at 08:37

## 2018-08-02 RX ADMIN — SODIUM CHLORIDE: 4.5 INJECTION, SOLUTION INTRAVENOUS at 08:09

## 2018-08-02 ASSESSMENT — COPD QUESTIONNAIRES: CAT_SEVERITY: NO INTERVAL CHANGE

## 2018-08-02 ASSESSMENT — PAIN SCALES - GENERAL
PAINLEVEL_OUTOF10: 0
PAINLEVEL_OUTOF10: 0

## 2018-08-02 NOTE — PROGRESS NOTES
egd with biospies and dilation with 54 [place] complete.  Patient tolerated well photos taken specimen sent to lab

## 2018-08-02 NOTE — ANESTHESIA PRE PROCEDURE
sodium (COLACE, DULCOLAX) 100 MG CAPS Take 100 mg by mouth 2 times daily as needed for Constipation 3/6/18   Reina Henry MD       Current medications:    Current Facility-Administered Medications   Medication Dose Route Frequency Provider Last Rate Last Dose    0.45 % sodium chloride infusion   Intravenous Continuous Alejo Segura MD           Allergies:     Allergies   Allergen Reactions    Ciprofloxacin Itching    Neomycin     Pcn [Penicillins] Hives     Pt reports having reaction 50 years ago    Zanaflex [Tizanidine Hcl] Other (See Comments)     Causes confusion    Levaquin [Levofloxacin] Itching     Benadryl was given for tx       Problem List:    Patient Active Problem List   Diagnosis Code    Hyperlipidemia E78.5    Stress incontinence, female N39.3    [facility] C50.919    CAD (coronary artery disease) s/p PCI and stent Multilink 3 x 18  mm mid LAD- in 07/2009 at [facility] I25.10    Obstructive sleep apnea on CPAP G47.33, Z99.89    COPD (chronic obstructive pulmonary disease) ([facility]) J44.9    S/P ICD (internal cardiac defibrillator) procedure Z95.810    Nonischemic cardiomyopathy ([facility] I42.8    Class 2 obesity due to excess calories with serious comorbidity and body mass index (BMI) of 35.0 to 35.9 in adult E66.09, Z68.35    Closed compression fracture of first lumbar vertebra ([facility]) S32.010A    Hypertension I10    Adjustment disorder with mixed anxiety and depressed mood F43.23    Osteopenia determined by x-ray M85.80    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 coli UTI (urinary tract infection) N39.0, B96.20    Severe malnutrition ([facility]) E43    Sepsis due to Escherichia coli ([facility]) A41.51       Past Medical History:        Diagnosis Date    Arthritis     general    Breast CA ([facility] 12/03    right    CAD (coronary artery disease) 2009    stent in [place]    CHF (congestive heart failure) ([facility])     Closed compression fracture of first lumbar vertebra ([facility]) 2/7/2018    Colon Beta Blocker:  Not on Beta Blocker         Neuro/Psych:   (+) psychiatric history: stable with treatment            GI/Hepatic/Renal:   (+) PUD,           Endo/Other:              Pt had no PAT visit       Abdominal:           Vascular:   + PE. Anesthesia Plan      MAC     ASA 4       Induction: intravenous. Anesthetic plan and risks discussed with patient. Plan discussed with attending.                   JADEN Lucia - IVANIA   8/2/2018

## 2018-08-02 NOTE — PROGRESS NOTES
ENDOSCOPY POSTPROCEDURE REPORT     PT NAME: Maribell Hansen  MEDICAL RECORD NUMBER: [id number]  YOB: 1940    PROCEDURE PERFORMED BY : Rafa Hawk    PROCEDURE TYPE:   EGD/Am dil ___x___   COLONOSCOPY _______   ERCP   MEDICATIONS USED :  VERSED _____   FENTANYL_____   PROPOFOL __x____  Patient tolerated procedure well. No apparent complications. Estimated blood loss:  Nil  Specimens removed:  Yes_x__  No______  Disposition of Specimens:  To Lab      BRIEF  FINDINGS:  1. Erythema of esophageal mucosa with unusual, benign, reticulated/spider-web like appearance. Mult bx's  2. Tiny sliding HH  3. Erythema in antrum. Mult bx's  4. 53F Am dil with mild resistance    PRELIMINARY PLAN:  1. F/U bx's  2.OV 8 weeks  3. Resume meds    For complete findings and plan, see dictated report.      Rafa Hawk MD  8/2/2018  8:54 AM

## 2018-08-03 ENCOUNTER — HOSPITAL ENCOUNTER (OUTPATIENT)
Dept: PHYSICAL THERAPY | Age: 78
Setting detail: THERAPIES SERIES
Discharge: HOME | End: 2018-08-03
Payer: MEDICARE

## 2018-08-03 PROCEDURE — G8991 OTHER PT/OT GOAL STATUS: HCPCS

## 2018-08-03 PROCEDURE — G8992 OTHER PT/OT  D/C STATUS: HCPCS

## 2018-08-03 PROCEDURE — 97110 THERAPEUTIC EXERCISES: CPT

## 2018-08-03 ASSESSMENT — PAIN DESCRIPTION - PAIN TYPE: TYPE: ACUTE PAIN

## 2018-08-03 ASSESSMENT — PAIN DESCRIPTION - LOCATION: LOCATION: BACK

## 2018-08-03 ASSESSMENT — PAIN SCALES - GENERAL: PAINLEVEL_OUTOF10: 3

## 2018-08-03 NOTE — PROGRESS NOTES
[address]     Time In: 3604  Time Out: 0900  Minutes: 28  Timed Code Treatment Minutes: 28 Minutes     Date: 8/3/2018  Patient Name: Rodriguez Cervantes,  Gender:  female        CSN: [id number]   : 1940  (77 y.o.)       Referring Practitioner: Dr Noah Alvarez      Diagnosis: Vertigo  Treatment Diagnosis: BPPV   Additional Pertinent Hx: No restrictions from doctor. Hx: vertigo, left shoulder/arm surgery and fractures, high BP, pacemaker, osteoporosis, cancer (breast), compression fx in low back -no surgery                  General:  PT Visit Information  PT Insurance Information: Medicare - [place] Medicare supplement is secondary. Aquatics and modalities except Ionto and HP/CP covered. Total # of Visits to Date: 5  Plan of Care/Certification Expiration Date: 18  Progress Note Counter:                Subjective:  Chart Reviewed: Yes  Patient assessed for rehabilitation services?: Yes  Family / Caregiver Present: No  Comments: Unsure of next follow up with doctor. BP taken today in left forearm at 08:39 and was 124/75. Subjective: Patient reports has only had one episode of dizziness since last seen by therapy. States was when standing up from something and did not last long. States wants today to be last day of therapy. Reports her back is still causing her great pain and she starts therapy for it next week. Pain:  Patient Currently in Pain: Yes  Pain Assessment: 0-10  Pain Level: 3  Pain Type: Acute pain  Pain Location: Back      Objective    Exercises  Exercise 1: [place]-Hallpike - negative bilaterally  Exercise 2: Roll test - negative bilaterally  Exercise 3: Gaze stability -negative for all 3 tests. Pt reported double vision with looking to the left. States has been getting \"floaters\" also since the accident. Dizziness Handicap Inventory   1. Does looking up increase your problem?  0 - No problem interfere with your job or household responsibilities? 0 - No   25. Does bending over increase your problem? 0 - No   TOTAL 0/100       Activity Tolerance:  Activity Tolerance: Patient Tolerated treatment well, Patient limited by pain    Assessment:  Assessment: Patient with all negative testing for BPPV and gaze stability so no further therapy warranted. Patient Education:  Patient Education: No precautions to follow. Follow up with eye doctor about floaters and double vision. Plan:  Plan Comment: Discharge to home    Goals:  Patient goals :  To get rid of the dizziness    Short term goals  Time Frame for Short term goals: 6 weeks  Short term goal 1: See LTG  Short term goal 2:    Short term goal 3:      Long term goals  Time Frame for Long term goals : 6 weeks  Long term goal 1: Patient to have negative testing for [place]-Hallpike to be able to roll over in bed without dizziness - MET  Long term goal 2: Patient to have negative testing for Roll test to perform all bed mobility without dizziness - MET  Long term goal 3: PT to test for any gaze stability issues and treat as needed - MET    PT G-Codes  Functional Assessment Tool Used: Dizziness Hnadicap Inventory  Score: 0/100  Functional Limitation: Other PT primary  Other PT Primary Goal Status (): 0 percent impaired, limited or restricted  Other PT Primary Discharge Status (): 0 percent impaired, limited or restricted    [address], [place]  [id number]

## 2018-08-06 DIAGNOSIS — M54.6 CHRONIC MIDLINE THORACIC BACK PAIN: Primary | ICD-10-CM

## 2018-08-06 DIAGNOSIS — S32.010K CLOSED COMPRESSION FRACTURE OF L1 LUMBAR VERTEBRA WITH NONUNION, SUBSEQUENT ENCOUNTER: ICD-10-CM

## 2018-08-06 DIAGNOSIS — G89.29 CHRONIC MIDLINE THORACIC BACK PAIN: Primary | ICD-10-CM

## 2018-08-06 NOTE — TELEPHONE ENCOUNTER
Pt came in and req refill:    Tramadol 50 mg I po q 6 hrs prn    Please send to :    [facility]    Pt will need today as she took her last one.

## 2018-08-07 RX ORDER — MAGNESIUM OXIDE 400 MG/1
TABLET ORAL
Qty: 30 TABLET | Refills: 1 | Status: SHIPPED | OUTPATIENT
Start: 2018-08-07 | End: 2018-09-10 | Stop reason: SDUPTHER

## 2018-08-07 RX ORDER — TRAMADOL HYDROCHLORIDE 50 MG/1
50 TABLET ORAL EVERY 6 HOURS PRN
Qty: 120 TABLET | Refills: 1 | Status: SHIPPED | OUTPATIENT
Start: 2018-08-07 | End: 2018-09-10 | Stop reason: DRUGHIGH

## 2018-08-07 NOTE — TELEPHONE ENCOUNTER
Date of last visit:  6/20/2018  Date of next visit:  9/10/2018    Requested Prescriptions     Pending Prescriptions Disp Refills    magnesium oxide (MAG-OX) 400 MG tablet [Pharmacy Med Name: MAGNESIUM OXIDE 400 MG TABLET] 30 tablet 1     Sig: take 1 tablet by mouth once daily

## 2018-08-10 DIAGNOSIS — I47.20 VENTRICULAR TACHYCARDIA: Primary | ICD-10-CM

## 2018-08-13 RX ORDER — METOPROLOL SUCCINATE 50 MG/1
50 TABLET, EXTENDED RELEASE ORAL DAILY
Qty: 90 TABLET | Refills: 1 | Status: SHIPPED | OUTPATIENT
Start: 2018-08-13 | End: 2019-01-30 | Stop reason: SDUPTHER

## 2018-08-15 ENCOUNTER — HOSPITAL ENCOUNTER (OUTPATIENT)
Age: 78
Discharge: HOME | End: 2018-08-15
Payer: MEDICARE

## 2018-08-15 DIAGNOSIS — I47.20 VENTRICULAR TACHYCARDIA: ICD-10-CM

## 2018-08-15 LAB
ANION GAP SERPL CALCULATED.3IONS-SCNC: 14 MEQ/L (ref 8–16)
BUN BLDV-MCNC: 16 MG/DL (ref 7–22)
CALCIUM SERPL-MCNC: 9.4 MG/DL (ref 8.5–10.5)
CHLORIDE BLD-SCNC: 105 MEQ/L (ref 98–111)
CO2: 23 MEQ/L (ref 23–33)
CREAT SERPL-MCNC: 0.5 MG/DL (ref 0.4–1.2)
GFR SERPL CREATININE-BSD FRML MDRD: > 90 ML/MIN/1.73M2
GLUCOSE BLD-MCNC: 93 MG/DL (ref 70–108)
MAGNESIUM: 1.6 MG/DL (ref 1.6–2.4)
POTASSIUM SERPL-SCNC: 5 MEQ/L (ref 3.5–5.2)
SODIUM BLD-SCNC: 142 MEQ/L (ref 135–145)

## 2018-08-15 PROCEDURE — 80048 BASIC METABOLIC PNL TOTAL CA: CPT

## 2018-08-15 PROCEDURE — 83735 ASSAY OF MAGNESIUM: CPT

## 2018-08-15 PROCEDURE — 36415 COLL VENOUS BLD VENIPUNCTURE: CPT

## 2018-09-10 ENCOUNTER — OFFICE VISIT (OUTPATIENT)
Dept: FAMILY MEDICINE CLINIC | Age: 78
End: 2018-09-10

## 2018-09-10 VITALS
BODY MASS INDEX: 30.7 KG/M2 | RESPIRATION RATE: 16 BRPM | WEIGHT: 195.6 LBS | SYSTOLIC BLOOD PRESSURE: 126 MMHG | HEART RATE: 80 BPM | HEIGHT: 67 IN | DIASTOLIC BLOOD PRESSURE: 80 MMHG

## 2018-09-10 DIAGNOSIS — E46 MALNUTRITION, UNSPECIFIED TYPE (HCC): ICD-10-CM

## 2018-09-10 DIAGNOSIS — G89.29 CHRONIC MIDLINE THORACIC BACK PAIN: ICD-10-CM

## 2018-09-10 DIAGNOSIS — I10 ESSENTIAL HYPERTENSION: ICD-10-CM

## 2018-09-10 DIAGNOSIS — M54.6 CHRONIC MIDLINE THORACIC BACK PAIN: ICD-10-CM

## 2018-09-10 DIAGNOSIS — K21.9 GASTROESOPHAGEAL REFLUX DISEASE, ESOPHAGITIS PRESENCE NOT SPECIFIED: ICD-10-CM

## 2018-09-10 DIAGNOSIS — S32.010K CLOSED COMPRESSION FRACTURE OF L1 LUMBAR VERTEBRA WITH NONUNION, SUBSEQUENT ENCOUNTER: ICD-10-CM

## 2018-09-10 PROBLEM — A41.51 SEPSIS DUE TO ESCHERICHIA COLI (HCC): Status: RESOLVED | Noted: 2018-06-12 | Resolved: 2018-09-10

## 2018-09-10 PROBLEM — B96.20 E. COLI UTI (URINARY TRACT INFECTION): Status: RESOLVED | Noted: 2018-06-11 | Resolved: 2018-09-10

## 2018-09-10 PROBLEM — N39.0 E. COLI UTI (URINARY TRACT INFECTION): Status: RESOLVED | Noted: 2018-06-11 | Resolved: 2018-09-10

## 2018-09-10 PROCEDURE — 99213 OFFICE O/P EST LOW 20 MIN: CPT | Performed by: FAMILY MEDICINE

## 2018-09-10 PROCEDURE — 1101F PT FALLS ASSESS-DOCD LE1/YR: CPT | Performed by: FAMILY MEDICINE

## 2018-09-10 RX ORDER — TRAMADOL HYDROCHLORIDE 50 MG/1
50 TABLET ORAL DAILY
Qty: 120 TABLET | Refills: 1 | Status: SHIPPED
Start: 2018-09-10 | End: 2018-11-20 | Stop reason: SDUPTHER

## 2018-09-10 ASSESSMENT — ENCOUNTER SYMPTOMS
SHORTNESS OF BREATH: 0
DIARRHEA: 0
NAUSEA: 0
BACK PAIN: 1
EYES NEGATIVE: 1
ABDOMINAL PAIN: 0
VOMITING: 0
CONSTIPATION: 0
CHEST TIGHTNESS: 0
COUGH: 0

## 2018-09-10 NOTE — PROGRESS NOTES
1 tablet by mouth daily 90 tablet 1    pantoprazole (PROTONIX) 40 MG tablet Take 40 mg by mouth daily      sacubitril-valsartan (ENTRESTO) 49-51 MG per tablet Take 1 tablet by mouth 2 times daily 60 tablet 6    baclofen (LIORESAL) 10 MG tablet Take 0.5 tablets by mouth 3 times daily 15 tablet 0    magnesium oxide (MAG-OX) 400 (241.3 Mg) MG TABS tablet Take 1 tablet by mouth daily 30 tablet 1    calcitonin (MIACALCIN) 200 UNIT/ACT nasal spray 1 spray by Nasal route daily 1 Bottle 3    acetaminophen (TYLENOL) 325 MG tablet Take 2 tablets by mouth every 6 hours 120 tablet 0    isosorbide mononitrate (IMDUR) 30 MG extended release tablet Take 0.5 tablets by mouth daily 45 tablet 3    simvastatin (ZOCOR) 40 MG tablet TAKE 1 TABLET BY MOUTH EVERY EVENING 90 tablet 3    docusate sodium (COLACE, DULCOLAX) 100 MG CAPS Take 100 mg by mouth 2 times daily as needed for Constipation      calcium-vitamin D (OSCAL-500) 500-200 MG-UNIT per tablet Take 1 tablet by mouth 2 times daily 30 tablet 3    aspirin 81 MG EC tablet Take 1 tablet by mouth daily Stop 5 days before any planned intervention for compression fractures 30 tablet 3     No current facility-administered medications for this visit. Orders Placed This Encounter   Procedures    Comprehensive Metabolic Panel     Standing Status:   Future     Standing Expiration Date:   9/10/2019     Continue current medications. Return in about 4 months (around 1/10/2019) for HTN. Discussed use, benefit, and side effects of prescribed medications. All patient questions answered. Pt voiced understanding. Instructed to continue current medications, diet and exercise. Patient agreed with treatment plan. Controlled Substances Monitoring:     RX Monitoring 9/10/2018   Attestation The Prescription Monitoring Report for this patient was reviewed today.    Documentation Possible medication side effects, risk of tolerance/dependence & alternative treatments discussed. ;No signs of potential drug abuse or diversion identified. Chronic Pain Reviewed the patient's functional status and documentation. ;Dose reduction has been attempted. Medication Contracts Medication contract signed today.

## 2018-09-14 ENCOUNTER — HOSPITAL ENCOUNTER (OUTPATIENT)
Age: 78
Discharge: HOME | End: 2018-09-14
Payer: MEDICARE

## 2018-09-14 DIAGNOSIS — E46 MALNUTRITION, UNSPECIFIED TYPE (HCC): ICD-10-CM

## 2018-09-14 DIAGNOSIS — I10 ESSENTIAL HYPERTENSION: ICD-10-CM

## 2018-09-14 LAB
ALBUMIN SERPL-MCNC: 3.9 G/DL (ref 3.5–5.1)
ALP BLD-CCNC: 54 U/L (ref 38–126)
ALT SERPL-CCNC: 10 U/L (ref 11–66)
ANION GAP SERPL CALCULATED.3IONS-SCNC: 12 MEQ/L (ref 8–16)
AST SERPL-CCNC: 27 U/L (ref 5–40)
BILIRUB SERPL-MCNC: 0.5 MG/DL (ref 0.3–1.2)
BUN BLDV-MCNC: 14 MG/DL (ref 7–22)
CALCIUM SERPL-MCNC: 9.3 MG/DL (ref 8.5–10.5)
CHLORIDE BLD-SCNC: 103 MEQ/L (ref 98–111)
CO2: 24 MEQ/L (ref 23–33)
CREAT SERPL-MCNC: 0.6 MG/DL (ref 0.4–1.2)
GFR SERPL CREATININE-BSD FRML MDRD: > 90 ML/MIN/1.73M2
GLUCOSE BLD-MCNC: 95 MG/DL (ref 70–108)
POTASSIUM SERPL-SCNC: 4.8 MEQ/L (ref 3.5–5.2)
SODIUM BLD-SCNC: 139 MEQ/L (ref 135–145)
TOTAL PROTEIN: 6.7 G/DL (ref 6.1–8)

## 2018-09-14 PROCEDURE — 80053 COMPREHEN METABOLIC PANEL: CPT

## 2018-09-14 PROCEDURE — 36415 COLL VENOUS BLD VENIPUNCTURE: CPT

## 2018-09-17 ENCOUNTER — TELEPHONE (OUTPATIENT)
Dept: CARDIOLOGY CLINIC | Age: 78
End: 2018-09-17

## 2018-09-28 ENCOUNTER — CARE COORDINATION (OUTPATIENT)
Dept: CARE COORDINATION | Age: 78
End: 2018-09-28

## 2018-10-22 ENCOUNTER — PROCEDURE VISIT (OUTPATIENT)
Dept: CARDIOLOGY CLINIC | Age: 78
End: 2018-10-22
Payer: MEDICARE

## 2018-10-22 DIAGNOSIS — Z95.810 S/P ICD (INTERNAL CARDIAC DEFIBRILLATOR) PROCEDURE: Primary | ICD-10-CM

## 2018-10-22 PROCEDURE — 93295 DEV INTERROG REMOTE 1/2/MLT: CPT | Performed by: INTERNAL MEDICINE

## 2018-10-22 PROCEDURE — 93296 REM INTERROG EVL PM/IDS: CPT | Performed by: INTERNAL MEDICINE

## 2018-10-22 NOTE — PROGRESS NOTES
DR Verma [facility] PT  ST YAIMA BIV ICD REMOTE  BATTERY 5.6-5.9 YRS REMAINING  ATRIAL IMPEDENCE 450  RV IMPEDENCE 390  LV IMPEDENCE 450  HV 43  P WAVES 1.0  RV WAVES >12  A PACED 14%  V PACED >99%  See attached corvue report  Remote monitoring reviewed over a 30 day period   18 BTS NS VT ON 08/05/18

## 2018-11-01 ENCOUNTER — CARE COORDINATION (OUTPATIENT)
Dept: CARE COORDINATION | Age: 78
End: 2018-11-01

## 2018-11-01 ASSESSMENT — ENCOUNTER SYMPTOMS: DYSPNEA ASSOCIATED WITH: MINIMAL EXERTION

## 2018-11-01 NOTE — CARE COORDINATION
Betsey Contreras MD   baclofen (LIORESAL) 10 MG tablet Take 0.5 tablets by mouth 3 times daily 7/18/18  Yes Ramiro Foster MD   magnesium oxide (MAG-OX) 400 (241.3 Mg) MG TABS tablet Take 1 tablet by mouth daily 6/15/18  Yes Alexx Mock MD   calcitonin (MIACALCIN) 200 UNIT/ACT nasal spray 1 spray by Nasal route daily 6/8/18  Yes Ramiro Foster MD   acetaminophen (TYLENOL) 325 MG tablet Take 2 tablets by mouth every 6 hours 3/6/18  Yes Preston Rob MD   isosorbide mononitrate (IMDUR) 30 MG extended release tablet Take 0.5 tablets by mouth daily 3/6/18  Yes Preston Rob MD   simvastatin (ZOCOR) 40 MG tablet TAKE 1 TABLET BY MOUTH EVERY EVENING 3/6/18  Yes Preston Rob MD   docusate sodium (COLACE, DULCOLAX) 100 MG CAPS Take 100 mg by mouth 2 times daily as needed for Constipation 3/6/18  Yes Preston Rob MD   calcium-vitamin D (OSCAL-500) 500-200 MG-UNIT per tablet Take 1 tablet by mouth 2 times daily 3/6/18  Yes Preston Rob MD   aspirin 81 MG EC tablet Take 1 tablet by mouth daily Stop 5 days before any planned intervention for compression fractures 3/7/18  Yes Preston Rob MD       Future Appointments  Date Time Provider [facility]   12/10/2018 11:45 AM Mine Hong PA-C Pul Med [address]   1/11/2019 1:40 PM Ramiro Foster MD [facility]   1/30/2019 11:00 AM Cody Fortune MD [address] [facility] Charlene COOK AM [place] [address]   4/10/2019 11:00 AM SCHEDULE, [facility] PACER NURSE [facility] PACER [facility] LORI COOK AM [address] [address]

## 2018-11-20 DIAGNOSIS — M54.6 CHRONIC MIDLINE THORACIC BACK PAIN: ICD-10-CM

## 2018-11-20 DIAGNOSIS — G89.29 CHRONIC MIDLINE THORACIC BACK PAIN: ICD-10-CM

## 2018-11-20 DIAGNOSIS — S32.010K CLOSED COMPRESSION FRACTURE OF L1 LUMBAR VERTEBRA WITH NONUNION, SUBSEQUENT ENCOUNTER: ICD-10-CM

## 2018-11-20 NOTE — TELEPHONE ENCOUNTER
Pt called requesting Rx for Tramadol. Said she only has about 12 pills left.       [facility])

## 2018-11-21 RX ORDER — TRAMADOL HYDROCHLORIDE 50 MG/1
50 TABLET ORAL 2 TIMES DAILY
Qty: 60 TABLET | Refills: 0 | Status: SHIPPED | OUTPATIENT
Start: 2018-11-21 | End: 2019-01-20

## 2018-11-25 ENCOUNTER — HOSPITAL ENCOUNTER (INPATIENT)
Age: 78
LOS: 3 days | Discharge: NURSING, REHAB OR LONG-TERM CARE | DRG: 494 | End: 2018-11-28
Attending: FAMILY MEDICINE | Admitting: ORTHOPAEDIC SURGERY
Payer: MEDICARE

## 2018-11-25 ENCOUNTER — APPOINTMENT (OUTPATIENT)
Dept: GENERAL RADIOLOGY | Age: 78
DRG: 494 | End: 2018-11-25
Payer: MEDICARE

## 2018-11-25 ENCOUNTER — APPOINTMENT (OUTPATIENT)
Dept: CT IMAGING | Age: 78
DRG: 494 | End: 2018-11-25
Payer: MEDICARE
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
## 2018-11-25 DIAGNOSIS — S42.332A CLOSED DISPLACED OBLIQUE FRACTURE OF SHAFT OF LEFT HUMERUS, INITIAL ENCOUNTER: ICD-10-CM

## 2018-11-25 DIAGNOSIS — S42.302A CLOSED FRACTURE OF SHAFT OF LEFT HUMERUS, UNSPECIFIED FRACTURE MORPHOLOGY, INITIAL ENCOUNTER: Primary | ICD-10-CM

## 2018-11-25 LAB
ANION GAP SERPL CALCULATED.3IONS-SCNC: 10 MEQ/L (ref 8–16)
BASOPHILS # BLD: 0.4 %
BASOPHILS ABSOLUTE: 0 THOU/MM3 (ref 0–0.1)
BUN BLDV-MCNC: 24 MG/DL (ref 7–22)
CALCIUM SERPL-MCNC: 9.4 MG/DL (ref 8.5–10.5)
CHLORIDE BLD-SCNC: 101 MEQ/L (ref 98–111)
CO2: 30 MEQ/L (ref 23–33)
CREAT SERPL-MCNC: 0.5 MG/DL (ref 0.4–1.2)
EOSINOPHIL # BLD: 0.9 %
EOSINOPHILS ABSOLUTE: 0.1 THOU/MM3 (ref 0–0.4)
ERYTHROCYTE [DISTWIDTH] IN BLOOD BY AUTOMATED COUNT: 12.7 % (ref 11.5–14.5)
ERYTHROCYTE [DISTWIDTH] IN BLOOD BY AUTOMATED COUNT: 44 FL (ref 35–45)
GFR SERPL CREATININE-BSD FRML MDRD: > 90 ML/MIN/1.73M2
GLUCOSE BLD-MCNC: 118 MG/DL (ref 70–108)
HCT VFR BLD CALC: 39.7 % (ref 37–47)
HEMOGLOBIN: 13.5 GM/DL (ref 12–16)
IMMATURE GRANS (ABS): 0.02 THOU/MM3 (ref 0–0.07)
IMMATURE GRANULOCYTES: 0.3 %
LYMPHOCYTES # BLD: 30.1 %
LYMPHOCYTES ABSOLUTE: 2.2 THOU/MM3 (ref 1–4.8)
MCH RBC QN AUTO: 32 PG (ref 26–33)
MCHC RBC AUTO-ENTMCNC: 34 GM/DL (ref 32.2–35.5)
MCV RBC AUTO: 94.1 FL (ref 81–99)
MONOCYTES # BLD: 6.4 %
MONOCYTES ABSOLUTE: 0.5 THOU/MM3 (ref 0.4–1.3)
NUCLEATED RED BLOOD CELLS: 0 /100 WBC
OSMOLALITY CALCULATION: 286.4 MOSMOL/KG (ref 275–300)
PLATELET # BLD: 192 THOU/MM3 (ref 130–400)
PMV BLD AUTO: 10.4 FL (ref 9.4–12.4)
POTASSIUM SERPL-SCNC: 3.9 MEQ/L (ref 3.5–5.2)
RBC # BLD: 4.22 MILL/MM3 (ref 4.2–5.4)
SEG NEUTROPHILS: 61.9 %
SEGMENTED NEUTROPHILS ABSOLUTE COUNT: 4.6 THOU/MM3 (ref 1.8–7.7)
SODIUM BLD-SCNC: 141 MEQ/L (ref 135–145)
WBC # BLD: 7.4 THOU/MM3 (ref 4.8–10.8)

## 2018-11-25 PROCEDURE — 96376 TX/PRO/DX INJ SAME DRUG ADON: CPT

## 2018-11-25 PROCEDURE — 85025 COMPLETE CBC W/AUTO DIFF WBC: CPT

## 2018-11-25 PROCEDURE — 36415 COLL VENOUS BLD VENIPUNCTURE: CPT

## 2018-11-25 PROCEDURE — 76376 3D RENDER W/INTRP POSTPROCES: CPT

## 2018-11-25 PROCEDURE — 6360000002 HC RX W HCPCS: Performed by: FAMILY MEDICINE

## 2018-11-25 PROCEDURE — 80048 BASIC METABOLIC PNL TOTAL CA: CPT

## 2018-11-25 PROCEDURE — 6370000000 HC RX 637 (ALT 250 FOR IP): Performed by: ORTHOPAEDIC SURGERY

## 2018-11-25 PROCEDURE — 74176 CT ABD & PELVIS W/O CONTRAST: CPT

## 2018-11-25 PROCEDURE — 96375 TX/PRO/DX INJ NEW DRUG ADDON: CPT

## 2018-11-25 PROCEDURE — 1200000000 HC SEMI PRIVATE

## 2018-11-25 PROCEDURE — 72125 CT NECK SPINE W/O DYE: CPT

## 2018-11-25 PROCEDURE — 96374 THER/PROPH/DIAG INJ IV PUSH: CPT

## 2018-11-25 PROCEDURE — 2709999900 HC NON-CHARGEABLE SUPPLY

## 2018-11-25 PROCEDURE — 99285 EMERGENCY DEPT VISIT HI MDM: CPT

## 2018-11-25 PROCEDURE — 70450 CT HEAD/BRAIN W/O DYE: CPT

## 2018-11-25 PROCEDURE — 6820000001 HC L2 TRAUMA SURGERY EVALUATION

## 2018-11-25 PROCEDURE — 71250 CT THORAX DX C-: CPT

## 2018-11-25 PROCEDURE — 73060 X-RAY EXAM OF HUMERUS: CPT

## 2018-11-25 PROCEDURE — 2580000003 HC RX 258: Performed by: ORTHOPAEDIC SURGERY

## 2018-11-25 RX ORDER — FENTANYL CITRATE 50 UG/ML
50 INJECTION, SOLUTION INTRAMUSCULAR; INTRAVENOUS ONCE
Status: COMPLETED | OUTPATIENT
Start: 2018-11-25 | End: 2018-11-25

## 2018-11-25 RX ORDER — SODIUM CHLORIDE 0.9 % (FLUSH) 0.9 %
10 SYRINGE (ML) INJECTION PRN
Status: DISCONTINUED | OUTPATIENT
Start: 2018-11-25 | End: 2018-11-28 | Stop reason: HOSPADM

## 2018-11-25 RX ORDER — ONDANSETRON 2 MG/ML
4 INJECTION INTRAMUSCULAR; INTRAVENOUS EVERY 6 HOURS PRN
Status: DISCONTINUED | OUTPATIENT
Start: 2018-11-25 | End: 2018-11-28 | Stop reason: HOSPADM

## 2018-11-25 RX ORDER — SODIUM CHLORIDE 0.9 % (FLUSH) 0.9 %
10 SYRINGE (ML) INJECTION EVERY 12 HOURS SCHEDULED
Status: DISCONTINUED | OUTPATIENT
Start: 2018-11-25 | End: 2018-11-28 | Stop reason: HOSPADM

## 2018-11-25 RX ORDER — HYDROCODONE BITARTRATE AND ACETAMINOPHEN 5; 325 MG/1; MG/1
2 TABLET ORAL EVERY 4 HOURS PRN
Status: DISCONTINUED | OUTPATIENT
Start: 2018-11-25 | End: 2018-11-28 | Stop reason: HOSPADM

## 2018-11-25 RX ORDER — ACETAMINOPHEN 325 MG/1
650 TABLET ORAL EVERY 4 HOURS PRN
Status: DISCONTINUED | OUTPATIENT
Start: 2018-11-25 | End: 2018-11-28 | Stop reason: HOSPADM

## 2018-11-25 RX ORDER — HYDROCODONE BITARTRATE AND ACETAMINOPHEN 5; 325 MG/1; MG/1
1 TABLET ORAL EVERY 4 HOURS PRN
Status: DISCONTINUED | OUTPATIENT
Start: 2018-11-25 | End: 2018-11-28 | Stop reason: HOSPADM

## 2018-11-25 RX ORDER — ONDANSETRON 2 MG/ML
4 INJECTION INTRAMUSCULAR; INTRAVENOUS ONCE
Status: COMPLETED | OUTPATIENT
Start: 2018-11-25 | End: 2018-11-25

## 2018-11-25 RX ORDER — MORPHINE SULFATE 2 MG/ML
2 INJECTION, SOLUTION INTRAMUSCULAR; INTRAVENOUS ONCE
Status: COMPLETED | OUTPATIENT
Start: 2018-11-25 | End: 2018-11-25

## 2018-11-25 RX ADMIN — MORPHINE SULFATE 2 MG: 2 INJECTION, SOLUTION INTRAMUSCULAR; INTRAVENOUS at 18:23

## 2018-11-25 RX ADMIN — FENTANYL CITRATE 50 MCG: 50 INJECTION INTRAMUSCULAR; INTRAVENOUS at 18:45

## 2018-11-25 RX ADMIN — ONDANSETRON 4 MG: 2 INJECTION INTRAMUSCULAR; INTRAVENOUS at 18:23

## 2018-11-25 RX ADMIN — FENTANYL CITRATE 50 MCG: 50 INJECTION INTRAMUSCULAR; INTRAVENOUS at 20:22

## 2018-11-25 RX ADMIN — HYDROCODONE BITARTRATE AND ACETAMINOPHEN 2 TABLET: 5; 325 TABLET ORAL at 22:18

## 2018-11-25 RX ADMIN — Medication 10 ML: at 22:22

## 2018-11-25 ASSESSMENT — PAIN DESCRIPTION - LOCATION
LOCATION: SHOULDER

## 2018-11-25 ASSESSMENT — PAIN DESCRIPTION - ONSET
ONSET: ON-GOING

## 2018-11-25 ASSESSMENT — ENCOUNTER SYMPTOMS
NAUSEA: 0
EYE PAIN: 0
COUGH: 0
WHEEZING: 0
SHORTNESS OF BREATH: 0
ABDOMINAL PAIN: 0
SORE THROAT: 0
RHINORRHEA: 0
DIARRHEA: 0
BACK PAIN: 0
VOMITING: 0
EYE DISCHARGE: 0

## 2018-11-25 ASSESSMENT — PAIN DESCRIPTION - ORIENTATION
ORIENTATION: LEFT

## 2018-11-25 ASSESSMENT — PAIN DESCRIPTION - DESCRIPTORS
DESCRIPTORS: ACHING

## 2018-11-25 ASSESSMENT — PAIN SCALES - GENERAL
PAINLEVEL_OUTOF10: 6
PAINLEVEL_OUTOF10: 10
PAINLEVEL_OUTOF10: 4
PAINLEVEL_OUTOF10: 5
PAINLEVEL_OUTOF10: 8
PAINLEVEL_OUTOF10: 10
PAINLEVEL_OUTOF10: 5

## 2018-11-25 ASSESSMENT — PAIN DESCRIPTION - FREQUENCY
FREQUENCY: CONTINUOUS

## 2018-11-25 ASSESSMENT — PAIN DESCRIPTION - PAIN TYPE
TYPE: ACUTE PAIN

## 2018-11-25 ASSESSMENT — PAIN DESCRIPTION - PROGRESSION: CLINICAL_PROGRESSION: GRADUALLY WORSENING

## 2018-11-25 NOTE — ED PROVIDER NOTES
m)  5' 4\" (1.626 m)        6:15 PM: The patient was seen and evaluated. MDM:  Patient was seen and evaluated for a left shoulder injury after falling on it today while getting out of her chair at home. Patient has a history of surgeries on this shoulder by Dr. Antony Kirk with hardware placed as well. Upon physical examination the patient had decrease ROM of her left shoulder, upper extremity, and elbow along with tenderness and swelling in all these places. She has an obvious deformity to the left upper arm with tenderness and swelling there. Patient also has a a scar on her anterior left shoulder mid-way down her left upper extremity from previous surgery. She was given sublimaze, morphine, and zofran here in the ED for her pain. Labs show a BUN of 24 and a XR of her left humerus showed an acute obliquely oriented fracture of the humeral diaphysis just distal to the surgical hardware. A CT of her lumbar and thoracic revealed an old compression fracture of T12 with near complete height loss anteriorly. There is at least moderate severity spinal canal stenosis at this level. No acute fractures in the thoracic or lumbar spine. A CT of her chest, abdomen, and cervical spine were benign. Lastly, a CT of her head revealed mild severity chronic small vessel ischemic changes. Mild mucosal thickening in the ethmoid air cells but no other acute findings. Based on the patient's presentation and workup here in the department, it was decided to bring the patient into the hospital for admission. We did discuss this plan with the patient and available family and they did agree to the plan of admission. Dr. Eleanor Douglas MD of the orthopaedics service was consulted regarding this patient and did graciously accept this patient in the hospital for admission. Patient was admitted to the hospital in fair condition.     CRITICAL CARE:   None     CONSULTS:  Dr. Eleanor Douglas MD of the orthopaedics service was consulted regarding this patient

## 2018-11-26 ENCOUNTER — ANESTHESIA (OUTPATIENT)
Dept: OPERATING ROOM | Age: 78
DRG: 494 | End: 2018-11-26
Payer: MEDICARE

## 2018-11-26 ENCOUNTER — APPOINTMENT (OUTPATIENT)
Dept: GENERAL RADIOLOGY | Age: 78
DRG: 494 | End: 2018-11-26
Payer: MEDICARE

## 2018-11-26 ENCOUNTER — ANESTHESIA EVENT (OUTPATIENT)
Dept: OPERATING ROOM | Age: 78
DRG: 494 | End: 2018-11-26
Payer: MEDICARE

## 2018-11-26 VITALS
OXYGEN SATURATION: 98 % | SYSTOLIC BLOOD PRESSURE: 132 MMHG | TEMPERATURE: 97.7 F | DIASTOLIC BLOOD PRESSURE: 59 MMHG | RESPIRATION RATE: 5 BRPM

## 2018-11-26 LAB
ANION GAP SERPL CALCULATED.3IONS-SCNC: 12 MEQ/L (ref 8–16)
ANION GAP SERPL CALCULATED.3IONS-SCNC: 12 MEQ/L (ref 8–16)
BASOPHILS # BLD: 0.3 %
BASOPHILS ABSOLUTE: 0 THOU/MM3 (ref 0–0.1)
BUN BLDV-MCNC: 23 MG/DL (ref 7–22)
BUN BLDV-MCNC: 23 MG/DL (ref 7–22)
CALCIUM SERPL-MCNC: 9.2 MG/DL (ref 8.5–10.5)
CALCIUM SERPL-MCNC: 9.3 MG/DL (ref 8.5–10.5)
CHLORIDE BLD-SCNC: 104 MEQ/L (ref 98–111)
CHLORIDE BLD-SCNC: 105 MEQ/L (ref 98–111)
CO2: 28 MEQ/L (ref 23–33)
CO2: 28 MEQ/L (ref 23–33)
CREAT SERPL-MCNC: 0.5 MG/DL (ref 0.4–1.2)
CREAT SERPL-MCNC: 0.5 MG/DL (ref 0.4–1.2)
EOSINOPHIL # BLD: 0.8 %
EOSINOPHILS ABSOLUTE: 0.1 THOU/MM3 (ref 0–0.4)
ERYTHROCYTE [DISTWIDTH] IN BLOOD BY AUTOMATED COUNT: 12.9 % (ref 11.5–14.5)
ERYTHROCYTE [DISTWIDTH] IN BLOOD BY AUTOMATED COUNT: 44.3 FL (ref 35–45)
GFR SERPL CREATININE-BSD FRML MDRD: > 90 ML/MIN/1.73M2
GFR SERPL CREATININE-BSD FRML MDRD: > 90 ML/MIN/1.73M2
GLUCOSE BLD-MCNC: 92 MG/DL (ref 70–108)
GLUCOSE BLD-MCNC: 94 MG/DL (ref 70–108)
HCT VFR BLD CALC: 40.1 % (ref 37–47)
HEMOGLOBIN: 13.4 GM/DL (ref 12–16)
IMMATURE GRANS (ABS): 0.02 THOU/MM3 (ref 0–0.07)
IMMATURE GRANULOCYTES: 0.3 %
INR BLD: 0.97 (ref 0.85–1.13)
LYMPHOCYTES # BLD: 31.7 %
LYMPHOCYTES ABSOLUTE: 2.5 THOU/MM3 (ref 1–4.8)
MCH RBC QN AUTO: 31.5 PG (ref 26–33)
MCHC RBC AUTO-ENTMCNC: 33.4 GM/DL (ref 32.2–35.5)
MCV RBC AUTO: 94.4 FL (ref 81–99)
MONOCYTES # BLD: 7.6 %
MONOCYTES ABSOLUTE: 0.6 THOU/MM3 (ref 0.4–1.3)
NUCLEATED RED BLOOD CELLS: 0 /100 WBC
PLATELET # BLD: 185 THOU/MM3 (ref 130–400)
PMV BLD AUTO: 10.6 FL (ref 9.4–12.4)
POTASSIUM REFLEX MAGNESIUM: 3.7 MEQ/L (ref 3.5–5.2)
POTASSIUM SERPL-SCNC: 3.7 MEQ/L (ref 3.5–5.2)
RBC # BLD: 4.25 MILL/MM3 (ref 4.2–5.4)
SEG NEUTROPHILS: 59.3 %
SEGMENTED NEUTROPHILS ABSOLUTE COUNT: 4.7 THOU/MM3 (ref 1.8–7.7)
SODIUM BLD-SCNC: 144 MEQ/L (ref 135–145)
SODIUM BLD-SCNC: 145 MEQ/L (ref 135–145)
WBC # BLD: 7.9 THOU/MM3 (ref 4.8–10.8)

## 2018-11-26 PROCEDURE — 6360000002 HC RX W HCPCS: Performed by: ORTHOPAEDIC SURGERY

## 2018-11-26 PROCEDURE — 3600000004 HC SURGERY LEVEL 4 BASE: Performed by: ORTHOPAEDIC SURGERY

## 2018-11-26 PROCEDURE — 2500000003 HC RX 250 WO HCPCS: Performed by: NURSE ANESTHETIST, CERTIFIED REGISTERED

## 2018-11-26 PROCEDURE — 3209999900 FLUORO FOR SURGICAL PROCEDURES

## 2018-11-26 PROCEDURE — 85025 COMPLETE CBC W/AUTO DIFF WBC: CPT

## 2018-11-26 PROCEDURE — 0PPG04Z REMOVAL OF INTERNAL FIXATION DEVICE FROM LEFT HUMERAL SHAFT, OPEN APPROACH: ICD-10-PCS | Performed by: ORTHOPAEDIC SURGERY

## 2018-11-26 PROCEDURE — 3700000001 HC ADD 15 MINUTES (ANESTHESIA): Performed by: ORTHOPAEDIC SURGERY

## 2018-11-26 PROCEDURE — 2709999900 HC NON-CHARGEABLE SUPPLY: Performed by: ORTHOPAEDIC SURGERY

## 2018-11-26 PROCEDURE — 6370000000 HC RX 637 (ALT 250 FOR IP): Performed by: INTERNAL MEDICINE

## 2018-11-26 PROCEDURE — 1200000000 HC SEMI PRIVATE

## 2018-11-26 PROCEDURE — 94760 N-INVAS EAR/PLS OXIMETRY 1: CPT

## 2018-11-26 PROCEDURE — 2500000003 HC RX 250 WO HCPCS: Performed by: ORTHOPAEDIC SURGERY

## 2018-11-26 PROCEDURE — 73060 X-RAY EXAM OF HUMERUS: CPT

## 2018-11-26 PROCEDURE — 99222 1ST HOSP IP/OBS MODERATE 55: CPT | Performed by: INTERNAL MEDICINE

## 2018-11-26 PROCEDURE — 85610 PROTHROMBIN TIME: CPT

## 2018-11-26 PROCEDURE — 7100000001 HC PACU RECOVERY - ADDTL 15 MIN: Performed by: ORTHOPAEDIC SURGERY

## 2018-11-26 PROCEDURE — 6370000000 HC RX 637 (ALT 250 FOR IP): Performed by: ORTHOPAEDIC SURGERY

## 2018-11-26 PROCEDURE — C1713 ANCHOR/SCREW BN/BN,TIS/BN: HCPCS | Performed by: ORTHOPAEDIC SURGERY

## 2018-11-26 PROCEDURE — 2580000003 HC RX 258: Performed by: NURSE ANESTHETIST, CERTIFIED REGISTERED

## 2018-11-26 PROCEDURE — 7100000000 HC PACU RECOVERY - FIRST 15 MIN: Performed by: ORTHOPAEDIC SURGERY

## 2018-11-26 PROCEDURE — 0PSG04Z REPOSITION LEFT HUMERAL SHAFT WITH INTERNAL FIXATION DEVICE, OPEN APPROACH: ICD-10-PCS | Performed by: ORTHOPAEDIC SURGERY

## 2018-11-26 PROCEDURE — 36415 COLL VENOUS BLD VENIPUNCTURE: CPT

## 2018-11-26 PROCEDURE — 6360000002 HC RX W HCPCS: Performed by: NURSE ANESTHETIST, CERTIFIED REGISTERED

## 2018-11-26 PROCEDURE — 2580000003 HC RX 258: Performed by: ORTHOPAEDIC SURGERY

## 2018-11-26 PROCEDURE — 6360000002 HC RX W HCPCS: Performed by: ANESTHESIOLOGY

## 2018-11-26 PROCEDURE — 3700000000 HC ANESTHESIA ATTENDED CARE: Performed by: ORTHOPAEDIC SURGERY

## 2018-11-26 PROCEDURE — 3600000014 HC SURGERY LEVEL 4 ADDTL 15MIN: Performed by: ORTHOPAEDIC SURGERY

## 2018-11-26 PROCEDURE — APPNB15 APP NON BILLABLE TIME 0-15 MINS: Performed by: PHYSICIAN ASSISTANT

## 2018-11-26 PROCEDURE — 80048 BASIC METABOLIC PNL TOTAL CA: CPT

## 2018-11-26 DEVICE — EVOS 3.5MM X 18MM LOCKING SCREW SELF-TAPPING
Type: IMPLANTABLE DEVICE | Site: HUMERUS | Status: FUNCTIONAL
Brand: EVOS

## 2018-11-26 DEVICE — EVOS 3.5MM X 10MM CORTEX SCREW SELF-TAPPING
Type: IMPLANTABLE DEVICE | Site: HUMERUS | Status: FUNCTIONAL
Brand: EVOS

## 2018-11-26 DEVICE — EVOS 3.5MM X 6MM CORTEX SCREW SELF-TAPPING
Type: IMPLANTABLE DEVICE | Site: HUMERUS | Status: FUNCTIONAL
Brand: EVOS

## 2018-11-26 DEVICE — EVOS 3.5MM X 20MM LOCKING SCREW SELF-TAPPING
Type: IMPLANTABLE DEVICE | Site: HUMERUS | Status: FUNCTIONAL
Brand: EVOS

## 2018-11-26 DEVICE — EVOS 3.5MM X 22MM LOCKING SCREW SELF-TAPPING
Type: IMPLANTABLE DEVICE | Site: HUMERUS | Status: FUNCTIONAL
Brand: EVOS

## 2018-11-26 DEVICE — EVOS 3.5MM X 18MM CORTEX SCREW SELF-TAPPING
Type: IMPLANTABLE DEVICE | Site: HUMERUS | Status: FUNCTIONAL
Brand: EVOS

## 2018-11-26 DEVICE — EVOS 3.5MM X 20MM CORTEX SCREW SELF-TAPPING
Type: IMPLANTABLE DEVICE | Site: HUMERUS | Status: FUNCTIONAL
Brand: EVOS

## 2018-11-26 RX ORDER — HYDRALAZINE HYDROCHLORIDE 20 MG/ML
5 INJECTION INTRAMUSCULAR; INTRAVENOUS EVERY 10 MIN PRN
Status: DISCONTINUED | OUTPATIENT
Start: 2018-11-26 | End: 2018-11-26 | Stop reason: HOSPADM

## 2018-11-26 RX ORDER — ROCURONIUM BROMIDE 10 MG/ML
INJECTION, SOLUTION INTRAVENOUS PRN
Status: DISCONTINUED | OUTPATIENT
Start: 2018-11-26 | End: 2018-11-26 | Stop reason: SDUPTHER

## 2018-11-26 RX ORDER — METOPROLOL SUCCINATE 50 MG/1
50 TABLET, EXTENDED RELEASE ORAL DAILY
Status: DISCONTINUED | OUTPATIENT
Start: 2018-11-26 | End: 2018-11-28 | Stop reason: HOSPADM

## 2018-11-26 RX ORDER — DOCUSATE SODIUM 100 MG/1
100 CAPSULE, LIQUID FILLED ORAL DAILY
Status: DISCONTINUED | OUTPATIENT
Start: 2018-11-26 | End: 2018-11-28 | Stop reason: HOSPADM

## 2018-11-26 RX ORDER — DEXAMETHASONE SODIUM PHOSPHATE 4 MG/ML
INJECTION, SOLUTION INTRA-ARTICULAR; INTRALESIONAL; INTRAMUSCULAR; INTRAVENOUS; SOFT TISSUE PRN
Status: DISCONTINUED | OUTPATIENT
Start: 2018-11-26 | End: 2018-11-26 | Stop reason: SDUPTHER

## 2018-11-26 RX ORDER — SODIUM CHLORIDE 9 MG/ML
INJECTION, SOLUTION INTRAVENOUS CONTINUOUS PRN
Status: DISCONTINUED | OUTPATIENT
Start: 2018-11-26 | End: 2018-11-26 | Stop reason: SDUPTHER

## 2018-11-26 RX ORDER — HYDROCODONE BITARTRATE AND ACETAMINOPHEN 5; 325 MG/1; MG/1
1-2 TABLET ORAL EVERY 4 HOURS PRN
Qty: 50 TABLET | Refills: 0 | Status: SHIPPED | OUTPATIENT
Start: 2018-11-26 | End: 2018-12-06

## 2018-11-26 RX ORDER — NEOSTIGMINE METHYLSULFATE 1 MG/ML
INJECTION, SOLUTION INTRAVENOUS PRN
Status: DISCONTINUED | OUTPATIENT
Start: 2018-11-26 | End: 2018-11-26 | Stop reason: SDUPTHER

## 2018-11-26 RX ORDER — LABETALOL HYDROCHLORIDE 5 MG/ML
5 INJECTION, SOLUTION INTRAVENOUS EVERY 10 MIN PRN
Status: DISCONTINUED | OUTPATIENT
Start: 2018-11-26 | End: 2018-11-26 | Stop reason: HOSPADM

## 2018-11-26 RX ORDER — FENTANYL CITRATE 50 UG/ML
25 INJECTION, SOLUTION INTRAMUSCULAR; INTRAVENOUS EVERY 5 MIN PRN
Status: DISCONTINUED | OUTPATIENT
Start: 2018-11-26 | End: 2018-11-26 | Stop reason: HOSPADM

## 2018-11-26 RX ORDER — ONDANSETRON 2 MG/ML
INJECTION INTRAMUSCULAR; INTRAVENOUS PRN
Status: DISCONTINUED | OUTPATIENT
Start: 2018-11-26 | End: 2018-11-26 | Stop reason: SDUPTHER

## 2018-11-26 RX ORDER — ONDANSETRON 2 MG/ML
4 INJECTION INTRAMUSCULAR; INTRAVENOUS
Status: DISCONTINUED | OUTPATIENT
Start: 2018-11-26 | End: 2018-11-26 | Stop reason: HOSPADM

## 2018-11-26 RX ORDER — SIMVASTATIN 40 MG
40 TABLET ORAL NIGHTLY
Status: DISCONTINUED | OUTPATIENT
Start: 2018-11-26 | End: 2018-11-28 | Stop reason: HOSPADM

## 2018-11-26 RX ORDER — LIDOCAINE HYDROCHLORIDE 10 MG/ML
INJECTION, SOLUTION INFILTRATION; PERINEURAL PRN
Status: DISCONTINUED | OUTPATIENT
Start: 2018-11-26 | End: 2018-11-26 | Stop reason: SDUPTHER

## 2018-11-26 RX ORDER — ASPIRIN 81 MG/1
81 TABLET ORAL DAILY
Status: DISCONTINUED | OUTPATIENT
Start: 2018-11-26 | End: 2018-11-28 | Stop reason: HOSPADM

## 2018-11-26 RX ORDER — FENTANYL CITRATE 50 UG/ML
INJECTION, SOLUTION INTRAMUSCULAR; INTRAVENOUS PRN
Status: DISCONTINUED | OUTPATIENT
Start: 2018-11-26 | End: 2018-11-26 | Stop reason: SDUPTHER

## 2018-11-26 RX ORDER — PROPOFOL 10 MG/ML
INJECTION, EMULSION INTRAVENOUS PRN
Status: DISCONTINUED | OUTPATIENT
Start: 2018-11-26 | End: 2018-11-26 | Stop reason: SDUPTHER

## 2018-11-26 RX ORDER — FENTANYL CITRATE 50 UG/ML
50 INJECTION, SOLUTION INTRAMUSCULAR; INTRAVENOUS EVERY 5 MIN PRN
Status: DISCONTINUED | OUTPATIENT
Start: 2018-11-26 | End: 2018-11-26 | Stop reason: HOSPADM

## 2018-11-26 RX ORDER — PANTOPRAZOLE SODIUM 40 MG/1
40 TABLET, DELAYED RELEASE ORAL DAILY
Status: DISCONTINUED | OUTPATIENT
Start: 2018-11-26 | End: 2018-11-28 | Stop reason: HOSPADM

## 2018-11-26 RX ORDER — ISOSORBIDE MONONITRATE 30 MG/1
15 TABLET, EXTENDED RELEASE ORAL DAILY
Status: DISCONTINUED | OUTPATIENT
Start: 2018-11-26 | End: 2018-11-28 | Stop reason: HOSPADM

## 2018-11-26 RX ORDER — CEFAZOLIN SODIUM 1 G/3ML
INJECTION, POWDER, FOR SOLUTION INTRAMUSCULAR; INTRAVENOUS PRN
Status: DISCONTINUED | OUTPATIENT
Start: 2018-11-26 | End: 2018-11-26 | Stop reason: SDUPTHER

## 2018-11-26 RX ORDER — GLYCOPYRROLATE 1 MG/5 ML
SYRINGE (ML) INTRAVENOUS PRN
Status: DISCONTINUED | OUTPATIENT
Start: 2018-11-26 | End: 2018-11-26 | Stop reason: SDUPTHER

## 2018-11-26 RX ADMIN — HYDROCODONE BITARTRATE AND ACETAMINOPHEN 1 TABLET: 5; 325 TABLET ORAL at 10:01

## 2018-11-26 RX ADMIN — SIMVASTATIN 40 MG: 40 TABLET, FILM COATED ORAL at 20:21

## 2018-11-26 RX ADMIN — MAGNESIUM GLUCONATE 500 MG ORAL TABLET 400 MG: 500 TABLET ORAL at 17:07

## 2018-11-26 RX ADMIN — LIDOCAINE HYDROCHLORIDE 50 MG: 10 INJECTION, SOLUTION INFILTRATION; PERINEURAL at 12:42

## 2018-11-26 RX ADMIN — FENTANYL CITRATE 50 MCG: 50 INJECTION INTRAMUSCULAR; INTRAVENOUS at 13:57

## 2018-11-26 RX ADMIN — PHENYLEPHRINE HYDROCHLORIDE 100 MCG: 10 INJECTION INTRAVENOUS at 13:16

## 2018-11-26 RX ADMIN — ONDANSETRON HYDROCHLORIDE 4 MG: 4 INJECTION, SOLUTION INTRAMUSCULAR; INTRAVENOUS at 13:44

## 2018-11-26 RX ADMIN — Medication 0.8 MG: at 13:45

## 2018-11-26 RX ADMIN — CEFAZOLIN 2000 MG: 1 INJECTION, POWDER, FOR SOLUTION INTRAMUSCULAR; INTRAVENOUS; PARENTERAL at 12:51

## 2018-11-26 RX ADMIN — Medication 2 G: at 20:22

## 2018-11-26 RX ADMIN — DEXAMETHASONE SODIUM PHOSPHATE 6 MG: 4 INJECTION, SOLUTION INTRAMUSCULAR; INTRAVENOUS at 12:51

## 2018-11-26 RX ADMIN — DOCUSATE SODIUM 100 MG: 100 CAPSULE, LIQUID FILLED ORAL at 20:22

## 2018-11-26 RX ADMIN — FENTANYL CITRATE 50 MCG: 50 INJECTION INTRAMUSCULAR; INTRAVENOUS at 12:42

## 2018-11-26 RX ADMIN — HYDROCODONE BITARTRATE AND ACETAMINOPHEN 2 TABLET: 5; 325 TABLET ORAL at 04:02

## 2018-11-26 RX ADMIN — FENTANYL CITRATE 50 MCG: 50 INJECTION INTRAMUSCULAR; INTRAVENOUS at 12:51

## 2018-11-26 RX ADMIN — PHENYLEPHRINE HYDROCHLORIDE 100 MCG: 10 INJECTION INTRAVENOUS at 13:23

## 2018-11-26 RX ADMIN — ISOSORBIDE MONONITRATE 15 MG: 30 TABLET ORAL at 17:07

## 2018-11-26 RX ADMIN — METOPROLOL SUCCINATE 50 MG: 50 TABLET, EXTENDED RELEASE ORAL at 17:07

## 2018-11-26 RX ADMIN — ROCURONIUM BROMIDE 40 MG: 10 INJECTION INTRAVENOUS at 12:42

## 2018-11-26 RX ADMIN — NEOSTIGMINE METHYLSULFATE 4 MG: 1 INJECTION, SOLUTION INTRAVENOUS at 13:45

## 2018-11-26 RX ADMIN — FENTANYL CITRATE 50 MCG: 50 INJECTION INTRAMUSCULAR; INTRAVENOUS at 14:40

## 2018-11-26 RX ADMIN — PHENYLEPHRINE HYDROCHLORIDE 100 MCG: 10 INJECTION INTRAVENOUS at 13:42

## 2018-11-26 RX ADMIN — PANTOPRAZOLE SODIUM 40 MG: 40 TABLET, DELAYED RELEASE ORAL at 20:21

## 2018-11-26 RX ADMIN — SACUBITRIL AND VALSARTAN 1 TABLET: 49; 51 TABLET, FILM COATED ORAL at 20:21

## 2018-11-26 RX ADMIN — SODIUM CHLORIDE: 9 INJECTION, SOLUTION INTRAVENOUS at 12:36

## 2018-11-26 RX ADMIN — HYDROMORPHONE HYDROCHLORIDE 0.5 MG: 1 INJECTION, SOLUTION INTRAMUSCULAR; INTRAVENOUS; SUBCUTANEOUS at 15:44

## 2018-11-26 RX ADMIN — FENTANYL CITRATE 50 MCG: 50 INJECTION INTRAMUSCULAR; INTRAVENOUS at 14:30

## 2018-11-26 RX ADMIN — PHENYLEPHRINE HYDROCHLORIDE 100 MCG: 10 INJECTION INTRAVENOUS at 13:26

## 2018-11-26 RX ADMIN — PROPOFOL 120 MG: 10 INJECTION, EMULSION INTRAVENOUS at 12:42

## 2018-11-26 ASSESSMENT — PULMONARY FUNCTION TESTS
PIF_VALUE: 19
PIF_VALUE: 19
PIF_VALUE: 1
PIF_VALUE: 2
PIF_VALUE: 18
PIF_VALUE: 19
PIF_VALUE: 18
PIF_VALUE: 19
PIF_VALUE: 19
PIF_VALUE: 15
PIF_VALUE: 18
PIF_VALUE: 19
PIF_VALUE: 1
PIF_VALUE: 1
PIF_VALUE: 18
PIF_VALUE: 19
PIF_VALUE: 19
PIF_VALUE: 21
PIF_VALUE: 18
PIF_VALUE: 19
PIF_VALUE: 18
PIF_VALUE: 18
PIF_VALUE: 19
PIF_VALUE: 2
PIF_VALUE: 19
PIF_VALUE: 2
PIF_VALUE: 19
PIF_VALUE: 20
PIF_VALUE: 21
PIF_VALUE: 18
PIF_VALUE: 19
PIF_VALUE: 19
PIF_VALUE: 20
PIF_VALUE: 18
PIF_VALUE: 18
PIF_VALUE: 19
PIF_VALUE: 4
PIF_VALUE: 19
PIF_VALUE: 4
PIF_VALUE: 19
PIF_VALUE: 19
PIF_VALUE: 20
PIF_VALUE: 19
PIF_VALUE: 18
PIF_VALUE: 15
PIF_VALUE: 19
PIF_VALUE: 19
PIF_VALUE: 18
PIF_VALUE: 19
PIF_VALUE: 18
PIF_VALUE: 18
PIF_VALUE: 2
PIF_VALUE: 19
PIF_VALUE: 19
PIF_VALUE: 20
PIF_VALUE: 19
PIF_VALUE: 19
PIF_VALUE: 4
PIF_VALUE: 18
PIF_VALUE: 2
PIF_VALUE: 1
PIF_VALUE: 1
PIF_VALUE: 19
PIF_VALUE: 29
PIF_VALUE: 1
PIF_VALUE: 19

## 2018-11-26 ASSESSMENT — PAIN DESCRIPTION - DESCRIPTORS
DESCRIPTORS: ACHING

## 2018-11-26 ASSESSMENT — PAIN DESCRIPTION - PAIN TYPE
TYPE: SURGICAL PAIN
TYPE: ACUTE PAIN

## 2018-11-26 ASSESSMENT — PAIN SCALES - GENERAL
PAINLEVEL_OUTOF10: 8
PAINLEVEL_OUTOF10: 6
PAINLEVEL_OUTOF10: 2
PAINLEVEL_OUTOF10: 4
PAINLEVEL_OUTOF10: 7
PAINLEVEL_OUTOF10: 2
PAINLEVEL_OUTOF10: 7
PAINLEVEL_OUTOF10: 6
PAINLEVEL_OUTOF10: 2
PAINLEVEL_OUTOF10: 8

## 2018-11-26 ASSESSMENT — PAIN DESCRIPTION - ONSET
ONSET: ON-GOING
ONSET: ON-GOING
ONSET: UNABLE TO TELL

## 2018-11-26 ASSESSMENT — PAIN DESCRIPTION - ORIENTATION
ORIENTATION: LOWER
ORIENTATION: LEFT
ORIENTATION: LEFT

## 2018-11-26 ASSESSMENT — PAIN DESCRIPTION - LOCATION
LOCATION: BACK
LOCATION: ARM

## 2018-11-26 ASSESSMENT — PAIN DESCRIPTION - FREQUENCY: FREQUENCY: CONTINUOUS

## 2018-11-26 NOTE — CONSULTS
includes Cancer in her brother and father; Heart Disease in her brother, mother, and sister; High Blood Pressure in her mother. REVIEW OF SYSTEMS:    Constitutional: negative for anorexia, chills and fevers,weight change  Respiratory: negative for cough, dyspnea on exertion, hemoptysis, shortness of breath and wheezing  Cardiovascular: negative for  orthopnea, palpitations and syncope. Gastrointestinal: negative for abdominal pain,nausea , vomiting, constipation, diarrhea. Hematologic/lymphatic: negative for bruising,prolonged bleeding,blood clots  Musculoskeletal:negative for muscle weakness, myalgias,wasting  Neurological: negative for coordination problems, dizziness, gait problems and vertigo  Behavioral/Psych:negative for mood/sleep disturbance      PHYSICAL EXAM:  Vitals:Patient Vitals for the past 24 hrs:   BP Temp Temp src Pulse Resp SpO2 Height Weight   11/26/18 0828 (!) 150/67 98 °F (36.7 °C) Oral 70 16 94 % - -   11/26/18 0400 (!) 151/72 98.1 °F (36.7 °C) Oral 71 16 94 % - -   11/25/18 2345 (!) 156/82 98.4 °F (36.9 °C) Oral 76 16 96 % - -   11/25/18 2139 (!) 177/84 98.2 °F (36.8 °C) Oral 74 20 96 % 5' 4\" (1.626 m) 197 lb 9 oz (89.6 kg)   11/25/18 1956 (!) 160/70 - - 74 20 98 % - -   11/25/18 1816 (!) 158/72 98.1 °F (36.7 °C) Oral 79 20 96 % 5' 4\" (1.626 m) 190 lb (86.2 kg)       Last 3 weights: Wt Readings from Last 3 Encounters:   11/25/18 197 lb 9 oz (89.6 kg)   09/10/18 195 lb 9.6 oz (88.7 kg)   08/02/18 198 lb 6.4 oz (90 kg)     24 hour intake/output:  Intake/Output Summary (Last 24 hours) at 11/26/18 0851  Last data filed at 11/26/18 0414   Gross per 24 hour   Intake              200 ml   Output                0 ml   Net              200 ml     BMI:Body mass index is 33.91 kg/m².     General Appearance: alert and oriented to person, place and time, well developed and well- nourished, in no acute distress  Skin: warm and dry, no rash or erythema  Eyes: pupils equal, round, and reactive to light,

## 2018-11-26 NOTE — ED NOTES
Reassessment of the patients Fall and Shoulder Injury (left)   is unchanged, the patients pain reassessment is a 5/10, Side rails up times 2, call light in reach, will continue to monitor.      Paris Frey RN  11/25/18 8537

## 2018-11-26 NOTE — H&P
3/6/18  Yes Kevyn Stanley MD   calcium-vitamin D (OSCAL-500) 500-200 MG-UNIT per tablet Take 1 tablet by mouth 2 times daily 3/6/18  Yes Kevyn Stanley MD   aspirin 81 MG EC tablet Take 1 tablet by mouth daily Stop 5 days before any planned intervention for compression fractures 3/7/18  Yes Kevyn Stanley MD    Scheduled Meds:   sodium chloride flush  10 mL Intravenous 2 times per day     Continuous Infusions:  PRN Meds:.sodium chloride flush, acetaminophen, magnesium hydroxide, ondansetron, HYDROcodone 5 mg - acetaminophen **OR** HYDROcodone 5 mg - acetaminophen, HYDROmorphone **OR** HYDROmorphone    Allergies:  Ciprofloxacin; Neomycin; Pcn [penicillins]; Zanaflex [tizanidine hcl]; and Levaquin [levofloxacin]    Social History:   Social History     Social History    Marital status:      Spouse name: N/A    Number of children: N/A    Years of education: N/A     Social History Main Topics    Smoking status: Former Smoker     Packs/day: 0.50     Years: 40.00     Types: Cigarettes     Quit date: 1/1/1998    Smokeless tobacco: Never Used    Alcohol use 1.2 oz/week     2 Standard drinks or equivalent per week      Comment: occasional     Drug use: No    Sexual activity: No     Other Topics Concern    None     Social History Narrative    None     History   Smoking Status    Former Smoker    Packs/day: 0.50    Years: 40.00    Types: Cigarettes    Quit date: 1/1/1998   Smokeless Tobacco    Never Used     History   Alcohol Use    1.2 oz/week    2 Standard drinks or equivalent per week     Comment: occasional      History   Drug Use No       Family History:  Family History   Problem Relation Age of Onset    Heart Disease Mother     High Blood Pressure Mother     Cancer Father     Heart Disease Sister     Cancer Brother     Heart Disease Brother        REVIEW OF SYSTEMS:  Constitutional: Denies any fever, chills. Derm: Denies any rash or skin color change.   Eyes: Denies blurred or  11/25/2018    CO2 30 11/25/2018    BUN 24 11/25/2018    CREATININE 0.5 11/25/2018    CALCIUM 9.4 11/25/2018    GLUCOSE 118 11/25/2018     PT/INR:    Lab Results   Component Value Date    INR 0.95 06/11/2018     Troponin:    Lab Results   Component Value Date    TROPONINI <0.006 07/16/2012     No results for input(s): LIPASE, AMYLASE in the last 72 hours. No results for input(s): AST, ALT, BILIDIR, BILITOT, ALKPHOS in the last 72 hours. Radiology:  Ct Abdomen Pelvis Wo Contrast Additional Contrast? None    Result Date: 11/25/2018  PROCEDURE: CT CHEST WO CONTRAST, CT ABDOMEN PELVIS WO CONTRAST CLINICAL INFORMATION: fall. Chelsea Lorri. Hit head. Left arm pain. Back pain. COMPARISON: CT chest 1/26/2018. CT abdomen and pelvis 11/9/2017. CTA chest 5/27/2009. TECHNIQUE: 5 mm axial images were obtained through the chest, abdomen and pelvis without intravenous contrast. Coronal and sagittal reconstructions were obtained through the chest, abdomen and pelvis. All CT scans at this facility use dose modulation, iterative reconstruction, and/or weight-based dosing when appropriate to reduce radiation dose to as low as reasonably achievable. FINDINGS: In the chest,  there is cardiomegaly. The patient has a pacemaker. There are coronary artery calcifications. The ascending aorta measures 3.8 cm. This is stable. The main pulmonary artery measures 3.4 cm. This is mildly dilated, but stable. There is a stable oval shaped prevascular lesion measuring 2.5 x 1.6 cm. There is no axillary adenopathy. There are calcified subcarinal lymph nodes. There is no pericardial or pleural effusion. No pulmonary infiltrates are present. No pulmonary masses are noted. There is an old healed fracture of the sternum. There is an old compression fracture of T12. In the abdomen, the liver is grossly normal. The spleen is normal. There is stable mild fullness of the left adrenal gland.  The right adrenal gland is normal. The pancreas is normal. The problems. *       PLAN as discussed with Dr. French Organ:  OR today with Dr. Gillian Bullock for ORIF left distal humerus. Discussed with the patient and family and they consent to surgery. Will have Dr. Elvin Raygoza provide cardiac risk for surgery, patient has an ICD. Continue NPO, pain control, and medical management. Hold anticoagulants. Okay for blood draws in the foot.        Electronically signed by JADEN Thomas CNP on 11/26/2018 at 7:30 AM

## 2018-11-26 NOTE — ANESTHESIA PRE PROCEDURE
Department of Anesthesiology  Preprocedure Note       Name:  Dalia Mcgarry   Age:  77 y.o.  :  1940                                          MRN:  [id number]         Date:  2018      Surgeon: Lazaro Villegas):  Matthew Martinez MD    Procedure: Procedure(s):  HUMERUS OPEN REDUCTION INTERNAL FIXATION    Medications prior to admission:   Prior to Admission medications    Medication Sig Start Date End Date Taking? Authorizing Provider   traMADol (ULTRAM) 50 MG tablet Take 1 tablet by mouth 2 times daily for 60 days. . 18  Ana Grace MD   metoprolol succinate (TOPROL XL) 50 MG extended release tablet Take 1 tablet by mouth daily 18   Boo Banks MD   pantoprazole (PROTONIX) 40 MG tablet Take 40 mg by mouth daily    Historical Provider, MD   sacubitril-valsartan (ENTRESTO) 49-51 MG per tablet Take 1 tablet by mouth 2 times daily 18   Boo Banks MD   baclofen (LIORESAL) 10 MG tablet Take 0.5 tablets by mouth 3 times daily 18   Ana Grace MD   magnesium oxide (MAG-OX) 400 (241.3 Mg) MG TABS tablet Take 1 tablet by mouth daily 6/15/18   Sunni Craven MD   calcitonin (MIACALCIN) 200 UNIT/ACT nasal spray 1 spray by Nasal route daily 18   Ana Grace MD   acetaminophen (TYLENOL) 325 MG tablet Take 2 tablets by mouth every 6 hours 3/6/18   Martine Lenz MD   isosorbide mononitrate (IMDUR) 30 MG extended release tablet Take 0.5 tablets by mouth daily 3/6/18   Martine Lnez MD   simvastatin (ZOCOR) 40 MG tablet TAKE 1 TABLET BY MOUTH EVERY EVENING 3/6/18   Martine Lenz MD   docusate sodium (COLACE, DULCOLAX) 100 MG CAPS Take 100 mg by mouth 2 times daily as needed for Constipation 3/6/18   Martine Lenz MD   calcium-vitamin D (OSCAL-500) 500-200 MG-UNIT per tablet Take 1 tablet by mouth 2 times daily 3/6/18   Martine Lenz MD   aspirin 81 MG EC tablet Take 1 tablet by mouth daily Stop 5 days before any planned intervention for 0.5 mg Intravenous Q3H PRN Benedicto Cano MD           Allergies:     Allergies   Allergen Reactions    Ciprofloxacin Itching    Neomycin     Pcn [Penicillins] Hives     Pt reports having reaction 50 years ago    Zanaflex [Tizanidine Hcl] Other (See Comments)     Causes confusion    Levaquin [Levofloxacin] Itching     Benadryl was given for tx       Problem List:    Patient Active Problem List   Diagnosis Code    Hyperlipidemia E78.5    Stress incontinence, female N39.3    Breast CA ([facility] C50.919    CAD (coronary artery disease) s/p PCI and stent Multilink 3 x 18  mm mid LAD- in 07/2009 at [facility] I25.10    Obstructive sleep apnea on CPAP G47.33, Z99.89    COPD (chronic obstructive pulmonary disease) ([facility]) J44.9    S/P ICD (internal cardiac defibrillator) procedure Z95.810    [facility] I42.8    Class 2 obesity due to excess calories with serious comorbidity and body mass index (BMI) of 35.0 to 35.9 in adult [id number]    Closed compression fracture of first lumbar vertebra ([facility]) S32.010A    Hypertension I10    Adjustment disorder with mixed anxiety and depressed mood F43.23    Osteopenia determined by x-ray M85.80    Osteoporosis of vertebra M81.0    Severe malnutrition ([facility]) E43    Closed displaced oblique fracture of shaft of left humerus A07.424U       Past Medical History:        Diagnosis Date    Arthritis     general    Breast CA ([facility] 12/03    right    CAD (coronary artery disease) 2009    stent in [place]    CHF (congestive heart failure) ([facility])     Closed compression fracture of first lumbar vertebra ([facility] 2/7/2018    Colon polyps 8/97; 3/11    COPD (chronic obstructive pulmonary disease) ([facility] 8/5/2017    Diverticulosis 1/06    Erosive gastritis 11/06    Esophageal stricture 03/2011    Three times, Dr. Raj Sexton    Hyperlipidemia     Hypertension     Internal hemorrhoid 1/06    LUISA on CPAP     Osteopenia determined by x-ray 1999    Osteoporosis of

## 2018-11-27 LAB
HCT VFR BLD CALC: 33.6 % (ref 37–47)
HEMOGLOBIN: 11.8 GM/DL (ref 12–16)

## 2018-11-27 PROCEDURE — 6370000000 HC RX 637 (ALT 250 FOR IP): Performed by: ORTHOPAEDIC SURGERY

## 2018-11-27 PROCEDURE — G8988 SELF CARE GOAL STATUS: HCPCS

## 2018-11-27 PROCEDURE — G8987 SELF CARE CURRENT STATUS: HCPCS

## 2018-11-27 PROCEDURE — 97110 THERAPEUTIC EXERCISES: CPT

## 2018-11-27 PROCEDURE — 97116 GAIT TRAINING THERAPY: CPT

## 2018-11-27 PROCEDURE — 97166 OT EVAL MOD COMPLEX 45 MIN: CPT

## 2018-11-27 PROCEDURE — 6370000000 HC RX 637 (ALT 250 FOR IP): Performed by: INTERNAL MEDICINE

## 2018-11-27 PROCEDURE — G8979 MOBILITY GOAL STATUS: HCPCS

## 2018-11-27 PROCEDURE — 2580000003 HC RX 258: Performed by: ORTHOPAEDIC SURGERY

## 2018-11-27 PROCEDURE — 85018 HEMOGLOBIN: CPT

## 2018-11-27 PROCEDURE — 6360000002 HC RX W HCPCS: Performed by: NURSE PRACTITIONER

## 2018-11-27 PROCEDURE — G8978 MOBILITY CURRENT STATUS: HCPCS

## 2018-11-27 PROCEDURE — 36415 COLL VENOUS BLD VENIPUNCTURE: CPT

## 2018-11-27 PROCEDURE — 85014 HEMATOCRIT: CPT

## 2018-11-27 PROCEDURE — 97530 THERAPEUTIC ACTIVITIES: CPT

## 2018-11-27 PROCEDURE — 97163 PT EVAL HIGH COMPLEX 45 MIN: CPT

## 2018-11-27 PROCEDURE — 6360000002 HC RX W HCPCS: Performed by: ORTHOPAEDIC SURGERY

## 2018-11-27 PROCEDURE — 1200000000 HC SEMI PRIVATE

## 2018-11-27 RX ORDER — MECLIZINE HCL 12.5 MG/1
25 TABLET ORAL 3 TIMES DAILY PRN
Status: DISCONTINUED | OUTPATIENT
Start: 2018-11-27 | End: 2018-11-28 | Stop reason: HOSPADM

## 2018-11-27 RX ORDER — TRAMADOL HYDROCHLORIDE 50 MG/1
50 TABLET ORAL EVERY 6 HOURS PRN
Status: DISCONTINUED | OUTPATIENT
Start: 2018-11-27 | End: 2018-11-28 | Stop reason: HOSPADM

## 2018-11-27 RX ADMIN — MAGNESIUM GLUCONATE 500 MG ORAL TABLET 400 MG: 500 TABLET ORAL at 09:28

## 2018-11-27 RX ADMIN — METOPROLOL SUCCINATE 50 MG: 50 TABLET, EXTENDED RELEASE ORAL at 09:28

## 2018-11-27 RX ADMIN — ISOSORBIDE MONONITRATE 15 MG: 30 TABLET ORAL at 09:28

## 2018-11-27 RX ADMIN — PANTOPRAZOLE SODIUM 40 MG: 40 TABLET, DELAYED RELEASE ORAL at 09:28

## 2018-11-27 RX ADMIN — SIMVASTATIN 40 MG: 40 TABLET, FILM COATED ORAL at 22:02

## 2018-11-27 RX ADMIN — TRAMADOL HYDROCHLORIDE 50 MG: 50 TABLET, FILM COATED ORAL at 22:04

## 2018-11-27 RX ADMIN — SACUBITRIL AND VALSARTAN 1 TABLET: 49; 51 TABLET, FILM COATED ORAL at 22:02

## 2018-11-27 RX ADMIN — ACETAMINOPHEN 650 MG: 325 TABLET ORAL at 12:19

## 2018-11-27 RX ADMIN — Medication 2 G: at 03:37

## 2018-11-27 RX ADMIN — TRAMADOL HYDROCHLORIDE 50 MG: 50 TABLET, FILM COATED ORAL at 14:42

## 2018-11-27 RX ADMIN — DOCUSATE SODIUM 100 MG: 100 CAPSULE, LIQUID FILLED ORAL at 09:28

## 2018-11-27 RX ADMIN — ENOXAPARIN SODIUM 40 MG: 40 INJECTION SUBCUTANEOUS at 14:42

## 2018-11-27 RX ADMIN — Medication 10 ML: at 22:02

## 2018-11-27 RX ADMIN — SACUBITRIL AND VALSARTAN 1 TABLET: 49; 51 TABLET, FILM COATED ORAL at 09:28

## 2018-11-27 RX ADMIN — ASPIRIN 81 MG: 81 TABLET ORAL at 09:28

## 2018-11-27 ASSESSMENT — PAIN DESCRIPTION - ORIENTATION
ORIENTATION: LEFT
ORIENTATION: LOWER;LEFT
ORIENTATION: LEFT

## 2018-11-27 ASSESSMENT — PAIN DESCRIPTION - PROGRESSION: CLINICAL_PROGRESSION: NOT CHANGED

## 2018-11-27 ASSESSMENT — PAIN SCALES - GENERAL
PAINLEVEL_OUTOF10: 2
PAINLEVEL_OUTOF10: 4
PAINLEVEL_OUTOF10: 2
PAINLEVEL_OUTOF10: 4

## 2018-11-27 ASSESSMENT — PAIN DESCRIPTION - FREQUENCY
FREQUENCY: CONTINUOUS
FREQUENCY: CONTINUOUS

## 2018-11-27 ASSESSMENT — PAIN DESCRIPTION - LOCATION
LOCATION: BACK;SHOULDER
LOCATION: SHOULDER;BACK
LOCATION: SHOULDER;BACK

## 2018-11-27 ASSESSMENT — PAIN DESCRIPTION - PAIN TYPE
TYPE: SURGICAL PAIN;CHRONIC PAIN
TYPE: SURGICAL PAIN
TYPE: SURGICAL PAIN

## 2018-11-27 ASSESSMENT — PAIN DESCRIPTION - DESCRIPTORS
DESCRIPTORS: ACHING;SHARP
DESCRIPTORS: ACHING;SHARP

## 2018-11-27 NOTE — PROGRESS NOTES
able  and imporve strength and safety with gait. Prognosis: Good    Clinical Presentation: High - Unstable with Unpredictable Characteristics: Pt tested positive for joseph red pike on right , negative on left . Pt was only able to tolerate a partial epley to treat due to inability for pt to roll on left side/shoulder due to fx and ORIF. Pt is unsteady with mobility and requires skilled PT to treat the BPPV as able  and imporve strength and safety with gait.:    Decision Making: High Complexitybased on patient assessment and decision making process of determining plan of care and establishing reasonable expectations for measurable functional outcomes    REQUIRES PT FOLLOW UP: Yes    Discharge Recommendations:  Discharge Recommendations: Patient would benefit from continued therapy after discharge, [address]    Patient Education:  Patient Education: POC, positive joseph red right     Equipment Recommendations:  Equipment Needed: No    Safety:  Type of devices: All fall risk precautions in place, Left in chair, Call light within reach, Nurse notified, Gait belt, Chair alarm in place  Restraints  Initially in place: No    Plan:  Times per week: 6 X Vest/O  Times per day: Daily  Specific instructions for Next Treatment: ther ex, balance, mobility, gait, BVPPV treatment  for positive right ear joseph red pike- partial EPley as tolerated -instead of placing pt off edge of bed ,raise lower end of bed in trendelenburg-unable then to complete by lying onto left shoulder.   Current Treatment Recommendations: Strengthening, Balance Training, Functional Mobility Training, Transfer Training, Gait Training, Stair training, Endurance Training (BPPV treatment as able)    Goals:  Patient goals : Go to the [facility] for rehab  Short term goals  Time Frame for Short term goals: 1 week   Short term goal 1: supine to sit and return with CGA to get in and out of bed   Short term goal 2: sit to stand with CGA to get on and off various surfaces  Short term goal 3: ambulate with hemiwalker or cane and CGA 50 feet to walk household distances   Short term goal 4: ascend/descend 2 steps with 1 HR and CGA to enter home  Long term goals  Time Frame for Long term goals : NA due to short ELOS    Evaluation Complexity: Based on the findings of patient history, examination, clinical presentation, and decision making during this evaluation, the evaluation of Praneeth Britt  is of high complexity. PT G-Codes  Functional Limitation: Mobility: Walking and moving around  Mobility: Walking and Moving Around Current Status (): At least 40 percent but less than 60 percent impaired, limited or restricted  Mobility: Walking and Moving Around Goal Status ():  At least 20 percent but less than 40 percent impaired, limited or restricted       AM-PAC Inpatient Mobility without Stair Climbing Raw Score : 14  AM-PAC Inpatient without Stair Climbing T-Scale Score : 40.85  Mobility Inpatient CMS 0-100% Score: 53.33  Mobility Inpatient without Stair CMS G-Code Modifier : CK

## 2018-11-27 NOTE — PROGRESS NOTES
AROM: WNL    LUE Strength  L Hand Grasp: 4-/5  LUE Strength Comment: NT secondary to shoulder NWB precaution. LUE Edema - Circumference (cm)  LUE Edema Present?: Yes (mild edema noted in fingers)    RUE Strength  R Hand Grasp: 4/5  RUE Strength Comment: 3+/5 deltoid; 3+/5 pectoral; 4-/5 biceps/triceps                               Movements Are Fluid And Coordinated: No  Coordination and Movement description: Left UE (mild shakiness in her L hand)     Fine Motor Skills  Fine Motor Comment: Pt could doing simple tasks with B hands such as looking up files on a smart phone while holding it in her L hand         ADL  Toileting: Minimal assistance (help needed for clothing management)     Bed mobility  Supine to Sit: Unable to assess  Sit to Supine: Unable to assess    Transfers  Sit to stand: Minimal assistance (from the recliner chair)  Stand to sit: Contact guard assistance (to the chair with cues to sit slowly as pt tended to \"plop\" while sitting)  Toilet Transfers  Toilet - Technique: Ambulating  Equipment Used: Grab bars  Toilet Transfer: Minimal assistance    Balance  Sitting Balance: Stand by assistance  Standing Balance: Contact guard assistance (preparing to walk with pt reporting some lightheadedness at first)     Time: 30 seconds  Activity: preparing to walk     Functional Mobility  Functional - Mobility Device: Other (small based quad cane)  Activity: To/from bathroom  Assist Level: Contact guard assistance  Functional Mobility Comments: Pt had a slow pace but no LOB and an even gait shown. Activity Tolerance:  Activity Tolerance: Patient limited by fatigue, Patient limited by pain  Activity Tolerance: Pt was standing for washing her hands. She had SOB after having returned from the bathroom. She remained in the chair with her feet down at the end of session.       Treatment Initiated:  Pt washed her hands at the sink with CGA for working the soap dispenser. She walked from the bathroom with CGA using a quad cane. She sat in the chair and scooted back in the seat with CGA. Verbal cues needed for sitting slowly. Encouraged pt to exercise her L hand and to keep her LUE on a pillow whenever she is in a chair or the bed. Pt had an incentive spirometer and she was encouraged to use it each hour whenever she thinks of it. Assessment:  Assessment: Patient would benefit from continued skilled OT services to address above deficits. She presents with decreased endurance, functional mobility and ROM/strength in her LUE secondary to shoulder surgery to repair oblique fx. She was using a straight cane at times prior to admission while walking. She used a quad cane with CGA at this time. She also needed help for the clothing management while using the bathroom. She was doing her own self care independently prior to admission. Performance deficits / Impairments: Decreased functional mobility , Decreased ADL status, Decreased endurance, Decreased ROM, Decreased strength, Decreased balance, Decreased safe awareness  Prognosis: Good    Clinical Decision Making: Clinical Decision making was of Moderate Complexity as the result of analysis of data from a detailed assessment, a consideration of several treatment options, the presence of comorbidities affecting the plan of care and the need for minimal to moderate modifications or assistance required to complete the evaluation. Discharge Recommendations:  Discharge Recommendations: Continue to assess pending progress, Patient would benefit from continued therapy after discharge, Subacute/Skilled Nursing Facility    Patient Education:  Patient Education: OT POC; pt's goal; strategies for keeping her head still while doing self care and minimizing any dizziness; doing the incentive spirometer as she can tolerate to help with taking deep breaths.       Equipment Recommendations:  Equipment Needed: No    Safety:  Safety Devices in place: Yes  Type of devices: Left in chair, Call light within reach, Chair alarm in place, Patient at risk for falls, Nurse notified, Gait belt    Plan:  Times per week: 6x  Current Treatment Recommendations: ROM, Self-Care / ADL, Endurance Training, Balance Training, Functional Mobility Training  Plan Comment: Pt would benefit from continued OT when medically stable and discharged to [facility]. Specific instructions for Next Treatment: Functional mobility; ADLs and adaptations as needed; standing activity and safety; LUE ROM exercises as able to tolerate    Goals:  Patient goals : \"I want to get healed up and be able to return to doing my own dressing, bathing and cooking. \" pt states. Short term goals  Time Frame for Short term goals: 1 week  Short term goal 1: Pt will demonstrate functional mobility walking to/from the bathroom with SBA while using any AD needed to prepare for doing sinkside grooming. Short term goal 2: Pt will complete lower body ADLs while using any Adaptive equipment needed to increase her independence with self care. Short term goal 3: Pt will complete standing ADLs for over 4 minute duration while using B hands with verbal cues for safety to increase her endurance for ease of spongebathing and dressing. Short term goal 4: Pt will complete L  hand/wrist and forearm ROM exercises as able to tolerate to increase her pain free movement for ease of doing self care. Long term goals  Time Frame for Long term goals : None secondary to short estimated length of stay. Evaluation Complexity: Based on the findings of patient history, examination, clinical presentation, and decision making during this evaluation, this patient is of medium complexity. OT G-codes  Functional Limitation: Self care  Self Care Current Status ():  At least 40 percent but less than 60 percent impaired, limited or restricted  Self Care Goal Status ():  At least 20 percent but less than 40 percent impaired, limited or restricted  AM-PAC Inpatient Daily Activity Raw Score: 17  AM-PAC Inpatient ADL T-Scale Score : 37.26  ADL Inpatient CMS 0-100% Score: 50.11  ADL Inpatient CMS G-Code Modifier : CK

## 2018-11-28 VITALS
RESPIRATION RATE: 16 BRPM | SYSTOLIC BLOOD PRESSURE: 167 MMHG | HEIGHT: 64 IN | OXYGEN SATURATION: 96 % | TEMPERATURE: 98.1 F | DIASTOLIC BLOOD PRESSURE: 81 MMHG | WEIGHT: 197.56 LBS | HEART RATE: 73 BPM | BODY MASS INDEX: 33.73 KG/M2

## 2018-11-28 PROCEDURE — 2580000003 HC RX 258: Performed by: ORTHOPAEDIC SURGERY

## 2018-11-28 PROCEDURE — 97110 THERAPEUTIC EXERCISES: CPT

## 2018-11-28 PROCEDURE — 6370000000 HC RX 637 (ALT 250 FOR IP): Performed by: ORTHOPAEDIC SURGERY

## 2018-11-28 PROCEDURE — 6370000000 HC RX 637 (ALT 250 FOR IP): Performed by: INTERNAL MEDICINE

## 2018-11-28 PROCEDURE — 97116 GAIT TRAINING THERAPY: CPT

## 2018-11-28 PROCEDURE — 95992 CANALITH REPOSITIONING PROC: CPT

## 2018-11-28 RX ORDER — POLYETHYLENE GLYCOL 3350 17 G/17G
17 POWDER, FOR SOLUTION ORAL DAILY
Status: DISCONTINUED | OUTPATIENT
Start: 2018-11-28 | End: 2018-11-28 | Stop reason: HOSPADM

## 2018-11-28 RX ORDER — POLYETHYLENE GLYCOL 3350 17 G/17G
17 POWDER, FOR SOLUTION ORAL DAILY
Qty: 527 G | Refills: 1 | Status: SHIPPED | OUTPATIENT
Start: 2018-11-28 | End: 2018-12-28

## 2018-11-28 RX ADMIN — MAGNESIUM GLUCONATE 500 MG ORAL TABLET 400 MG: 500 TABLET ORAL at 08:49

## 2018-11-28 RX ADMIN — DOCUSATE SODIUM 100 MG: 100 CAPSULE, LIQUID FILLED ORAL at 08:49

## 2018-11-28 RX ADMIN — Medication 10 ML: at 10:07

## 2018-11-28 RX ADMIN — ISOSORBIDE MONONITRATE 15 MG: 30 TABLET ORAL at 08:49

## 2018-11-28 RX ADMIN — ASPIRIN 81 MG: 81 TABLET ORAL at 08:49

## 2018-11-28 RX ADMIN — SACUBITRIL AND VALSARTAN 1 TABLET: 49; 51 TABLET, FILM COATED ORAL at 08:49

## 2018-11-28 RX ADMIN — TRAMADOL HYDROCHLORIDE 50 MG: 50 TABLET, FILM COATED ORAL at 04:17

## 2018-11-28 RX ADMIN — PANTOPRAZOLE SODIUM 40 MG: 40 TABLET, DELAYED RELEASE ORAL at 08:49

## 2018-11-28 RX ADMIN — TRAMADOL HYDROCHLORIDE 50 MG: 50 TABLET, FILM COATED ORAL at 10:10

## 2018-11-28 RX ADMIN — POLYETHYLENE GLYCOL 3350 17 G: 17 POWDER, FOR SOLUTION ORAL at 10:07

## 2018-11-28 RX ADMIN — METOPROLOL SUCCINATE 50 MG: 50 TABLET, EXTENDED RELEASE ORAL at 08:49

## 2018-11-28 ASSESSMENT — PAIN SCALES - GENERAL
PAINLEVEL_OUTOF10: 6
PAINLEVEL_OUTOF10: 7

## 2018-11-28 ASSESSMENT — PAIN DESCRIPTION - LOCATION
LOCATION: ARM
LOCATION: ARM

## 2018-11-28 ASSESSMENT — PAIN DESCRIPTION - ORIENTATION
ORIENTATION: LEFT
ORIENTATION: LEFT

## 2018-11-28 ASSESSMENT — PAIN DESCRIPTION - PAIN TYPE
TYPE: SURGICAL PAIN
TYPE: SURGICAL PAIN

## 2018-11-28 ASSESSMENT — PAIN SCALES - WONG BAKER: WONGBAKER_NUMERICALRESPONSE: 4

## 2018-11-28 ASSESSMENT — PAIN DESCRIPTION - FREQUENCY: FREQUENCY: INTERMITTENT

## 2018-11-28 NOTE — DISCHARGE SUMMARY
Patient ID:  Milana Grimaldo  [id number]  77 y.o.  1940    Admit date: 11/25/2018    Discharge date and time: No discharge date for patient encounter. Admitting Physician: Laureano Bello MD     Discharge Physician: Laureano Bello MD    Admission Diagnoses: Closed displaced oblique fracture of shaft of left humerus [S42.332A]    Discharge Diagnoses: Closed displaced oblique fracture of shaft of left humerus [address] [facility] Course: patient had a fall and sustained a left distal humerus fracture. She underwent ORIF of the fracture without any complications. VSS and pain is well controlled. She has been working with PT/OT and doing well. Consults:  Cardiology, IM      Disposition: ECF    Patient Instructions:      Medication List      START taking these medications    HYDROcodone-acetaminophen 5-325 MG per tablet  Commonly known as:  NORCO  Take 1-2 tablets by mouth every 4 hours as needed for Pain for up to 10 days. .     polyethylene glycol packet  Commonly known as:  GLYCOLAX  Take 17 g by mouth daily        CONTINUE taking these medications    acetaminophen 325 MG tablet  Commonly known as:  TYLENOL  Take 2 tablets by mouth every 6 hours     aspirin 81 MG EC tablet  Take 1 tablet by mouth daily Stop 5 days before any planned intervention for compression fractures     baclofen 10 MG tablet  Commonly known as:  LIORESAL  Take 0.5 tablets by mouth 3 times daily     calcitonin 200 UNIT/ACT nasal spray  Commonly known as:  MIACALCIN  1 spray by Nasal route daily     calcium-vitamin D 500-200 MG-UNIT per tablet  Commonly known as:  OSCAL-500  Take 1 tablet by mouth 2 times daily     docusate 100 MG Caps  Commonly known as:  COLACE, DULCOLAX  Take 100 mg by mouth 2 times daily as needed for Constipation     isosorbide mononitrate 30 MG extended release tablet  Commonly known as:  IMDUR  Take 0.5 tablets by mouth daily     magnesium oxide 400 (241.3 Mg) MG Tabs tablet  Commonly known as:

## 2018-11-28 NOTE — PROGRESS NOTES
precautions in place, Left in chair, Call light within reach, Nurse notified, Gait belt, Chair alarm in place  Restraints  Initially in place: No    Plan:  Times per week: 6 X Vest/O  Times per day: Daily  Specific instructions for Next Treatment: ther ex, balance, mobility, gait, BVPPV treatment  for positive right ear joseph red pike- partial EPley as tolerated -instead of placing pt off edge of bed ,raise lower end of bed in trendelenburg-unable then to complete by lying onto left shoulder.   Current Treatment Recommendations: Strengthening, Balance Training, Functional Mobility Training, Transfer Training, Gait Training, Stair training, Endurance Training    Goals:  Patient goals : Go to the [facility] for rehab    Short term goals  Time Frame for Short term goals: 1 week   Short term goal 1: supine to sit and return with CGA to get in and out of bed   Short term goal 2: sit to stand with CGA to get on and off various surfaces  Short term goal 3: ambulate with hemiwalker or cane and CGA 50 feet to walk household distances   Short term goal 4: ascend/descend 2 steps with 1 HR and CGA to enter home    Long term goals  Time Frame for Long term goals : NA due to short ELOS            AM-PAC Inpatient Mobility without Stair Climbing Raw Score : 13  AM-PAC Inpatient without Stair Climbing T-Scale Score : 38.96  Mobility Inpatient CMS 0-100% Score: 58.44  Mobility Inpatient without Stair CMS G-Code Modifier : ALBERTA

## 2018-12-03 ENCOUNTER — CARE COORDINATION (OUTPATIENT)
Dept: CARE COORDINATION | Age: 78
End: 2018-12-03

## 2018-12-03 NOTE — CARE COORDINATION
Terrie Mya has been placed in the [place] [facility] for skilled rehab. CTC has been tagged and will follow up upon discharge.

## 2018-12-05 ENCOUNTER — OFFICE VISIT (OUTPATIENT)
Dept: CARDIOLOGY CLINIC | Age: 78
End: 2018-12-05
Payer: MEDICARE

## 2018-12-05 VITALS — HEART RATE: 64 BPM | SYSTOLIC BLOOD PRESSURE: 140 MMHG | DIASTOLIC BLOOD PRESSURE: 72 MMHG

## 2018-12-05 DIAGNOSIS — I10 ESSENTIAL HYPERTENSION: ICD-10-CM

## 2018-12-05 DIAGNOSIS — E78.00 PURE HYPERCHOLESTEROLEMIA: ICD-10-CM

## 2018-12-05 DIAGNOSIS — I25.10 CORONARY ARTERY DISEASE INVOLVING NATIVE CORONARY ARTERY OF NATIVE HEART WITHOUT ANGINA PECTORIS: Primary | ICD-10-CM

## 2018-12-05 DIAGNOSIS — I42.8 NONISCHEMIC CARDIOMYOPATHY (HCC): ICD-10-CM

## 2018-12-05 PROCEDURE — 1101F PT FALLS ASSESS-DOCD LE1/YR: CPT | Performed by: PHYSICIAN ASSISTANT

## 2018-12-05 PROCEDURE — 99213 OFFICE O/P EST LOW 20 MIN: CPT | Performed by: PHYSICIAN ASSISTANT

## 2018-12-05 PROCEDURE — G8427 DOCREV CUR MEDS BY ELIG CLIN: HCPCS | Performed by: PHYSICIAN ASSISTANT

## 2018-12-05 PROCEDURE — G8417 CALC BMI ABV UP PARAM F/U: HCPCS | Performed by: PHYSICIAN ASSISTANT

## 2018-12-05 PROCEDURE — G8399 PT W/DXA RESULTS DOCUMENT: HCPCS | Performed by: PHYSICIAN ASSISTANT

## 2018-12-05 PROCEDURE — 1123F ACP DISCUSS/DSCN MKR DOCD: CPT | Performed by: PHYSICIAN ASSISTANT

## 2018-12-05 PROCEDURE — 4040F PNEUMOC VAC/ADMIN/RCVD: CPT | Performed by: PHYSICIAN ASSISTANT

## 2018-12-05 PROCEDURE — 1090F PRES/ABSN URINE INCON ASSESS: CPT | Performed by: PHYSICIAN ASSISTANT

## 2018-12-05 PROCEDURE — G8598 ASA/ANTIPLAT THER USED: HCPCS | Performed by: PHYSICIAN ASSISTANT

## 2018-12-05 PROCEDURE — 1111F DSCHRG MED/CURRENT MED MERGE: CPT | Performed by: PHYSICIAN ASSISTANT

## 2018-12-05 PROCEDURE — G8484 FLU IMMUNIZE NO ADMIN: HCPCS | Performed by: PHYSICIAN ASSISTANT

## 2018-12-05 PROCEDURE — 1036F TOBACCO NON-USER: CPT | Performed by: PHYSICIAN ASSISTANT

## 2018-12-05 RX ORDER — ROSUVASTATIN CALCIUM 5 MG/1
5 TABLET, COATED ORAL DAILY
COMMUNITY
End: 2019-01-30 | Stop reason: ALTCHOICE

## 2018-12-05 NOTE — PROGRESS NOTES
Pt here for check up fu [facility] fell    Pt continues with dizziness at times, swelling in bilateral hands and ankles, notice more in right ankle,  Sob with anxiety     Pt denies chest pain, heart palpitation,

## 2018-12-05 NOTE — PROGRESS NOTES
[facility] PROFESSIONAL SERVICES  [facility]   [address] [facility] [place] [address] [address]   Dept: [phone]   Dept Fax: [phone]: [phone]      Chief Complaint   Patient presents with    Follow-Up from [address] [place] [address] with a h/o NICM presents for follow up. Pt is rehabbing from a recent fall and fractured left arm. She denies any significant CP, SOB or palpitations. She states her BP has been running well in the ECF. She denies any issues with LE edema, orthopnea or PND.   Cardiologist:  Dr. Mya Dupree:   No fever, no chills, No fatigue or weight loss  Pulmonary:    No dyspnea, no wheezing  Cardiac:    Denies recent chest pain   GI:     No nausea or vomiting, no abdominal pain  Neuro:    No dizziness or light headedness  Musculoskeletal:  No recent active issues  Extremities:   No edema, good peripheral pulses      Past Medical History:   Diagnosis Date    Arthritis     general    Breast CA ([facility] 12/03    right    CAD (coronary artery disease) 2009    stent in [place]    CH[facility]estive [facility] failure[facility]     Closed compression fracture of first lumbar vertebra ([facility] 2/7/2018    Colon polyps 8/97; 3/11    COPD (chronic obstructive pulmonary disease) ([facility] 8/5/2017    Diverticulosis 1/06    Erosive gastritis 11/06    Esophageal stricture 03/2011    Three times, Dr. Socorro Fagan    Hyperlipidemia     Hypertension     Internal hemorrhoid 1/06    LUISA on CPAP     Osteopenia determined by x-ray 1999    Osteoporosis of vertebra 6/2018 Per CT thoracolumbar    Stress incontinence, female        Allergies   Allergen Reactions    Ciprofloxacin Itching    Neomycin     Pcn [Penicillins] Hives     Pt reports having reaction 50 years ago    Zanaflex [Tizanidine Hcl] Other (See Comments)     Causes confusion    Levaquin [Levofloxacin] Itching     Benadryl was given for tx       Current Outpatient Prescriptions   Medication Sig Dispense Refill    rosuvastatin (CRESTOR) 5 MG tablet Take 5 mg by mouth daily      polyethylene glycol (GLYCOLAX) packet Take 17 g by mouth daily 527 g 1    HYDROcodone-acetaminophen (NORCO) 5-325 MG per tablet Take 1-2 tablets by mouth every 4 hours as needed for Pain for up to 10 days. . 50 tablet 0    traMADol (ULTRAM) 50 MG tablet Take 1 tablet by mouth 2 times daily for 60 days. . 60 tablet 0    metoprolol succinate (TOPROL XL) 50 MG extended release tablet Take 1 tablet by mouth daily 90 tablet 1    pantoprazole (PROTONIX) 40 MG tablet Take 40 mg by mouth daily      sacubitril-valsartan (ENTRESTO) 49-51 MG per tablet Take 1 tablet by mouth 2 times daily 60 tablet 6    magnesium oxide (MAG-OX) 400 (241.3 Mg) MG TABS tablet Take 1 tablet by mouth daily 30 tablet 1    calcitonin (MIACALCIN) 200 UNIT/ACT nasal spray 1 spray by Nasal route daily 1 Bottle 3    acetaminophen (TYLENOL) 325 MG tablet Take 2 tablets by mouth every 6 hours 120 tablet 0    isosorbide mononitrate (IMDUR) 30 MG extended release tablet Take 0.5 tablets by mouth daily 45 tablet 3    docusate sodium (COLACE, DULCOLAX) 100 MG CAPS Take 100 mg by mouth 2 times daily as needed for Constipation      calcium-vitamin D (OSCAL-500) 500-200 MG-UNIT per tablet Take 1 tablet by mouth 2 times daily 30 tablet 3    aspirin 81 MG EC tablet Take 1 tablet by mouth daily Stop 5 days before any planned intervention for compression fractures 30 tablet 3     No current facility-administered medications for this visit. Social History     Social History    Marital status:       Spouse name: N/A    Number of children: N/A    Years of education: N/A     Social History Main Topics    Smoking status: Former Smoker     Packs/day: 0.50     Years: 40.00     Types: Cigarettes     Quit date: 1/1/1998    Smokeless tobacco: Never Used    Alcohol use 1.2 oz/week     2 Standard drinks or equivalent per week      Comment: occasional     Drug use:

## 2018-12-10 ENCOUNTER — OFFICE VISIT (OUTPATIENT)
Dept: PULMONOLOGY | Age: 78
End: 2018-12-10
Payer: MEDICARE

## 2018-12-10 VITALS
HEIGHT: 64 IN | WEIGHT: 198 LBS | OXYGEN SATURATION: 96 % | SYSTOLIC BLOOD PRESSURE: 128 MMHG | DIASTOLIC BLOOD PRESSURE: 72 MMHG | BODY MASS INDEX: 33.8 KG/M2 | HEART RATE: 71 BPM

## 2018-12-10 DIAGNOSIS — Z99.89 OBSTRUCTIVE SLEEP APNEA ON CPAP: Primary | ICD-10-CM

## 2018-12-10 DIAGNOSIS — G47.33 OBSTRUCTIVE SLEEP APNEA ON CPAP: Primary | ICD-10-CM

## 2018-12-10 DIAGNOSIS — E66.9 OBESITY (BMI 30-39.9): ICD-10-CM

## 2018-12-10 PROCEDURE — G8598 ASA/ANTIPLAT THER USED: HCPCS | Performed by: PHYSICIAN ASSISTANT

## 2018-12-10 PROCEDURE — 1090F PRES/ABSN URINE INCON ASSESS: CPT | Performed by: PHYSICIAN ASSISTANT

## 2018-12-10 PROCEDURE — 1101F PT FALLS ASSESS-DOCD LE1/YR: CPT | Performed by: PHYSICIAN ASSISTANT

## 2018-12-10 PROCEDURE — G8417 CALC BMI ABV UP PARAM F/U: HCPCS | Performed by: PHYSICIAN ASSISTANT

## 2018-12-10 PROCEDURE — 1123F ACP DISCUSS/DSCN MKR DOCD: CPT | Performed by: PHYSICIAN ASSISTANT

## 2018-12-10 PROCEDURE — 1111F DSCHRG MED/CURRENT MED MERGE: CPT | Performed by: PHYSICIAN ASSISTANT

## 2018-12-10 PROCEDURE — G8399 PT W/DXA RESULTS DOCUMENT: HCPCS | Performed by: PHYSICIAN ASSISTANT

## 2018-12-10 PROCEDURE — G8427 DOCREV CUR MEDS BY ELIG CLIN: HCPCS | Performed by: PHYSICIAN ASSISTANT

## 2018-12-10 PROCEDURE — 1036F TOBACCO NON-USER: CPT | Performed by: PHYSICIAN ASSISTANT

## 2018-12-10 PROCEDURE — 4040F PNEUMOC VAC/ADMIN/RCVD: CPT | Performed by: PHYSICIAN ASSISTANT

## 2018-12-10 PROCEDURE — G8484 FLU IMMUNIZE NO ADMIN: HCPCS | Performed by: PHYSICIAN ASSISTANT

## 2018-12-10 PROCEDURE — 99213 OFFICE O/P EST LOW 20 MIN: CPT | Performed by: PHYSICIAN ASSISTANT

## 2018-12-10 RX ORDER — HYDROCODONE BITARTRATE AND ACETAMINOPHEN 5; 325 MG/1; MG/1
1 TABLET ORAL EVERY 6 HOURS PRN
COMMUNITY
End: 2019-02-12 | Stop reason: SDUPTHER

## 2018-12-10 ASSESSMENT — ENCOUNTER SYMPTOMS
CHEST TIGHTNESS: 0
ALLERGIC/IMMUNOLOGIC NEGATIVE: 1
EYES NEGATIVE: 1
SHORTNESS OF BREATH: 0
BACK PAIN: 0
DIARRHEA: 0
STRIDOR: 0
COUGH: 0
WHEEZING: 0
NAUSEA: 0

## 2018-12-16 ENCOUNTER — HOSPITAL ENCOUNTER (EMERGENCY)
Age: 78
Discharge: HOME | End: 2018-12-17
Payer: MEDICARE

## 2018-12-16 ENCOUNTER — APPOINTMENT (OUTPATIENT)
Dept: INTERVENTIONAL RADIOLOGY/VASCULAR | Age: 78
End: 2018-12-16
Payer: MEDICARE

## 2018-12-16 ENCOUNTER — APPOINTMENT (OUTPATIENT)
Dept: GENERAL RADIOLOGY | Age: 78
End: 2018-12-16
Payer: MEDICARE

## 2018-12-16 DIAGNOSIS — I10 ASYMPTOMATIC HYPERTENSION: Primary | ICD-10-CM

## 2018-12-16 LAB
ALBUMIN SERPL-MCNC: 3.4 G/DL (ref 3.5–5.1)
ALP BLD-CCNC: 59 U/L (ref 38–126)
ALT SERPL-CCNC: 6 U/L (ref 11–66)
ANION GAP SERPL CALCULATED.3IONS-SCNC: 11 MEQ/L (ref 8–16)
AST SERPL-CCNC: 9 U/L (ref 5–40)
BASOPHILS # BLD: 0.7 %
BASOPHILS ABSOLUTE: 0 THOU/MM3 (ref 0–0.1)
BILIRUB SERPL-MCNC: 0.3 MG/DL (ref 0.3–1.2)
BILIRUBIN DIRECT: < 0.2 MG/DL (ref 0–0.3)
BUN BLDV-MCNC: 19 MG/DL (ref 7–22)
CALCIUM SERPL-MCNC: 9.3 MG/DL (ref 8.5–10.5)
CHLORIDE BLD-SCNC: 101 MEQ/L (ref 98–111)
CO2: 30 MEQ/L (ref 23–33)
CREAT SERPL-MCNC: 0.5 MG/DL (ref 0.4–1.2)
EKG ATRIAL RATE: 70 BPM
EKG P AXIS: 57 DEGREES
EKG P-R INTERVAL: 144 MS
EKG Q-T INTERVAL: 462 MS
EKG QRS DURATION: 150 MS
EKG QTC CALCULATION (BAZETT): 498 MS
EKG R AXIS: -70 DEGREES
EKG T AXIS: 82 DEGREES
EKG VENTRICULAR RATE: 70 BPM
EOSINOPHIL # BLD: 1.7 %
EOSINOPHILS ABSOLUTE: 0.1 THOU/MM3 (ref 0–0.4)
ERYTHROCYTE [DISTWIDTH] IN BLOOD BY AUTOMATED COUNT: 13.1 % (ref 11.5–14.5)
ERYTHROCYTE [DISTWIDTH] IN BLOOD BY AUTOMATED COUNT: 47.2 FL (ref 35–45)
GFR SERPL CREATININE-BSD FRML MDRD: > 90 ML/MIN/1.73M2
GLUCOSE BLD-MCNC: 105 MG/DL (ref 70–108)
HCT VFR BLD CALC: 39.5 % (ref 37–47)
HEMOGLOBIN: 12.9 GM/DL (ref 12–16)
IMMATURE GRANS (ABS): 0.01 THOU/MM3 (ref 0–0.07)
IMMATURE GRANULOCYTES: 0.2 %
LIPASE: 14.1 U/L (ref 5.6–51.3)
LYMPHOCYTES # BLD: 32.8 %
LYMPHOCYTES ABSOLUTE: 1.9 THOU/MM3 (ref 1–4.8)
MCH RBC QN AUTO: 32 PG (ref 26–33)
MCHC RBC AUTO-ENTMCNC: 32.7 GM/DL (ref 32.2–35.5)
MCV RBC AUTO: 98 FL (ref 81–99)
MONOCYTES # BLD: 8.8 %
MONOCYTES ABSOLUTE: 0.5 THOU/MM3 (ref 0.4–1.3)
NUCLEATED RED BLOOD CELLS: 0 /100 WBC
OSMOLALITY CALCULATION: 285.7 MOSMOL/KG (ref 275–300)
PLATELET # BLD: 207 THOU/MM3 (ref 130–400)
PMV BLD AUTO: 10.3 FL (ref 9.4–12.4)
POTASSIUM SERPL-SCNC: 4.1 MEQ/L (ref 3.5–5.2)
PRO-BNP: 403.4 PG/ML (ref 0–1800)
RBC # BLD: 4.03 MILL/MM3 (ref 4.2–5.4)
SEG NEUTROPHILS: 55.8 %
SEGMENTED NEUTROPHILS ABSOLUTE COUNT: 3.3 THOU/MM3 (ref 1.8–7.7)
SODIUM BLD-SCNC: 142 MEQ/L (ref 135–145)
TOTAL PROTEIN: 5.5 G/DL (ref 6.1–8)
TROPONIN T: < 0.01 NG/ML
WBC # BLD: 5.9 THOU/MM3 (ref 4.8–10.8)

## 2018-12-16 PROCEDURE — 80053 COMPREHEN METABOLIC PANEL: CPT

## 2018-12-16 PROCEDURE — 36415 COLL VENOUS BLD VENIPUNCTURE: CPT

## 2018-12-16 PROCEDURE — 93971 EXTREMITY STUDY: CPT

## 2018-12-16 PROCEDURE — 83690 ASSAY OF LIPASE: CPT

## 2018-12-16 PROCEDURE — 93005 ELECTROCARDIOGRAM TRACING: CPT | Performed by: NURSE PRACTITIONER

## 2018-12-16 PROCEDURE — 71045 X-RAY EXAM CHEST 1 VIEW: CPT

## 2018-12-16 PROCEDURE — 82248 BILIRUBIN DIRECT: CPT

## 2018-12-16 PROCEDURE — 83880 ASSAY OF NATRIURETIC PEPTIDE: CPT

## 2018-12-16 PROCEDURE — 81001 URINALYSIS AUTO W/SCOPE: CPT

## 2018-12-16 PROCEDURE — 84484 ASSAY OF TROPONIN QUANT: CPT

## 2018-12-16 PROCEDURE — 6370000000 HC RX 637 (ALT 250 FOR IP): Performed by: NURSE PRACTITIONER

## 2018-12-16 PROCEDURE — 85025 COMPLETE CBC W/AUTO DIFF WBC: CPT

## 2018-12-16 PROCEDURE — 99284 EMERGENCY DEPT VISIT MOD MDM: CPT

## 2018-12-16 RX ORDER — TRAMADOL HYDROCHLORIDE 50 MG/1
50 TABLET ORAL ONCE
Status: COMPLETED | OUTPATIENT
Start: 2018-12-16 | End: 2018-12-16

## 2018-12-16 RX ADMIN — TRAMADOL HYDROCHLORIDE 50 MG: 50 TABLET, FILM COATED ORAL at 21:22

## 2018-12-16 ASSESSMENT — PAIN SCALES - GENERAL
PAINLEVEL_OUTOF10: 2
PAINLEVEL_OUTOF10: 4

## 2018-12-16 ASSESSMENT — PAIN DESCRIPTION - LOCATION: LOCATION: BACK

## 2018-12-16 ASSESSMENT — PAIN DESCRIPTION - PAIN TYPE: TYPE: CHRONIC PAIN

## 2018-12-17 VITALS
HEIGHT: 64 IN | SYSTOLIC BLOOD PRESSURE: 128 MMHG | WEIGHT: 198 LBS | DIASTOLIC BLOOD PRESSURE: 64 MMHG | HEART RATE: 69 BPM | OXYGEN SATURATION: 96 % | BODY MASS INDEX: 33.8 KG/M2 | TEMPERATURE: 97.8 F | RESPIRATION RATE: 15 BRPM

## 2018-12-17 LAB
BACTERIA: ABNORMAL /HPF
BILIRUBIN URINE: NEGATIVE
BLOOD, URINE: NEGATIVE
CASTS 2: ABNORMAL /LPF
CASTS UA: ABNORMAL /LPF
CHARACTER, URINE: ABNORMAL
COLOR: YELLOW
CRYSTALS, UA: ABNORMAL
EPITHELIAL CELLS, UA: ABNORMAL /HPF
GLUCOSE URINE: NEGATIVE MG/DL
KETONES, URINE: NEGATIVE
LEUKOCYTE ESTERASE, URINE: NEGATIVE
MISCELLANEOUS 2: ABNORMAL
NITRITE, URINE: NEGATIVE
PH UA: 7.5
PROTEIN UA: NEGATIVE
RBC URINE: ABNORMAL /HPF
RENAL EPITHELIAL, UA: ABNORMAL
SPECIFIC GRAVITY, URINE: 1.02 (ref 1–1.03)
UROBILINOGEN, URINE: 1 EU/DL
WBC UA: ABNORMAL /HPF
YEAST: ABNORMAL

## 2018-12-17 PROCEDURE — 2709999900 HC NON-CHARGEABLE SUPPLY

## 2018-12-17 PROCEDURE — 93010 ELECTROCARDIOGRAM REPORT: CPT | Performed by: INTERNAL MEDICINE

## 2018-12-17 NOTE — ED TRIAGE NOTES
Patient presents to the ED by EMS with complaints of hypertension. Patient's son requested that the patient come to the ED and be evaluated. Patient's blood pressure had been taken on the patient's leg due to previous breast cancer and a recent surgery on the patient's left upper arm. Patient's BP was first taken on the left leg which was at 193/83 and then the patient's BP was taken on her left forearm with a lower 144/70 BP. Patient denies any other pain or SOB. Patient denies any other complaints or fever. Patient also has not taken her nighttime HTN medication before being transported to the ED from [address] [place] [address].

## 2018-12-22 ASSESSMENT — ENCOUNTER SYMPTOMS
CHEST TIGHTNESS: 0
COUGH: 0
RHINORRHEA: 0
BACK PAIN: 0

## 2019-01-24 ENCOUNTER — TELEPHONE (OUTPATIENT)
Dept: FAMILY MEDICINE CLINIC | Age: 79
End: 2019-01-24

## 2019-01-25 ENCOUNTER — TELEPHONE (OUTPATIENT)
Dept: FAMILY MEDICINE CLINIC | Age: 79
End: 2019-01-25

## 2019-01-25 DIAGNOSIS — I10 ESSENTIAL HYPERTENSION: Primary | ICD-10-CM

## 2019-01-25 RX ORDER — FUROSEMIDE 20 MG/1
20 TABLET ORAL DAILY
Qty: 60 TABLET | Refills: 3 | Status: SHIPPED | OUTPATIENT
Start: 2019-01-25 | End: 2019-12-24

## 2019-01-28 ENCOUNTER — PROCEDURE VISIT (OUTPATIENT)
Dept: CARDIOLOGY CLINIC | Age: 79
End: 2019-01-28
Payer: MEDICARE

## 2019-01-28 DIAGNOSIS — Z95.810 ICD (IMPLANTABLE CARDIOVERTER-DEFIBRILLATOR) IN PLACE: ICD-10-CM

## 2019-01-28 PROCEDURE — 93295 DEV INTERROG REMOTE 1/2/MLT: CPT | Performed by: INTERNAL MEDICINE

## 2019-01-28 PROCEDURE — 93296 REM INTERROG EVL PM/IDS: CPT | Performed by: INTERNAL MEDICINE

## 2019-01-28 RX ORDER — ISOSORBIDE MONONITRATE 30 MG/1
TABLET, EXTENDED RELEASE ORAL
Qty: 45 TABLET | Refills: 3 | Status: SHIPPED | OUTPATIENT
Start: 2019-01-28 | End: 2020-02-03

## 2019-01-30 ENCOUNTER — OFFICE VISIT (OUTPATIENT)
Dept: CARDIOLOGY CLINIC | Age: 79
End: 2019-01-30
Payer: MEDICARE

## 2019-01-30 VITALS
HEART RATE: 76 BPM | SYSTOLIC BLOOD PRESSURE: 136 MMHG | BODY MASS INDEX: 33.8 KG/M2 | DIASTOLIC BLOOD PRESSURE: 84 MMHG | WEIGHT: 198 LBS | HEIGHT: 64 IN

## 2019-01-30 DIAGNOSIS — I42.0 DILATED CARDIOMYOPATHY (HCC): ICD-10-CM

## 2019-01-30 DIAGNOSIS — Z95.810 ICD (IMPLANTABLE CARDIOVERTER-DEFIBRILLATOR) IN PLACE: ICD-10-CM

## 2019-01-30 DIAGNOSIS — I10 ESSENTIAL HYPERTENSION: Primary | ICD-10-CM

## 2019-01-30 PROCEDURE — 1090F PRES/ABSN URINE INCON ASSESS: CPT | Performed by: NUCLEAR MEDICINE

## 2019-01-30 PROCEDURE — 1101F PT FALLS ASSESS-DOCD LE1/YR: CPT | Performed by: NUCLEAR MEDICINE

## 2019-01-30 PROCEDURE — G8399 PT W/DXA RESULTS DOCUMENT: HCPCS | Performed by: NUCLEAR MEDICINE

## 2019-01-30 PROCEDURE — 1036F TOBACCO NON-USER: CPT | Performed by: NUCLEAR MEDICINE

## 2019-01-30 PROCEDURE — 99213 OFFICE O/P EST LOW 20 MIN: CPT | Performed by: NUCLEAR MEDICINE

## 2019-01-30 PROCEDURE — 1123F ACP DISCUSS/DSCN MKR DOCD: CPT | Performed by: NUCLEAR MEDICINE

## 2019-01-30 PROCEDURE — 4040F PNEUMOC VAC/ADMIN/RCVD: CPT | Performed by: NUCLEAR MEDICINE

## 2019-01-30 PROCEDURE — G8599 NO ASA/ANTIPLAT THER USE RNG: HCPCS | Performed by: NUCLEAR MEDICINE

## 2019-01-30 PROCEDURE — G8417 CALC BMI ABV UP PARAM F/U: HCPCS | Performed by: NUCLEAR MEDICINE

## 2019-01-30 PROCEDURE — G8484 FLU IMMUNIZE NO ADMIN: HCPCS | Performed by: NUCLEAR MEDICINE

## 2019-01-30 PROCEDURE — G8427 DOCREV CUR MEDS BY ELIG CLIN: HCPCS | Performed by: NUCLEAR MEDICINE

## 2019-01-30 RX ORDER — TRAMADOL HYDROCHLORIDE 50 MG/1
50 TABLET ORAL 2 TIMES DAILY
COMMUNITY
End: 2019-02-15 | Stop reason: SDUPTHER

## 2019-01-30 RX ORDER — METOPROLOL SUCCINATE 50 MG/1
50 TABLET, EXTENDED RELEASE ORAL DAILY
Qty: 90 TABLET | Refills: 3 | Status: SHIPPED | OUTPATIENT
Start: 2019-01-30 | End: 2019-04-22 | Stop reason: SDUPTHER

## 2019-01-30 RX ORDER — SIMVASTATIN 40 MG
40 TABLET ORAL NIGHTLY
COMMUNITY
End: 2019-02-05 | Stop reason: SDUPTHER

## 2019-02-05 RX ORDER — SIMVASTATIN 40 MG
40 TABLET ORAL NIGHTLY
Qty: 30 TABLET | Refills: 6 | Status: SHIPPED | OUTPATIENT
Start: 2019-02-05 | End: 2019-05-22 | Stop reason: SDUPTHER

## 2019-02-05 RX ORDER — SIMVASTATIN 40 MG
TABLET ORAL
Qty: 90 TABLET | Refills: 3 | Status: SHIPPED | OUTPATIENT
Start: 2019-02-05 | End: 2019-11-11 | Stop reason: ALTCHOICE

## 2019-02-11 ENCOUNTER — CARE COORDINATION (OUTPATIENT)
Dept: CARE COORDINATION | Age: 79
End: 2019-02-11

## 2019-02-11 ASSESSMENT — ENCOUNTER SYMPTOMS: DYSPNEA ASSOCIATED WITH: EXERTION

## 2019-02-12 DIAGNOSIS — S32.010S CLOSED COMPRESSION FRACTURE OF FIRST LUMBAR VERTEBRA, SEQUELA: Primary | ICD-10-CM

## 2019-02-12 DIAGNOSIS — S42.332A CLOSED DISPLACED OBLIQUE FRACTURE OF SHAFT OF LEFT HUMERUS, INITIAL ENCOUNTER: ICD-10-CM

## 2019-02-12 RX ORDER — HYDROCODONE BITARTRATE AND ACETAMINOPHEN 5; 325 MG/1; MG/1
1 TABLET ORAL EVERY 6 HOURS PRN
Qty: 56 TABLET | Refills: 0 | Status: SHIPPED | OUTPATIENT
Start: 2019-02-12 | End: 2019-02-26

## 2019-02-13 ENCOUNTER — TELEPHONE (OUTPATIENT)
Dept: FAMILY MEDICINE CLINIC | Age: 79
End: 2019-02-13

## 2019-02-13 ENCOUNTER — OFFICE VISIT (OUTPATIENT)
Dept: FAMILY MEDICINE CLINIC | Age: 79
End: 2019-02-13

## 2019-02-13 VITALS
HEART RATE: 78 BPM | HEIGHT: 64 IN | RESPIRATION RATE: 16 BRPM | DIASTOLIC BLOOD PRESSURE: 84 MMHG | SYSTOLIC BLOOD PRESSURE: 130 MMHG | BODY MASS INDEX: 33.8 KG/M2 | WEIGHT: 198 LBS

## 2019-02-13 DIAGNOSIS — I10 ESSENTIAL HYPERTENSION: Primary | ICD-10-CM

## 2019-02-13 DIAGNOSIS — R42 VERTIGO: ICD-10-CM

## 2019-02-13 DIAGNOSIS — S06.0X0D BRAIN CONCUSSION, WITHOUT LOSS OF CONSCIOUSNESS, SUBSEQUENT ENCOUNTER: ICD-10-CM

## 2019-02-13 DIAGNOSIS — E78.00 PURE HYPERCHOLESTEROLEMIA: ICD-10-CM

## 2019-02-13 DIAGNOSIS — Z99.89 OBSTRUCTIVE SLEEP APNEA ON CPAP: ICD-10-CM

## 2019-02-13 DIAGNOSIS — G47.33 OBSTRUCTIVE SLEEP APNEA ON CPAP: ICD-10-CM

## 2019-02-13 PROCEDURE — 1090F PRES/ABSN URINE INCON ASSESS: CPT | Performed by: FAMILY MEDICINE

## 2019-02-13 PROCEDURE — G8417 CALC BMI ABV UP PARAM F/U: HCPCS | Performed by: FAMILY MEDICINE

## 2019-02-13 PROCEDURE — 1101F PT FALLS ASSESS-DOCD LE1/YR: CPT | Performed by: FAMILY MEDICINE

## 2019-02-13 PROCEDURE — G8399 PT W/DXA RESULTS DOCUMENT: HCPCS | Performed by: FAMILY MEDICINE

## 2019-02-13 PROCEDURE — 1123F ACP DISCUSS/DSCN MKR DOCD: CPT | Performed by: FAMILY MEDICINE

## 2019-02-13 PROCEDURE — 99213 OFFICE O/P EST LOW 20 MIN: CPT | Performed by: FAMILY MEDICINE

## 2019-02-13 PROCEDURE — 4040F PNEUMOC VAC/ADMIN/RCVD: CPT | Performed by: FAMILY MEDICINE

## 2019-02-13 PROCEDURE — 1036F TOBACCO NON-USER: CPT | Performed by: FAMILY MEDICINE

## 2019-02-13 PROCEDURE — G8427 DOCREV CUR MEDS BY ELIG CLIN: HCPCS | Performed by: FAMILY MEDICINE

## 2019-02-13 ASSESSMENT — ENCOUNTER SYMPTOMS
NAUSEA: 0
DIARRHEA: 0
EYES NEGATIVE: 1
CONSTIPATION: 0
VOMITING: 0
SHORTNESS OF BREATH: 0
CHEST TIGHTNESS: 0
ABDOMINAL PAIN: 0
COUGH: 0

## 2019-02-13 ASSESSMENT — PATIENT HEALTH QUESTIONNAIRE - PHQ9
SUM OF ALL RESPONSES TO PHQ QUESTIONS 1-9: 0
SUM OF ALL RESPONSES TO PHQ QUESTIONS 1-9: 0
SUM OF ALL RESPONSES TO PHQ9 QUESTIONS 1 & 2: 0
1. LITTLE INTEREST OR PLEASURE IN DOING THINGS: 0
2. FEELING DOWN, DEPRESSED OR HOPELESS: 0

## 2019-02-15 DIAGNOSIS — G89.29 OTHER CHRONIC PAIN: Primary | ICD-10-CM

## 2019-02-15 RX ORDER — TRAMADOL HYDROCHLORIDE 50 MG/1
50 TABLET ORAL 2 TIMES DAILY
Qty: 40 TABLET | Refills: 0 | Status: SHIPPED | OUTPATIENT
Start: 2019-02-15 | End: 2019-03-07 | Stop reason: SDUPTHER

## 2019-03-07 DIAGNOSIS — G89.29 OTHER CHRONIC PAIN: ICD-10-CM

## 2019-03-08 RX ORDER — TRAMADOL HYDROCHLORIDE 50 MG/1
50 TABLET ORAL 2 TIMES DAILY PRN
Qty: 40 TABLET | Refills: 0 | Status: SHIPPED | OUTPATIENT
Start: 2019-03-08 | End: 2019-04-05 | Stop reason: SDUPTHER

## 2019-03-13 ENCOUNTER — CARE COORDINATION (OUTPATIENT)
Dept: CARE COORDINATION | Age: 79
End: 2019-03-13

## 2019-03-28 ENCOUNTER — HOSPITAL ENCOUNTER (OUTPATIENT)
Dept: WOMENS IMAGING | Age: 79
Discharge: HOME | End: 2019-03-28
Payer: MEDICARE

## 2019-03-28 DIAGNOSIS — Z12.31 VISIT FOR SCREENING MAMMOGRAM: ICD-10-CM

## 2019-03-28 PROCEDURE — 77063 BREAST TOMOSYNTHESIS BI: CPT

## 2019-03-31 DIAGNOSIS — M80.00XD AGE-RELATED OSTEOPOROSIS WITH CURRENT PATHOLOGICAL FRACTURE WITH ROUTINE HEALING, SUBSEQUENT ENCOUNTER: ICD-10-CM

## 2019-04-01 RX ORDER — CALCITONIN SALMON 200 [IU]/.09ML
SPRAY, METERED NASAL
Qty: 3.7 ML | Refills: 3 | Status: SHIPPED | OUTPATIENT
Start: 2019-04-01 | End: 2019-05-22 | Stop reason: ALTCHOICE

## 2019-04-01 NOTE — TELEPHONE ENCOUNTER
Date of last visit:  2/13/2019  Date of next visit:  6/17/2019    Requested Prescriptions     Pending Prescriptions Disp Refills    calcitonin (MIACALCIN) 200 UNIT/ACT nasal spray [Pharmacy Med Name: CALCITONIN-SALMON 200 UNITS SP] 3.7 mL 3     Sig: instill 1 spray into alternating nostrils once daily

## 2019-04-04 DIAGNOSIS — G89.29 OTHER CHRONIC PAIN: ICD-10-CM

## 2019-04-04 NOTE — TELEPHONE ENCOUNTER
Reba Loaiza called requesting a refill of the below medication which has been pended for you:     Requested Prescriptions     Pending Prescriptions Disp Refills    traMADol (ULTRAM) 50 MG tablet 40 tablet 0     Sig: Take 1 tablet by mouth 2 times daily as needed for Pain for up to 30 days.        Last Appointment Date: 2/13/2019  Next Appointment Date: 6/17/2019    Allergies   Allergen Reactions    Ciprofloxacin Itching    Neomycin     Pcn [Penicillins] Hives     Pt reports having reaction 50 years ago    Zanaflex [Tizanidine Hcl] Other (See Comments)     Causes confusion    Levaquin [Levofloxacin] Itching     Benadryl was given for tx

## 2019-04-05 RX ORDER — TRAMADOL HYDROCHLORIDE 50 MG/1
50 TABLET ORAL 2 TIMES DAILY PRN
Qty: 40 TABLET | Refills: 0 | Status: SHIPPED | OUTPATIENT
Start: 2019-04-05 | End: 2019-05-01 | Stop reason: SDUPTHER

## 2019-04-10 ENCOUNTER — TELEPHONE (OUTPATIENT)
Dept: CARDIOLOGY CLINIC | Age: 79
End: 2019-04-10

## 2019-04-10 ENCOUNTER — NURSE ONLY (OUTPATIENT)
Dept: CARDIOLOGY CLINIC | Age: 79
End: 2019-04-10
Payer: MEDICARE

## 2019-04-10 DIAGNOSIS — Z95.810 ICD (IMPLANTABLE CARDIOVERTER-DEFIBRILLATOR) IN PLACE: Primary | ICD-10-CM

## 2019-04-10 PROCEDURE — 93284 PRGRMG EVAL IMPLANTABLE DFB: CPT | Performed by: INTERNAL MEDICINE

## 2019-04-10 NOTE — PROGRESS NOTES
The patient's LV thresholds are high. It does not look like anyone has recently tried other pacing configurations to see if she has a better threshold. This should be done to try and increase battery longevity.

## 2019-04-10 NOTE — PROGRESS NOTES
Bi V ICD [facility] [employer]  Battery 3.8-4.2yrs  A paced 23%  V Paced 99%  p wave 0.6mV  r wave >12.0mV  No episodes  Atrial threshold 0.62V@ 0.5ms  RV threshold 0.5V @ 0.5ms  LV THreshold 2.37V @ 0.9ms  Atrial impedance 460 ohms  RV impedance 380 ohms  LV impedance 530 ohms  Shock 44 ohms  Done by [address] [place] [address]

## 2019-04-16 ENCOUNTER — CARE COORDINATION (OUTPATIENT)
Dept: CARE COORDINATION | Age: 79
End: 2019-04-16

## 2019-04-16 NOTE — CARE COORDINATION
Ambulatory Care Coordination Note  4/16/2019  CM Risk Score: 9  Roland Mortality Risk Score: 21    ACC: Katina Olivares RN    Summary Note: Spoke with patient for Care Coordination follow up. Pt reports she is stable with this review. Reports her breathing is at baseline and she only feels SOB with exertion. Patient reports she continues to have productive cough, but was advised by PCP to limit cold/hot beverages and pt reports this has decreased coughing. Pt verbalizes understanding of zone management tools and early symptom recognition. Pt continues to have dizziness. Vestibular therapy scheduled and pt reports she feels she should be able to tolerate this time as she can now lay flat. Upcoming appt with [facility] and Pain Management clinic reviewed. Pt voices understanding and denies need for transportation. No further questions or concerns voiced at this time. Advised patient to contact CC or PCP office if any needs arise. Plan:  -Review COPD education and complete as appropriate  -Follow up regarding vestibular therapy and any side effects due to dizziness    COPD Assessment    Does the patient understand envrionmental exposure?:  Yes  Is the patient able to verbalize Rescue vs. Long Acting medications?:  No  Does the patient have a nebulizer?:  No  Does the patient use a space with inhaled medications?:  No     No patient-reported symptoms         Symptoms:      Have you had a recent diagnosis of pneumonia either by PCP or at a hospital?:  No             Care Coordination Interventions    Program Enrollment:  Rising Risk  Referral from Primary Care Provider:  Yes  Suggested Interventions and Community Resources  Fall Risk Prevention:   In Process  Home Health Services:  Completed  Occupational Therapy:  Completed  Physical Therapy:  Completed  Senior Services:  Completed  Zone Management Tools:  Completed         Goals Addressed                 This Visit's Progress     Conditions and Symptoms On track     I will follow my Zone Management tool to seek urgent or emergent care. I will notify my provider of any symptoms that indicate a worsening of my condition. Barriers: impairment:  physical  Plan for overcoming my barriers: family support  Confidence: 8/10  Anticipated Goal Completion Date: 5-11-19              Prior to Admission medications    Medication Sig Start Date End Date Taking? Authorizing Provider   traMADol (ULTRAM) 50 MG tablet Take 1 tablet by mouth 2 times daily as needed for Pain for up to 30 days.  4/5/19 5/5/19 Yes Autumn Garcia MD   calcitonin (MIACALCIN) 200 UNIT/ACT nasal spray instill 1 spray into alternating nostrils once daily 4/1/19  Yes Autumn Garcia MD   simvastatin (ZOCOR) 40 MG tablet take 1 tablet by mouth every evening 2/5/19  Yes Praneeth Mane MD   simvastatin (ZOCOR) 40 MG tablet Take 1 tablet by mouth nightly 2/5/19  Yes Praneeth Mane MD   RaNITidine HCl (ACID REDUCER PO) Take by mouth daily   Yes Historical Provider, MD   metoprolol succinate (TOPROL XL) 50 MG extended release tablet Take 1 tablet by mouth daily 1/30/19  Yes Praneeth Mane MD   sacubitril-valsartan (ENTRESTO) 49-51 MG per tablet Take 1 tablet by mouth 2 times daily 1/30/19  Yes Praneeth Mane MD   isosorbide mononitrate (IMDUR) 30 MG extended release tablet take 1/2 tablet by mouth once daily 1/28/19  Yes Praneeth Mane MD   furosemide (LASIX) 20 MG tablet Take 1 tablet by mouth daily 1/25/19  Yes Armando Garcia MD   acetaminophen (TYLENOL) 325 MG tablet Take 2 tablets by mouth every 6 hours 3/6/18  Yes Jordis Gowers, MD   docusate sodium (COLACE, DULCOLAX) 100 MG CAPS Take 100 mg by mouth 2 times daily as needed for Constipation 3/6/18  Yes Jordis Gowers, MD   calcium-vitamin D (OSCAL-500) 500-200 MG-UNIT per tablet Take 1 tablet by mouth 2 times daily 3/6/18  Yes Jordis Gowers, MD   aspirin 81 MG EC tablet Take 1 tablet by mouth daily Stop 5 days before any planned intervention for compression fractures 3/7/18  Yes Hina Saez MD       Future Appointments   Date Time Provider Laura Chrissei   4/17/2019 10:00 AM SCHEDULE, SRPS PACER NURSE [facility] PACER [facility]   6/17/2019 12:00 PM Moreno Olsen MD [facility]   8/19/2019 11:00 AM Joycelyn Whitley MD [facility] Heart [facility]   12/10/2019 11:15 AM Belkys Salazar PA-C Pulm Med [facility]   4/15/2020  9:00 AM [facility]

## 2019-04-17 ENCOUNTER — NURSE ONLY (OUTPATIENT)
Dept: CARDIOLOGY CLINIC | Age: 79
End: 2019-04-17

## 2019-04-17 DIAGNOSIS — Z95.810 ICD (IMPLANTABLE CARDIOVERTER-DEFIBRILLATOR) IN PLACE: Primary | ICD-10-CM

## 2019-04-17 NOTE — PROGRESS NOTES
Bi V ICD [employer]  Battery 3.8-4. 1years  A paced 31%  V paced 99%  p wave 0.5mV  r wave 6.6mV  Atrial threshold 0.5V @ 0.5ms  RV Threshold 0.5V @ 0.5ms  LV Threshold 2.5V @ 0.9ms  Atrial impedance 490 ohms  RV impedance 390 ohms   ohms  Shock 43 ohms   0 mode switches  [place] [facility] [employer] Rep test for better vector but No better vector was found PNS in P4, mid 2/mid 3   Print out included

## 2019-04-22 RX ORDER — METOPROLOL SUCCINATE 50 MG/1
50 TABLET, EXTENDED RELEASE ORAL DAILY
Qty: 90 TABLET | Refills: 3 | Status: ON HOLD | OUTPATIENT
Start: 2019-04-22 | End: 2019-09-10 | Stop reason: HOSPADM

## 2019-04-30 DIAGNOSIS — G89.29 OTHER CHRONIC PAIN: ICD-10-CM

## 2019-04-30 NOTE — TELEPHONE ENCOUNTER
Chao Ureña called requesting a refill of the below medication which has been pended for you:     Requested Prescriptions     Pending Prescriptions Disp Refills    traMADol (ULTRAM) 50 MG tablet 40 tablet 0     Sig: Take 1 tablet by mouth 2 times daily as needed for Pain for up to 30 days.        Last Appointment Date: 2/13/2019  Next Appointment Date: 6/17/2019    Allergies   Allergen Reactions    Ciprofloxacin Itching    Neomycin     Pcn [Penicillins] Hives     Pt reports having reaction 50 years ago    Zanaflex [Tizanidine Hcl] Other (See Comments)     Causes confusion    Levaquin [Levofloxacin] Itching     Benadryl was given for tx

## 2019-05-01 RX ORDER — TRAMADOL HYDROCHLORIDE 50 MG/1
50 TABLET ORAL 2 TIMES DAILY PRN
Qty: 40 TABLET | Refills: 0 | Status: SHIPPED | OUTPATIENT
Start: 2019-05-01 | End: 2019-06-07 | Stop reason: SDUPTHER

## 2019-05-20 ENCOUNTER — CARE COORDINATION (OUTPATIENT)
Dept: CARE COORDINATION | Age: 79
End: 2019-05-20

## 2019-05-20 ASSESSMENT — ENCOUNTER SYMPTOMS: DYSPNEA ASSOCIATED WITH: EXERTION

## 2019-05-20 NOTE — CARE COORDINATION
Ambulatory Care Coordination Note  5/20/2019  CM Risk Score: 9  Roland Mortality Risk Score: 21    ACC: Malika Reyna, RN    Summary Note: Spoke with Alexis Griffith. States she is currently doing water aerobics at the [facility] for her chronic back pain. Discussed her vestibular therapy for her dizziness. The issue before was that she couldn't lay flat on her back on the table to perform the vestibular therapy. She states that she would like to try it again and is hoping that the water therapy will help back so that she would be able to tolerate the vestibular therapy. She states currently her breathing is more labored due to the humidity of the weather. Denies changes in her cough or sputum. I educated her on sx's to report and the importance of early recognition and reporting to prevent exacerbation of her condition. Plan-  -reinforce education and complete additional education to help prevent hospital admissions and ED visits for chronic conditions  -Assess want/need for referral for vestibular therapy as she can tolerate  -follow up on progression of sx changes to make sure they are weather related and not signs of potential exacerbation. COPD Assessment    Does the patient understand envrionmental exposure?:  Yes  Is the patient able to verbalize Rescue vs. Long Acting medications?:  No  Does the patient have a nebulizer?:  No  Does the patient use a space with inhaled medications?:  No            Symptoms:   None:  Yes      Symptom course:  stable  Breathlessness:  exertion  Change in chronic cough?:  No/At Baseline  Change in sputum?:  No/At Baseline  Sputum characteristics:   Thin  Self Monitoring - SaO2:  No  Have you had a recent diagnosis of pneumonia either by PCP or at a hospital?:  No               Care Coordination Interventions    Program Enrollment:  Complex Care  Referral from Primary Care Provider:  Yes  Suggested Interventions and Community Resources  Fall Risk Prevention:  Completed  Home Health Services:  Completed  Occupational Therapy:  Completed  Physical Therapy:  Completed  Senior Services:  Completed  Zone Management Tools:  Completed         Goals Addressed                 This Visit's Progress     Conditions and Symptoms   On track     I will follow my Zone Management tool to seek urgent or emergent care. I will notify my provider of any symptoms that indicate a worsening of my condition. COPD  Barriers: impairment:  physical  Plan for overcoming my barriers: family support  Confidence: 8/10  Anticipated Goal Completion Date: 5-11-19    Update: Marquita Nino states she has been having increased shortness of breath but thinks it is from the humidity of the weather and rain. Educated on sx's to report including changes in her cough and sputum. 5-20-19              Prior to Admission medications    Medication Sig Start Date End Date Taking? Authorizing Provider   traMADol (ULTRAM) 50 MG tablet Take 1 tablet by mouth 2 times daily as needed for Pain for up to 30 days.  5/1/19 5/31/19 Yes Jalyn Hobbs MD   metoprolol succinate (TOPROL XL) 50 MG extended release tablet Take 1 tablet by mouth daily 4/22/19  Yes Kala Dominguez MD   calcitonin (MIACALCIN) 200 UNIT/ACT nasal spray instill 1 spray into alternating nostrils once daily 4/1/19  Yes Jalyn Hobbs MD   simvastatin (ZOCOR) 40 MG tablet take 1 tablet by mouth every evening 2/5/19  Yes Kala Dominguez MD   simvastatin (ZOCOR) 40 MG tablet Take 1 tablet by mouth nightly 2/5/19  Yes Kala Dominguez MD   RaNITidine HCl (ACID REDUCER PO) Take by mouth daily   Yes Historical Provider, MD   sacubitril-valsartan (ENTRESTO) 49-51 MG per tablet Take 1 tablet by mouth 2 times daily 1/30/19  Yes Kala Dominguez MD   isosorbide mononitrate (IMDUR) 30 MG extended release tablet take 1/2 tablet by mouth once daily 1/28/19  Yes Kala Dominguez MD   furosemide (LASIX) 20 MG tablet Take 1 tablet by mouth daily 1/25/19  Yes Iván Richardson MD Iliana   acetaminophen (TYLENOL) 325 MG tablet Take 2 tablets by mouth every 6 hours 3/6/18  Yes Amaya Núñez MD   docusate sodium (COLACE, DULCOLAX) 100 MG CAPS Take 100 mg by mouth 2 times daily as needed for Constipation 3/6/18  Yes Amaya Núñez MD   calcium-vitamin D (OSCAL-500) 500-200 MG-UNIT per tablet Take 1 tablet by mouth 2 times daily 3/6/18  Yes Amaya Núñez MD   aspirin 81 MG EC tablet Take 1 tablet by mouth daily Stop 5 days before any planned intervention for compression fractures 3/7/18  Yes Amaya Núñez MD       Future Appointments   Date Time Provider hospitals   5/22/2019  8:40 AM Tobias Guillaume MD [facility]   8/19/2019 11:00 AM MD BRIANA Lozano Heart [facility] [place]   12/10/2019 11:15 AM Boston Lowe PA-C [facility]   4/15/2020  9:00 AM SCHEDULE, KAIAS PACER NURSE [facility] [place] [facility]

## 2019-05-22 ENCOUNTER — OFFICE VISIT (OUTPATIENT)
Dept: FAMILY MEDICINE CLINIC | Age: 79
End: 2019-05-22

## 2019-05-22 ENCOUNTER — TELEPHONE (OUTPATIENT)
Dept: FAMILY MEDICINE CLINIC | Age: 79
End: 2019-05-22

## 2019-05-22 VITALS
SYSTOLIC BLOOD PRESSURE: 130 MMHG | DIASTOLIC BLOOD PRESSURE: 76 MMHG | RESPIRATION RATE: 16 BRPM | BODY MASS INDEX: 34.72 KG/M2 | HEIGHT: 64 IN | WEIGHT: 203.4 LBS | HEART RATE: 66 BPM

## 2019-05-22 DIAGNOSIS — R42 VERTIGO: ICD-10-CM

## 2019-05-22 DIAGNOSIS — I10 ESSENTIAL HYPERTENSION: ICD-10-CM

## 2019-05-22 DIAGNOSIS — M51.36 DDD (DEGENERATIVE DISC DISEASE), LUMBAR: ICD-10-CM

## 2019-05-22 DIAGNOSIS — Z91.81 AT HIGH RISK FOR FALLS: Primary | ICD-10-CM

## 2019-05-22 DIAGNOSIS — G89.29 OTHER CHRONIC PAIN: ICD-10-CM

## 2019-05-22 DIAGNOSIS — E78.5 HYPERLIPIDEMIA, UNSPECIFIED HYPERLIPIDEMIA TYPE: ICD-10-CM

## 2019-05-22 PROBLEM — E43 SEVERE MALNUTRITION (HCC): Chronic | Status: RESOLVED | Noted: 2018-06-11 | Resolved: 2019-05-22

## 2019-05-22 PROCEDURE — G8399 PT W/DXA RESULTS DOCUMENT: HCPCS | Performed by: FAMILY MEDICINE

## 2019-05-22 PROCEDURE — G8417 CALC BMI ABV UP PARAM F/U: HCPCS | Performed by: FAMILY MEDICINE

## 2019-05-22 PROCEDURE — G8427 DOCREV CUR MEDS BY ELIG CLIN: HCPCS | Performed by: FAMILY MEDICINE

## 2019-05-22 PROCEDURE — 4040F PNEUMOC VAC/ADMIN/RCVD: CPT | Performed by: FAMILY MEDICINE

## 2019-05-22 PROCEDURE — 1090F PRES/ABSN URINE INCON ASSESS: CPT | Performed by: FAMILY MEDICINE

## 2019-05-22 PROCEDURE — 99214 OFFICE O/P EST MOD 30 MIN: CPT | Performed by: FAMILY MEDICINE

## 2019-05-22 PROCEDURE — 1123F ACP DISCUSS/DSCN MKR DOCD: CPT | Performed by: FAMILY MEDICINE

## 2019-05-22 PROCEDURE — 1036F TOBACCO NON-USER: CPT | Performed by: FAMILY MEDICINE

## 2019-05-22 ASSESSMENT — ENCOUNTER SYMPTOMS
SHORTNESS OF BREATH: 0
VOMITING: 0
CHEST TIGHTNESS: 0
CONSTIPATION: 0
ABDOMINAL PAIN: 0
BACK PAIN: 1
DIARRHEA: 0
NAUSEA: 0
EYES NEGATIVE: 1
COUGH: 0

## 2019-05-22 NOTE — OP NOTE
under  fluoroscopic guidance. The short J-tipped guidewire and the dilator were  removed. A [employer] [facility] [facility], model 7120Q-58 cm, serial number  [id number] dual cord defibrillator lead was prolapsed retrogradely across  the tricuspid valve. It was withdrawn from the RV outflow tract and  advanced into the right ventricular apex, where it was anchored to the  endocardium by advancing the helical screw under fluoroscopic assistance. The stylet was partially withdrawn and sufficient slack was advanced into  the lead. Pacing and sensing thresholds were performed and were good. Pacing at intervals did not produce any phrenic nerve stimulation. The  short 8-St Helenian peel-away hemostatic sheath was then removed without any  difficulties. A 10-[place] peel-away hemostatic sheath was then easily  inserted over the most lateral short J-tipped guidewire into the left  subclavian vein under fluoroscopic guidance. The short J-tipped guidewire  and the dilator were removed. A [employer] large curved delivery sheath was  advanced over an angled Glidewire into the left atrium. I then introduced  it into the right ventricular outflow tract and then withdrew it with  counterclockwise torque. I had some difficulty at first trying to identify  the location of the coronary sinus. I then placed the camera in an [place]  projection and after several different passes, was finally able to  cannulate the CS with the Glidewire. I was then able to telescope the  delivery sheath over the Glidewire into the coronary sinus. Venogram was  performed and it showed that I had already sub selected a high takeoff  posterolateral branch. Furthermore, it showed that this branch extended  almost all the way apically to the lateral side of the heart. There was  one very small, very proximal almost to the point being next to the middle  cardiac vein posterolateral branch present, but no mid branches identified. The Glidewire was removed. The sheath was flushed with saline. A [facility],  model 1458Q-86 cm, serial number [id number] lead was  advanced over Whisper EDS into this branch and placed distally. Pacing and  sensing thresholds and different configurations performed until suitable  location and pacing configuration was identified. The patient did have  diaphragmatic stimulation and maximal output; however, the diaphragmatic  threshold to capture threshold was very wide. The Whisper EDS guidewire  was removed and the finishing stylet was then placed in the lead. The  delivery sheath was cut away and then the 10-Thai peel-away hemostatic  sheath was removed. No evidence of dislodgement of the lead occurred  during this process. The stylet was then removed from the lead and  sufficient slack was advanced into the right atrium. The stylet was then  completely withdrawn from both the LV lead and the RV ICD lead. Each lead  was then individually anchored to the pectoralis muscle with two  interrupted 1-0 Ethibond mattress sutures around each of the sewing sheaths  to prevent lead migration in the pocket. Finally, a 7-Thai peel-away  hemostatic sheath was then advanced over the remaining short J-tipped  guidewire into the left subclavian vein under fluoroscopic guidance. The  short J-tipped guidewire and the dilator were removed. A [employer]  Tendril STS, model 2088TC-52 cm, serial number [id number] pacemaker lead was  placed into the right atrium with a soft straight stylet. The soft  straight stylet was withdrawn from the lead and the curved stylet was then  fully inserted into the lead. The lead was positioned into the right  atrial appendage, where it was anchored to the endocardium by advancing the  helical screw under fluoroscopic guidance. The curved stylet was partially  withdrawn and sufficient slack was advanced into the lead. Pacing and  sensing thresholds were performed and were good.   Pacing at [employer].    Luis Miguel parameters were programmed DDDR lower rate 60, upper rate 150. Tachy parameters were programmed with a VT1 zone of 176 and a VF zone of  206. Upper limit vulnerability testing was performed. The patient did not have  inducible ventricular fibrillation with a shock of 15 joules during the  vulnerable window of the T-wave, therefore the defibrillation threshold was  equal to or less than 15 joules with high probability. CONCLUSION:  1. Successful insertion of a biventricular pacemaker ICD. 2.  Successful device and lead testing with programming at the time of  implant.         Luke Parra MD    D: 01/08/2018 16:56:42       T: 01/08/2018 17:00:02     [email or web address]  Job#: [id number]     Doc#: [id number]    CC:

## 2019-05-22 NOTE — PROGRESS NOTES
Date: 2019    Charles Coronado is a [de-identified] y.o. female who presents today for:  Chief Complaint   Patient presents with    Hypertension stable    Hyperlipidemia  She states that her main problem is back pain, she still takes 2 tramadol a day to be able to function,  Also she started having problems with her vertigo. She has done vestibular rehabilitation in the past but because of all her back issues she is not able to complete it. HPI:     HPI    has a current medication list which includes the following prescription(s): tramadol, metoprolol succinate, simvastatin, ranitidine hcl, sacubitril-valsartan, isosorbide mononitrate, furosemide, acetaminophen, docusate, calcium-vitamin d, and aspirin. Allergies   Allergen Reactions    Ciprofloxacin Itching    Neomycin     Pcn [Penicillins] Hives     Pt reports having reaction 50 years ago    Zanaflex [Tizanidine Hcl] Other (See Comments)     Causes confusion    Levaquin [Levofloxacin] Itching     Benadryl was given for tx       Social History     Tobacco Use    Smoking status: Former Smoker     Packs/day: 0.50     Years: 40.00     Pack years: 20.00     Types: Cigarettes     Last attempt to quit: 1998     Years since quittin.4    Smokeless tobacco: Never Used   Substance Use Topics    Alcohol use:  Yes     Alcohol/week: 1.2 oz     Types: 2 Standard drinks or equivalent per week     Comment: occasional     Drug use: No       Past Medical History:   Diagnosis Date    Arthritis     general    [facility]     right    CAD (coronary artery disease)     stent in [place]    CH[facility]estive heart failure) [facility]     Closed compression fracture of first lumbar vertebra ([facility] 2018    Colon polyps ; 3/11    COPD [facility]chronic obstructive pulmonary disease) [facility] 2017    Diverticulosis     Erosive gastritis     Esophageal stricture 2011    Three times, Dr. Tennille Austin    Hyperlipidemia     Hypertension     Internal hemorrhoid 1/06    LUISA on CPAP     Osteopenia determined by x-ray 1999    Osteoporosis of vertebra 6/2018 Per CT thoracolumbar    Stress incontinence, female        Past Surgical History:   Procedure Laterality Date    ARM SURGERY Left 11/27/2018    plate in arm    BREAST LUMPECTOMY  1/04    wtih axillary dissection    CARDIAC DEFIBRILLATOR PLACEMENT  01/08/2018    Biventricular pacemaker ICD, Dr. Kathy Cain  3/2011    CORONARY ANGIOPLASTY WITH STENT PLACEMENT  2009    DILATATION, ESOPHAGUS  2011    HYSTERECTOMY  1976 or 1977    bleeding    JOINT REPLACEMENT Left     Shoulder    IA OFFICE/OUTPT VISIT,PROCEDURE ONLY N/A 8/2/2018    EGD DIL performed by Joselin Fortune MD at [facility] OFFICE/OUTPT VISIT,PROCEDURE ONLY Left 11/26/2018    ORIF LEFT PERIPROSTHETIC DISTAL HUMERAL FRACTURE performed by Rita Guidry MD at [place] [address] Right 2/12/2018    LEFT HUMERUS OPEN REDUCTION INTERNAL FIXATION performed by [facility] MD at [facility]    UPPER GASTROINTESTINAL ENDOSCOPY Left 8/2/2018    EGD BIOPSY performed by Joselin Fortune MD at [facility]       Family History   Problem Relation Age of Onset    Heart Disease Mother     High Blood Pressure Mother     Cancer Father     Heart Disease Sister     Cancer Brother     Heart Disease Brother      Subjective:     Review of Systems   Constitutional: Negative for activity change, appetite change, diaphoresis, fatigue and fever. HENT: Negative. Eyes: Negative. Respiratory: Negative for cough, chest tightness and shortness of breath. Cardiovascular: Negative for chest pain, palpitations and leg swelling. Gastrointestinal: Negative for abdominal pain, constipation, diarrhea, nausea and vomiting. Genitourinary: Negative. Musculoskeletal: Positive for back pain. Skin: Negative. Negative for rash.    Neurological: Positive for dizziness (on and off.). Negative for syncope, weakness, light-headedness, numbness and headaches. Psychiatric/Behavioral: Negative.        :   /76 (Site: Left Upper Arm, Position: Sitting, Cuff Size: Large Adult)   Pulse 66   Resp 16   Ht 5' 4\" (1.626 m)   Wt 203 lb 6.4 oz (92.3 kg)   BMI 34.91 kg/m²   Wt Readings from Last 3 Encounters:   05/22/19 203 lb 6.4 oz (92.3 kg)   02/13/19 198 lb (89.8 kg)   01/30/19 198 lb (89.8 kg)     Physical Exam   Constitutional: She is oriented to person, place, and time. She appears well-developed and well-nourished. No distress. HENT:   Head: Normocephalic and atraumatic. Eyes: Pupils are equal, round, and reactive to light. Conjunctivae and EOM are normal. Right eye exhibits no discharge. Left eye exhibits no discharge. No scleral icterus. Neck: Normal range of motion. Neck supple. No JVD present. No thyromegaly present. Cardiovascular: Normal rate, regular rhythm and normal heart sounds. No murmur heard. Pulmonary/Chest: Breath sounds normal. No respiratory distress. She has no wheezes. She has no rhonchi. She has no rales. Abdominal: Soft. Bowel sounds are normal. She exhibits no distension and no mass. There is no hepatosplenomegaly. There is no tenderness. There is no rebound and no guarding. Lymphadenopathy:     She has no cervical adenopathy. Neurological: She is alert and oriented to person, place, and time. She became dizzy when she got up at the exam table from a lying position. This was short lived. Skin: Skin is warm and dry. No rash noted. She is not diaphoretic. Psychiatric: She has a normal mood and affect. Her behavior is normal.   Nursing note and vitals reviewed.    :       Diagnosis Orders   1. Essential hypertension  Comprehensive Metabolic Panel    CBC Auto Differential   2. Hyperlipidemia, unspecified hyperlipidemia type  Lipid Panel   3.  Other chronic pain  Yaneth Ortiz MD, Pain Medicine, [facility]   4. DDD (degenerative disc disease), lumbar  Whit Dimas MD, Pain Medicine, [facility]   5. Vertigo  PT vestibular rehab       :      Requested Prescriptions      No prescriptions requested or ordered in this encounter     Current Outpatient Medications   Medication Sig Dispense Refill    traMADol (ULTRAM) 50 MG tablet Take 1 tablet by mouth 2 times daily as needed for Pain for up to 30 days. 40 tablet 0    metoprolol succinate (TOPROL XL) 50 MG extended release tablet Take 1 tablet by mouth daily 90 tablet 3    simvastatin (ZOCOR) 40 MG tablet take 1 tablet by mouth every evening 90 tablet 3    RaNITidine HCl (ACID REDUCER PO) Take by mouth daily      sacubitril-valsartan (ENTRESTO) 49-51 MG per tablet Take 1 tablet by mouth 2 times daily 180 tablet 3    isosorbide mononitrate (IMDUR) 30 MG extended release tablet take 1/2 tablet by mouth once daily 45 tablet 3    furosemide (LASIX) 20 MG tablet Take 1 tablet by mouth daily 60 tablet 3    acetaminophen (TYLENOL) 325 MG tablet Take 2 tablets by mouth every 6 hours 120 tablet 0    docusate sodium (COLACE, DULCOLAX) 100 MG CAPS Take 100 mg by mouth 2 times daily as needed for Constipation      calcium-vitamin D (OSCAL-500) 500-200 MG-UNIT per tablet Take 1 tablet by mouth 2 times daily 30 tablet 3    aspirin 81 MG EC tablet Take 1 tablet by mouth daily Stop 5 days before any planned intervention for compression fractures 30 tablet 3     No current facility-administered medications for this visit.       Orders Placed This Encounter   Procedures    Lipid Panel     Standing Status:   Future     Standing Expiration Date:   5/21/2020     Order Specific Question:   Is Patient Fasting?/# of Hours     Answer:   yes 12 hours    Comprehensive Metabolic Panel     Standing Status:   Future     Standing Expiration Date:   5/21/2020    CBC Auto Differential     Standing Status:   Future     Standing Expiration Date:   5/22/2020   [facility] - Fabi Wing MD, Pain Medicine, [facility]     Referral Priority:   Routine     Referral Type:   Eval and Treat     Referral Reason:   Specialty Services Required     Referred to Provider:   Omari Handy MD     Requested Specialty:   Pain Medicine     Number of Visits Requested:   1    PT vestibular rehab     Continue current medications. Return in about 4 months (around 9/22/2019) for HTN. Discussed use, benefit, and side effects of prescribed medications. All patient questions answered. Pt voiced understanding. Instructed to continue current medications,diet and exercise. Patient agreed with treatment plan. On the basis of positive falls risk screening, assessment and plan is as follows: Referral for vestibular rehab.

## 2019-05-22 NOTE — TELEPHONE ENCOUNTER
[facility] ([phone]) - pt called there requesting an appt for vertigo. They did go ahead and schedule her for June 6, 2019.     They need medical record including patient demographics, copy of insurance cards, notes and a referral.    Fax - [phone]

## 2019-05-31 ENCOUNTER — HOSPITAL ENCOUNTER (OUTPATIENT)
Age: 79
Discharge: HOME | End: 2019-05-31
Payer: MEDICARE

## 2019-05-31 ENCOUNTER — CARE COORDINATION (OUTPATIENT)
Dept: CARE COORDINATION | Age: 79
End: 2019-05-31

## 2019-05-31 DIAGNOSIS — I10 ESSENTIAL HYPERTENSION: ICD-10-CM

## 2019-05-31 DIAGNOSIS — E78.5 HYPERLIPIDEMIA, UNSPECIFIED HYPERLIPIDEMIA TYPE: ICD-10-CM

## 2019-05-31 LAB
ALBUMIN SERPL-MCNC: 4.2 G/DL (ref 3.5–5.1)
ALP BLD-CCNC: 48 U/L (ref 38–126)
ALT SERPL-CCNC: 8 U/L (ref 11–66)
ANION GAP SERPL CALCULATED.3IONS-SCNC: 13 MEQ/L (ref 8–16)
AST SERPL-CCNC: 13 U/L (ref 5–40)
BASOPHILS # BLD: 0.5 %
BASOPHILS ABSOLUTE: 0 THOU/MM3 (ref 0–0.1)
BILIRUB SERPL-MCNC: 0.7 MG/DL (ref 0.3–1.2)
BUN BLDV-MCNC: 21 MG/DL (ref 7–22)
CALCIUM SERPL-MCNC: 10 MG/DL (ref 8.5–10.5)
CHLORIDE BLD-SCNC: 102 MEQ/L (ref 98–111)
CHOLESTEROL, TOTAL: 178 MG/DL (ref 100–199)
CO2: 30 MEQ/L (ref 23–33)
CREAT SERPL-MCNC: 0.7 MG/DL (ref 0.4–1.2)
EOSINOPHIL # BLD: 1.5 %
EOSINOPHILS ABSOLUTE: 0.1 THOU/MM3 (ref 0–0.4)
ERYTHROCYTE [DISTWIDTH] IN BLOOD BY AUTOMATED COUNT: 12.6 % (ref 11.5–14.5)
ERYTHROCYTE [DISTWIDTH] IN BLOOD BY AUTOMATED COUNT: 43.3 FL (ref 35–45)
GFR SERPL CREATININE-BSD FRML MDRD: 80 ML/MIN/1.73M2
GLUCOSE BLD-MCNC: 97 MG/DL (ref 70–108)
HCT VFR BLD CALC: 41.2 % (ref 37–47)
HDLC SERPL-MCNC: 61 MG/DL
HEMOGLOBIN: 14.1 GM/DL (ref 12–16)
IMMATURE GRANS (ABS): 0.02 THOU/MM3 (ref 0–0.07)
IMMATURE GRANULOCYTES: 0.3 %
LDL CHOLESTEROL CALCULATED: 90 MG/DL
LYMPHOCYTES # BLD: 23 %
LYMPHOCYTES ABSOLUTE: 1.4 THOU/MM3 (ref 1–4.8)
MCH RBC QN AUTO: 32.2 PG (ref 26–33)
MCHC RBC AUTO-ENTMCNC: 34.2 GM/DL (ref 32.2–35.5)
MCV RBC AUTO: 94.1 FL (ref 81–99)
MONOCYTES # BLD: 5.9 %
MONOCYTES ABSOLUTE: 0.4 THOU/MM3 (ref 0.4–1.3)
NUCLEATED RED BLOOD CELLS: 0 /100 WBC
PLATELET # BLD: 194 THOU/MM3 (ref 130–400)
PMV BLD AUTO: 10.5 FL (ref 9.4–12.4)
POTASSIUM SERPL-SCNC: 3.7 MEQ/L (ref 3.5–5.2)
RBC # BLD: 4.38 MILL/MM3 (ref 4.2–5.4)
SEG NEUTROPHILS: 68.8 %
SEGMENTED NEUTROPHILS ABSOLUTE COUNT: 4.2 THOU/MM3 (ref 1.8–7.7)
SODIUM BLD-SCNC: 145 MEQ/L (ref 135–145)
TOTAL PROTEIN: 6.7 G/DL (ref 6.1–8)
TRIGL SERPL-MCNC: 137 MG/DL (ref 0–199)
WBC # BLD: 6.1 THOU/MM3 (ref 4.8–10.8)

## 2019-05-31 PROCEDURE — 80053 COMPREHEN METABOLIC PANEL: CPT

## 2019-05-31 PROCEDURE — 80061 LIPID PANEL: CPT

## 2019-05-31 PROCEDURE — 85025 COMPLETE CBC W/AUTO DIFF WBC: CPT

## 2019-05-31 PROCEDURE — 36415 COLL VENOUS BLD VENIPUNCTURE: CPT

## 2019-05-31 NOTE — CARE COORDINATION
Ambulatory Care Coordination Note  5/31/2019  CM Risk Score: 9  Roland Mortality Risk Score: 21    ACC: Gabrielle Carrasco RN    Summary Note: Spoke with Destiny John. Discussed her pain which has flared up for her. She has appt with pain management clinic in June as a new referral.  States she is taking Tylenol with no effect. Was taking Tramadol but states PCP informed her she would not order more of these until pain management appt. I encouraged her to try some ibuprofen for this pain to see if that helps. She verbalized understanding and states she will try it. Order has been written again for vestibular therapy. She states she will be starting that soon. States COPD is back to baseline with no changes in breathing, cough or sputum.   Plan-  -reinforce education completed and completed additional education to help manage chronic conditions  -follow up with vestibular therapy  -assess for barriers and need for further intervention  COPD Assessment    Does the patient understand envrionmental exposure?:  Yes  Is the patient able to verbalize Rescue vs. Long Acting medications?:  No  Does the patient have a nebulizer?:  No  Does the patient use a space with inhaled medications?:  No     No patient-reported symptoms         Symptoms:          and   General Assessment    Do you have any symptoms that are causing concern?:  Yes  Progression since Onset:  Unchanged  Reported Symptoms:  Pain               Care Coordination Interventions    Program Enrollment:  Complex Care  Referral from Primary Care Provider:  Yes  Suggested Interventions and Community Resources  Fall Risk Prevention:  Completed  Home Health Services:  Completed  Occupational Therapy:  Completed  Physical Therapy:  Completed  Senior Services:  Completed  Zone Management Tools:  Completed         Goals Addressed                 This Visit's Progress     Conditions and Symptoms   On track     I will follow my Zone Management tool to seek urgent or emergent care.  I will notify my provider of any symptoms that indicate a worsening of my condition. COPD  Barriers: impairment:  physical  Plan for overcoming my barriers: family support  Confidence: 8/10  Anticipated Goal Completion Date: 5-11-19 Update 6-30-19    Update: Constance Castillo states she has been having increased shortness of breath but thinks it is from the humidity of the weather and rain. Educated on sx's to report including changes in her cough and sputum. 5-20-19              Prior to Admission medications    Medication Sig Start Date End Date Taking? Authorizing Provider   traMADol (ULTRAM) 50 MG tablet Take 1 tablet by mouth 2 times daily as needed for Pain for up to 30 days.  5/1/19 5/31/19 Yes Alek Oneil MD   metoprolol succinate (TOPROL XL) 50 MG extended release tablet Take 1 tablet by mouth daily 4/22/19  Yes Tita Nina MD   simvastatin (ZOCOR) 40 MG tablet take 1 tablet by mouth every evening 2/5/19  Yes Tita Nina MD   RaNITidine HCl (ACID REDUCER PO) Take by mouth daily   Yes Historical Provider, MD   sacubitril-valsartan (ENTRESTO) 49-51 MG per tablet Take 1 tablet by mouth 2 times daily 1/30/19  Yes Tita Nina MD   isosorbide mononitrate (IMDUR) 30 MG extended release tablet take 1/2 tablet by mouth once daily 1/28/19  Yes Tita Nina MD   furosemide (LASIX) 20 MG tablet Take 1 tablet by mouth daily 1/25/19  Yes Belkys Pena MD   acetaminophen (TYLENOL) 325 MG tablet Take 2 tablets by mouth every 6 hours 3/6/18  Yes Sonia Almendarez MD   docusate sodium (COLACE, DULCOLAX) 100 MG CAPS Take 100 mg by mouth 2 times daily as needed for Constipation 3/6/18  Yes Sonia Almendarez MD   calcium-vitamin D (OSCAL-500) 500-200 MG-UNIT per tablet Take 1 tablet by mouth 2 times daily 3/6/18  Yes Sonia Almendarez MD   aspirin 81 MG EC tablet Take 1 tablet by mouth daily Stop 5 days before any planned intervention for compression fractures 3/7/18  Yes Sonia Almendarez MD

## 2019-06-04 ENCOUNTER — HOSPITAL ENCOUNTER (EMERGENCY)
Age: 79
Discharge: HOME | End: 2019-06-04
Attending: EMERGENCY MEDICINE
Payer: MEDICARE

## 2019-06-04 VITALS
HEIGHT: 64 IN | TEMPERATURE: 97.6 F | DIASTOLIC BLOOD PRESSURE: 71 MMHG | BODY MASS INDEX: 34.15 KG/M2 | WEIGHT: 200 LBS | SYSTOLIC BLOOD PRESSURE: 125 MMHG | RESPIRATION RATE: 17 BRPM | HEART RATE: 67 BPM | OXYGEN SATURATION: 96 %

## 2019-06-04 DIAGNOSIS — N30.01 ACUTE CYSTITIS WITH HEMATURIA: Primary | ICD-10-CM

## 2019-06-04 LAB
AMORPHOUS: ABNORMAL
ANION GAP SERPL CALCULATED.3IONS-SCNC: 13 MEQ/L (ref 8–16)
BACTERIA: ABNORMAL
BASOPHILS # BLD: 0.3 %
BASOPHILS ABSOLUTE: 0 THOU/MM3 (ref 0–0.1)
BILIRUBIN URINE: NEGATIVE
BLOOD, URINE: ABNORMAL
BUN BLDV-MCNC: 26 MG/DL (ref 7–22)
CALCIUM SERPL-MCNC: 9.7 MG/DL (ref 8.5–10.5)
CASTS: ABNORMAL /LPF
CHARACTER, URINE: ABNORMAL
CHLORIDE BLD-SCNC: 103 MEQ/L (ref 98–111)
CO2: 28 MEQ/L (ref 23–33)
COLOR: YELLOW
CREAT SERPL-MCNC: 0.7 MG/DL (ref 0.4–1.2)
CRYSTALS: ABNORMAL
EKG ATRIAL RATE: 92 BPM
EKG Q-T INTERVAL: 472 MS
EKG QRS DURATION: 148 MS
EKG QTC CALCULATION (BAZETT): 516 MS
EKG R AXIS: -51 DEGREES
EKG T AXIS: 54 DEGREES
EKG VENTRICULAR RATE: 72 BPM
EOSINOPHIL # BLD: 0.4 %
EOSINOPHILS ABSOLUTE: 0 THOU/MM3 (ref 0–0.4)
EPITHELIAL CELLS, UA: ABNORMAL /HPF
ERYTHROCYTE [DISTWIDTH] IN BLOOD BY AUTOMATED COUNT: 12.7 % (ref 11.5–14.5)
ERYTHROCYTE [DISTWIDTH] IN BLOOD BY AUTOMATED COUNT: 44.4 FL (ref 35–45)
GFR SERPL CREATININE-BSD FRML MDRD: 80 ML/MIN/1.73M2
GLUCOSE BLD-MCNC: 100 MG/DL (ref 70–108)
GLUCOSE, URINE: NEGATIVE MG/DL
HCT VFR BLD CALC: 42.7 % (ref 37–47)
HEMOGLOBIN: 14.2 GM/DL (ref 12–16)
IMMATURE GRANS (ABS): 0.05 THOU/MM3 (ref 0–0.07)
IMMATURE GRANULOCYTES: 0.5 %
KETONES, URINE: NEGATIVE
LEUKOCYTE EST, POC: ABNORMAL
LYMPHOCYTES # BLD: 13.8 %
LYMPHOCYTES ABSOLUTE: 1.4 THOU/MM3 (ref 1–4.8)
MCH RBC QN AUTO: 32 PG (ref 26–33)
MCHC RBC AUTO-ENTMCNC: 33.3 GM/DL (ref 32.2–35.5)
MCV RBC AUTO: 96.2 FL (ref 81–99)
MONOCYTES # BLD: 4.4 %
MONOCYTES ABSOLUTE: 0.5 THOU/MM3 (ref 0.4–1.3)
MUCUS: ABNORMAL
NITRITE, URINE: POSITIVE
NUCLEATED RED BLOOD CELLS: 0 /100 WBC
OSMOLALITY CALCULATION: 291.7 MOSMOL/KG (ref 275–300)
PH UA: 5.5 (ref 5–9)
PLATELET # BLD: 196 THOU/MM3 (ref 130–400)
PMV BLD AUTO: 10.4 FL (ref 9.4–12.4)
POTASSIUM REFLEX MAGNESIUM: 3.9 MEQ/L (ref 3.5–5.2)
PROTEIN UA: NEGATIVE MG/DL
RBC # BLD: 4.44 MILL/MM3 (ref 4.2–5.4)
RBC URINE: ABNORMAL /HPF
SEG NEUTROPHILS: 80.6 %
SEGMENTED NEUTROPHILS ABSOLUTE COUNT: 8.3 THOU/MM3 (ref 1.8–7.7)
SODIUM BLD-SCNC: 144 MEQ/L (ref 135–145)
SPECIFIC GRAVITY UA: 1.02 (ref 1–1.03)
UROBILINOGEN, URINE: 1 EU/DL (ref 0–1)
WBC # BLD: 10.3 THOU/MM3 (ref 4.8–10.8)
WBC UA: > 200 /HPF

## 2019-06-04 PROCEDURE — 99284 EMERGENCY DEPT VISIT MOD MDM: CPT

## 2019-06-04 PROCEDURE — 2580000003 HC RX 258: Performed by: EMERGENCY MEDICINE

## 2019-06-04 PROCEDURE — 6360000002 HC RX W HCPCS: Performed by: EMERGENCY MEDICINE

## 2019-06-04 PROCEDURE — 36415 COLL VENOUS BLD VENIPUNCTURE: CPT

## 2019-06-04 PROCEDURE — 87186 SC STD MICRODIL/AGAR DIL: CPT

## 2019-06-04 PROCEDURE — 93010 ELECTROCARDIOGRAM REPORT: CPT | Performed by: NUCLEAR MEDICINE

## 2019-06-04 PROCEDURE — 93005 ELECTROCARDIOGRAM TRACING: CPT | Performed by: EMERGENCY MEDICINE

## 2019-06-04 PROCEDURE — 87184 SC STD DISK METHOD PER PLATE: CPT

## 2019-06-04 PROCEDURE — 81001 URINALYSIS AUTO W/SCOPE: CPT

## 2019-06-04 PROCEDURE — 2709999900 HC NON-CHARGEABLE SUPPLY

## 2019-06-04 PROCEDURE — 87086 URINE CULTURE/COLONY COUNT: CPT

## 2019-06-04 PROCEDURE — 80048 BASIC METABOLIC PNL TOTAL CA: CPT

## 2019-06-04 PROCEDURE — 96365 THER/PROPH/DIAG IV INF INIT: CPT

## 2019-06-04 PROCEDURE — 85025 COMPLETE CBC W/AUTO DIFF WBC: CPT

## 2019-06-04 PROCEDURE — 87077 CULTURE AEROBIC IDENTIFY: CPT

## 2019-06-04 RX ORDER — NITROFURANTOIN 25; 75 MG/1; MG/1
100 CAPSULE ORAL 2 TIMES DAILY
Qty: 14 CAPSULE | Refills: 0 | Status: SHIPPED | OUTPATIENT
Start: 2019-06-04 | End: 2019-06-11

## 2019-06-04 RX ORDER — 0.9 % SODIUM CHLORIDE 0.9 %
1000 INTRAVENOUS SOLUTION INTRAVENOUS ONCE
Status: COMPLETED | OUTPATIENT
Start: 2019-06-04 | End: 2019-06-04

## 2019-06-04 RX ADMIN — CEFTRIAXONE SODIUM 1 G: 1 INJECTION, POWDER, FOR SOLUTION INTRAMUSCULAR; INTRAVENOUS at 09:47

## 2019-06-04 RX ADMIN — SODIUM CHLORIDE 1000 ML: 9 INJECTION, SOLUTION INTRAVENOUS at 09:12

## 2019-06-04 ASSESSMENT — ENCOUNTER SYMPTOMS
EYE PAIN: 0
SORE THROAT: 0
DIARRHEA: 0
VOMITING: 0
EYE DISCHARGE: 0
ABDOMINAL PAIN: 0
RHINORRHEA: 0
BACK PAIN: 0
WHEEZING: 0
SHORTNESS OF BREATH: 0
NAUSEA: 0
COUGH: 0

## 2019-06-04 NOTE — ED NOTES
Patient resting in bed. Updated on plan of care and upcoming discharge. Denies any needs. Call light in reach.       Nadeem Esparza RN  06/04/19 0959

## 2019-06-04 NOTE — CARE COORDINATION
ED Care Transition    2019    Patient Name: Demi Morley   : 1940  MRN: [id number]    CRIS Score: 3  PCP: Marry Lovelace MD   Specialist: yes - Dr. Alphonso Canales, Pain Management, Sallie GIBBS, Dr. Luke Valderrama ACC/CTC: yes - ACC                        Utilization Review:  ED visits:  2  19 - Dizziness, UTI   18 - HTN    Admissions: 2   18-18 - Fall, Left distal humerus fracture   18-6/15/18 - E.coli UTI, treated with Bactrim     Problem List:  Patient Active Problem List   Diagnosis    Hyperlipidemia    Stress incontinence, female    [facility]    CAD (coronary artery disease) s/p PCI and stent Multilink 3 x 18  mm mid LAD- in 2009 at [facility]    Obstructive sleep apnea on CPAP    COPD (chronic obstructive pulmonary disease) ([facility]    S/P ICD (internal cardiac defibrillator) procedure    Nonischemic cardiomyopathy ([facility]    Class 2 obesity due to excess calories with serious comorbidity and body mass index (BMI) of 35.0 to 35.9 in adult    Closed compression fracture of first lumbar vertebra ([facility]    Hypertension    Adjustment disorder with mixed anxiety and depressed mood    Osteopenia determined by x-ray    Osteoporosis of vertebra    Closed displaced oblique fracture of shaft of left humerus    ICD (implantable cardioverter-defibrillator) in place     Summary:  Met with Nancy, introduced self/role. Presented to ED for evaluation of dizziness and dysuria. Patient has a history of vertigo, reports this was different, scheduled to go to vestibular rehab. Does not have Antivert at home. Patient has a history of stress incontinence and E.coli UTI, treated with Bactrim. Reports she wears a pad and depends at night, changes in the am. Advised patient to change pad/depends as soon as she knows she voided, verbalized understanding. Denies having urology consultation. Patient resides at home alone. Has a good social support system.  Denies further needs/assistance at home. Has cane if needed. Plan is for discharge. Patient comfortable going home with current services. Declines the need for home health or additional services. Educated on UTI prevention, verbalized understanding. Educated on s/s to monitor for. Educated on s/s to report to PCP or return, verbalized understanding.      Follow up appointments:    Future Appointments   Date Time Provider Laura Dickens   6/17/2019  1:30 PM JADEN Carvalho - CNP [facility] Pain [facility] [place]   8/19/2019 11:00 AM Nalini Lee MD [facility]  [place] [address]   9/23/2019  9:30 AM Demetria Ferguson MD [facility]   12/10/2019 11:15 AM Hope Guajardo PA-C Pulm Med [facility] [place] [address]   4/15/2020  9:00 AM SCHEDULE, SRPS PACER NURSE [facility] PACER [facility] [place] [address]       Review Due Health Maintenance:  Health Maintenance Due   Topic Date Due    Shingles Vaccine (1 of 2) 01/01/1989    Pneumococcal 65+ years Vaccine (1 of 2 - PCV13) 01/01/2004     YADIRA Mittal 11, BSN  [phone]  [phone]

## 2019-06-04 NOTE — ED PROVIDER NOTES
[facility]  eMERGENCY dEPARTMENT eNCOUnter          CHIEF COMPLAINT       Chief Complaint   Patient presents with    Dizziness       Nurses Notes reviewed and I agree except as noted in the HPI. HISTORY OF PRESENT ILLNESS    Angelique Whitaker is a [de-identified] y.o. female who presents to the Emergency Department for the evaluation of dizziness. Patient states that she has a history of vertigo and is scheduled to see a specialist tomorrow. Patient has has had fall in the past related to her vertigo. She explains it as \"feel unsteady and uncomfortable\". She states that when she ambulates and moves her head a certain way she gets off balance. Patient reports that when she looks to the left she has double vision and is being monitored by optometrist. She also reports that she began to have dysuria on Sunday 6/2/19. Patient denies fever, chills, nausea, emesis, weakness, headache, urinary urgency, urinary frequency, hematuria, shortness of breath, or chest pain. No further complaints at initial evaluation. The HPI was provided by the patient. REVIEW OF SYSTEMS     Review of Systems   Constitutional: Negative for appetite change, chills, fatigue and fever. HENT: Negative for congestion, ear pain, rhinorrhea and sore throat. Eyes: Negative for pain, discharge and visual disturbance. Respiratory: Negative for cough, shortness of breath and wheezing. Cardiovascular: Negative for chest pain, palpitations and leg swelling. Gastrointestinal: Negative for abdominal pain, diarrhea, nausea and vomiting. Genitourinary: Positive for dysuria. Negative for difficulty urinating, hematuria and vaginal discharge. Musculoskeletal: Negative for arthralgias, back pain, joint swelling and neck pain. Skin: Negative for pallor and rash. Neurological: Positive for dizziness. Negative for syncope, weakness, light-headedness, numbness and headaches. Hematological: Negative for adenopathy. HCL (ACID REDUCER PO)    Take by mouth daily    SACUBITRIL-VALSARTAN (ENTRESTO) 49-51 MG PER TABLET    Take 1 tablet by mouth 2 times daily    SIMVASTATIN (ZOCOR) 40 MG TABLET    take 1 tablet by mouth every evening       ALLERGIES     is allergic to ciprofloxacin; neomycin; pcn [penicillins]; zanaflex [tizanidine hcl]; and levaquin [levofloxacin]. FAMILY HISTORY     indicated that her mother is . She indicated that her father is . She indicated that her sister is . She indicated that her brother is . family history includes Cancer in her brother and father; Heart Disease in her brother, mother, and sister; High Blood Pressure in her mother. SOCIAL HISTORY      reports that she quit smoking about 21 years ago. Her smoking use included cigarettes. She has a 20.00 pack-year smoking history. She has never used smokeless tobacco. She reports that she drinks about 1.2 oz of alcohol per week. She reports that she does not use drugs. PHYSICAL EXAM     INITIAL VITALS:  height is 5' 4\" (1.626 m) and weight is 200 lb (90.7 kg). Her oral temperature is 97.6 °F (36.4 °C). Her blood pressure is 125/71 and her pulse is 67. Her respiration is 17 and oxygen saturation is 96%. Physical Exam   Constitutional: She is oriented to person, place, and time. She appears well-developed and well-nourished. Non-toxic appearance. HENT:   Head: Normocephalic and atraumatic. Right Ear: Tympanic membrane and external ear normal.   Left Ear: Tympanic membrane and external ear normal.   Nose: Nose normal.   Mouth/Throat: Oropharynx is clear and moist and mucous membranes are normal. No oropharyngeal exudate, posterior oropharyngeal edema or posterior oropharyngeal erythema. Eyes: Conjunctivae and EOM are normal.   Neck: Normal range of motion. Neck supple. No JVD present. Cardiovascular: Normal rate, regular rhythm, normal heart sounds, intact distal pulses and normal pulses.  Exam reveals no within normal limits   GLOMERULAR FILTRATION RATE, ESTIMATED - Abnormal; Notable for the following components:    Est, Glom Filt Rate 80 (*)     All other components within normal limits   URINE CULTURE   ANION GAP   OSMOLALITY       EMERGENCY DEPARTMENT COURSE:   Vitals:    Vitals:    06/04/19 0837 06/04/19 0923 06/04/19 1015 06/04/19 1115   BP: (!) 153/74 129/75 126/72 125/71   Pulse: 70 68 66 67   Resp: 15 15 17 17   Temp: 97.6 °F (36.4 °C)      TempSrc: Oral      SpO2: 95% 95% 95% 96%   Weight: 200 lb (90.7 kg)      Height: 5' 4\" (1.626 m)          8:53 AM: The patient was seen and evaluated in a timely fashion. MDM:  The patient was seen within the ED today for the evaluation of dizziness. The patient arrived in no acute distress and in stable condition. Within the department, I observed the patient's vital signs to be within acceptable range. On exam, I appreciated heart and lungs clear to ascultation. Abdomen soft and non-tender. No reproducible symptoms with eye or head movement. EOM intact bilaterally. Laboratory work was reassuring for UTI. Within the department, the patient was treated with Rocephin. I observed the patient's condition to remain stable during the duration of her stay. I explained my proposed course of treatment to the patient, who was amenable to my decision, and I answered all questions that were asked. She was discharged home in stable condition with prescriptions for Macrobid, and the patient will return to the ED if her symptoms become more severe in nature or otherwise change. I advised the patient to follow-up with PCP. I also discussed return to ED precautions with the patient who verbalized understanding. CRITICAL CARE:   None     CONSULTS:  None    PROCEDURES:  None     FINAL IMPRESSION      1.  Acute cystitis with hematuria          DISPOSITION/PLAN   Discharge    PATIENT REFERRED TO:  [facility]  [phone]    Schedule an appointment as soon as possible for a visit in 2 days  RE-CHECK AND FURTHER TESTING AS NEEDED      DISCHARGE MEDICATIONS:  New Prescriptions    NITROFURANTOIN, MACROCRYSTAL-MONOHYDRATE, (MACROBID) 100 MG CAPSULE    Take 1 capsule by mouth 2 times daily for 7 days       (Please note that portions of this note were completed with a voice recognition program.Efforts were made to edit thedictations but occasionally words are mis-transcribed.)    Scribe:  Kassie Vazquez 6/4/19 8:53 AM Scribing forand in the presence Ty Pascual DO. Signed by: Niki Weinberg, 06/04/19 11:23 AM    Provider:  I personally performed the services described in the documentation, reviewed and edited the documentation which was dictated to the scribe in my presence, and it accurately records mywords and actions.     Rena Richter DO 6/4/19 11:23 AM                  Rena Richter DO  06/04/19 [id number]

## 2019-06-04 NOTE — ED TRIAGE NOTES
Patient presents to ED with c/o vertigo. Patient states that she has chronic issues with vertigo and is scheduled to see a specialist for this tomorrow. States that she lives alone and doesn't feel comfortable being by herself with feeling dizzy. Denies any pain. Call light in reach.

## 2019-06-06 LAB
ORGANISM: ABNORMAL
URINE CULTURE, ROUTINE: ABNORMAL

## 2019-06-07 ENCOUNTER — TELEPHONE (OUTPATIENT)
Dept: FAMILY MEDICINE CLINIC | Age: 79
End: 2019-06-07

## 2019-06-07 ENCOUNTER — OFFICE VISIT (OUTPATIENT)
Dept: FAMILY MEDICINE CLINIC | Age: 79
End: 2019-06-07

## 2019-06-07 VITALS
BODY MASS INDEX: 34.91 KG/M2 | HEART RATE: 80 BPM | SYSTOLIC BLOOD PRESSURE: 130 MMHG | WEIGHT: 204.5 LBS | HEIGHT: 64 IN | DIASTOLIC BLOOD PRESSURE: 70 MMHG | RESPIRATION RATE: 13 BRPM

## 2019-06-07 DIAGNOSIS — I10 ESSENTIAL HYPERTENSION: Primary | ICD-10-CM

## 2019-06-07 DIAGNOSIS — M51.36 DDD (DEGENERATIVE DISC DISEASE), LUMBAR: ICD-10-CM

## 2019-06-07 DIAGNOSIS — G89.29 OTHER CHRONIC PAIN: ICD-10-CM

## 2019-06-07 DIAGNOSIS — N39.0 URINARY TRACT INFECTION WITHOUT HEMATURIA, SITE UNSPECIFIED: ICD-10-CM

## 2019-06-07 PROCEDURE — 1090F PRES/ABSN URINE INCON ASSESS: CPT | Performed by: FAMILY MEDICINE

## 2019-06-07 PROCEDURE — 1123F ACP DISCUSS/DSCN MKR DOCD: CPT | Performed by: FAMILY MEDICINE

## 2019-06-07 PROCEDURE — G8427 DOCREV CUR MEDS BY ELIG CLIN: HCPCS | Performed by: FAMILY MEDICINE

## 2019-06-07 PROCEDURE — 1036F TOBACCO NON-USER: CPT | Performed by: FAMILY MEDICINE

## 2019-06-07 PROCEDURE — 4040F PNEUMOC VAC/ADMIN/RCVD: CPT | Performed by: FAMILY MEDICINE

## 2019-06-07 PROCEDURE — G8399 PT W/DXA RESULTS DOCUMENT: HCPCS | Performed by: FAMILY MEDICINE

## 2019-06-07 PROCEDURE — G8417 CALC BMI ABV UP PARAM F/U: HCPCS | Performed by: FAMILY MEDICINE

## 2019-06-07 PROCEDURE — 99213 OFFICE O/P EST LOW 20 MIN: CPT | Performed by: FAMILY MEDICINE

## 2019-06-07 RX ORDER — TRAMADOL HYDROCHLORIDE 50 MG/1
50 TABLET ORAL 2 TIMES DAILY PRN
Qty: 40 TABLET | Refills: 0 | Status: SHIPPED | OUTPATIENT
Start: 2019-06-07 | End: 2019-07-24 | Stop reason: SDUPTHER

## 2019-06-07 ASSESSMENT — ENCOUNTER SYMPTOMS
ABDOMINAL PAIN: 0
SHORTNESS OF BREATH: 0
CHEST TIGHTNESS: 0
EYES NEGATIVE: 1
BACK PAIN: 1
DIARRHEA: 0
COUGH: 0
NAUSEA: 0
CONSTIPATION: 0
VOMITING: 0

## 2019-06-07 NOTE — PROGRESS NOTES
Pharmacy Note  ED Culture Follow-up    Alma Dove is a [de-identified] y.o. female. Allergies: Ciprofloxacin; Neomycin; Pcn [penicillins]; Zanaflex [tizanidine hcl]; and Levaquin [levofloxacin]     Labs:  Lab Results   Component Value Date    BUN 26 (H) 06/04/2019    CREATININE 0.7 06/04/2019    WBC 10.3 06/04/2019     Estimated Creatinine Clearance: 70 mL/min (based on SCr of 0.7 mg/dL). Current antimicrobials:   Macrobid     ASSESSMENT:  Micro results:   Urine culture: positive for e. coli      PLAN:  Need for intervention: Sensitive to macrobid - already started   Discussed with:  Osmani Isaacs PA-C  Chosen treatment:    Patient already on appropriate treatment as above    Patient response:   No need to contact patient    Called/sent in prescription to: Not applicable    Please call with any questions.  [address], PharmD  6/7/2019  4:24 PM

## 2019-06-07 NOTE — PROGRESS NOTES
Date: 2019    Marquita Garcia is a [de-identified] y.o. female who presents today for:  Chief Complaint   Patient presents with    Follow-Up from Hospital     UTI  She has appt with her ortho 6/10/19 and pain management 19. She states that she is in a lot of pain since she doesn't have any more pain meds. She has been using Ibuprofen but is does not help et all. HPI:     HPI    has a current medication list which includes the following prescription(s): tramadol, nitrofurantoin (macrocrystal-monohydrate), metoprolol succinate, simvastatin, ranitidine hcl, sacubitril-valsartan, isosorbide mononitrate, furosemide, acetaminophen, docusate, calcium-vitamin d, and aspirin. Allergies   Allergen Reactions    Ciprofloxacin Itching    Neomycin     Pcn [Penicillins] Hives     Pt reports having reaction 50 years ago    Zanaflex [Tizanidine Hcl] Other (See Comments)     Causes confusion    Levaquin [Levofloxacin] Itching     Benadryl was given for tx       Social History     Tobacco Use    Smoking status: Former Smoker     Packs/day: 0.50     Years: 40.00     Pack years: 20.00     Types: Cigarettes     Last attempt to quit: 1998     Years since quittin.4    Smokeless tobacco: Never Used   Substance Use Topics    Alcohol use:  Yes     Alcohol/week: 1.2 oz     Types: 2 Standard drinks or equivalent per week     Comment: occasional     Drug use: No       Past Medical History:   Diagnosis Date    Arthritis     general    Breast CA ([facility]     right    CAD (coronary artery disease)     stent in [place]    CHF (congestive heart failure) (HCC)     Closed compression fracture of first lumbar vertebra ([facility] 2018    Colon polyps ; 3/11    COPD (chronic obstructive pulmonary disease) ([facility] [facility] 2017    Diverticulosis     Erosive gastritis     Esophageal stricture 2011    Three times, Dr. Inga Jackson    Hyperlipidemia     Hypertension     Internal hemorrhoid     LUISA on CPAP     Osteopenia determined by x-ray 1999    Osteoporosis of vertebra 6/2018 Per CT thoracolumbar    Stress incontinence, female        Past Surgical History:   Procedure Laterality Date    ARM SURGERY Left 11/27/2018    plate in arm    BREAST LUMPECTOMY  1/04    wtih axillary dissection    CARDIAC DEFIBRILLATOR PLACEMENT  01/08/2018    Biventricular pacemaker ICD, Dr. Veronica Nelson  3/2011    CORONARY ANGIOPLASTY WITH STENT PLACEMENT  2009    DILATATION, ESOPHAGUS  2011    HYSTERECTOMY  1976 or 1977    bleeding    JOINT REPLACEMENT Left     Shoulder    [facility] OFFICE/OUTPT VISIT,PROCEDURE ONLY N/A 8/2/2018    EGD DIL performed by David Gamino MD at [address] [place] [address] OFFICE/OUTPT VISIT,PROCEDURE ONLY Left 11/26/2018    ORIF LEFT PERIPROSTHETIC DISTAL HUMERAL FRACTURE performed by Juani Mcmahan MD at [place] [address] Right 2/12/2018    LEFT HUMERUS OPEN REDUCTION INTERNAL FIXATION performed by Juani Mcmahan MD at [place] [address]  childhood    UPPER GASTROINTESTINAL ENDOSCOPY Left 8/2/2018    EGD BIOPSY performed by David Gamino MD at [facility]       Family History   Problem Relation Age of Onset    Heart Disease Mother     High Blood Pressure Mother     Cancer Father     Heart Disease Sister     Cancer Brother     Heart Disease Brother      Subjective:     Review of Systems   Constitutional: Negative for activity change, appetite change, diaphoresis, fatigue and fever. HENT: Negative. Eyes: Negative. Respiratory: Negative for cough, chest tightness and shortness of breath. Cardiovascular: Negative for chest pain, palpitations and leg swelling. Gastrointestinal: Negative for abdominal pain, constipation, diarrhea, nausea and vomiting. Genitourinary: Negative. Musculoskeletal: Positive for arthralgias and back pain. Skin: Negative. Negative for rash.    Neurological: Positive for dizziness. Negative for syncope, weakness, light-headedness, numbness and headaches. Psychiatric/Behavioral: Negative.        :   /70 (Site: Left Upper Arm, Position: Sitting, Cuff Size: Large Adult)   Pulse 80   Resp 13   Ht 5' 4\" (1.626 m)   Wt 204 lb 8 oz (92.8 kg)   BMI 35.10 kg/m²   Wt Readings from Last 3 Encounters:   06/07/19 204 lb 8 oz (92.8 kg)   06/04/19 200 lb (90.7 kg)   05/22/19 203 lb 6.4 oz (92.3 kg)     Physical Exam   Constitutional: She is oriented to person, place, and time. She appears well-developed and well-nourished. No distress. HENT:   Head: Normocephalic and atraumatic. Eyes: Pupils are equal, round, and reactive to light. Conjunctivae and EOM are normal. Right eye exhibits no discharge. Left eye exhibits no discharge. No scleral icterus. Neck: Normal range of motion. Neck supple. No JVD present. No thyromegaly present. Cardiovascular: Normal rate, regular rhythm and normal heart sounds. No murmur heard. Pulmonary/Chest: Breath sounds normal. No respiratory distress. She has no wheezes. She has no rhonchi. She has no rales. Abdominal: Soft. Bowel sounds are normal. She exhibits no distension and no mass. There is no hepatosplenomegaly. There is no tenderness. There is no rebound and no guarding. Musculoskeletal: She exhibits edema ( to her ankles). Lymphadenopathy:     She has no cervical adenopathy. Neurological: She is alert and oriented to person, place, and time. Skin: Skin is warm and dry. No rash noted. She is not diaphoretic. Psychiatric: She has a normal mood and affect. Her behavior is normal.   Nursing note and vitals reviewed.    :       Diagnosis Orders   1. Essential hypertension     2. Other chronic pain  traMADol (ULTRAM) 50 MG tablet   3. DDD (degenerative disc disease), lumbar     4.  Urinary tract infection without hematuria, site unspecified  Urinalysis Reflex to Culture       :      Requested Prescriptions     Signed Prescriptions

## 2019-06-07 NOTE — TELEPHONE ENCOUNTER
----- Message from Real Pleitez MD sent at 6/7/2019 12:34 PM EDT -----  Please let her know that she needs a U/A after she is done with antibiotics.

## 2019-06-10 ENCOUNTER — CARE COORDINATION (OUTPATIENT)
Dept: CARE COORDINATION | Age: 79
End: 2019-06-10

## 2019-06-11 ENCOUNTER — HOSPITAL ENCOUNTER (OUTPATIENT)
Age: 79
Discharge: HOME | End: 2019-06-11
Payer: MEDICARE

## 2019-06-11 ENCOUNTER — TELEPHONE (OUTPATIENT)
Dept: FAMILY MEDICINE CLINIC | Age: 79
End: 2019-06-11

## 2019-06-11 DIAGNOSIS — N39.0 URINARY TRACT INFECTION WITHOUT HEMATURIA, SITE UNSPECIFIED: ICD-10-CM

## 2019-06-11 LAB
BILIRUBIN URINE: NEGATIVE
BLOOD, URINE: NEGATIVE
CHARACTER, URINE: CLEAR
COLOR: YELLOW
GLUCOSE URINE: NEGATIVE MG/DL
KETONES, URINE: NEGATIVE
LEUKOCYTE ESTERASE, URINE: NEGATIVE
NITRITE, URINE: NEGATIVE
PH UA: 5.5 (ref 5–9)
PROTEIN UA: NEGATIVE
SPECIFIC GRAVITY, URINE: 1.01 (ref 1–1.03)
UROBILINOGEN, URINE: 0.2 EU/DL (ref 0–1)

## 2019-06-11 PROCEDURE — 81003 URINALYSIS AUTO W/O SCOPE: CPT

## 2019-06-14 ENCOUNTER — CARE COORDINATION (OUTPATIENT)
Dept: CARE COORDINATION | Age: 79
End: 2019-06-14

## 2019-06-14 NOTE — CARE COORDINATION
Ambulatory Care Coordination Note  6/14/2019  CM Risk Score: 13  Roland Mortality Risk Score: 21    ACC: Ayleen Thapa, RN    Summary Note: Spoke with Nancy today. States she is very thankful for the tramadol that PCP gave her because her back pain is better managed. She had follow up appt in [place] this week and no changes were made. States she has appt with pain management scheduled for next week. Was recently in ED for vertigo. This has been a barrier for her as well. She has been referred to vestibular therapy to help with this. States UTI has resolved. Encouraged her to drink plenty of fluids and proper toileting hygiene to help prevent UTI's. States COPD is at baseline. Denies changes in her breathing, cough or sputum. Plan-  -reinforce education completed and complete additional education to help manage chronic conditions  -assess progression with vestibular therapy to help with vertigo  -assess risk for falls due to vertigo or need for additional support, last ED visit she states she was afraid to be alone during vertigo episode.   COPD Assessment    Does the patient understand envrionmental exposure?:  Yes  Is the patient able to verbalize Rescue vs. Long Acting medications?:  No  Does the patient have a nebulizer?:  No  Does the patient use a space with inhaled medications?:  No            Symptoms:   None:  Yes      Symptom course:  stable  Breathlessness:  none  Increase use of rapid acting/rescue inhaled medications?:  No  Change in chronic cough?:  No/At Baseline  Change in sputum?:  No/At Baseline  Self Monitoring - SaO2:  No  Have you had a recent diagnosis of pneumonia either by PCP or at a hospital?:  No               Care Coordination Interventions    Program Enrollment:  Complex Care  Referral from Primary Care Provider:  Yes  Suggested Interventions and Community Resources  Fall Risk Prevention:  Completed  Home Health Services:  Completed  Occupational Therapy:  Completed  Physical DULCOLAX) 100 MG CAPS Take 100 mg by mouth 2 times daily as needed for Constipation 3/6/18  Yes Karina Fischer MD   calcium-vitamin D (OSCAL-500) 500-200 MG-UNIT per tablet Take 1 tablet by mouth 2 times daily 3/6/18  Yes Karina Fischer MD   aspirin 81 MG EC tablet Take 1 tablet by mouth daily Stop 5 days before any planned intervention for compression fractures 3/7/18  Yes Karina Fischer MD       Future Appointments   Date Time Provider [facility]   6/17/2019  1:30 PM Tamiko Bronson, APRN - CNP [facility] [place]   8/19/2019 11:00 AM Rosie Gould MD [facility] [place]   9/23/2019  9:30 AM Edward Phillip MD [facility] [address]   12/10/2019 11:15 AM Teddy Sanchez PA-C Pulm Med P - [place]   4/15/2020  9:00 AM SCHEDULE, SRPS PACER NURSE [facility] PACER [facility] [place]

## 2019-06-17 ENCOUNTER — OFFICE VISIT (OUTPATIENT)
Dept: PHYSICAL MEDICINE AND REHAB | Age: 79
End: 2019-06-17
Payer: MEDICARE

## 2019-06-17 VITALS
WEIGHT: 209.8 LBS | SYSTOLIC BLOOD PRESSURE: 129 MMHG | BODY MASS INDEX: 35.82 KG/M2 | DIASTOLIC BLOOD PRESSURE: 80 MMHG | HEIGHT: 64 IN | HEART RATE: 72 BPM

## 2019-06-17 DIAGNOSIS — M47.816 LUMBAR SPONDYLOSIS: Primary | ICD-10-CM

## 2019-06-17 DIAGNOSIS — M62.838 SPASM OF MUSCLE: ICD-10-CM

## 2019-06-17 DIAGNOSIS — M47.819 FACET ARTHROPATHY OF SPINE: ICD-10-CM

## 2019-06-17 DIAGNOSIS — G89.4 CHRONIC PAIN SYNDROME: ICD-10-CM

## 2019-06-17 DIAGNOSIS — M54.50 LUMBAR PAIN: ICD-10-CM

## 2019-06-17 PROCEDURE — 99214 OFFICE O/P EST MOD 30 MIN: CPT | Performed by: NURSE PRACTITIONER

## 2019-06-17 ASSESSMENT — ENCOUNTER SYMPTOMS
APNEA: 1
SORE THROAT: 0
CONSTIPATION: 0
CHEST TIGHTNESS: 0
SINUS PAIN: 0
BACK PAIN: 1
NAUSEA: 0
EYE PAIN: 0
ABDOMINAL PAIN: 0
EYE ITCHING: 0
DIARRHEA: 0
RHINORRHEA: 0
VOMITING: 0
SHORTNESS OF BREATH: 1
COUGH: 0
SINUS PRESSURE: 0
EYE REDNESS: 0
COLOR CHANGE: 0
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
## 2019-06-17 NOTE — PATIENT INSTRUCTIONS
Patient is seen today as a referral for pain management. Patient and daughter have appreciated and like the care that Dr Georgia Dai has given. They they may want to continue with him but will let our office know. Patient and daughter understanding that our office is comprehensive and would need to take over complete pain management, not just medications. Patient and daughter appreciative of this and understanding. We do not have Dr Gabriel Lee records available today.

## 2019-07-15 ENCOUNTER — CARE COORDINATION (OUTPATIENT)
Dept: CARE COORDINATION | Age: 79
End: 2019-07-15

## 2019-07-15 ASSESSMENT — ENCOUNTER SYMPTOMS: DYSPNEA ASSOCIATED WITH: EXERTION

## 2019-07-22 ENCOUNTER — TELEPHONE (OUTPATIENT)
Dept: FAMILY MEDICINE CLINIC | Age: 79
End: 2019-07-22

## 2019-07-22 ENCOUNTER — HOSPITAL ENCOUNTER (OUTPATIENT)
Age: 79
Discharge: HOME | End: 2019-07-22
Payer: MEDICARE

## 2019-07-22 DIAGNOSIS — R30.0 DYSURIA: Primary | ICD-10-CM

## 2019-07-22 DIAGNOSIS — R30.0 DYSURIA: ICD-10-CM

## 2019-07-22 LAB
BACTERIA: ABNORMAL /HPF
BILIRUBIN URINE: NEGATIVE
BLOOD, URINE: NEGATIVE
CASTS 2: ABNORMAL /LPF
CASTS UA: ABNORMAL /LPF
CHARACTER, URINE: CLEAR
COLOR: YELLOW
CRYSTALS, UA: ABNORMAL
EPITHELIAL CELLS, UA: ABNORMAL /HPF
GLUCOSE URINE: NEGATIVE MG/DL
KETONES, URINE: NEGATIVE
LEUKOCYTE ESTERASE, URINE: ABNORMAL
MISCELLANEOUS 2: ABNORMAL
NITRITE, URINE: NEGATIVE
PH UA: 6.5 (ref 5–9)
PROTEIN UA: NEGATIVE
RBC URINE: ABNORMAL /HPF
RENAL EPITHELIAL, UA: ABNORMAL
SPECIFIC GRAVITY, URINE: 1.01 (ref 1–1.03)
UROBILINOGEN, URINE: 0.2 EU/DL (ref 0–1)
WBC UA: ABNORMAL /HPF
YEAST: ABNORMAL

## 2019-07-22 PROCEDURE — 87186 SC STD MICRODIL/AGAR DIL: CPT

## 2019-07-22 PROCEDURE — 87086 URINE CULTURE/COLONY COUNT: CPT

## 2019-07-22 PROCEDURE — 81001 URINALYSIS AUTO W/SCOPE: CPT

## 2019-07-22 PROCEDURE — 87077 CULTURE AEROBIC IDENTIFY: CPT

## 2019-07-22 NOTE — TELEPHONE ENCOUNTER
Pt called req order for a UA for urgency, pain with urination.     Pt req called once addressed to inform    [address] [place] [address]

## 2019-07-22 NOTE — TELEPHONE ENCOUNTER
Please call pt when this is completed as the pt called and wanted to know if was done yet and I told her we would call when done.

## 2019-07-23 ENCOUNTER — TELEPHONE (OUTPATIENT)
Dept: FAMILY MEDICINE CLINIC | Age: 79
End: 2019-07-23

## 2019-07-23 RX ORDER — SULFAMETHOXAZOLE AND TRIMETHOPRIM 800; 160 MG/1; MG/1
1 TABLET ORAL 2 TIMES DAILY
Qty: 20 TABLET | Refills: 0 | Status: SHIPPED | OUTPATIENT
Start: 2019-07-23 | End: 2019-08-02

## 2019-07-24 ENCOUNTER — TELEPHONE (OUTPATIENT)
Dept: FAMILY MEDICINE CLINIC | Age: 79
End: 2019-07-24

## 2019-07-24 DIAGNOSIS — G89.29 OTHER CHRONIC PAIN: ICD-10-CM

## 2019-07-24 LAB
ORGANISM: ABNORMAL
URINE CULTURE REFLEX: ABNORMAL

## 2019-07-24 RX ORDER — TRAMADOL HYDROCHLORIDE 50 MG/1
50 TABLET ORAL 2 TIMES DAILY PRN
Qty: 40 TABLET | Refills: 0 | Status: ON HOLD | OUTPATIENT
Start: 2019-07-24 | End: 2019-08-20 | Stop reason: HOSPADM

## 2019-07-31 ENCOUNTER — PROCEDURE VISIT (OUTPATIENT)
Dept: CARDIOLOGY CLINIC | Age: 79
End: 2019-07-31
Payer: MEDICARE

## 2019-07-31 DIAGNOSIS — Z95.810 S/P ICD (INTERNAL CARDIAC DEFIBRILLATOR) PROCEDURE: Primary | ICD-10-CM

## 2019-07-31 PROCEDURE — 93296 REM INTERROG EVL PM/IDS: CPT | Performed by: INTERNAL MEDICINE

## 2019-07-31 PROCEDURE — 93295 DEV INTERROG REMOTE 1/2/MLT: CPT | Performed by: INTERNAL MEDICINE

## 2019-07-31 NOTE — PROGRESS NOTES
[employer] Bi[employer] ICD   2.9-3.3 years on device   At imped 460   shock 43  P waves 0.3 RV 11.7  33% atrial paced 100% vent paced   2 mode switches, both under 1 minute   No CHF DX   . Kike Dials See attached corvue report  Remote monitoring reviewed over a 30 day period

## 2019-08-01 ENCOUNTER — TELEPHONE (OUTPATIENT)
Dept: CARDIOLOGY CLINIC | Age: 79
End: 2019-08-01

## 2019-08-01 NOTE — PROGRESS NOTES
Patient's LV threshold has been steadily increasing. She needs to be brought in for testing other vectors to see if there is a lower threshold.

## 2019-08-18 ENCOUNTER — APPOINTMENT (OUTPATIENT)
Dept: CT IMAGING | Age: 79
DRG: 481 | End: 2019-08-18
Payer: MEDICARE

## 2019-08-18 ENCOUNTER — APPOINTMENT (OUTPATIENT)
Dept: GENERAL RADIOLOGY | Age: 79
DRG: 481 | End: 2019-08-18
Payer: MEDICARE

## 2019-08-18 ENCOUNTER — HOSPITAL ENCOUNTER (INPATIENT)
Age: 79
LOS: 5 days | Discharge: NURSING, REHAB OR LONG-TERM CARE | DRG: 481 | End: 2019-08-23
Attending: INTERNAL MEDICINE | Admitting: ORTHOPAEDIC SURGERY
Payer: MEDICARE

## 2019-08-18 DIAGNOSIS — S72.002A CLOSED FRACTURE OF LEFT HIP, INITIAL ENCOUNTER (HCC): Primary | ICD-10-CM
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
## 2019-08-18 LAB
ALBUMIN SERPL-MCNC: 3.9 G/DL (ref 3.5–5.1)
ALP BLD-CCNC: 48 U/L (ref 38–126)
ALT SERPL-CCNC: 9 U/L (ref 11–66)
ANION GAP SERPL CALCULATED.3IONS-SCNC: 13 MEQ/L (ref 8–16)
AST SERPL-CCNC: 15 U/L (ref 5–40)
BACTERIA: ABNORMAL /HPF
BASOPHILS # BLD: 0.3 %
BASOPHILS ABSOLUTE: 0 THOU/MM3 (ref 0–0.1)
BILIRUB SERPL-MCNC: 0.5 MG/DL (ref 0.3–1.2)
BILIRUBIN DIRECT: < 0.2 MG/DL (ref 0–0.3)
BILIRUBIN URINE: NEGATIVE
BLOOD, URINE: NEGATIVE
BUN BLDV-MCNC: 23 MG/DL (ref 7–22)
CALCIUM SERPL-MCNC: 9.4 MG/DL (ref 8.5–10.5)
CASTS 2: ABNORMAL /LPF
CASTS UA: ABNORMAL /LPF
CHARACTER, URINE: CLEAR
CHLORIDE BLD-SCNC: 103 MEQ/L (ref 98–111)
CO2: 29 MEQ/L (ref 23–33)
COLOR: YELLOW
CREAT SERPL-MCNC: 0.9 MG/DL (ref 0.4–1.2)
CRYSTALS, UA: ABNORMAL
EKG ATRIAL RATE: 77 BPM
EKG P AXIS: 60 DEGREES
EKG Q-T INTERVAL: 458 MS
EKG QRS DURATION: 156 MS
EKG QTC CALCULATION (BAZETT): 518 MS
EKG R AXIS: -49 DEGREES
EKG T AXIS: 74 DEGREES
EKG VENTRICULAR RATE: 77 BPM
EOSINOPHIL # BLD: 1.2 %
EOSINOPHILS ABSOLUTE: 0.1 THOU/MM3 (ref 0–0.4)
EPITHELIAL CELLS, UA: ABNORMAL /HPF
ERYTHROCYTE [DISTWIDTH] IN BLOOD BY AUTOMATED COUNT: 12.9 % (ref 11.5–14.5)
ERYTHROCYTE [DISTWIDTH] IN BLOOD BY AUTOMATED COUNT: 43.8 FL (ref 35–45)
GFR SERPL CREATININE-BSD FRML MDRD: 60 ML/MIN/1.73M2
GLUCOSE BLD-MCNC: 93 MG/DL (ref 70–108)
GLUCOSE URINE: NEGATIVE MG/DL
HCT VFR BLD CALC: 37.1 % (ref 37–47)
HEMOGLOBIN: 12.9 GM/DL (ref 12–16)
IMMATURE GRANS (ABS): 0.06 THOU/MM3 (ref 0–0.07)
IMMATURE GRANULOCYTES: 1 %
KETONES, URINE: NEGATIVE
LEUKOCYTE ESTERASE, URINE: ABNORMAL
LYMPHOCYTES # BLD: 22.8 %
LYMPHOCYTES ABSOLUTE: 1.7 THOU/MM3 (ref 1–4.8)
MCH RBC QN AUTO: 32.1 PG (ref 26–33)
MCHC RBC AUTO-ENTMCNC: 34.8 GM/DL (ref 32.2–35.5)
MCV RBC AUTO: 92.3 FL (ref 81–99)
MISCELLANEOUS 2: ABNORMAL
MONOCYTES # BLD: 7.6 %
MONOCYTES ABSOLUTE: 0.6 THOU/MM3 (ref 0.4–1.3)
NITRITE, URINE: NEGATIVE
NUCLEATED RED BLOOD CELLS: 0 /100 WBC
OSMOLALITY CALCULATION: 292.1 MOSMOL/KG (ref 275–300)
PH UA: 7 (ref 5–9)
PLATELET # BLD: 160 THOU/MM3 (ref 130–400)
PMV BLD AUTO: 10.6 FL (ref 9.4–12.4)
POTASSIUM SERPL-SCNC: 3.8 MEQ/L (ref 3.5–5.2)
PROTEIN UA: NEGATIVE
RBC # BLD: 4.02 MILL/MM3 (ref 4.2–5.4)
RBC URINE: ABNORMAL /HPF
RENAL EPITHELIAL, UA: ABNORMAL
SEG NEUTROPHILS: 67.3 %
SEGMENTED NEUTROPHILS ABSOLUTE COUNT: 4.9 THOU/MM3 (ref 1.8–7.7)
SODIUM BLD-SCNC: 145 MEQ/L (ref 135–145)
SPECIFIC GRAVITY, URINE: 1.02 (ref 1–1.03)
TOTAL PROTEIN: 6 G/DL (ref 6.1–8)
TROPONIN T: < 0.01 NG/ML
UROBILINOGEN, URINE: 1 EU/DL (ref 0–1)
WBC # BLD: 7.3 THOU/MM3 (ref 4.8–10.8)
WBC UA: ABNORMAL /HPF
YEAST: ABNORMAL

## 2019-08-18 PROCEDURE — 84484 ASSAY OF TROPONIN QUANT: CPT

## 2019-08-18 PROCEDURE — 93005 ELECTROCARDIOGRAM TRACING: CPT | Performed by: INTERNAL MEDICINE

## 2019-08-18 PROCEDURE — 1200000003 HC TELEMETRY R&B

## 2019-08-18 PROCEDURE — 6370000000 HC RX 637 (ALT 250 FOR IP): Performed by: PHYSICIAN ASSISTANT

## 2019-08-18 PROCEDURE — 80053 COMPREHEN METABOLIC PANEL: CPT

## 2019-08-18 PROCEDURE — 81001 URINALYSIS AUTO W/SCOPE: CPT

## 2019-08-18 PROCEDURE — 73502 X-RAY EXAM HIP UNI 2-3 VIEWS: CPT

## 2019-08-18 PROCEDURE — 70450 CT HEAD/BRAIN W/O DYE: CPT

## 2019-08-18 PROCEDURE — 73700 CT LOWER EXTREMITY W/O DYE: CPT

## 2019-08-18 PROCEDURE — 71045 X-RAY EXAM CHEST 1 VIEW: CPT

## 2019-08-18 PROCEDURE — 82248 BILIRUBIN DIRECT: CPT

## 2019-08-18 PROCEDURE — 72170 X-RAY EXAM OF PELVIS: CPT

## 2019-08-18 PROCEDURE — 72125 CT NECK SPINE W/O DYE: CPT

## 2019-08-18 PROCEDURE — 96374 THER/PROPH/DIAG INJ IV PUSH: CPT

## 2019-08-18 PROCEDURE — 6360000002 HC RX W HCPCS: Performed by: INTERNAL MEDICINE

## 2019-08-18 PROCEDURE — 6370000000 HC RX 637 (ALT 250 FOR IP): Performed by: ORTHOPAEDIC SURGERY

## 2019-08-18 PROCEDURE — 36415 COLL VENOUS BLD VENIPUNCTURE: CPT

## 2019-08-18 PROCEDURE — 73552 X-RAY EXAM OF FEMUR 2/>: CPT

## 2019-08-18 PROCEDURE — 99285 EMERGENCY DEPT VISIT HI MDM: CPT

## 2019-08-18 PROCEDURE — 73590 X-RAY EXAM OF LOWER LEG: CPT

## 2019-08-18 PROCEDURE — 85025 COMPLETE CBC W/AUTO DIFF WBC: CPT

## 2019-08-18 RX ORDER — SIMVASTATIN 40 MG
40 TABLET ORAL NIGHTLY
Status: DISCONTINUED | OUTPATIENT
Start: 2019-08-18 | End: 2019-08-18

## 2019-08-18 RX ORDER — ONDANSETRON 2 MG/ML
4 INJECTION INTRAMUSCULAR; INTRAVENOUS EVERY 6 HOURS PRN
Status: DISCONTINUED | OUTPATIENT
Start: 2019-08-18 | End: 2019-08-23 | Stop reason: HOSPADM

## 2019-08-18 RX ORDER — SODIUM CHLORIDE 0.9 % (FLUSH) 0.9 %
10 SYRINGE (ML) INJECTION EVERY 12 HOURS SCHEDULED
Status: DISCONTINUED | OUTPATIENT
Start: 2019-08-18 | End: 2019-08-23 | Stop reason: HOSPADM

## 2019-08-18 RX ORDER — HYDROXYZINE PAMOATE 25 MG/1
25 CAPSULE ORAL EVERY 6 HOURS PRN
Status: DISCONTINUED | OUTPATIENT
Start: 2019-08-18 | End: 2019-08-23 | Stop reason: HOSPADM

## 2019-08-18 RX ORDER — OXYCODONE HYDROCHLORIDE AND ACETAMINOPHEN 5; 325 MG/1; MG/1
2 TABLET ORAL EVERY 4 HOURS PRN
Status: DISCONTINUED | OUTPATIENT
Start: 2019-08-18 | End: 2019-08-23 | Stop reason: HOSPADM

## 2019-08-18 RX ORDER — DOCUSATE SODIUM 100 MG/1
100 CAPSULE, LIQUID FILLED ORAL 2 TIMES DAILY
Status: DISCONTINUED | OUTPATIENT
Start: 2019-08-18 | End: 2019-08-23 | Stop reason: HOSPADM

## 2019-08-18 RX ORDER — ISOSORBIDE MONONITRATE 30 MG/1
15 TABLET, EXTENDED RELEASE ORAL DAILY
Status: DISCONTINUED | OUTPATIENT
Start: 2019-08-19 | End: 2019-08-18

## 2019-08-18 RX ORDER — ACETAMINOPHEN 325 MG/1
650 TABLET ORAL EVERY 4 HOURS PRN
Status: DISCONTINUED | OUTPATIENT
Start: 2019-08-18 | End: 2019-08-23 | Stop reason: HOSPADM

## 2019-08-18 RX ORDER — METOPROLOL SUCCINATE 50 MG/1
50 TABLET, EXTENDED RELEASE ORAL DAILY
Status: DISCONTINUED | OUTPATIENT
Start: 2019-08-19 | End: 2019-08-18

## 2019-08-18 RX ORDER — SODIUM CHLORIDE 0.9 % (FLUSH) 0.9 %
10 SYRINGE (ML) INJECTION PRN
Status: DISCONTINUED | OUTPATIENT
Start: 2019-08-18 | End: 2019-08-23 | Stop reason: HOSPADM

## 2019-08-18 RX ORDER — FUROSEMIDE 20 MG/1
20 TABLET ORAL DAILY
Status: DISCONTINUED | OUTPATIENT
Start: 2019-08-19 | End: 2019-08-18

## 2019-08-18 RX ORDER — FAMOTIDINE 20 MG/1
20 TABLET, FILM COATED ORAL 2 TIMES DAILY
Status: DISCONTINUED | OUTPATIENT
Start: 2019-08-18 | End: 2019-08-23 | Stop reason: HOSPADM

## 2019-08-18 RX ORDER — SENNA PLUS 8.6 MG/1
2 TABLET ORAL NIGHTLY
Status: DISCONTINUED | OUTPATIENT
Start: 2019-08-18 | End: 2019-08-23 | Stop reason: HOSPADM

## 2019-08-18 RX ORDER — TRAMADOL HYDROCHLORIDE 50 MG/1
50 TABLET ORAL EVERY 6 HOURS PRN
Status: DISCONTINUED | OUTPATIENT
Start: 2019-08-18 | End: 2019-08-23 | Stop reason: HOSPADM

## 2019-08-18 RX ORDER — OXYCODONE HYDROCHLORIDE AND ACETAMINOPHEN 5; 325 MG/1; MG/1
1 TABLET ORAL EVERY 4 HOURS PRN
Status: DISCONTINUED | OUTPATIENT
Start: 2019-08-18 | End: 2019-08-19

## 2019-08-18 RX ORDER — FENTANYL CITRATE 50 UG/ML
25 INJECTION, SOLUTION INTRAMUSCULAR; INTRAVENOUS ONCE
Status: COMPLETED | OUTPATIENT
Start: 2019-08-18 | End: 2019-08-18

## 2019-08-18 RX ADMIN — HYDROXYZINE PAMOATE 25 MG: 25 CAPSULE ORAL at 21:59

## 2019-08-18 RX ADMIN — FAMOTIDINE 20 MG: 20 TABLET ORAL at 21:59

## 2019-08-18 RX ADMIN — FENTANYL CITRATE 25 MCG: 50 INJECTION, SOLUTION INTRAMUSCULAR; INTRAVENOUS at 18:13

## 2019-08-18 RX ADMIN — DOCUSATE SODIUM 100 MG: 100 CAPSULE, LIQUID FILLED ORAL at 21:59

## 2019-08-18 RX ADMIN — ACETAMINOPHEN 650 MG: 325 TABLET ORAL at 21:59

## 2019-08-18 RX ADMIN — TRAMADOL HYDROCHLORIDE 50 MG: 50 TABLET, FILM COATED ORAL at 21:59

## 2019-08-18 ASSESSMENT — ENCOUNTER SYMPTOMS
WHEEZING: 0
EYE DISCHARGE: 0
VOMITING: 0
NAUSEA: 0
SHORTNESS OF BREATH: 0
EYE PAIN: 0
RHINORRHEA: 0
DIARRHEA: 0
BACK PAIN: 0
SORE THROAT: 0
ABDOMINAL PAIN: 0
COUGH: 0

## 2019-08-18 ASSESSMENT — PAIN SCALES - GENERAL
PAINLEVEL_OUTOF10: 8
PAINLEVEL_OUTOF10: 9
PAINLEVEL_OUTOF10: 9

## 2019-08-18 ASSESSMENT — PAIN DESCRIPTION - ONSET: ONSET: ON-GOING

## 2019-08-18 ASSESSMENT — PAIN DESCRIPTION - LOCATION: LOCATION: HIP;KNEE

## 2019-08-18 ASSESSMENT — PAIN DESCRIPTION - FREQUENCY: FREQUENCY: INTERMITTENT

## 2019-08-18 ASSESSMENT — PAIN DESCRIPTION - PAIN TYPE: TYPE: ACUTE PAIN

## 2019-08-18 ASSESSMENT — PAIN DESCRIPTION - DESCRIPTORS: DESCRIPTORS: ACHING;POUNDING

## 2019-08-19 ENCOUNTER — ANESTHESIA EVENT (OUTPATIENT)
Dept: OPERATING ROOM | Age: 79
DRG: 481 | End: 2019-08-19
Payer: MEDICARE

## 2019-08-19 ENCOUNTER — ANESTHESIA (OUTPATIENT)
Dept: OPERATING ROOM | Age: 79
DRG: 481 | End: 2019-08-19
Payer: MEDICARE

## 2019-08-19 ENCOUNTER — APPOINTMENT (OUTPATIENT)
Dept: CT IMAGING | Age: 79
DRG: 481 | End: 2019-08-19
Payer: MEDICARE

## 2019-08-19 PROBLEM — H81.10 BENIGN POSITIONAL VERTIGO: Status: ACTIVE | Noted: 2019-08-19

## 2019-08-19 PROBLEM — S42.332A CLOSED DISPLACED OBLIQUE FRACTURE OF SHAFT OF LEFT HUMERUS: Status: RESOLVED | Noted: 2018-11-25 | Resolved: 2019-08-19

## 2019-08-19 PROBLEM — S32.010A CLOSED COMPRESSION FRACTURE OF FIRST LUMBAR VERTEBRA (HCC): Status: RESOLVED | Noted: 2018-02-07 | Resolved: 2019-08-19

## 2019-08-19 LAB
EKG ATRIAL RATE: 71 BPM
EKG P AXIS: 57 DEGREES
EKG P-R INTERVAL: 138 MS
EKG Q-T INTERVAL: 468 MS
EKG QRS DURATION: 152 MS
EKG QTC CALCULATION (BAZETT): 508 MS
EKG R AXIS: -49 DEGREES
EKG T AXIS: 61 DEGREES
EKG VENTRICULAR RATE: 71 BPM
LV EF: 50 %
LVEF MODALITY: NORMAL
TROPONIN T: < 0.01 NG/ML

## 2019-08-19 PROCEDURE — 84484 ASSAY OF TROPONIN QUANT: CPT

## 2019-08-19 PROCEDURE — 2580000003 HC RX 258: Performed by: EMERGENCY MEDICINE

## 2019-08-19 PROCEDURE — 36415 COLL VENOUS BLD VENIPUNCTURE: CPT

## 2019-08-19 PROCEDURE — 2700000000 HC OXYGEN THERAPY PER DAY

## 2019-08-19 PROCEDURE — 94761 N-INVAS EAR/PLS OXIMETRY MLT: CPT

## 2019-08-19 PROCEDURE — 99223 1ST HOSP IP/OBS HIGH 75: CPT | Performed by: NUCLEAR MEDICINE

## 2019-08-19 PROCEDURE — 6370000000 HC RX 637 (ALT 250 FOR IP): Performed by: PHYSICIAN ASSISTANT

## 2019-08-19 PROCEDURE — 2500000003 HC RX 250 WO HCPCS

## 2019-08-19 PROCEDURE — 1200000003 HC TELEMETRY R&B

## 2019-08-19 PROCEDURE — 6360000004 HC RX CONTRAST MEDICATION: Performed by: NUCLEAR MEDICINE

## 2019-08-19 PROCEDURE — 6820000001 HC L2 TRAUMA SURGERY EVALUATION

## 2019-08-19 PROCEDURE — 93010 ELECTROCARDIOGRAM REPORT: CPT | Performed by: INTERNAL MEDICINE

## 2019-08-19 PROCEDURE — 93005 ELECTROCARDIOGRAM TRACING: CPT | Performed by: NUCLEAR MEDICINE

## 2019-08-19 PROCEDURE — 6370000000 HC RX 637 (ALT 250 FOR IP): Performed by: FAMILY MEDICINE

## 2019-08-19 PROCEDURE — 6370000000 HC RX 637 (ALT 250 FOR IP): Performed by: ORTHOPAEDIC SURGERY

## 2019-08-19 PROCEDURE — 93306 TTE W/DOPPLER COMPLETE: CPT

## 2019-08-19 PROCEDURE — 71275 CT ANGIOGRAPHY CHEST: CPT

## 2019-08-19 RX ORDER — OXYCODONE HYDROCHLORIDE AND ACETAMINOPHEN 5; 325 MG/1; MG/1
0.5 TABLET ORAL EVERY 4 HOURS PRN
Status: DISCONTINUED | OUTPATIENT
Start: 2019-08-19 | End: 2019-08-23 | Stop reason: HOSPADM

## 2019-08-19 RX ORDER — FUROSEMIDE 20 MG/1
20 TABLET ORAL DAILY
Status: DISCONTINUED | OUTPATIENT
Start: 2019-08-19 | End: 2019-08-23 | Stop reason: HOSPADM

## 2019-08-19 RX ORDER — NITROGLYCERIN 20 MG/100ML
5 INJECTION INTRAVENOUS CONTINUOUS
Status: DISCONTINUED | OUTPATIENT
Start: 2019-08-19 | End: 2019-08-23 | Stop reason: HOSPADM

## 2019-08-19 RX ORDER — NITROGLYCERIN 20 MG/100ML
INJECTION INTRAVENOUS
Status: COMPLETED
Start: 2019-08-19 | End: 2019-08-19

## 2019-08-19 RX ORDER — SIMVASTATIN 40 MG
40 TABLET ORAL NIGHTLY
Status: DISCONTINUED | OUTPATIENT
Start: 2019-08-19 | End: 2019-08-23 | Stop reason: HOSPADM

## 2019-08-19 RX ORDER — METOPROLOL SUCCINATE 50 MG/1
50 TABLET, EXTENDED RELEASE ORAL DAILY
Status: DISCONTINUED | OUTPATIENT
Start: 2019-08-19 | End: 2019-08-23 | Stop reason: HOSPADM

## 2019-08-19 RX ORDER — OYSTER SHELL CALCIUM WITH VITAMIN D 500; 200 MG/1; [IU]/1
1 TABLET, FILM COATED ORAL 2 TIMES DAILY
Status: DISCONTINUED | OUTPATIENT
Start: 2019-08-19 | End: 2019-08-23 | Stop reason: HOSPADM

## 2019-08-19 RX ORDER — ISOSORBIDE MONONITRATE 30 MG/1
15 TABLET, EXTENDED RELEASE ORAL DAILY
Status: DISCONTINUED | OUTPATIENT
Start: 2019-08-19 | End: 2019-08-23 | Stop reason: HOSPADM

## 2019-08-19 RX ORDER — SODIUM CHLORIDE 9 MG/ML
INJECTION, SOLUTION INTRAVENOUS CONTINUOUS
Status: DISCONTINUED | OUTPATIENT
Start: 2019-08-19 | End: 2019-08-21

## 2019-08-19 RX ORDER — NITROGLYCERIN 0.4 MG/1
TABLET SUBLINGUAL
Status: DISCONTINUED
Start: 2019-08-19 | End: 2019-08-19 | Stop reason: WASHOUT

## 2019-08-19 RX ADMIN — SACUBITRIL AND VALSARTAN 1 TABLET: 49; 51 TABLET, FILM COATED ORAL at 21:00

## 2019-08-19 RX ADMIN — IOPAMIDOL 80 ML: 755 INJECTION, SOLUTION INTRAVENOUS at 19:09

## 2019-08-19 RX ADMIN — ISOSORBIDE MONONITRATE 15 MG: 30 TABLET ORAL at 10:03

## 2019-08-19 RX ADMIN — HYDROXYZINE PAMOATE 25 MG: 25 CAPSULE ORAL at 10:04

## 2019-08-19 RX ADMIN — OXYCODONE HYDROCHLORIDE AND ACETAMINOPHEN 0.5 TABLET: 5; 325 TABLET ORAL at 00:54

## 2019-08-19 RX ADMIN — METOPROLOL SUCCINATE 50 MG: 50 TABLET, FILM COATED, EXTENDED RELEASE ORAL at 10:04

## 2019-08-19 RX ADMIN — OXYCODONE HYDROCHLORIDE AND ACETAMINOPHEN 0.5 TABLET: 5; 325 TABLET ORAL at 20:25

## 2019-08-19 RX ADMIN — OXYCODONE HYDROCHLORIDE AND ACETAMINOPHEN 0.5 TABLET: 5; 325 TABLET ORAL at 13:18

## 2019-08-19 RX ADMIN — DOCUSATE SODIUM 100 MG: 100 CAPSULE, LIQUID FILLED ORAL at 21:00

## 2019-08-19 RX ADMIN — SENNOSIDES 17.2 MG: 8.6 TABLET, FILM COATED ORAL at 21:00

## 2019-08-19 RX ADMIN — SODIUM CHLORIDE: 9 INJECTION, SOLUTION INTRAVENOUS at 20:25

## 2019-08-19 RX ADMIN — SIMVASTATIN 40 MG: 40 TABLET, FILM COATED ORAL at 21:00

## 2019-08-19 RX ADMIN — NITROGLYCERIN 5 MCG/MIN: 20 INJECTION INTRAVENOUS at 17:26

## 2019-08-19 RX ADMIN — SODIUM CHLORIDE: 9 INJECTION, SOLUTION INTRAVENOUS at 10:04

## 2019-08-19 RX ADMIN — FAMOTIDINE 20 MG: 20 TABLET ORAL at 21:00

## 2019-08-19 RX ADMIN — CALCIUM CARBONATE-VITAMIN D TAB 500 MG-200 UNIT 1 TABLET: 500-200 TAB at 21:00

## 2019-08-19 RX ADMIN — SACUBITRIL AND VALSARTAN 1 TABLET: 49; 51 TABLET, FILM COATED ORAL at 10:04

## 2019-08-19 ASSESSMENT — PAIN SCALES - GENERAL
PAINLEVEL_OUTOF10: 1
PAINLEVEL_OUTOF10: 5
PAINLEVEL_OUTOF10: 4
PAINLEVEL_OUTOF10: 4
PAINLEVEL_OUTOF10: 6
PAINLEVEL_OUTOF10: 0
PAINLEVEL_OUTOF10: 4
PAINLEVEL_OUTOF10: 8
PAINLEVEL_OUTOF10: 5
PAINLEVEL_OUTOF10: 5
PAINLEVEL_OUTOF10: 8
PAINLEVEL_OUTOF10: 8

## 2019-08-19 ASSESSMENT — ENCOUNTER SYMPTOMS
TROUBLE SWALLOWING: 0
NAUSEA: 0
CONSTIPATION: 0
CHEST TIGHTNESS: 0
SINUS PRESSURE: 0
VOMITING: 0
DIARRHEA: 0
SHORTNESS OF BREATH: 0
COUGH: 0
VOICE CHANGE: 0
BACK PAIN: 0
SORE THROAT: 0
ABDOMINAL PAIN: 0
WHEEZING: 0
RHINORRHEA: 0

## 2019-08-19 ASSESSMENT — PAIN DESCRIPTION - ONSET: ONSET: ON-GOING

## 2019-08-19 ASSESSMENT — PAIN DESCRIPTION - PAIN TYPE
TYPE: ACUTE PAIN

## 2019-08-19 ASSESSMENT — PAIN DESCRIPTION - ORIENTATION
ORIENTATION: LEFT
ORIENTATION: MID

## 2019-08-19 ASSESSMENT — PAIN DESCRIPTION - DESCRIPTORS
DESCRIPTORS: PRESSURE
DESCRIPTORS: SPASM
DESCRIPTORS: PRESSURE

## 2019-08-19 ASSESSMENT — PAIN DESCRIPTION - FREQUENCY
FREQUENCY: CONTINUOUS
FREQUENCY: INTERMITTENT

## 2019-08-19 ASSESSMENT — PAIN - FUNCTIONAL ASSESSMENT: PAIN_FUNCTIONAL_ASSESSMENT: PREVENTS OR INTERFERES SOME ACTIVE ACTIVITIES AND ADLS

## 2019-08-19 ASSESSMENT — PAIN DESCRIPTION - DIRECTION
RADIATING_TOWARDS: BACK

## 2019-08-19 ASSESSMENT — PAIN DESCRIPTION - PROGRESSION
CLINICAL_PROGRESSION: GRADUALLY WORSENING
CLINICAL_PROGRESSION: NOT CHANGED

## 2019-08-19 ASSESSMENT — PAIN DESCRIPTION - LOCATION
LOCATION: CHEST
LOCATION: CHEST
LOCATION: HIP
LOCATION: CHEST

## 2019-08-19 NOTE — H&P
History and Physical        CHIEF COMPLAINT:  Left hip pain    HISTORY OF PRESENT ILLNESS:      The patient is a [de-identified] y.o. female with multiple medical comorbidities who ortho admitted for definitive treatment of a left intertrochanteric fracture. Patient lives alone and is fairly independent. She was in her kitchen and had a spell of vertigo causing her to fall from standing height onto a hard floor. She was unable to walk and bear weight after the fall. She currently is NPO in anticipation for surgery, waiting on cardiac clearance. She denies other injury and no LOC. X-rays and lab reviewed.      Past Medical History:    Past Medical History:   Diagnosis Date    Arthritis     general    Breast CA ([facility] [address] 12/03    right    CAD (coronary artery disease) 2009    stent in [place]    [facility]estive [facility] failure) [facility]     Closed compression fracture of first lumbar vertebra ([facility] [address] 2/7/2018    Colon polyps 8/97; 3/11    COPD (chronic obstructive pulmonary disease) ([facility] [address] 8/5/2017    Diverticulosis 1/06    Erosive gastritis 11/06    Esophageal stricture 03/2011    Three times, Dr. Perla Phelps    Hyperlipidemia     Hypertension     Internal hemorrhoid 1/06    LUISA on CPAP     Osteopenia determined by x-ray 1999    Osteoporosis of vertebra 6/2018 Per CT thoracolumbar    Pneumonia 2009    Stress incontinence, female     Vertigo        Past Surgical History:    Past Surgical History:   Procedure Laterality Date    ARM SURGERY Left 11/27/2018    plate in arm    BREAST LUMPECTOMY  1/04    [facility] axillary dissection    CARDIAC DEFIBRILLATOR PLACEMENT  01/08/2018    Biventricular pacemaker ICD, Dr. Carmella Knott  3/2011    Vicenta [place]  2009    DILATATION, ESOPHAGUS  2011    HYSTERECTOMY  1976 or 1977    bleeding    JOINT REPLACEMENT Left     Shoulder    ME OFFICE/OUTPT VISIT,PROCEDURE ONLY N/A 8/2/2018    EGD DIL performed by Laurence Aden MD at [facility]    Scheduled Meds:   furosemide  20 mg Oral Daily    isosorbide mononitrate  15 mg Oral Daily    metoprolol succinate  50 mg Oral Daily    sacubitril-valsartan  1 tablet Oral BID    simvastatin  40 mg Oral Nightly    calcium-vitamin D  1 tablet Oral BID    sodium chloride flush  10 mL Intravenous 2 times per day    famotidine  20 mg Oral BID    docusate sodium  100 mg Oral BID    senna  2 tablet Oral Nightly     Continuous Infusions:  PRN Meds:.sodium chloride flush, acetaminophen, ondansetron, traMADol, hydrOXYzine, magnesium hydroxide, oxyCODONE-acetaminophen, oxyCODONE-acetaminophen    Allergies:  Ciprofloxacin; Neomycin; Pcn [penicillins]; Zanaflex [tizanidine hcl]; and Levaquin [levofloxacin]    Social History:   Social History     Socioeconomic History    Marital status:      Spouse name: None    Number of children: None    Years of education: None    Highest education level: None   Occupational History    None   Social Needs    Financial resource strain: None    Food insecurity:     Worry: None     Inability: None    Transportation needs:     Medical: None     Non-medical: None   Tobacco Use    Smoking status: Former Smoker     Packs/day: 0.50     Years: 40.00     Pack years: 20.00     Types: Cigarettes     Last attempt to quit: 1998     Years since quittin.6    Smokeless tobacco: Never Used   Substance and Sexual Activity    Alcohol use:  Yes     Alcohol/week: 2.0 standard drinks     Types: 2 Standard drinks or equivalent per week     Comment: occasional     Drug use: No    Sexual activity: Not Currently   Lifestyle    Physical activity:     Days per week: None     Minutes per session: None    Stress: None   Relationships    Social connections:     Talks on phone: None     Gets together: None     Attends Orthodoxy service: None     Active member of club or organization: None     Attends meetings of clubs or organizations: None     Relationship status: None -- -- -- -- 96 % -- --   08/18/19 1602 136/62 97.6 °F (36.4 °C) Oral 80 17 (!) 86 % 5' 4\" (1.626 m) 195 lb (88.5 kg)     General appearance:  Alert and oriented x 3. No apparent distress, appears stated age and cooperative. HEENT:  Normal cephalic, atraumatic without obvious deformity. Pupils equal, round, and reactive to light. Conjunctivae/corneas clear. Neck: Supple, with full range of motion. Trachea midline. Respiratory:  Normal respiratory effort. No audible Wheezes or Rhonchi. Cardiovascular:  Regular rate and rhythm. Abdomen: Soft, non-tender, non-distended. Musculoskeletal:  Left hip skin intact, diffuse TTP. Flex and extends toes and ankle. ROM decreased secondary to pain. Calf soft non-tender. NVI to foot. Skin: Skin color, texture, turgor normal.  No rashes or lesions. Neurologic:  Neurovascularly intact without any focal sensory/motor deficits. Sensation intact. Capillary Refill: Brisk,< 3 seconds   Peripheral Pulses: +2 palpable, equal bilaterally    DATA:  CBC:   Lab Results   Component Value Date    WBC 7.3 08/18/2019    HGB 12.9 08/18/2019     08/18/2019     BMP:    Lab Results   Component Value Date     08/18/2019    K 3.8 08/18/2019    K 3.9 06/04/2019     08/18/2019    CO2 29 08/18/2019    BUN 23 08/18/2019    CREATININE 0.9 08/18/2019    CALCIUM 9.4 08/18/2019    GLUCOSE 93 08/18/2019     PT/INR:    Lab Results   Component Value Date    INR 0.97 11/26/2018     Troponin:    Lab Results   Component Value Date    TROPONINI <0.006 07/16/2012     No results for input(s): LIPASE, AMYLASE in the last 72 hours. Recent Labs     08/18/19  1631   AST 15   ALT 9*   BILIDIR <0.2   BILITOT 0.5   ALKPHOS 48       Radiology:  Xr Pelvis (1-2 Views)    Result Date: 8/18/2019  PROCEDURE: XR PELVIS (1-2 VIEWS), XR FEMUR LEFT (MIN 2 VIEWS), XR HIP LEFT (2-3 VIEWS) CLINICAL INFORMATION: fall COMPARISON: No prior study.  TECHNIQUE:  AP pelvis 2 views, left hip and femur 4 views

## 2019-08-19 NOTE — PROGRESS NOTES
Patient sent to CT scan via bed with transport and rapid nurse. Patient to 6W04 with rapid nurse from CT scan. Family sent to room with personal belongings on 6K.

## 2019-08-20 ENCOUNTER — APPOINTMENT (OUTPATIENT)
Dept: GENERAL RADIOLOGY | Age: 79
DRG: 481 | End: 2019-08-20
Payer: MEDICARE

## 2019-08-20 VITALS
RESPIRATION RATE: 12 BRPM | OXYGEN SATURATION: 99 % | TEMPERATURE: 98.6 F | SYSTOLIC BLOOD PRESSURE: 131 MMHG | DIASTOLIC BLOOD PRESSURE: 69 MMHG

## 2019-08-20 PROCEDURE — 6360000002 HC RX W HCPCS: Performed by: ORTHOPAEDIC SURGERY

## 2019-08-20 PROCEDURE — 2500000003 HC RX 250 WO HCPCS: Performed by: ORTHOPAEDIC SURGERY

## 2019-08-20 PROCEDURE — 2720000010 HC SURG SUPPLY STERILE: Performed by: ORTHOPAEDIC SURGERY

## 2019-08-20 PROCEDURE — C1713 ANCHOR/SCREW BN/BN,TIS/BN: HCPCS | Performed by: ORTHOPAEDIC SURGERY

## 2019-08-20 PROCEDURE — 6360000002 HC RX W HCPCS: Performed by: ANESTHESIOLOGY

## 2019-08-20 PROCEDURE — 7100000001 HC PACU RECOVERY - ADDTL 15 MIN: Performed by: ORTHOPAEDIC SURGERY

## 2019-08-20 PROCEDURE — 3700000000 HC ANESTHESIA ATTENDED CARE: Performed by: ORTHOPAEDIC SURGERY

## 2019-08-20 PROCEDURE — 0QS736Z REPOSITION LEFT UPPER FEMUR WITH INTRAMEDULLARY INTERNAL FIXATION DEVICE, PERCUTANEOUS APPROACH: ICD-10-PCS | Performed by: ORTHOPAEDIC SURGERY

## 2019-08-20 PROCEDURE — 2580000003 HC RX 258: Performed by: ORTHOPAEDIC SURGERY

## 2019-08-20 PROCEDURE — 2580000003 HC RX 258: Performed by: EMERGENCY MEDICINE

## 2019-08-20 PROCEDURE — 1200000003 HC TELEMETRY R&B

## 2019-08-20 PROCEDURE — 6360000002 HC RX W HCPCS: Performed by: NURSE ANESTHETIST, CERTIFIED REGISTERED

## 2019-08-20 PROCEDURE — 6370000000 HC RX 637 (ALT 250 FOR IP): Performed by: ORTHOPAEDIC SURGERY

## 2019-08-20 PROCEDURE — 6370000000 HC RX 637 (ALT 250 FOR IP): Performed by: PHYSICIAN ASSISTANT

## 2019-08-20 PROCEDURE — 73502 X-RAY EXAM HIP UNI 2-3 VIEWS: CPT

## 2019-08-20 PROCEDURE — 3600000004 HC SURGERY LEVEL 4 BASE: Performed by: ORTHOPAEDIC SURGERY

## 2019-08-20 PROCEDURE — 2709999900 HC NON-CHARGEABLE SUPPLY: Performed by: ORTHOPAEDIC SURGERY

## 2019-08-20 PROCEDURE — 7100000000 HC PACU RECOVERY - FIRST 15 MIN: Performed by: ORTHOPAEDIC SURGERY

## 2019-08-20 PROCEDURE — 3209999900 FLUORO FOR SURGICAL PROCEDURES

## 2019-08-20 PROCEDURE — 3700000001 HC ADD 15 MINUTES (ANESTHESIA): Performed by: ORTHOPAEDIC SURGERY

## 2019-08-20 PROCEDURE — 2500000003 HC RX 250 WO HCPCS: Performed by: NURSE ANESTHETIST, CERTIFIED REGISTERED

## 2019-08-20 PROCEDURE — 6370000000 HC RX 637 (ALT 250 FOR IP): Performed by: FAMILY MEDICINE

## 2019-08-20 PROCEDURE — 3600000014 HC SURGERY LEVEL 4 ADDTL 15MIN: Performed by: ORTHOPAEDIC SURGERY

## 2019-08-20 PROCEDURE — 2580000003 HC RX 258: Performed by: NURSE ANESTHETIST, CERTIFIED REGISTERED

## 2019-08-20 DEVICE — INTERTAN LAG/COMPRESSION SCREW KIT                                    85MM / 80MM
Type: IMPLANTABLE DEVICE | Site: HIP | Status: FUNCTIONAL
Brand: TRIGEN

## 2019-08-20 DEVICE — TRIGEN INTERTAN 10S 10MM X 40CM 125DEGREE LEFT
Type: IMPLANTABLE DEVICE | Site: HIP | Status: FUNCTIONAL
Brand: TRIGEN

## 2019-08-20 DEVICE — TRIGEN LOW PROFILE SCREW 5.0MM X 40MM
Type: IMPLANTABLE DEVICE | Site: HIP | Status: FUNCTIONAL
Brand: TRIGEN

## 2019-08-20 RX ORDER — SUCCINYLCHOLINE/SOD CL,ISO/PF 200MG/10ML
SYRINGE (ML) INTRAVENOUS PRN
Status: DISCONTINUED | OUTPATIENT
Start: 2019-08-20 | End: 2019-08-20 | Stop reason: SDUPTHER

## 2019-08-20 RX ORDER — MORPHINE SULFATE 2 MG/ML
2 INJECTION, SOLUTION INTRAMUSCULAR; INTRAVENOUS
Status: CANCELLED | OUTPATIENT
Start: 2019-08-20

## 2019-08-20 RX ORDER — FENTANYL CITRATE 50 UG/ML
INJECTION, SOLUTION INTRAMUSCULAR; INTRAVENOUS PRN
Status: DISCONTINUED | OUTPATIENT
Start: 2019-08-20 | End: 2019-08-20 | Stop reason: SDUPTHER

## 2019-08-20 RX ORDER — HYDROCODONE BITARTRATE AND ACETAMINOPHEN 5; 325 MG/1; MG/1
1-2 TABLET ORAL
Qty: 40 TABLET | Refills: 0 | Status: ON HOLD | OUTPATIENT
Start: 2019-08-20 | End: 2019-09-10 | Stop reason: HOSPADM

## 2019-08-20 RX ORDER — LIDOCAINE HCL/PF 100 MG/5ML
SYRINGE (ML) INJECTION PRN
Status: DISCONTINUED | OUTPATIENT
Start: 2019-08-20 | End: 2019-08-20 | Stop reason: SDUPTHER

## 2019-08-20 RX ORDER — FENTANYL CITRATE 50 UG/ML
50 INJECTION, SOLUTION INTRAMUSCULAR; INTRAVENOUS EVERY 5 MIN PRN
Status: DISCONTINUED | OUTPATIENT
Start: 2019-08-20 | End: 2019-08-20 | Stop reason: HOSPADM

## 2019-08-20 RX ORDER — SODIUM CHLORIDE 9 MG/ML
INJECTION, SOLUTION INTRAVENOUS CONTINUOUS PRN
Status: DISCONTINUED | OUTPATIENT
Start: 2019-08-20 | End: 2019-08-20 | Stop reason: SDUPTHER

## 2019-08-20 RX ORDER — PHENYLEPHRINE HYDROCHLORIDE 10 MG/ML
INJECTION INTRAVENOUS PRN
Status: DISCONTINUED | OUTPATIENT
Start: 2019-08-20 | End: 2019-08-20 | Stop reason: SDUPTHER

## 2019-08-20 RX ORDER — LABETALOL 20 MG/4 ML (5 MG/ML) INTRAVENOUS SYRINGE
5 EVERY 10 MIN PRN
Status: DISCONTINUED | OUTPATIENT
Start: 2019-08-20 | End: 2019-08-20 | Stop reason: HOSPADM

## 2019-08-20 RX ORDER — MORPHINE SULFATE 2 MG/ML
4 INJECTION, SOLUTION INTRAMUSCULAR; INTRAVENOUS
Status: CANCELLED | OUTPATIENT
Start: 2019-08-20

## 2019-08-20 RX ORDER — CEFAZOLIN SODIUM 1 G/3ML
INJECTION, POWDER, FOR SOLUTION INTRAMUSCULAR; INTRAVENOUS PRN
Status: DISCONTINUED | OUTPATIENT
Start: 2019-08-20 | End: 2019-08-20 | Stop reason: SDUPTHER

## 2019-08-20 RX ORDER — FENTANYL CITRATE 50 UG/ML
25 INJECTION, SOLUTION INTRAMUSCULAR; INTRAVENOUS EVERY 5 MIN PRN
Status: DISCONTINUED | OUTPATIENT
Start: 2019-08-20 | End: 2019-08-20 | Stop reason: HOSPADM

## 2019-08-20 RX ORDER — PROMETHAZINE HYDROCHLORIDE 25 MG/ML
12.5 INJECTION, SOLUTION INTRAMUSCULAR; INTRAVENOUS
Status: DISCONTINUED | OUTPATIENT
Start: 2019-08-20 | End: 2019-08-20 | Stop reason: HOSPADM

## 2019-08-20 RX ORDER — PROPOFOL 10 MG/ML
INJECTION, EMULSION INTRAVENOUS PRN
Status: DISCONTINUED | OUTPATIENT
Start: 2019-08-20 | End: 2019-08-20 | Stop reason: SDUPTHER

## 2019-08-20 RX ORDER — DOCUSATE SODIUM 100 MG/1
100 CAPSULE, LIQUID FILLED ORAL DAILY
Status: CANCELLED | OUTPATIENT
Start: 2019-08-20

## 2019-08-20 RX ORDER — MEPERIDINE HYDROCHLORIDE 25 MG/ML
12.5 INJECTION INTRAMUSCULAR; INTRAVENOUS; SUBCUTANEOUS EVERY 5 MIN PRN
Status: DISCONTINUED | OUTPATIENT
Start: 2019-08-20 | End: 2019-08-20 | Stop reason: HOSPADM

## 2019-08-20 RX ADMIN — PROPOFOL 120 MG: 10 INJECTION, EMULSION INTRAVENOUS at 16:05

## 2019-08-20 RX ADMIN — OXYCODONE HYDROCHLORIDE AND ACETAMINOPHEN 0.5 TABLET: 5; 325 TABLET ORAL at 22:10

## 2019-08-20 RX ADMIN — SODIUM CHLORIDE: 9 INJECTION, SOLUTION INTRAVENOUS at 16:00

## 2019-08-20 RX ADMIN — SODIUM CHLORIDE: 9 INJECTION, SOLUTION INTRAVENOUS at 21:38

## 2019-08-20 RX ADMIN — FAMOTIDINE 20 MG: 20 TABLET ORAL at 08:58

## 2019-08-20 RX ADMIN — ISOSORBIDE MONONITRATE 15 MG: 30 TABLET ORAL at 08:57

## 2019-08-20 RX ADMIN — FENTANYL CITRATE 50 MCG: 50 INJECTION, SOLUTION INTRAMUSCULAR; INTRAVENOUS at 16:57

## 2019-08-20 RX ADMIN — FENTANYL CITRATE 50 MCG: 50 INJECTION INTRAMUSCULAR; INTRAVENOUS at 16:05

## 2019-08-20 RX ADMIN — DOCUSATE SODIUM 100 MG: 100 CAPSULE, LIQUID FILLED ORAL at 08:57

## 2019-08-20 RX ADMIN — FAMOTIDINE 20 MG: 20 TABLET ORAL at 21:42

## 2019-08-20 RX ADMIN — SENNOSIDES 17.2 MG: 8.6 TABLET, FILM COATED ORAL at 21:42

## 2019-08-20 RX ADMIN — SACUBITRIL AND VALSARTAN 1 TABLET: 49; 51 TABLET, FILM COATED ORAL at 21:42

## 2019-08-20 RX ADMIN — PHENYLEPHRINE HYDROCHLORIDE 100 MCG: 10 INJECTION INTRAVENOUS at 16:37

## 2019-08-20 RX ADMIN — PHENYLEPHRINE HYDROCHLORIDE 100 MCG: 10 INJECTION INTRAVENOUS at 16:22

## 2019-08-20 RX ADMIN — FENTANYL CITRATE 25 MCG: 50 INJECTION INTRAMUSCULAR; INTRAVENOUS at 16:17

## 2019-08-20 RX ADMIN — Medication 120 MG: at 16:05

## 2019-08-20 RX ADMIN — SIMVASTATIN 40 MG: 40 TABLET, FILM COATED ORAL at 21:42

## 2019-08-20 RX ADMIN — OXYCODONE HYDROCHLORIDE AND ACETAMINOPHEN 0.5 TABLET: 5; 325 TABLET ORAL at 04:13

## 2019-08-20 RX ADMIN — OXYCODONE HYDROCHLORIDE AND ACETAMINOPHEN 0.5 TABLET: 5; 325 TABLET ORAL at 08:57

## 2019-08-20 RX ADMIN — SACUBITRIL AND VALSARTAN 1 TABLET: 49; 51 TABLET, FILM COATED ORAL at 08:57

## 2019-08-20 RX ADMIN — DOCUSATE SODIUM 100 MG: 100 CAPSULE, LIQUID FILLED ORAL at 21:42

## 2019-08-20 RX ADMIN — HYDROXYZINE PAMOATE 25 MG: 25 CAPSULE ORAL at 02:12

## 2019-08-20 RX ADMIN — METOPROLOL SUCCINATE 50 MG: 50 TABLET, FILM COATED, EXTENDED RELEASE ORAL at 08:56

## 2019-08-20 RX ADMIN — FENTANYL CITRATE 50 MCG: 50 INJECTION, SOLUTION INTRAMUSCULAR; INTRAVENOUS at 17:03

## 2019-08-20 RX ADMIN — FENTANYL CITRATE 25 MCG: 50 INJECTION INTRAMUSCULAR; INTRAVENOUS at 16:31

## 2019-08-20 RX ADMIN — PHENYLEPHRINE HYDROCHLORIDE 200 MCG: 10 INJECTION INTRAVENOUS at 16:23

## 2019-08-20 RX ADMIN — OXYCODONE HYDROCHLORIDE AND ACETAMINOPHEN 0.5 TABLET: 5; 325 TABLET ORAL at 18:10

## 2019-08-20 RX ADMIN — FUROSEMIDE 20 MG: 20 TABLET ORAL at 08:57

## 2019-08-20 RX ADMIN — Medication 60 MG: at 16:05

## 2019-08-20 RX ADMIN — CALCIUM CARBONATE-VITAMIN D TAB 500 MG-200 UNIT 1 TABLET: 500-200 TAB at 08:57

## 2019-08-20 RX ADMIN — CEFAZOLIN 2000 MG: 1 INJECTION, POWDER, FOR SOLUTION INTRAMUSCULAR; INTRAVENOUS; PARENTERAL at 16:15

## 2019-08-20 ASSESSMENT — PULMONARY FUNCTION TESTS
PIF_VALUE: 18
PIF_VALUE: 17
PIF_VALUE: 17
PIF_VALUE: 0
PIF_VALUE: 3
PIF_VALUE: 0
PIF_VALUE: 17
PIF_VALUE: 2
PIF_VALUE: 1
PIF_VALUE: 17
PIF_VALUE: 0
PIF_VALUE: 16
PIF_VALUE: 0
PIF_VALUE: 17
PIF_VALUE: 18
PIF_VALUE: 17
PIF_VALUE: 21
PIF_VALUE: 17
PIF_VALUE: 3
PIF_VALUE: 23
PIF_VALUE: 17
PIF_VALUE: 18
PIF_VALUE: 17
PIF_VALUE: 1
PIF_VALUE: 17
PIF_VALUE: 17
PIF_VALUE: 28
PIF_VALUE: 18
PIF_VALUE: 6
PIF_VALUE: 10
PIF_VALUE: 1
PIF_VALUE: 4
PIF_VALUE: 17
PIF_VALUE: 17
PIF_VALUE: 1
PIF_VALUE: 17
PIF_VALUE: 18
PIF_VALUE: 17
PIF_VALUE: 17
PIF_VALUE: 24
PIF_VALUE: 17
PIF_VALUE: 15
PIF_VALUE: 0

## 2019-08-20 ASSESSMENT — PAIN DESCRIPTION - DESCRIPTORS: DESCRIPTORS: SHARP

## 2019-08-20 ASSESSMENT — PAIN SCALES - GENERAL
PAINLEVEL_OUTOF10: 0
PAINLEVEL_OUTOF10: 5
PAINLEVEL_OUTOF10: 9
PAINLEVEL_OUTOF10: 6
PAINLEVEL_OUTOF10: 8
PAINLEVEL_OUTOF10: 8
PAINLEVEL_OUTOF10: 6
PAINLEVEL_OUTOF10: 3
PAINLEVEL_OUTOF10: 6
PAINLEVEL_OUTOF10: 5
PAINLEVEL_OUTOF10: 6
PAINLEVEL_OUTOF10: 10

## 2019-08-20 ASSESSMENT — PAIN DESCRIPTION - FREQUENCY: FREQUENCY: CONTINUOUS

## 2019-08-20 ASSESSMENT — PAIN - FUNCTIONAL ASSESSMENT: PAIN_FUNCTIONAL_ASSESSMENT: PREVENTS OR INTERFERES WITH MANY ACTIVE NOT PASSIVE ACTIVITIES

## 2019-08-20 ASSESSMENT — PAIN DESCRIPTION - ORIENTATION: ORIENTATION: LEFT;UPPER

## 2019-08-20 ASSESSMENT — PAIN DESCRIPTION - PROGRESSION
CLINICAL_PROGRESSION: GRADUALLY IMPROVING
CLINICAL_PROGRESSION: NOT CHANGED

## 2019-08-20 ASSESSMENT — PAIN DESCRIPTION - ONSET: ONSET: ON-GOING

## 2019-08-20 ASSESSMENT — PAIN DESCRIPTION - LOCATION: LOCATION: LEG

## 2019-08-20 ASSESSMENT — PAIN DESCRIPTION - PAIN TYPE: TYPE: ACUTE PAIN

## 2019-08-20 NOTE — ANESTHESIA POSTPROCEDURE EVALUATION
Department of Anesthesiology  Postprocedure Note    Patient: Kat Escobar  MRN: [id number]  YOB: 1940  Date of evaluation: 8/20/2019  Time:  5:12 PM     Procedure Summary     Date:  08/20/19 Room / Location:  [facility]    Anesthesia Start:  1600 Anesthesia Stop:  0326    Procedures:       LEFT HIP INTERTAIN (Left Hip)      LEFT HIP INTERTAN (canceled) Diagnosis:  (LEFT HIP PAIN)    Surgeon:  Kiera Garcia MD Responsible Provider:  Moriah Osorio DO    Anesthesia Type:  general ASA Status:  3          Anesthesia Type: general    Carlos Phase I: Carlos Score: 8    Carlos Phase II:      Last vitals: Reviewed and per EMR flowsheets.        Anesthesia Post Evaluation    Patient location during evaluation: PACU  Patient participation: complete - patient participated  Level of consciousness: awake  Airway patency: patent  Nausea & Vomiting: no nausea and no vomiting  Complications: no  Cardiovascular status: hemodynamically stable  Respiratory status: acceptable and CPAP  Hydration status: stable

## 2019-08-20 NOTE — OP NOTE
[facility]  Post-Operative Note    Patient Name:  Kat Escobar  MRN:  [id number] YOB: 1940  Admission Date:  8/18/2019    Surgery Date:  8/20/2019    Pre-operative Diagnosis: Left  Intertrochanteric Fracture    Post-operative Diagnosis: Same    Procedure: Open treatment of an intertrochanteric fracture with a cephamedullary nail    Surgeon: Kiera Garcia MD    Assistants:   [] Alton More   [x] Kristen Phalen  [] Tomasa Hassan     Anesthesia: General    Estimated Blood Loss:  616 ml    Complications:  None    Specimens: 0    Components Used: Intertan size 10 by 400 nail 85 lag screw, 80 compression screw, 40mm distal locking screw          INDICATION FOR PROCEDURE     The patient is an [de-identified]y.o.-year-old with a history of a fall. Patient complained of Left  hip pain. The patient has multiple medical comorbidities and was admitted with an intertrochanteric hip fracture. Patient was evaluated medically and felt patient to be of acceptable risk. Discussed with patient and elected to proceed.     NARRATIVE:     PROCEDURE DETAILS     The patient was taken to the operating room, underwent a general anesthetic. The fractured side was placed in longitudinal traction to aid in the reduction of the fracture. The other leg was draped out of the field. Timeout was taken, consent was confirmed. The patient received 2 gram of Ancef preoperatively. Started with 3.2 mm guide pin on the medial face of the greater trochanter and was then advanced under fluoroscopic guidance. The incision size was increased. A 16 mm channel reamer was then passed down to the level of the lesser trochanter. We placed the 10 mm x 400 mm Intertan nail down to the appropriate depth. We used the jig and a 3.2 mm guide pin placed in the center of the femoral head. We then used a dimpler followed by a cannulated reamer,the rigid reamer and the anti-rotation bar.  We placed a 85 mm lag screw, 80 mm compression screw. Good compression noted across the fracture site. Traction was removed and then placed a 40 mm distal locking screw with a free-handed technique under fluoroscopic guidance. Wounds were irrigated and closed with 2-0 Vicryl and Prineo. The patient was then awakened and returned to recovery room in fair condition.     POSTOPERATIVE PLAN: Weight bearing as tolerated, dry dressings p.r.n. First postop visit will be in 8 weeks x-rays, 2 views of the Right femur .     The physician assistant assisted throughout the procedure with positioning, draping, retraction, wound closure, and dressings application.       Marlo Valentine MD

## 2019-08-20 NOTE — PROGRESS NOTES
Priority: Medium    ICD (implantable cardioverter-defibrillator) in place [Z95.810] 01/28/2019     Priority: Medium    Hypertension [I10]      Priority: Medium    S/P ICD (internal cardiac defibrillator) procedure [Z95.810] 01/08/2018     Priority: Medium    Nonischemic cardiomyopathy ([facility] [address] [I42.8]      Priority: Medium    COPD (chronic obstructive pulmonary disease) ([facility] [address] [J44.9] 08/05/2017     Priority: Medium    Obstructive sleep apnea on CPAP [G47.33, Z99.89] 11/05/2015     Priority: Medium    CAD (coronary artery disease) s/p PCI and stent Multilink 3 x 18  mm mid LAD- in 07/2009 at [facility] [I25.10] 07/16/2012     Priority: Medium       Plan:     Discussed plans with the nursing staff  surgery planned today    Medications, Laboratories and Imaging results:    Scheduled Meds:   furosemide  20 mg Oral Daily    isosorbide mononitrate  15 mg Oral Daily    metoprolol succinate  50 mg Oral Daily    sacubitril-valsartan  1 tablet Oral BID    simvastatin  40 mg Oral Nightly    calcium-vitamin D  1 tablet Oral BID    sodium chloride flush  10 mL Intravenous 2 times per day    famotidine  20 mg Oral BID    docusate sodium  100 mg Oral BID    senna  2 tablet Oral Nightly     Continuous Infusions:   sodium chloride 100 mL/hr at 08/19/19 2025    nitroGLYCERIN 5 mcg/min (08/19/19 1726)     PRN Meds:oxyCODONE-acetaminophen, sodium chloride flush, acetaminophen, ondansetron, traMADol, hydrOXYzine, magnesium hydroxide, oxyCODONE-acetaminophen    Imaging:    Lab Review :    Lab Results   Component Value Date    WBC 7.3 08/18/2019    HGB 12.9 08/18/2019    HCT 37.1 08/18/2019    MCV 92.3 08/18/2019     08/18/2019     Lab Results   Component Value Date    CREATININE 0.9 08/18/2019    BUN 23 (H) 08/18/2019     08/18/2019    K 3.8 08/18/2019     08/18/2019    CO2 29 08/18/2019     Lab Results   Component Value Date    CKTOTAL 26 (L) 04/22/2013    TROPONINI <0.006 07/16/2012     Lab Results

## 2019-08-21 ENCOUNTER — NURSE ONLY (OUTPATIENT)
Dept: CARDIOLOGY CLINIC | Age: 79
End: 2019-08-21

## 2019-08-21 DIAGNOSIS — Z95.810 ICD (IMPLANTABLE CARDIOVERTER-DEFIBRILLATOR) IN PLACE: Primary | ICD-10-CM

## 2019-08-21 LAB
ERYTHROCYTE [DISTWIDTH] IN BLOOD BY AUTOMATED COUNT: 12.8 % (ref 11.5–14.5)
ERYTHROCYTE [DISTWIDTH] IN BLOOD BY AUTOMATED COUNT: 45.4 FL (ref 35–45)
HCT VFR BLD CALC: 31.8 % (ref 37–47)
HEMOGLOBIN: 10.6 GM/DL (ref 12–16)
MCH RBC QN AUTO: 32.1 PG (ref 26–33)
MCHC RBC AUTO-ENTMCNC: 33.3 GM/DL (ref 32.2–35.5)
MCV RBC AUTO: 96.4 FL (ref 81–99)
PLATELET # BLD: 147 THOU/MM3 (ref 130–400)
PMV BLD AUTO: 10.7 FL (ref 9.4–12.4)
RBC # BLD: 3.3 MILL/MM3 (ref 4.2–5.4)
WBC # BLD: 7.8 THOU/MM3 (ref 4.8–10.8)

## 2019-08-21 PROCEDURE — 36415 COLL VENOUS BLD VENIPUNCTURE: CPT

## 2019-08-21 PROCEDURE — 6370000000 HC RX 637 (ALT 250 FOR IP): Performed by: FAMILY MEDICINE

## 2019-08-21 PROCEDURE — 1200000003 HC TELEMETRY R&B

## 2019-08-21 PROCEDURE — 97530 THERAPEUTIC ACTIVITIES: CPT

## 2019-08-21 PROCEDURE — 2580000003 HC RX 258: Performed by: EMERGENCY MEDICINE

## 2019-08-21 PROCEDURE — 97166 OT EVAL MOD COMPLEX 45 MIN: CPT

## 2019-08-21 PROCEDURE — 99232 SBSQ HOSP IP/OBS MODERATE 35: CPT | Performed by: PHYSICIAN ASSISTANT

## 2019-08-21 PROCEDURE — 2580000003 HC RX 258: Performed by: PHYSICIAN ASSISTANT

## 2019-08-21 PROCEDURE — 97163 PT EVAL HIGH COMPLEX 45 MIN: CPT

## 2019-08-21 PROCEDURE — 6370000000 HC RX 637 (ALT 250 FOR IP): Performed by: PHYSICIAN ASSISTANT

## 2019-08-21 PROCEDURE — 85027 COMPLETE CBC AUTOMATED: CPT

## 2019-08-21 PROCEDURE — 6370000000 HC RX 637 (ALT 250 FOR IP): Performed by: ORTHOPAEDIC SURGERY

## 2019-08-21 RX ADMIN — FAMOTIDINE 20 MG: 20 TABLET ORAL at 08:07

## 2019-08-21 RX ADMIN — SIMVASTATIN 40 MG: 40 TABLET, FILM COATED ORAL at 20:02

## 2019-08-21 RX ADMIN — FAMOTIDINE 20 MG: 20 TABLET ORAL at 20:02

## 2019-08-21 RX ADMIN — TRAMADOL HYDROCHLORIDE 50 MG: 50 TABLET, FILM COATED ORAL at 13:21

## 2019-08-21 RX ADMIN — CALCIUM CARBONATE-VITAMIN D TAB 500 MG-200 UNIT 1 TABLET: 500-200 TAB at 08:07

## 2019-08-21 RX ADMIN — Medication 10 ML: at 20:05

## 2019-08-21 RX ADMIN — DOCUSATE SODIUM 100 MG: 100 CAPSULE, LIQUID FILLED ORAL at 08:07

## 2019-08-21 RX ADMIN — SODIUM CHLORIDE: 9 INJECTION, SOLUTION INTRAVENOUS at 07:30

## 2019-08-21 RX ADMIN — OXYCODONE HYDROCHLORIDE AND ACETAMINOPHEN 0.5 TABLET: 5; 325 TABLET ORAL at 10:24

## 2019-08-21 RX ADMIN — SENNOSIDES 17.2 MG: 8.6 TABLET, FILM COATED ORAL at 20:02

## 2019-08-21 RX ADMIN — ISOSORBIDE MONONITRATE 15 MG: 30 TABLET ORAL at 08:08

## 2019-08-21 RX ADMIN — METOPROLOL SUCCINATE 50 MG: 50 TABLET, FILM COATED, EXTENDED RELEASE ORAL at 08:07

## 2019-08-21 RX ADMIN — SACUBITRIL AND VALSARTAN 1 TABLET: 49; 51 TABLET, FILM COATED ORAL at 08:07

## 2019-08-21 RX ADMIN — TRAMADOL HYDROCHLORIDE 50 MG: 50 TABLET, FILM COATED ORAL at 20:02

## 2019-08-21 RX ADMIN — DOCUSATE SODIUM 100 MG: 100 CAPSULE, LIQUID FILLED ORAL at 20:02

## 2019-08-21 RX ADMIN — ACETAMINOPHEN 650 MG: 325 TABLET ORAL at 08:07

## 2019-08-21 RX ADMIN — FUROSEMIDE 20 MG: 20 TABLET ORAL at 08:07

## 2019-08-21 RX ADMIN — SACUBITRIL AND VALSARTAN 1 TABLET: 49; 51 TABLET, FILM COATED ORAL at 20:02

## 2019-08-21 ASSESSMENT — PAIN DESCRIPTION - LOCATION
LOCATION: HIP
LOCATION: BACK

## 2019-08-21 ASSESSMENT — PAIN SCALES - GENERAL
PAINLEVEL_OUTOF10: 5
PAINLEVEL_OUTOF10: 6
PAINLEVEL_OUTOF10: 3
PAINLEVEL_OUTOF10: 3
PAINLEVEL_OUTOF10: 5
PAINLEVEL_OUTOF10: 5

## 2019-08-21 ASSESSMENT — PAIN DESCRIPTION - ORIENTATION
ORIENTATION: MID;LOWER
ORIENTATION: LEFT

## 2019-08-21 ASSESSMENT — PAIN DESCRIPTION - FREQUENCY: FREQUENCY: CONTINUOUS

## 2019-08-21 ASSESSMENT — PAIN DESCRIPTION - DESCRIPTORS: DESCRIPTORS: ACHING

## 2019-08-21 ASSESSMENT — PAIN DESCRIPTION - DIRECTION: RADIATING_TOWARDS: BACK

## 2019-08-21 ASSESSMENT — PAIN - FUNCTIONAL ASSESSMENT: PAIN_FUNCTIONAL_ASSESSMENT: ACTIVITIES ARE NOT PREVENTED

## 2019-08-21 ASSESSMENT — PAIN DESCRIPTION - PAIN TYPE
TYPE: CHRONIC PAIN
TYPE: SURGICAL PAIN
TYPE: SURGICAL PAIN

## 2019-08-21 ASSESSMENT — PAIN DESCRIPTION - PROGRESSION: CLINICAL_PROGRESSION: NOT CHANGED

## 2019-08-21 ASSESSMENT — PAIN DESCRIPTION - ONSET: ONSET: ON-GOING

## 2019-08-21 ASSESSMENT — PAIN SCALES - WONG BAKER: WONGBAKER_NUMERICALRESPONSE: 8

## 2019-08-21 NOTE — PROGRESS NOTES
rails  Entrance Stairs - Number of Steps: 2 steps with 1 rail   Home Equipment: Standard walker, Cane                   Ambulation Assistance: Independent  Transfer Assistance: Independent          Additional Comments: Pt was getting vestibular PT rehab. Pt was generally independent with mobility. Pt volunteers regularly [facility] at [facility]. OBJECTIVE:  Range of Motion:  Right Lower Extremity: WFL  Left Lower Extremity: Impaired - limited by pain    Strength:  Right Lower Extremity: Impaired - grossly 4-/5  Left Lower Extremity: Impaired - limited by increased pain. grossly 2+ to 3-/5    Balance:  Pt has hx of BPPV. Pt tolerated sitting with SBA and standing activity with Min to Mod A with walker for support'    Bed Mobility:  Supine to Sit: Moderate Assistance    Transfers:  Sit to Stand: Moderate Assistance, x 1 and Min A x 1  Stand to Sit:Moderate Assistance x 1    Ambulation:  Moderate Assistance, x 1 and CGA x 1   Distance: 3 ft x 2  Surface: Level Tile  Device:Rolling Walker  Gait Deviations:  Decreased Step Length Bilaterally, Decreased Gait Speed, Decreased Heel Strike Bilaterally, Narrow Base of Support and with extra time and much encouragement      Exercise:  Patient was guided in 1 set(s) 10 reps of exercise to both lower extremities. Ankle pumps, Long arc quads, and Seated hip flexion with AAROM for L LE. Exercises were completed for increased independence with functional mobility. Functional Outcome Measures: Completed  AM-PAC Inpatient Mobility without Stair Climbing Raw Score : 11  AM-PAC Inpatient without Stair Climbing T-Scale Score : 35.66    ASSESSMENT:  Activity Tolerance:  Patient tolerance of  treatment: good. With rest breaks      Treatment Initiated: Treatment and education initiated within context of evaluation.   Evaluation time included review of current medical information, gathering information related to past medical, social and functional history, completion of standardized testing, formal and informal observation of tasks, assessment of data and development of plan of care and goals. Treatment time included skilled education and facilitation of tasks to increase safety and independence with functional mobility for improved independence and quality of life. Assessment: Body structures, Functions, Activity limitations: Decreased functional mobility , Decreased balance, Decreased endurance, Decreased ROM, Decreased strength, Increased Pain  Assessment: Pt is an [de-identified] y.o. female that fell and sustained a left hip fracture and is s/p ORIF with cephalomedullary nail. Pt also has hx of BPPV and was being treated in outpatient PT recently. Pt is requiring Mod A of 1-2 persons for transfers and mobility and would benefit from continued skilled PT to address strengthening, transfers, and gait training before being ready to return home alone. Prognosis: Good    REQUIRES PT FOLLOW UP: Yes    Discharge Recommendations:  Discharge Recommendations: Continue to assess pending progress, IP Rehab, Patient would benefit from continued therapy after discharge    Patient Education:  PT Education: Plan of Care, General Safety, Gait Training, Transfer Training, Precautions, Weight-bearing Education    Equipment Recommendations:   Other: monitor for needs    Plan:  Times per week: 6x O  Times per day: Daily  Specific instructions for Next Treatment: ther ex, ther act, transfers, gait training  Current Treatment Recommendations: Strengthening, Transfer Training, Endurance Training, Patient/Caregiver Education & Training, ROM, Balance Training, Gait Training, Stair training, Functional Mobility Training, Safety Education & Training, Home Exercise Program    Goals:  Patient goals : go home  Short term goals  Time Frame for Short term goals: at discharge  Short term goal 1: Pt to be CGA for supine <> sit to get in/out of bed  Short term goal 2: Pt to be CGA x 1 for sit <> stand to get up to ambulate  Short term goal

## 2019-08-21 NOTE — PROGRESS NOTES
Priority: High    Benign positional vertigo [H81.10] 08/19/2019     Priority: Medium    ICD (implantable cardioverter-defibrillator) in place [Z95.810] 01/28/2019     Priority: Medium    Hypertension [I10]      Priority: Medium    S/P ICD (internal cardiac defibrillator) procedure [Z95.810] 01/08/2018     Priority: Medium    Nonischemic cardiomyopathy ([facility] [address] [I42.8]      Priority: Medium    COPD (chronic obstructive pulmonary disease) ([facility] [address] [J44.9] 08/05/2017     Priority: Medium    Obstructive sleep apnea on CPAP [G47.33, Z99.89] 11/05/2015     Priority: Medium    CAD (coronary artery disease) s/p PCI and stent Multilink 3 x 18  mm mid LAD- in 07/2009 at [facility] [I25.10] 07/16/2012     Priority: Medium       Plan:     Discussed plans with the nursing staff  Consult rehab hip hip, and vestibular rehab  Weak off nitro and oxygen    Medications, Laboratories and Imaging results:    Scheduled Meds:   furosemide  20 mg Oral Daily    isosorbide mononitrate  15 mg Oral Daily    metoprolol succinate  50 mg Oral Daily    sacubitril-valsartan  1 tablet Oral BID    simvastatin  40 mg Oral Nightly    calcium-vitamin D  1 tablet Oral BID    sodium chloride flush  10 mL Intravenous 2 times per day    famotidine  20 mg Oral BID    docusate sodium  100 mg Oral BID    senna  2 tablet Oral Nightly     Continuous Infusions:   sodium chloride 100 mL/hr at 08/21/19 0730    nitroGLYCERIN Stopped (08/20/19 1645)     PRN Meds:oxyCODONE-acetaminophen, sodium chloride flush, acetaminophen, ondansetron, traMADol, hydrOXYzine, magnesium hydroxide, oxyCODONE-acetaminophen    Imaging:    Lab Review :    Lab Results   Component Value Date    WBC 7.3 08/18/2019    HGB 12.9 08/18/2019    HCT 37.1 08/18/2019    MCV 92.3 08/18/2019     08/18/2019     Lab Results   Component Value Date    CREATININE 0.9 08/18/2019    BUN 23 (H) 08/18/2019     08/18/2019    K 3.8 08/18/2019     08/18/2019    CO2 29 08/18/2019

## 2019-08-21 NOTE — PROGRESS NOTES
Esther Williamson 59  INPATIENT OCCUPATIONAL THERAPY  [facility] RENAL TELEMETRY 6K  EVALUATION    Time:   Time In: 6648  Time Out: 1415  Timed Code Treatment Minutes: 9 Minutes  Minutes: 21          Date: 2019  Patient Name: Cathi Moreno,   Gender: female      MRN: [id number]  : 1940  ([de-identified] y.o.)  Referring Practitioner: Dr. Ana Campbell  Diagnosis: Closed L hip fracture  Additional Pertinent Hx: Per ED note, pt is a [de-identified] y.o. female who presents to the Emergency Department for the evaluation of a fall. Patient reports that she bent forward, felt dizzy and fell. She is not certain if she hit her head during the fall, but she did not loose consciousness. Patient has a history of vertigo, DVT and has a pacemaker. She takes asprin regularly as a blood thinner and takes tramodol for pain management. She also does crystal therapy for her vertigo. Patient reports that most pain is located in her left leg. She reports a aching/pounding pain that has a 8/10 in severity.      Restrictions/Precautions:  Restrictions/Precautions: Weight Bearing, General Precautions, Fall Risk  Left Lower Extremity Weight Bearing: Weight Bearing As Tolerated    Subjective  Chart Reviewed: Yes, Operative Notes, Orders, Progress Notes, History and Physical, Previous Admission  Patient assessed for rehabilitation services?: Yes  Family / Caregiver Present: Yes    Subjective: Requesting to go back to bed  Comments: RN ok'd OT    Pain:  Pain Assessment  Patient Currently in Pain: Yes  Pain Assessment: Faces  Palacios-Baker Pain Rating: Hurts whole lot  Pain Type: Surgical pain    Social/Functional History:  Lives With: Alone  Type of Home: House  Home Layout: Two level, Able to Live on Main level with bedroom/bathroom  Home Access: Stairs to enter with rails  Entrance Stairs - Number of Steps: 2 steps with 1 rail   Home Equipment: Standard walker, Cane   Bathroom Shower/Tub: Walk-in shower  Bathroom Toilet: Handicap height       ADL Assistance: Independent  Homemaking Assistance: Independent  Ambulation Assistance: Independent  Transfer Assistance: Independent    Active : Yes  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 Pt was generally independent with mobility. Pt volunteers regularly here at [facility]. Cognition/Orientation:  Overall Orientation Status: Within Functional Limits  Cognition Comment: grossly WFL, decreased insight, decreased level of alertness, inc time for processing    ADL;s:  LE Dressing: Maximum assistance(adjusting socks)  Toileting: Moderate assistance(d/t incontinence)       Functional Mobility:  Bed mobility  Sit to Supine: 2 Person assistance, Maximum assistance  Scooting: Dependent/Total(hercules lift)    Functional Mobility  Functional - Mobility Device: Rolling Walker  Activity: Other(chair>EOB)  Assist Level: Moderate assistance(of2)  Functional Mobility Comments: very unsteady, max cues, max A for RW progression; 2 persons for safety     Balance:  Balance  Sitting Balance: Contact guard assistance  Standing Balance:  Moderate assistance(of 2; heavy posterior lean)  Standing Balance  Time: 1 mins  Activity: prep for mobility, static stand to achieve balance    Transfers:  Sit to stand: 2 Person assistance, Moderate assistance(from chair, max cues for technique)  Stand to sit: Moderate assistance, 2 Person assistance(to control descent)       Upper Extremity Assessment:   LUE General AROM: WFL as seen functionally  RUE General AROM: WFL as seen functionally    LUE Strength  LUE Strength Comment: gross deconditioning  RUE Strength  RUE Strength Comment: gross deconditioning    Sensation  Overall Sensation Status: WNL       Activity Tolerance: Patient limited by fatigue, Patient limited by pain  limited by weakness    Assessment:  Assessment: Pt would continue to benefit from skilled OT intervention to maximize pt safety and independence with performing self care tasks and functional mobility and to ensure safe transition to the next level of care and return to [facility]. Performance deficits / Impairments: Decreased functional mobility , Decreased ADL status, Decreased endurance, Decreased strength, Decreased balance, Decreased safe awareness, Decreased high-level IADLs  Prognosis: Fair  REQUIRES OT FOLLOW UP: Yes    Treatment Initiated: Treatment and education initiated within context of evaluation. Evaluation time included review of current medical information, gathering information related to past medical, social and functional history, completion of standardized testing, formal and informal observation of tasks, assessment of data and development of plan of care and goals. Treatment time included skilled education and facilitation of tasks to increase safety and independence with ADL's for improved functional independence and quality of life. Discharge Recommendations:  Patient would benefit from continued therapy after discharge, Continue to assess pending progress    Patient Education:  OT Education: OT Role, Plan of Care, Precautions, Transfer Training, Equipment    Equipment Recommendations:   Other: defer to next level of care    Plan:  Times per week: 6x  Current Treatment Recommendations: Strengthening, Balance Training, Functional Mobility Training, Endurance Training, Equipment Evaluation, Education, & procurement, Home Management Training, Patient/Caregiver Education & Training, Safety Education & Training, Self-Care / ADL, Pain Management    Goals:  Patient goals : to go to [facility] rehab  Short term goals  Time Frame for Short term goals: 2 weeks  Short term goal 1: pt to tolerate short HH distance functional mobility with no greater than Min A x1 for inc indep accessing environmnet  Short term goal 2: pt to demo standing tolerance greater than 3 mins with ocassional 1 UE release at [employer] in prep for clothing management  Short term goal 3: pt to complete

## 2019-08-22 PROBLEM — E66.01 CLASS 2 SEVERE OBESITY DUE TO EXCESS CALORIES WITH SERIOUS COMORBIDITY IN ADULT (HCC): Status: ACTIVE | Noted: 2018-02-03

## 2019-08-22 LAB
ERYTHROCYTE [DISTWIDTH] IN BLOOD BY AUTOMATED COUNT: 13.1 % (ref 11.5–14.5)
ERYTHROCYTE [DISTWIDTH] IN BLOOD BY AUTOMATED COUNT: 47.5 FL (ref 35–45)
HCT VFR BLD CALC: 34.1 % (ref 37–47)
HEMOGLOBIN: 11 GM/DL (ref 12–16)
MCH RBC QN AUTO: 32.4 PG (ref 26–33)
MCHC RBC AUTO-ENTMCNC: 32.3 GM/DL (ref 32.2–35.5)
MCV RBC AUTO: 100.3 FL (ref 81–99)
PLATELET # BLD: 136 THOU/MM3 (ref 130–400)
PMV BLD AUTO: 10.7 FL (ref 9.4–12.4)
RBC # BLD: 3.4 MILL/MM3 (ref 4.2–5.4)
WBC # BLD: 8 THOU/MM3 (ref 4.8–10.8)

## 2019-08-22 PROCEDURE — 6370000000 HC RX 637 (ALT 250 FOR IP): Performed by: PHYSICIAN ASSISTANT

## 2019-08-22 PROCEDURE — 99223 1ST HOSP IP/OBS HIGH 75: CPT | Performed by: PHYSICAL MEDICINE & REHABILITATION

## 2019-08-22 PROCEDURE — 97110 THERAPEUTIC EXERCISES: CPT

## 2019-08-22 PROCEDURE — 2580000003 HC RX 258: Performed by: PHYSICIAN ASSISTANT

## 2019-08-22 PROCEDURE — 6370000000 HC RX 637 (ALT 250 FOR IP): Performed by: FAMILY MEDICINE

## 2019-08-22 PROCEDURE — 36415 COLL VENOUS BLD VENIPUNCTURE: CPT

## 2019-08-22 PROCEDURE — 97535 SELF CARE MNGMENT TRAINING: CPT

## 2019-08-22 PROCEDURE — 1200000003 HC TELEMETRY R&B

## 2019-08-22 PROCEDURE — 97530 THERAPEUTIC ACTIVITIES: CPT

## 2019-08-22 PROCEDURE — 6370000000 HC RX 637 (ALT 250 FOR IP): Performed by: ORTHOPAEDIC SURGERY

## 2019-08-22 PROCEDURE — 85027 COMPLETE CBC AUTOMATED: CPT

## 2019-08-22 RX ORDER — POLYETHYLENE GLYCOL 3350 17 G/17G
17 POWDER, FOR SOLUTION ORAL DAILY
Status: DISCONTINUED | OUTPATIENT
Start: 2019-08-22 | End: 2019-08-23 | Stop reason: HOSPADM

## 2019-08-22 RX ADMIN — TRAMADOL HYDROCHLORIDE 50 MG: 50 TABLET, FILM COATED ORAL at 05:34

## 2019-08-22 RX ADMIN — ISOSORBIDE MONONITRATE 15 MG: 30 TABLET ORAL at 08:30

## 2019-08-22 RX ADMIN — FUROSEMIDE 20 MG: 20 TABLET ORAL at 08:31

## 2019-08-22 RX ADMIN — OXYCODONE HYDROCHLORIDE AND ACETAMINOPHEN 0.5 TABLET: 5; 325 TABLET ORAL at 08:31

## 2019-08-22 RX ADMIN — OXYCODONE HYDROCHLORIDE AND ACETAMINOPHEN 0.5 TABLET: 5; 325 TABLET ORAL at 00:20

## 2019-08-22 RX ADMIN — METOPROLOL SUCCINATE 50 MG: 50 TABLET, FILM COATED, EXTENDED RELEASE ORAL at 08:31

## 2019-08-22 RX ADMIN — OXYCODONE HYDROCHLORIDE AND ACETAMINOPHEN 0.5 TABLET: 5; 325 TABLET ORAL at 16:48

## 2019-08-22 RX ADMIN — DOCUSATE SODIUM 100 MG: 100 CAPSULE, LIQUID FILLED ORAL at 08:34

## 2019-08-22 RX ADMIN — FAMOTIDINE 20 MG: 20 TABLET ORAL at 08:34

## 2019-08-22 RX ADMIN — Medication 10 ML: at 08:31

## 2019-08-22 RX ADMIN — CALCIUM CARBONATE-VITAMIN D TAB 500 MG-200 UNIT 1 TABLET: 500-200 TAB at 08:30

## 2019-08-22 RX ADMIN — SACUBITRIL AND VALSARTAN 1 TABLET: 49; 51 TABLET, FILM COATED ORAL at 08:30

## 2019-08-22 ASSESSMENT — PAIN DESCRIPTION - LOCATION
LOCATION: HIP
LOCATION: HIP;LEG
LOCATION: HIP

## 2019-08-22 ASSESSMENT — PAIN DESCRIPTION - ORIENTATION
ORIENTATION: LEFT

## 2019-08-22 ASSESSMENT — PAIN DESCRIPTION - ONSET
ONSET: ON-GOING

## 2019-08-22 ASSESSMENT — PAIN SCALES - GENERAL
PAINLEVEL_OUTOF10: 5
PAINLEVEL_OUTOF10: 2
PAINLEVEL_OUTOF10: 7
PAINLEVEL_OUTOF10: 8
PAINLEVEL_OUTOF10: 7
PAINLEVEL_OUTOF10: 5
PAINLEVEL_OUTOF10: 1
PAINLEVEL_OUTOF10: 5
PAINLEVEL_OUTOF10: 2

## 2019-08-22 ASSESSMENT — PAIN DESCRIPTION - DESCRIPTORS
DESCRIPTORS: DISCOMFORT;SHARP
DESCRIPTORS: ACHING;DISCOMFORT
DESCRIPTORS: DISCOMFORT;SHARP

## 2019-08-22 ASSESSMENT — PAIN DESCRIPTION - DIRECTION
RADIATING_TOWARDS: BACK
RADIATING_TOWARDS: BACK

## 2019-08-22 ASSESSMENT — PAIN DESCRIPTION - PAIN TYPE
TYPE: ACUTE PAIN
TYPE: ACUTE PAIN;SURGICAL PAIN
TYPE: ACUTE PAIN;SURGICAL PAIN
TYPE: SURGICAL PAIN
TYPE: SURGICAL PAIN
TYPE: ACUTE PAIN

## 2019-08-22 ASSESSMENT — PAIN DESCRIPTION - FREQUENCY
FREQUENCY: CONTINUOUS

## 2019-08-22 ASSESSMENT — PAIN DESCRIPTION - PROGRESSION
CLINICAL_PROGRESSION: NOT CHANGED
CLINICAL_PROGRESSION: GRADUALLY IMPROVING
CLINICAL_PROGRESSION: GRADUALLY WORSENING

## 2019-08-22 ASSESSMENT — PAIN - FUNCTIONAL ASSESSMENT
PAIN_FUNCTIONAL_ASSESSMENT: PREVENTS OR INTERFERES SOME ACTIVE ACTIVITIES AND ADLS
PAIN_FUNCTIONAL_ASSESSMENT: PREVENTS OR INTERFERES WITH MANY ACTIVE NOT PASSIVE ACTIVITIES

## 2019-08-22 NOTE — PLAN OF CARE
Problem: Pain:  Goal: Pain level will decrease  Description  Pain level will decrease  Outcome: Ongoing  Note:   Pain is controlled this shift with medication and ice therapy. Patient has increased pain when moving to chair/bed. Stated pain goal is no pain. Will continue to assess. Problem: Falls - Risk of:  Goal: Will remain free from falls  Description  Will remain free from falls  8/22/2019 1338 by William Edwards RN  Outcome: Ongoing  Note:   Call light within reach. Side rails up x2. Bed alarm on. Non skid slippers available. Care plan reviewed with patient and daughter. Patient and daughter verbalize understanding of the plan of care and contribute to goal setting. Problem: Spiritual  Goal: Coping methods/spiritual issues explored  8/22/2019 1338 by William Edwards RN  Outcome: Ongoing  Note:   Patient discussed home issues with . 8/22/2019 0222 by Gideon Patel RN  Outcome: Ongoing        Problem: Risk for Impaired Skin Integrity  Goal: Tissue integrity - skin and mucous membranes  Description  Structural intactness and normal physiological function of skin and  mucous membranes. 8/22/2019 1338 by William Edwards RN  Outcome: Ongoing  Note:   Patient free from skin breakdown. Patient is turned every two hours and as needed. Will continue to monitor. Problem: Neurological  Goal: Maximum potential motor/sensory/cognitive function  Outcome: Ongoing  Note:   Patient has +2 pulses in left leg and foot. Patient complains of no numbness or tingling. Cap refill less than 3 seconds. Will continue to monitor every four hours. Problem: Cardiovascular  Goal: No DVT, peripheral vascular complications  Outcome: Ongoing  Note:   Patient has no complaints of pain in left calf. SCDs in place. Problem: Respiratory  Goal: O2 Sat > 90%  Outcome: Ongoing  Note:   Patient saturation in 88% on room air and high 90s on 2 liters oxygen. Will try to wean throughout shift.      Problem:

## 2019-08-22 NOTE — CONSULTS
 Osteopenia determined by x-ray 1999    Osteoporosis of vertebra 6/2018 Per CT thoracolumbar    Pneumonia 2009    Stress incontinence, female     Vertigo        Past Surgical History:        Procedure Laterality Date    ARM SURGERY Left 11/27/2018    plate in arm    BREAST LUMPECTOMY  1/04    wt axillary dissection    CARDIAC DEFIBRILLATOR PLACEMENT  01/08/2018    Biventricular pacemaker ICD, Dr. Simon Guido  3/2011    CORONARY ANGIOPLASTY WITH STENT PLACEMENT  2009    DILATATION, ESOPHAGUS  2011    HIP PINNING Left 8/20/2019    LEFT HIP INTERTAIN performed by Dat Hyman MD at [facility] or [facility]    bleeding    JOINT REPLACEMENT Left     Shoulder    ND OFFICE/OUTPT VISIT,PROCEDURE ONLY N/A 8/2/2018    EGD DIL performed by Marty Lal MD at [facility] OFFICE/OUTPT VISIT,PROCEDURE ONLY Left 11/26/2018    ORIF LEFT PERIPROSTHETIC DISTAL HUMERAL FRACTURE performed by Dat Hyman MD at [place] [address] Right 2/12/2018    LEFT HUMERUS OPEN REDUCTION INTERNAL FIXATION performed by Dat Hyman MD at [address] Left 8/2/2018    EGD BIOPSY performed by Marty Lal MD at [facility]       Allergies:     Allergies   Allergen Reactions    Ciprofloxacin Itching    Neomycin     Pcn [Penicillins] Hives     Pt reports having reaction 50 years ago    Zanaflex [Tizanidine Hcl] Other (See Comments)     Causes confusion    Levaquin [Levofloxacin] Itching     Benadryl was given for tx        Current Medications:   Current Facility-Administered Medications: furosemide (LASIX) tablet 20 mg, 20 mg, Oral, Daily  isosorbide mononitrate (IMDUR) extended release tablet 15 mg, 15 mg, Oral, Daily  metoprolol succinate (TOPROL XL) extended release tablet 50 mg, 50 mg, Oral, Daily  sacubitril-valsartan (ENTRESTO) 49-51 MG per tablet 1 tablet,

## 2019-08-23 ENCOUNTER — HOSPITAL ENCOUNTER (INPATIENT)
Age: 79
LOS: 18 days | Discharge: NURSING, REHAB OR LONG-TERM CARE | DRG: 560 | End: 2019-09-10
Attending: PHYSICAL MEDICINE & REHABILITATION | Admitting: PHYSICAL MEDICINE & REHABILITATION
Payer: MEDICARE

## 2019-08-23 VITALS
BODY MASS INDEX: 37.25 KG/M2 | OXYGEN SATURATION: 95 % | RESPIRATION RATE: 20 BRPM | HEIGHT: 64 IN | TEMPERATURE: 98.1 F | HEART RATE: 85 BPM | WEIGHT: 218.2 LBS | SYSTOLIC BLOOD PRESSURE: 116 MMHG | DIASTOLIC BLOOD PRESSURE: 58 MMHG

## 2019-08-23 DIAGNOSIS — S72.002A CLOSED LEFT HIP FRACTURE, INITIAL ENCOUNTER (HCC): Primary | ICD-10-CM

## 2019-08-23 PROBLEM — Z87.81 S/P LEFT HIP FRACTURE: Status: ACTIVE | Noted: 2019-08-23

## 2019-08-23 LAB
ERYTHROCYTE [DISTWIDTH] IN BLOOD BY AUTOMATED COUNT: 12.7 % (ref 11.5–14.5)
ERYTHROCYTE [DISTWIDTH] IN BLOOD BY AUTOMATED COUNT: 41 FL (ref 35–45)
HCT VFR BLD CALC: 31.6 % (ref 37–47)
HEMOGLOBIN: 11.3 GM/DL (ref 12–16)
MCH RBC QN AUTO: 31.7 PG (ref 26–33)
MCHC RBC AUTO-ENTMCNC: 35.8 GM/DL (ref 32.2–35.5)
MCV RBC AUTO: 88.5 FL (ref 81–99)
PLATELET # BLD: 146 THOU/MM3 (ref 130–400)
PMV BLD AUTO: 11 FL (ref 9.4–12.4)
RBC # BLD: 3.57 MILL/MM3 (ref 4.2–5.4)
WBC # BLD: 8.2 THOU/MM3 (ref 4.8–10.8)

## 2019-08-23 PROCEDURE — 6360000002 HC RX W HCPCS: Performed by: EMERGENCY MEDICINE

## 2019-08-23 PROCEDURE — 97110 THERAPEUTIC EXERCISES: CPT

## 2019-08-23 PROCEDURE — 1180000000 HC REHAB R&B

## 2019-08-23 PROCEDURE — 6370000000 HC RX 637 (ALT 250 FOR IP): Performed by: PHYSICIAN ASSISTANT

## 2019-08-23 PROCEDURE — 99223 1ST HOSP IP/OBS HIGH 75: CPT | Performed by: PHYSICAL MEDICINE & REHABILITATION

## 2019-08-23 PROCEDURE — 97112 NEUROMUSCULAR REEDUCATION: CPT

## 2019-08-23 PROCEDURE — 97530 THERAPEUTIC ACTIVITIES: CPT

## 2019-08-23 PROCEDURE — 6370000000 HC RX 637 (ALT 250 FOR IP): Performed by: NURSE PRACTITIONER

## 2019-08-23 PROCEDURE — 6370000000 HC RX 637 (ALT 250 FOR IP): Performed by: ORTHOPAEDIC SURGERY

## 2019-08-23 PROCEDURE — 2709999900 HC NON-CHARGEABLE SUPPLY

## 2019-08-23 PROCEDURE — 94760 N-INVAS EAR/PLS OXIMETRY 1: CPT

## 2019-08-23 PROCEDURE — 85027 COMPLETE CBC AUTOMATED: CPT

## 2019-08-23 PROCEDURE — 6370000000 HC RX 637 (ALT 250 FOR IP): Performed by: FAMILY MEDICINE

## 2019-08-23 PROCEDURE — 2580000003 HC RX 258: Performed by: NURSE PRACTITIONER

## 2019-08-23 PROCEDURE — 6370000000 HC RX 637 (ALT 250 FOR IP): Performed by: PHYSICAL MEDICINE & REHABILITATION

## 2019-08-23 PROCEDURE — 36415 COLL VENOUS BLD VENIPUNCTURE: CPT

## 2019-08-23 PROCEDURE — 2700000000 HC OXYGEN THERAPY PER DAY

## 2019-08-23 PROCEDURE — 2580000003 HC RX 258: Performed by: PHYSICIAN ASSISTANT

## 2019-08-23 RX ORDER — SODIUM CHLORIDE 0.9 % (FLUSH) 0.9 %
10 SYRINGE (ML) INJECTION EVERY 12 HOURS SCHEDULED
Status: CANCELLED | OUTPATIENT
Start: 2019-08-23

## 2019-08-23 RX ORDER — DOCUSATE SODIUM 100 MG/1
100 CAPSULE, LIQUID FILLED ORAL 2 TIMES DAILY
Status: CANCELLED | OUTPATIENT
Start: 2019-08-23

## 2019-08-23 RX ORDER — SIMVASTATIN 40 MG
40 TABLET ORAL NIGHTLY
Status: DISCONTINUED | OUTPATIENT
Start: 2019-08-23 | End: 2019-09-10 | Stop reason: HOSPADM

## 2019-08-23 RX ORDER — SODIUM CHLORIDE 0.9 % (FLUSH) 0.9 %
10 SYRINGE (ML) INJECTION PRN
Status: CANCELLED | OUTPATIENT
Start: 2019-08-23

## 2019-08-23 RX ORDER — ACETAMINOPHEN 325 MG/1
650 TABLET ORAL EVERY 4 HOURS PRN
Status: CANCELLED | OUTPATIENT
Start: 2019-08-23

## 2019-08-23 RX ORDER — ISOSORBIDE MONONITRATE 30 MG/1
30 TABLET, EXTENDED RELEASE ORAL DAILY
Status: CANCELLED | OUTPATIENT
Start: 2019-08-23

## 2019-08-23 RX ORDER — SODIUM CHLORIDE 0.9 % (FLUSH) 0.9 %
10 SYRINGE (ML) INJECTION PRN
Status: DISCONTINUED | OUTPATIENT
Start: 2019-08-23 | End: 2019-08-31

## 2019-08-23 RX ORDER — ISOSORBIDE MONONITRATE 30 MG/1
30 TABLET, EXTENDED RELEASE ORAL DAILY
Status: DISCONTINUED | OUTPATIENT
Start: 2019-08-24 | End: 2019-09-10 | Stop reason: HOSPADM

## 2019-08-23 RX ORDER — METOPROLOL SUCCINATE 50 MG/1
50 TABLET, EXTENDED RELEASE ORAL DAILY
Status: DISCONTINUED | OUTPATIENT
Start: 2019-08-24 | End: 2019-08-29

## 2019-08-23 RX ORDER — ACETAMINOPHEN 325 MG/1
650 TABLET ORAL EVERY 4 HOURS PRN
Status: DISCONTINUED | OUTPATIENT
Start: 2019-08-23 | End: 2019-09-10 | Stop reason: HOSPADM

## 2019-08-23 RX ORDER — ASPIRIN 81 MG/1
81 TABLET ORAL DAILY
Status: CANCELLED | OUTPATIENT
Start: 2019-08-23

## 2019-08-23 RX ORDER — SODIUM CHLORIDE 0.9 % (FLUSH) 0.9 %
10 SYRINGE (ML) INJECTION EVERY 12 HOURS SCHEDULED
Status: DISCONTINUED | OUTPATIENT
Start: 2019-08-23 | End: 2019-08-31

## 2019-08-23 RX ORDER — TRAMADOL HYDROCHLORIDE 50 MG/1
50 TABLET ORAL EVERY 6 HOURS PRN
Status: CANCELLED | OUTPATIENT
Start: 2019-08-23

## 2019-08-23 RX ORDER — SIMVASTATIN 40 MG
40 TABLET ORAL NIGHTLY
Status: CANCELLED | OUTPATIENT
Start: 2019-08-23

## 2019-08-23 RX ORDER — HYDROXYZINE PAMOATE 25 MG/1
25 CAPSULE ORAL EVERY 6 HOURS PRN
Status: CANCELLED | OUTPATIENT
Start: 2019-08-23

## 2019-08-23 RX ORDER — ONDANSETRON 4 MG/1
4 TABLET, FILM COATED ORAL EVERY 8 HOURS PRN
Status: DISCONTINUED | OUTPATIENT
Start: 2019-08-23 | End: 2019-09-10 | Stop reason: HOSPADM

## 2019-08-23 RX ORDER — DOCUSATE SODIUM 100 MG/1
100 CAPSULE, LIQUID FILLED ORAL 2 TIMES DAILY
Status: DISCONTINUED | OUTPATIENT
Start: 2019-08-23 | End: 2019-08-25

## 2019-08-23 RX ORDER — POLYETHYLENE GLYCOL 3350 17 G/17G
17 POWDER, FOR SOLUTION ORAL DAILY PRN
Status: DISCONTINUED | OUTPATIENT
Start: 2019-08-23 | End: 2019-09-10 | Stop reason: HOSPADM

## 2019-08-23 RX ORDER — METOPROLOL SUCCINATE 50 MG/1
50 TABLET, EXTENDED RELEASE ORAL DAILY
Status: CANCELLED | OUTPATIENT
Start: 2019-08-23

## 2019-08-23 RX ORDER — OYSTER SHELL CALCIUM WITH VITAMIN D 500; 200 MG/1; [IU]/1
1 TABLET, FILM COATED ORAL 2 TIMES DAILY
Status: CANCELLED | OUTPATIENT
Start: 2019-08-23

## 2019-08-23 RX ORDER — TRAMADOL HYDROCHLORIDE 50 MG/1
50 TABLET ORAL EVERY 6 HOURS PRN
Status: DISCONTINUED | OUTPATIENT
Start: 2019-08-23 | End: 2019-08-30

## 2019-08-23 RX ORDER — FUROSEMIDE 20 MG/1
20 TABLET ORAL DAILY
Status: CANCELLED | OUTPATIENT
Start: 2019-08-23

## 2019-08-23 RX ORDER — HYDROXYZINE PAMOATE 25 MG/1
25 CAPSULE ORAL EVERY 6 HOURS PRN
Status: DISCONTINUED | OUTPATIENT
Start: 2019-08-23 | End: 2019-09-10 | Stop reason: HOSPADM

## 2019-08-23 RX ORDER — ASPIRIN 81 MG/1
81 TABLET ORAL DAILY
Status: DISCONTINUED | OUTPATIENT
Start: 2019-08-24 | End: 2019-09-10 | Stop reason: HOSPADM

## 2019-08-23 RX ORDER — OYSTER SHELL CALCIUM WITH VITAMIN D 500; 200 MG/1; [IU]/1
1 TABLET, FILM COATED ORAL 2 TIMES DAILY
Status: DISCONTINUED | OUTPATIENT
Start: 2019-08-23 | End: 2019-09-10 | Stop reason: HOSPADM

## 2019-08-23 RX ORDER — FUROSEMIDE 20 MG/1
20 TABLET ORAL DAILY
Status: DISCONTINUED | OUTPATIENT
Start: 2019-08-24 | End: 2019-09-10 | Stop reason: HOSPADM

## 2019-08-23 RX ADMIN — POLYETHYLENE GLYCOL 3350 17 G: 17 POWDER, FOR SOLUTION ORAL at 09:04

## 2019-08-23 RX ADMIN — RIVAROXABAN 10 MG: 10 TABLET, FILM COATED ORAL at 18:15

## 2019-08-23 RX ADMIN — SODIUM CHLORIDE, PRESERVATIVE FREE 10 ML: 5 INJECTION INTRAVENOUS at 20:02

## 2019-08-23 RX ADMIN — CALCIUM CARBONATE-VITAMIN D TAB 500 MG-200 UNIT 1 TABLET: 500-200 TAB at 19:59

## 2019-08-23 RX ADMIN — OXYCODONE HYDROCHLORIDE AND ACETAMINOPHEN 0.5 TABLET: 5; 325 TABLET ORAL at 09:04

## 2019-08-23 RX ADMIN — Medication 10 ML: at 09:07

## 2019-08-23 RX ADMIN — SACUBITRIL AND VALSARTAN 1 TABLET: 49; 51 TABLET, FILM COATED ORAL at 09:03

## 2019-08-23 RX ADMIN — OXYCODONE HYDROCHLORIDE AND ACETAMINOPHEN 0.5 TABLET: 5; 325 TABLET ORAL at 14:12

## 2019-08-23 RX ADMIN — FUROSEMIDE 20 MG: 20 TABLET ORAL at 09:07

## 2019-08-23 RX ADMIN — TRAMADOL HYDROCHLORIDE 50 MG: 50 TABLET, FILM COATED ORAL at 19:59

## 2019-08-23 RX ADMIN — DOCUSATE SODIUM 100 MG: 100 CAPSULE, LIQUID FILLED ORAL at 09:04

## 2019-08-23 RX ADMIN — SACUBITRIL AND VALSARTAN 1 TABLET: 49; 51 TABLET, FILM COATED ORAL at 19:59

## 2019-08-23 RX ADMIN — SIMVASTATIN 40 MG: 40 TABLET, FILM COATED ORAL at 19:59

## 2019-08-23 RX ADMIN — FAMOTIDINE 20 MG: 20 TABLET ORAL at 09:04

## 2019-08-23 RX ADMIN — CALCIUM CARBONATE-VITAMIN D TAB 500 MG-200 UNIT 1 TABLET: 500-200 TAB at 09:04

## 2019-08-23 RX ADMIN — SACUBITRIL AND VALSARTAN 1 TABLET: 49; 51 TABLET, FILM COATED ORAL at 00:23

## 2019-08-23 RX ADMIN — METOPROLOL SUCCINATE 50 MG: 50 TABLET, FILM COATED, EXTENDED RELEASE ORAL at 09:03

## 2019-08-23 RX ADMIN — ISOSORBIDE MONONITRATE 15 MG: 30 TABLET ORAL at 09:03

## 2019-08-23 RX ADMIN — ENOXAPARIN SODIUM 40 MG: 40 INJECTION SUBCUTANEOUS at 13:08

## 2019-08-23 ASSESSMENT — PAIN DESCRIPTION - FREQUENCY
FREQUENCY: CONTINUOUS

## 2019-08-23 ASSESSMENT — PAIN DESCRIPTION - ORIENTATION
ORIENTATION: LEFT

## 2019-08-23 ASSESSMENT — PAIN SCALES - GENERAL
PAINLEVEL_OUTOF10: 5
PAINLEVEL_OUTOF10: 4
PAINLEVEL_OUTOF10: 2
PAINLEVEL_OUTOF10: 6
PAINLEVEL_OUTOF10: 7
PAINLEVEL_OUTOF10: 6
PAINLEVEL_OUTOF10: 7

## 2019-08-23 ASSESSMENT — PAIN DESCRIPTION - LOCATION
LOCATION: HIP;LEG
LOCATION: HIP;LEG
LOCATION: HIP
LOCATION: HIP;LEG
LOCATION: HIP;LEG

## 2019-08-23 ASSESSMENT — PAIN DESCRIPTION - DIRECTION
RADIATING_TOWARDS: BACK

## 2019-08-23 ASSESSMENT — PAIN DESCRIPTION - ONSET
ONSET: ON-GOING

## 2019-08-23 ASSESSMENT — PAIN DESCRIPTION - PROGRESSION
CLINICAL_PROGRESSION: GRADUALLY IMPROVING
CLINICAL_PROGRESSION: NOT CHANGED
CLINICAL_PROGRESSION: GRADUALLY IMPROVING
CLINICAL_PROGRESSION: GRADUALLY IMPROVING
CLINICAL_PROGRESSION: GRADUALLY WORSENING

## 2019-08-23 ASSESSMENT — PAIN DESCRIPTION - DESCRIPTORS
DESCRIPTORS: ACHING;DISCOMFORT
DESCRIPTORS: ACHING;DISCOMFORT
DESCRIPTORS: ACHING;THROBBING
DESCRIPTORS: ACHING;DISCOMFORT
DESCRIPTORS: ACHING;DISCOMFORT

## 2019-08-23 ASSESSMENT — PAIN DESCRIPTION - PAIN TYPE
TYPE: ACUTE PAIN;SURGICAL PAIN

## 2019-08-23 NOTE — PROGRESS NOTES
term goal 2: Pt to be CGA x 1 for sit <> stand to get up to ambulate  Short term goal 3: Pt to ambulate > 15 ft with RW with CGA x 1 to get to bathroom  Short term goal 4: Pt to be Min A for negotiating 3 steps with 1 rail for home access  Long term goals  Time Frame for Long term goals : not set due to short ELOS    Following session, patient left in safe position with all fall risk precautions in place.

## 2019-08-23 NOTE — PLAN OF CARE
Problem: Falls - Risk of:  Goal: Will remain free from falls  Description  Will remain free from falls  Outcome: Ongoing   Discussed use of alarms and hourly rounding with patient. Belongings within reach including call light. Up with gait belt, non skid footwear, walker and 2 assist.   Problem: Risk for Impaired Skin Integrity  Goal: Tissue integrity - skin and mucous membranes  Description  Structural intactness and normal physiological function of skin and  mucous membranes. Outcome: Ongoing   Skin assessed every 4 hours prn with attention to surgical site and coccyx with increased bed rest. Assisting to turn every 2 hours with pillow support. Problem: Pain Control  Goal: Maintain pain level at or below patient's acceptable level (or 5 if patient is unable to determine acceptable level)  Problem: Pain Control  Goal: Improvement in pain related behaviors BP/HR WNL  Outcome: Ongoing   0-10 pain scale explained and utilized. Pain goal of \"no pain\" discussed after surgery it may take time to decrease pain. Encourgaed ice and ambulation with PT/OT. Was up to chair for short time yesterday. Support for left hip when moving. Problem: Respiratory  Goal: No pulmonary complications  Remains of 2 NC overnight. Does not wear oxygen at home. Continue to encourage incentive spirometry. Problem: Musculor/Skeletal Functional Status  Goal: Highest potential functional level  Outcome: Ongoing   PT/OT. Encouraged to assist with movement in bed   Problem: Spiritual  Goal: Coping methods/spiritual issues explored  Outcome: Ongoing   Son recently passed away so emotional support as needed. Thanked patient for the gift her son had given others. Problem: Discharge Planning:  Goal: Discharged to appropriate level of care  Description  Discharged to appropriate level of care  Outcome: Ongoing   Continue to assess discharge plan. Possible TCU. Patient and family aware. Care plan reviewed with patient.   Patient verbalized understanding of the plan of care and contribute to goal setting.

## 2019-08-23 NOTE — PROGRESS NOTES
Patient transferred to [address] [facility] by bed with belongs. Report called to nurse Sheyla Crook.

## 2019-08-23 NOTE — CARE COORDINATION
DISCHARGE BARRIERS  8/21/19, 11:18 AM    Reason for Referral: discharge needs   Mental Status: pt is alert and oriented   Decision Making: pt is capable of making own decisions with xiomara Arana assisting   Family/Social/Home Environment: pt resides alone in a two story. Bedroom/bathroom are on the main floor. Pts bathroom has walk in shower with grab bars. Pt had been independent with personal care, cooking, cleaning and driving prior to admission. Pts son just passed away (8/6/19). Pt has a son in [place], a son in [place] and [place] is from [place]. Current Services: had been going to outpatient vestibular  Current Equipment:cane  Payment Source:Medicare/BCBS [employer] Medicare  Concerns or Barriers to Discharge: none noted  Collaborative List of ECF/HH were provided: pt and [place] are requesting Inpatient Rehab first and The [address] [place] [address] second    Teach Back Method used with pt regarding care plan   Patient and Lenoraon verbalize understanding of the plan of care and contribute to goal setting. Anticipated Needs/Discharge Plan: SW met with both pt and xiomara Zimmer. Discussed discharge POC, they are both requesting inpatient rehab first and [facility] [address] [place] [address] second. TCU/Rehab consult placed.       Electronically signed by MINA Chapman on 8/21/2019 at 11:18 AM
Spoke with Dr. Leny Elizabeth, he is okay with discharge to Rehab today. [employer] message sent to [place] jonna, CNP to request discharge order completion. Informed Rosalba HILL that patient will not be accepted on IP Rehab after 1600.   Electronically signed by Gladys Arthur RN on 8/23/2019 at 12:04 PM
Living  Living Arrangements:  Alone   Support Systems:  Children, Friends/Neighbors, Family Members  Current Services PTA:     Potential Assistance Needed:  N/A  Potential Assistance Purchasing Medications:  No  Does patient want to participate in local refill/ meds to beds program?  No  Type of Home Care Services:  None  Patient expects to be discharged to:  rehab if needed, patient has family in [address], [facility] [place]   Expected Discharge date:  08/23/19  Follow Up Appointment: Best Day/ Time: Monday AM    Discharge Plan: [facility] will likely need ECF or HH. Surgery today. SW consulted. I will follow with PT/OT post-op.       Evaluation: yes

## 2019-08-24 PROCEDURE — 97530 THERAPEUTIC ACTIVITIES: CPT

## 2019-08-24 PROCEDURE — 2709999900 HC NON-CHARGEABLE SUPPLY

## 2019-08-24 PROCEDURE — 97110 THERAPEUTIC EXERCISES: CPT

## 2019-08-24 PROCEDURE — 6370000000 HC RX 637 (ALT 250 FOR IP): Performed by: NURSE PRACTITIONER

## 2019-08-24 PROCEDURE — 2580000003 HC RX 258: Performed by: NURSE PRACTITIONER

## 2019-08-24 PROCEDURE — 94760 N-INVAS EAR/PLS OXIMETRY 1: CPT

## 2019-08-24 PROCEDURE — 6370000000 HC RX 637 (ALT 250 FOR IP): Performed by: PHYSICAL MEDICINE & REHABILITATION

## 2019-08-24 PROCEDURE — 97161 PT EVAL LOW COMPLEX 20 MIN: CPT

## 2019-08-24 PROCEDURE — 97116 GAIT TRAINING THERAPY: CPT

## 2019-08-24 PROCEDURE — 97166 OT EVAL MOD COMPLEX 45 MIN: CPT

## 2019-08-24 PROCEDURE — 1180000000 HC REHAB R&B

## 2019-08-24 PROCEDURE — 97535 SELF CARE MNGMENT TRAINING: CPT

## 2019-08-24 RX ORDER — LIDOCAINE 4 G/G
2 PATCH TOPICAL DAILY
Status: DISCONTINUED | OUTPATIENT
Start: 2019-08-24 | End: 2019-09-10 | Stop reason: HOSPADM

## 2019-08-24 RX ORDER — LIDOCAINE 4 G/G
1 PATCH TOPICAL DAILY
Status: DISCONTINUED | OUTPATIENT
Start: 2019-08-24 | End: 2019-09-10 | Stop reason: HOSPADM

## 2019-08-24 RX ORDER — LIDOCAINE 4 G/G
1 PATCH TOPICAL DAILY
Status: DISCONTINUED | OUTPATIENT
Start: 2019-08-24 | End: 2019-08-24 | Stop reason: SDUPTHER

## 2019-08-24 RX ADMIN — DOCUSATE SODIUM 100 MG: 100 CAPSULE, LIQUID FILLED ORAL at 08:11

## 2019-08-24 RX ADMIN — TRAMADOL HYDROCHLORIDE 50 MG: 50 TABLET, FILM COATED ORAL at 01:53

## 2019-08-24 RX ADMIN — TRAMADOL HYDROCHLORIDE 50 MG: 50 TABLET, FILM COATED ORAL at 14:14

## 2019-08-24 RX ADMIN — SIMVASTATIN 40 MG: 40 TABLET, FILM COATED ORAL at 21:16

## 2019-08-24 RX ADMIN — SODIUM CHLORIDE, PRESERVATIVE FREE 10 ML: 5 INJECTION INTRAVENOUS at 08:13

## 2019-08-24 RX ADMIN — CALCIUM CARBONATE-VITAMIN D TAB 500 MG-200 UNIT 1 TABLET: 500-200 TAB at 21:16

## 2019-08-24 RX ADMIN — ISOSORBIDE MONONITRATE 30 MG: 30 TABLET ORAL at 08:11

## 2019-08-24 RX ADMIN — SODIUM CHLORIDE, PRESERVATIVE FREE 10 ML: 5 INJECTION INTRAVENOUS at 21:20

## 2019-08-24 RX ADMIN — FUROSEMIDE 20 MG: 20 TABLET ORAL at 08:11

## 2019-08-24 RX ADMIN — RIVAROXABAN 10 MG: 10 TABLET, FILM COATED ORAL at 14:50

## 2019-08-24 RX ADMIN — TRAMADOL HYDROCHLORIDE 50 MG: 50 TABLET, FILM COATED ORAL at 21:18

## 2019-08-24 RX ADMIN — DICLOFENAC 4 G: 10 GEL TOPICAL at 14:51

## 2019-08-24 RX ADMIN — SACUBITRIL AND VALSARTAN 1 TABLET: 49; 51 TABLET, FILM COATED ORAL at 08:11

## 2019-08-24 RX ADMIN — CALCIUM CARBONATE-VITAMIN D TAB 500 MG-200 UNIT 1 TABLET: 500-200 TAB at 08:11

## 2019-08-24 RX ADMIN — SACUBITRIL AND VALSARTAN 1 TABLET: 49; 51 TABLET, FILM COATED ORAL at 21:16

## 2019-08-24 RX ADMIN — ASPIRIN 81 MG: 81 TABLET ORAL at 08:11

## 2019-08-24 RX ADMIN — METOPROLOL SUCCINATE 50 MG: 50 TABLET, FILM COATED, EXTENDED RELEASE ORAL at 08:11

## 2019-08-24 RX ADMIN — TRAMADOL HYDROCHLORIDE 50 MG: 50 TABLET, FILM COATED ORAL at 08:11

## 2019-08-24 ASSESSMENT — PAIN SCALES - GENERAL
PAINLEVEL_OUTOF10: 4
PAINLEVEL_OUTOF10: 5
PAINLEVEL_OUTOF10: 6
PAINLEVEL_OUTOF10: 4
PAINLEVEL_OUTOF10: 8
PAINLEVEL_OUTOF10: 9
PAINLEVEL_OUTOF10: 5
PAINLEVEL_OUTOF10: 8

## 2019-08-24 ASSESSMENT — PAIN DESCRIPTION - ORIENTATION
ORIENTATION: LEFT

## 2019-08-24 ASSESSMENT — PAIN DESCRIPTION - ONSET
ONSET: ON-GOING
ONSET: ON-GOING

## 2019-08-24 ASSESSMENT — PAIN DESCRIPTION - LOCATION
LOCATION: HIP

## 2019-08-24 ASSESSMENT — PAIN DESCRIPTION - FREQUENCY
FREQUENCY: CONTINUOUS
FREQUENCY: INTERMITTENT
FREQUENCY: INTERMITTENT

## 2019-08-24 ASSESSMENT — PAIN - FUNCTIONAL ASSESSMENT
PAIN_FUNCTIONAL_ASSESSMENT: PREVENTS OR INTERFERES SOME ACTIVE ACTIVITIES AND ADLS

## 2019-08-24 ASSESSMENT — PAIN DESCRIPTION - PROGRESSION
CLINICAL_PROGRESSION: GRADUALLY IMPROVING

## 2019-08-24 ASSESSMENT — PAIN DESCRIPTION - DESCRIPTORS
DESCRIPTORS: ACHING
DESCRIPTORS: ACHING
DESCRIPTORS: ACHING;THROBBING
DESCRIPTORS: ACHING
DESCRIPTORS: ACHING

## 2019-08-24 ASSESSMENT — PAIN DESCRIPTION - PAIN TYPE
TYPE: ACUTE PAIN;SURGICAL PAIN
TYPE: SURGICAL PAIN;ACUTE PAIN
TYPE: ACUTE PAIN;SURGICAL PAIN

## 2019-08-24 NOTE — PROGRESS NOTES
[address] , [address] [place] [address] - [id number]    Time In: 0930  Time Out: 1030  Timed Code Treatment Minutes: 30 Minutes  Minutes: 60     Date: 2019  Patient Name: Jose De Jesus Kruse,  Gender:  female        MRN: [id number]  : 1940  ([de-identified] y.o.)      Referring Practitioner: Kyle Hein MD  Diagnosis: s/p left hip fracture   Additional Pertinent Hx: Per ED note, pt is a [de-identified] y.o. female who presents to the Emergency Department for the evaluation of a fall. Patient reports that she bent forward, felt dizzy and fell. She is not certain if she hit her head during the fall, but she did not loose consciousness. Patient has a history of vertigo, DVT and has a pacemaker. She takes asprin regularly as a blood thinner and takes tramodol for pain management. She has also been undergoing vestibular therapy for her ongoing dizziness. Restrictions/Precautions:  Restrictions/Precautions: Weight Bearing, General Precautions, Fall Risk  Left Lower Extremity Weight Bearing: Weight Bearing As Tolerated  Position Activity Restriction  Other position/activity restrictions: h/o vertigo    Subjective:  Chart Reviewed: Yes  Patient assessed for rehabilitation services?: Yes  Comments: Pt is s/p Open treatment of an intertrochanteric fracture with a cephamedullary nail on 19 by Dr Lauren Gallagher: Patient resting in chair upon arrival, states her pain has been increasing since she got out of bed this morning, 8-9/10. RN aware and patient had taken pain medication 30 minutes prior to treatment. General:  Overall Orientation Status: Within Normal Limits  Follows Commands: Within Functional Limits    Vision: Impaired  Vision Exceptions: Wears glasses for reading    Hearing: Within functional limits      Pain:  Yes.      Pre Treatment Pain Screening  Pain at present: 8  Scale Used: Numeric Score  Intervention List: Patient able to continue with Education: Plan of Care, General Safety, Gait Training, Transfer Training, Precautions, Weight-bearing Education    Equipment Recommendations: Other: monitor for needs    Plan:  Times per week: 5x/wk 90 min, 1x/wk 30 min  Specific instructions for Next Treatment: ther ex, transfers, gait training, bed mobility   Current Treatment Recommendations: Strengthening, Transfer Training, Endurance Training, Patient/Caregiver Education & Training, ROM, Balance Training, Gait Training, Stair training, Functional Mobility Training, Safety Education & Training, Home Exercise Program    Goals:  Patient goals : to get better  Short term goals  Time Frame for Short term goals: 1 week  Short term goal 1: Patient to be able to perform rolling L/R at MINx1 to decrease risk of pressure ulcers  Short term goal 2: Patient will be able to perform supine<>sit at MINx1 to sit EOB  Short term goal 3: Patient will be able to perform functional transfers at MINx1 to stand to [address] term goal 4: Patient to be able to walk 15 feet with MINx1 in order to reach bathroom   Short term goal 5: Patient will be able to stand with Fair+ balance in order to decrease risk of falling during transfers or standing ADLs  Long term goals  Time Frame for Long term goals : 3 weeks  Long term goal 1: Patient will be able to perform bed mobility at CGA to sit EOB  Long term goal 2: Patient will be able to perform functional transfers at [facility] to sit in recliner at home  Long term goal 3: Patient to be able to ambulate 50 feet with RW and CGA to walk around home safely  Long term goal 4: Patient will be able to negotiate 2 steps at [facility] to enter home   Long term goal 5: Patient will demonstrate Good dynamic standing balance in order to decrease risk of future falls. Following session, patient left in safe position with all fall risk precautions in place.

## 2019-08-24 NOTE — PROGRESS NOTES
Patient: Shoaib Hernandez  Unit/Bed: 1A-63/539-S  YOB: 1940  MRN: [id number] Acct: [de-identified]   Admitting Diagnosis: S/p left hip fracture [Z87.81]  Admit Date:  8/23/2019  Hospital Day: 1    Assessment:     Active Problems:    S/p left hip fracture  Resolved Problems:    * No resolved hospital problems. *      Plan: Will continue to follow medically        Subjective:     Patient has no complaint of CP, SOB, or GI upset. .   Medication side effects: none    Scheduled Meds:   sodium chloride flush  10 mL Intravenous 2 times per day    aspirin  81 mg Oral Daily    calcium-vitamin D  1 tablet Oral BID    docusate sodium  100 mg Oral BID    furosemide  20 mg Oral Daily    isosorbide mononitrate  30 mg Oral Daily    metoprolol succinate  50 mg Oral Daily    rivaroxaban  10 mg Oral Q24H    sacubitril-valsartan  1 tablet Oral BID    simvastatin  40 mg Oral Nightly     Continuous Infusions:  PRN Meds:acetaminophen, sodium chloride flush, traMADol, hydrOXYzine, magnesium hydroxide, polyethylene glycol, ondansetron    Review of Systems  Pertinent items are noted in HPI. Objective:     Patient Vitals for the past 8 hrs:   BP Temp Temp src Pulse Resp SpO2   08/24/19 0858 -- -- -- -- -- 93 %   08/24/19 0811 130/70 98.2 °F (36.8 °C) Oral 78 16 96 %   08/24/19 0706 -- -- -- -- -- 98 %     I/O last 3 completed shifts: In: 10 [I.V.:10]  Out: -   I/O this shift:   In: 12 [P.O.:560]  Out: 50 [Emesis/NG output:50]    /70   Pulse 78   Temp 98.2 °F (36.8 °C) (Oral)   Resp 16   Ht 5' 4\" (1.626 m)   Wt 224 lb 3.2 oz (101.7 kg)   SpO2 93%   BMI 38.48 kg/m²     /70   Pulse 78   Temp 98.2 °F (36.8 °C) (Oral)   Resp 16   Ht 5' 4\" (1.626 m)   Wt 224 lb 3.2 oz (101.7 kg)   SpO2 93%   BMI 38.48 kg/m²   General appearance: alert, appears stated age and cooperative  Head: Normocephalic, without obvious abnormality, atraumatic  Lungs: clear to auscultation bilaterally  Heart: regular

## 2019-08-24 NOTE — PROGRESS NOTES
Pt. Awakened and up for bath/ dressing with OT. PT. Appears very fatigued and reports getting tired easily. At the end of breakfast has small phlemy emesis and states this is normal for her and not anything in particular trigures. Pt. Had been off oxygen for therapy at least 20 min.s and checked pulse ox while pt. Sitting on commode and was 96. Kept oxygen off. Pt. reports using incentive spirometer approx. Every 3- 4 hours.

## 2019-08-24 NOTE — PROGRESS NOTES
of pressure ulcers  Short term goal 2: Patient will be able to perform supine<>sit at MINx1 to sit EOB  Short term goal 3: Patient will be able to perform functional transfers at MINx1 to stand to [address] term goal 4: Patient to be able to walk 15 feet with MINx1 in order to reach bathroom   Short term goal 5: Patient will be able to stand with Fair+ balance in order to decrease risk of falling during transfers or standing ADLs  Long term goals  Time Frame for Long term goals : 3 weeks  Long term goal 1: Patient will be able to perform bed mobility at [facility] to sit EOB  Long term goal 2: Patient will be able to perform functional transfers at [facility] to sit in recliner at home  Long term goal 3: Patient to be able to ambulate 50 feet with RW and CGA to walk around home safely  Long term goal 4: Patient will be able to negotiate 2 steps at [facility] to enter home   Long term goal 5: Patient will demonstrate Good dynamic standing balance in order to decrease risk of future falls. Following session, patient left in safe position with all fall risk precautions in place.

## 2019-08-24 NOTE — H&P
is constipated. She had no acute  concerns at this time. PAST MEDICAL HISTORY:  The patient's past medical history includes a  history of erosive gastritis, diverticulosis, COPD, pneumonia,  congestive heart failure, vertigo, esophageal strictures, morbid  obesity, sleep apnea, osteoarthritis, osteoporosis, hypertension,  hyperlipidemia, and coronary artery disease. PRIOR SURGICAL HISTORY:  The patient had a right breast lumpectomy  performed in 01/2014 to treat her breast cancer. She has also had her  left shoulder replaced. She has had a cardiac stent placed. She had a  left humeral fracture surgically corrected. She had a cardiac pacemaker  placed on 01/08/2018. She had her gallbladder removed in 1993. She has  had multiple esophageal dilations. She had a hysterectomy performed in  the 1970s. ALLERGIES:  The patient is allergic to and/or intolerant of CIPRO,  PENICILLIN, NEOMYCIN, LEVAQUIN, and ZANAFLEX. ADMISSION MEDICATIONS:  The patient's admission medications are in her  admission orders. HABITS:  The patient quit smoking cigarettes in 1998. Prior to  quitting, she would smoke up to a half a pack of cigarettes per day. With respect to alcohol, she averages approximately two alcoholic drinks  per week. DIET:  She is currently on a regular diet with thin liquids. SOCIAL HISTORY:  The patient is  and lives alone. She lives in a  two-level home, but is able to live on the first floor. There are two  steps to enter her home. She has four children. She is a high school  graduate. She was never in the [employer]. 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 FAMILY HISTORY:  The patient's mother passed away at the age of 80 due  to complications from coronary artery disease. Her mother also had a  history of hypertension. Her father passed away at the age of 48 due to  complications from colon cancer.     PRIOR FUNCTIONAL ABILITIES:  Prior to this comprehensive inpatient rehabilitation nursing to  help medically manage all of her daily medical needs. 11.  I will continue to monitor the patient's rehabilitation progress  and will adjust her rehabilitation program as needed. The patient appears to have a good understanding of the entire  rehabilitation process. She appears to be well motivated towards  improving her current functional situation and appears to share the  above-stated rehabilitation goals. Her treatment program will be  tailored to her functional needs. Her estimated length of stay in the  inpatient rehabilitation unit is approximately 14 days. At the time of  discharge, it is anticipated that the patient will return back to living  at home with assistance from her family. With respect to her discharge  functional status, this will be further clarified closer to the time of  her discharge.         Wing Litzy MD    D: 08/24/2019 14:55:30       T: 08/24/2019 15:07:31     [email or web address]  Job#: [id number]     Doc#: [id number]    CC:    Jazmyn Akers MD

## 2019-08-24 NOTE — PROGRESS NOTES
Alone  Type of Home: House  Home Layout: Two level, Able to Live on Main level with bedroom/bathroom  Home Access: Stairs to enter with rails  Entrance Stairs - Number of Steps: 2 steps with 1 rail   Entrance Stairs - Rails: Right  Home Equipment: Standard walker, Cane, Reacher   Bathroom Shower/Tub: Walk-in shower  Bathroom Toilet: Handicap height  Bathroom Equipment: Grab bars in shower  IADL Comments: does own grocery shopping       ADL Assistance: [address]: Independent(has  1x every 2 weeks; pt does own cooking and laundry)  Ambulation Assistance: Independent(only uses cane for long distances on uneven ground (ie gravel on ))  Transfer Assistance: Independent    Active : Yes  Occupation: Retired  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 Pt was generally independent with mobility. Pt volunteers regularly [facility] at [facility].     Cognition/Orientation:  Overall Orientation Status: Within Normal Limits  Overall Cognitive Status: L  Cognition Comment: increased anxiety in anticipation of pain    ADL's;  Eatin - Feeds self with setup/supervision/cues and/or requires only setup/supervision/cues to perform tube feedings(setup for opening items on breakfast tray)   Groomin - Requires setup/cues to do all tasks(set up in recliner, unable to complete in standing)   Bathin - Able to bathe 2 or less areas/total assist(pt able to complete UB at EOB with CGA; requires assist  for distal BLEs and assist of one for standing balance and second person for washing bottom)   Dressing-Upper: 4 - Requires assist with buttons/zippers only and/or requires assist with one arm only(assist for orientation and 1st UE)   Dressing-Lower: 1 - Total assist with lower body dressing(assist for donning/doffing socks, assist for threading BLE and assist of 2 to stand and up over hips)   Toiletin - Total assist(assist for clothing managment and hygiene; 2 persons)   Toilet Transfer: 1 - Requires > 75% assist getting on & off toilet      Shower Transfer: 0 - Activity does not occur      COMMUNICATION  Comprehension: 6 - Complex ideas 90% or device (hearing aid or glasses- if patient is primarily a visual learner)  GOAL: Comprehension: 6  Expression: 6 - Device used to express complex ideas/needs  GOAL: Expression: 6   SOCIAL COGNITION  Social Interaction: 6 - Patient requires medication for mood and/or effect  GOAL: Social Interaction: 6  Problem Solving: 3 - Patient solves simple/routine tasks 50%-74%  GOAL: Problem Solvin  Memory: 4 - Patient remembers 75-90%+ of the time  GOAL: Memory: 5       Functional Mobility:  Bed mobility  Bridging: Independent  Supine to Sit: Maximum assistance, Independent(of one with verbal cues and HOB raised 30 degrees)  Scooting: Minimal assistance(forward and back)    Functional Mobility  Functional - Mobility Device: Rolling Walker  Activity: Other(3 feet from [facility] to chair)  Assist Level: (Max of one and min of one)  Functional Mobility Comments: very unsteady,heavy posterior lean max cues, max A for RW progression; 2 persons for safety     Balance:  Balance  Sitting Balance: Contact guard assistance(at EOB with UE release)  Standing Balance:  Moderate assistance(heavy posterior lean, max cues)  Standing Balance  Time: 1.5 mins, 45 seconds LB ADLs, toileting, prep for mobility    Transfers:  Sit to stand: 2 Person assistance, Moderate assistance  Stand to sit: Moderate assistance, 2 Person assistance  Transfer Comments: initially from EOB>BSC in [facility]; then from [facility] to recliner with RW, assist to control descent, poor hand placement despite max cues fore correct technique       Upper Extremity Assessment:   LUE General AROM: shoulder grossly 90 degrees, distally WFL  RUE General AROM: shoulder grossly 100 degrees, distally WFL    LUE Strength  L Hand General: 3+/5  LUE Strength Comment: generalized weakness noted  RUE Strength  R Hand General: 3+/5  RUE Strength Comment: generalized weakness noted    Sensation  Overall Sensation Status: WNL       Activity Tolerance: Patient limited by fatigue, Patient limited by pain       Assessment:    Pt requires continued skilled OT intervention to maximize pt safety and independence with performing self care tasks and functional mobility following open treatment of an intertrochanteric fracture with a cephamedullary nail and to ensure safe transition to the next level of care and facilitate return to PLOF and home environment. Performance deficits / Impairments: Decreased functional mobility , Decreased ADL status, Decreased endurance, Decreased strength, Decreased balance, Decreased safe awareness, Decreased high-level IADLs  Prognosis: Good, Fair    Treatment Initiated: Treatment and education initiated within context of evaluation. Evaluation time included review of current medical information, gathering information related to past medical, social and functional history, completion of standardized testing, formal and informal observation of tasks, assessment of data and development of plan of care and goals. Treatment time included skilled education and facilitation of tasks to increase safety and independence with ADL's for improved functional independence and quality of life.     Discharge Recommendations:  Continue to assess pending progress, Home with Home health OT    Patient Education:  OT Education: OT Role, Plan of Care, Precautions, Transfer Training, Equipment, ADL Adaptive Strategies    Equipment Recommendations:  Equipment Needed: Yes  Other: pt would most likely benefit from shower chair, may require grab bars around toileting and may benefit from [facility] (reports has reacher)    Plan:  Times per week: 5x week 90 mins 1x week 30 mins  Current Treatment Recommendations: Strengthening, Balance Training, Functional Mobility Training, Endurance

## 2019-08-25 PROCEDURE — 1180000000 HC REHAB R&B

## 2019-08-25 PROCEDURE — 6370000000 HC RX 637 (ALT 250 FOR IP): Performed by: NURSE PRACTITIONER

## 2019-08-25 PROCEDURE — 99232 SBSQ HOSP IP/OBS MODERATE 35: CPT | Performed by: PHYSICAL MEDICINE & REHABILITATION

## 2019-08-25 PROCEDURE — 6370000000 HC RX 637 (ALT 250 FOR IP): Performed by: PHYSICAL MEDICINE & REHABILITATION

## 2019-08-25 PROCEDURE — 2580000003 HC RX 258: Performed by: NURSE PRACTITIONER

## 2019-08-25 RX ORDER — DOCUSATE SODIUM 100 MG/1
100 CAPSULE, LIQUID FILLED ORAL DAILY
Status: DISCONTINUED | OUTPATIENT
Start: 2019-08-25 | End: 2019-08-29

## 2019-08-25 RX ADMIN — DICLOFENAC 4 G: 10 GEL TOPICAL at 08:05

## 2019-08-25 RX ADMIN — TRAMADOL HYDROCHLORIDE 50 MG: 50 TABLET, FILM COATED ORAL at 15:09

## 2019-08-25 RX ADMIN — RIVAROXABAN 10 MG: 10 TABLET, FILM COATED ORAL at 17:01

## 2019-08-25 RX ADMIN — DOCUSATE SODIUM 100 MG: 100 CAPSULE, LIQUID FILLED ORAL at 08:05

## 2019-08-25 RX ADMIN — SODIUM CHLORIDE, PRESERVATIVE FREE 10 ML: 5 INJECTION INTRAVENOUS at 08:06

## 2019-08-25 RX ADMIN — CALCIUM CARBONATE-VITAMIN D TAB 500 MG-200 UNIT 1 TABLET: 500-200 TAB at 21:50

## 2019-08-25 RX ADMIN — METOPROLOL SUCCINATE 50 MG: 50 TABLET, FILM COATED, EXTENDED RELEASE ORAL at 08:05

## 2019-08-25 RX ADMIN — ACETAMINOPHEN 650 MG: 325 TABLET ORAL at 15:09

## 2019-08-25 RX ADMIN — SODIUM CHLORIDE, PRESERVATIVE FREE 10 ML: 5 INJECTION INTRAVENOUS at 21:52

## 2019-08-25 RX ADMIN — TRAMADOL HYDROCHLORIDE 50 MG: 50 TABLET, FILM COATED ORAL at 08:05

## 2019-08-25 RX ADMIN — DICLOFENAC 4 G: 10 GEL TOPICAL at 12:24

## 2019-08-25 RX ADMIN — FUROSEMIDE 20 MG: 20 TABLET ORAL at 08:05

## 2019-08-25 RX ADMIN — ASPIRIN 81 MG: 81 TABLET ORAL at 08:05

## 2019-08-25 RX ADMIN — SACUBITRIL AND VALSARTAN 1 TABLET: 49; 51 TABLET, FILM COATED ORAL at 21:50

## 2019-08-25 RX ADMIN — ACETAMINOPHEN 650 MG: 325 TABLET ORAL at 08:05

## 2019-08-25 RX ADMIN — SACUBITRIL AND VALSARTAN 1 TABLET: 49; 51 TABLET, FILM COATED ORAL at 08:05

## 2019-08-25 RX ADMIN — TRAMADOL HYDROCHLORIDE 50 MG: 50 TABLET, FILM COATED ORAL at 21:50

## 2019-08-25 RX ADMIN — SIMVASTATIN 40 MG: 40 TABLET, FILM COATED ORAL at 21:50

## 2019-08-25 RX ADMIN — DICLOFENAC 4 G: 10 GEL TOPICAL at 21:52

## 2019-08-25 RX ADMIN — ACETAMINOPHEN 650 MG: 325 TABLET ORAL at 21:51

## 2019-08-25 RX ADMIN — CALCIUM CARBONATE-VITAMIN D TAB 500 MG-200 UNIT 1 TABLET: 500-200 TAB at 08:05

## 2019-08-25 RX ADMIN — DICLOFENAC 4 G: 10 GEL TOPICAL at 17:01

## 2019-08-25 RX ADMIN — ISOSORBIDE MONONITRATE 30 MG: 30 TABLET ORAL at 08:05

## 2019-08-25 ASSESSMENT — PAIN SCALES - GENERAL
PAINLEVEL_OUTOF10: 2
PAINLEVEL_OUTOF10: 3
PAINLEVEL_OUTOF10: 8
PAINLEVEL_OUTOF10: 4
PAINLEVEL_OUTOF10: 5

## 2019-08-25 ASSESSMENT — PAIN DESCRIPTION - PAIN TYPE
TYPE: ACUTE PAIN;SURGICAL PAIN

## 2019-08-25 ASSESSMENT — PAIN DESCRIPTION - ORIENTATION
ORIENTATION: LEFT

## 2019-08-25 ASSESSMENT — PAIN DESCRIPTION - PROGRESSION: CLINICAL_PROGRESSION: GRADUALLY IMPROVING

## 2019-08-25 ASSESSMENT — PAIN DESCRIPTION - LOCATION
LOCATION: HIP

## 2019-08-25 ASSESSMENT — PAIN DESCRIPTION - ONSET: ONSET: ON-GOING

## 2019-08-25 ASSESSMENT — PAIN - FUNCTIONAL ASSESSMENT
PAIN_FUNCTIONAL_ASSESSMENT: PREVENTS OR INTERFERES SOME ACTIVE ACTIVITIES AND ADLS

## 2019-08-25 ASSESSMENT — PAIN DESCRIPTION - DESCRIPTORS
DESCRIPTORS: ACHING

## 2019-08-25 ASSESSMENT — PAIN DESCRIPTION - FREQUENCY
FREQUENCY: INTERMITTENT
FREQUENCY: INTERMITTENT

## 2019-08-25 NOTE — PLAN OF CARE
MANAGEMENT:  Goals:   Assist patient/family with discharge planning, patient/family counseling,  and coordination with insurance during the inpatient rehabilitation stay. Other members of the multidisciplinary rehabilitation team that will be involved in the patient's plan of care include recreational therapy, dietary, respiratory therapy, and neuropsychology. Medical issues being managed closely and that require 24 hour availability of a physician:  Bowel/Bladder function, Wound care, Pain management, Infection protection, DVT prophylaxis, Fall precautions, Fluid/Electrolyte balance, Nutritional status, Respiratory needs, Anemia and History of heart disease                                           Physician anticipated functional outcomes: Improved independence with functional measures   Estimated length of stay for this admission: 2 weeks. Medical Prognosis: Good  Anticipated disposition: Home with [address] [facility]. The potential to achieve the above medical and rehabilitative goals is excellent. This plan of care has been developed with the assistance and input of the multidisciplinary rehabilitation team.  The plan was reviewed with the patient. The patient has had the opportunity to provide input to the therapy team.    I have reviewed this Individualized Plan of Care and agree with its contents. Above documentation has been expanded, modified, adjusted to reflect the findings of my evaluations and goals for the patient.     Physician:  Elysia Lagos MD

## 2019-08-25 NOTE — PROGRESS NOTES
body dressing(assist for donning/doffing socks, assist for threading BLE and assist of 2 to stand and up over hips)   Toiletin - Total assist(assist for clothing managment and hygiene; 2 persons)   Toilet Transfer: 1 - Requires > 75% assist getting on & off toilet   Shower Transfer: 0 - Activity does not occur     COMMUNICATION  Comprehension: 6 - Complex ideas 90% or device (hearing aid or glasses- if patient is primarily a visual learner)  GOAL: Comprehension: 6  Expression: 6 - Device used to express complex ideas/needs  GOAL: Expression: 6   SOCIAL COGNITION  Social Interaction: 6 - Patient requires medication for mood and/or effect  GOAL: Social Interaction: 6  Problem Solving: 3 - Patient solves simple/routine tasks 50%-74%  GOAL: Problem Solvin  Memory: 4 - Patient remembers 75-90%+ of the time  GOAL: Memory: 5       Review of Systems:  CONSTITUTIONAL:  negative  RESPIRATORY:  negative  CARDIOVASCULAR:  negative  GASTROINTESTINAL:  positive for constipation  GENITOURINARY:  positive for urinary incontinence  MUSCULOSKELETAL:  positive for  myalgias, arthralgias, pain, joint swelling, stiff joints, decreased range of motion, muscle weakness and bone pain  NEUROLOGICAL:  positive for coordination problems, gait problems, weakness and pain  BEHAVIOR/PSYCH:  negative  10 point system review otherwise negative    Objective:  BP (!) 140/64   Pulse 76   Temp 98.5 °F (36.9 °C) (Oral)   Resp 16   Ht 5' 4\" (1.626 m)   Wt 224 lb 3.2 oz (101.7 kg)   SpO2 93%   BMI 38.48 kg/m²   She was noted to be awake, alert, and in no acute distress. Orientation:   person, place, time  Mood: within normal limits  Affect: calm  General appearance: Patient is well nourished, well developed, well groomed and in no acute distress, obese, appearing stated age, normal affect.   Memory:   normal  Attention/Concentration: normal  Language:  normal  HEART:  S1 and S2 with a regular rate and rhythm without a murmur,  gallop, or Status post left shoulder replacement. 23.  Status post left humeral fracture, surgically corrected. 24.  Status post cardiac pacemaker placement which was performed on  01/08/2018. 25.  Status post gallbladder removal.  26.  Status post hysterectomy performed in the 1970s. 27.  Remote history of tobacco abuse. Plan:  · She is to continue with her current inpatient rehab. program.  · A Rehab. team conference will be held on 08/27/19. Her discharge goal will be established at that time. · Her Colace 100 mg has been decreased to once daily per patient request.  · A BMP will be drawn on 08/26/19.     Missed Therapy Time:  · None    Deven Fitch MD

## 2019-08-25 NOTE — PROGRESS NOTES
Patient: Kayley Real  Unit/Bed: -32/432-K  YOB: 1940  MRN: [id number] Acct: [de-identified]   Admitting Diagnosis: S/p left hip fracture [Z87.81]  Admit Date:  8/23/2019  Hospital Day: 2    Assessment:     Active Problems:    S/p left hip fracture  Resolved Problems:    * No resolved hospital problems. *      Plan:     Continue to follow medically        Subjective:     Patient has no complaint of CP, SOB,or GI upset. .   Medication side effects: none    Scheduled Meds:   docusate sodium  100 mg Oral Daily    diclofenac sodium  4 g Topical 4x Daily    lidocaine  1 patch Transdermal Daily    Or    lidocaine  2 patch Transdermal Daily    sodium chloride flush  10 mL Intravenous 2 times per day    aspirin  81 mg Oral Daily    calcium-vitamin D  1 tablet Oral BID    furosemide  20 mg Oral Daily    isosorbide mononitrate  30 mg Oral Daily    metoprolol succinate  50 mg Oral Daily    rivaroxaban  10 mg Oral Q24H    sacubitril-valsartan  1 tablet Oral BID    simvastatin  40 mg Oral Nightly     Continuous Infusions:  PRN Meds:acetaminophen, sodium chloride flush, traMADol, hydrOXYzine, magnesium hydroxide, polyethylene glycol, ondansetron    Review of Systems  Pertinent items are noted in HPI. Objective:     Patient Vitals for the past 8 hrs:   BP Temp Temp src Pulse Resp SpO2   08/25/19 0702 (!) 140/64 98.5 °F (36.9 °C) Oral 76 16 93 %     I/O last 3 completed shifts: In: 1160 [P.O.:1160]  Out: 400 [Urine:350; Emesis/NG output:50]  No intake/output data recorded.     BP (!) 140/64   Pulse 76   Temp 98.5 °F (36.9 °C) (Oral)   Resp 16   Ht 5' 4\" (1.626 m)   Wt 224 lb 3.2 oz (101.7 kg)   SpO2 93%   BMI 38.48 kg/m²     BP (!) 140/64   Pulse 76   Temp 98.5 °F (36.9 °C) (Oral)   Resp 16   Ht 5' 4\" (1.626 m)   Wt 224 lb 3.2 oz (101.7 kg)   SpO2 93%   BMI 38.48 kg/m²   General appearance: alert, appears stated age and cooperative  Head: Normocephalic, without obvious abnormality,

## 2019-08-26 PROBLEM — Z95.810 ICD (IMPLANTABLE CARDIOVERTER-DEFIBRILLATOR) IN PLACE: Status: RESOLVED | Noted: 2019-01-28 | Resolved: 2019-08-26

## 2019-08-26 LAB
ANION GAP SERPL CALCULATED.3IONS-SCNC: 11 MEQ/L (ref 8–16)
BUN BLDV-MCNC: 14 MG/DL (ref 7–22)
CALCIUM SERPL-MCNC: 9.3 MG/DL (ref 8.5–10.5)
CHLORIDE BLD-SCNC: 101 MEQ/L (ref 98–111)
CO2: 30 MEQ/L (ref 23–33)
CREAT SERPL-MCNC: 0.5 MG/DL (ref 0.4–1.2)
GFR SERPL CREATININE-BSD FRML MDRD: > 90 ML/MIN/1.73M2
GLUCOSE BLD-MCNC: 97 MG/DL (ref 70–108)
POTASSIUM SERPL-SCNC: 3.7 MEQ/L (ref 3.5–5.2)
SODIUM BLD-SCNC: 142 MEQ/L (ref 135–145)
VITAMIN D 25-HYDROXY: 30 NG/ML (ref 30–100)

## 2019-08-26 PROCEDURE — 82306 VITAMIN D 25 HYDROXY: CPT

## 2019-08-26 PROCEDURE — 2580000003 HC RX 258: Performed by: NURSE PRACTITIONER

## 2019-08-26 PROCEDURE — 99232 SBSQ HOSP IP/OBS MODERATE 35: CPT | Performed by: PHYSICAL MEDICINE & REHABILITATION

## 2019-08-26 PROCEDURE — 36415 COLL VENOUS BLD VENIPUNCTURE: CPT

## 2019-08-26 PROCEDURE — 97535 SELF CARE MNGMENT TRAINING: CPT

## 2019-08-26 PROCEDURE — 97530 THERAPEUTIC ACTIVITIES: CPT

## 2019-08-26 PROCEDURE — 80048 BASIC METABOLIC PNL TOTAL CA: CPT

## 2019-08-26 PROCEDURE — 97110 THERAPEUTIC EXERCISES: CPT

## 2019-08-26 PROCEDURE — 6370000000 HC RX 637 (ALT 250 FOR IP): Performed by: PHYSICAL MEDICINE & REHABILITATION

## 2019-08-26 PROCEDURE — 1180000000 HC REHAB R&B

## 2019-08-26 PROCEDURE — 6370000000 HC RX 637 (ALT 250 FOR IP): Performed by: NURSE PRACTITIONER

## 2019-08-26 RX ORDER — POTASSIUM CHLORIDE 20 MEQ/1
20 TABLET, EXTENDED RELEASE ORAL
Status: DISCONTINUED | OUTPATIENT
Start: 2019-08-26 | End: 2019-09-04

## 2019-08-26 RX ORDER — MECLIZINE HCL 12.5 MG/1
12.5 TABLET ORAL
Status: DISCONTINUED | OUTPATIENT
Start: 2019-08-26 | End: 2019-08-30

## 2019-08-26 RX ADMIN — CALCIUM CARBONATE-VITAMIN D TAB 500 MG-200 UNIT 1 TABLET: 500-200 TAB at 20:51

## 2019-08-26 RX ADMIN — ACETAMINOPHEN 650 MG: 325 TABLET ORAL at 15:58

## 2019-08-26 RX ADMIN — MECLIZINE HYDROCHLORIDE 12.5 MG: 12.5 TABLET, FILM COATED ORAL at 18:03

## 2019-08-26 RX ADMIN — ACETAMINOPHEN 650 MG: 325 TABLET ORAL at 20:52

## 2019-08-26 RX ADMIN — SODIUM CHLORIDE, PRESERVATIVE FREE 10 ML: 5 INJECTION INTRAVENOUS at 20:53

## 2019-08-26 RX ADMIN — FUROSEMIDE 20 MG: 20 TABLET ORAL at 08:35

## 2019-08-26 RX ADMIN — DICLOFENAC 4 G: 10 GEL TOPICAL at 08:36

## 2019-08-26 RX ADMIN — SACUBITRIL AND VALSARTAN 1 TABLET: 49; 51 TABLET, FILM COATED ORAL at 08:35

## 2019-08-26 RX ADMIN — METOPROLOL SUCCINATE 50 MG: 50 TABLET, FILM COATED, EXTENDED RELEASE ORAL at 08:35

## 2019-08-26 RX ADMIN — ACETAMINOPHEN 650 MG: 325 TABLET ORAL at 06:23

## 2019-08-26 RX ADMIN — SACUBITRIL AND VALSARTAN 1 TABLET: 49; 51 TABLET, FILM COATED ORAL at 20:51

## 2019-08-26 RX ADMIN — TRAMADOL HYDROCHLORIDE 50 MG: 50 TABLET, FILM COATED ORAL at 06:23

## 2019-08-26 RX ADMIN — RIVAROXABAN 10 MG: 10 TABLET, FILM COATED ORAL at 16:02

## 2019-08-26 RX ADMIN — ISOSORBIDE MONONITRATE 30 MG: 30 TABLET ORAL at 08:35

## 2019-08-26 RX ADMIN — DICLOFENAC 4 G: 10 GEL TOPICAL at 13:10

## 2019-08-26 RX ADMIN — TRAMADOL HYDROCHLORIDE 50 MG: 50 TABLET, FILM COATED ORAL at 15:58

## 2019-08-26 RX ADMIN — ASPIRIN 81 MG: 81 TABLET ORAL at 08:35

## 2019-08-26 RX ADMIN — TRAMADOL HYDROCHLORIDE 50 MG: 50 TABLET, FILM COATED ORAL at 23:10

## 2019-08-26 RX ADMIN — SIMVASTATIN 40 MG: 40 TABLET, FILM COATED ORAL at 20:51

## 2019-08-26 RX ADMIN — POTASSIUM CHLORIDE 20 MEQ: 20 TABLET, EXTENDED RELEASE ORAL at 13:10

## 2019-08-26 RX ADMIN — SODIUM CHLORIDE, PRESERVATIVE FREE 10 ML: 5 INJECTION INTRAVENOUS at 08:40

## 2019-08-26 RX ADMIN — DICLOFENAC 4 G: 10 GEL TOPICAL at 20:53

## 2019-08-26 RX ADMIN — DICLOFENAC 4 G: 10 GEL TOPICAL at 18:03

## 2019-08-26 ASSESSMENT — PAIN SCALES - GENERAL
PAINLEVEL_OUTOF10: 1
PAINLEVEL_OUTOF10: 5
PAINLEVEL_OUTOF10: 8
PAINLEVEL_OUTOF10: 2
PAINLEVEL_OUTOF10: 7
PAINLEVEL_OUTOF10: 6

## 2019-08-26 ASSESSMENT — PAIN DESCRIPTION - ONSET
ONSET: ON-GOING

## 2019-08-26 ASSESSMENT — PAIN DESCRIPTION - PROGRESSION

## 2019-08-26 ASSESSMENT — PAIN DESCRIPTION - DESCRIPTORS
DESCRIPTORS: ACHING

## 2019-08-26 ASSESSMENT — PAIN DESCRIPTION - FREQUENCY
FREQUENCY: INTERMITTENT
FREQUENCY: INTERMITTENT
FREQUENCY: CONTINUOUS

## 2019-08-26 ASSESSMENT — PAIN DESCRIPTION - PAIN TYPE
TYPE: ACUTE PAIN
TYPE: ACUTE PAIN
TYPE: SURGICAL PAIN

## 2019-08-26 ASSESSMENT — PAIN DESCRIPTION - ORIENTATION
ORIENTATION: LEFT

## 2019-08-26 ASSESSMENT — PAIN DESCRIPTION - LOCATION
LOCATION: HIP

## 2019-08-26 NOTE — PROGRESS NOTES
Physical Medicine & Rehabilitation   Progress Note    Chief Complaint: S/P fall causing a Left hip fracture which was surgically corrected on 19. Subjective: She reported that her Left hip pain ranges between a 2-10 depending on her activity level. This is with respect to a pain scale of 1-10 with 1 being no pain and 10 being intolerable pain. She slept well last evening. She had no acute concerns. Rehabilitation:  PT:   Bed Mobility:  Supine to Sit: Moderate Assistance, X 1, with head of bed raised, with verbal cues , with increased time for completion, and bedrail ; ed in 1/2 bridging technique   Scooting: Minimal Assistance     Transfers:  Sit to Stand: Moderate Assistance, Maximum Assistance, X 1, with increased time for completion, with verbal cues  Stand to Sit:Moderate Assistance, X 1, with increased time for completion, with verbal cues  Stand Pivot:Minimal Assistance, X 1, with increased time for completion, with verbal cues- a lot of cueing for weight shifts and walker placement - nearly modA ; a lot of extra time ; bed to w/c      Balance:  Static Standing Balance: Minimal Assistance- at walker - pt stood 3 times total for Depends and hosp shorts changing; practice and ed in coming to stand; used 1-2-3 momentum; ed on proper hand placement ; foot placement and and coming forward    Ambulation:  Moderate Assistance  Distance: 3 ft  Surface: Level Tile  Device:Rolling Walker  Gait Deviations:  Slow Lynn, Decreased Step Length Bilaterally and Pt has difficulting wt bearing enough on LLE to allow for a step with RLE. Cues to avoid pivoting foot on Rt showed pt taking better steps with RLE.       O.T:  ADL's;  Eatin - Feeds self with setup/supervision/cues and/or requires only setup/supervision/cues to perform tube feedings(setup for opening items on breakfast tray)   Groomin - Requires setup/cues to do all tasks(set up in recliner, unable to complete in standing)   Bathin - Able 08/27/19.     Missed Therapy Time:  · None    Elysia Lagos MD

## 2019-08-26 NOTE — PROGRESS NOTES
with self care   Long term goal 3: Pt to demonstrate standing tolerance greater than 10 mins at SUP in prep for SMP and basic homemaking skills    Following session, patient left in safe position with all fall risk precautions in place.

## 2019-08-26 NOTE — PROGRESS NOTES
ulcers  Short term goal 2: Patient will be able to perform supine<>sit at MINx1 to sit EOB  Short term goal 3: Patient will be able to get up/down from various seated surfaces, +1 mod assist, to get up to walk. Short term goal 4: Patient to be able to walk >= 10 feet with MINx1 with RW in order to reach bathroom   Short term goal 5: Pt to perform stand/pivot transfer with RW, +1 min assist to get in/out of w/c. Long term goals  Time Frame for Long term goals : 3 weeks  Long term goal 1: Patient will be able to perform bed mobility at [facility] to sit EOB  Long term goal 2: Patient will be able to perform functional transfers at [facility] to sit in recliner at home  Long term goal 3: Patient to be able to ambulate 50 feet with RW and CGA to walk around home safely  Long term goal 4: Patient will be able to negotiate 2 steps at [facility] to enter home   Long term goal 5: Patient will demonstrate Good dynamic standing balance in order to decrease risk of future falls. Following session, patient left in safe position with all fall risk precautions in place.

## 2019-08-26 NOTE — PROGRESS NOTES
is  21days   Your insurance Coverage has been verified as follows:    Primary Insurance:   Deductible: 2131 Coverage:active   Secondary Insurance:anthem ;secondary insurance policies often cover co-pay amounts, but to ensure payment please contact your insurance company.     Alternative Resources: Please ask the  for more information [phone]

## 2019-08-26 NOTE — PROGRESS NOTES
Nutrition Assessment    Type and Reason for Visit: Initial, Consult(nutrition assessment)    Nutrition Recommendations: Continue diet per MD. Spencer Oconnor changed today per RD. Nutrition Assessment: Pt. nutritionally compromised AEB admit to rehab s/p hip repair and appetite variable since surgery. At risk for further nutrition compromise r/t increased needs for healing; Hx CHF and COPD. Meds include colace, lasix. Glucose 97  Pt. States she follows low salt diet pta and doesn't add salt to meals received here - continue general menu - has been getting Enlive TID and would prefer only 1x daily - encouraged lean proteins. Malnutrition Assessment:  · Malnutrition Status: At risk for malnutrition    Nutrition Risk Level: Moderate    Nutrient Needs:  · Estimated Daily Total Kcal: 1836 (18/kgm based on 101kgm 8/26)  · Estimated Daily Protein (g): 82 grams (1.5/kgm IBW)    Nutrition Diagnosis:   · Problem: Inadequate oral intake  · Etiology: related to Insufficient energy/nutrient consumption     Signs and symptoms:  as evidenced by Patient report of, Diet history of poor intake    Objective Information:  · Nutrition-Focused Physical Findings: pt. sitting in recliner; appetite variable since surgery; trying to drink the Enlive; BM x2 past 24h; some swallowing issues - has had dilatations x2 - no texture modifications need ordered per pt.    · Wound Type: Surgical Wound(8/20 hip repair)  · Current Nutrition Therapies:  · Oral Diet Orders: General   · Oral Diet intake: 26-50%, 51-75%  · Oral Nutrition Supplement (ONS) Orders: Standard High Calorie Oral Supplement(Enlive 1x daily)  · ONS intake: 51-75%(pt. requests changing from BID to 1x daily)  · Anthropometric Measures:  · Ht: 5' 4\" (162.6 cm)   · Current Body Wt: 224 lb (101.6 kg)(8/23 with +1 edema)  · Admission Body Wt: 224 lb (101.6 kg)(8/23 with +1 edema)  · Usual Body Wt: 197 lb (89.4 kg)(11/25/18)  · Ideal Body Wt: 120 lb (54.4 kg),   · BMI Classification:

## 2019-08-26 NOTE — PROGRESS NOTES
Family Medicine Progress Notes/Coverage    Today's Date: 8/26/19  [id number]  Medical Record # [id number]  Account # [de-identified]      Ms. Priscilla Grewal admitted on 8/23/2019        Subjective / Interval History :     Pain is improving  Tinge of vertigo  Reviewed notes, consults, laboratory and radiology results,    Objective:       Physical Exam:  Patient Vitals for the past 24 hrs:   BP Temp Temp src Pulse Resp SpO2   08/26/19 0800 (!) 143/70 97.9 °F (36.6 °C) Oral 77 16 93 %   08/25/19 2149 (!) 119/58 98.5 °F (36.9 °C) Oral 76 15 90 %       General Appearance:  Alert, cooperative, no distress, appears stated age   HEENT:  Neck:      Chest/Lungs:  Heart: CTA  RRR   Abdomen:  Back: soft   Extremities:  Neurological Exam: Mild edema  WNL       Assessment:     Admitting Diagnosis:    S/p left hip fracture [Z87.81]    Active Hospital Problems    Diagnosis Date Noted    S/p left hip fracture [Z87.81] 08/23/2019     Priority: High    Benign positional vertigo [H81.10] 08/19/2019     Priority: High    Osteoporosis of vertebra [M81.0]      Priority: Medium    Class 2 severe obesity due to excess calories with serious comorbidity in adult ([facility] [address] [E66.01] 02/03/2018     Priority: Medium    Nonischemic cardiomyopathy ([facility] [address] [I42.8]      Priority: Medium    COPD (chronic obstructive pulmonary disease) ([facility] [address] [J44.9] 08/05/2017     Priority: Medium    Obstructive sleep apnea on CPAP [G47.33, Z99.89] 11/05/2015     Priority: Medium    CAD (coronary artery disease) s/p PCI and stent Multilink 3 x 18  mm mid LAD- in 07/2009 at [facility] [I25.10] 07/16/2012     Priority: Medium       Plan:     Discussed plans with the nursing staff  Vestibular rehab  Discussed Prolia, Boniva, Fosamax, reclast for bone osteoporosis      Medications, Laboratories and Imaging results:    Scheduled Meds:  

## 2019-08-26 NOTE — PROGRESS NOTES
Education & Training, Home Exercise Program    Patient Education  Patient Education: Home Exercise Program, Bed Mobility, Transfers,  - Patient Verbalized Understanding, - Patient Requires Continued Education    Goals:  Patient goals : to get better  Short term goals  NOT MET, continue  Time Frame for Short term goals: 1 week  Short term goal 1: Patient to be able to perform rolling L/R at MINx1 to decrease risk of pressure ulcers    Short term goal 2: Patient will be able to perform supine<>sit at MINx1 to sit EOB  Short term goal 3: Patient will be able to perform functional transfers at MINx1 to stand to [address] term goal 4: Patient to be able to walk 15 feet with MINx1 in order to reach bathroom   Short term goal 5: Patient will be able to stand with Fair+ balance in order to decrease risk of falling during transfers or standing ADLs  Long term goals  NOT MET, continue  Time Frame for Long term goals : 3 weeks  Long term goal 1: Patient will be able to perform bed mobility at CGA to sit EOB  Long term goal 2: Patient will be able to perform functional transfers at [facility] to sit in recliner at home  Long term goal 3: Patient to be able to ambulate 50 feet with RW and CGA to walk around home safely  Long term goal 4: Patient will be able to negotiate 2 steps at [facility] to enter home   Long term goal 5: Patient will demonstrate Good dynamic standing balance in order to decrease risk of future falls.

## 2019-08-27 LAB
BILIRUBIN URINE: NEGATIVE
BLOOD, URINE: NEGATIVE
CHARACTER, URINE: CLEAR
COLOR: YELLOW
GLUCOSE URINE: NEGATIVE MG/DL
KETONES, URINE: NEGATIVE
LEUKOCYTE ESTERASE, URINE: NEGATIVE
NITRITE, URINE: NEGATIVE
PH UA: 7.5 (ref 5–9)
PROTEIN UA: NEGATIVE
SPECIFIC GRAVITY, URINE: 1.01 (ref 1–1.03)
UROBILINOGEN, URINE: 1 EU/DL (ref 0–1)

## 2019-08-27 PROCEDURE — 2580000003 HC RX 258: Performed by: NURSE PRACTITIONER

## 2019-08-27 PROCEDURE — 6370000000 HC RX 637 (ALT 250 FOR IP): Performed by: NURSE PRACTITIONER

## 2019-08-27 PROCEDURE — 81003 URINALYSIS AUTO W/O SCOPE: CPT

## 2019-08-27 PROCEDURE — 99232 SBSQ HOSP IP/OBS MODERATE 35: CPT | Performed by: PHYSICAL MEDICINE & REHABILITATION

## 2019-08-27 PROCEDURE — 2709999900 HC NON-CHARGEABLE SUPPLY

## 2019-08-27 PROCEDURE — 97535 SELF CARE MNGMENT TRAINING: CPT

## 2019-08-27 PROCEDURE — 97110 THERAPEUTIC EXERCISES: CPT

## 2019-08-27 PROCEDURE — 95992 CANALITH REPOSITIONING PROC: CPT

## 2019-08-27 PROCEDURE — 6370000000 HC RX 637 (ALT 250 FOR IP): Performed by: PHYSICAL MEDICINE & REHABILITATION

## 2019-08-27 PROCEDURE — 97530 THERAPEUTIC ACTIVITIES: CPT

## 2019-08-27 PROCEDURE — 1180000000 HC REHAB R&B

## 2019-08-27 RX ADMIN — ACETAMINOPHEN 650 MG: 325 TABLET ORAL at 06:37

## 2019-08-27 RX ADMIN — SACUBITRIL AND VALSARTAN 1 TABLET: 49; 51 TABLET, FILM COATED ORAL at 08:46

## 2019-08-27 RX ADMIN — ACETAMINOPHEN 650 MG: 325 TABLET ORAL at 18:48

## 2019-08-27 RX ADMIN — DICLOFENAC 4 G: 10 GEL TOPICAL at 18:48

## 2019-08-27 RX ADMIN — ACETAMINOPHEN 650 MG: 325 TABLET ORAL at 12:34

## 2019-08-27 RX ADMIN — DOCUSATE SODIUM 100 MG: 100 CAPSULE, LIQUID FILLED ORAL at 08:46

## 2019-08-27 RX ADMIN — CALCIUM CARBONATE-VITAMIN D TAB 500 MG-200 UNIT 1 TABLET: 500-200 TAB at 21:08

## 2019-08-27 RX ADMIN — DICLOFENAC 4 G: 10 GEL TOPICAL at 12:34

## 2019-08-27 RX ADMIN — ASPIRIN 81 MG: 81 TABLET ORAL at 08:48

## 2019-08-27 RX ADMIN — CALCIUM CARBONATE-VITAMIN D TAB 500 MG-200 UNIT 1 TABLET: 500-200 TAB at 08:46

## 2019-08-27 RX ADMIN — FUROSEMIDE 20 MG: 20 TABLET ORAL at 08:46

## 2019-08-27 RX ADMIN — SODIUM CHLORIDE, PRESERVATIVE FREE 10 ML: 5 INJECTION INTRAVENOUS at 08:49

## 2019-08-27 RX ADMIN — SACUBITRIL AND VALSARTAN 1 TABLET: 49; 51 TABLET, FILM COATED ORAL at 21:08

## 2019-08-27 RX ADMIN — MECLIZINE HYDROCHLORIDE 12.5 MG: 12.5 TABLET, FILM COATED ORAL at 06:37

## 2019-08-27 RX ADMIN — MECLIZINE HYDROCHLORIDE 12.5 MG: 12.5 TABLET, FILM COATED ORAL at 12:38

## 2019-08-27 RX ADMIN — TRAMADOL HYDROCHLORIDE 50 MG: 50 TABLET, FILM COATED ORAL at 18:48

## 2019-08-27 RX ADMIN — POTASSIUM CHLORIDE 20 MEQ: 20 TABLET, EXTENDED RELEASE ORAL at 08:48

## 2019-08-27 RX ADMIN — SIMVASTATIN 40 MG: 40 TABLET, FILM COATED ORAL at 21:08

## 2019-08-27 RX ADMIN — RIVAROXABAN 10 MG: 10 TABLET, FILM COATED ORAL at 18:47

## 2019-08-27 RX ADMIN — TRAMADOL HYDROCHLORIDE 50 MG: 50 TABLET, FILM COATED ORAL at 12:34

## 2019-08-27 RX ADMIN — TRAMADOL HYDROCHLORIDE 50 MG: 50 TABLET, FILM COATED ORAL at 06:37

## 2019-08-27 RX ADMIN — ISOSORBIDE MONONITRATE 30 MG: 30 TABLET ORAL at 08:48

## 2019-08-27 RX ADMIN — METOPROLOL SUCCINATE 50 MG: 50 TABLET, FILM COATED, EXTENDED RELEASE ORAL at 08:47

## 2019-08-27 RX ADMIN — SODIUM CHLORIDE, PRESERVATIVE FREE 10 ML: 5 INJECTION INTRAVENOUS at 21:08

## 2019-08-27 RX ADMIN — DICLOFENAC 4 G: 10 GEL TOPICAL at 08:47

## 2019-08-27 RX ADMIN — DICLOFENAC 4 G: 10 GEL TOPICAL at 21:08

## 2019-08-27 RX ADMIN — MECLIZINE HYDROCHLORIDE 12.5 MG: 12.5 TABLET, FILM COATED ORAL at 18:50

## 2019-08-27 ASSESSMENT — PAIN DESCRIPTION - DIRECTION: RADIATING_TOWARDS: BACK

## 2019-08-27 ASSESSMENT — PAIN SCALES - GENERAL
PAINLEVEL_OUTOF10: 4
PAINLEVEL_OUTOF10: 7
PAINLEVEL_OUTOF10: 0
PAINLEVEL_OUTOF10: 4

## 2019-08-27 ASSESSMENT — PAIN DESCRIPTION - PROGRESSION

## 2019-08-27 ASSESSMENT — PAIN - FUNCTIONAL ASSESSMENT: PAIN_FUNCTIONAL_ASSESSMENT: PREVENTS OR INTERFERES SOME ACTIVE ACTIVITIES AND ADLS

## 2019-08-27 ASSESSMENT — PAIN DESCRIPTION - LOCATION: LOCATION: HIP

## 2019-08-27 ASSESSMENT — PAIN DESCRIPTION - ONSET: ONSET: ON-GOING

## 2019-08-27 ASSESSMENT — PAIN DESCRIPTION - FREQUENCY: FREQUENCY: INTERMITTENT

## 2019-08-27 ASSESSMENT — PAIN DESCRIPTION - PAIN TYPE: TYPE: ACUTE PAIN

## 2019-08-27 ASSESSMENT — PAIN DESCRIPTION - DESCRIPTORS: DESCRIPTORS: ACHING

## 2019-08-27 ASSESSMENT — PAIN DESCRIPTION - ORIENTATION: ORIENTATION: LEFT

## 2019-08-27 NOTE — PROGRESS NOTES
less areas/total assist(pt able to complete UB at EOB with CGA; requires assist  for distal BLEs and assist of one for standing balance and second person for washing bottom)   Dressing-Upper: 4 - Requires assist with buttons/zippers only and/or requires assist with one arm only(assist for orientation and 1st UE)   Dressing-Lower: 1 - Total assist with lower body dressing(assist for donning/doffing socks, assist for threading BLE and assist of 2 to stand and up over hips)   Toiletin - Total assist(assist for clothing managment and hygiene; 2 persons)   Toilet Transfer: 1 - Requires > 75% assist getting on & off toilet   Shower Transfer: 0 - Activity does not occur     COMMUNICATION  Comprehension: 6 - Complex ideas 90% or device (hearing aid or glasses- if patient is primarily a visual learner)  GOAL: Comprehension: 6  Expression: 6 - Device used to express complex ideas/needs  GOAL: Expression: 6   SOCIAL COGNITION  Social Interaction: 6 - Patient requires medication for mood and/or effect  GOAL: Social Interaction: 6  Problem Solving: 3 - Patient solves simple/routine tasks 50%-74%  GOAL: Problem Solvin  Memory: 4 - Patient remembers 75-90%+ of the time  GOAL: Memory: 5       Review of Systems:  CONSTITUTIONAL:  negative  RESPIRATORY:  negative  CARDIOVASCULAR:  negative  GASTROINTESTINAL:  positive for constipation  GENITOURINARY:  positive for urinary incontinence  MUSCULOSKELETAL:  positive for  myalgias, arthralgias, pain, joint swelling, stiff joints, decreased range of motion, muscle weakness and bone pain  NEUROLOGICAL:  positive for coordination problems, gait problems, weakness and pain  BEHAVIOR/PSYCH:  negative  10 point system review otherwise negative    Objective:  BP (!) 148/64   Pulse 80   Temp 98.4 °F (36.9 °C) (Oral)   Resp 16   Ht 5' 4\" (1.626 m)   Wt 225 lb 8 oz (102.3 kg)   SpO2 97%   BMI 38.71 kg/m²   She was noted to be awake, alert, and in no acute distress.   Orientation: person, place, time  Mood: within normal limits  Affect: calm  General appearance: Patient is well nourished, well developed, well groomed and in no acute distress, obese, appearing stated age, normal affect. Memory:   normal  Attention/Concentration: normal  Language:  normal  HEART:  S1 and S2 with a regular rate and rhythm without a murmur,  gallop, or rub. LUNGS:  Clear to auscultation bilaterally without rales, rhonchi, or  wheezes. ABDOMEN:  Diminished bowel sounds in all four quadrants. Her abdomen  was noted to be soft, nontender, without masses. Cranial Nerves:  cranial nerves II-XII are grossly intact  ROM:  abnormal - with respect to her Left hip. Motor Exam: No acute changes noted with respect to her limb strength. Tone:  normal  Muscle bulk: within normal limits  Sensory:  Sensory intact  Coordination:   abnormal - with respect to her mobility. Deep Tendon Reflexes: No acute changes noted. Skin: warm and dry, no rash or erythema. Well-healing Left lateral hip and thigh surgical incisions. Edema: Moderate Left hip and thigh edema without erythema. Mild Left leg to foot edema without erythema. Diagnostics:   No results found for this or any previous visit (from the past 24 hour(s)). Impression:  1. Status post recent fall inside of her home which caused a left hip  fracture which was surgically corrected on 08/20/2019. Postoperatively,  she is to be weightbearing as tolerated to the left lower limb. 2.  Gait dysfunction. 3.  Deficits with respect to her activities of daily living. 4.  Current history of constipation. 5.  Chronic history of urinary bladder incontinence. 6.  Chronic history of low back pain. 7.  Ongoing history of vertigo. 8.  History of erosive gastritis. 9.  History of diverticulosis. 10.  History of COPD. 11.  History of previous pneumonia. 12.  History of congestive heart failure.   13.  History of esophageal strictures which have been surgically

## 2019-08-27 NOTE — PROGRESS NOTES
[address]  Occupational Therapy  Daily Note  Time:   Time In: 1401  Time Out: 1431  Timed Code Treatment Minutes: 30 Minutes  Minutes: 30          Date: 2019  Patient Name: Christina Yost,   Gender: female      Room: [facility]/055-A  MRN: [id number]  : 1940  ([de-identified] y.o.)  Referring Practitioner: Ordering: JADEN Guzman - NIKKO Attending: Caroline Isaac MD  Diagnosis: S/p left hip fracture  Additional Pertinent Hx: Per ED note, pt is a [de-identified] y.o. female who presents to the Emergency Department for the evaluation of a fall. Patient reports that she bent forward, felt dizzy and fell. She is not certain if she hit her head during the fall, but she did not loose consciousness. Patient has a history of vertigo, DVT and has a pacemaker. She takes asprin regularly as a blood thinner and takes tramodol for pain management. She also does crystal therapy for her vertigo. Patient reports that most pain is located in her left leg. She reports a aching/pounding pain that has a 8/10 in severity. S/P Open treatment of an intertrochanteric fracture with a cephamedullary nail 19, to IP Rehab 19    Restrictions/Precautions:  Restrictions/Precautions: Weight Bearing, General Precautions, Fall Risk  Left Lower Extremity Weight Bearing: Weight Bearing As Tolerated  Position Activity Restriction  Other position/activity restrictions: h/o vertigo    SUBJECTIVE: Pt voices fatigue following PT session, but agreeable to OT. PAIN: 4/10: L hip    COGNITION: WNL    ADL:   No ADL's completed this session. [place] BALANCE:  Sitting Balance:  Modified Independent    ADDITIONAL ACTIVITIES:  -Education completed with DME and home set-up. Pt voiced her current process for showering, but ancipate pt will need a shower chair.  Pt reports her family is able to provide pictures of bathroom as pt reports she has one grab bar in front of toilet that she uses to [employer]

## 2019-08-27 NOTE — PLAN OF CARE
Problem: DISCHARGE BARRIERS  Goal: Patient's continuum of care needs are met  8/27/2019 1212 by MINA Fish  Note:   Team conference held Tuesday, 08/27/2019. Recommendations of the team were explained to the patient by MINA Gutierrez, and Dr. Char Gaines. Team is recommending that patient continue on acute inpatient rehab for two more weeks, with expected discharge date of Tuesday, 09/10/2019. Following discharge, team is recommending home health care services for RN/PT/OT/HHA. Additional discussion regarding need for increased support at discharge beyond the services available for home health care. Patient continues to mention possibility of discharge to assisted living facility. Care plan reviewed with patient. Patient verbalized understanding of the plan of care and contributed to goal setting. SW contacted patient's daughter, Michelle Obregon, at [phone], to provide team conference update. No answer, left voice message requesting returned phone call. SW to follow and maintain involvement in discharge planning.

## 2019-08-27 NOTE — PROGRESS NOTES
h/o vertigo    SUBJECTIVE: pt in bed on arrival, agrees to therapy. Vestibular assessment completed, see below - s/s likely diminished d/t pt receiving antivert ~ an hour prior to assessment    PAIN: 5/10: L hip region    OBJECTIVE:  Bed Mobility:  Supine to Sit: Moderate Assistance, with head of bed raised, with verbal cues , with increased time for completion, slight assist at LLE and at trnk to reach uprihgt  Scooting: Minimal Assistance, with verbal cues , with increased time for completion, to EOB    Transfers:  Sit to Stand: [employer], Minimal Assistance, with verbal cues, Bora from EOB, CGA seated in marguerite stedy. stedy used due to pt request to use restroom urgently  Stand to [employer] [facility] [place], Minimal Assistance, with verbal cues    Ambulation:  Contact Guard Assistance, Minimal Assistance, with cues for safety, with verbal cues , mild instability while turning requiring Bora for stability  Distance: 3ft  Surface: Level Tile  Device:Rolling Walker  Gait Deviations: Forward Flexed Posture, Slow Lynn, Decreased Step Length Bilaterally, Decreased Gait Speed, Decreased Heel Strike Bilaterally, Mild Path Deviations and antalgia with decreased WB and stance phase on LLE        Exercise:  Patient was guided in 1 set(s) 12 reps of exercise to both lower extremities. Ankle pumps, Long arc quads, Seated hip flexion and assist as needed to complete full ROM on L. Exercises were completed for increased independence with functional mobility.     Balance:  Static Standing Balance: Contact Guard Assistance, with increased time for completion, with increased time pt able to complete pericare while standing in marguerite stedy with times of BUE release, mild instability with no LOB      Benign Paroxysmal Positional Vertigo (BPPV)    History of Falls: Yes fall at home resulting in L hip fx    POSITIONAL VERTIGO TESTIN [address]  Right Ear:  Negative  Left Ear:   Negative    ROLL TEST  Right Ear: Negative   Left Ear:   Negative    MANEUVER PERFORMED:  None Indicated    Mild nystagmus noted in L eye during smooth pursuits in to L zuleyka field and saccades, pt reports intermittent double vision in L zuleyka field - able to correct with concentration or by covering L eye, will notify OT. Pt reports having HEP from prior PT as OP, states she will have family bring in for self performance. This PT will continue to monitor s/s vertigo during stay. PATIENT EDUCATION: No Precautions/Instructions Necessary    Functional Outcome Measures: Not completed       ASSESSMENT:  Assessment: Patient progressing toward established goals. Activity Tolerance:  Patient tolerance of  treatment: good. Continues to experience pain in L LE during all mobility, increased time required as well as cues for proper breathing throughout session     Equipment Recommendations: Other: monitor for needs  Discharge Recommendations:  Continue to assess pending progress, Patient would benefit from continued therapy after discharge, 24 hour supervision or assist    Plan: Times per week: 5x/wk 90 min, 1x/wk 30 min  Specific instructions for Next Treatment: ther ex, transfers, gait training, bed mobility   Current Treatment Recommendations: Strengthening, Transfer Training, Endurance Training, Patient/Caregiver Education & Training, ROM, Balance Training, Gait Training, Stair training, Functional Mobility Training, Safety Education & Training, Home Exercise Program    Patient Education  Patient Education: Plan of Care, [employer] Mobility, Transfers, Gait, Verbal Exercise Instruction    Goals:  Patient goals : to get better  Short term goals  Time Frame for Short term goals: 1 week  Short term goal 1: Patient to be able to perform rolling L/R at MINx1 to decrease risk of pressure ulcers  Short term goal 2: Patient will be able to perform supine<>sit at MINx1 to sit EOB  Short term goal 3: Patient will be able to get up/down from various seated surfaces, +1

## 2019-08-27 NOTE — PLAN OF CARE
Problem: DISCHARGE BARRIERS  Goal: Patient's continuum of care needs are met  Note:   [address] [address]  Physical Medicine Case Management Assessment    ? Inpatient Rehabilitation Unit  ? Transitional Care Unit    Patient Name: Christina Yost        MRN: [id number]    : 1940  ([de-identified] y.o.)  Gender: female     Date of Admission: 2019      Family/Social/Home Environment: Prior to fall, surgery, and hospitalization, patient reports independence with ADL's and personal care. Patient lives alone. Patient's son, Nathaly Whitney, recently passed away. Patient's son, Nathaly Whitney, was assisting with finances, but now daughter, Daniella Tristan, is helping with finances. Patient was driving, completing meal preparation, laundry, errands, and managing own medications. Patient has private  every two weeks. Patient also volunteers at [address] [place] [address]. Patient has [employer] services and verbalizes wearing device while at home. Patient reports nephew, Adonis Allen, and his family live across the street and assist with mail, newspaper, and trash. Patient was not using any medical equipment, but was getting vestibular PT rehab. Patient's two other children, Daniella Tristan and Shane Ramirez, both live in [address] [place] [address], but work full time. Patient is aware that returning home upon discharge may not be appropriate, therefore, has discussed possibility of assisted living. Patient has previously stayed in an assisted living facility in [place] to be closer to daughter. Social/Functional History  Lives With: Alone  Type of Home: House  Home Layout: Two level, Able to Live on Main level with bedroom/bathroom  Home Access: Stairs to enter with rails  Entrance Stairs - Number of Steps: 2 steps with 1 rail   Entrance Stairs - Rails: Right  Bathroom Shower/Tub: Walk-in shower  Bathroom Toilet: Handicap height  Bathroom Equipment: Grab bars in shower  Home Equipment: Standard walker, Cane, Reacher, Rolling walker, cpap machine.    ADL Assistance: not be appropriate, therefore, has discussed possibility of assisted living. Patient has previously stayed in an assisted living facility in [place] to be closer to daughter. Anticipate patient would benefit from home health care services, medical equipment, and assisted living upon discharge. SW reviewed with patient team conference on Tuesday, 08/27/2019, to further discuss progress and discharge recommendations. SW to follow and maintain involvement in discharge planning.            Discharge Planning  Living Arrangements: Alone  Support Systems: Children, Friends/Neighbors, Family Members  Potential Assistance Needed: N/A  Potential Assistance Purchasing Medications: No  Meds-to-Beds: Does the patient want to have any new prescriptions delivered to bedside prior to discharge?: No  Type of Home Care Services: None  Patient expects to be discharged to[de-identified] home  Expected Discharge Date: 09/10/19  Follow Up Appointment: Best Day/Time : Friday PM      Electronically signed by MINA Krishna on 8/27/2019 at 9:09 AM

## 2019-08-28 PROCEDURE — 97110 THERAPEUTIC EXERCISES: CPT

## 2019-08-28 PROCEDURE — 6370000000 HC RX 637 (ALT 250 FOR IP): Performed by: NURSE PRACTITIONER

## 2019-08-28 PROCEDURE — 2580000003 HC RX 258: Performed by: NURSE PRACTITIONER

## 2019-08-28 PROCEDURE — 6370000000 HC RX 637 (ALT 250 FOR IP): Performed by: PHYSICAL MEDICINE & REHABILITATION

## 2019-08-28 PROCEDURE — 1180000000 HC REHAB R&B

## 2019-08-28 PROCEDURE — 97535 SELF CARE MNGMENT TRAINING: CPT

## 2019-08-28 PROCEDURE — 99232 SBSQ HOSP IP/OBS MODERATE 35: CPT | Performed by: PHYSICAL MEDICINE & REHABILITATION

## 2019-08-28 PROCEDURE — 97530 THERAPEUTIC ACTIVITIES: CPT

## 2019-08-28 PROCEDURE — 97116 GAIT TRAINING THERAPY: CPT

## 2019-08-28 RX ADMIN — FUROSEMIDE 20 MG: 20 TABLET ORAL at 09:13

## 2019-08-28 RX ADMIN — ACETAMINOPHEN 650 MG: 325 TABLET ORAL at 06:11

## 2019-08-28 RX ADMIN — ACETAMINOPHEN 650 MG: 325 TABLET ORAL at 00:00

## 2019-08-28 RX ADMIN — ASPIRIN 81 MG: 81 TABLET ORAL at 09:13

## 2019-08-28 RX ADMIN — MECLIZINE HYDROCHLORIDE 12.5 MG: 12.5 TABLET, FILM COATED ORAL at 06:11

## 2019-08-28 RX ADMIN — ACETAMINOPHEN 650 MG: 325 TABLET ORAL at 12:43

## 2019-08-28 RX ADMIN — TRAMADOL HYDROCHLORIDE 50 MG: 50 TABLET, FILM COATED ORAL at 12:43

## 2019-08-28 RX ADMIN — ACETAMINOPHEN 650 MG: 325 TABLET ORAL at 23:00

## 2019-08-28 RX ADMIN — TRAMADOL HYDROCHLORIDE 50 MG: 50 TABLET, FILM COATED ORAL at 06:10

## 2019-08-28 RX ADMIN — DICLOFENAC 4 G: 10 GEL TOPICAL at 09:13

## 2019-08-28 RX ADMIN — TRAMADOL HYDROCHLORIDE 50 MG: 50 TABLET, FILM COATED ORAL at 23:00

## 2019-08-28 RX ADMIN — TRAMADOL HYDROCHLORIDE 50 MG: 50 TABLET, FILM COATED ORAL at 00:01

## 2019-08-28 RX ADMIN — DOCUSATE SODIUM 100 MG: 100 CAPSULE, LIQUID FILLED ORAL at 09:13

## 2019-08-28 RX ADMIN — CALCIUM CARBONATE-VITAMIN D TAB 500 MG-200 UNIT 1 TABLET: 500-200 TAB at 09:13

## 2019-08-28 RX ADMIN — CALCIUM CARBONATE-VITAMIN D TAB 500 MG-200 UNIT 1 TABLET: 500-200 TAB at 23:04

## 2019-08-28 RX ADMIN — MECLIZINE HYDROCHLORIDE 12.5 MG: 12.5 TABLET, FILM COATED ORAL at 18:11

## 2019-08-28 RX ADMIN — MECLIZINE HYDROCHLORIDE 12.5 MG: 12.5 TABLET, FILM COATED ORAL at 11:15

## 2019-08-28 RX ADMIN — SODIUM CHLORIDE, PRESERVATIVE FREE 10 ML: 5 INJECTION INTRAVENOUS at 23:42

## 2019-08-28 RX ADMIN — SACUBITRIL AND VALSARTAN 1 TABLET: 49; 51 TABLET, FILM COATED ORAL at 09:12

## 2019-08-28 RX ADMIN — DICLOFENAC 4 G: 10 GEL TOPICAL at 22:50

## 2019-08-28 RX ADMIN — SIMVASTATIN 40 MG: 40 TABLET, FILM COATED ORAL at 23:04

## 2019-08-28 RX ADMIN — POTASSIUM CHLORIDE 20 MEQ: 20 TABLET, EXTENDED RELEASE ORAL at 09:13

## 2019-08-28 RX ADMIN — RIVAROXABAN 10 MG: 10 TABLET, FILM COATED ORAL at 18:11

## 2019-08-28 RX ADMIN — SACUBITRIL AND VALSARTAN 1 TABLET: 49; 51 TABLET, FILM COATED ORAL at 23:04

## 2019-08-28 RX ADMIN — ISOSORBIDE MONONITRATE 30 MG: 30 TABLET ORAL at 09:13

## 2019-08-28 ASSESSMENT — PAIN SCALES - WONG BAKER
WONGBAKER_NUMERICALRESPONSE: 2
WONGBAKER_NUMERICALRESPONSE: 2

## 2019-08-28 ASSESSMENT — PAIN SCALES - GENERAL
PAINLEVEL_OUTOF10: 0
PAINLEVEL_OUTOF10: 6
PAINLEVEL_OUTOF10: 7
PAINLEVEL_OUTOF10: 5
PAINLEVEL_OUTOF10: 5
PAINLEVEL_OUTOF10: 8
PAINLEVEL_OUTOF10: 10

## 2019-08-28 ASSESSMENT — PAIN DESCRIPTION - ORIENTATION
ORIENTATION: LEFT

## 2019-08-28 ASSESSMENT — PAIN DESCRIPTION - DESCRIPTORS
DESCRIPTORS: ACHING

## 2019-08-28 ASSESSMENT — PAIN - FUNCTIONAL ASSESSMENT
PAIN_FUNCTIONAL_ASSESSMENT: PREVENTS OR INTERFERES SOME ACTIVE ACTIVITIES AND ADLS
PAIN_FUNCTIONAL_ASSESSMENT: PREVENTS OR INTERFERES SOME ACTIVE ACTIVITIES AND ADLS

## 2019-08-28 ASSESSMENT — PAIN DESCRIPTION - PROGRESSION
CLINICAL_PROGRESSION: NOT CHANGED
CLINICAL_PROGRESSION: NOT CHANGED

## 2019-08-28 ASSESSMENT — PAIN DESCRIPTION - FREQUENCY
FREQUENCY: INTERMITTENT

## 2019-08-28 ASSESSMENT — PAIN DESCRIPTION - LOCATION
LOCATION: HIP

## 2019-08-28 ASSESSMENT — PAIN DESCRIPTION - ONSET
ONSET: ON-GOING

## 2019-08-28 ASSESSMENT — PAIN DESCRIPTION - PAIN TYPE
TYPE: ACUTE PAIN

## 2019-08-28 ASSESSMENT — PAIN DESCRIPTION - DIRECTION
RADIATING_TOWARDS: LEG
RADIATING_TOWARDS: BACK
RADIATING_TOWARDS: LEG

## 2019-08-28 NOTE — PROGRESS NOTES
[facility]  INPATIENT PHYSICAL THERAPY  DAILY NOTE  [address] [facility] - 7E-55/055-A    Time In: 1340  Time Out: 1410  Timed Code Treatment Minutes: 30 Minutes  Minutes: 30          Date: 2019  Patient Name: Kat Escobar,  Gender:  female        MRN: [id number]  : 1940  ([de-identified] y.o.)     Referring Practitioner: Gilbert Zamora MD  Diagnosis: s/p left hip fracture   Additional Pertinent Hx: Per ED note, pt is a [de-identified] y.o. female who presents to the Emergency Department for the evaluation of a fall. Patient reports that she bent forward, felt dizzy and fell. She is not certain if she hit her head during the fall, but she did not loose consciousness. Patient has a history of vertigo, DVT and has a pacemaker. She takes asprin regularly as a blood thinner and takes tramodol for pain management. She has also been undergoing vestibular therapy for her ongoing dizziness. Prior Level of Function:  Lives With: Alone  Type of Home: House  Home Layout: Two level, Able to Live on Main level with bedroom/bathroom  Home Access: Stairs to enter with rails  Entrance Stairs - Number of Steps: 2 steps with 1 rail   Entrance Stairs - Rails: Right  Home Equipment: Standard walker, Cane, Reacher, Rolling walker   Bathroom Shower/Tub: Walk-in shower  Bathroom Toilet: Handicap height  Bathroom Equipment: Grab bars in shower    ADL Assistance: Independent  Homemaking Assistance: Independent  Ambulation Assistance: Independent  Transfer Assistance: Independent  Active : Yes  IADL Comments: does own grocery shopping  Additional Comments: Pt was getting vestibular PT rehab. Pt was generally independent with mobility. Pt volunteers regularly here at [facility].     Restrictions/Precautions:  Restrictions/Precautions: Weight Bearing, General Precautions, Fall Risk  Left Lower Extremity Weight Bearing: Weight Bearing As Tolerated  Position Activity Restriction  Other position/activity

## 2019-08-28 NOTE — PROGRESS NOTES
disease with a cardiac stent being  placed in the past.  21.  Status post right breast lumpectomy performed in 01/2004 to treat  her right breast cancer. 22.  Status post left shoulder replacement. 23.  Status post left humeral fracture, surgically corrected. 24.  Status post cardiac pacemaker placement which was performed on  01/08/2018. 25.  Status post gallbladder removal.  26.  Status post hysterectomy performed in the 1970s. 27.  Remote history of tobacco abuse. Plan:  · She is to continue with her current inpatient rehab. program.  · A Rehab. team conference was held on 08/27/19. Her discharge goal is for her to be discharged on 09/10/19. Her discharge disposition will be clarified in the near future. · Her Colace 100 mg has been decreased to once daily per patient request.  · A BMP was drawn on 08/26/19. All of her labs remain stable. Her Potassium level was 3.7. Started her on Potassium Chloride 20 meq once daily. · Started Antivert 12.5 mg to be taken 3 times daily. · Bilateral knee-high compression hose are to be placed starting on 08/27/19.   · A U/A C&S performed on 08/27/19 was noted to be W.N.L.    Missed Therapy Time:  · None    Christina Jackson MD

## 2019-08-28 NOTE — PROGRESS NOTES
[facility]  Recreational Therapy  Daily Note  [address]    Time Spent with Patient: 30 minutes    Date:  8/28/2019       Patient Name: Shoaib Hernandez      MRN: [id number]      YOB: 1940 ([de-identified] y.o.)       Gender: female  Diagnosis: S/p left hip fracture  Referring Practitioner: Ordering: JADEN Valentine CNP Attending: Tati Atkins MD    RESTRICTIONS/PRECAUTIONS:  Restrictions/Precautions: Weight Bearing, General Precautions, Fall Risk  Vision: Impaired  Hearing: Within functional limits    PAIN: 0-no c/o pain     SUBJECTIVE:  I will try it     OBJECTIVE:  Pt came to the dining room via w/c after her O.T.  And played [employer] with peer and his wife-affect bright at times, good sense of humor-able to shuffle and deal cards with no cues needed-able to follow and process game with no cues-nurse tech assisted pt back to her room to rest          Patient Education  New Education Provided: Importance of Leisure,     Electronically signed by: ROXANN Arango  Date: 8/28/2019

## 2019-08-29 LAB
GLUCOSE BLD-MCNC: 90 MG/DL (ref 70–108)
GLUCOSE BLD-MCNC: 98 MG/DL (ref 70–108)

## 2019-08-29 PROCEDURE — 97530 THERAPEUTIC ACTIVITIES: CPT

## 2019-08-29 PROCEDURE — 97110 THERAPEUTIC EXERCISES: CPT

## 2019-08-29 PROCEDURE — 2709999900 HC NON-CHARGEABLE SUPPLY

## 2019-08-29 PROCEDURE — 97116 GAIT TRAINING THERAPY: CPT

## 2019-08-29 PROCEDURE — 99232 SBSQ HOSP IP/OBS MODERATE 35: CPT | Performed by: PHYSICAL MEDICINE & REHABILITATION

## 2019-08-29 PROCEDURE — 2580000003 HC RX 258: Performed by: NURSE PRACTITIONER

## 2019-08-29 PROCEDURE — 82948 REAGENT STRIP/BLOOD GLUCOSE: CPT

## 2019-08-29 PROCEDURE — 1180000000 HC REHAB R&B

## 2019-08-29 PROCEDURE — 97535 SELF CARE MNGMENT TRAINING: CPT

## 2019-08-29 PROCEDURE — 6370000000 HC RX 637 (ALT 250 FOR IP): Performed by: NURSE PRACTITIONER

## 2019-08-29 PROCEDURE — 6370000000 HC RX 637 (ALT 250 FOR IP): Performed by: PHYSICAL MEDICINE & REHABILITATION

## 2019-08-29 RX ORDER — POLYETHYLENE GLYCOL 3350 17 G/17G
17 POWDER, FOR SOLUTION ORAL 2 TIMES DAILY
Status: DISCONTINUED | OUTPATIENT
Start: 2019-08-29 | End: 2019-08-29

## 2019-08-29 RX ORDER — POLYETHYLENE GLYCOL 3350 17 G/17G
17 POWDER, FOR SOLUTION ORAL DAILY
Status: DISCONTINUED | OUTPATIENT
Start: 2019-08-29 | End: 2019-09-10 | Stop reason: HOSPADM

## 2019-08-29 RX ORDER — DOCUSATE SODIUM 100 MG/1
100 CAPSULE, LIQUID FILLED ORAL 2 TIMES DAILY
Status: DISCONTINUED | OUTPATIENT
Start: 2019-08-29 | End: 2019-09-10 | Stop reason: HOSPADM

## 2019-08-29 RX ORDER — CETIRIZINE HYDROCHLORIDE 10 MG/1
10 TABLET ORAL NIGHTLY
Status: DISCONTINUED | OUTPATIENT
Start: 2019-08-29 | End: 2019-09-10 | Stop reason: HOSPADM

## 2019-08-29 RX ORDER — METOPROLOL SUCCINATE 25 MG/1
25 TABLET, EXTENDED RELEASE ORAL DAILY
Status: DISCONTINUED | OUTPATIENT
Start: 2019-08-30 | End: 2019-09-08

## 2019-08-29 RX ADMIN — CETIRIZINE HYDROCHLORIDE 10 MG: 10 TABLET, FILM COATED ORAL at 21:16

## 2019-08-29 RX ADMIN — ACETAMINOPHEN 650 MG: 325 TABLET ORAL at 06:10

## 2019-08-29 RX ADMIN — POTASSIUM CHLORIDE 20 MEQ: 20 TABLET, EXTENDED RELEASE ORAL at 09:51

## 2019-08-29 RX ADMIN — CALCIUM CARBONATE-VITAMIN D TAB 500 MG-200 UNIT 1 TABLET: 500-200 TAB at 21:16

## 2019-08-29 RX ADMIN — CALCIUM CARBONATE-VITAMIN D TAB 500 MG-200 UNIT 1 TABLET: 500-200 TAB at 09:50

## 2019-08-29 RX ADMIN — DICLOFENAC 4 G: 10 GEL TOPICAL at 21:14

## 2019-08-29 RX ADMIN — POLYETHYLENE GLYCOL 3350 17 G: 17 POWDER, FOR SOLUTION ORAL at 21:15

## 2019-08-29 RX ADMIN — DICLOFENAC 4 G: 10 GEL TOPICAL at 17:33

## 2019-08-29 RX ADMIN — ACETAMINOPHEN 650 MG: 325 TABLET ORAL at 21:15

## 2019-08-29 RX ADMIN — SACUBITRIL AND VALSARTAN 1 TABLET: 49; 51 TABLET, FILM COATED ORAL at 09:54

## 2019-08-29 RX ADMIN — DOCUSATE SODIUM 100 MG: 100 CAPSULE, LIQUID FILLED ORAL at 09:50

## 2019-08-29 RX ADMIN — DICLOFENAC 4 G: 10 GEL TOPICAL at 09:52

## 2019-08-29 RX ADMIN — MECLIZINE HYDROCHLORIDE 12.5 MG: 12.5 TABLET, FILM COATED ORAL at 11:46

## 2019-08-29 RX ADMIN — MECLIZINE HYDROCHLORIDE 12.5 MG: 12.5 TABLET, FILM COATED ORAL at 06:10

## 2019-08-29 RX ADMIN — DOCUSATE SODIUM 100 MG: 100 CAPSULE, LIQUID FILLED ORAL at 21:15

## 2019-08-29 RX ADMIN — ISOSORBIDE MONONITRATE 30 MG: 30 TABLET ORAL at 09:50

## 2019-08-29 RX ADMIN — SIMVASTATIN 40 MG: 40 TABLET, FILM COATED ORAL at 21:16

## 2019-08-29 RX ADMIN — TRAMADOL HYDROCHLORIDE 50 MG: 50 TABLET, FILM COATED ORAL at 21:15

## 2019-08-29 RX ADMIN — RIVAROXABAN 10 MG: 10 TABLET, FILM COATED ORAL at 17:32

## 2019-08-29 RX ADMIN — SODIUM CHLORIDE, PRESERVATIVE FREE 10 ML: 5 INJECTION INTRAVENOUS at 21:16

## 2019-08-29 RX ADMIN — SODIUM CHLORIDE, PRESERVATIVE FREE 10 ML: 5 INJECTION INTRAVENOUS at 11:50

## 2019-08-29 RX ADMIN — SACUBITRIL AND VALSARTAN 1 TABLET: 49; 51 TABLET, FILM COATED ORAL at 21:17

## 2019-08-29 RX ADMIN — FUROSEMIDE 20 MG: 20 TABLET ORAL at 09:50

## 2019-08-29 RX ADMIN — TRAMADOL HYDROCHLORIDE 50 MG: 50 TABLET, FILM COATED ORAL at 13:44

## 2019-08-29 RX ADMIN — TRAMADOL HYDROCHLORIDE 50 MG: 50 TABLET, FILM COATED ORAL at 06:10

## 2019-08-29 RX ADMIN — ASPIRIN 81 MG: 81 TABLET ORAL at 09:50

## 2019-08-29 ASSESSMENT — PAIN DESCRIPTION - DESCRIPTORS
DESCRIPTORS: ACHING
DESCRIPTORS: ACHING

## 2019-08-29 ASSESSMENT — PAIN SCALES - GENERAL
PAINLEVEL_OUTOF10: 3
PAINLEVEL_OUTOF10: 4
PAINLEVEL_OUTOF10: 8
PAINLEVEL_OUTOF10: 4

## 2019-08-29 ASSESSMENT — PAIN DESCRIPTION - ORIENTATION
ORIENTATION: LEFT
ORIENTATION: LEFT

## 2019-08-29 ASSESSMENT — PAIN DESCRIPTION - PAIN TYPE
TYPE: ACUTE PAIN
TYPE: ACUTE PAIN

## 2019-08-29 ASSESSMENT — PAIN DESCRIPTION - FREQUENCY
FREQUENCY: INTERMITTENT
FREQUENCY: CONTINUOUS

## 2019-08-29 ASSESSMENT — PAIN DESCRIPTION - LOCATION
LOCATION: HIP
LOCATION: HIP

## 2019-08-29 ASSESSMENT — PAIN DESCRIPTION - ONSET: ONSET: ON-GOING

## 2019-08-29 ASSESSMENT — PAIN DESCRIPTION - DIRECTION: RADIATING_TOWARDS: LEFT LEG

## 2019-08-29 ASSESSMENT — PAIN - FUNCTIONAL ASSESSMENT: PAIN_FUNCTIONAL_ASSESSMENT: PREVENTS OR INTERFERES SOME ACTIVE ACTIVITIES AND ADLS

## 2019-08-29 ASSESSMENT — PAIN DESCRIPTION - PROGRESSION: CLINICAL_PROGRESSION: NOT CHANGED

## 2019-08-29 ASSESSMENT — PAIN SCALES - WONG BAKER: WONGBAKER_NUMERICALRESPONSE: 2

## 2019-08-29 NOTE — PROGRESS NOTES
don t- shirt)   Dressing-Lower: 3 - Requires assist with 2-3 parts of dressing(mod A to problem solve reacher A for teds and shoes)         Shower Transfer: 2 - Maximal assistance, pt. expends 25%-49% effort(min A to lift and lower )                       COMMUNICATION  Comprehension: 6 - Complex ideas 90% or device (hearing aid or glasses- if patient is primarily a visual learner)  GOAL: Comprehension: 6  Expression: 6 - Device used to express complex ideas/needs  GOAL: Expression: 6   SOCIAL COGNITION  Social Interaction: 6 - Patient requires medication for mood and/or effect  GOAL: Social Interaction: 6  Problem Solving: 3 - Patient solves simple/routine tasks 50%-74%  GOAL: Problem Solvin  Memory: 4 - Patient remembers 75-90%+ of the time  GOAL: Memory: 5       Review of Systems:  CONSTITUTIONAL:  negative  RESPIRATORY:  negative  CARDIOVASCULAR:  negative  GASTROINTESTINAL:  positive for constipation  GENITOURINARY:  positive for urinary incontinence  MUSCULOSKELETAL:  positive for  myalgias, arthralgias, pain, joint swelling, stiff joints, decreased range of motion, muscle weakness and bone pain  NEUROLOGICAL:  positive for coordination problems, gait problems, weakness and pain  BEHAVIOR/PSYCH:  negative  10 point system review otherwise negative    Objective:  BP (!) 107/55   Pulse 76   Temp 97.6 °F (36.4 °C) (Oral)   Resp 16   Ht 5' 4\" (1.626 m)   Wt 212 lb 8 oz (96.4 kg) Comment: reweighed twice  SpO2 97%   BMI 36.48 kg/m²   She was noted to be awake, alert, and in no acute distress. Orientation:   person, place, time  Mood: within normal limits  Affect: calm  General appearance: Patient is well nourished, well developed, well groomed and in no acute distress, obese, appearing stated age, normal affect. Memory:   normal  Attention/Concentration: normal  Language:  normal  HEART:  S1 and S2 with a regular rate and rhythm without a murmur,  gallop, or rub.   LUNGS:  Clear to auscultation hyperlipidemia. 20.  History of coronary artery disease with a cardiac stent being  placed in the past.  21.  Status post right breast lumpectomy performed in 01/2004 to treat  her right breast cancer. 22.  Status post left shoulder replacement. 23.  Status post left humeral fracture, surgically corrected. 24.  Status post cardiac pacemaker placement which was performed on  01/08/2018. 25.  Status post gallbladder removal.  26.  Status post hysterectomy performed in the 1970's. 27.  Remote history of tobacco abuse. Plan:  · She is to continue with her current inpatient rehab. program.  · A Rehab. team conference was held on 08/27/19. Her discharge goal is for her to be discharged on 09/10/19. Her discharge disposition will be clarified in the near future. · Her Colace 100 mg has been decreased to once daily per patient request.  · A BMP was drawn on 08/26/19. All of her labs remain stable. Her Potassium level was 3.7. Started her on Potassium Chloride 20 meq once daily. · Started Antivert 12.5 mg to be taken 3 times daily. · Bilateral knee-high compression hose are to be placed starting on 08/27/19. · A U/A C&S performed on 08/27/19 was noted to be W.N.L.  · A BMP, Magnesium level, and a CBC with diff. will be drawn on 08/30/19. · Started the patient on zyrtec 10 mg to be taken nightly. · Decreased her Toprol XL to 25 mg once daily.     Missed Therapy Time:  · None    Oracio Silva MD

## 2019-08-29 NOTE — PLAN OF CARE
Problem: Mobility - Impaired:  Goal: Mobility will improve to maximum level  Description  Mobility will improve to maximum level  8/29/2019 0330 by Devyn Jacome RN  Outcome: Ongoing  Note:   Patient is only able to ambulate a short distance using small steps with moaning and pain.  Encouraged to sit on bedside commode at bedtime and in the AM.

## 2019-08-29 NOTE — PROGRESS NOTES
Restriction  Other position/activity restrictions: h/o vertigo    SUBJECTIVE: Pt. Laying in bed upon arrival. Pt. Pleasant and agreeable to therapy session. Pt. Requests to brush teeth at beginning of session agreeable to do so while sitting EOB to challenge balance and core stability. Pt. Anxious at times throughout session and limited by pain. Pt. Agreeable to sit up in BS chair. Assistance required to snow depends, angie hose, and shoes. Breakfast arrived during session so session was split into two different times. Pt. Incontinent of urine during second part of session requiring assistance to change pad. PAIN: 5/10: L hip    OBJECTIVE:  Bed Mobility:  Supine to Sit: Minimal Assistance, To guide LEs  Scooting: Contact Guard Assistance    Transfers:  Sit to Stand: Moderate Assistance, Maximum Assistance  Stand to Sit:Contact Guard Assistance    Ambulation:  Contact Guard Assistance, with verbal cues , with increased time for completion  Distance: 5' x 1, 8' x 1 with two 180 degree turns. Surface: Level Tile  Device:Rolling Walker  Gait Deviations: Forward Flexed Posture, Slow Lynn, Decreased Step Length Bilaterally, Decreased Weight Shift Left, Decreased Gait Speed and Decreased Heel Strike Bilaterally    Balance:  Dynamic Sitting Balance: Supervision  Dynamic Standing Balance: Contact Guard Assistance   Pt. Sat EOB with single to no UE support whle brushing teeth. Pt. Steady with no LOB throughout. Pt. Stood at walker while PTA assisted with pulling up depends. Pt. Able to maintain balance with no UE support to pull depends up with B UEs. Pt. Steady with no LOB but fatigues easily. Pt. Stood at walker a second time while PTA assisted with changing pad after urinary incontinence. Pt. Limited by fatigue but able to assist with pulling up depends. Exercise:  Patient was guided in 1 set(s) 10 reps of exercise to both lower extremities.   Ankle pumps, Glut sets, Quad sets, Heelslides, Long arc quads, Hip Patient will be able to perform functional transfers at [facility] to sit in recliner at home  Long term goal 3: Patient to be able to ambulate 50 feet with RW and CGA to walk around home safely  Long term goal 4: Patient will be able to negotiate 2 steps at [facility] to enter home   Long term goal 5: Patient will demonstrate Good dynamic standing balance in order to decrease risk of future falls. Following session, patient left in safe position with all fall risk precautions in place.

## 2019-08-29 NOTE — PROGRESS NOTES
Family Medicine Progress Notes/Coverage    Today's Date: 8/29/19  [id number]  Medical Record # [id number]  Account # [de-identified]      Ms. Ijeoma Kilgore admitted on 8/23/2019        Subjective / Interval History :     doing well, No SOB, No chest pain , No fatigue.  No nausea nor abdominal pain  Ambulated 6 ft yesterday    Reviewed notes, consults, laboratory and radiology results,    Objective:       Physical Exam:  Patient Vitals for the past 24 hrs:   BP Temp Temp src Pulse Resp SpO2   08/28/19 2353 (!) 117/56 97.6 °F (36.4 °C) Oral 79 16 91 %   08/28/19 1500 (!) 116/54 -- -- 86 -- --   08/28/19 0900 (!) 95/50 98.6 °F (37 °C) Oral 78 18 92 %       General Appearance:  Alert, cooperative, no distress, appears stated age   HEENT:  Neck:      Chest/Lungs:  Heart: CTA  RRR   Abdomen:  Back: soft   Extremities:  Neurological Exam: Wound dry and no redness  WNL       Assessment:     Admitting Diagnosis:    S/p left hip fracture [Z87.81]    Active Hospital Problems    Diagnosis Date Noted    S/p left hip fracture [Z87.81] 08/23/2019     Priority: High    Benign positional vertigo [H81.10] 08/19/2019     Priority: High    Osteoporosis of vertebra [M81.0]      Priority: Medium    Class 2 severe obesity due to excess calories with serious comorbidity in adult ([facility] [E66.01] 02/03/2018     Priority: Medium    Nonischemic cardiomyopathy ([facility] [I42.8]      Priority: Medium    COPD (chronic obstructive pulmonary disease) ([facility] [J44.9] 08/05/2017     Priority: Medium    Obstructive sleep apnea on CPAP [G47.33, Z99.89] 11/05/2015     Priority: Medium    CAD (coronary artery disease) s/p PCI and stent Multilink 3 x 18  mm mid LAD- in 07/2009 at [facility] [I25.10] 07/16/2012     Priority: Medium       Plan:     Discussed plans with the nursing staff  Cont rehab    Medications, Laboratories and

## 2019-08-29 NOTE — PROGRESS NOTES
[address]  Occupational Therapy  Daily Note  Time:   Time In: 1000  Time Out: 1130  Timed Code Treatment Minutes: 90 Minutes  Minutes: 90          Date: 2019  Patient Name: Shoaib Hernandez,   Gender: female      Room: [address] [place], [place] [address]-  MRN: [id number]  : 1940  ([de-identified] y.o.)  Referring Practitioner: Ordering: JADEN Valentine CNP Attending: Tati Atkins MD  Diagnosis: S/p left hip fracture  Additional Pertinent Hx: Per ED note, pt is a [de-identified] y.o. female who presents to the Emergency Department for the evaluation of a fall. Patient reports that she bent forward, felt dizzy and fell. She is not certain if she hit her head during the fall, but she did not loose consciousness. Patient has a history of vertigo, DVT and has a pacemaker. She takes asprin regularly as a blood thinner and takes tramodol for pain management. She also does crystal therapy for her vertigo. Patient reports that most pain is located in her left leg. She reports a aching/pounding pain that has a 8/10 in severity. S/P Open treatment of an intertrochanteric fracture with a cephamedullary nail 19, to IP Rehab 19    Restrictions/Precautions:  Restrictions/Precautions: Weight Bearing, General Precautions, Fall Risk  Left Lower Extremity Weight Bearing: Weight Bearing As Tolerated  Position Activity Restriction  Other position/activity restrictions: h/o vertigo    SUBJECTIVE: Pleasant, cooperative. Looking forward to shower. Mod emotional mid session when frustrated by Maria Elena Verduzco and LB dressing, pt became tearful and began to talk about family dynamics and her recent loss of her son and her being worried about her daughter in law. DURAN provided active listening and support, pt able to persist through after this rest break. PAIN: 10/10: L hip    COGNITION: WFL    ADL:   See FIM Information Below.           Bathin - Able to bathe all 10 areas with setup/sup/cues

## 2019-08-30 LAB
ANION GAP SERPL CALCULATED.3IONS-SCNC: 11 MEQ/L (ref 8–16)
BASOPHILS # BLD: 0.4 %
BASOPHILS ABSOLUTE: 0 THOU/MM3 (ref 0–0.1)
BUN BLDV-MCNC: 17 MG/DL (ref 7–22)
CALCIUM SERPL-MCNC: 10 MG/DL (ref 8.5–10.5)
CHLORIDE BLD-SCNC: 102 MEQ/L (ref 98–111)
CO2: 30 MEQ/L (ref 23–33)
CREAT SERPL-MCNC: 0.7 MG/DL (ref 0.4–1.2)
EOSINOPHIL # BLD: 3.1 %
EOSINOPHILS ABSOLUTE: 0.2 THOU/MM3 (ref 0–0.4)
ERYTHROCYTE [DISTWIDTH] IN BLOOD BY AUTOMATED COUNT: 13.6 % (ref 11.5–14.5)
ERYTHROCYTE [DISTWIDTH] IN BLOOD BY AUTOMATED COUNT: 48.2 FL (ref 35–45)
FOLATE: 11.4 NG/ML (ref 4.8–24.2)
GFR SERPL CREATININE-BSD FRML MDRD: 80 ML/MIN/1.73M2
GLUCOSE BLD-MCNC: 90 MG/DL (ref 70–108)
HCT VFR BLD CALC: 33.8 % (ref 37–47)
HEMOGLOBIN: 10.8 GM/DL (ref 12–16)
IMMATURE GRANS (ABS): 0.25 THOU/MM3 (ref 0–0.07)
IMMATURE GRANULOCYTES: 4 %
LYMPHOCYTES # BLD: 34.4 %
LYMPHOCYTES ABSOLUTE: 2.4 THOU/MM3 (ref 1–4.8)
MAGNESIUM: 1.4 MG/DL (ref 1.6–2.4)
MCH RBC QN AUTO: 31.4 PG (ref 26–33)
MCHC RBC AUTO-ENTMCNC: 32 GM/DL (ref 32.2–35.5)
MCV RBC AUTO: 98.3 FL (ref 81–99)
MONOCYTES # BLD: 6.5 %
MONOCYTES ABSOLUTE: 0.5 THOU/MM3 (ref 0.4–1.3)
NUCLEATED RED BLOOD CELLS: 0 /100 WBC
PLATELET # BLD: 307 THOU/MM3 (ref 130–400)
PMV BLD AUTO: 9.5 FL (ref 9.4–12.4)
POTASSIUM SERPL-SCNC: 4.1 MEQ/L (ref 3.5–5.2)
RBC # BLD: 3.44 MILL/MM3 (ref 4.2–5.4)
SEG NEUTROPHILS: 52.1 %
SEGMENTED NEUTROPHILS ABSOLUTE COUNT: 3.7 THOU/MM3 (ref 1.8–7.7)
SODIUM BLD-SCNC: 143 MEQ/L (ref 135–145)
TSH SERPL DL<=0.05 MIU/L-ACNC: 2.05 UIU/ML (ref 0.4–4.2)
VITAMIN B-12: 482 PG/ML (ref 211–911)
WBC # BLD: 7.1 THOU/MM3 (ref 4.8–10.8)

## 2019-08-30 PROCEDURE — 97110 THERAPEUTIC EXERCISES: CPT

## 2019-08-30 PROCEDURE — 84443 ASSAY THYROID STIM HORMONE: CPT

## 2019-08-30 PROCEDURE — 36415 COLL VENOUS BLD VENIPUNCTURE: CPT

## 2019-08-30 PROCEDURE — 80048 BASIC METABOLIC PNL TOTAL CA: CPT

## 2019-08-30 PROCEDURE — 2580000003 HC RX 258: Performed by: NURSE PRACTITIONER

## 2019-08-30 PROCEDURE — 6370000000 HC RX 637 (ALT 250 FOR IP): Performed by: EMERGENCY MEDICINE

## 2019-08-30 PROCEDURE — 83735 ASSAY OF MAGNESIUM: CPT

## 2019-08-30 PROCEDURE — 82746 ASSAY OF FOLIC ACID SERUM: CPT

## 2019-08-30 PROCEDURE — 2709999900 HC NON-CHARGEABLE SUPPLY

## 2019-08-30 PROCEDURE — 99232 SBSQ HOSP IP/OBS MODERATE 35: CPT | Performed by: PHYSICAL MEDICINE & REHABILITATION

## 2019-08-30 PROCEDURE — 6370000000 HC RX 637 (ALT 250 FOR IP): Performed by: PHYSICAL MEDICINE & REHABILITATION

## 2019-08-30 PROCEDURE — 6370000000 HC RX 637 (ALT 250 FOR IP): Performed by: NURSE PRACTITIONER

## 2019-08-30 PROCEDURE — 97116 GAIT TRAINING THERAPY: CPT

## 2019-08-30 PROCEDURE — 97530 THERAPEUTIC ACTIVITIES: CPT

## 2019-08-30 PROCEDURE — 82607 VITAMIN B-12: CPT

## 2019-08-30 PROCEDURE — 1180000000 HC REHAB R&B

## 2019-08-30 PROCEDURE — 85025 COMPLETE CBC W/AUTO DIFF WBC: CPT

## 2019-08-30 PROCEDURE — 6360000002 HC RX W HCPCS: Performed by: EMERGENCY MEDICINE

## 2019-08-30 PROCEDURE — 97535 SELF CARE MNGMENT TRAINING: CPT

## 2019-08-30 PROCEDURE — 97542 WHEELCHAIR MNGMENT TRAINING: CPT

## 2019-08-30 RX ORDER — MAGNESIUM SULFATE IN WATER 40 MG/ML
2 INJECTION, SOLUTION INTRAVENOUS ONCE
Status: DISCONTINUED | OUTPATIENT
Start: 2019-08-30 | End: 2019-09-08

## 2019-08-30 RX ADMIN — FUROSEMIDE 20 MG: 20 TABLET ORAL at 08:18

## 2019-08-30 RX ADMIN — ACETAMINOPHEN 650 MG: 325 TABLET ORAL at 10:24

## 2019-08-30 RX ADMIN — DICLOFENAC 4 G: 10 GEL TOPICAL at 08:20

## 2019-08-30 RX ADMIN — ASPIRIN 81 MG: 81 TABLET ORAL at 08:18

## 2019-08-30 RX ADMIN — RIVAROXABAN 10 MG: 10 TABLET, FILM COATED ORAL at 15:57

## 2019-08-30 RX ADMIN — SACUBITRIL AND VALSARTAN 1 TABLET: 49; 51 TABLET, FILM COATED ORAL at 08:18

## 2019-08-30 RX ADMIN — MAGNESIUM GLUCONATE 500 MG ORAL TABLET 400 MG: 500 TABLET ORAL at 15:44

## 2019-08-30 RX ADMIN — CALCIUM CARBONATE-VITAMIN D TAB 500 MG-200 UNIT 1 TABLET: 500-200 TAB at 08:18

## 2019-08-30 RX ADMIN — DOCUSATE SODIUM 100 MG: 100 CAPSULE, LIQUID FILLED ORAL at 08:19

## 2019-08-30 RX ADMIN — ACETAMINOPHEN 650 MG: 325 TABLET ORAL at 06:20

## 2019-08-30 RX ADMIN — POTASSIUM CHLORIDE 20 MEQ: 20 TABLET, EXTENDED RELEASE ORAL at 08:19

## 2019-08-30 RX ADMIN — POLYETHYLENE GLYCOL 3350 17 G: 17 POWDER, FOR SOLUTION ORAL at 08:20

## 2019-08-30 RX ADMIN — SIMVASTATIN 40 MG: 40 TABLET, FILM COATED ORAL at 20:35

## 2019-08-30 RX ADMIN — CALCIUM CARBONATE-VITAMIN D TAB 500 MG-200 UNIT 1 TABLET: 500-200 TAB at 20:35

## 2019-08-30 RX ADMIN — CETIRIZINE HYDROCHLORIDE 10 MG: 10 TABLET, FILM COATED ORAL at 20:35

## 2019-08-30 RX ADMIN — DICLOFENAC 4 G: 10 GEL TOPICAL at 15:45

## 2019-08-30 RX ADMIN — ACETAMINOPHEN 650 MG: 325 TABLET ORAL at 20:35

## 2019-08-30 RX ADMIN — DOCUSATE SODIUM 100 MG: 100 CAPSULE, LIQUID FILLED ORAL at 20:35

## 2019-08-30 RX ADMIN — METOPROLOL SUCCINATE 25 MG: 25 TABLET, EXTENDED RELEASE ORAL at 08:21

## 2019-08-30 RX ADMIN — DICLOFENAC 4 G: 10 GEL TOPICAL at 20:35

## 2019-08-30 RX ADMIN — ISOSORBIDE MONONITRATE 30 MG: 30 TABLET ORAL at 08:18

## 2019-08-30 RX ADMIN — SACUBITRIL AND VALSARTAN 1 TABLET: 49; 51 TABLET, FILM COATED ORAL at 20:35

## 2019-08-30 RX ADMIN — MECLIZINE HYDROCHLORIDE 12.5 MG: 12.5 TABLET, FILM COATED ORAL at 06:20

## 2019-08-30 RX ADMIN — SODIUM CHLORIDE, PRESERVATIVE FREE 10 ML: 5 INJECTION INTRAVENOUS at 08:20

## 2019-08-30 ASSESSMENT — PAIN DESCRIPTION - PAIN TYPE
TYPE: ACUTE PAIN

## 2019-08-30 ASSESSMENT — PAIN - FUNCTIONAL ASSESSMENT
PAIN_FUNCTIONAL_ASSESSMENT: PREVENTS OR INTERFERES WITH MANY ACTIVE NOT PASSIVE ACTIVITIES
PAIN_FUNCTIONAL_ASSESSMENT: PREVENTS OR INTERFERES SOME ACTIVE ACTIVITIES AND ADLS

## 2019-08-30 ASSESSMENT — PAIN DESCRIPTION - DESCRIPTORS
DESCRIPTORS: ACHING

## 2019-08-30 ASSESSMENT — PAIN DESCRIPTION - ORIENTATION
ORIENTATION: LEFT

## 2019-08-30 ASSESSMENT — PAIN SCALES - GENERAL
PAINLEVEL_OUTOF10: 3
PAINLEVEL_OUTOF10: 8
PAINLEVEL_OUTOF10: 3
PAINLEVEL_OUTOF10: 4
PAINLEVEL_OUTOF10: 3
PAINLEVEL_OUTOF10: 2

## 2019-08-30 ASSESSMENT — PAIN DESCRIPTION - FREQUENCY
FREQUENCY: INTERMITTENT

## 2019-08-30 ASSESSMENT — PAIN DESCRIPTION - LOCATION
LOCATION: HIP

## 2019-08-30 NOTE — PROGRESS NOTES
Patients daughter called and was very concerned about medication Antivert possibly causing her mom to be very confused today. Patient had called daughter and was \"saying they was needing to clean the house\"etc.. Balta Jasmine Or could it be the Tramadol? Will continue to monitor.

## 2019-08-30 NOTE — FLOWSHEET NOTE
Pt was with her daughter. She fell and broke her bone and had surgery. She was dealing also with sleep apnea. She felt that her recovery will take long. She was anointed. 08/30/19 8521   Encounter Summary   Services provided to: Patient and family together   Referral/Consult From: [address] [place] [address] Road Visiting Yes  (8/30)   Complexity of Encounter Moderate   Length of Encounter 15 minutes   Spiritual/Cheondoism   Type Spiritual support   Assessment Approachable;Calm; Hopeful   Intervention Prayer;Nurtured hope; Active listening; Anointing;Empowerment;Sustaining presence/ Ministry of presence   Outcome Connection/belonging;Expressed gratitude;Encouraged; Hopeful;Receptive   Sacraments   Sacrament of Sick-Anointing Anointed

## 2019-08-30 NOTE — PROGRESS NOTES
Daughter, Rodrick Bang, notified of Antivert and tramadol discontinuation. She voiced her concerns with the antivert being the cause of confusion and not the tramadol. Will let Dr Whitley Thurman know.

## 2019-08-30 NOTE — PROGRESS NOTES
[address]  Occupational Therapy  Daily Note  Time:   Time In: 5854  Time Out: 7833  Timed Code Treatment Minutes: 44 Minutes  Minutes: 39    Date: 2019  Patient Name: Carol Hathaway,   Gender: female      Room: [address] [place], [place] [address]-  MRN: [id number]  : 1940  ([de-identified] y.o.)  Referring Practitioner: Ordering: JADEN Roblero CNP Attending: Samantha Gallego MD  Diagnosis: S/p left hip fracture  Additional Pertinent Hx: Per ED note, pt is a [de-identified] y.o. female who presents to the Emergency Department for the evaluation of a fall. Patient reports that she bent forward, felt dizzy and fell. She is not certain if she hit her head during the fall, but she did not loose consciousness. Patient has a history of vertigo, DVT and has a pacemaker. She takes asprin regularly as a blood thinner and takes tramodol for pain management. She also does crystal therapy for her vertigo. Patient reports that most pain is located in her left leg. She reports a aching/pounding pain that has a 8/10 in severity. S/P Open treatment of an intertrochanteric fracture with a cephamedullary nail 19, to IP Rehab 19    Restrictions/Precautions:  Restrictions/Precautions: Weight Bearing, General Precautions, Fall Risk  Left Lower Extremity Weight Bearing: Weight Bearing As Tolerated  Position Activity Restriction  Other position/activity restrictions: h/o vertigo    SUBJECTIVE: Pleasant and cooperative     PAIN: 7/10: L hip    COGNITION: Some confusion noted, but clearer than this AM.     ADL:   See FIM Information Below.                  Toileting: 3 - Able to perform 2 tasks(A to manage up on L side only, VCs for sequencing as pt tempted to ambulate away with pants at ankles )   Toilet Transfer: 3 - Requires 25-49% assist getting off toilet(min A to lift, CGA to lower )     Increased time required during toileting     BALANCE:  Sitting Balance:  Modified endurance for home mobility to the BR. Short term goal 2: Pt to demo standing tolerance greater than 3 mins with ocassional 1 UE release at Mod A x1 in prep for clothing management and toilet hygiene  Short term goal 3: Pt to complete various functional t/fs at min A x2 with min cues for safety for inc indep with toileting  Short term goal 4: Pt to tolerate 12 reps of light BUE strengthening for increasing strength required for functional transfers  Long term goals  Time Frame for Long term goals : 3-4 weeks  Long term goal 1: Pt to demonstrate basic homemaking tasks with no greater than Sup in prep for return to IADL tasks within home environment  Long term goal 2: Pt to demonstrate LB ADLs using any LHAE necessary and no greater than min cues for increased indep with self care   Long term goal 3: Pt to demonstrate standing tolerance greater than 10 mins at SUP in prep for SMP and basic homemaking skills    Following session, patient left in safe position with all fall risk precautions in place.

## 2019-08-30 NOTE — PROGRESS NOTES
standing)   Dressing-Upper: 5 - Requires setup/supervision/cues and/or requires assist with presthesis/brace only(set-up )   Dressing-Lower: 4 - Requires assist with buttons/zippers/shoelaces and/or assist with shoes only(used reacher to thread, x1 VC for surgical donning tech, CGA balance. A for TEDs and shoes)   Toileting: 3 - Able to perform 2 tasks(A to manage up on L side only )   Toilet Transfer: 3 - Requires 25-49% assist getting off toilet(min A to lift, CGA to lower )   Shower Transfer: 2 - Maximal assistance, pt. expends 25%-49% effort(min A to lift and lower )                                        COMMUNICATION  Comprehension: 6 - Complex ideas 90% or device (hearing aid or glasses- if patient is primarily a visual learner)  GOAL: Comprehension: 6  Expression: 6 - Device used to express complex ideas/needs  GOAL: Expression: 6   SOCIAL COGNITION  Social Interaction: 6 - Patient requires medication for mood and/or effect  GOAL: Social Interaction: 6  Problem Solving: 3 - Patient solves simple/routine tasks 50%-74%  GOAL: Problem Solvin  Memory: 4 - Patient remembers 75-90%+ of the time  GOAL: Memory: 5       Review of Systems:  CONSTITUTIONAL:  negative  RESPIRATORY:  negative  CARDIOVASCULAR:  negative  GASTROINTESTINAL:  positive for constipation  GENITOURINARY:  positive for urinary incontinence  MUSCULOSKELETAL:  positive for  myalgias, arthralgias, pain, joint swelling, stiff joints, decreased range of motion, muscle weakness and bone pain  NEUROLOGICAL:  positive for coordination problems, gait problems, weakness and pain  BEHAVIOR/PSYCH:  negative  10 point system review otherwise negative    Objective:  BP (!) 116/59   Pulse 80   Temp 98.2 °F (36.8 °C) (Oral)   Resp 18   Ht 5' 4\" (1.626 m)   Wt 212 lb 8 oz (96.4 kg) Comment: reweighed twice  SpO2 92%   BMI 36.48 kg/m²   She was noted to be awake, alert, and in no acute distress.   Orientation:   person, place, time  Mood: within normal

## 2019-08-30 NOTE — PROGRESS NOTES
1: Pt will ambulate short distances with RW and CGA x 1 person to increase endurance for home mobility to the BR. Short term goal 2: Pt to demo standing tolerance greater than 3 mins with ocassional 1 UE release at Mod A x1 in prep for clothing management and toilet hygiene  Short term goal 3: Pt to complete various functional t/fs at min A x2 with min cues for safety for inc indep with toileting  Short term goal 4: Pt to tolerate 12 reps of light BUE strengthening for increasing strength required for functional transfers  Long term goals  Time Frame for Long term goals : 3-4 weeks  Long term goal 1: Pt to demonstrate basic homemaking tasks with no greater than Sup in prep for return to IADL tasks within home environment  Long term goal 2: Pt to demonstrate LB ADLs using any LHAE necessary and no greater than min cues for increased indep with self care   Long term goal 3: Pt to demonstrate standing tolerance greater than 10 mins at SUP in prep for SMP and basic homemaking skills    Following session, patient left in safe position with all fall risk precautions in place.

## 2019-08-31 LAB
BACTERIA: ABNORMAL /HPF
BILIRUBIN URINE: NEGATIVE
BLOOD, URINE: ABNORMAL
CASTS 2: ABNORMAL /LPF
CASTS UA: ABNORMAL /LPF
CHARACTER, URINE: ABNORMAL
COLOR: YELLOW
CRYSTALS, UA: ABNORMAL
EPITHELIAL CELLS, UA: ABNORMAL /HPF
GLUCOSE URINE: NEGATIVE MG/DL
KETONES, URINE: NEGATIVE
LEUKOCYTE ESTERASE, URINE: ABNORMAL
MAGNESIUM: 1.6 MG/DL (ref 1.6–2.4)
MISCELLANEOUS 2: ABNORMAL
NITRITE, URINE: NEGATIVE
PH UA: 8 (ref 5–9)
PROTEIN UA: NEGATIVE
RBC URINE: ABNORMAL /HPF
RENAL EPITHELIAL, UA: ABNORMAL
SPECIFIC GRAVITY, URINE: 1.02 (ref 1–1.03)
UROBILINOGEN, URINE: 0.2 EU/DL (ref 0–1)
WBC UA: > 200 /HPF
YEAST: ABNORMAL

## 2019-08-31 PROCEDURE — 97116 GAIT TRAINING THERAPY: CPT

## 2019-08-31 PROCEDURE — 6370000000 HC RX 637 (ALT 250 FOR IP): Performed by: EMERGENCY MEDICINE

## 2019-08-31 PROCEDURE — 87186 SC STD MICRODIL/AGAR DIL: CPT

## 2019-08-31 PROCEDURE — 87077 CULTURE AEROBIC IDENTIFY: CPT

## 2019-08-31 PROCEDURE — 6370000000 HC RX 637 (ALT 250 FOR IP): Performed by: PHYSICAL MEDICINE & REHABILITATION

## 2019-08-31 PROCEDURE — 6370000000 HC RX 637 (ALT 250 FOR IP): Performed by: NURSE PRACTITIONER

## 2019-08-31 PROCEDURE — 2709999900 HC NON-CHARGEABLE SUPPLY

## 2019-08-31 PROCEDURE — 83735 ASSAY OF MAGNESIUM: CPT

## 2019-08-31 PROCEDURE — 81001 URINALYSIS AUTO W/SCOPE: CPT

## 2019-08-31 PROCEDURE — 1180000000 HC REHAB R&B

## 2019-08-31 PROCEDURE — 87086 URINE CULTURE/COLONY COUNT: CPT

## 2019-08-31 PROCEDURE — 97530 THERAPEUTIC ACTIVITIES: CPT

## 2019-08-31 PROCEDURE — 97110 THERAPEUTIC EXERCISES: CPT

## 2019-08-31 PROCEDURE — 36415 COLL VENOUS BLD VENIPUNCTURE: CPT

## 2019-08-31 RX ORDER — GRANULES FOR ORAL 3 G/1
3 POWDER ORAL
Status: COMPLETED | OUTPATIENT
Start: 2019-08-31 | End: 2019-09-06

## 2019-08-31 RX ADMIN — FUROSEMIDE 20 MG: 20 TABLET ORAL at 08:35

## 2019-08-31 RX ADMIN — POLYETHYLENE GLYCOL 3350 17 G: 17 POWDER, FOR SOLUTION ORAL at 08:35

## 2019-08-31 RX ADMIN — ACETAMINOPHEN 650 MG: 325 TABLET ORAL at 21:16

## 2019-08-31 RX ADMIN — DICLOFENAC 4 G: 10 GEL TOPICAL at 21:30

## 2019-08-31 RX ADMIN — METOPROLOL SUCCINATE 25 MG: 25 TABLET, EXTENDED RELEASE ORAL at 08:35

## 2019-08-31 RX ADMIN — DICLOFENAC 4 G: 10 GEL TOPICAL at 08:44

## 2019-08-31 RX ADMIN — SACUBITRIL AND VALSARTAN 1 TABLET: 49; 51 TABLET, FILM COATED ORAL at 21:16

## 2019-08-31 RX ADMIN — SIMVASTATIN 40 MG: 40 TABLET, FILM COATED ORAL at 21:15

## 2019-08-31 RX ADMIN — SACUBITRIL AND VALSARTAN 1 TABLET: 49; 51 TABLET, FILM COATED ORAL at 08:34

## 2019-08-31 RX ADMIN — ASPIRIN 81 MG: 81 TABLET ORAL at 08:34

## 2019-08-31 RX ADMIN — FOSFOMYCIN TROMETHAMINE 1 PACKET: 3 POWDER ORAL at 21:15

## 2019-08-31 RX ADMIN — POTASSIUM CHLORIDE 20 MEQ: 20 TABLET, EXTENDED RELEASE ORAL at 08:34

## 2019-08-31 RX ADMIN — ACETAMINOPHEN 650 MG: 325 TABLET ORAL at 04:20

## 2019-08-31 RX ADMIN — ISOSORBIDE MONONITRATE 30 MG: 30 TABLET ORAL at 08:35

## 2019-08-31 RX ADMIN — ACETAMINOPHEN 650 MG: 325 TABLET ORAL at 13:19

## 2019-08-31 RX ADMIN — CETIRIZINE HYDROCHLORIDE 10 MG: 10 TABLET, FILM COATED ORAL at 21:16

## 2019-08-31 RX ADMIN — MAGNESIUM GLUCONATE 500 MG ORAL TABLET 400 MG: 500 TABLET ORAL at 08:35

## 2019-08-31 RX ADMIN — DOCUSATE SODIUM 100 MG: 100 CAPSULE, LIQUID FILLED ORAL at 08:34

## 2019-08-31 RX ADMIN — CALCIUM CARBONATE-VITAMIN D TAB 500 MG-200 UNIT 1 TABLET: 500-200 TAB at 08:35

## 2019-08-31 RX ADMIN — RIVAROXABAN 10 MG: 10 TABLET, FILM COATED ORAL at 17:45

## 2019-08-31 RX ADMIN — CALCIUM CARBONATE-VITAMIN D TAB 500 MG-200 UNIT 1 TABLET: 500-200 TAB at 21:16

## 2019-08-31 RX ADMIN — ACETAMINOPHEN 650 MG: 325 TABLET ORAL at 08:29

## 2019-08-31 ASSESSMENT — PAIN SCALES - GENERAL
PAINLEVEL_OUTOF10: 4
PAINLEVEL_OUTOF10: 8
PAINLEVEL_OUTOF10: 3
PAINLEVEL_OUTOF10: 6
PAINLEVEL_OUTOF10: 6

## 2019-08-31 ASSESSMENT — PAIN DESCRIPTION - ORIENTATION: ORIENTATION: LEFT

## 2019-08-31 ASSESSMENT — PAIN DESCRIPTION - LOCATION: LOCATION: HIP

## 2019-08-31 ASSESSMENT — PAIN DESCRIPTION - PAIN TYPE: TYPE: ACUTE PAIN

## 2019-08-31 NOTE — PROGRESS NOTES
[address] [address]  INPATIENT PHYSICAL THERAPY  DAILY NOTE  [facility] - 7E-55/055-A    Time In: 0930  Time Out: 1030  Timed Code Treatment Minutes: 60 Minutes  Minutes: 60          Date: 2019  Patient Name: Jose De Jesus Kruse,  Gender:  female        MRN: [id number]  : 1940  ([de-identified] y.o.)     Referring Practitioner: Kyle Hein MD  Diagnosis: s/p left hip fracture   Additional Pertinent Hx: Per ED note, pt is a [de-identified] y.o. female who presents to the Emergency Department for the evaluation of a fall. Patient reports that she bent forward, felt dizzy and fell. She is not certain if she hit her head during the fall, but she did not loose consciousness. Patient has a history of vertigo, DVT and has a pacemaker. She takes asprin regularly as a blood thinner and takes tramodol for pain management. She has also been undergoing vestibular therapy for her ongoing dizziness. Prior Level of Function:  Lives With: Alone  Type of Home: House  Home Layout: Two level, Able to Live on Main level with bedroom/bathroom  Home Access: Stairs to enter with rails  Entrance Stairs - Number of Steps: 2 steps with 1 rail   Entrance Stairs - Rails: Right  Home Equipment: Standard walker, Cane, Reacher, Rolling walker   Bathroom Shower/Tub: Walk-in shower  Bathroom Toilet: Handicap height  Bathroom Equipment: Grab bars in shower    ADL Assistance: Independent  Homemaking Assistance: Independent  Ambulation Assistance: Independent  Transfer Assistance: Independent  Active : Yes  IADL Comments: does own grocery shopping  Additional Comments: Pt was getting vestibular PT rehab. Pt was generally independent with mobility. Pt volunteers regularly [facility] at [facility].     Restrictions/Precautions:  Restrictions/Precautions: Weight Bearing, General Precautions, Fall Risk  Left Lower Extremity Weight Bearing: Weight Bearing As Tolerated  Position Activity Restriction  Other position/activity

## 2019-08-31 NOTE — PROGRESS NOTES
needs  Discharge Recommendations:  Continue to assess pending progress, Patient would benefit from continued therapy after discharge, 24 hour supervision or assist    Plan: Times per week: 5x/wk 90 min, 1x/wk 30 min  Specific instructions for Next Treatment: ther ex, transfers, gait training, bed mobility   Current Treatment Recommendations: Strengthening, Transfer Training, Endurance Training, Patient/Caregiver Education & Training, ROM, Balance Training, Gait Training, Stair training, Functional Mobility Training, Safety Education & Training, Home Exercise Program    Patient Education  Patient Education: Plan of Care, Precautions/Restrictions, [employer] Mobility, Transfers, Reviewed Prior Education, Gait, safety, balance, posture,  - Patient Verbalized Understanding, - Patient Requires Continued Education    Goals:  Patient goals : to get better  Short term goals  Time Frame for Short term goals: 1 week  Short term goal 1: Patient to be able to perform rolling L/R at MINx1 to decrease risk of pressure ulcers  Short term goal 2: Patient will be able to perform supine<>sit at MINx1 to sit EOB  Short term goal 3: Patient will be able to get up/down from various seated surfaces, +1 mod assist, to get up to walk. Short term goal 4: Patient to be able to walk >= 10 feet with MINx1 with RW in order to reach bathroom   Short term goal 5: Pt to perform stand/pivot transfer with RW, +1 min assist to get in/out of w/c.    Long term goals  Time Frame for Long term goals : 3 weeks  Long term goal 1: Patient will be able to perform bed mobility at [facility] 62 to sit EOB  Long term goal 2: Patient will be able to perform functional transfers at [facility] 62 to sit in recliner at home  Long term goal 3: Patient to be able to ambulate 50 feet with RW and CGA to walk around home safely  Long term goal 4: Patient will be able to negotiate 2 steps at [facility] to enter home   Long term goal 5: Patient will demonstrate Good dynamic standing balance in order to decrease risk of future falls. Following session, patient left in safe position with all fall risk precautions in place.

## 2019-09-01 PROCEDURE — 6370000000 HC RX 637 (ALT 250 FOR IP): Performed by: PHYSICAL MEDICINE & REHABILITATION

## 2019-09-01 PROCEDURE — 99232 SBSQ HOSP IP/OBS MODERATE 35: CPT | Performed by: PHYSICAL MEDICINE & REHABILITATION

## 2019-09-01 PROCEDURE — 1180000000 HC REHAB R&B

## 2019-09-01 PROCEDURE — 97116 GAIT TRAINING THERAPY: CPT

## 2019-09-01 PROCEDURE — 6370000000 HC RX 637 (ALT 250 FOR IP): Performed by: EMERGENCY MEDICINE

## 2019-09-01 PROCEDURE — 97110 THERAPEUTIC EXERCISES: CPT

## 2019-09-01 PROCEDURE — 2709999900 HC NON-CHARGEABLE SUPPLY

## 2019-09-01 PROCEDURE — 97535 SELF CARE MNGMENT TRAINING: CPT

## 2019-09-01 PROCEDURE — 6370000000 HC RX 637 (ALT 250 FOR IP): Performed by: NURSE PRACTITIONER

## 2019-09-01 RX ADMIN — DICLOFENAC 4 G: 10 GEL TOPICAL at 16:41

## 2019-09-01 RX ADMIN — MAGNESIUM GLUCONATE 500 MG ORAL TABLET 400 MG: 500 TABLET ORAL at 09:02

## 2019-09-01 RX ADMIN — ISOSORBIDE MONONITRATE 30 MG: 30 TABLET ORAL at 09:02

## 2019-09-01 RX ADMIN — SIMVASTATIN 40 MG: 40 TABLET, FILM COATED ORAL at 21:54

## 2019-09-01 RX ADMIN — POTASSIUM CHLORIDE 20 MEQ: 20 TABLET, EXTENDED RELEASE ORAL at 08:58

## 2019-09-01 RX ADMIN — RIVAROXABAN 10 MG: 10 TABLET, FILM COATED ORAL at 16:41

## 2019-09-01 RX ADMIN — CALCIUM CARBONATE-VITAMIN D TAB 500 MG-200 UNIT 1 TABLET: 500-200 TAB at 09:02

## 2019-09-01 RX ADMIN — POLYETHYLENE GLYCOL 3350 17 G: 17 POWDER, FOR SOLUTION ORAL at 08:58

## 2019-09-01 RX ADMIN — DOCUSATE SODIUM 100 MG: 100 CAPSULE, LIQUID FILLED ORAL at 08:58

## 2019-09-01 RX ADMIN — CALCIUM CARBONATE-VITAMIN D TAB 500 MG-200 UNIT 1 TABLET: 500-200 TAB at 21:54

## 2019-09-01 RX ADMIN — ACETAMINOPHEN 650 MG: 325 TABLET ORAL at 09:46

## 2019-09-01 RX ADMIN — ACETAMINOPHEN 650 MG: 325 TABLET ORAL at 21:53

## 2019-09-01 RX ADMIN — FUROSEMIDE 20 MG: 20 TABLET ORAL at 09:02

## 2019-09-01 RX ADMIN — ACETAMINOPHEN 650 MG: 325 TABLET ORAL at 16:44

## 2019-09-01 RX ADMIN — SACUBITRIL AND VALSARTAN 1 TABLET: 49; 51 TABLET, FILM COATED ORAL at 09:02

## 2019-09-01 RX ADMIN — DICLOFENAC 4 G: 10 GEL TOPICAL at 21:54

## 2019-09-01 RX ADMIN — METOPROLOL SUCCINATE 25 MG: 25 TABLET, EXTENDED RELEASE ORAL at 09:02

## 2019-09-01 RX ADMIN — SACUBITRIL AND VALSARTAN 1 TABLET: 49; 51 TABLET, FILM COATED ORAL at 21:54

## 2019-09-01 RX ADMIN — ASPIRIN 81 MG: 81 TABLET ORAL at 08:58

## 2019-09-01 RX ADMIN — CETIRIZINE HYDROCHLORIDE 10 MG: 10 TABLET, FILM COATED ORAL at 21:54

## 2019-09-01 ASSESSMENT — PAIN SCALES - GENERAL
PAINLEVEL_OUTOF10: 2
PAINLEVEL_OUTOF10: 3
PAINLEVEL_OUTOF10: 0
PAINLEVEL_OUTOF10: 6
PAINLEVEL_OUTOF10: 6
PAINLEVEL_OUTOF10: 3
PAINLEVEL_OUTOF10: 1

## 2019-09-01 ASSESSMENT — PAIN DESCRIPTION - ONSET: ONSET: ON-GOING

## 2019-09-01 ASSESSMENT — PAIN - FUNCTIONAL ASSESSMENT: PAIN_FUNCTIONAL_ASSESSMENT: ACTIVITIES ARE NOT PREVENTED

## 2019-09-01 ASSESSMENT — PAIN DESCRIPTION - ORIENTATION: ORIENTATION: LEFT

## 2019-09-01 ASSESSMENT — PAIN SCALES - WONG BAKER: WONGBAKER_NUMERICALRESPONSE: 2

## 2019-09-01 ASSESSMENT — PAIN DESCRIPTION - LOCATION: LOCATION: HIP

## 2019-09-01 ASSESSMENT — PAIN DESCRIPTION - PAIN TYPE: TYPE: ACUTE PAIN

## 2019-09-01 ASSESSMENT — PAIN DESCRIPTION - FREQUENCY: FREQUENCY: INTERMITTENT

## 2019-09-01 ASSESSMENT — PAIN DESCRIPTION - DIRECTION: RADIATING_TOWARDS: LEFT LEG

## 2019-09-01 ASSESSMENT — PAIN DESCRIPTION - PROGRESSION: CLINICAL_PROGRESSION: GRADUALLY IMPROVING

## 2019-09-01 ASSESSMENT — PAIN DESCRIPTION - DESCRIPTORS: DESCRIPTORS: ACHING

## 2019-09-01 NOTE — PLAN OF CARE
Problem: Pain:  Description  Pain management should include both nonpharmacologic and pharmacologic interventions. Goal: Pain level will decrease  Description  Pain level will decrease  Outcome: Ongoing  Goal: Control of acute pain  Description  Control of acute pain  Outcome: Ongoing    Patient will participate in pain management to decrease pain as evidenced by patient use of breathing and meditation for pain management     Problem: Infection - Surgical Site:  Goal: Will show no infection signs and symptoms  Description  Will show no infection signs and symptoms  Outcome: Ongoing     Problem: Injury - Risk of, Postfracture Complications:  Goal: Absence of fat embolism  Description  Absence of fat embolism  Outcome: Ongoing    Patient will remain free of injury during admission; patient will use safety devices and techniques for transfers      Problem: Mobility - Impaired:  Goal: Mobility will improve to maximum level  Description  Mobility will improve to maximum level  Outcome: Met This Shift     Problem: Venous Thromboembolism:  Goal: Absence of deep vein thrombosis  Description  Absence of deep vein thrombosis  Outcome: Met This Shift     Problem: Falls - Risk of:  Goal: Will remain free from falls  Description  Will remain free from falls  Outcome: Ongoing    Patient will remain free from falls during this admission as evidenced by no falls during this admission patient and staff will utilize safety devices and techniques for transfer      Problem: DISCHARGE BARRIERS  Goal: Patient's continuum of care needs are met  Outcome: Ongoing    Patient will participate in discharge planning during this admission as evidenced by patient will continue to work with nursing and therapy for home going education.

## 2019-09-01 NOTE — PROGRESS NOTES
mg  25 mg Oral Q6H PRN Almeda Osler, APRN - CNP        isosorbide mononitrate (IMDUR) extended release tablet 30 mg  30 mg Oral Daily JADEN Archer CNP   30 mg at 09/01/19 0902    magnesium hydroxide (MILK OF MAGNESIA) 400 MG/5ML suspension 30 mL  30 mL Oral Daily PRN JADEN Archer CNP        rivaroxaban (XARELTO) tablet 10 mg  10 mg Oral Q24H JADEN Archer CNP   10 mg at 08/31/19 1745    sacubitril-valsartan (ENTRESTO) 49-51 MG per tablet 1 tablet  1 tablet Oral BID Tilda JADEN Warren CNP   1 tablet at 09/01/19 0902    simvastatin (ZOCOR) tablet 40 mg  40 mg Oral Nightly JADEN Archer CNP   40 mg at 08/31/19 2115    polyethylene glycol (GLYCOLAX) packet 17 g  17 g Oral Daily PRN Gamal Santa MD        ondansetron [facility]) tablet 4 mg  4 mg Oral Q8H PRN Gamal Santa MD         Allergies   Allergen Reactions    Ciprofloxacin Itching    Neomycin     Pcn [Penicillins] Hives     Pt reports having reaction 50 years ago    Zanaflex [Tizanidine Hcl] Other (See Comments)     Causes confusion    Levaquin [Levofloxacin] Itching     Benadryl was given for tx     Active Problems:    Obstructive sleep apnea on CPAP    Benign positional vertigo    CAD (coronary artery disease) s/p PCI and stent Multilink 3 x 18  mm mid LAD- in 07/2009 at [facility]    COPD (chronic obstructive pulmonary disease) ([facility]    Nonischemic cardiomyopathy (HCC)    Class 2 severe obesity due to excess calories with serious comorbidity in adult [facility])    Osteoporosis of vertebra    S/p left hip fracture  Resolved Problems:    ICD (implantable cardioverter-defibrillator) in place    Blood pressure (!) 110/59, pulse 84, temperature 98.1 °F (36.7 °C), temperature source Oral, resp. rate 16, height 5' 4\" (1.626 m), weight 213 lb 8 oz (96.8 kg), SpO2 98 %, not currently breastfeeding. Subjective:  Symptoms:  Stable. Diet:  Adequate intake.     Activity level:

## 2019-09-01 NOTE — PROGRESS NOTES
standing)   Dressing-Upper: 5 - Requires setup/supervision/cues and/or requires assist with presthesis/brace only(set-up )   Dressing-Lower: 4 - Requires assist with buttons/zippers/shoelaces and/or assist with shoes only(used reacher to thread, x1 VC for surgical donning tech, CGA balance. A for TEDs and shoes)   Toileting: 3 - Able to perform 2 tasks(A to manage up on L side only )   Toilet Transfer: 3 - Requires 25-49% assist getting off toilet(min A to lift, CGA to lower )   Shower Transfer: 2 - Maximal assistance, pt. expends 25%-49% effort(min A to lift and lower )                                        COMMUNICATION  Comprehension: 6 - Complex ideas 90% or device (hearing aid or glasses- if patient is primarily a visual learner)  GOAL: Comprehension: 6  Expression: 6 - Device used to express complex ideas/needs  GOAL: Expression: 6   SOCIAL COGNITION  Social Interaction: 6 - Patient requires medication for mood and/or effect  GOAL: Social Interaction: 6  Problem Solving: 3 - Patient solves simple/routine tasks 50%-74%  GOAL: Problem Solvin  Memory: 4 - Patient remembers 75-90%+ of the time  GOAL: Memory: 5       Review of Systems:  CONSTITUTIONAL:  negative  RESPIRATORY:  negative  CARDIOVASCULAR:  negative  GASTROINTESTINAL:  positive for constipation  GENITOURINARY:  positive for urinary incontinence  MUSCULOSKELETAL:  positive for  myalgias, arthralgias, pain, joint swelling, stiff joints, decreased range of motion, muscle weakness and bone pain  NEUROLOGICAL:  positive for coordination problems, gait problems, weakness and pain  BEHAVIOR/PSYCH:  negative  10 point system review otherwise negative    Objective:  BP (!) 110/59   Pulse 84   Temp 98.1 °F (36.7 °C) (Oral)   Resp 16   Ht 5' 4\" (1.626 m)   Wt 213 lb 8 oz (96.8 kg)   SpO2 98%   BMI 36.65 kg/m²   She was noted to be awake, alert, and in no acute distress.   Orientation:   person, place, time  Mood: within normal limits  Affect: calm  General appearance: Patient is well nourished, well developed, well groomed and in no acute distress, obese, appearing stated age, normal affect. Memory:   normal  Attention/Concentration: normal  Language:  normal  HEART:  S1 and S2 with a regular rate and rhythm without a murmur,  gallop, or rub. LUNGS:  Clear to auscultation bilaterally without rales, rhonchi, or  wheezes. ABDOMEN:  Diminished bowel sounds in all four quadrants. Her abdomen  was noted to be soft, nontender, without masses. Cranial Nerves:  cranial nerves II-XII are grossly intact  ROM:  abnormal - with respect to her Left hip. Motor Exam: No acute changes noted with respect to her limb strength. Tone:  normal  Muscle bulk: within normal limits  Sensory:  Sensory intact  Coordination:   abnormal - with respect to her mobility. Deep Tendon Reflexes: No acute changes noted. Skin: warm and dry, no rash or erythema. Well-healing Left lateral hip and thigh surgical incisions. Edema: Moderate Left hip and thigh edema without erythema. Mild Left leg to foot edema without erythema. Diagnostics:   No results found for this or any previous visit (from the past 24 hour(s)). Impression:  1. Status post recent fall inside of her home which caused a left hip  fracture which was surgically corrected on 08/20/2019. Postoperatively,  she is to be weightbearing as tolerated to the left lower limb. 2.  Gait dysfunction. 3.  Deficits with respect to her activities of daily living. 4.  Current history of constipation. 5.  Chronic history of urinary bladder incontinence. 6.  Chronic history of low back pain. 7.  Ongoing history of vertigo. 8.  History of erosive gastritis. 9.  History of diverticulosis. 10.  History of COPD. 11.  History of previous pneumonia. 12.  History of congestive heart failure. 13.  History of esophageal strictures which have been surgically dilated  multiple times.   14.  Current history of morbid obesity. 15.  History of sleep apnea. 16.  History of osteoarthritis. 17.  History of osteoporosis. 18.  History of hypertension. 19.  History of hyperlipidemia. 20.  History of coronary artery disease with a cardiac stent being  placed in the past.  21.  Status post right breast lumpectomy performed in 01/2004 to treat  her right breast cancer. 22.  Status post left shoulder replacement. 23.  Status post left humeral fracture, surgically corrected. 24.  Status post cardiac pacemaker placement which was performed on  01/08/2018. 25.  Status post gallbladder removal.  26.  Status post hysterectomy performed in the 1970's. 27.  Remote history of tobacco abuse. Plan:  · She is to continue with her current inpatient rehab. program.  · A Rehab. team conference was held on 08/27/19. Her discharge goal is for her to be discharged on 09/10/19. Her discharge disposition will be clarified in the near future. · Discontinued her Antivert 12.5 mg to be taken 3 times daily due to possible cognitive side effects. · Bilateral knee-high compression hose are to be placed starting on 08/27/19. · A U/A C&S performed on 08/31/19 was noted to show a W. B.C count greater than 200 along with a large amount of Leukocyte Esterase. The C&S is pending. She was started on Monurol to be taken once daily for 3 days. · A BMP, Magnesium level, and a CBC with diff. were drawn on 08/30/19. Her Magnesium level was noted to be low at 1.4. Her Magnesium is being replaced. The rest of her labs remain stable. Her Magnesium level was 1.6 on 08/31/19. · Started the patient on Zyrtec 10 mg to be taken nightly. · Decreased her Toprol XL to 25 mg once daily. · A K-pad has been ordered and is to be used to her low back as needed.     Missed Therapy Time:  · None    Jazmyn Akers MD

## 2019-09-02 LAB
ORGANISM: ABNORMAL
URINE CULTURE REFLEX: ABNORMAL

## 2019-09-02 PROCEDURE — 99233 SBSQ HOSP IP/OBS HIGH 50: CPT | Performed by: PHYSICAL MEDICINE & REHABILITATION

## 2019-09-02 PROCEDURE — 2709999900 HC NON-CHARGEABLE SUPPLY

## 2019-09-02 PROCEDURE — 1180000000 HC REHAB R&B

## 2019-09-02 PROCEDURE — 6370000000 HC RX 637 (ALT 250 FOR IP): Performed by: PHYSICAL MEDICINE & REHABILITATION

## 2019-09-02 PROCEDURE — 6370000000 HC RX 637 (ALT 250 FOR IP): Performed by: NURSE PRACTITIONER

## 2019-09-02 RX ORDER — TRAMADOL HYDROCHLORIDE 50 MG/1
50 TABLET ORAL
Status: DISCONTINUED | OUTPATIENT
Start: 2019-09-03 | End: 2019-09-03

## 2019-09-02 RX ADMIN — METOPROLOL SUCCINATE 25 MG: 25 TABLET, EXTENDED RELEASE ORAL at 09:18

## 2019-09-02 RX ADMIN — RIVAROXABAN 10 MG: 10 TABLET, FILM COATED ORAL at 17:23

## 2019-09-02 RX ADMIN — CALCIUM CARBONATE-VITAMIN D TAB 500 MG-200 UNIT 1 TABLET: 500-200 TAB at 09:18

## 2019-09-02 RX ADMIN — ACETAMINOPHEN 650 MG: 325 TABLET ORAL at 17:56

## 2019-09-02 RX ADMIN — CETIRIZINE HYDROCHLORIDE 10 MG: 10 TABLET, FILM COATED ORAL at 21:10

## 2019-09-02 RX ADMIN — FUROSEMIDE 20 MG: 20 TABLET ORAL at 09:18

## 2019-09-02 RX ADMIN — SIMVASTATIN 40 MG: 40 TABLET, FILM COATED ORAL at 21:10

## 2019-09-02 RX ADMIN — POTASSIUM CHLORIDE 20 MEQ: 20 TABLET, EXTENDED RELEASE ORAL at 09:17

## 2019-09-02 RX ADMIN — ISOSORBIDE MONONITRATE 30 MG: 30 TABLET ORAL at 09:18

## 2019-09-02 RX ADMIN — ACETAMINOPHEN 650 MG: 325 TABLET ORAL at 13:35

## 2019-09-02 RX ADMIN — SACUBITRIL AND VALSARTAN 1 TABLET: 49; 51 TABLET, FILM COATED ORAL at 21:10

## 2019-09-02 RX ADMIN — SACUBITRIL AND VALSARTAN 1 TABLET: 49; 51 TABLET, FILM COATED ORAL at 09:18

## 2019-09-02 RX ADMIN — CALCIUM CARBONATE-VITAMIN D TAB 500 MG-200 UNIT 1 TABLET: 500-200 TAB at 21:10

## 2019-09-02 RX ADMIN — DICLOFENAC 4 G: 10 GEL TOPICAL at 21:10

## 2019-09-02 RX ADMIN — ASPIRIN 81 MG: 81 TABLET ORAL at 09:17

## 2019-09-02 ASSESSMENT — PAIN SCALES - GENERAL
PAINLEVEL_OUTOF10: 3
PAINLEVEL_OUTOF10: 5
PAINLEVEL_OUTOF10: 4
PAINLEVEL_OUTOF10: 5

## 2019-09-02 ASSESSMENT — PAIN SCALES - WONG BAKER: WONGBAKER_NUMERICALRESPONSE: 2

## 2019-09-02 ASSESSMENT — PAIN DESCRIPTION - DESCRIPTORS: DESCRIPTORS: ACHING

## 2019-09-02 ASSESSMENT — PAIN DESCRIPTION - DIRECTION: RADIATING_TOWARDS: LEFT LEG

## 2019-09-02 ASSESSMENT — PAIN DESCRIPTION - ORIENTATION: ORIENTATION: LEFT

## 2019-09-02 ASSESSMENT — PAIN DESCRIPTION - ONSET: ONSET: ON-GOING

## 2019-09-02 ASSESSMENT — PAIN DESCRIPTION - FREQUENCY: FREQUENCY: INTERMITTENT

## 2019-09-02 ASSESSMENT — PAIN - FUNCTIONAL ASSESSMENT: PAIN_FUNCTIONAL_ASSESSMENT: ACTIVITIES ARE NOT PREVENTED

## 2019-09-02 ASSESSMENT — PAIN DESCRIPTION - LOCATION: LOCATION: HIP

## 2019-09-02 ASSESSMENT — PAIN DESCRIPTION - PAIN TYPE: TYPE: ACUTE PAIN

## 2019-09-02 NOTE — PLAN OF CARE
Problem: IP MOBILITY  Goal: LTG - patient will demonstrate safe mobility requirements  Outcome: Ongoing  Note:   Mobility will improve to maximum level; patient ambulated to bathroom with one person using walker and gait belt.

## 2019-09-02 NOTE — PROGRESS NOTES
Physical Medicine & Rehabilitation   Progress Note    Chief Complaint: S/P fall causing a Left hip fracture which was surgically corrected on 08/20/19. Subjective: She reported that her Left hip and chronic low back pain is currently at a 4. This is with respect to a pain scale of 1-10 with 1 being no pain and 10 being intolerable pain. She slept well last evening. She reported no current vertigo or dizziness. Her cognition appears to be back to her baseline. The patient's discharge was discussed with the patient and her daughter. My current recommendation is that the patient should have direct supervision and or physical assistance with all of her mobility. Rehabilitation:  PT:   Bed Mobility:  Supine to Sit: Moderate Assistance, X 1, with head of bed raised, with verbal cues , with increased time for completion, and bedrail ; ed in 1/2 bridging technique   Scooting: Minimal Assistance     Transfers:  Sit to Stand: Moderate Assistance, X 1, with increased time for completion, with verbal cues, to/from chair with arms  Stand to [address] [facility], with verbal cues, to/from chair with arms  Stand Pivot:Minimal Assistance, with verbal cues, using RW     Balance:  Static Standing Balance: Contact Guard Assistance, with verbal cues , standing at AD while therapist assisted with caitlin-care, grossly steady with no LOB  Dynamic Standing Balance: Contact Guard Assistance, patient stood at AD while assisting with clothing management with CGA-MIn. A to maintain balance. Patient also stood at AD while completing dynamic reaching to limits of LUIZ to challenge balance and improve standing tolerance. 1st trial stood for ~1:20 and 2nd trial stood for ~1:27,  Left knee starting to give out on 2nd trial and needed to sit down quickly.      Ambulation:  Minimal Assistance, with verbal cues , with increased time for completion  Distance: 10 ft. X1; 5 ft. x2   Surface: Level Tile  Device:Rolling Walker  Gait Deviations: Leukocyte Esterase. The C&S demonstrated E. coli. She was started on Monurol to be taken once daily for 3 days. · A BMP, Magnesium level, and a CBC with diff. were drawn on 08/30/19. Her Magnesium level was noted to be low at 1.4. Her Magnesium is being replaced. The rest of her labs remain stable. Her Magnesium level was 1.6 on 08/31/19. · Started the patient on Zyrtec 10 mg to be taken nightly. · Decreased her Toprol XL to 25 mg once daily. · A K-pad has been ordered and is to be used to her low back as needed. · Restarted the Ultram 50 mg to be taken once daily with breakfast.  · A Psychology consult has been ordered. · A long discussion regarding her discharge disposition was held with the patient and her daughter.     Missed Therapy Time:  · None    Deven Fitch MD

## 2019-09-03 PROCEDURE — 6370000000 HC RX 637 (ALT 250 FOR IP): Performed by: NURSE PRACTITIONER

## 2019-09-03 PROCEDURE — 2709999900 HC NON-CHARGEABLE SUPPLY

## 2019-09-03 PROCEDURE — 6370000000 HC RX 637 (ALT 250 FOR IP): Performed by: EMERGENCY MEDICINE

## 2019-09-03 PROCEDURE — 6370000000 HC RX 637 (ALT 250 FOR IP): Performed by: PHYSICAL MEDICINE & REHABILITATION

## 2019-09-03 PROCEDURE — 97116 GAIT TRAINING THERAPY: CPT

## 2019-09-03 PROCEDURE — 1180000000 HC REHAB R&B

## 2019-09-03 PROCEDURE — 97110 THERAPEUTIC EXERCISES: CPT

## 2019-09-03 PROCEDURE — 97535 SELF CARE MNGMENT TRAINING: CPT

## 2019-09-03 PROCEDURE — 97530 THERAPEUTIC ACTIVITIES: CPT

## 2019-09-03 PROCEDURE — 99232 SBSQ HOSP IP/OBS MODERATE 35: CPT | Performed by: PHYSICAL MEDICINE & REHABILITATION

## 2019-09-03 RX ORDER — TRAMADOL HYDROCHLORIDE 50 MG/1
50 TABLET ORAL 2 TIMES DAILY WITH MEALS
Status: DISCONTINUED | OUTPATIENT
Start: 2019-09-04 | End: 2019-09-10 | Stop reason: HOSPADM

## 2019-09-03 RX ORDER — GUAIFENESIN 600 MG/1
600 TABLET, EXTENDED RELEASE ORAL 2 TIMES DAILY
Status: DISCONTINUED | OUTPATIENT
Start: 2019-09-03 | End: 2019-09-08

## 2019-09-03 RX ADMIN — CALCIUM CARBONATE-VITAMIN D TAB 500 MG-200 UNIT 1 TABLET: 500-200 TAB at 22:21

## 2019-09-03 RX ADMIN — CALCIUM CARBONATE-VITAMIN D TAB 500 MG-200 UNIT 1 TABLET: 500-200 TAB at 08:35

## 2019-09-03 RX ADMIN — MAGNESIUM GLUCONATE 500 MG ORAL TABLET 400 MG: 500 TABLET ORAL at 08:36

## 2019-09-03 RX ADMIN — ACETAMINOPHEN 650 MG: 325 TABLET ORAL at 18:02

## 2019-09-03 RX ADMIN — SACUBITRIL AND VALSARTAN 1 TABLET: 49; 51 TABLET, FILM COATED ORAL at 22:21

## 2019-09-03 RX ADMIN — DICLOFENAC 4 G: 10 GEL TOPICAL at 08:37

## 2019-09-03 RX ADMIN — SIMVASTATIN 40 MG: 40 TABLET, FILM COATED ORAL at 22:21

## 2019-09-03 RX ADMIN — DICLOFENAC 4 G: 10 GEL TOPICAL at 13:19

## 2019-09-03 RX ADMIN — FOSFOMYCIN TROMETHAMINE 1 PACKET: 3 POWDER ORAL at 22:22

## 2019-09-03 RX ADMIN — POTASSIUM CHLORIDE 20 MEQ: 20 TABLET, EXTENDED RELEASE ORAL at 08:38

## 2019-09-03 RX ADMIN — RIVAROXABAN 10 MG: 10 TABLET, FILM COATED ORAL at 18:00

## 2019-09-03 RX ADMIN — FUROSEMIDE 20 MG: 20 TABLET ORAL at 08:36

## 2019-09-03 RX ADMIN — CETIRIZINE HYDROCHLORIDE 10 MG: 10 TABLET, FILM COATED ORAL at 22:21

## 2019-09-03 RX ADMIN — TRAMADOL HYDROCHLORIDE 50 MG: 50 TABLET, FILM COATED ORAL at 08:37

## 2019-09-03 RX ADMIN — GUAIFENESIN 600 MG: 600 TABLET, EXTENDED RELEASE ORAL at 22:21

## 2019-09-03 RX ADMIN — SACUBITRIL AND VALSARTAN 1 TABLET: 49; 51 TABLET, FILM COATED ORAL at 08:36

## 2019-09-03 RX ADMIN — ACETAMINOPHEN 650 MG: 325 TABLET ORAL at 06:09

## 2019-09-03 RX ADMIN — DICLOFENAC 4 G: 10 GEL TOPICAL at 18:11

## 2019-09-03 RX ADMIN — ASPIRIN 81 MG: 81 TABLET ORAL at 08:37

## 2019-09-03 RX ADMIN — ACETAMINOPHEN 650 MG: 325 TABLET ORAL at 13:19

## 2019-09-03 RX ADMIN — METOPROLOL SUCCINATE 25 MG: 25 TABLET, EXTENDED RELEASE ORAL at 08:36

## 2019-09-03 RX ADMIN — ISOSORBIDE MONONITRATE 30 MG: 30 TABLET ORAL at 08:36

## 2019-09-03 RX ADMIN — DOCUSATE SODIUM 100 MG: 100 CAPSULE, LIQUID FILLED ORAL at 08:37

## 2019-09-03 ASSESSMENT — PAIN DESCRIPTION - LOCATION: LOCATION: BACK;HIP

## 2019-09-03 ASSESSMENT — PAIN SCALES - GENERAL
PAINLEVEL_OUTOF10: 0
PAINLEVEL_OUTOF10: 3
PAINLEVEL_OUTOF10: 3
PAINLEVEL_OUTOF10: 1
PAINLEVEL_OUTOF10: 8
PAINLEVEL_OUTOF10: 0
PAINLEVEL_OUTOF10: 8

## 2019-09-03 ASSESSMENT — PAIN - FUNCTIONAL ASSESSMENT: PAIN_FUNCTIONAL_ASSESSMENT: PREVENTS OR INTERFERES SOME ACTIVE ACTIVITIES AND ADLS

## 2019-09-03 ASSESSMENT — PAIN DESCRIPTION - PAIN TYPE: TYPE: ACUTE PAIN

## 2019-09-03 ASSESSMENT — PAIN DESCRIPTION - FREQUENCY: FREQUENCY: INTERMITTENT

## 2019-09-03 ASSESSMENT — PAIN DESCRIPTION - ORIENTATION: ORIENTATION: LEFT

## 2019-09-03 ASSESSMENT — PAIN DESCRIPTION - ONSET: ONSET: ON-GOING

## 2019-09-03 ASSESSMENT — PAIN DESCRIPTION - PROGRESSION: CLINICAL_PROGRESSION: GRADUALLY WORSENING

## 2019-09-03 ASSESSMENT — PAIN DESCRIPTION - DESCRIPTORS: DESCRIPTORS: ACHING;CRAMPING

## 2019-09-03 NOTE — PROGRESS NOTES
Family Medicine Progress Notes/Coverage    Today's Date: 9/3/19  [id number]  Medical Record # [id number]  Account # [de-identified]      Ms. Mary Voss admitted on 8/23/2019        Subjective / Interval History :     Seen at the gym  She struggling to stand up, limping when standing  No SOB no CP  Reviewed notes, consults, laboratory and radiology results,    Objective:       Physical Exam:  Patient Vitals for the past 24 hrs:   BP Temp Temp src Pulse Resp SpO2 Weight   09/03/19 0112 -- -- -- -- -- -- 214 lb 8 oz (97.3 kg)   09/02/19 2101 (!) 121/57 98.2 °F (36.8 °C) Oral 81 18 93 % --   09/02/19 0822 127/69 98.4 °F (36.9 °C) Oral 75 14 92 % --       General Appearance:  Alert, cooperative, no distress, appears stated age   HEENT:  Neck:      Chest/Lungs:  Heart: CTA  RRR   Abdomen:  Back:    Extremities:  Neurological Exam:   WNL       Assessment:     Admitting Diagnosis:    S/p left hip fracture [Z87.81]    Active Hospital Problems    Diagnosis Date Noted    S/p left hip fracture [Z87.81] 08/23/2019     Priority: High    Benign positional vertigo [H81.10] 08/19/2019     Priority: High    Osteoporosis of vertebra [M81.0]      Priority: Medium    Class 2 severe obesity due to excess calories with serious comorbidity in adult ([facility] [address] [E66.01] 02/03/2018     Priority: Medium    [facility]ischemic cardiomyopathy [facility] [I42.8]      Priority: Medium    COPD (chronic obstructive pulmonary disease) ([facility] [address] [J44.9] 08/05/2017     Priority: Medium    Obstructive sleep apnea on CPAP [G47.33, Z99.89] 11/05/2015     Priority: Medium    CAD (coronary artery disease) s/p PCI and stent Multilink 3 x 18  mm mid LAD- in 07/2009 at [facility] [I25.10] 07/16/2012     Priority: Medium       Plan:     Discussed plans with the nursing staff  Continue current treatment    Medications, Laboratories and Imaging
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
## 2019-09-03 NOTE — PROGRESS NOTES
Nutrition Assessment    Type and Reason for Visit: Reassess    Nutrition Recommendations: Continue current diet. Consider multivitamin. ONS discontinued per patient request.     Nutrition Assessment:   Pt slowly improving from a nutritional standpoint AEB intake mostly 50% or greater of meals since admit to rehab and patient report of appetite slowly improving since hip surgery. Remains at risk for further nutritional compromise r/t increased nutrient needs for healing, history CHF and COPD. Will discontinue Ensure enlive once daily per patient request. Encouraged lean protein sources. Malnutrition Assessment:  · Malnutrition Status: At risk for malnutrition    Nutrition Risk Level:  Moderate    Nutrient Needs:  · Estimated Daily Total Kcal: 1836 (18/kgm based on 101kgm 8/26)  · Estimated Daily Protein (g): 82 grams (1.5/kgm IBW)    Nutrition Diagnosis:   · Problem: Inadequate oral intake  · Etiology: related to Insufficient energy/nutrient consumption     Signs and symptoms:  as evidenced by Patient report of, Diet history of poor intake    Objective Information:  · Nutrition-Focused Physical Findings: enjoys when family brings in food, BM x 1 8/29, history of esophageal dilatations x 2, medication includes lasix and glycolax   · Wound Type: Surgical Wound(8/20 hip repair)  · Current Nutrition Therapies:  · Oral Diet Orders: General   · Oral Diet intake: 51-75%, %, 1-25%  · Oral Nutrition Supplement (ONS) Orders: Standard High Calorie Oral Supplement(Enlive 1x daily)  · ONS intake: Refused(not drinking them per patient and requests be discontinued)  · Anthropometric Measures:  · Ht: 5' 4\" (162.6 cm)   · Current Body Wt: 214 lb 8 oz (97.3 kg)(9/3; +1 pitting edema)  · Admission Body Wt: 224 lb (101.6 kg)(8/23 with +1 edema)  · Usual Body Wt: 197 lb (89.4 kg)(11/25/18)  · Ideal Body Wt: 120 lb (54.4 kg),  · Weight change: 4.4% loss since admit (suspect in part due to post-op fluid shifts/ edema, history CHF, on diuretic)  · BMI Classification: BMI 35.0 - 39.9 Obese Class II    Nutrition Interventions:   Continue current diet, Discontinue ONS  Continued Inpatient Monitoring, Education Initiated, Coordination of Care(discussed lean protein sources for healing process)    Nutrition Evaluation:   · Evaluation: Progressing toward goals(slowly)   · Goals: consume greater than 75% meals during LOS    · Monitoring: Meal Intake, Diet Tolerance, Skin Integrity, Wound Healing, Weight, Pertinent Labs, Monitor Bowel Function      Electronically signed by Lewis Narvaez RD, LD on 9/3/19 at 10:03 AM    Contact Number: [phone]

## 2019-09-03 NOTE — PLAN OF CARE
Problem: Injury - Risk of, Postfracture Complications:  Goal: Absence of fat embolism  Description  Absence of fat embolism  Outcome: Met This Shift     Problem: Venous Thromboembolism:  Goal: Absence of deep vein thrombosis  Description  Absence of deep vein thrombosis  Outcome: Met This Shift     Problem: Nutrition  Goal: Optimal nutrition therapy  9/3/2019 1050 by Kevyn Frances RN  Outcome: Met This Shift  Note:   Eating  % of most meals. 9/3/2019 1003 by Goldy Nick RD, LD  Outcome: Ongoing     Problem: Pain:  Goal: Pain level will decrease  Description  Pain level will decrease  Outcome: Ongoing  Goal: Control of acute pain  Description  Control of acute pain  Outcome: Ongoing  Note:   Patient satisfied with pain control, using heat, rest and re-positioning , oral espinoza medications used to help control pain. Problem: Discharge Planning:  Goal: Discharged to appropriate level of care  Description  Discharged to appropriate level of care  Outcome: Ongoing  Note:   Working toward goal of discharge to home. Problem: Infection - Surgical Site:  Goal: Will show no infection signs and symptoms  Description  Will show no infection signs and symptoms  Outcome: Ongoing  Note:   Incision healing, no signs or symptoms of infection noted . Problem: Mobility - Impaired:  Goal: Mobility will improve to maximum level  Description  Mobility will improve to maximum level  Outcome: Ongoing  Note:   Working toward goal of increased mobility. Problem: Falls - Risk of:  Goal: Will remain free from falls  Description  Will remain free from falls  Outcome: Ongoing  Note:   Patient verbalizes understanding of fall precautions, uses call light appropriately. Problem: DISCHARGE BARRIERS  Goal: Patient's continuum of care needs are met  Outcome: Ongoing  Note:   Working toward goal of discharge to home.

## 2019-09-03 NOTE — PROGRESS NOTES
[address] [place] [address]  Recreational Therapy  Daily Note  [address]    Time Spent with Patient: 15 minutes    Date:  9/3/2019       Patient Name: Nia Pichardo      MRN: [id number]      YOB: 1940 ([de-identified] y.o.)       Gender: female  Diagnosis: S/p left hip fracture  Referring Practitioner: Ordering: JADEN Whittaker CNP Attending: Earline Chappell MD    RESTRICTIONS/PRECAUTIONS:  Restrictions/Precautions: Weight Bearing, General Precautions, Fall Risk  Vision: Impaired  Hearing: Within functional limits    PAIN: 0-no c/o pain     SUBJECTIVE:  I might come     OBJECTIVE:  Pt may come to the [facility] [place]'s dining room tomorrow night with peers for a painting activity with a woman from [employer]-tearful worrying about her daughter in law who lost her  a month ago in a car accident-supportive contact given          Patient Education  New Education Provided: Importance of Leisure,     Electronically signed by: ROXANN Rich  Date: 9/3/2019

## 2019-09-03 NOTE — PROGRESS NOTES
once daily for 3 days. · A BMP, Magnesium level, and a CBC with diff. were drawn on 08/30/19. Her Magnesium level was noted to be low at 1.4. Her Magnesium is being replaced. The rest of her labs remain stable. Her Magnesium level was 1.6 on 08/31/19. · Started the patient on Zyrtec 10 mg to be taken nightly. · Decreased her Toprol XL to 25 mg once daily. · A K-pad has been ordered and is to be used to her low back as needed. · Restarted the Ultram 50 mg to be taken once daily with breakfast and with lunch. · A Psychology consult has been ordered. · A BMP, Magnesium, and a CBC with diff. will be drawn on 09/04/19.     Missed Therapy Time:  · None    Elysia Lagos MD

## 2019-09-03 NOTE — PROGRESS NOTES
restrictions: h/o vertigo    SUBJECTIVE: Pt on commode with call light on upon arrival and agrees to therapy. Pt is pleasant and cooperative. Reporting increased pain in hip/LLE throughout session. Pt reporting she took tylenol before session, requesting tramadol at end fo session. PAIN: did not rate/10: L LE    OBJECTIVE:  Bed Mobility:  Rolling to Left: Moderate Assistance, with verbal cues , with increased time for completion   Rolling to Right: Moderate Assistance, with verbal cues , with increased time for completion   Supine to Sit: Moderate Assistance, with verbal cues , with increased time for completion, anxiety  Sit to Supine: Moderate Assistance, with verbal cues , with increased time for completion, anxiety   Scooting: Contact Guard Assistance   Pt c/o dizziness with bed mobility    Transfers:  Sit to Stand: Contact Guard Assistance, Minimal Assistance, with verbal cues  Stand to Sit:Contact Guard Assistance    Ambulation:  Contact Guard Assistance  Distance: 25'x1 10'x1  Surface: Level Tile  Device:Rolling Walker  Gait Deviations: Forward Flexed Posture, Slow Lynn, Decreased Step Length Bilaterally, Decreased Weight Shift Left, Lean to Right, Decreased Gait Speed, Decreased Heel Strike Bilaterally, Wide Base of Support and Unsteady Gait    Stairs:  Minimal Assistance  Number of Steps: 4  Height: 6\" step with Bilateral Handrails  Pt needing cues for which foot to lead with and min unsteady ricardo with descent, 1 LOB with min A to correct, pt struggling to weight shift to move foot down  Balance:  Dynamic Standing Balance: Contact Guard Assistance   Pt completed functional tasks on commode with RW support and CGA, extra time needed pt without LOB stood ~3mins with reaching tasks    Exercise:  Patient was guided in 1 set(s) 10 reps of exercise to both lower extremities. Ankle pumps, Glut sets, Quad sets, Heelslides, Short arc quads, Hip abduction/adduction and abd bracing, PPT.   Exercises were completed for increased independence with functional mobility. Functional Outcome Measures: Not completed       ASSESSMENT:  Assessment: Patient progressing toward established goals. Activity Tolerance:  Patient tolerance of  treatment: good. Pt demos decreased strength, endurance, balance, and independence with functional mobility. Pt is limited by anxiety, pain, and dizziness. Pt will benefit from cont skilled PT at this time. Equipment Recommendations:Equipment Needed: Yes(Pt has RW. May need a w/c for the discharge setting.  )  Other: monitor for needs  Discharge Recommendations:  Continue to assess pending progress, Patient would benefit from continued therapy after discharge, 24 hour supervision or assist    Plan: Times per week: 5x/wk 90 min, 1x/wk 30 min  Specific instructions for Next Treatment: ther ex, transfers, gait training, bed mobility   Current Treatment Recommendations: Strengthening, Transfer Training, Endurance Training, Patient/Caregiver Education & Training, ROM, Balance Training, Gait Training, Stair training, Functional Mobility Training, Safety Education & Training, Home Exercise Program    Patient Education  Patient Education: Plan of Care, Bed Mobility, Transfers, Gait, Stairs    Goals:  Patient goals : to get better  Short term goals  Time Frame for Short term goals: 1 week  Short term goal 1: Patient to be able to perform rolling L/R at MINx1 to decrease risk of pressure ulcers NOT MET  Short term goal 2: Patient will be able to perform supine<>sit at MINx1 to sit EOB NOT MET  Short term goal 3: Patient will be able to get up/down from various seated surfaces, +1 mod assist, to get up to walk. MET SEE LTG  Short term goal 4: Patient to be able to walk >= 10 feet with MINx1 with RW in order to reach bathroom MET SEE LTG  Short term goal 5: Pt to perform stand/pivot transfer with RW, +1 min assist to get in/out of w/c.  MET SEE LTG  Long term goals  Time Frame for Long term goals :

## 2019-09-04 LAB
ANION GAP SERPL CALCULATED.3IONS-SCNC: 13 MEQ/L (ref 8–16)
BASOPHILS # BLD: 0.7 %
BASOPHILS ABSOLUTE: 0.1 THOU/MM3 (ref 0–0.1)
BUN BLDV-MCNC: 18 MG/DL (ref 7–22)
CALCIUM SERPL-MCNC: 9.6 MG/DL (ref 8.5–10.5)
CHLORIDE BLD-SCNC: 103 MEQ/L (ref 98–111)
CO2: 26 MEQ/L (ref 23–33)
CREAT SERPL-MCNC: 0.6 MG/DL (ref 0.4–1.2)
EOSINOPHIL # BLD: 1.7 %
EOSINOPHILS ABSOLUTE: 0.1 THOU/MM3 (ref 0–0.4)
ERYTHROCYTE [DISTWIDTH] IN BLOOD BY AUTOMATED COUNT: 13.8 % (ref 11.5–14.5)
ERYTHROCYTE [DISTWIDTH] IN BLOOD BY AUTOMATED COUNT: 46.3 FL (ref 35–45)
GFR SERPL CREATININE-BSD FRML MDRD: > 90 ML/MIN/1.73M2
GLUCOSE BLD-MCNC: 76 MG/DL (ref 70–108)
HCT VFR BLD CALC: 33.4 % (ref 37–47)
HEMOGLOBIN: 11.7 GM/DL (ref 12–16)
IMMATURE GRANS (ABS): 0.13 THOU/MM3 (ref 0–0.07)
IMMATURE GRANULOCYTES: 2 %
LYMPHOCYTES # BLD: 34.6 %
LYMPHOCYTES ABSOLUTE: 2.5 THOU/MM3 (ref 1–4.8)
MAGNESIUM: 1.9 MG/DL (ref 1.6–2.4)
MCH RBC QN AUTO: 32.1 PG (ref 26–33)
MCHC RBC AUTO-ENTMCNC: 35 GM/DL (ref 32.2–35.5)
MCV RBC AUTO: 91.8 FL (ref 81–99)
MONOCYTES # BLD: 7.8 %
MONOCYTES ABSOLUTE: 0.6 THOU/MM3 (ref 0.4–1.3)
NUCLEATED RED BLOOD CELLS: 0 /100 WBC
PLATELET # BLD: 230 THOU/MM3 (ref 130–400)
PMV BLD AUTO: 9.7 FL (ref 9.4–12.4)
POTASSIUM SERPL-SCNC: 4.8 MEQ/L (ref 3.5–5.2)
RBC # BLD: 3.64 MILL/MM3 (ref 4.2–5.4)
SEG NEUTROPHILS: 53.4 %
SEGMENTED NEUTROPHILS ABSOLUTE COUNT: 3.8 THOU/MM3 (ref 1.8–7.7)
SODIUM BLD-SCNC: 142 MEQ/L (ref 135–145)
WBC # BLD: 7.2 THOU/MM3 (ref 4.8–10.8)

## 2019-09-04 PROCEDURE — 83735 ASSAY OF MAGNESIUM: CPT

## 2019-09-04 PROCEDURE — 97535 SELF CARE MNGMENT TRAINING: CPT

## 2019-09-04 PROCEDURE — 36415 COLL VENOUS BLD VENIPUNCTURE: CPT

## 2019-09-04 PROCEDURE — 2709999900 HC NON-CHARGEABLE SUPPLY

## 2019-09-04 PROCEDURE — 6370000000 HC RX 637 (ALT 250 FOR IP): Performed by: NURSE PRACTITIONER

## 2019-09-04 PROCEDURE — 90791 PSYCH DIAGNOSTIC EVALUATION: CPT | Performed by: PSYCHOLOGIST

## 2019-09-04 PROCEDURE — 6370000000 HC RX 637 (ALT 250 FOR IP): Performed by: PHYSICAL MEDICINE & REHABILITATION

## 2019-09-04 PROCEDURE — 6370000000 HC RX 637 (ALT 250 FOR IP): Performed by: EMERGENCY MEDICINE

## 2019-09-04 PROCEDURE — 1180000000 HC REHAB R&B

## 2019-09-04 PROCEDURE — 97530 THERAPEUTIC ACTIVITIES: CPT

## 2019-09-04 PROCEDURE — 85025 COMPLETE CBC W/AUTO DIFF WBC: CPT

## 2019-09-04 PROCEDURE — 97116 GAIT TRAINING THERAPY: CPT

## 2019-09-04 PROCEDURE — 97110 THERAPEUTIC EXERCISES: CPT

## 2019-09-04 PROCEDURE — 80048 BASIC METABOLIC PNL TOTAL CA: CPT

## 2019-09-04 PROCEDURE — 99232 SBSQ HOSP IP/OBS MODERATE 35: CPT | Performed by: PHYSICAL MEDICINE & REHABILITATION

## 2019-09-04 RX ADMIN — FUROSEMIDE 20 MG: 20 TABLET ORAL at 09:34

## 2019-09-04 RX ADMIN — ACETAMINOPHEN 650 MG: 325 TABLET ORAL at 22:23

## 2019-09-04 RX ADMIN — CALCIUM CARBONATE-VITAMIN D TAB 500 MG-200 UNIT 1 TABLET: 500-200 TAB at 20:52

## 2019-09-04 RX ADMIN — GUAIFENESIN 600 MG: 600 TABLET, EXTENDED RELEASE ORAL at 20:52

## 2019-09-04 RX ADMIN — DICLOFENAC 4 G: 10 GEL TOPICAL at 09:46

## 2019-09-04 RX ADMIN — DICLOFENAC 4 G: 10 GEL TOPICAL at 20:52

## 2019-09-04 RX ADMIN — TRAMADOL HYDROCHLORIDE 50 MG: 50 TABLET, FILM COATED ORAL at 13:34

## 2019-09-04 RX ADMIN — MAGNESIUM GLUCONATE 500 MG ORAL TABLET 400 MG: 500 TABLET ORAL at 09:40

## 2019-09-04 RX ADMIN — METOPROLOL SUCCINATE 25 MG: 25 TABLET, EXTENDED RELEASE ORAL at 09:38

## 2019-09-04 RX ADMIN — CALCIUM CARBONATE-VITAMIN D TAB 500 MG-200 UNIT 1 TABLET: 500-200 TAB at 09:34

## 2019-09-04 RX ADMIN — RIVAROXABAN 10 MG: 10 TABLET, FILM COATED ORAL at 17:53

## 2019-09-04 RX ADMIN — CETIRIZINE HYDROCHLORIDE 10 MG: 10 TABLET, FILM COATED ORAL at 20:52

## 2019-09-04 RX ADMIN — GUAIFENESIN 600 MG: 600 TABLET, EXTENDED RELEASE ORAL at 09:40

## 2019-09-04 RX ADMIN — SACUBITRIL AND VALSARTAN 1 TABLET: 49; 51 TABLET, FILM COATED ORAL at 20:52

## 2019-09-04 RX ADMIN — ACETAMINOPHEN 650 MG: 325 TABLET ORAL at 06:16

## 2019-09-04 RX ADMIN — ASPIRIN 81 MG: 81 TABLET ORAL at 09:34

## 2019-09-04 RX ADMIN — POTASSIUM CHLORIDE 20 MEQ: 20 TABLET, EXTENDED RELEASE ORAL at 09:34

## 2019-09-04 RX ADMIN — ISOSORBIDE MONONITRATE 30 MG: 30 TABLET ORAL at 09:34

## 2019-09-04 RX ADMIN — TRAMADOL HYDROCHLORIDE 50 MG: 50 TABLET, FILM COATED ORAL at 09:34

## 2019-09-04 RX ADMIN — SIMVASTATIN 40 MG: 40 TABLET, FILM COATED ORAL at 20:52

## 2019-09-04 RX ADMIN — SACUBITRIL AND VALSARTAN 1 TABLET: 49; 51 TABLET, FILM COATED ORAL at 09:40

## 2019-09-04 ASSESSMENT — PAIN SCALES - GENERAL
PAINLEVEL_OUTOF10: 5
PAINLEVEL_OUTOF10: 3
PAINLEVEL_OUTOF10: 0
PAINLEVEL_OUTOF10: 6
PAINLEVEL_OUTOF10: 3

## 2019-09-04 NOTE — PROGRESS NOTES
Prescription approved per [facility] Refill Protocol.     restrictions: h/o vertigo    SUBJECTIVE: Patient in recliner upon arrival, agreed to therapy. Reports needing to use bathroom due to incontinence in depends,  Therapist assisting with caitlin-care and clothing change. PAIN: 1-2 when at rest/10: left leg. OBJECTIVE:    Transfers:  Sit to Stand: Minimal Assistance, Moderate Assistance, X 1, with increased time for completion, with verbal cues  Stand to Sit:Minimal Assistance, X 1, with verbal cues    Ambulation:  Minimal Assistance, with cues for safety, with verbal cues , with increased time for completion  Distance: 12 ft. x1 and 10 ft. x1   Surface: Level Tile  Device:Rolling Walker  Gait Deviations: Forward Flexed Posture, Slow Lynn, Decreased Step Length on Right, Decreased Weight Shift Left, Decreased Gait Speed, Decreased Heel Strike Bilaterally, Unsteady Gait,  Increased time and effort to complete both distances due to increased pain with weight-bearing on left LE. Balance:  Dynamic Standing Balance: Contact Guard Assistance, stood at AD while completing caitlin-care and clothing management, therapist assisting to complete fully,  grossly steady with no LOB but fatigued quickly, extra time spent to complete above activities. Functional Outcome Measures: Not completed       ASSESSMENT:  Assessment: Patient progressing toward established goals. Activity Tolerance:  Patient tolerance of  treatment: fair. Limited due to increased pain and decreased endurance. Equipment Recommendations:Equipment Needed: Yes(Pt has RW.   May need a w/c for the discharge setting.  )  Other: monitor for needs  Discharge Recommendations: Home with home health PT, 24 hour supervision or assist    Plan: Times per week: 5x/wk 90 min, 1x/wk 30 min  Specific instructions for Next Treatment: ther ex, transfers, gait training, bed mobility   Current Treatment Recommendations: Strengthening, Transfer Training, Endurance Training, Patient/Caregiver Education & Training, ROM, Balance Training, Gait Training, Stair training, Functional Mobility Training, Safety Education & Training, Home Exercise Program    Patient Education  Patient Education: Plan of Care, Precautions/Restrictions, Transfers, Reviewed Prior Education, Gait, safety, posture, balance/standing tolerance,  - Patient Verbalized Understanding, - Patient Requires Continued Education    Goals:  Patient goals : to get better  Short term goals  Time Frame for Short term goals: 1 week  Short term goal 1: Patient to be able to perform rolling L/R at MINx1 to decrease risk of pressure ulcers  Short term goal 2: Patient will be able to perform supine<>sit at MINx1 to sit EOB  Short term goal 3: Patient will be able to get up/down from various seated surfaces, +1 mod assist, to get up to walk. Short term goal 4: Patient to be able to walk >= 10 feet with MINx1 with RW in order to reach bathroom   Short term goal 5: Pt to perform stand/pivot transfer with RW, +1 min assist to get in/out of w/c. Long term goals  Time Frame for Long term goals : 3 weeks  Long term goal 1: Patient will be able to perform bed mobility at [facility] to sit EOB  Long term goal 2: Patient will be able to perform functional transfers at [facility] to sit in recliner at home  Long term goal 3: Patient to be able to ambulate 50 feet with RW and CGA to walk around home safely  Long term goal 4: Patient will be able to negotiate 2 steps at [facility] to enter home   Long term goal 5: Patient will demonstrate Good dynamic standing balance in order to decrease risk of future falls. Following session, patient left in safe position with all fall risk precautions in place.

## 2019-09-04 NOTE — PLAN OF CARE
Problem: DISCHARGE BARRIERS  Goal: Patient's continuum of care needs are met  Note:   SW received call from patient's daughter, Monty Ortiz, regarding plan for discharge, as anticipated discharge remains for Tuesday, 09/10/2019. Patient's daughter, Monty Ortiz, looked into options for twenty four hour care at discharge (nursing facility, assisted living, and private caregiver in own home). Due to financial commitment for patient's needs, plan for patient to transition to an assisted living facility. Daughter, Monty Ortiz, continues to look into other assisted living facilities at this time. Daughter, Monty Ortiz, to notify SW when final decision has been made. SW to follow and maintain involvement in discharge planning.

## 2019-09-04 NOTE — PLAN OF CARE
Problem: Pain:  Goal: Pain level will decrease  Description  Pain level will decrease  Outcome: Met This Shift  States pain control is fair during therapy. Requested that the Diclofenac be applied to the right hip and the Lidocaine patch to the left thigh. Goal: Control of acute pain  Description  Control of acute pain  Outcome: Met This Shift     Problem: Discharge Planning:  Goal: Discharged to appropriate level of care  Description  Discharged to appropriate level of care  Outcome: Met This Shift   Case management in place. Problem: Infection - Surgical Site:  Goal: Will show no infection signs and symptoms  Description  Will show no infection signs and symptoms  Outcome: Met This Shift  No s/s of infection. Problem: Injury - Risk of, Postfracture Complications:  Goal: Absence of fat embolism  Description  Absence of fat embolism  Outcome: Met This Shift  No s/s of fat embolism. No SOB. Pulse ox within normal limits. Problem: Mobility - Impaired:  Goal: Mobility will improve to maximum level  Description  Mobility will improve to maximum level  9/4/2019 1002 by Waqar Rivera RN  Outcome: Met This Shift  Amb using a walker; gait belt in use. Attends and participates in all scheduled therapies. 9/4/2019 0134 by Vanessa Harper RN  Outcome: Met This Shift  Note:   Bed in lowest position and locked. Bed alarm activated. Educated on use of call light when in need of assistance- needs reinforcement. Visual checks performed and charted. No falls during shift at this time. Armband and falling star in place. Call light within reach. Problem: Venous Thromboembolism:  Goal: Absence of deep vein thrombosis  Description  Absence of deep vein thrombosis  Outcome: Met This Shift  No swelling,redness, or tenderness of calves bilat.        Problem: Falls - Risk of:  Goal: Will remain free from falls  Description  Will remain free from falls  9/4/2019 1002 by Waqar Rivera RN  Outcome: Met This

## 2019-09-04 NOTE — PROGRESS NOTES
Physical Medicine & Rehabilitation   Progress Note    Chief Complaint: S/P fall causing a Left hip fracture which was surgically corrected on 08/20/19. Subjective: Patient reports being frustrated with the pace of her overall recovery with respect to her mobility. She reported that her left arm, low back, and left hip pain are a 2 at rest but increases to a 7 with activity. This is with respect to a pain scale of 1-10 with 1 being no pain and 10 being intolerable pain. The pain is a dull ache. She slept well last evening. She reported her last BM was on 9/3/19. She noted no fever, chills, chest pain, abdominal pain, nausea/vomiting, diarrhea or constipation. Rehabilitation:  PT:   Bed Mobility:  Supine to Sit: Moderate Assistance, X 1, with head of bed raised, with verbal cues , with increased time for completion, and bedrail ; ed in 1/2 bridging technique   Scooting: Minimal Assistance     Transfers:  Sit to Stand: Minimal Assistance, with increased time for completion, with verbal cues  Stand to [place] [id number], with verbal cues  Stand Pivot:Contact Guard Assistance, with verbal cues, using RW, extra time to complete, poor weight shift onto left side during stance time     Ambulation:  Minimal Assistance, with cues for safety, with verbal cues , with increased time for completion  Distance: 12 ft. x1 and 10 ft. x1   Surface: Level Tile  Device:Rolling Walker  Gait Deviations: Forward Flexed Posture, Slow Lynn, Decreased Step Length on Right, Decreased Weight Shift Left, Decreased Gait Speed, Decreased Heel Strike Bilaterally, Unsteady Gait,  Increased time and effort to complete both distances due to increased pain with weight-bearing on left LE. Stairs:  Stairs:  6\" steps. X 4 using initially using both hands on R ascending rail, used bilateral hand rails for descending steps. and Minimal Assistance, X 2, with cues for safety, with verbal cues , with increased time for completion. negative  RESPIRATORY:  negative  CARDIOVASCULAR:  negative  GASTROINTESTINAL:  positive for constipation  GENITOURINARY:  positive for urinary incontinence  MUSCULOSKELETAL:  positive for  myalgias, arthralgias, pain, joint swelling, stiff joints, decreased range of motion, muscle weakness and bone pain  NEUROLOGICAL:  positive for coordination problems, gait problems, weakness and pain  BEHAVIOR/PSYCH:  negative  10 point system review otherwise negative    Objective:  BP (!) 113/58   Pulse 80   Temp 97.8 °F (36.6 °C) (Oral)   Resp 14   Ht 5' 4\" (1.626 m)   Wt 203 lb 14.4 oz (92.5 kg)   SpO2 93%   BMI 35.00 kg/m²   She was noted to be awake, alert, and in no acute distress. Orientation:   person, place, time  Mood: within normal limits  Affect: calm  General appearance: Patient is well nourished, well developed, well groomed and in no acute distress, obese, appearing stated age, normal affect. Memory:   normal  Attention/Concentration: normal  Language:  normal  HEART:  S1 and S2 with a regular rate and rhythm without a murmur,  gallop, or rub. LUNGS:  Clear to auscultation bilaterally without rales, rhonchi, or  wheezes. ABDOMEN:  Normoactive bowel sounds in all four quadrants. Her abdomen  was noted to be soft, nontender, without masses. Cranial Nerves:  cranial nerves II-XII are grossly intact  ROM:  abnormal - with respect to her Left hip. Motor Exam: No acute changes noted with respect to her limb strength. Tone:  normal  Muscle bulk: within normal limits  Sensory:  Sensory intact  Coordination:   abnormal - with respect to her mobility. Deep Tendon Reflexes: No acute changes noted. Skin: warm and dry, no rash or erythema. Well-healing Left lateral hip and thigh surgical incisions. Edema: Moderate Left hip and thigh edema without erythema. Mild Left leg to foot edema without erythema.       Diagnostics:   Recent Results (from the past 24 hour(s))   CBC Auto Differential    Collection Time:

## 2019-09-05 PROCEDURE — 97116 GAIT TRAINING THERAPY: CPT

## 2019-09-05 PROCEDURE — 2709999900 HC NON-CHARGEABLE SUPPLY

## 2019-09-05 PROCEDURE — 97530 THERAPEUTIC ACTIVITIES: CPT

## 2019-09-05 PROCEDURE — 97535 SELF CARE MNGMENT TRAINING: CPT

## 2019-09-05 PROCEDURE — 6370000000 HC RX 637 (ALT 250 FOR IP): Performed by: PHYSICAL MEDICINE & REHABILITATION

## 2019-09-05 PROCEDURE — 97110 THERAPEUTIC EXERCISES: CPT

## 2019-09-05 PROCEDURE — 6370000000 HC RX 637 (ALT 250 FOR IP): Performed by: NURSE PRACTITIONER

## 2019-09-05 PROCEDURE — 99232 SBSQ HOSP IP/OBS MODERATE 35: CPT | Performed by: PHYSICAL MEDICINE & REHABILITATION

## 2019-09-05 PROCEDURE — 6370000000 HC RX 637 (ALT 250 FOR IP): Performed by: EMERGENCY MEDICINE

## 2019-09-05 PROCEDURE — 1180000000 HC REHAB R&B

## 2019-09-05 RX ADMIN — DOCUSATE SODIUM 100 MG: 100 CAPSULE, LIQUID FILLED ORAL at 08:07

## 2019-09-05 RX ADMIN — ASPIRIN 81 MG: 81 TABLET ORAL at 08:07

## 2019-09-05 RX ADMIN — FUROSEMIDE 20 MG: 20 TABLET ORAL at 08:07

## 2019-09-05 RX ADMIN — DICLOFENAC 4 G: 10 GEL TOPICAL at 08:08

## 2019-09-05 RX ADMIN — GUAIFENESIN 600 MG: 600 TABLET, EXTENDED RELEASE ORAL at 08:07

## 2019-09-05 RX ADMIN — CALCIUM CARBONATE-VITAMIN D TAB 500 MG-200 UNIT 1 TABLET: 500-200 TAB at 20:42

## 2019-09-05 RX ADMIN — SIMVASTATIN 40 MG: 40 TABLET, FILM COATED ORAL at 20:43

## 2019-09-05 RX ADMIN — ACETAMINOPHEN 650 MG: 325 TABLET ORAL at 20:42

## 2019-09-05 RX ADMIN — CETIRIZINE HYDROCHLORIDE 10 MG: 10 TABLET, FILM COATED ORAL at 20:42

## 2019-09-05 RX ADMIN — DICLOFENAC 4 G: 10 GEL TOPICAL at 13:38

## 2019-09-05 RX ADMIN — MAGNESIUM GLUCONATE 500 MG ORAL TABLET 400 MG: 500 TABLET ORAL at 08:07

## 2019-09-05 RX ADMIN — SACUBITRIL AND VALSARTAN 1 TABLET: 49; 51 TABLET, FILM COATED ORAL at 20:42

## 2019-09-05 RX ADMIN — METOPROLOL SUCCINATE 25 MG: 25 TABLET, EXTENDED RELEASE ORAL at 08:07

## 2019-09-05 RX ADMIN — TRAMADOL HYDROCHLORIDE 50 MG: 50 TABLET, FILM COATED ORAL at 13:37

## 2019-09-05 RX ADMIN — SACUBITRIL AND VALSARTAN 1 TABLET: 49; 51 TABLET, FILM COATED ORAL at 08:07

## 2019-09-05 RX ADMIN — CALCIUM CARBONATE-VITAMIN D TAB 500 MG-200 UNIT 1 TABLET: 500-200 TAB at 08:07

## 2019-09-05 RX ADMIN — TRAMADOL HYDROCHLORIDE 50 MG: 50 TABLET, FILM COATED ORAL at 08:07

## 2019-09-05 RX ADMIN — GUAIFENESIN 600 MG: 600 TABLET, EXTENDED RELEASE ORAL at 20:42

## 2019-09-05 RX ADMIN — RIVAROXABAN 10 MG: 10 TABLET, FILM COATED ORAL at 17:48

## 2019-09-05 RX ADMIN — ISOSORBIDE MONONITRATE 30 MG: 30 TABLET ORAL at 08:07

## 2019-09-05 ASSESSMENT — PAIN DESCRIPTION - LOCATION: LOCATION: HIP

## 2019-09-05 ASSESSMENT — PAIN SCALES - GENERAL
PAINLEVEL_OUTOF10: 2
PAINLEVEL_OUTOF10: 2
PAINLEVEL_OUTOF10: 0
PAINLEVEL_OUTOF10: 0

## 2019-09-05 ASSESSMENT — PAIN DESCRIPTION - PROGRESSION: CLINICAL_PROGRESSION: NOT CHANGED

## 2019-09-05 ASSESSMENT — PAIN DESCRIPTION - ORIENTATION: ORIENTATION: LEFT

## 2019-09-05 ASSESSMENT — PAIN - FUNCTIONAL ASSESSMENT: PAIN_FUNCTIONAL_ASSESSMENT: PREVENTS OR INTERFERES SOME ACTIVE ACTIVITIES AND ADLS

## 2019-09-05 ASSESSMENT — PAIN DESCRIPTION - DESCRIPTORS: DESCRIPTORS: ACHING;DISCOMFORT

## 2019-09-05 ASSESSMENT — PAIN DESCRIPTION - FREQUENCY: FREQUENCY: INTERMITTENT

## 2019-09-05 ASSESSMENT — PAIN DESCRIPTION - PAIN TYPE: TYPE: SURGICAL PAIN;ACUTE PAIN

## 2019-09-05 ASSESSMENT — PAIN DESCRIPTION - ONSET: ONSET: ON-GOING

## 2019-09-05 NOTE — PROGRESS NOTES
Physical Medicine & Rehabilitation   Progress Note    Chief Complaint: S/P fall causing a Left hip fracture which was surgically corrected on 08/20/19. Subjective: She reported that her low back and left hip pain were at a 5. This is with respect to a pain scale of 1-10 with 1 being no pain and 10 being intolerable pain. The pain is a dull ache. She slept well last evening. She noted no fever, chills, chest pain, abdominal pain, nausea/vomiting, diarrhea or constipation. She was evaluated today while she was working with [employer]. She had just walked 55 feet using a front-wheeled walker with minimal assistance. Rehabilitation:  PT:   Bed Mobility:  Supine to Sit: Moderate Assistance, X 1, with head of bed raised, with verbal cues , with increased time for completion, and bedrail ; ed in 1/2 bridging technique   Scooting: Minimal Assistance     Transfers:  Sit to Stand: Minimal Assistance, with increased time for completion, with verbal cues  Stand to [place] [id number], with verbal cues  Stand Pivot:Contact Guard Assistance, with verbal cues, using RW, extra time to complete, poor weight shift onto left side during stance time     Ambulation:  Minimal Assistance, with verbal cues , with increased time for completion  Distance: 22 ft. X1; 5 ft. x2   Surface: Level Tile  Device:Rolling Walker  Gait Deviations: Forward Flexed Posture, Slow Lynn, Decreased Step Length on Right, Decreased Weight Shift Left, Decreased Gait Speed, Decreased Heel Strike Bilaterally and Decreased Terminal Knee Extension bilaterally     Stairs:  Stairs:  6\" steps. X 4 using initially using both hands on R ascending rail, used bilateral hand rails for descending steps.   and Minimal Assistance, X 2, with cues for safety, with verbal cues , with increased time for completion.      Balance:  Dynamic Standing Balance: Contact Guard Assistance, stood at AD while completing caitlin-care and clothing management, therapist assisting to incontinence  MUSCULOSKELETAL:  positive for  myalgias, arthralgias, pain, joint swelling, stiff joints, decreased range of motion, muscle weakness and bone pain  NEUROLOGICAL:  positive for coordination problems, gait problems, weakness and pain  BEHAVIOR/PSYCH:  negative  10 point system review otherwise negative    Objective:  /60   Pulse 72   Temp 97.9 °F (36.6 °C) (Oral)   Resp 13   Ht 5' 4\" (1.626 m)   Wt 203 lb 8 oz (92.3 kg)   SpO2 95%   BMI 34.93 kg/m²   She was noted to be awake, alert, and in no acute distress. Orientation:   person, place, time  Mood: within normal limits  Affect: calm  General appearance: Patient is well nourished, well developed, well groomed and in no acute distress, obese, appearing stated age, normal affect. Memory:   normal  Attention/Concentration: normal  Language:  normal  HEART:  S1 and S2 with a regular rate and rhythm without a murmur,  gallop, or rub. LUNGS:  Clear to auscultation bilaterally without rales, rhonchi, or  wheezes. ABDOMEN:  Normoactive bowel sounds in all four quadrants. Her abdomen  was noted to be soft, nontender, without masses. Cranial Nerves:  cranial nerves II-XII are grossly intact  ROM:  abnormal - with respect to her Left hip. Motor Exam: No acute changes noted with respect to her limb strength. Tone:  normal  Muscle bulk: within normal limits  Sensory:  Sensory intact  Coordination:   abnormal - with respect to her mobility. Deep Tendon Reflexes: No acute changes noted. Skin: warm and dry, no rash or erythema. Well-healing Left lateral hip and thigh surgical incisions. Edema: Moderate Left hip and thigh edema without erythema. Mild Left leg to foot edema without erythema. Diagnostics:   No results found for this or any previous visit (from the past 24 hour(s)). Impression:  1. Status post recent fall inside of her home which caused a left hip  fracture which was surgically corrected on 08/20/2019. Postoperatively,  she is to be weightbearing as tolerated to the left lower limb. 2.  Gait dysfunction. 3.  Deficits with respect to her activities of daily living. 4.  Current history of constipation. 5.  Chronic history of urinary bladder incontinence. 6.  Chronic history of low back pain. 7.  Ongoing history of vertigo. 8.  History of erosive gastritis. 9.  History of diverticulosis. 10.  History of COPD. 11.  History of previous pneumonia. 12.  History of congestive heart failure. 13.  History of esophageal strictures which have been surgically dilated  multiple times. 14.  Current history of morbid obesity. 15.  History of sleep apnea. 16.  History of osteoarthritis. 17.  History of osteoporosis. 18.  History of hypertension. 19.  History of hyperlipidemia. 20.  History of coronary artery disease with a cardiac stent being  placed in the past.  21.  Status post right breast lumpectomy performed in 01/2004 to treat  her right breast cancer. 22.  Status post left shoulder replacement. 23.  Status post left humeral fracture, surgically corrected. 24.  Status post cardiac pacemaker placement which was performed on  01/08/2018. 25.  Status post gallbladder removal.  26.  Status post hysterectomy performed in the 1970's. 27.  Remote history of tobacco abuse. Plan:  · She is to continue with her current inpatient rehab. program.  · A Rehab. team conference was held on 09/03/19. Her discharge goal is for her to be discharged on 09/10/19. She is to be discharged to her home with 24 hour caregivers to assure her safety. · Discontinued her Antivert 12.5 mg to be taken 3 times daily due to possible cognitive side effects. · Bilateral knee-high compression hose are to be placed starting on 08/27/19. · A U/A C&S performed on 08/31/19 was noted to show a W. B.C count greater than 200 along with a large amount of Leukocyte Esterase. The C&S demonstrated E. coli.  She was started on Monurol to be taken once daily for 3 days. · A BMP, Magnesium level, and a CBC with diff. were drawn on 08/30/19. Her Magnesium level was noted to be low at 1.4. Her Magnesium is being replaced. The rest of her labs remain stable. Her Magnesium level was 1.6 on 08/31/19. · Started the patient on Zyrtec 10 mg to be taken nightly. · Decreased her Toprol XL to 25 mg once daily. · A K-pad has been ordered and is to be used to her low back as needed. · Restarted the Ultram 50 mg to be taken once daily with breakfast and with lunch. · A Psychology consult note from Dr. Michell Izaguirre dated 09/03/19 was appreciated. · A BMP, Magnesium, and a CBC with diff. were drawn on 09/04/19. All of her labs remain stable. Her Magnesium level has improved to 1.9.  · Discontinued her Potassium Chloride due to her Potassium level being 4.8.       Missed Therapy Time:  · None    Juana Butts MD

## 2019-09-05 NOTE — PROGRESS NOTES
Dressing-Lower: 4 - Requires assist with buttons/zippers/shoelaces and/or assist with shoes only(pt used reacher for Depends and pants, A for shoes and TEDs)              Shower Transfer: 4 - Minimal contact assistance, pt. expends 75% or more effort(CGA)        BALANCE:  Sitting Balance:  Modified Independent  Standing Balance: Stand By Assistance    BED MOBILITY:  Supine to Sit: Minimal Assistance      TRANSFERS:  Sit to Stand:  Contact Guard Assistance. Stand to Sit: Stand By Assistance. FUNCTIONAL MOBILITY:  Assistive Device: Rolling Walker  Assist Level:  Contact Guard Assistance -- emerging to [address]    Distance: To and from bathroom, used w/c to/from shower for ECT      ADDITIONAL ACTIVITIES:  - Pt completed BUE strengthening exercises x15 reps x1 set this date with a lightweight resistive band in all joints and all planes in order to increase strength and improve activity tolerance required for functional toilet & shower transfers and ADL routine. Pt tolerated well, requiring min rest breaks throughout task. - Pt completed visual gaze stabilization exercises x 10 reps x 1 set and convergence / divergence exercises x 10 reps x 1 set for functional visual skills training so pt can safely locate ADL objects without complaints of diplopia. ASSESSMENT:  Activity Tolerance:  Patient tolerance of  treatment: good. Discharge Recommendations: 24 hour supervision or assist, Home with Home health OT, Home with nursing aide  Equipment Recommendations: Equipment Needed: Yes  Other: Patient will need a shower chair, recommend a BSC vs grab bars around toilet. Ordered on 9-04-19. She has a reacher, cont to assess for other Alyssa Arango.    Plan: Times per week: 5x week 90 mins 1x week 30 mins  Current Treatment Recommendations: Strengthening, Balance Training, Functional Mobility Training, Endurance Training, Equipment Evaluation, Education, & procurement, Home Management Training, Patient/Caregiver

## 2019-09-06 LAB
BILIRUBIN URINE: NEGATIVE
BLOOD, URINE: NEGATIVE
CHARACTER, URINE: CLEAR
COLOR: YELLOW
GLUCOSE URINE: NEGATIVE MG/DL
KETONES, URINE: NEGATIVE
LEUKOCYTE ESTERASE, URINE: NEGATIVE
NITRITE, URINE: NEGATIVE
PH UA: 7 (ref 5–9)
PROTEIN UA: NEGATIVE
SPECIFIC GRAVITY, URINE: 1.02 (ref 1–1.03)
UROBILINOGEN, URINE: 0.2 EU/DL (ref 0–1)

## 2019-09-06 PROCEDURE — 97110 THERAPEUTIC EXERCISES: CPT

## 2019-09-06 PROCEDURE — 97116 GAIT TRAINING THERAPY: CPT

## 2019-09-06 PROCEDURE — 6370000000 HC RX 637 (ALT 250 FOR IP): Performed by: PHYSICAL MEDICINE & REHABILITATION

## 2019-09-06 PROCEDURE — 97530 THERAPEUTIC ACTIVITIES: CPT

## 2019-09-06 PROCEDURE — 6370000000 HC RX 637 (ALT 250 FOR IP): Performed by: NURSE PRACTITIONER

## 2019-09-06 PROCEDURE — 81003 URINALYSIS AUTO W/O SCOPE: CPT

## 2019-09-06 PROCEDURE — 6370000000 HC RX 637 (ALT 250 FOR IP): Performed by: EMERGENCY MEDICINE

## 2019-09-06 PROCEDURE — 97535 SELF CARE MNGMENT TRAINING: CPT

## 2019-09-06 PROCEDURE — 1180000000 HC REHAB R&B

## 2019-09-06 PROCEDURE — 2709999900 HC NON-CHARGEABLE SUPPLY

## 2019-09-06 RX ADMIN — CETIRIZINE HYDROCHLORIDE 10 MG: 10 TABLET, FILM COATED ORAL at 21:32

## 2019-09-06 RX ADMIN — DICLOFENAC 4 G: 10 GEL TOPICAL at 13:15

## 2019-09-06 RX ADMIN — CALCIUM CARBONATE-VITAMIN D TAB 500 MG-200 UNIT 1 TABLET: 500-200 TAB at 08:05

## 2019-09-06 RX ADMIN — ISOSORBIDE MONONITRATE 30 MG: 30 TABLET ORAL at 08:05

## 2019-09-06 RX ADMIN — SACUBITRIL AND VALSARTAN 1 TABLET: 49; 51 TABLET, FILM COATED ORAL at 08:05

## 2019-09-06 RX ADMIN — CALCIUM CARBONATE-VITAMIN D TAB 500 MG-200 UNIT 1 TABLET: 500-200 TAB at 21:32

## 2019-09-06 RX ADMIN — GUAIFENESIN 600 MG: 600 TABLET, EXTENDED RELEASE ORAL at 08:05

## 2019-09-06 RX ADMIN — TRAMADOL HYDROCHLORIDE 50 MG: 50 TABLET, FILM COATED ORAL at 13:07

## 2019-09-06 RX ADMIN — ASPIRIN 81 MG: 81 TABLET ORAL at 08:05

## 2019-09-06 RX ADMIN — ACETAMINOPHEN 650 MG: 325 TABLET ORAL at 21:32

## 2019-09-06 RX ADMIN — FOSFOMYCIN TROMETHAMINE 1 PACKET: 3 POWDER ORAL at 21:32

## 2019-09-06 RX ADMIN — RIVAROXABAN 10 MG: 10 TABLET, FILM COATED ORAL at 16:36

## 2019-09-06 RX ADMIN — FUROSEMIDE 20 MG: 20 TABLET ORAL at 08:05

## 2019-09-06 RX ADMIN — DOCUSATE SODIUM 100 MG: 100 CAPSULE, LIQUID FILLED ORAL at 08:05

## 2019-09-06 RX ADMIN — SIMVASTATIN 40 MG: 40 TABLET, FILM COATED ORAL at 21:32

## 2019-09-06 RX ADMIN — DICLOFENAC 4 G: 10 GEL TOPICAL at 08:05

## 2019-09-06 RX ADMIN — METOPROLOL SUCCINATE 25 MG: 25 TABLET, EXTENDED RELEASE ORAL at 08:05

## 2019-09-06 RX ADMIN — MAGNESIUM GLUCONATE 500 MG ORAL TABLET 400 MG: 500 TABLET ORAL at 08:05

## 2019-09-06 RX ADMIN — DICLOFENAC 4 G: 10 GEL TOPICAL at 16:36

## 2019-09-06 RX ADMIN — SACUBITRIL AND VALSARTAN 1 TABLET: 49; 51 TABLET, FILM COATED ORAL at 21:32

## 2019-09-06 RX ADMIN — TRAMADOL HYDROCHLORIDE 50 MG: 50 TABLET, FILM COATED ORAL at 08:05

## 2019-09-06 ASSESSMENT — PAIN SCALES - GENERAL
PAINLEVEL_OUTOF10: 5
PAINLEVEL_OUTOF10: 0
PAINLEVEL_OUTOF10: 0
PAINLEVEL_OUTOF10: 3

## 2019-09-06 NOTE — PROGRESS NOTES
Family Medicine Progress Notes/Coverage    Today's Date: 9/6/19  7E-55/055-A  Medical Record # [id number]  Account # [de-identified]      Ms. Ar Fong admitted on 8/23/2019        Subjective / Interval History :     No vertigo with goggles , doing well, No SOB, No chest pain , No fatigue. No nausea nor abdominal pain  Reviewed notes, consults, laboratory and radiology results,    Objective:       Physical Exam:  Patient Vitals for the past 24 hrs:   BP Temp Temp src Pulse Resp SpO2 Weight   09/06/19 0745 136/60 97.7 °F (36.5 °C) Oral 68 16 94 % --   09/05/19 2158 -- -- -- -- -- -- 202 lb 14.4 oz (92 kg)   09/05/19 2030 122/64 98.1 °F (36.7 °C) Oral 77 14 93 % --       General Appearance:  Alert, cooperative, no distress, appears stated age   HEENT:  Neck:      Chest/Lungs:  Heart: CTA  RRR   Abdomen:  Back: soft   Extremities:  Neurological Exam: No edema  WNL       Assessment:     Admitting Diagnosis:    S/p left hip fracture [Z87.81]    Active Hospital Problems    Diagnosis Date Noted    S/p left hip fracture [Z87.81] 08/23/2019     Priority: High    Benign positional vertigo [H81.10] 08/19/2019     Priority: High    Osteoporosis of vertebra [M81.0]      Priority: Medium    Class 2 severe obesity due to excess calories with serious comorbidity in adult ([facility] [E66.01] 02/03/2018     Priority: Medium    Nonischemic cardiomyopathy ([facility] [I42.8]      Priority: Medium    COPD (chronic obstructive pulmonary disease) ([facility] [J44.9] 08/05/2017     Priority: Medium    Obstructive sleep apnea on CPAP [G47.33, Z99.89] 11/05/2015     Priority: Medium    CAD (coronary artery disease) s/p PCI and stent Multilink 3 x 18  mm mid LAD- in 07/2009 at [facility] [I25.10] 07/16/2012     Priority: Medium       Plan:     Discussed plans with the nursing staff  Home on Tuesday?     Medications,

## 2019-09-06 NOTE — PROGRESS NOTES
Requires setup/supervision/cues and/or requires assist with presthesis/brace only   Dressing-Lower: 5 - Requires setup/supervision/cues and/or staff applies TEDS/prosthesis/brace only(SBA, used reacher )   Toiletin - Requires setup/supervision/cues(close SBA )   Toilet Transfer: 5 - Requires setup/supervision/cues(SBA )     BALANCE:  Sitting Balance:  Modified Independent  Standing Balance: Stand By Assistance    BED MOBILITY:  Supine to Sit: Stand By Assistance      TRANSFERS:  Sit to Stand:  [address] Stand to Sit: Stand By Assistance. FUNCTIONAL MOBILITY:  Assistive Device: Rolling Walker  Assist Level:  Stand By Assistance. Distance: To and from bathroom and as well as within therapy apartment during IADL     ADDITIONAL ACTIVITIES:  Reviewed IADL RW safety with visual demo in therapy apartment; pt then carried over info to complete simulated task. SBA throughout with an endurance x 5 min, no LOB. Min cues for reassurance on right transportation technique. ASSESSMENT:  Activity Tolerance:  Patient tolerance of  treatment: good. Discharge Recommendations: 24 hour supervision or assist, Home with Home health OT, Home with nursing aide  Equipment Recommendations: Equipment Needed: Yes  Other: Patient will need a shower chair, recommend a BSC vs grab bars around toilet. Ordered on 19. She has a reacher, cont to assess for other Alyssa Arango.    Plan: Times per week: 5x week 90 mins 1x week 30 mins  Current Treatment Recommendations: Strengthening, Balance Training, Functional Mobility Training, Endurance Training, Equipment Evaluation, Education, & procurement, Home Management Training, Patient/Caregiver Education & Training, Safety Education & Training, Self-Care / ADL, Pain Management    Patient Education  Patient Education: ADL's and IADL's    Goals  Short term goals  Time Frame for Short term goals: 1 week  Short term goal 1: Pt will ambulate short distances with RW and SBA x 1 person to increase endurance for home mobility to the BR. Short term goal 2: Pt to demo standing tolerance greater than 3 mins with ocassional 1 UE release at SBA in prep for clothing management and toilet hygiene  Short term goal 3: Pt to complete various functional t/fs at SBAwith min cues for safety for inc indep with toileting  Short term goal 4: Pt to tolerate 12 reps of light BUE strengthening for increasing strength required for functional transfers  Long term goals  Time Frame for Long term goals : 3-4 weeks  Long term goal 1: Pt to demonstrate basic homemaking tasks with no greater than Sup in prep for return to IADL tasks within home environment  Long term goal 2: Pt to demonstrate LB ADLs using any LHAE necessary and no greater than min cues for increased indep with self care   Long term goal 3: Pt to demonstrate standing tolerance greater than 10 mins at SUP in prep for SMP and basic homemaking skills    Following session, patient left in safe position with all fall risk precautions in place.

## 2019-09-06 NOTE — PROGRESS NOTES
restrictions: h/o vertigo    SUBJECTIVE: Pt. Seated in BS chair upon arrival and pleasantly agrees to therapy session but reports light-headedness and nausea. Pt. Requests to use restroom at beginning of session and becomes anxious during ambulation requiring cues for slowed breathing. BP checked and read 82/50 while seated on toilet. Pt. Placed back in BS chair to complete seated ther ex. PAIN: Not rated: L LE    OBJECTIVE:  Transfers:  Sit to Stand: [employer], Minimal Assistance  Stand to Sit:Contact Guard Assistance    Ambulation:  Contact Guard Assistance  Distance: 12' x 1, 3' x 2  Surface: Level Tile  Device:Rolling Walker  Gait Deviations: Forward Flexed Posture, Slow Lynn, Decreased Step Length Bilaterally, Decreased Weight Shift Left, Decreased Gait Speed, Decreased Heel Strike Bilaterally and Unsteady Gait    Balance:  Dynamic Standing Balance: Contact Guard Assistance   To stand at walker and complete clothing management prior to and after using toilet. Exercise:  Patient was guided in 1 set(s) 15 reps of exercise to both lower extremities. Ankle pumps, Glut sets, Quad sets, Heelslides, Long arc quads, Hip abduction/adduction, Straight leg raises, Seated hip flexion, Seated hamstring curls, Seated heel/toe raises and Seated isometric hip adduction. Assistance provided with L LE at times. Pt. Requires frequent rest breaks throughout. Exercises were completed for increased independence with functional mobility. Functional Outcome Measures: Not completed       ASSESSMENT:  Assessment: Pt. limited this session by low BP causing light-headedness and nausea. Activity Tolerance:  Patient tolerance of  treatment: fair. Equipment Recommendations:Equipment Needed: Yes(Pt has RW.   May need a w/c for the discharge setting.  )  Other: monitor for needs  Discharge Recommendations:  Continue to assess pending progress, Patient would benefit from continued therapy after discharge, 24 hour supervision or assist    Plan: Times per week: 5x/wk 90 min, 1x/wk 30 min  Specific instructions for Next Treatment: ther ex, transfers, gait training, bed mobility   Current Treatment Recommendations: Strengthening, Transfer Training, Endurance Training, Patient/Caregiver Education & Training, ROM, Balance Training, Gait Training, Stair training, Functional Mobility Training, Safety Education & Training, Home Exercise Program    Patient Education  Patient Education: Plan of Care, Transfers, Gait, Verbal Exercise Instruction    Goals:  Patient goals : to get better  Short term goals  Time Frame for Short term goals: 1 week  Short term goal 1: Patient to be able to perform rolling L/R at MINx1 to decrease risk of pressure ulcers  Short term goal 2: Patient will be able to perform supine<>sit at MINx1 to sit EOB  Short term goal 3: Patient will be able to get up/down from various seated surfaces, +1 mod assist, to get up to walk. Short term goal 4: Patient to be able to walk >= 10 feet with MINx1 with RW in order to reach bathroom   Short term goal 5: Pt to perform stand/pivot transfer with RW, +1 min assist to get in/out of w/c. Long term goals  Time Frame for Long term goals : 3 weeks  Long term goal 1: Patient will be able to perform bed mobility at [facility] to sit EOB  Long term goal 2: Patient will be able to perform functional transfers at [facility] to sit in recliner at home  Long term goal 3: Patient to be able to ambulate 50 feet with RW and CGA to walk around home safely  Long term goal 4: Patient will be able to negotiate 2 steps at [place] [id number] to enter home   Long term goal 5: Patient will demonstrate Good dynamic standing balance in order to decrease risk of future falls. Following session, patient left in safe position with all fall risk precautions in place.

## 2019-09-07 PROCEDURE — 97116 GAIT TRAINING THERAPY: CPT

## 2019-09-07 PROCEDURE — 6370000000 HC RX 637 (ALT 250 FOR IP): Performed by: NURSE PRACTITIONER

## 2019-09-07 PROCEDURE — 97110 THERAPEUTIC EXERCISES: CPT

## 2019-09-07 PROCEDURE — 97535 SELF CARE MNGMENT TRAINING: CPT

## 2019-09-07 PROCEDURE — 6370000000 HC RX 637 (ALT 250 FOR IP): Performed by: PHYSICAL MEDICINE & REHABILITATION

## 2019-09-07 PROCEDURE — 1180000000 HC REHAB R&B

## 2019-09-07 PROCEDURE — 2709999900 HC NON-CHARGEABLE SUPPLY

## 2019-09-07 PROCEDURE — 6370000000 HC RX 637 (ALT 250 FOR IP): Performed by: EMERGENCY MEDICINE

## 2019-09-07 RX ADMIN — DICLOFENAC 4 G: 10 GEL TOPICAL at 08:07

## 2019-09-07 RX ADMIN — DICLOFENAC 4 G: 10 GEL TOPICAL at 12:48

## 2019-09-07 RX ADMIN — SACUBITRIL AND VALSARTAN 1 TABLET: 49; 51 TABLET, FILM COATED ORAL at 08:07

## 2019-09-07 RX ADMIN — MAGNESIUM GLUCONATE 500 MG ORAL TABLET 400 MG: 500 TABLET ORAL at 08:07

## 2019-09-07 RX ADMIN — SIMVASTATIN 40 MG: 40 TABLET, FILM COATED ORAL at 20:08

## 2019-09-07 RX ADMIN — CALCIUM CARBONATE-VITAMIN D TAB 500 MG-200 UNIT 1 TABLET: 500-200 TAB at 08:07

## 2019-09-07 RX ADMIN — ISOSORBIDE MONONITRATE 30 MG: 30 TABLET ORAL at 08:07

## 2019-09-07 RX ADMIN — SACUBITRIL AND VALSARTAN 1 TABLET: 49; 51 TABLET, FILM COATED ORAL at 20:09

## 2019-09-07 RX ADMIN — RIVAROXABAN 10 MG: 10 TABLET, FILM COATED ORAL at 17:09

## 2019-09-07 RX ADMIN — DOCUSATE SODIUM 100 MG: 100 CAPSULE, LIQUID FILLED ORAL at 08:07

## 2019-09-07 RX ADMIN — METOPROLOL SUCCINATE 25 MG: 25 TABLET, EXTENDED RELEASE ORAL at 08:07

## 2019-09-07 RX ADMIN — FUROSEMIDE 20 MG: 20 TABLET ORAL at 08:07

## 2019-09-07 RX ADMIN — GUAIFENESIN 600 MG: 600 TABLET, EXTENDED RELEASE ORAL at 08:07

## 2019-09-07 RX ADMIN — TRAMADOL HYDROCHLORIDE 50 MG: 50 TABLET, FILM COATED ORAL at 12:48

## 2019-09-07 RX ADMIN — TRAMADOL HYDROCHLORIDE 50 MG: 50 TABLET, FILM COATED ORAL at 08:06

## 2019-09-07 RX ADMIN — CALCIUM CARBONATE-VITAMIN D TAB 500 MG-200 UNIT 1 TABLET: 500-200 TAB at 20:09

## 2019-09-07 RX ADMIN — ASPIRIN 81 MG: 81 TABLET ORAL at 08:07

## 2019-09-07 RX ADMIN — ACETAMINOPHEN 650 MG: 325 TABLET ORAL at 20:09

## 2019-09-07 RX ADMIN — CETIRIZINE HYDROCHLORIDE 10 MG: 10 TABLET, FILM COATED ORAL at 20:09

## 2019-09-07 ASSESSMENT — PAIN SCALES - GENERAL
PAINLEVEL_OUTOF10: 0
PAINLEVEL_OUTOF10: 0
PAINLEVEL_OUTOF10: 2
PAINLEVEL_OUTOF10: 2

## 2019-09-07 ASSESSMENT — PAIN DESCRIPTION - PAIN TYPE: TYPE: SURGICAL PAIN

## 2019-09-07 ASSESSMENT — PAIN DESCRIPTION - ORIENTATION: ORIENTATION: LEFT

## 2019-09-07 ASSESSMENT — PAIN DESCRIPTION - LOCATION: LOCATION: HIP

## 2019-09-07 ASSESSMENT — PAIN DESCRIPTION - DESCRIPTORS: DESCRIPTORS: ACHING

## 2019-09-07 NOTE — PROGRESS NOTES
Physical Therapy   [address] [address]  INPATIENT PHYSICAL THERAPY  DAILY NOTE  [facility] - 7E-55/055-A  Time In: 1100  Time Out: 1130  Timed Code Treatment Minutes: 30 Minutes  Minutes: 30          Date: 2019  Patient Name: Carol Hathaway,  Gender:  female        MRN: [id number]  : 1940  ([de-identified] y.o.)     Referring Practitioner: Tiana Villafuerte MD  Diagnosis: s/p left hip fracture   Additional Pertinent Hx: Per ED note, pt is a [de-identified] y.o. female who presents to the Emergency Department for the evaluation of a fall. Patient reports that she bent forward, felt dizzy and fell. She is not certain if she hit her head during the fall, but she did not loose consciousness. Patient has a history of vertigo, DVT and has a pacemaker. She takes asprin regularly as a blood thinner and takes tramodol for pain management. She has also been undergoing vestibular therapy for her ongoing dizziness. Prior Level of Function:  Lives With: Alone  Type of Home: House  Home Layout: Two level, Able to Live on Main level with bedroom/bathroom  Home Access: Stairs to enter with rails  Entrance Stairs - Number of Steps: 2 steps with 1 rail   Entrance Stairs - Rails: Right  Home Equipment: Standard walker, Cane, Reacher, Rolling walker   Bathroom Shower/Tub: Walk-in shower  Bathroom Toilet: Handicap height  Bathroom Equipment: Grab bars in shower    ADL Assistance: Independent  Homemaking Assistance: Independent  Ambulation Assistance: Independent  Transfer Assistance: Independent  Active : Yes  IADL Comments: does own grocery shopping  Additional Comments: Pt was getting vestibular PT rehab. Pt was generally independent with mobility. Pt volunteers regularly here at [facility].     Restrictions/Precautions:  Restrictions/Precautions: Weight Bearing, General Precautions, Fall Risk  Left Lower Extremity Weight Bearing: Weight Bearing As Tolerated  Position Activity Restriction  Other

## 2019-09-07 NOTE — PROGRESS NOTES
toileting task. BALANCE:  Sitting Balance:  Modified Independent  Standing Balance: Stand By Assistance  In preparation for ambulation, ADLs    TRANSFERS:  Sit to Stand:  Contact Guard Assistance. Stand to Sit: Contact Guard Assistance. To from recliner    FUNCTIONAL MOBILITY:  Assistive Device: Rolling Walker  Assist Level:  SBA   Distance: To and from bathroom   slow pace, No LOB patient demonstrated difficult with R LE swinging forward with ambulation    ASSESSMENT:  Activity Tolerance:  Patient tolerance of  treatment: good. Discharge Recommendations: 24 hour supervision or assist, Home with Home health OT, Home with nursing aide  Equipment Recommendations: Equipment Needed: Yes  Other: Patient will need a shower chair, recommend a BSC vs grab bars around toilet. Ordered on 9-04-19. She has a reacher, cont to assess for other Maximiliano Mate. Plan: Times per week: 5x week 90 mins 1x week 30 mins  Current Treatment Recommendations: Strengthening, Balance Training, Functional Mobility Training, Endurance Training, Equipment Evaluation, Education, & procurement, Home Management Training, Patient/Caregiver Education & Training, Safety Education & Training, Self-Care / ADL, Pain Management    Patient Education  Patient Education: Plan of Care and ADL's    Goals  Short term goals  Time Frame for Short term goals: 1 week  Short term goal 1: Pt will ambulate short distances with RW and SBA x 1 person to increase endurance for home mobility to the .    Short term goal 2: Pt to demo standing tolerance greater than 3 mins with ocassional 1 UE release at SBA in prep for clothing management and toilet hygiene  Short term goal 3: Pt to complete various functional t/fs at SBAwith min cues for safety for inc indep with toileting  Short term goal 4: Pt to tolerate 12 reps of light BUE strengthening for increasing strength required for functional transfers  Long term goals  Time Frame for Long term goals : 3-4 weeks  Long

## 2019-09-07 NOTE — DISCHARGE INSTR - COC
MD at [address] [address] Right 2/12/2018    LEFT HUMERUS OPEN REDUCTION INTERNAL FIXATION performed by Vanessa Logan MD at [facility]    UPPER GASTROINTESTINAL ENDOSCOPY Left 8/2/2018    EGD BIOPSY performed by Danni Chu MD at [facility]       Immunization History:   Immunization History   Administered Date(s) Administered    Influenza Vaccine, unspecified formulation 10/19/2016    Influenza Virus Vaccine 11/29/2013, 10/08/2014, 11/02/2015    Tdap (Boostrix, Adacel) 02/03/2018       Active Problems:  Patient Active Problem List   Diagnosis Code    Hyperlipidemia E78.5    Breast CA ([facility] C50.919    CAD (coronary artery disease) s/p PCI and stent Multilink 3 x 18  mm mid LAD- in 07/2009 at [facility] I25.10    Obstructive sleep apnea on CPAP G47.33, Z99.89    COPD (chronic obstructive pulmonary disease) ([facility] J44.9    S/P ICD (internal cardiac defibrillator) procedure Z95.810    Nonischemic cardiomyopathy ([facility] I42.8    Class 2 severe obesity due to excess calories with serious comorbidity in adult ([facility] E66.01    Hypertension I10    Adjustment disorder with mixed anxiety and depressed mood F43.23    Osteopenia determined by x-ray M85.80    Osteoporosis of vertebra M81.0    Closed left hip fracture, initial encounter ([facility] S72.002A    Benign positional vertigo H81.10    S/p left hip fracture Z87.81       Isolation/Infection:   Isolation          No Isolation            Nurse Assessment:  Last Vital Signs: /62   Pulse 71   Temp 97.7 °F (36.5 °C) (Oral)   Resp 16   Ht 5' 4\" (1.626 m)   Wt 202 lb 14.4 oz (92 kg)   SpO2 95%   BMI 34.83 kg/m²     Last documented pain score (0-10 scale): Pain Level: 0  Last Weight:   Wt Readings from Last 1 Encounters:   09/05/19 202 lb 14.4 oz (92 kg)     Mental Status:  oriented and alert    IV Access:  - None    Nursing Mobility/ADLs:  Walking   Assisted  Transfer  Assisted  Bathing

## 2019-09-08 PROCEDURE — 6370000000 HC RX 637 (ALT 250 FOR IP): Performed by: NURSE PRACTITIONER

## 2019-09-08 PROCEDURE — 1180000000 HC REHAB R&B

## 2019-09-08 PROCEDURE — 99232 SBSQ HOSP IP/OBS MODERATE 35: CPT | Performed by: PHYSICAL MEDICINE & REHABILITATION

## 2019-09-08 PROCEDURE — 6370000000 HC RX 637 (ALT 250 FOR IP): Performed by: EMERGENCY MEDICINE

## 2019-09-08 PROCEDURE — 6370000000 HC RX 637 (ALT 250 FOR IP): Performed by: PHYSICAL MEDICINE & REHABILITATION

## 2019-09-08 PROCEDURE — 2709999900 HC NON-CHARGEABLE SUPPLY

## 2019-09-08 RX ORDER — METOPROLOL SUCCINATE 25 MG/1
12.5 TABLET, EXTENDED RELEASE ORAL DAILY
Status: DISCONTINUED | OUTPATIENT
Start: 2019-09-09 | End: 2019-09-10 | Stop reason: HOSPADM

## 2019-09-08 RX ADMIN — DICLOFENAC 4 G: 10 GEL TOPICAL at 21:19

## 2019-09-08 RX ADMIN — TRAMADOL HYDROCHLORIDE 50 MG: 50 TABLET, FILM COATED ORAL at 08:24

## 2019-09-08 RX ADMIN — ASPIRIN 81 MG: 81 TABLET ORAL at 08:24

## 2019-09-08 RX ADMIN — ACETAMINOPHEN 650 MG: 325 TABLET ORAL at 21:18

## 2019-09-08 RX ADMIN — RIVAROXABAN 10 MG: 10 TABLET, FILM COATED ORAL at 17:30

## 2019-09-08 RX ADMIN — FUROSEMIDE 20 MG: 20 TABLET ORAL at 08:24

## 2019-09-08 RX ADMIN — METOPROLOL SUCCINATE 25 MG: 25 TABLET, EXTENDED RELEASE ORAL at 08:24

## 2019-09-08 RX ADMIN — CALCIUM CARBONATE-VITAMIN D TAB 500 MG-200 UNIT 1 TABLET: 500-200 TAB at 19:48

## 2019-09-08 RX ADMIN — SACUBITRIL AND VALSARTAN 1 TABLET: 49; 51 TABLET, FILM COATED ORAL at 19:48

## 2019-09-08 RX ADMIN — SIMVASTATIN 40 MG: 40 TABLET, FILM COATED ORAL at 19:47

## 2019-09-08 RX ADMIN — SACUBITRIL AND VALSARTAN 1 TABLET: 49; 51 TABLET, FILM COATED ORAL at 08:24

## 2019-09-08 RX ADMIN — TRAMADOL HYDROCHLORIDE 50 MG: 50 TABLET, FILM COATED ORAL at 12:42

## 2019-09-08 RX ADMIN — CALCIUM CARBONATE-VITAMIN D TAB 500 MG-200 UNIT 1 TABLET: 500-200 TAB at 08:24

## 2019-09-08 RX ADMIN — CETIRIZINE HYDROCHLORIDE 10 MG: 10 TABLET, FILM COATED ORAL at 19:47

## 2019-09-08 RX ADMIN — MAGNESIUM GLUCONATE 500 MG ORAL TABLET 400 MG: 500 TABLET ORAL at 08:24

## 2019-09-08 RX ADMIN — DICLOFENAC 4 G: 10 GEL TOPICAL at 12:44

## 2019-09-08 RX ADMIN — ISOSORBIDE MONONITRATE 30 MG: 30 TABLET ORAL at 08:24

## 2019-09-08 ASSESSMENT — PAIN DESCRIPTION - DESCRIPTORS: DESCRIPTORS: ACHING

## 2019-09-08 ASSESSMENT — PAIN SCALES - GENERAL
PAINLEVEL_OUTOF10: 0
PAINLEVEL_OUTOF10: 2
PAINLEVEL_OUTOF10: 2
PAINLEVEL_OUTOF10: 0
PAINLEVEL_OUTOF10: 2
PAINLEVEL_OUTOF10: 2
PAINLEVEL_OUTOF10: 0
PAINLEVEL_OUTOF10: 0
PAINLEVEL_OUTOF10: 4
PAINLEVEL_OUTOF10: 0

## 2019-09-08 ASSESSMENT — PAIN DESCRIPTION - LOCATION: LOCATION: GENERALIZED

## 2019-09-08 ASSESSMENT — PAIN DESCRIPTION - PAIN TYPE: TYPE: ACUTE PAIN

## 2019-09-08 NOTE — PROGRESS NOTES
esophageal strictures which have been surgically dilated  multiple times. 14.  Current history of morbid obesity. 15.  History of sleep apnea. 16.  History of osteoarthritis. 17.  History of osteoporosis. 18.  History of hypertension. 19.  History of hyperlipidemia. 20.  History of coronary artery disease with a cardiac stent being  placed in the past.  21.  Status post right breast lumpectomy performed in 01/2004 to treat  her right breast cancer. 22.  Status post left shoulder replacement. 23.  Status post left humeral fracture, surgically corrected. 24.  Status post cardiac pacemaker placement which was performed on  01/08/2018. 25.  Status post gallbladder removal.  26.  Status post hysterectomy performed in the 1970's. 27.  Remote history of tobacco abuse. Plan:  · She is to continue with her current inpatient rehab. program.  · A Rehab. team conference was held on 09/03/19. Her discharge goal is for her to be discharged on 09/10/19. She is to be discharged to the [facility]. She will receive P.T., O.T., Nursing, and an Aide. · Discontinued her Antivert 12.5 mg to be taken 3 times daily due to possible cognitive side effects. · Bilateral knee-high compression hose are to be placed starting on 08/27/19. · A U/A C&S performed on 08/31/19 was noted to show a W. B.C count greater than 200 along with a large amount of Leukocyte Esterase. The C&S demonstrated E. coli. She was started on Monurol to be taken once daily for 3 days. · Started the patient on Zyrtec 10 mg to be taken nightly. · A K-pad has been ordered and is to be used to her low back as needed. · Restarted the Ultram 50 mg to be taken once daily with breakfast and with lunch. · A Psychology consult note from Dr. Jenny Alcaraz dated 09/03/19 was appreciated. · A BMP, Magnesium, and a CBC with diff. Will be drawn on 09/09/19.    · Decreased her Toprol XL to 12.5 mg taken once a day due to her blood pressure running

## 2019-09-09 LAB
ANION GAP SERPL CALCULATED.3IONS-SCNC: 10 MEQ/L (ref 8–16)
BASOPHILS # BLD: 0.7 %
BASOPHILS ABSOLUTE: 0 THOU/MM3 (ref 0–0.1)
BUN BLDV-MCNC: 23 MG/DL (ref 7–22)
CALCIUM SERPL-MCNC: 9.7 MG/DL (ref 8.5–10.5)
CHLORIDE BLD-SCNC: 100 MEQ/L (ref 98–111)
CO2: 29 MEQ/L (ref 23–33)
CREAT SERPL-MCNC: 0.7 MG/DL (ref 0.4–1.2)
EOSINOPHIL # BLD: 2.3 %
EOSINOPHILS ABSOLUTE: 0.1 THOU/MM3 (ref 0–0.4)
ERYTHROCYTE [DISTWIDTH] IN BLOOD BY AUTOMATED COUNT: 14.1 % (ref 11.5–14.5)
ERYTHROCYTE [DISTWIDTH] IN BLOOD BY AUTOMATED COUNT: 52.9 FL (ref 35–45)
GFR SERPL CREATININE-BSD FRML MDRD: 80 ML/MIN/1.73M2
GLUCOSE BLD-MCNC: 85 MG/DL (ref 70–108)
HCT VFR BLD CALC: 35.6 % (ref 37–47)
HEMOGLOBIN: 11 GM/DL (ref 12–16)
IMMATURE GRANS (ABS): 0.05 THOU/MM3 (ref 0–0.07)
IMMATURE GRANULOCYTES: 1 %
LYMPHOCYTES # BLD: 35 %
LYMPHOCYTES ABSOLUTE: 2 THOU/MM3 (ref 1–4.8)
MAGNESIUM: 2.1 MG/DL (ref 1.6–2.4)
MCH RBC QN AUTO: 31.5 PG (ref 26–33)
MCHC RBC AUTO-ENTMCNC: 30.9 GM/DL (ref 32.2–35.5)
MCV RBC AUTO: 102 FL (ref 81–99)
MONOCYTES # BLD: 9.4 %
MONOCYTES ABSOLUTE: 0.5 THOU/MM3 (ref 0.4–1.3)
NUCLEATED RED BLOOD CELLS: 0 /100 WBC
PLATELET # BLD: 228 THOU/MM3 (ref 130–400)
PMV BLD AUTO: 9.9 FL (ref 9.4–12.4)
POTASSIUM SERPL-SCNC: 4.4 MEQ/L (ref 3.5–5.2)
RBC # BLD: 3.49 MILL/MM3 (ref 4.2–5.4)
SEG NEUTROPHILS: 51.7 %
SEGMENTED NEUTROPHILS ABSOLUTE COUNT: 2.9 THOU/MM3 (ref 1.8–7.7)
SODIUM BLD-SCNC: 139 MEQ/L (ref 135–145)
WBC # BLD: 5.7 THOU/MM3 (ref 4.8–10.8)

## 2019-09-09 PROCEDURE — 85025 COMPLETE CBC W/AUTO DIFF WBC: CPT

## 2019-09-09 PROCEDURE — 97110 THERAPEUTIC EXERCISES: CPT

## 2019-09-09 PROCEDURE — 1180000000 HC REHAB R&B

## 2019-09-09 PROCEDURE — 97535 SELF CARE MNGMENT TRAINING: CPT

## 2019-09-09 PROCEDURE — 36415 COLL VENOUS BLD VENIPUNCTURE: CPT

## 2019-09-09 PROCEDURE — 97530 THERAPEUTIC ACTIVITIES: CPT

## 2019-09-09 PROCEDURE — 6370000000 HC RX 637 (ALT 250 FOR IP): Performed by: PHYSICAL MEDICINE & REHABILITATION

## 2019-09-09 PROCEDURE — 6370000000 HC RX 637 (ALT 250 FOR IP): Performed by: EMERGENCY MEDICINE

## 2019-09-09 PROCEDURE — 6370000000 HC RX 637 (ALT 250 FOR IP): Performed by: NURSE PRACTITIONER

## 2019-09-09 PROCEDURE — 97542 WHEELCHAIR MNGMENT TRAINING: CPT

## 2019-09-09 PROCEDURE — 80048 BASIC METABOLIC PNL TOTAL CA: CPT

## 2019-09-09 PROCEDURE — 97116 GAIT TRAINING THERAPY: CPT

## 2019-09-09 PROCEDURE — 83735 ASSAY OF MAGNESIUM: CPT

## 2019-09-09 PROCEDURE — 99232 SBSQ HOSP IP/OBS MODERATE 35: CPT | Performed by: PHYSICAL MEDICINE & REHABILITATION

## 2019-09-09 RX ADMIN — RIVAROXABAN 10 MG: 10 TABLET, FILM COATED ORAL at 16:24

## 2019-09-09 RX ADMIN — CALCIUM CARBONATE-VITAMIN D TAB 500 MG-200 UNIT 1 TABLET: 500-200 TAB at 07:40

## 2019-09-09 RX ADMIN — MAGNESIUM GLUCONATE 500 MG ORAL TABLET 400 MG: 500 TABLET ORAL at 07:42

## 2019-09-09 RX ADMIN — SACUBITRIL AND VALSARTAN 1 TABLET: 49; 51 TABLET, FILM COATED ORAL at 22:05

## 2019-09-09 RX ADMIN — ACETAMINOPHEN 650 MG: 325 TABLET ORAL at 03:58

## 2019-09-09 RX ADMIN — DICLOFENAC 4 G: 10 GEL TOPICAL at 16:25

## 2019-09-09 RX ADMIN — TRAMADOL HYDROCHLORIDE 50 MG: 50 TABLET, FILM COATED ORAL at 12:55

## 2019-09-09 RX ADMIN — SACUBITRIL AND VALSARTAN 1 TABLET: 49; 51 TABLET, FILM COATED ORAL at 07:44

## 2019-09-09 RX ADMIN — CALCIUM CARBONATE-VITAMIN D TAB 500 MG-200 UNIT 1 TABLET: 500-200 TAB at 21:08

## 2019-09-09 RX ADMIN — DICLOFENAC 4 G: 10 GEL TOPICAL at 22:04

## 2019-09-09 RX ADMIN — FUROSEMIDE 20 MG: 20 TABLET ORAL at 07:42

## 2019-09-09 RX ADMIN — METOPROLOL SUCCINATE 12.5 MG: 25 TABLET, EXTENDED RELEASE ORAL at 07:43

## 2019-09-09 RX ADMIN — ISOSORBIDE MONONITRATE 30 MG: 30 TABLET ORAL at 07:42

## 2019-09-09 RX ADMIN — DOCUSATE SODIUM 100 MG: 100 CAPSULE, LIQUID FILLED ORAL at 07:41

## 2019-09-09 RX ADMIN — CETIRIZINE HYDROCHLORIDE 10 MG: 10 TABLET, FILM COATED ORAL at 21:08

## 2019-09-09 RX ADMIN — DICLOFENAC 4 G: 10 GEL TOPICAL at 07:45

## 2019-09-09 RX ADMIN — SIMVASTATIN 40 MG: 40 TABLET, FILM COATED ORAL at 21:08

## 2019-09-09 RX ADMIN — ACETAMINOPHEN 650 MG: 325 TABLET ORAL at 22:04

## 2019-09-09 RX ADMIN — ACETAMINOPHEN 650 MG: 325 TABLET ORAL at 16:30

## 2019-09-09 RX ADMIN — ASPIRIN 81 MG: 81 TABLET ORAL at 07:39

## 2019-09-09 RX ADMIN — TRAMADOL HYDROCHLORIDE 50 MG: 50 TABLET, FILM COATED ORAL at 07:44

## 2019-09-09 ASSESSMENT — PAIN DESCRIPTION - PAIN TYPE
TYPE: ACUTE PAIN

## 2019-09-09 ASSESSMENT — PAIN DESCRIPTION - FREQUENCY
FREQUENCY: INTERMITTENT

## 2019-09-09 ASSESSMENT — PAIN SCALES - GENERAL
PAINLEVEL_OUTOF10: 5
PAINLEVEL_OUTOF10: 3
PAINLEVEL_OUTOF10: 4
PAINLEVEL_OUTOF10: 3
PAINLEVEL_OUTOF10: 5
PAINLEVEL_OUTOF10: 0
PAINLEVEL_OUTOF10: 3
PAINLEVEL_OUTOF10: 4
PAINLEVEL_OUTOF10: 0

## 2019-09-09 ASSESSMENT — PAIN DESCRIPTION - LOCATION
LOCATION: HIP

## 2019-09-09 ASSESSMENT — PAIN DESCRIPTION - ORIENTATION
ORIENTATION: LEFT
ORIENTATION: RIGHT
ORIENTATION: LEFT

## 2019-09-09 ASSESSMENT — PAIN - FUNCTIONAL ASSESSMENT: PAIN_FUNCTIONAL_ASSESSMENT: PREVENTS OR INTERFERES SOME ACTIVE ACTIVITIES AND ADLS

## 2019-09-09 ASSESSMENT — PAIN DESCRIPTION - ONSET: ONSET: GRADUAL

## 2019-09-09 ASSESSMENT — PAIN DESCRIPTION - DESCRIPTORS
DESCRIPTORS: ACHING

## 2019-09-09 ASSESSMENT — PAIN DESCRIPTION - PROGRESSION: CLINICAL_PROGRESSION: GRADUALLY IMPROVING

## 2019-09-09 NOTE — PROGRESS NOTES
PORTABLE PATIENT PROFILE  Alice Mendoza  7E-55/055-A    MEDICAL DIAGNOSIS/CONDITION:   Patient Active Problem List   Diagnosis    Hyperlipidemia    [facility] ([facility]    CAD (coronary artery disease) s/p PCI and stent Multilink 3 x 18  mm mid LAD- in 2009 at [facility]    Obstructive sleep apnea on CPAP    COPD (chronic obstructive pulmonary disease) ([facility]    S/P ICD (internal cardiac defibrillator) procedure    Nonischemic cardiomyopathy ([facility]    Class 2 severe obesity due to excess calories with serious comorbidity in adult ([facility]    Hypertension    Adjustment disorder with mixed anxiety and depressed mood    Osteopenia determined by x-ray    Osteoporosis of vertebra    Closed left hip fracture, initial encounter ([facility]    Benign positional vertigo    S/p left hip fracture       INSURANCE INFORMATION:  Payor: MEDICARE /  /  /     ADVANCED DIRECTIVES:   Advance Directives (For Healthcare)  Pre-existing DNR Comfort Care/DNR Arrest/DNI Order: Yes, notify physician for order  Healthcare Directive: No, patient does not have an advance directive for healthcare treatment  Information on Healthcare Directives Requested: Yes  Patient Requests Assistance: No  Advance Directives: Living will completed, Healthcare power of attornery completed  Full Code     EMERGENCY CONTACT:       RISK FACTORS:   Social History     Tobacco Use    Smoking status: Former Smoker     Packs/day: 0.50     Years: 40.00     Pack years: 20.00     Types: Cigarettes     Last attempt to quit: 1998     Years since quittin.7    Smokeless tobacco: Never Used   Substance Use Topics    Alcohol use:  Yes     Alcohol/week: 2.0 standard drinks     Types: 2 Standard drinks or equivalent per week     Comment: occasional        ALLERGIES:  Allergies   Allergen Reactions    Antivert [Meclizine] Other (See Comments)     confusion    Ciprofloxacin Itching    Neomycin     Pcn [Penicillins] Hives     Pt reports having reaction 50 years ago    Zanaflex [Tizanidine Hcl] Other (See Comments)     Causes confusion    Levaquin [Levofloxacin] Itching     Benadryl was given for tx       FUNCTIONAL STATUS:  Eatin - Patient feeds self  Groomin - Patient independent with all grooming tasks(in w/c for oral care and hair care )  Bathin - Able to bathe all 10 areas with setup/sup/cues(SBA)  Dressing-Upper: 5 - Requires setup/supervision/cues and/or requires assist with presthesis/brace only  Dressing-Lower: 5 - Requires setup/supervision/cues and/or staff applies TEDS/prosthesis/brace only(A with TEDs, uses reacher to thread )  Toiletin - Requires setup/supervision/cues    SPHINCTER CONTROL  Bladder: 6 - Uses device independently (including EMPTYING of device, or uses medication)  Bladder Level of Assistance: 2- Requires 50-74% assistance for bladder management tasks (ie. helper places, holds and removes/empties device)  Bowel: 6 - Uses toilet independently with device or oral medication(s)    Bed, Chair, Wheel Chair: 2 - Requires 50-74% assistance to transfer  Toilet Transfer: 5 - Requires setup/supervision/cues(close SBA)  Shower Transfer: 5 - Supervision, set-up, cues(close SBA with increased time )    LOCOMOTION  Primary Mode: Walk  Distance Walked: 6 ft  Walk: 1 - Total Assistance Walks < 50 feet OR requires two or more people OR patient performs < 25% of locomotion effort  Wheel Chair: 1 - Total Assistance Operates wheelchair < 50 feet OR requires two or more people OR patient performs < 25% of locomotion effort    COMMUNICATION  Comprehension: 5 - Patient understands basic needs (hungry/hot/pain)  GOAL: Comprehension: 6  Expression: 5 - Expresses basic ideas/needs only (hungry/hot/pain)  GOAL: Expression: 6    SOCIAL COGNITION  Social Interaction: 6 - Patient requires medication for mood and/or effect  GOAL: Social Interaction: 6  Problem Solvin - Patient solves simple/routine tasks 75-90%+   GOAL: Problem Solvin  Memory:

## 2019-09-09 NOTE — PROGRESS NOTES
[address]  Occupational Therapy  Progress Note  Time:   Time In: 0830  Time Out: 1000  Timed Code Treatment Minutes: 90 Minutes  Minutes: 90    Date: 2019  Patient Name: Shahana Pérez,   Gender: female      Room: [address] [place] [address]  MRN: [id number]  : 1940  ([de-identified] y.o.)  Referring Practitioner: Ordering: JADEN Madrigal CNP Attending: Sera Browning MD  Diagnosis: S/p left hip fracture  Additional Pertinent Hx: Per ED note, pt is a [de-identified] y.o. female who presents to the Emergency Department for the evaluation of a fall. Patient reports that she bent forward, felt dizzy and fell. She is not certain if she hit her head during the fall, but she did not loose consciousness. Patient has a history of vertigo, DVT and has a pacemaker. She takes asprin regularly as a blood thinner and takes tramodol for pain management. She also does crystal therapy for her vertigo. Patient reports that most pain is located in her left leg. She reports a aching/pounding pain that has a 8/10 in severity.  S/P Open treatment of an intertrochanteric fracture with a cephamedullary nail 19, to IP Rehab 19    Restrictions/Precautions:  Restrictions/Precautions: Weight Bearing, General Precautions, Fall Risk  Left Lower Extremity Weight Bearing: Weight Bearing As Tolerated  Position Activity Restriction  Other position/activity restrictions: h/o vertigo    SUBJECTIVE: Patient pleasant and cooperative, agreeable to OT     PAIN: 2/10: L hip    COGNITION: WNL    ADL:   Eatin - Patient feeds self   Groomin - Patient independent with all grooming tasks(in w/c for oral care and hair care )   Bathin - Able to bathe all 10 areas with setup/sup/cues(SBA)   Dressing-Upper: 5 - Requires setup/supervision/cues and/or requires assist with presthesis/brace only   Dressing-Lower: 5 - Requires setup/supervision/cues and/or staff applies TEDS/prosthesis/brace only(A with TEDs, uses reacher to thread )   Toiletin - Requires setup/supervision/cues   Toilet Transfer: 5 - Requires setup/supervision/cues(close SBA)   Shower Transfer: 5 - Supervision, set-up, cues(close SBA with increased time )    OT FIM ASSESSMENT:     Admission score Current score Goal Goal Status   EATING 5 - Feeds self with setup/supervision/cues and/or requires only setup/supervision/cues to perform tube feedings(setup for opening items on breakfast tray) 7 - Patient feeds self 7 Met and Continue   GROOMING 5 - Requires setup/cues to do all tasks(set up in recliner, unable to complete in standing) 7 - Patient independent with all grooming tasks(in w/c for oral care and hair care ) 6 Met and Continue   BATHING 1 - Able to bathe 2 or less areas/total assist(pt able to complete UB at EOB with CGA; requires assist  for distal BLEs and assist of one for standing balance and second person for washing bottom) 5 - Able to bathe all 10 areas with setup/sup/cues(SBA) 6 Not Met and Continue   UPPER EXTREMITY DRESSING 4 - Requires assist with buttons/zippers only and/or requires assist with one arm only(assist for orientation and 1st UE) 5 - Requires setup/supervision/cues and/or requires assist with presthesis/brace only 7 Not Met and Continue   LOWER EXTREMITY DRESSING 1 - Total assist with lower body dressing(assist for donning/doffing socks, assist for threading BLE and assist of 2 to stand and up over hips) 5 - Requires setup/supervision/cues and/or staff applies TEDS/prosthesis/brace only(A with TEDs, uses reacher to thread ) 6 Not Met and Continue   TOILETING 1 - Total assist(assist for clothing managment and hygiene; 2 persons) 5 - Requires setup/supervision/cues 6 Not Met and Continue   TOILET TRANSFER 1 - Requires > 75% assist getting on & off toilet 5 - Requires setup/supervision/cues(close SBA) 6 Not Met and Continue   TUB/SHOWER TRANSFER    0 - Activity does not occur         5 - Supervision, set-up,

## 2019-09-09 NOTE — PROGRESS NOTES
restrictions: h/o vertigo    SUBJECTIVE: Patient in recliner upon arrival, agreed to therapy. Requesting to use bathroom at start of session. PAIN: 3/10: LLE at start of session, increasing throughout session. OBJECTIVE:    Transfers:  Sit to Stand: Minimal Assistance, with verbal cues  Stand to [address], with increased time for completion, with verbal cues    Ambulation:  Contact Guard Assistance  Distance: 10 ft. X1; 5 ft. x1   Surface: Level Tile  Device:Rolling Walker  Gait Deviations: Forward Flexed Posture, Slow Lynn, Decreased Step Length on Right, Decreased Weight Shift Left, Decreased Gait Speed, Decreased Heel Strike Bilaterally and Unsteady Gait    Wheelchair Mobility:  Modified Independent  Extremities Used: Bilateral Upper Extremities  Type of Chair:Manual  Surface: Level Tile  Distance: 150 ft. x2  Quality: Slow Velocity, Short Strokes and Decreased Fluidity      Balance:  Dynamic Standing Balance: Contact Guard Assistance, increased time spent in bathroom while completing bathroom activities with CGA,  grossly steady but no LOB noted. Functional Outcome Measures: Not completed       ASSESSMENT:  Assessment: Patient progressing toward established goals. Activity Tolerance:  Patient tolerance of  treatment: good. Limited by increased pain. Equipment Recommendations:Equipment Needed: Yes(Pt has RW.   May need a w/c for the discharge setting.  )  Other: monitor for needs  Discharge Recommendations: Home with home health, 24 hour supervision or assist    Plan: Times per week: 5x/wk 90 min, 1x/wk 30 min  Specific instructions for Next Treatment: ther ex, transfers, gait training, bed mobility   Current Treatment Recommendations: Strengthening, Transfer Training, Endurance Training, Patient/Caregiver Education & Training, ROM, Balance Training, Gait Training, Stair training, Functional Mobility Training, Safety Education & Training, Home

## 2019-09-09 NOTE — PROGRESS NOTES
setup/supervision/cues to perform tube feedings(setup for opening items on breakfast tray)   Groomin - Requires setup/cues to do all tasks(set up in recliner, unable to complete in standing)   Dressing-Upper: 5 - Requires setup/supervision/cues and/or requires assist with presthesis/brace only(set-up )   Dressing-Lower: 5 - Requires setup/supervision/cues and/or staff applies TEDS/prosthesis/brace only(doffing and donning pants and briefs).      Toiletin - Requires setup/supervision/cues   Toilet Transfer: 5 - Requires setup/supervision/cues   Patient became incontinent in preparation for functional activity and therapy session focused on toileting task                                      COMMUNICATION  Comprehension: 6 - Complex ideas 90% or device (hearing aid or glasses- if patient is primarily a visual learner)  GOAL: Comprehension: 6  Expression: 6 - Device used to express complex ideas/needs  GOAL: Expression: 6   SOCIAL COGNITION  Social Interaction: 6 - Patient requires medication for mood and/or effect  GOAL: Social Interaction: 6  Problem Solving: 3 - Patient solves simple/routine tasks 50%-74%  GOAL: Problem Solvin  Memory: 4 - Patient remembers 75-90%+ of the time  GOAL: Memory: 5       Review of Systems:  CONSTITUTIONAL:  negative  RESPIRATORY:  negative  CARDIOVASCULAR:  negative  GASTROINTESTINAL:  positive for constipation  GENITOURINARY:  positive for urinary incontinence  MUSCULOSKELETAL:  positive for  myalgias, arthralgias, pain, joint swelling, stiff joints, decreased range of motion, muscle weakness and bone pain  NEUROLOGICAL:  positive for coordination problems, gait problems, weakness and pain  BEHAVIOR/PSYCH:  negative  10 point system review otherwise negative    Objective:  /66   Pulse 71   Temp 98 °F (36.7 °C) (Oral)   Resp 16   Ht 5' 4\" (1.626 m)   Wt 201 lb 11.2 oz (91.5 kg)   SpO2 95%   BMI 34.62 kg/m²   She was noted to be awake, alert, and in no acute distress. Orientation:   person, place, time  Mood: within normal limits  Affect: calm  General appearance: Patient is well nourished, well developed, well groomed and in no acute distress, obese, appearing stated age, normal affect. Memory:   normal  Attention/Concentration: normal  Language:  normal  HEART:  S1 and S2 with a regular rate and rhythm without a murmur,  gallop, or rub. LUNGS:  Clear to auscultation bilaterally without rales, rhonchi, or  wheezes. ABDOMEN:  Normoactive bowel sounds in all four quadrants. Her abdomen  was noted to be soft, nontender, without masses. Cranial Nerves:  cranial nerves II-XII are grossly intact  ROM:  abnormal - with respect to her Left hip. Motor Exam: No acute changes noted with respect to her limb strength. Tone:  normal  Muscle bulk: within normal limits  Sensory:  Sensory intact  Coordination:   abnormal - with respect to her mobility. Deep Tendon Reflexes: No acute changes noted. Skin: warm and dry, no rash or erythema. Well-healing Left lateral hip and thigh surgical incisions. Edema: Moderate Left hip and thigh edema without erythema. Mild Left leg to foot edema without erythema.       Diagnostics:   Recent Results (from the past 24 hour(s))   CBC Auto Differential    Collection Time: 09/09/19  6:02 AM   Result Value Ref Range    WBC 5.7 4.8 - 10.8 thou/mm3    RBC 3.49 (L) 4.20 - 5.40 mill/mm3    Hemoglobin 11.0 (L) 12.0 - 16.0 gm/dl    Hematocrit 35.6 (L) 37.0 - 47.0 %    .0 (H) 81.0 - 99.0 fL    MCH 31.5 26.0 - 33.0 pg    MCHC 30.9 (L) 32.2 - 35.5 gm/dl    RDW-CV 14.1 11.5 - 14.5 %    RDW-SD 52.9 (H) 35.0 - 45.0 fL    Platelets 049 560 - 365 thou/mm3    MPV 9.9 9.4 - 12.4 fL    Seg Neutrophils 51.7 %    Lymphocytes 35.0 %    Monocytes 9.4 %    Eosinophils 2.3 %    Basophils 0.7 %    Immature Granulocytes 1 %    Segs Absolute 2.9 1.8 - 7.7 thou/mm3    Lymphocytes Absolute 2.0 1.0 - 4.8 thou/mm3    Monocytes Absolute 0.5 0.4 - 1.3 thou/mm3 Eosinophils Absolute 0.1 0.0 - 0.4 thou/mm3    Basophils Absolute 0.0 0.0 - 0.1 thou/mm3    Immature Grans (Abs) 0.05 0.00 - 0.07 thou/mm3    nRBC 0 /100 wbc   Basic Metabolic Panel    Collection Time: 09/09/19  6:02 AM   Result Value Ref Range    Sodium 139 135 - 145 meq/L    Potassium 4.4 3.5 - 5.2 meq/L    Chloride 100 98 - 111 meq/L    CO2 29 23 - 33 meq/L    Glucose 85 70 - 108 mg/dL    BUN 23 (H) 7 - 22 mg/dL    CREATININE 0.7 0.4 - 1.2 mg/dL    Calcium 9.7 8.5 - 10.5 mg/dL   Magnesium    Collection Time: 09/09/19  6:02 AM   Result Value Ref Range    Magnesium 2.1 1.6 - 2.4 mg/dL   Anion Gap    Collection Time: 09/09/19  6:02 AM   Result Value Ref Range    Anion Gap 10.0 8.0 - 16.0 meq/L   Glomerular Filtration Rate, Estimated    Collection Time: 09/09/19  6:02 AM   Result Value Ref Range    Est, Glom Filt Rate 80 (A) ml/min/1.73m2       Impression:  1. Status post recent fall inside of her home which caused a left hip  fracture which was surgically corrected on 08/20/2019. Postoperatively,  she is to be weightbearing as tolerated to the left lower limb. 2.  Gait dysfunction. 3.  Deficits with respect to her activities of daily living. 4.  Current history of constipation. 5.  Chronic history of urinary bladder incontinence. 6.  Chronic history of low back pain. 7.  Ongoing history of vertigo. 8.  History of erosive gastritis. 9.  History of diverticulosis. 10.  History of COPD. 11.  History of previous pneumonia. 12.  History of congestive heart failure. 13.  History of esophageal strictures which have been surgically dilated  multiple times. 14.  Current history of morbid obesity. 15.  History of sleep apnea. 16.  History of osteoarthritis. 17.  History of osteoporosis. 18.  History of hypertension. 19.  History of hyperlipidemia.   20.  History of coronary artery disease with a cardiac stent being  placed in the past.  21.  Status post right breast lumpectomy performed in 01/2004 to

## 2019-09-10 VITALS
RESPIRATION RATE: 18 BRPM | SYSTOLIC BLOOD PRESSURE: 116 MMHG | HEART RATE: 76 BPM | TEMPERATURE: 96.5 F | DIASTOLIC BLOOD PRESSURE: 61 MMHG | BODY MASS INDEX: 35.12 KG/M2 | OXYGEN SATURATION: 97 % | HEIGHT: 64 IN | WEIGHT: 205.7 LBS

## 2019-09-10 PROCEDURE — 99239 HOSP IP/OBS DSCHRG MGMT >30: CPT | Performed by: PHYSICAL MEDICINE & REHABILITATION

## 2019-09-10 PROCEDURE — 6370000000 HC RX 637 (ALT 250 FOR IP): Performed by: PHYSICAL MEDICINE & REHABILITATION

## 2019-09-10 PROCEDURE — 6370000000 HC RX 637 (ALT 250 FOR IP): Performed by: NURSE PRACTITIONER

## 2019-09-10 RX ORDER — CETIRIZINE HYDROCHLORIDE 10 MG/1
10 TABLET ORAL NIGHTLY
COMMUNITY
Start: 2019-09-10 | End: 2019-11-11 | Stop reason: SDUPTHER

## 2019-09-10 RX ORDER — TRAMADOL HYDROCHLORIDE 50 MG/1
50 TABLET ORAL 2 TIMES DAILY WITH MEALS
Qty: 60 TABLET | Refills: 0 | Status: SHIPPED | OUTPATIENT
Start: 2019-09-10 | End: 2019-10-10

## 2019-09-10 RX ORDER — METOPROLOL SUCCINATE 25 MG/1
12.5 TABLET, EXTENDED RELEASE ORAL DAILY
Qty: 30 TABLET | Refills: 3 | Status: SHIPPED | OUTPATIENT
Start: 2019-09-10 | End: 2019-11-11 | Stop reason: SDUPTHER

## 2019-09-10 RX ORDER — POLYETHYLENE GLYCOL 3350 17 G/17G
17 POWDER, FOR SOLUTION ORAL DAILY PRN
Qty: 527 G | Refills: 1 | Status: SHIPPED | OUTPATIENT
Start: 2019-09-10 | End: 2019-10-10

## 2019-09-10 RX ADMIN — SACUBITRIL AND VALSARTAN 1 TABLET: 49; 51 TABLET, FILM COATED ORAL at 08:11

## 2019-09-10 RX ADMIN — DICLOFENAC 4 G: 10 GEL TOPICAL at 08:13

## 2019-09-10 RX ADMIN — ASPIRIN 81 MG: 81 TABLET ORAL at 08:11

## 2019-09-10 RX ADMIN — ISOSORBIDE MONONITRATE 30 MG: 30 TABLET ORAL at 08:12

## 2019-09-10 RX ADMIN — TRAMADOL HYDROCHLORIDE 50 MG: 50 TABLET, FILM COATED ORAL at 08:11

## 2019-09-10 RX ADMIN — CALCIUM CARBONATE-VITAMIN D TAB 500 MG-200 UNIT 1 TABLET: 500-200 TAB at 08:12

## 2019-09-10 RX ADMIN — DICLOFENAC 4 G: 10 GEL TOPICAL at 12:27

## 2019-09-10 RX ADMIN — MAGNESIUM GLUCONATE 500 MG ORAL TABLET 200 MG: 500 TABLET ORAL at 08:12

## 2019-09-10 RX ADMIN — TRAMADOL HYDROCHLORIDE 50 MG: 50 TABLET, FILM COATED ORAL at 12:27

## 2019-09-10 RX ADMIN — FUROSEMIDE 20 MG: 20 TABLET ORAL at 08:12

## 2019-09-10 RX ADMIN — DOCUSATE SODIUM 100 MG: 100 CAPSULE, LIQUID FILLED ORAL at 08:11

## 2019-09-10 RX ADMIN — METOPROLOL SUCCINATE 12.5 MG: 25 TABLET, EXTENDED RELEASE ORAL at 08:11

## 2019-09-10 ASSESSMENT — PAIN SCALES - GENERAL
PAINLEVEL_OUTOF10: 3
PAINLEVEL_OUTOF10: 2
PAINLEVEL_OUTOF10: 2

## 2019-09-10 NOTE — CARE COORDINATION
[address]      Date: 9/10/2019  Patient Name: Carol Hathaway,  Gender:  female        MRN: [id number]  : 1940  ([de-identified] y.o.)     Referring Practitioner: Tiana Villafuerte MD  Diagnosis: s/p left hip fracture   Additional Pertinent Hx: Per ED note, pt is a [de-identified] y.o. female who presents to the Emergency Department for the evaluation of a fall. Patient reports that she bent forward, felt dizzy and fell. She is not certain if she hit her head during the fall, but she did not loose consciousness. Patient has a history of vertigo, DVT and has a pacemaker. She takes asprin regularly as a blood thinner and takes tramodol for pain management. She has also been undergoing vestibular therapy for her ongoing dizziness. Restrictions/Precautions:  Restrictions/Precautions: Weight Bearing, General Precautions, Fall Risk    Left Lower Extremity Weight Bearing: Weight Bearing As Tolerated       Position Activity Restriction  Other position/activity restrictions: h/o vertigo         Social/Functional:  Type of Home: House  Home Layout: Two level, Able to Live on Main level with bedroom/bathroom  Home Access: Stairs to enter with rails  Entrance Stairs - Number of Steps: 2 steps with 1 rail   Entrance Stairs - Rails: Right  Home Equipment: Standard walker, Cane, Reacher, Rolling walker     Assessment:  Pt has shown steady, though slow improvement, meeting 4/5 short term goals and 4/5 long term goals. Limitations continue to be noted with LE strength, functional endurance and increased LLE pain with mobility resulting in need yet for 24/7 assist with functional mobility. Pt will benefit from continued skilled PT in the discharge setting to further advance in these areas for improved mobility and safety. Equipment Recommendations:  Equipment Needed: No.  Pt has RW.   Plan:  Discharge to SNF with PT to f/u    Goals:  Short

## 2019-09-10 NOTE — DISCHARGE SUMMARY
receive P.T., O.T., Nursing, and an Aide. Consults:   Family Medicine and Psychology. Significant Diagnostics:   CBC with Differential:    Lab Results   Component Value Date    WBC 5.7 09/09/2019    RBC 3.49 09/09/2019    HGB 11.0 09/09/2019    HCT 35.6 09/09/2019     09/09/2019    .0 09/09/2019    MCH 31.5 09/09/2019    MCHC 30.9 09/09/2019    RDW 13.9 06/14/2018    NRBC 0 09/09/2019    SEGSPCT 51.7 09/09/2019    MONOPCT 9.4 09/09/2019    MONOSABS 0.5 09/09/2019    LYMPHSABS 2.0 09/09/2019    EOSABS 0.1 09/09/2019    BASOSABS 0.0 09/09/2019     BMP:    Lab Results   Component Value Date     09/09/2019    K 4.4 09/09/2019    K 3.9 06/04/2019     09/09/2019    CO2 29 09/09/2019    BUN 23 09/09/2019    LABALBU 3.9 08/18/2019    CREATININE 0.7 09/09/2019    CALCIUM 9.7 09/09/2019    LABGLOM 80 09/09/2019    GLUCOSE 85 09/09/2019     Magnesium:    Lab Results   Component Value Date    MG 2.1 09/09/2019     U/A:    Lab Results   Component Value Date    COLORU YELLOW 09/06/2019    PROTEINU NEGATIVE 09/06/2019    PHUR 7.0 09/06/2019    LABCAST NONE SEEN 06/04/2019    WBCUA > 200 08/31/2019    RBCUA 15-25 08/31/2019    MUCUS NONE SEEN 06/04/2019    YEAST NONE SEEN 08/31/2019    BACTERIA NONE 08/31/2019    CLARITYU Cloudy 06/08/2018    SPECGRAV 1.018 06/04/2019    LEUKOCYTESUR NEGATIVE 09/06/2019    UROBILINOGEN 0.2 09/06/2019    BILIRUBINUR NEGATIVE 09/06/2019    BLOODU NEGATIVE 09/06/2019    GLUCOSEU NEGATIVE 09/06/2019    AMORPHOUS NONE SEEN 06/04/2019     TSH:    Lab Results   Component Value Date    TSH 2.050 08/30/2019       No results for input(s): POCGLU in the last 72 hours. Cholesterol Panel:   No results found for requested labs within last 30 days. Patient Instructions:     Follow-up visits: See after visit summary from hospitalization    Discharge Medications:   Diana Martinez"   Home Medication Instructions [id number]    Printed on:09/10/19 7998 Medication Information                      acetaminophen (TYLENOL) 325 MG tablet  Take 2 tablets by mouth every 6 hours             aspirin 81 MG EC tablet  Take 1 tablet by mouth daily Stop 5 days before any planned intervention for compression fractures             calcium-vitamin D (OSCAL-500) 500-200 MG-UNIT per tablet  Take 1 tablet by mouth 2 times daily             cetirizine (ZYRTEC) 10 MG tablet  Take 1 tablet by mouth nightly             denosumab (PROLIA) 60 MG/ML SOSY SC injection  Inject 1 mL into the skin once for 1 dose             diclofenac sodium 1 % GEL  Apply 4 g topically 4 times daily             furosemide (LASIX) 20 MG tablet  Take 1 tablet by mouth daily             isosorbide mononitrate (IMDUR) 30 MG extended release tablet  take 1/2 tablet by mouth once daily             magnesium oxide (MAG-OX) 400 (241.3 Mg) MG TABS tablet  Take 0.5 tablets by mouth daily             metoprolol succinate (TOPROL XL) 25 MG extended release tablet  Take 0.5 tablets by mouth daily             polyethylene glycol (GLYCOLAX) packet  Take 17 g by mouth daily as needed for Constipation (Constipation)             RaNITidine HCl (ACID REDUCER PO)  Take by mouth daily             rivaroxaban (XARELTO) 10 MG TABS tablet  Take 1 tablet by mouth every 24 hours             sacubitril-valsartan (ENTRESTO) 49-51 MG per tablet  Take 1 tablet by mouth 2 times daily             simvastatin (ZOCOR) 40 MG tablet  take 1 tablet by mouth every evening             traMADol (ULTRAM) 50 MG tablet  Take 1 tablet by mouth 2 times daily (with meals) for 30 days. Controlled substances monitoring: possible medication side effects, risk of tolerance and/or dependence, and alternative treatments discussed, no signs of potential drug abuse or diversion identified and [place] report reviewed today- activity consistent with treatment plan.      40 minutes spent preparing the patient for discharge    Faith Vizcaino MD

## 2019-09-25 ENCOUNTER — TELEPHONE (OUTPATIENT)
Dept: PHYSICAL MEDICINE AND REHAB | Age: 79
End: 2019-09-25

## 2019-10-03 ENCOUNTER — CARE COORDINATION (OUTPATIENT)
Dept: CASE MANAGEMENT | Age: 79
End: 2019-10-03

## 2019-10-03 DIAGNOSIS — F43.23 ADJUSTMENT DISORDER WITH MIXED ANXIETY AND DEPRESSED MOOD: Primary | ICD-10-CM

## 2019-10-03 PROCEDURE — 1111F DSCHRG MED/CURRENT MED MERGE: CPT | Performed by: INTERNAL MEDICINE

## 2019-11-11 ENCOUNTER — OFFICE VISIT (OUTPATIENT)
Dept: FAMILY MEDICINE CLINIC | Age: 79
End: 2019-11-11

## 2019-11-11 VITALS
HEIGHT: 64 IN | SYSTOLIC BLOOD PRESSURE: 124 MMHG | RESPIRATION RATE: 16 BRPM | WEIGHT: 208.13 LBS | BODY MASS INDEX: 35.53 KG/M2 | DIASTOLIC BLOOD PRESSURE: 78 MMHG | HEART RATE: 76 BPM

## 2019-11-11 DIAGNOSIS — Z99.89 OBSTRUCTIVE SLEEP APNEA ON CPAP: ICD-10-CM

## 2019-11-11 DIAGNOSIS — E78.5 HYPERLIPIDEMIA, UNSPECIFIED HYPERLIPIDEMIA TYPE: ICD-10-CM

## 2019-11-11 DIAGNOSIS — Z98.890 POST-OPERATIVE STATE: ICD-10-CM

## 2019-11-11 DIAGNOSIS — G47.33 OBSTRUCTIVE SLEEP APNEA ON CPAP: ICD-10-CM

## 2019-11-11 DIAGNOSIS — M80.00XD AGE-RELATED OSTEOPOROSIS WITH CURRENT PATHOLOGICAL FRACTURE WITH ROUTINE HEALING, SUBSEQUENT ENCOUNTER: ICD-10-CM

## 2019-11-11 DIAGNOSIS — I10 ESSENTIAL HYPERTENSION: Primary | ICD-10-CM

## 2019-11-11 DIAGNOSIS — R32 URINARY INCONTINENCE, UNSPECIFIED TYPE: ICD-10-CM

## 2019-11-11 PROCEDURE — 99214 OFFICE O/P EST MOD 30 MIN: CPT | Performed by: FAMILY MEDICINE

## 2019-11-11 PROCEDURE — G8399 PT W/DXA RESULTS DOCUMENT: HCPCS | Performed by: FAMILY MEDICINE

## 2019-11-11 PROCEDURE — G8417 CALC BMI ABV UP PARAM F/U: HCPCS | Performed by: FAMILY MEDICINE

## 2019-11-11 PROCEDURE — 4040F PNEUMOC VAC/ADMIN/RCVD: CPT | Performed by: FAMILY MEDICINE

## 2019-11-11 PROCEDURE — G8427 DOCREV CUR MEDS BY ELIG CLIN: HCPCS | Performed by: FAMILY MEDICINE

## 2019-11-11 PROCEDURE — 1090F PRES/ABSN URINE INCON ASSESS: CPT | Performed by: FAMILY MEDICINE

## 2019-11-11 PROCEDURE — 1123F ACP DISCUSS/DSCN MKR DOCD: CPT | Performed by: FAMILY MEDICINE

## 2019-11-11 PROCEDURE — G8598 ASA/ANTIPLAT THER USED: HCPCS | Performed by: FAMILY MEDICINE

## 2019-11-11 PROCEDURE — 1036F TOBACCO NON-USER: CPT | Performed by: FAMILY MEDICINE

## 2019-11-11 RX ORDER — ROSUVASTATIN CALCIUM 5 MG/1
5 TABLET, COATED ORAL DAILY
Qty: 30 TABLET | Refills: 5 | Status: SHIPPED | OUTPATIENT
Start: 2019-11-11 | End: 2020-05-04

## 2019-11-11 RX ORDER — TRAMADOL HYDROCHLORIDE 50 MG/1
TABLET ORAL
COMMUNITY
Start: 2019-10-11 | End: 2020-08-10 | Stop reason: SDUPTHER

## 2019-11-11 RX ORDER — METOPROLOL SUCCINATE 25 MG/1
12.5 TABLET, EXTENDED RELEASE ORAL DAILY
Qty: 30 TABLET | Refills: 3 | Status: SHIPPED | OUTPATIENT
Start: 2019-11-11 | End: 2020-07-03 | Stop reason: SDUPTHER

## 2019-11-11 RX ORDER — CETIRIZINE HYDROCHLORIDE 10 MG/1
10 TABLET ORAL NIGHTLY
Qty: 30 TABLET | Refills: 5 | Status: SHIPPED | OUTPATIENT
Start: 2019-11-11 | End: 2020-05-14 | Stop reason: SDUPTHER

## 2019-11-11 RX ORDER — ROSUVASTATIN CALCIUM 5 MG/1
TABLET, COATED ORAL
COMMUNITY
Start: 2019-10-10 | End: 2019-11-11 | Stop reason: SDUPTHER

## 2019-11-11 ASSESSMENT — ENCOUNTER SYMPTOMS
DIARRHEA: 0
VOMITING: 0
CHEST TIGHTNESS: 0
BACK PAIN: 1
COUGH: 0
SHORTNESS OF BREATH: 0
CONSTIPATION: 0
ABDOMINAL PAIN: 0
NAUSEA: 0

## 2019-11-13 ENCOUNTER — PROCEDURE VISIT (OUTPATIENT)
Dept: CARDIOLOGY CLINIC | Age: 79
End: 2019-11-13
Payer: MEDICARE

## 2019-11-13 DIAGNOSIS — Z95.810 S/P ICD (INTERNAL CARDIAC DEFIBRILLATOR) PROCEDURE: Primary | ICD-10-CM

## 2019-11-13 PROCEDURE — 93296 REM INTERROG EVL PM/IDS: CPT | Performed by: INTERNAL MEDICINE

## 2019-11-13 PROCEDURE — 93295 DEV INTERROG REMOTE 1/2/MLT: CPT | Performed by: INTERNAL MEDICINE

## 2019-11-15 ENCOUNTER — TELEPHONE (OUTPATIENT)
Dept: FAMILY MEDICINE CLINIC | Age: 79
End: 2019-11-15

## 2019-11-15 DIAGNOSIS — Z98.890 POST-OPERATIVE STATE: Primary | ICD-10-CM

## 2019-11-15 DIAGNOSIS — I10 ESSENTIAL HYPERTENSION: ICD-10-CM

## 2019-11-15 DIAGNOSIS — M81.0 OSTEOPOROSIS OF VERTEBRA: ICD-10-CM

## 2019-11-15 DIAGNOSIS — R29.6 FREQUENT FALLS: ICD-10-CM

## 2019-12-02 ENCOUNTER — TELEPHONE (OUTPATIENT)
Dept: FAMILY MEDICINE CLINIC | Age: 79
End: 2019-12-02

## 2019-12-02 DIAGNOSIS — R30.0 DYSURIA: Primary | ICD-10-CM

## 2019-12-04 ENCOUNTER — HOSPITAL ENCOUNTER (EMERGENCY)
Age: 79
Discharge: HOME | End: 2019-12-04
Payer: MEDICARE

## 2019-12-04 ENCOUNTER — NURSE ONLY (OUTPATIENT)
Dept: CARDIOLOGY CLINIC | Age: 79
End: 2019-12-04

## 2019-12-04 VITALS
BODY MASS INDEX: 32.49 KG/M2 | TEMPERATURE: 97.5 F | SYSTOLIC BLOOD PRESSURE: 114 MMHG | HEIGHT: 67 IN | DIASTOLIC BLOOD PRESSURE: 59 MMHG | RESPIRATION RATE: 16 BRPM | HEART RATE: 78 BPM | OXYGEN SATURATION: 97 % | WEIGHT: 207 LBS

## 2019-12-04 DIAGNOSIS — N30.00 ACUTE CYSTITIS WITHOUT HEMATURIA: Primary | ICD-10-CM

## 2019-12-04 DIAGNOSIS — M81.0 AGE-RELATED OSTEOPOROSIS WITHOUT CURRENT PATHOLOGICAL FRACTURE: ICD-10-CM

## 2019-12-04 DIAGNOSIS — S50.311A ABRASION OF RIGHT ELBOW, INITIAL ENCOUNTER: ICD-10-CM

## 2019-12-04 DIAGNOSIS — Z95.810 S/P ICD (INTERNAL CARDIAC DEFIBRILLATOR) PROCEDURE: Primary | ICD-10-CM

## 2019-12-04 LAB
BILIRUBIN URINE: NEGATIVE
BLOOD, URINE: ABNORMAL
CHARACTER, URINE: CLEAR
COLOR: YELLOW
GLUCOSE, URINE: NEGATIVE MG/DL
KETONES, URINE: NEGATIVE
LEUKOCYTES, UA: ABNORMAL
NITRATE, UA: NEGATIVE
PH UA: 6 (ref 5–9)
PROTEIN UA: NEGATIVE MG/DL
REFLEX TO URINE C & S: ABNORMAL
SPECIFIC GRAVITY UA: 1.02 (ref 1–1.03)
UROBILINOGEN, URINE: 1 EU/DL (ref 0–1)

## 2019-12-04 PROCEDURE — 99213 OFFICE O/P EST LOW 20 MIN: CPT | Performed by: NURSE PRACTITIONER

## 2019-12-04 PROCEDURE — 81003 URINALYSIS AUTO W/O SCOPE: CPT

## 2019-12-04 PROCEDURE — 99213 OFFICE O/P EST LOW 20 MIN: CPT

## 2019-12-04 RX ORDER — NITROFURANTOIN 25; 75 MG/1; MG/1
100 CAPSULE ORAL 2 TIMES DAILY
Qty: 14 CAPSULE | Refills: 0 | Status: SHIPPED | OUTPATIENT
Start: 2019-12-04 | End: 2019-12-10

## 2019-12-04 ASSESSMENT — PAIN DESCRIPTION - LOCATION: LOCATION: BACK

## 2019-12-04 ASSESSMENT — PAIN SCALES - GENERAL: PAINLEVEL_OUTOF10: 9

## 2019-12-04 ASSESSMENT — ENCOUNTER SYMPTOMS: BACK PAIN: 1

## 2019-12-05 ENCOUNTER — HOSPITAL ENCOUNTER (OUTPATIENT)
Dept: NURSING | Age: 79
Discharge: HOME | End: 2019-12-05
Payer: MEDICARE

## 2019-12-05 DIAGNOSIS — R30.0 DYSURIA: ICD-10-CM

## 2019-12-05 ASSESSMENT — ENCOUNTER SYMPTOMS
VOMITING: 0
ABDOMINAL PAIN: 0
NAUSEA: 0

## 2019-12-06 ENCOUNTER — HOSPITAL ENCOUNTER (EMERGENCY)
Age: 79
Discharge: HOME | End: 2019-12-06
Attending: FAMILY MEDICINE
Payer: MEDICARE

## 2019-12-06 ENCOUNTER — APPOINTMENT (OUTPATIENT)
Dept: CT IMAGING | Age: 79
End: 2019-12-06
Payer: MEDICARE

## 2019-12-06 VITALS
HEART RATE: 67 BPM | TEMPERATURE: 97.9 F | HEIGHT: 67 IN | SYSTOLIC BLOOD PRESSURE: 112 MMHG | DIASTOLIC BLOOD PRESSURE: 63 MMHG | OXYGEN SATURATION: 97 % | BODY MASS INDEX: 32.49 KG/M2 | WEIGHT: 207 LBS | RESPIRATION RATE: 18 BRPM

## 2019-12-06 DIAGNOSIS — G89.29 OTHER CHRONIC PAIN: ICD-10-CM

## 2019-12-06 DIAGNOSIS — M54.50 ACUTE EXACERBATION OF CHRONIC LOW BACK PAIN: ICD-10-CM

## 2019-12-06 DIAGNOSIS — N10 ACUTE PYELONEPHRITIS: Primary | ICD-10-CM

## 2019-12-06 DIAGNOSIS — G89.29 ACUTE EXACERBATION OF CHRONIC LOW BACK PAIN: ICD-10-CM

## 2019-12-06 LAB
ALBUMIN SERPL-MCNC: 3.9 G/DL (ref 3.5–5.1)
ALP BLD-CCNC: 68 U/L (ref 38–126)
ALT SERPL-CCNC: 7 U/L (ref 11–66)
AMORPHOUS: ABNORMAL
ANION GAP SERPL CALCULATED.3IONS-SCNC: 16 MEQ/L (ref 8–16)
AST SERPL-CCNC: 12 U/L (ref 5–40)
BACTERIA: ABNORMAL /HPF
BASOPHILS # BLD: 0.4 %
BASOPHILS ABSOLUTE: 0 THOU/MM3 (ref 0–0.1)
BILIRUB SERPL-MCNC: 0.6 MG/DL (ref 0.3–1.2)
BILIRUBIN DIRECT: < 0.2 MG/DL (ref 0–0.3)
BILIRUBIN URINE: NEGATIVE
BLOOD, URINE: NEGATIVE
BUN BLDV-MCNC: 21 MG/DL (ref 7–22)
CALCIUM SERPL-MCNC: 9.3 MG/DL (ref 8.5–10.5)
CASTS 2: ABNORMAL /LPF
CASTS UA: ABNORMAL /LPF
CHARACTER, URINE: ABNORMAL
CHLORIDE BLD-SCNC: 100 MEQ/L (ref 98–111)
CO2: 25 MEQ/L (ref 23–33)
COLOR: YELLOW
CREAT SERPL-MCNC: 0.6 MG/DL (ref 0.4–1.2)
CRYSTALS, UA: ABNORMAL
EKG ATRIAL RATE: 74 BPM
EKG P AXIS: 40 DEGREES
EKG P-R INTERVAL: 132 MS
EKG Q-T INTERVAL: 466 MS
EKG QRS DURATION: 152 MS
EKG QTC CALCULATION (BAZETT): 517 MS
EKG R AXIS: -56 DEGREES
EKG T AXIS: 69 DEGREES
EKG VENTRICULAR RATE: 74 BPM
EOSINOPHIL # BLD: 1.9 %
EOSINOPHILS ABSOLUTE: 0.2 THOU/MM3 (ref 0–0.4)
EPITHELIAL CELLS, UA: ABNORMAL /HPF
ERYTHROCYTE [DISTWIDTH] IN BLOOD BY AUTOMATED COUNT: 12.6 % (ref 11.5–14.5)
ERYTHROCYTE [DISTWIDTH] IN BLOOD BY AUTOMATED COUNT: 42.4 FL (ref 35–45)
GFR SERPL CREATININE-BSD FRML MDRD: > 90 ML/MIN/1.73M2
GLUCOSE BLD-MCNC: 132 MG/DL (ref 70–108)
GLUCOSE URINE: NEGATIVE MG/DL
HCT VFR BLD CALC: 41 % (ref 37–47)
HEMOGLOBIN: 13.6 GM/DL (ref 12–16)
IMMATURE GRANS (ABS): 0.02 THOU/MM3 (ref 0–0.07)
IMMATURE GRANULOCYTES: 0.2 %
KETONES, URINE: 15
LEUKOCYTE ESTERASE, URINE: ABNORMAL
LIPASE: 19.4 U/L (ref 5.6–51.3)
LYMPHOCYTES # BLD: 10.1 %
LYMPHOCYTES ABSOLUTE: 0.8 THOU/MM3 (ref 1–4.8)
MCH RBC QN AUTO: 30.5 PG (ref 26–33)
MCHC RBC AUTO-ENTMCNC: 33.2 GM/DL (ref 32.2–35.5)
MCV RBC AUTO: 91.9 FL (ref 81–99)
MISCELLANEOUS 2: ABNORMAL
MONOCYTES # BLD: 3.1 %
MONOCYTES ABSOLUTE: 0.3 THOU/MM3 (ref 0.4–1.3)
MUCUS: ABNORMAL
NITRITE, URINE: NEGATIVE
NUCLEATED RED BLOOD CELLS: 0 /100 WBC
OSMOLALITY CALCULATION: 286.1 MOSMOL/KG (ref 275–300)
PH UA: 5 (ref 5–9)
PLATELET # BLD: 205 THOU/MM3 (ref 130–400)
PMV BLD AUTO: 10.6 FL (ref 9.4–12.4)
POTASSIUM SERPL-SCNC: 3.5 MEQ/L (ref 3.5–5.2)
PROTEIN UA: NEGATIVE
RBC # BLD: 4.46 MILL/MM3 (ref 4.2–5.4)
RBC URINE: ABNORMAL /HPF
RENAL EPITHELIAL, UA: ABNORMAL
SEG NEUTROPHILS: 84.3 %
SEGMENTED NEUTROPHILS ABSOLUTE COUNT: 7.1 THOU/MM3 (ref 1.8–7.7)
SODIUM BLD-SCNC: 141 MEQ/L (ref 135–145)
SPECIFIC GRAVITY, URINE: 1.01 (ref 1–1.03)
TOTAL PROTEIN: 6.6 G/DL (ref 6.1–8)
TROPONIN T: < 0.01 NG/ML
UROBILINOGEN, URINE: 1 EU/DL (ref 0–1)
WBC # BLD: 8.4 THOU/MM3 (ref 4.8–10.8)
WBC UA: ABNORMAL /HPF
YEAST: ABNORMAL

## 2019-12-06 PROCEDURE — 74177 CT ABD & PELVIS W/CONTRAST: CPT

## 2019-12-06 PROCEDURE — 6360000004 HC RX CONTRAST MEDICATION: Performed by: FAMILY MEDICINE

## 2019-12-06 PROCEDURE — 80053 COMPREHEN METABOLIC PANEL: CPT

## 2019-12-06 PROCEDURE — 36415 COLL VENOUS BLD VENIPUNCTURE: CPT

## 2019-12-06 PROCEDURE — 81001 URINALYSIS AUTO W/SCOPE: CPT

## 2019-12-06 PROCEDURE — 83690 ASSAY OF LIPASE: CPT

## 2019-12-06 PROCEDURE — 84484 ASSAY OF TROPONIN QUANT: CPT

## 2019-12-06 PROCEDURE — 96360 HYDRATION IV INFUSION INIT: CPT

## 2019-12-06 PROCEDURE — 85025 COMPLETE CBC W/AUTO DIFF WBC: CPT

## 2019-12-06 PROCEDURE — 2580000003 HC RX 258: Performed by: FAMILY MEDICINE

## 2019-12-06 PROCEDURE — 93010 ELECTROCARDIOGRAM REPORT: CPT | Performed by: INTERNAL MEDICINE

## 2019-12-06 PROCEDURE — 93005 ELECTROCARDIOGRAM TRACING: CPT | Performed by: FAMILY MEDICINE

## 2019-12-06 PROCEDURE — 87086 URINE CULTURE/COLONY COUNT: CPT

## 2019-12-06 PROCEDURE — 99284 EMERGENCY DEPT VISIT MOD MDM: CPT

## 2019-12-06 PROCEDURE — 96361 HYDRATE IV INFUSION ADD-ON: CPT

## 2019-12-06 PROCEDURE — 82248 BILIRUBIN DIRECT: CPT

## 2019-12-06 RX ORDER — TRAMADOL HYDROCHLORIDE 50 MG/1
25 TABLET ORAL ONCE
Status: DISCONTINUED | OUTPATIENT
Start: 2019-12-06 | End: 2019-12-06 | Stop reason: HOSPADM

## 2019-12-06 RX ORDER — SULFAMETHOXAZOLE AND TRIMETHOPRIM 800; 160 MG/1; MG/1
1 TABLET ORAL 2 TIMES DAILY
Qty: 20 TABLET | Refills: 0 | Status: SHIPPED | OUTPATIENT
Start: 2019-12-06 | End: 2019-12-16

## 2019-12-06 RX ORDER — 0.9 % SODIUM CHLORIDE 0.9 %
1000 INTRAVENOUS SOLUTION INTRAVENOUS ONCE
Status: COMPLETED | OUTPATIENT
Start: 2019-12-06 | End: 2019-12-06

## 2019-12-06 RX ADMIN — SODIUM CHLORIDE 1000 ML: 9 INJECTION, SOLUTION INTRAVENOUS at 12:06

## 2019-12-06 RX ADMIN — IOPAMIDOL 80 ML: 755 INJECTION, SOLUTION INTRAVENOUS at 12:55

## 2019-12-06 ASSESSMENT — PAIN DESCRIPTION - PAIN TYPE: TYPE: ACUTE PAIN

## 2019-12-06 ASSESSMENT — PAIN DESCRIPTION - FREQUENCY: FREQUENCY: CONTINUOUS

## 2019-12-06 ASSESSMENT — PAIN SCALES - GENERAL: PAINLEVEL_OUTOF10: 8

## 2019-12-06 ASSESSMENT — ENCOUNTER SYMPTOMS
VOMITING: 0
NAUSEA: 0
EYE PAIN: 0
SHORTNESS OF BREATH: 0
BACK PAIN: 1
DIARRHEA: 0
ABDOMINAL PAIN: 1
COUGH: 0
RHINORRHEA: 0
EYE DISCHARGE: 0
SORE THROAT: 0
WHEEZING: 0

## 2019-12-06 ASSESSMENT — PAIN DESCRIPTION - LOCATION: LOCATION: BACK;ABDOMEN

## 2019-12-06 ASSESSMENT — PAIN DESCRIPTION - ORIENTATION: ORIENTATION: RIGHT;LEFT;LOWER

## 2019-12-08 LAB
ORGANISM: ABNORMAL
URINE CULTURE REFLEX: ABNORMAL

## 2019-12-10 ENCOUNTER — OFFICE VISIT (OUTPATIENT)
Dept: PULMONOLOGY | Age: 79
End: 2019-12-10
Payer: MEDICARE

## 2019-12-10 VITALS
DIASTOLIC BLOOD PRESSURE: 84 MMHG | OXYGEN SATURATION: 98 % | WEIGHT: 208 LBS | SYSTOLIC BLOOD PRESSURE: 134 MMHG | HEIGHT: 67 IN | HEART RATE: 79 BPM | BODY MASS INDEX: 32.65 KG/M2 | RESPIRATION RATE: 20 BRPM

## 2019-12-10 DIAGNOSIS — G47.33 OSA ON CPAP: Primary | ICD-10-CM

## 2019-12-10 DIAGNOSIS — E66.9 OBESITY (BMI 30-39.9): ICD-10-CM

## 2019-12-10 DIAGNOSIS — Z99.89 OSA ON CPAP: Primary | ICD-10-CM

## 2019-12-10 PROCEDURE — 1036F TOBACCO NON-USER: CPT | Performed by: PHYSICIAN ASSISTANT

## 2019-12-10 PROCEDURE — 4040F PNEUMOC VAC/ADMIN/RCVD: CPT | Performed by: PHYSICIAN ASSISTANT

## 2019-12-10 PROCEDURE — 1123F ACP DISCUSS/DSCN MKR DOCD: CPT | Performed by: PHYSICIAN ASSISTANT

## 2019-12-10 PROCEDURE — G8484 FLU IMMUNIZE NO ADMIN: HCPCS | Performed by: PHYSICIAN ASSISTANT

## 2019-12-10 PROCEDURE — 1090F PRES/ABSN URINE INCON ASSESS: CPT | Performed by: PHYSICIAN ASSISTANT

## 2019-12-10 PROCEDURE — G8399 PT W/DXA RESULTS DOCUMENT: HCPCS | Performed by: PHYSICIAN ASSISTANT

## 2019-12-10 PROCEDURE — G8417 CALC BMI ABV UP PARAM F/U: HCPCS | Performed by: PHYSICIAN ASSISTANT

## 2019-12-10 PROCEDURE — 99213 OFFICE O/P EST LOW 20 MIN: CPT | Performed by: PHYSICIAN ASSISTANT

## 2019-12-10 PROCEDURE — G8427 DOCREV CUR MEDS BY ELIG CLIN: HCPCS | Performed by: PHYSICIAN ASSISTANT

## 2019-12-10 PROCEDURE — G8598 ASA/ANTIPLAT THER USED: HCPCS | Performed by: PHYSICIAN ASSISTANT

## 2019-12-10 ASSESSMENT — ENCOUNTER SYMPTOMS
DIARRHEA: 0
CHEST TIGHTNESS: 0
EYES NEGATIVE: 1
ALLERGIC/IMMUNOLOGIC NEGATIVE: 1
STRIDOR: 0
COUGH: 0
WHEEZING: 0
SHORTNESS OF BREATH: 0
BACK PAIN: 1
NAUSEA: 0

## 2019-12-16 ENCOUNTER — TELEPHONE (OUTPATIENT)
Dept: FAMILY MEDICINE CLINIC | Age: 79
End: 2019-12-16

## 2019-12-19 ENCOUNTER — HOSPITAL ENCOUNTER (OUTPATIENT)
Dept: NURSING | Age: 79
End: 2019-12-19
Payer: MEDICARE

## 2019-12-23 DIAGNOSIS — I10 ESSENTIAL HYPERTENSION: ICD-10-CM

## 2019-12-24 RX ORDER — FUROSEMIDE 20 MG/1
TABLET ORAL
Qty: 60 TABLET | Refills: 0 | Status: SHIPPED | OUTPATIENT
Start: 2019-12-24 | End: 2020-02-24 | Stop reason: SDUPTHER

## 2019-12-27 ENCOUNTER — HOSPITAL ENCOUNTER (OUTPATIENT)
Dept: NURSING | Age: 79
Discharge: HOME | End: 2019-12-27
Payer: MEDICARE

## 2019-12-27 VITALS
OXYGEN SATURATION: 96 % | SYSTOLIC BLOOD PRESSURE: 130 MMHG | TEMPERATURE: 98.5 F | DIASTOLIC BLOOD PRESSURE: 59 MMHG | HEART RATE: 85 BPM | RESPIRATION RATE: 20 BRPM

## 2019-12-27 DIAGNOSIS — M81.0 AGE-RELATED OSTEOPOROSIS WITHOUT CURRENT PATHOLOGICAL FRACTURE: Primary | ICD-10-CM

## 2019-12-27 PROCEDURE — 96372 THER/PROPH/DIAG INJ SC/IM: CPT

## 2019-12-27 PROCEDURE — 6360000002 HC RX W HCPCS: Performed by: FAMILY MEDICINE

## 2019-12-27 RX ADMIN — DENOSUMAB 60 MG: 60 INJECTION SUBCUTANEOUS at 11:57

## 2019-12-27 ASSESSMENT — PAIN - FUNCTIONAL ASSESSMENT: PAIN_FUNCTIONAL_ASSESSMENT: 0-10

## 2019-12-27 ASSESSMENT — PAIN DESCRIPTION - DESCRIPTORS: DESCRIPTORS: ACHING

## 2019-12-28 ENCOUNTER — HOSPITAL ENCOUNTER (EMERGENCY)
Age: 79
Discharge: HOME | End: 2019-12-28
Payer: MEDICARE

## 2019-12-28 VITALS
HEART RATE: 79 BPM | SYSTOLIC BLOOD PRESSURE: 130 MMHG | BODY MASS INDEX: 33.11 KG/M2 | TEMPERATURE: 97.9 F | OXYGEN SATURATION: 95 % | HEIGHT: 66 IN | RESPIRATION RATE: 18 BRPM | WEIGHT: 206 LBS | DIASTOLIC BLOOD PRESSURE: 62 MMHG

## 2019-12-28 LAB
BILIRUBIN URINE: NEGATIVE
BLOOD, URINE: ABNORMAL
CHARACTER, URINE: CLEAR
COLOR: YELLOW
GLUCOSE, URINE: NEGATIVE MG/DL
KETONES, URINE: NEGATIVE
LEUKOCYTES, UA: ABNORMAL
NITRATE, UA: NEGATIVE
PH UA: 6 (ref 5–9)
PROTEIN UA: NEGATIVE MG/DL
REFLEX TO URINE C & S: ABNORMAL
SPECIFIC GRAVITY UA: 1.01 (ref 1–1.03)
UROBILINOGEN, URINE: 0.2 EU/DL (ref 0–1)

## 2019-12-28 PROCEDURE — 99213 OFFICE O/P EST LOW 20 MIN: CPT

## 2019-12-28 PROCEDURE — 87086 URINE CULTURE/COLONY COUNT: CPT

## 2019-12-28 PROCEDURE — 87077 CULTURE AEROBIC IDENTIFY: CPT

## 2019-12-28 PROCEDURE — 81003 URINALYSIS AUTO W/O SCOPE: CPT

## 2019-12-28 PROCEDURE — 99213 OFFICE O/P EST LOW 20 MIN: CPT | Performed by: NURSE PRACTITIONER

## 2019-12-28 PROCEDURE — 87186 SC STD MICRODIL/AGAR DIL: CPT

## 2019-12-28 RX ORDER — SULFAMETHOXAZOLE AND TRIMETHOPRIM 800; 160 MG/1; MG/1
1 TABLET ORAL 2 TIMES DAILY
Qty: 20 TABLET | Refills: 0 | Status: ON HOLD | OUTPATIENT
Start: 2019-12-28 | End: 2020-01-07

## 2019-12-28 ASSESSMENT — ENCOUNTER SYMPTOMS
NAUSEA: 0
BACK PAIN: 0
COUGH: 0
TROUBLE SWALLOWING: 0
DIARRHEA: 0
VOMITING: 0
RHINORRHEA: 0
SINUS PRESSURE: 0
SHORTNESS OF BREATH: 0
SORE THROAT: 0

## 2019-12-28 ASSESSMENT — PAIN DESCRIPTION - LOCATION: LOCATION: BACK

## 2019-12-28 ASSESSMENT — PAIN SCALES - GENERAL: PAINLEVEL_OUTOF10: 3

## 2019-12-28 NOTE — ED PROVIDER NOTES
[facility]  Urgent Care Encounter       CHIEF COMPLAINT       Chief Complaint   Patient presents with    Urinary Tract Infection    Dysuria    Back Pain       Nurses Notes reviewed and I agree except as noted in the HPI. HISTORY OF PRESENT ILLNESS   Phil Wong is a [age] [facility] y.o. female who presents     The history is provided by the patient. No  was used. Dysuria    This is a new problem. The problem occurs every urination. The problem has not changed since onset. The quality of the pain is described as burning. The pain is at a severity of 3/10. The pain is mild. There has been no fever. There is no history of pyelonephritis. Associated symptoms include frequency, hematuria and urgency. Pertinent negatives include no chills, no nausea and no vomiting. Her past medical history is significant for recurrent UTIs. REVIEW OF SYSTEMS     Review of Systems   Constitutional: Negative for activity change, appetite change, chills, fatigue and fever. HENT: Negative for congestion, ear pain, rhinorrhea, sinus pressure, sore throat and trouble swallowing. Respiratory: Negative for cough and shortness of breath. Cardiovascular: Negative for chest pain. Gastrointestinal: Negative for diarrhea, nausea and vomiting. Genitourinary: Positive for dysuria, frequency, hematuria and urgency. Negative for decreased urine volume and difficulty urinating. Musculoskeletal: Negative for back pain and neck stiffness. Skin: Negative for rash. Allergic/Immunologic: Negative for environmental allergies. Neurological: Negative for dizziness, light-headedness and headaches. Hematological: Negative for adenopathy.        PAST MEDICAL HISTORY         Diagnosis Date    Arthritis     general    Breast CA ([facility] 12/03    right    CAD (coronary artery disease) 2009    stent in [place]    CHF (congestive heart failure) (HCC)     Closed compression fracture of first lumbar vertebra ([facility] [address] 2/7/2018    Colon polyps 8/97; 3/11    COPD (chronic obstructive pulmonary disease) ([facility] [address] 8/5/2017    Diverticulosis 1/06    Erosive gastritis 11/06    Esophageal stricture 03/2011    Three times, Dr. Desmond Powell Hyperlipidemia     Hypertension     Internal hemorrhoid 1/06    LUISA on CPAP     Osteopenia determined by x-ray 1999    Osteoporosis of vertebra 6/2018 Per CT thoracolumbar    Pneumonia 2009    Stress incontinence, female     Vertigo        SURGICALHISTORY     Patient  has a past surgical history that includes Hysterectomy (1976 or 1977); Cholecystectomy (1993); Tonsillectomy (childhood); Coronary angioplasty with stent (2009); Breast lumpectomy (1/04); Colonoscopy (3/2011); joint replacement (Left); Dilatation, esophagus (2011); Cardiac defibrillator placement (01/08/2018); pr open fixatn mid humerus fracture (Right, 2/12/2018); pr office/outpt visit,procedure only (N/A, 8/2/2018); Upper gastrointestinal endoscopy (Left, 8/2/2018); pr office/outpt visit,procedure only (Left, 11/26/2018); Arm Surgery (Left, 11/27/2018); and hip pinning (Left, 8/20/2019). CURRENT MEDICATIONS       Discharge Medication List as of 12/28/2019  1:02 PM      CONTINUE these medications which have NOT CHANGED    Details   denosumab (PROLIA) 60 MG/ML SOSY SC injection Inject 60 mg into the skin onceHistorical Med      furosemide (LASIX) 20 MG tablet take 1 tablet by mouth once daily, Disp-60 tablet, R-0Normal      traMADol (ULTRAM) 50 MG tablet 50 mg 2 times daily.  Historical Med      rosuvastatin (CRESTOR) 5 MG tablet Take 1 tablet by mouth daily, Disp-30 tablet, R-5Normal      magnesium oxide (MAG-OX) 400 (241.3 Mg) MG TABS tablet Take 0.5 tablets by mouth daily, Disp-30 tablet, R-5Normal      cetirizine (ZYRTEC) 10 MG tablet Take 1 tablet by mouth nightly, Disp-30 tablet, R-5Normal      metoprolol succinate (TOPROL XL) 25 MG extended release tablet Take 0.5 tablets by mouth daily, Disp-30 tablet, R-3Normal      diclofenac sodium 1 % GEL Apply 4 g topically 4 times daily, Topical, 4 TIMES DAILY Starting Tue 9/10/2019, Disp-1 Tube, R-3, Print      sacubitril-valsartan (ENTRESTO) 49-51 MG per tablet Take 1 tablet by mouth 2 times daily, Disp-180 tablet, R-3Normal      isosorbide mononitrate (IMDUR) 30 MG extended release tablet take 1/2 tablet by mouth once daily, Disp-45 tablet, R-3Normal      acetaminophen (TYLENOL) 325 MG tablet Take 2 tablets by mouth every 6 hours, Disp-120 tablet, R-0DC to SNF      calcium-vitamin D (OSCAL-500) 500-200 MG-UNIT per tablet Take 1 tablet by mouth 2 times daily, Disp-30 tablet, R-3DC to SNF      aspirin 81 MG EC tablet Take 1 tablet by mouth daily Stop 5 days before any planned intervention for compression fractures, Disp-30 tablet, R-3DC to SNF             ALLERGIES     Patient is is allergic to antivert [meclizine]; ciprofloxacin; neomycin; pcn [penicillins]; zanaflex [tizanidine hcl]; and levaquin [levofloxacin]. Patients   Immunization History   Administered Date(s) Administered    Influenza Vaccine, unspecified formulation 10/19/2016    Influenza Virus Vaccine 11/29/2013, 10/08/2014, 11/02/2015    Tdap (Boostrix, Adacel) 02/03/2018       FAMILY HISTORY     Patient's family history includes Cancer in her brother and father; Heart Disease in her brother, mother, and sister; High Blood Pressure in her mother. SOCIAL HISTORY     Patient  reports that she quit smoking about 22 years ago. Her smoking use included cigarettes. She has a 20.00 pack-year smoking history. She has never used smokeless tobacco. She reports current alcohol use of about 2.0 standard drinks of alcohol per week. She reports that she does not use drugs. PHYSICAL EXAM     ED TRIAGE VITALS  BP: 130/62, Temp: 97.9 °F (36.6 °C), Pulse: 79, Resp: 18, SpO2: 95 %,Estimated body mass index is 33.25 kg/m² as calculated from the following:    Height as of this encounter: 5' 6\" (1.676 m). posterior cervical adenopathy. Upper Body:      Right upper body: No supraclavicular adenopathy. Left upper body: No supraclavicular adenopathy. Skin:     General: Skin is warm and dry. Capillary Refill: Capillary refill takes less than 2 seconds. Findings: No rash. Neurological:      Mental Status: She is alert and oriented to person, place, and time. Psychiatric:         Mood and Affect: Mood normal.         Behavior: Behavior normal. Behavior is cooperative. DIAGNOSTIC RESULTS     Labs:  Results for orders placed or performed during the hospital encounter of 12/28/19   UA without Microscopic Reflex C&S   Result Value Ref Range    Glucose, Urine Negative NEGATIVE mg/dl    Bilirubin Urine Negative NEGATIVE    Ketones, Urine Negative NEGATIVE    Specific Gravity, UA 1.010 1.002 - 1.03    Blood, Urine Small (A) NEGATIVE    pH, UA 6.00 5.0 - 9.0    Protein, UA Negative NEGATIVE mg/dl    Urobilinogen, Urine 0.20 0.0 - 1.0 eu/dl    Nitrate, UA Negative NEGATIVE    LEUKOCYTES, UA Large (A) NEGATIVE    Color, UA Yellow STRAW-YELL    Character, Urine Clear CLEAR-SL C    REFLEX TO URINE C & S INDICATED        IMAGING:    No orders to display         EKG:      URGENT CARE COURSE:     Vitals:    12/28/19 1218 12/28/19 1219   BP:  130/62   Pulse:  79   Resp: 18    Temp: 97.9 °F (36.6 °C)    TempSrc: Temporal    SpO2:  95%   Weight: 206 lb (93.4 kg)    Height: 5' 6\" (1.676 m)        Medications - No data to display         PROCEDURES:  None    FINAL IMPRESSION      1. Acute cystitis with hematuria          DISPOSITION/ PLAN        Patient or Patient designated representative was advised to drink plenty of water or fluids and take medication as prescribed. The patient or Patient designated representative is advised to monitor for any changes in pain, development of high fever, chills, persistent vomiting, development of increasing back or flank pain or increase in hematuria the patient is advised to go to the emergency department for reevaluation and further follow-up if they wouldn't notice any of the above symptoms. The patient or Patient designated representative was also advised to follow up with family doctor or primary care provider after the antibiotic is completed for repeat urinalysis. The patient did verbalize understanding of discharge instructions and is agreeable to the treatment plan. The patient left ambulatory without any changes or concerns in stable condition.       PATIENT REFERRED TO:  Rhonda Matt MD  [address] [place] [address] / [address] [place] [address]      DISCHARGE MEDICATIONS:  Discharge Medication List as of 12/28/2019  1:02 PM      START taking these medications    Details   sulfamethoxazole-trimethoprim (BACTRIM DS) 800-160 MG per tablet Take 1 tablet by mouth 2 times daily for 10 days, Disp-20 tablet, R-0Normal             Discharge Medication List as of 12/28/2019  1:02 PM          Discharge Medication List as of 12/28/2019  1:02 PM          JADEN Guillaume CNP    (Please note that portions of this note were completed with a voice recognition program. Efforts were made to edit the dictations but occasionally words are mis-transcribed.)           JADEN Guillaume CNP  12/28/19 4320

## 2019-12-31 LAB
ORGANISM: ABNORMAL
URINE CULTURE REFLEX: ABNORMAL
URINE CULTURE REFLEX: ABNORMAL

## 2020-01-07 ENCOUNTER — APPOINTMENT (OUTPATIENT)
Dept: GENERAL RADIOLOGY | Age: 80
DRG: 206 | End: 2020-01-07
Payer: MEDICARE

## 2020-01-07 ENCOUNTER — APPOINTMENT (OUTPATIENT)
Dept: CT IMAGING | Age: 80
DRG: 206 | End: 2020-01-07
Payer: MEDICARE

## 2020-01-07 ENCOUNTER — HOSPITAL ENCOUNTER (INPATIENT)
Age: 80
LOS: 1 days | Discharge: NURSING, REHAB OR LONG-TERM CARE | DRG: 206 | End: 2020-01-08
Attending: FAMILY MEDICINE | Admitting: EMERGENCY MEDICINE
Payer: MEDICARE

## 2020-01-07 PROBLEM — R07.9 CHEST PAIN: Status: ACTIVE | Noted: 2020-01-07

## 2020-01-07 PROBLEM — I50.32 CHRONIC DIASTOLIC CHF (CONGESTIVE HEART FAILURE) (HCC): Status: ACTIVE | Noted: 2020-01-07

## 2020-01-07 PROBLEM — Z87.81 S/P LEFT HIP FRACTURE: Status: RESOLVED | Noted: 2019-08-23 | Resolved: 2020-01-07

## 2020-01-07 PROBLEM — F43.23 ADJUSTMENT DISORDER WITH MIXED ANXIETY AND DEPRESSED MOOD: Status: RESOLVED | Noted: 2018-02-20 | Resolved: 2020-01-07

## 2020-01-07 PROBLEM — S72.002A CLOSED LEFT HIP FRACTURE, INITIAL ENCOUNTER (HCC): Status: RESOLVED | Noted: 2019-08-18 | Resolved: 2020-01-07

## 2020-01-07 PROBLEM — E83.51 HYPOCALCEMIA: Status: ACTIVE | Noted: 2020-01-07

## 2020-01-07 LAB
ALBUMIN SERPL-MCNC: 2.4 G/DL (ref 3.5–5.1)
ALP BLD-CCNC: 43 U/L (ref 38–126)
ALT SERPL-CCNC: < 5 U/L (ref 11–66)
ANION GAP SERPL CALCULATED.3IONS-SCNC: 11 MEQ/L (ref 8–16)
APTT: 30.3 SECONDS (ref 22–38)
AST SERPL-CCNC: 8 U/L (ref 5–40)
BASOPHILS # BLD: 0.4 %
BASOPHILS ABSOLUTE: 0 THOU/MM3 (ref 0–0.1)
BILIRUB SERPL-MCNC: 0.3 MG/DL (ref 0.3–1.2)
BILIRUBIN DIRECT: < 0.2 MG/DL (ref 0–0.3)
BILIRUBIN URINE: NEGATIVE
BLOOD, URINE: NEGATIVE
BUN BLDV-MCNC: 12 MG/DL (ref 7–22)
CALCIUM IONIZED: 0.94 MMOL/L (ref 1.12–1.32)
CALCIUM IONIZED: 1.04 MMOL/L (ref 1.12–1.32)
CALCIUM SERPL-MCNC: 5.8 MG/DL (ref 8.5–10.5)
CHARACTER, URINE: CLEAR
CHLORIDE BLD-SCNC: 114 MEQ/L (ref 98–111)
CO2: 16 MEQ/L (ref 23–33)
COLOR: YELLOW
CREAT SERPL-MCNC: 0.4 MG/DL (ref 0.4–1.2)
EKG ATRIAL RATE: 78 BPM
EKG ATRIAL RATE: 89 BPM
EKG P AXIS: 30 DEGREES
EKG P AXIS: 69 DEGREES
EKG P-R INTERVAL: 140 MS
EKG P-R INTERVAL: 140 MS
EKG Q-T INTERVAL: 446 MS
EKG Q-T INTERVAL: 470 MS
EKG QRS DURATION: 146 MS
EKG QRS DURATION: 150 MS
EKG QTC CALCULATION (BAZETT): 535 MS
EKG QTC CALCULATION (BAZETT): 542 MS
EKG R AXIS: -48 DEGREES
EKG R AXIS: -52 DEGREES
EKG T AXIS: 59 DEGREES
EKG T AXIS: 65 DEGREES
EKG VENTRICULAR RATE: 78 BPM
EKG VENTRICULAR RATE: 89 BPM
EOSINOPHIL # BLD: 2.5 %
EOSINOPHILS ABSOLUTE: 0.2 THOU/MM3 (ref 0–0.4)
ERYTHROCYTE [DISTWIDTH] IN BLOOD BY AUTOMATED COUNT: 13.3 % (ref 11.5–14.5)
ERYTHROCYTE [DISTWIDTH] IN BLOOD BY AUTOMATED COUNT: 46.4 FL (ref 35–45)
GFR SERPL CREATININE-BSD FRML MDRD: > 90 ML/MIN/1.73M2
GLUCOSE BLD-MCNC: 74 MG/DL (ref 70–108)
GLUCOSE URINE: NEGATIVE MG/DL
HCT VFR BLD CALC: 40.5 % (ref 37–47)
HEMOGLOBIN: 13.4 GM/DL (ref 12–16)
IMMATURE GRANS (ABS): 0.05 THOU/MM3 (ref 0–0.07)
IMMATURE GRANULOCYTES: 0.6 %
INR BLD: 1 (ref 0.85–1.13)
KETONES, URINE: 40
LEUKOCYTE ESTERASE, URINE: NEGATIVE
LIPASE: 7.5 U/L (ref 5.6–51.3)
LYMPHOCYTES # BLD: 13.1 %
LYMPHOCYTES ABSOLUTE: 1 THOU/MM3 (ref 1–4.8)
MAGNESIUM: 1.8 MG/DL (ref 1.6–2.4)
MCH RBC QN AUTO: 31.4 PG (ref 26–33)
MCHC RBC AUTO-ENTMCNC: 33.1 GM/DL (ref 32.2–35.5)
MCV RBC AUTO: 94.8 FL (ref 81–99)
MONOCYTES # BLD: 8.2 %
MONOCYTES ABSOLUTE: 0.6 THOU/MM3 (ref 0.4–1.3)
NITRITE, URINE: NEGATIVE
NUCLEATED RED BLOOD CELLS: 0 /100 WBC
OSMOLALITY CALCULATION: 279.7 MOSMOL/KG (ref 275–300)
PH UA: 5 (ref 5–9)
PLATELET # BLD: 219 THOU/MM3 (ref 130–400)
PMV BLD AUTO: 9.8 FL (ref 9.4–12.4)
POTASSIUM SERPL-SCNC: 3.4 MEQ/L (ref 3.5–5.2)
PRO-BNP: 1258 PG/ML (ref 0–1800)
PROTEIN UA: NEGATIVE
RBC # BLD: 4.27 MILL/MM3 (ref 4.2–5.4)
REASON FOR REJECTION: NORMAL
REJECTED TEST: NORMAL
SEG NEUTROPHILS: 75.2 %
SEGMENTED NEUTROPHILS ABSOLUTE COUNT: 5.9 THOU/MM3 (ref 1.8–7.7)
SODIUM BLD-SCNC: 141 MEQ/L (ref 135–145)
SPECIFIC GRAVITY, URINE: 1.01 (ref 1–1.03)
TOTAL PROTEIN: 3.9 G/DL (ref 6.1–8)
TROPONIN T: < 0.01 NG/ML
UROBILINOGEN, URINE: 1 EU/DL (ref 0–1)
WBC # BLD: 7.9 THOU/MM3 (ref 4.8–10.8)

## 2020-01-07 PROCEDURE — 6360000002 HC RX W HCPCS: Performed by: PHYSICIAN ASSISTANT

## 2020-01-07 PROCEDURE — 82248 BILIRUBIN DIRECT: CPT

## 2020-01-07 PROCEDURE — 93010 ELECTROCARDIOGRAM REPORT: CPT | Performed by: INTERNAL MEDICINE

## 2020-01-07 PROCEDURE — 96366 THER/PROPH/DIAG IV INF ADDON: CPT

## 2020-01-07 PROCEDURE — 83690 ASSAY OF LIPASE: CPT

## 2020-01-07 PROCEDURE — 71275 CT ANGIOGRAPHY CHEST: CPT

## 2020-01-07 PROCEDURE — 93005 ELECTROCARDIOGRAM TRACING: CPT | Performed by: EMERGENCY MEDICINE

## 2020-01-07 PROCEDURE — 2709999900 HC NON-CHARGEABLE SUPPLY

## 2020-01-07 PROCEDURE — 82330 ASSAY OF CALCIUM: CPT

## 2020-01-07 PROCEDURE — 36415 COLL VENOUS BLD VENIPUNCTURE: CPT

## 2020-01-07 PROCEDURE — 6360000002 HC RX W HCPCS: Performed by: NURSE PRACTITIONER

## 2020-01-07 PROCEDURE — 85025 COMPLETE CBC W/AUTO DIFF WBC: CPT

## 2020-01-07 PROCEDURE — 71045 X-RAY EXAM CHEST 1 VIEW: CPT

## 2020-01-07 PROCEDURE — 81003 URINALYSIS AUTO W/O SCOPE: CPT

## 2020-01-07 PROCEDURE — 2580000003 HC RX 258: Performed by: NURSE PRACTITIONER

## 2020-01-07 PROCEDURE — 2500000003 HC RX 250 WO HCPCS: Performed by: NURSE PRACTITIONER

## 2020-01-07 PROCEDURE — 85730 THROMBOPLASTIN TIME PARTIAL: CPT

## 2020-01-07 PROCEDURE — 2580000003 HC RX 258: Performed by: NUCLEAR MEDICINE

## 2020-01-07 PROCEDURE — 84484 ASSAY OF TROPONIN QUANT: CPT

## 2020-01-07 PROCEDURE — 6370000000 HC RX 637 (ALT 250 FOR IP): Performed by: NURSE PRACTITIONER

## 2020-01-07 PROCEDURE — 93307 TTE W/O DOPPLER COMPLETE: CPT

## 2020-01-07 PROCEDURE — 83880 ASSAY OF NATRIURETIC PEPTIDE: CPT

## 2020-01-07 PROCEDURE — 96368 THER/DIAG CONCURRENT INF: CPT

## 2020-01-07 PROCEDURE — 93005 ELECTROCARDIOGRAM TRACING: CPT | Performed by: NURSE PRACTITIONER

## 2020-01-07 PROCEDURE — 99223 1ST HOSP IP/OBS HIGH 75: CPT | Performed by: NUCLEAR MEDICINE

## 2020-01-07 PROCEDURE — 83735 ASSAY OF MAGNESIUM: CPT

## 2020-01-07 PROCEDURE — 96365 THER/PROPH/DIAG IV INF INIT: CPT

## 2020-01-07 PROCEDURE — 2580000003 HC RX 258: Performed by: FAMILY MEDICINE

## 2020-01-07 PROCEDURE — 6370000000 HC RX 637 (ALT 250 FOR IP): Performed by: FAMILY MEDICINE

## 2020-01-07 PROCEDURE — 96375 TX/PRO/DX INJ NEW DRUG ADDON: CPT

## 2020-01-07 PROCEDURE — 6360000004 HC RX CONTRAST MEDICATION: Performed by: NURSE PRACTITIONER

## 2020-01-07 PROCEDURE — 99285 EMERGENCY DEPT VISIT HI MDM: CPT

## 2020-01-07 PROCEDURE — 6360000002 HC RX W HCPCS: Performed by: EMERGENCY MEDICINE

## 2020-01-07 PROCEDURE — 80053 COMPREHEN METABOLIC PANEL: CPT

## 2020-01-07 PROCEDURE — 85610 PROTHROMBIN TIME: CPT

## 2020-01-07 PROCEDURE — 2140000000 HC CCU INTERMEDIATE R&B

## 2020-01-07 RX ORDER — FUROSEMIDE 20 MG/1
20 TABLET ORAL DAILY
Status: DISCONTINUED | OUTPATIENT
Start: 2020-01-07 | End: 2020-01-08 | Stop reason: HOSPADM

## 2020-01-07 RX ORDER — SODIUM CHLORIDE 9 MG/ML
500 INJECTION, SOLUTION INTRAVENOUS CONTINUOUS PRN
Status: ACTIVE | OUTPATIENT
Start: 2020-01-07 | End: 2020-01-07

## 2020-01-07 RX ORDER — TRAMADOL HYDROCHLORIDE 50 MG/1
50 TABLET ORAL 2 TIMES DAILY
Status: DISCONTINUED | OUTPATIENT
Start: 2020-01-07 | End: 2020-01-08 | Stop reason: HOSPADM

## 2020-01-07 RX ORDER — SODIUM CHLORIDE 0.9 % (FLUSH) 0.9 %
10 SYRINGE (ML) INJECTION PRN
Status: ACTIVE | OUTPATIENT
Start: 2020-01-07 | End: 2020-01-07

## 2020-01-07 RX ORDER — AMINOPHYLLINE DIHYDRATE 25 MG/ML
50 INJECTION, SOLUTION INTRAVENOUS PRN
Status: ACTIVE | OUTPATIENT
Start: 2020-01-07 | End: 2020-01-07

## 2020-01-07 RX ORDER — MORPHINE SULFATE 2 MG/ML
INJECTION, SOLUTION INTRAMUSCULAR; INTRAVENOUS
Status: DISPENSED
Start: 2020-01-07 | End: 2020-01-07

## 2020-01-07 RX ORDER — METOPROLOL SUCCINATE 25 MG/1
12.5 TABLET, EXTENDED RELEASE ORAL DAILY
Status: DISCONTINUED | OUTPATIENT
Start: 2020-01-07 | End: 2020-01-08 | Stop reason: HOSPADM

## 2020-01-07 RX ORDER — SODIUM CHLORIDE 9 MG/ML
INJECTION, SOLUTION INTRAVENOUS CONTINUOUS
Status: DISCONTINUED | OUTPATIENT
Start: 2020-01-07 | End: 2020-01-08 | Stop reason: HOSPADM

## 2020-01-07 RX ORDER — NITROGLYCERIN 0.4 MG/1
0.4 TABLET SUBLINGUAL EVERY 5 MIN PRN
Status: DISCONTINUED | OUTPATIENT
Start: 2020-01-07 | End: 2020-01-07 | Stop reason: SDUPTHER

## 2020-01-07 RX ORDER — METOPROLOL TARTRATE 5 MG/5ML
5 INJECTION INTRAVENOUS EVERY 5 MIN PRN
Status: ACTIVE | OUTPATIENT
Start: 2020-01-07 | End: 2020-01-07

## 2020-01-07 RX ORDER — ATROPINE SULFATE 0.1 MG/ML
0.5 INJECTION INTRAVENOUS EVERY 5 MIN PRN
Status: ACTIVE | OUTPATIENT
Start: 2020-01-07 | End: 2020-01-07

## 2020-01-07 RX ORDER — DOCUSATE SODIUM 100 MG/1
100 CAPSULE, LIQUID FILLED ORAL DAILY PRN
COMMUNITY
End: 2020-02-06

## 2020-01-07 RX ORDER — MORPHINE SULFATE 2 MG/ML
2 INJECTION, SOLUTION INTRAMUSCULAR; INTRAVENOUS ONCE
Status: COMPLETED | OUTPATIENT
Start: 2020-01-07 | End: 2020-01-07

## 2020-01-07 RX ORDER — MAGNESIUM 200 MG
200 TABLET ORAL DAILY
COMMUNITY
End: 2020-08-12

## 2020-01-07 RX ORDER — NITROGLYCERIN 0.4 MG/1
0.4 TABLET SUBLINGUAL EVERY 5 MIN PRN
Status: ACTIVE | OUTPATIENT
Start: 2020-01-07 | End: 2020-01-07

## 2020-01-07 RX ORDER — ASPIRIN 81 MG/1
81 TABLET ORAL DAILY
Status: DISCONTINUED | OUTPATIENT
Start: 2020-01-07 | End: 2020-01-08 | Stop reason: HOSPADM

## 2020-01-07 RX ORDER — ROSUVASTATIN CALCIUM 10 MG/1
5 TABLET, COATED ORAL DAILY
Status: DISCONTINUED | OUTPATIENT
Start: 2020-01-07 | End: 2020-01-08 | Stop reason: HOSPADM

## 2020-01-07 RX ORDER — NITROGLYCERIN 20 MG/100ML
5 INJECTION INTRAVENOUS CONTINUOUS
Status: DISCONTINUED | OUTPATIENT
Start: 2020-01-07 | End: 2020-01-08 | Stop reason: HOSPADM

## 2020-01-07 RX ORDER — ALBUTEROL SULFATE 90 UG/1
2 AEROSOL, METERED RESPIRATORY (INHALATION) PRN
Status: ACTIVE | OUTPATIENT
Start: 2020-01-07 | End: 2020-01-07

## 2020-01-07 RX ORDER — MORPHINE SULFATE 2 MG/ML
2 INJECTION, SOLUTION INTRAMUSCULAR; INTRAVENOUS
Status: DISCONTINUED | OUTPATIENT
Start: 2020-01-07 | End: 2020-01-08 | Stop reason: HOSPADM

## 2020-01-07 RX ORDER — 0.9 % SODIUM CHLORIDE 0.9 %
1000 INTRAVENOUS SOLUTION INTRAVENOUS ONCE
Status: COMPLETED | OUTPATIENT
Start: 2020-01-07 | End: 2020-01-07

## 2020-01-07 RX ORDER — RANITIDINE 150 MG/1
150 TABLET ORAL 2 TIMES DAILY PRN
Status: ON HOLD | COMMUNITY
End: 2020-01-08 | Stop reason: HOSPADM

## 2020-01-07 RX ORDER — KETOROLAC TROMETHAMINE 30 MG/ML
15 INJECTION, SOLUTION INTRAMUSCULAR; INTRAVENOUS EVERY 6 HOURS PRN
Status: DISCONTINUED | OUTPATIENT
Start: 2020-01-07 | End: 2020-01-08 | Stop reason: HOSPADM

## 2020-01-07 RX ORDER — SUCRALFATE 1 G/1
1 TABLET ORAL EVERY 6 HOURS SCHEDULED
Status: DISCONTINUED | OUTPATIENT
Start: 2020-01-07 | End: 2020-01-08 | Stop reason: HOSPADM

## 2020-01-07 RX ORDER — CETIRIZINE HYDROCHLORIDE 10 MG/1
10 TABLET ORAL NIGHTLY
Status: DISCONTINUED | OUTPATIENT
Start: 2020-01-07 | End: 2020-01-08 | Stop reason: HOSPADM

## 2020-01-07 RX ORDER — PANTOPRAZOLE SODIUM 40 MG/1
40 TABLET, DELAYED RELEASE ORAL
Status: DISCONTINUED | OUTPATIENT
Start: 2020-01-07 | End: 2020-01-08 | Stop reason: HOSPADM

## 2020-01-07 RX ORDER — POTASSIUM CHLORIDE 20 MEQ/1
40 TABLET, EXTENDED RELEASE ORAL ONCE
Status: COMPLETED | OUTPATIENT
Start: 2020-01-07 | End: 2020-01-07

## 2020-01-07 RX ORDER — OYSTER SHELL CALCIUM WITH VITAMIN D 500; 200 MG/1; [IU]/1
1 TABLET, FILM COATED ORAL 2 TIMES DAILY
Status: DISCONTINUED | OUTPATIENT
Start: 2020-01-07 | End: 2020-01-08 | Stop reason: HOSPADM

## 2020-01-07 RX ADMIN — PANTOPRAZOLE SODIUM 40 MG: 40 TABLET, DELAYED RELEASE ORAL at 08:14

## 2020-01-07 RX ADMIN — MAGNESIUM GLUCONATE 500 MG ORAL TABLET 200 MG: 500 TABLET ORAL at 09:21

## 2020-01-07 RX ADMIN — Medication 10 ML: at 20:05

## 2020-01-07 RX ADMIN — ENOXAPARIN SODIUM 40 MG: 40 INJECTION SUBCUTANEOUS at 11:38

## 2020-01-07 RX ADMIN — CETIRIZINE HYDROCHLORIDE 10 MG: 10 TABLET, FILM COATED ORAL at 20:00

## 2020-01-07 RX ADMIN — NITROGLYCERIN 0.4 MG: 0.4 TABLET, ORALLY DISINTEGRATING SUBLINGUAL at 01:23

## 2020-01-07 RX ADMIN — SODIUM CHLORIDE 1000 ML: 9 INJECTION, SOLUTION INTRAVENOUS at 01:22

## 2020-01-07 RX ADMIN — SUCRALFATE 1 G: 1 TABLET ORAL at 23:45

## 2020-01-07 RX ADMIN — NITROGLYCERIN 5 MCG/MIN: 20 INJECTION INTRAVENOUS at 02:33

## 2020-01-07 RX ADMIN — SACUBITRIL AND VALSARTAN 1 TABLET: 49; 51 TABLET, FILM COATED ORAL at 11:40

## 2020-01-07 RX ADMIN — ROSUVASTATIN CALCIUM 5 MG: 10 TABLET, FILM COATED ORAL at 20:00

## 2020-01-07 RX ADMIN — METOPROLOL SUCCINATE 12.5 MG: 25 TABLET, FILM COATED, EXTENDED RELEASE ORAL at 11:40

## 2020-01-07 RX ADMIN — IOPAMIDOL 80 ML: 755 INJECTION, SOLUTION INTRAVENOUS at 03:49

## 2020-01-07 RX ADMIN — SUCRALFATE 1 G: 1 TABLET ORAL at 17:51

## 2020-01-07 RX ADMIN — KETOROLAC TROMETHAMINE 15 MG: 30 INJECTION, SOLUTION INTRAMUSCULAR at 17:52

## 2020-01-07 RX ADMIN — MORPHINE SULFATE 2 MG: 2 INJECTION, SOLUTION INTRAMUSCULAR; INTRAVENOUS at 04:16

## 2020-01-07 RX ADMIN — MORPHINE SULFATE 2 MG: 2 INJECTION, SOLUTION INTRAMUSCULAR; INTRAVENOUS at 08:10

## 2020-01-07 RX ADMIN — SUCRALFATE 1 G: 1 TABLET ORAL at 11:45

## 2020-01-07 RX ADMIN — MORPHINE SULFATE 2 MG: 2 INJECTION, SOLUTION INTRAMUSCULAR; INTRAVENOUS at 05:57

## 2020-01-07 RX ADMIN — TRAMADOL HYDROCHLORIDE 50 MG: 50 TABLET, FILM COATED ORAL at 10:00

## 2020-01-07 RX ADMIN — ASPIRIN 81 MG: 81 TABLET ORAL at 09:20

## 2020-01-07 RX ADMIN — POTASSIUM CHLORIDE 40 MEQ: 1500 TABLET, EXTENDED RELEASE ORAL at 09:20

## 2020-01-07 RX ADMIN — TRAMADOL HYDROCHLORIDE 50 MG: 50 TABLET, FILM COATED ORAL at 20:01

## 2020-01-07 RX ADMIN — CALCIUM CARBONATE-VITAMIN D TAB 500 MG-200 UNIT 1 TABLET: 500-200 TAB at 09:19

## 2020-01-07 RX ADMIN — SACUBITRIL AND VALSARTAN 1 TABLET: 49; 51 TABLET, FILM COATED ORAL at 20:00

## 2020-01-07 RX ADMIN — SODIUM CHLORIDE: 9 INJECTION, SOLUTION INTRAVENOUS at 04:56

## 2020-01-07 RX ADMIN — FUROSEMIDE 20 MG: 20 TABLET ORAL at 11:40

## 2020-01-07 RX ADMIN — CALCIUM CARBONATE-VITAMIN D TAB 500 MG-200 UNIT 1 TABLET: 500-200 TAB at 20:00

## 2020-01-07 RX ADMIN — SUCRALFATE 1 G: 1 TABLET ORAL at 09:19

## 2020-01-07 RX ADMIN — LIDOCAINE HYDROCHLORIDE: 20 SOLUTION ORAL; TOPICAL at 06:40

## 2020-01-07 RX ADMIN — NITROGLYCERIN 0.4 MG: 0.4 TABLET, ORALLY DISINTEGRATING SUBLINGUAL at 01:29

## 2020-01-07 RX ADMIN — CALCIUM GLUCONATE 1 G: 98 INJECTION, SOLUTION INTRAVENOUS at 04:21

## 2020-01-07 RX ADMIN — KETOROLAC TROMETHAMINE 15 MG: 30 INJECTION, SOLUTION INTRAMUSCULAR at 11:19

## 2020-01-07 ASSESSMENT — PAIN DESCRIPTION - PROGRESSION
CLINICAL_PROGRESSION: NOT CHANGED
CLINICAL_PROGRESSION: GRADUALLY IMPROVING
CLINICAL_PROGRESSION: NOT CHANGED
CLINICAL_PROGRESSION: GRADUALLY WORSENING
CLINICAL_PROGRESSION: NOT CHANGED
CLINICAL_PROGRESSION: GRADUALLY IMPROVING

## 2020-01-07 ASSESSMENT — PAIN SCALES - GENERAL
PAINLEVEL_OUTOF10: 6
PAINLEVEL_OUTOF10: 3
PAINLEVEL_OUTOF10: 3
PAINLEVEL_OUTOF10: 1
PAINLEVEL_OUTOF10: 5
PAINLEVEL_OUTOF10: 7
PAINLEVEL_OUTOF10: 9
PAINLEVEL_OUTOF10: 6
PAINLEVEL_OUTOF10: 3
PAINLEVEL_OUTOF10: 2
PAINLEVEL_OUTOF10: 4
PAINLEVEL_OUTOF10: 8
PAINLEVEL_OUTOF10: 8
PAINLEVEL_OUTOF10: 5
PAINLEVEL_OUTOF10: 3
PAINLEVEL_OUTOF10: 3

## 2020-01-07 ASSESSMENT — PAIN DESCRIPTION - DESCRIPTORS
DESCRIPTORS: SHARP;STABBING
DESCRIPTORS: STABBING;SHARP
DESCRIPTORS: SHARP;PRESSURE
DESCRIPTORS: PRESSURE;SHARP
DESCRIPTORS: PRESSURE;SHARP
DESCRIPTORS: SHARP;PRESSURE
DESCRIPTORS: SHARP

## 2020-01-07 ASSESSMENT — PAIN - FUNCTIONAL ASSESSMENT
PAIN_FUNCTIONAL_ASSESSMENT: PREVENTS OR INTERFERES SOME ACTIVE ACTIVITIES AND ADLS
PAIN_FUNCTIONAL_ASSESSMENT: PREVENTS OR INTERFERES WITH MANY ACTIVE NOT PASSIVE ACTIVITIES
PAIN_FUNCTIONAL_ASSESSMENT: PREVENTS OR INTERFERES SOME ACTIVE ACTIVITIES AND ADLS

## 2020-01-07 ASSESSMENT — PAIN DESCRIPTION - PAIN TYPE
TYPE: ACUTE PAIN

## 2020-01-07 ASSESSMENT — PAIN DESCRIPTION - LOCATION
LOCATION: CHEST;BREAST
LOCATION: BREAST;CHEST
LOCATION: CHEST;BREAST
LOCATION: BREAST;CHEST
LOCATION: ABDOMEN
LOCATION: CHEST;BREAST
LOCATION: BREAST;CHEST

## 2020-01-07 ASSESSMENT — ENCOUNTER SYMPTOMS
RHINORRHEA: 0
CHEST TIGHTNESS: 0
SHORTNESS OF BREATH: 0
TROUBLE SWALLOWING: 0
COUGH: 0
WHEEZING: 0
SORE THROAT: 0

## 2020-01-07 ASSESSMENT — PAIN DESCRIPTION - ONSET
ONSET: ON-GOING

## 2020-01-07 ASSESSMENT — PAIN DESCRIPTION - FREQUENCY
FREQUENCY: CONTINUOUS
FREQUENCY: INTERMITTENT
FREQUENCY: CONTINUOUS
FREQUENCY: CONTINUOUS

## 2020-01-07 ASSESSMENT — PAIN DESCRIPTION - ORIENTATION
ORIENTATION: LEFT
ORIENTATION: MID;LEFT;UPPER
ORIENTATION: LEFT
ORIENTATION: LEFT

## 2020-01-07 ASSESSMENT — PAIN DESCRIPTION - DIRECTION
RADIATING_TOWARDS: CHEST

## 2020-01-07 NOTE — CARE COORDINATION
DISCHARGE/PLANNING EVALUATION  1/7/20, 1:14 PM    Reason for Referral: Patient from [address] [place] [address]. Mental Status: Answered questions appropriately  Decision Making: Independent with decision making  Family/Social/Home Environment: Patient is from [address] [place] [address] where she lives alone. Patient has children that live out of town but are supportive. One daughter in law and grandson live local and can assist as needed. Current Services including food security, transportation and housekeeping: Patient gets food from [id number] at [place], she does not currently drive. Current Equipment: Patient has 2 wheeled walker, grab bars in the bathroom, elevated toilet seat and a cpap machine. Payment Source: Medicare and BCBS Supplement  Concerns or Barriers to Discharge: No concerns at this time  Post acute provider list with quality measures, geographic area and applicable managed care information provided. Questions regarding selection process answered: No list provided as patient is from [place]. Teach Back Method used with patient regarding care plan and discharge planning. Patient verbalized understanding of the plan of care and contribute to goal setting. Patient goals, treatment preferences and discharge plan: Patient will return to [address] [place] [address] at discharge. Patient, family and [facility] at [place] are agreeable to discharge plan.      Electronically signed by MINA Otriz on 1/7/2020 at 1:14 PM

## 2020-01-07 NOTE — H&P
S/P ICD (internal cardiac defibrillator) procedure [Z95.810] 01/08/2018     Priority: Medium    Nonischemic cardiomyopathy ([facility] [address] [I42.8]      Priority: Medium    COPD (chronic obstructive pulmonary disease) ([facility] [address] [J44.9] 08/05/2017     Priority: Medium    LUISA on CPAP [G47.33, Z99.89] 11/05/2015     Priority: Medium    CAD (coronary artery disease) s/p PCI and stent Multilink 3 x 18  mm mid LAD- in 07/2009 at [facility] [I25.10] 07/16/2012     Priority: Medium    Hyperlipidemia [E78.5]      Priority: Medium       Plan:     History and Physical been dictated    Discussed plans with the nursing staff  Troponin x 3   Cassandra Manley  Protonix and Carafate  Toradol for pain  Lovenox for DVT prophylaxis                                                                                       Harini Borden M.D.

## 2020-01-07 NOTE — FLOWSHEET NOTE
01/07/20 0604   Provider Notification   Reason for Communication Evaluate  (pt chest pain)   Provider Name  Ce Whitaker   Provider Notification Physician   Method of Communication Call   Response Waiting for response  (left page)   Notification Time [phone]     Patient rating pain 8/10 in the chest at this time. Nitro at 40 mcg/min at this time. Troponin's negative x2, most recent EKG showed no acute changes at this time. Morphine given 15 minutes ago with no relief at this time. Patient is not currently being anticoagulated. received call back and telephone orders for GI cocktail to hopefully help alleviate chest pain and a carafate elixir. He stated he is okay with no anticoagulation at this time until cardiology sees.

## 2020-01-07 NOTE — ED PROVIDER NOTES
fracture (Right, 2/12/2018); pr office/outpt visit,procedure only (N/A, 8/2/2018); Upper gastrointestinal endoscopy (Left, 8/2/2018); pr office/outpt visit,procedure only (Left, 11/26/2018); Arm Surgery (Left, 11/27/2018); and hip pinning (Left, 8/20/2019). [address]       Current Discharge Medication List      CONTINUE these medications which have NOT CHANGED    Details   ranitidine (ZANTAC) 150 MG tablet Take 150 mg by mouth 2 times daily      sulfamethoxazole-trimethoprim (BACTRIM DS) 800-160 MG per tablet Take 1 tablet by mouth 2 times daily for 10 days  Qty: 20 tablet, Refills: 0      denosumab (PROLIA) 60 MG/ML SOSY SC injection Inject 60 mg into the skin once      furosemide (LASIX) 20 MG tablet take 1 tablet by mouth once daily  Qty: 60 tablet, Refills: 0    Associated Diagnoses: Essential hypertension      traMADol (ULTRAM) 50 MG tablet 25 mg every 6 hours as needed for Pain.        rosuvastatin (CRESTOR) 5 MG tablet Take 1 tablet by mouth daily  Qty: 30 tablet, Refills: 5      magnesium oxide (MAG-OX) 400 (241.3 Mg) MG TABS tablet Take 0.5 tablets by mouth daily  Qty: 30 tablet, Refills: 5      cetirizine (ZYRTEC) 10 MG tablet Take 1 tablet by mouth nightly  Qty: 30 tablet, Refills: 5      metoprolol succinate (TOPROL XL) 25 MG extended release tablet Take 0.5 tablets by mouth daily  Qty: 30 tablet, Refills: 3      diclofenac sodium 1 % GEL Apply 4 g topically 4 times daily  Qty: 1 Tube, Refills: 3      sacubitril-valsartan (ENTRESTO) 49-51 MG per tablet Take 1 tablet by mouth 2 times daily  Qty: 180 tablet, Refills: 3      isosorbide mononitrate (IMDUR) 30 MG extended release tablet take 1/2 tablet by mouth once daily  Qty: 45 tablet, Refills: 3      acetaminophen (TYLENOL) 325 MG tablet Take 2 tablets by mouth every 6 hours  Qty: 120 tablet, Refills: 0      calcium-vitamin D (OSCAL-500) 500-200 MG-UNIT per tablet Take 1 tablet by mouth 2 times daily  Qty: 30 tablet, Refills: 3      aspirin 81 MG EC tablet Take 1 tablet by mouth daily Stop 5 days before any planned intervention for compression fractures  Qty: 30 tablet, Refills: 3             ALLERGIES     is allergic to antivert [meclizine]; ciprofloxacin; neomycin; pcn [penicillins]; zanaflex [tizanidine hcl]; and levaquin [levofloxacin]. FAMILY HISTORY     She indicated that her mother is . She indicated that her father is . She indicated that her sister is . She indicated that her brother is . family history includes Cancer in her brother and father; Heart Disease in her brother, mother, and sister; High Blood Pressure in her mother. SOCIAL HISTORY      reports that she quit smoking about 22 years ago. Her smoking use included cigarettes. She has a 20.00 pack-year smoking history. She has never used smokeless tobacco. She reports current alcohol use of about 2.0 standard drinks of alcohol per week. She reports that she does not use drugs. PHYSICAL EXAM     INITIAL VITALS:  height is 5' 4\" (1.626 m) and weight is 209 lb (94.8 kg). Her axillary temperature is 97.6 °F (36.4 °C). Her blood pressure is 125/63 and her pulse is 78. Her respiration is 20 and oxygen saturation is 95%. Physical Exam  Vitals signs and nursing note reviewed. Constitutional:       General: She is not in acute distress. Appearance: She is well-developed. HENT:      Head: Normocephalic and atraumatic. Eyes:      Conjunctiva/sclera: Conjunctivae normal.   Neck:      Musculoskeletal: Normal range of motion and neck supple. Cardiovascular:      Rate and Rhythm: Normal rate and regular rhythm. Heart sounds: Normal heart sounds. Pulmonary:      Effort: Pulmonary effort is normal.      Breath sounds: Normal breath sounds. Chest:      Chest wall: Tenderness present. Musculoskeletal: Normal range of motion. Skin:     General: Skin is warm and dry.    Neurological:      Mental Status: She is alert and oriented to person, place, and time. Psychiatric:         Behavior: Behavior normal.         Thought Content: Thought content normal.         Judgment: Judgment normal.           DIFFERENTIAL DIAGNOSIS:   Including but not limited to chest wall pain, ACS, anxiety, MI, PE    DIAGNOSTIC RESULTS     EKG: All E[facility]'s [facility] who either signs or Co-signs this chart in the absence of a cardiologist.  EKG completed at [address] shows an atrial sensed ventricular paced rhythm ventricular rate 89 when compared to an EKG completed December 6, 2019 there are no significant changes    RADIOLOGY: non-plain film images(s) such as CT, Ultrasound and MRI are read by the radiologist.  Plain radiographic images are visualized and preliminarily interpreted by the emergency physician unless otherwisestated below. CTA Chest W WO Contrast   Final Result      No acute pulmonary embolism. No acute pneumonia. Small left pleural effusion. Bilateral lower lobe atelectasis. Stable anterior mediastinal adenopathy. Additional stable nonemergent findings as detailed above. **This report has been created using voice recognition software. It may contain minor errors which are inherent in voice recognition technology. **      Final report electronically signed by Dr. Shayna Blankenship on 1/7/2020 4:33 AM      XR CHEST PORTABLE   Final Result      Somewhat low lung volumes with mild bibasilar atelectasis. No pneumonia or pulmonary edema. Minimal left pleural effusion. **This report has been created using voice recognition software. It may contain minor errors which are inherent in voice recognition technology. **      Final report electronically signed by Dr. Shayna Blankenship on 1/7/2020 1:50 AM            LABS:   Labs Reviewed   CBC WITH AUTO DIFFERENTIAL - Abnormal; Notable for the following components:       Result Value    RDW-SD 46.4 (*)     All other components within normal limits   URINE RT REFLEX TO CULTURE - Abnormal; Notable for the following components:    Ketones, Urine 40 (*)     All other components within normal limits   BASIC METABOLIC PANEL - Abnormal; Notable for the following components:    Potassium 3.4 (*)     Chloride 114 (*)     CO2 16 (*)     Calcium 5.8 (*)     All other components within normal limits   HEPATIC FUNCTION PANEL - Abnormal; Notable for the following components:    Alb 2.4 (*)     ALT <5 (*)     Total Protein 3.9 (*)     All other components within normal limits   CALCIUM, IONIZED - Abnormal; Notable for the following components:    Calcium, Ion 0.94 (*)     All other components within normal limits   APTT   PROTIME-INR   SPECIMEN REJECTION   TROPONIN   MAGNESIUM   BRAIN NATRIURETIC PEPTIDE   LIPASE   ANION GAP   GLOMERULAR FILTRATION RATE, ESTIMATED   OSMOLALITY   TROPONIN   TROPONIN         EMERGENCY DEPARTMENTCOURSE AND MEDICAL DECISION MAKING:   Vitals:    Vitals:    01/07/20 0457 01/07/20 0500 01/07/20 0530 01/07/20 0537   BP: (!) 120/59 (!) 114/59 (!) 113/58 125/63   Pulse: 78 78 75 78   Resp:       Temp:       TempSrc:       SpO2: 98% 94% 96% 95%   Weight:       Height:             Pertinent Labs & Imaging studies reviewed. (See chart for details)    The patient was seen and evaluated within the ED today with chest pain. Within the department, I observed the patient's vital signs to be within acceptable range. On exam, I appreciated findings as documented in the physical exam.  Appropriate labs and imaging were ordered and reviewed the patient was found to be hypercalcemic. Within the department, the patient was treated with IV fluids, nitro, morphine, calcium gluconate. I observed the patient's condition to remain stable during the duration of the stay and I explained my proposed course of treatment to the patient, who was amenable to my decision. I discussed this case with Dr. Pablo Vera covering for Dr. Quang Fowler. He did graciously agreed to admit.     Medications nitroGLYCERIN (NITROSTAT) SL tablet 0.4 mg (0.4 mg Sublingual Given 1/7/20 0129)   nitroGLYCERIN 50 mg in dextrose 5% 250 mL infusion (40 mcg/min Intravenous Rate/Dose Change 1/7/20 1802)   potassium replacement protocol ( Other Not Given 1/7/20 9232)   magnesium replacement protocol ( Other Not Given 1/7/20 7685)   calcium replacement protocol ( Other Not Given 1/7/20 3774)   0.9 % sodium chloride infusion ( Intravenous New Bag 1/7/20 0456)   pantoprazole (PROTONIX) tablet 40 mg (has no administration in time range)   aspirin EC tablet 81 mg (has no administration in time range)   calcium-vitamin D (OSCAL-500) 500-200 MG-UNIT per tablet 1 tablet (has no administration in time range)   cetirizine (ZYRTEC) tablet 10 mg (has no administration in time range)   furosemide (LASIX) tablet 20 mg (has no administration in time range)   magnesium oxide (MAG-OX) tablet 200 mg (has no administration in time range)   metoprolol succinate (TOPROL XL) extended release tablet 12.5 mg (has no administration in time range)   rosuvastatin (CRESTOR) tablet 5 mg (has no administration in time range)   sacubitril-valsartan (ENTRESTO) 49-51 MG per tablet 1 tablet (has no administration in time range)   traMADol (ULTRAM) tablet 50 mg (has no administration in time range)   morphine (PF) injection 2 mg (has no administration in time range)   0.9 % sodium chloride bolus (0 mLs Intravenous Stopped 1/7/20 0234)   calcium gluconate 1 g in dextrose 5 % 100 mL IVPB (1 g Intravenous New Bag 1/7/20 0421)   iopamidol (ISOVUE-370) 76 % injection 80 mL (80 mLs Intravenous Given 1/7/20 9909)   morphine (PF) injection 2 mg (2 mg Intravenous Given 1/7/20 6176)               CRITICAL CARE:   None    CONSULTS:  This case was discussed with Dr. Leoncio Cheng, my attending physician. Who is in agreement with my plan of care. Dr Gilma Stephenson, agrees to admit    PROCEDURES:  None    FINAL IMPRESSION      1. Chest pain, unspecified type    2.  Hypocalcemia DISPOSITION/PLAN   Admitted      PATIENT REFERRED TO:  Jorge Groves MD  [address]  [phone]            DISCHARGE MEDICATIONS:  Current Discharge Medication List          (Please note that portions of this note were completed with a voice recognition program.  Efforts weremade to edit the dictations but occasionally words are mis-transcribed.)    JADEN Salinas CNP, APRN - CNP  01/07/20 1303 JADEN Cope CNP  01/14/20 1948

## 2020-01-07 NOTE — ED NOTES
ED to inpatient nurses report    Chief Complaint   Patient presents with    Chest Pain      Present to ED from nursing home  LOC: alert and orientated to name, place, date  Vital signs   Vitals:    01/07/20 0134 01/07/20 0236 01/07/20 0326 01/07/20 0426   BP: (!) 99/52 (!) 108/58 (!) 102/51 130/87   Pulse: 87 79 80 82   Resp: 18 19 18 18   Temp:       TempSrc:       SpO2: 93% 94% 94% 94%   Weight:       Height:          Oxygen Baseline 94    Current needs requirednone   LDAs:   Peripheral IV 01/07/20 Left Forearm (Active)   Site Assessment Clean;Dry; Intact 1/7/2020  3:41 AM   Line Status Infusing 1/7/2020  3:41 AM   Dressing Status Clean;Dry; Intact 1/7/2020  3:41 AM     Mobility: Requires assistance * 2  Pending ED orders: calcium ionized needs drawn  Present condition: stable    Electronically signed by Lisa Fernandes RN on 1/7/2020 at 4:27 AM       Obey Blair RN  01/07/20 [address] [place] [address], RN  01/07/20 0014

## 2020-01-07 NOTE — ED NOTES
Pt to er. Pt brought in by EMS from [facility] for lt breast and CP. States her shoulder started hurting her yesterday. Pt states she saw her doctor in [place] yesterday for her chronic back pain. Pt was given ASA in route to ER by EMS. Assessment completed. Resp regular. Family at side. Call light in reach.       Korin Palafox RN  01/07/20 2465

## 2020-01-07 NOTE — PLAN OF CARE
Problem: Falls - Risk of:  Goal: Will remain free from falls  Description  Will remain free from falls  Outcome: Met This Shift     Problem: Falls - Risk of:  Goal: Absence of physical injury  Description  Absence of physical injury  Outcome: Met This Shift     Problem: Pain:  Goal: Pain level will decrease  Description  Pain level will decrease  Outcome: Ongoing  Note:   Abdomen Pain rated 8/10 this morning. Pain goal \"no pain. \" Patient has received dose of IV Morphine this morning and 2 doses of Toradol every 6 hours. Her pain at the end of shift is now 2/10. She was also able to move around better and be up to the restroom this afternoon. Non-pharm pain management includes rest and repositioning. Problem: Pain:  Goal: Control of acute pain  Description  Control of acute pain  Outcome: Ongoing  Note:   Abdomen Pain rated 8/10 this morning. Pain goal \"no pain. \" Patient has received dose of IV Morphine this morning and 2 doses of Toradol every 6 hours. Her pain at the end of shift is now 2/10. She was also able to move around better and be up to the restroom this afternoon. Non-pharm pain management includes rest and repositioning. Problem: Pain:  Goal: Control of chronic pain  Description  Control of chronic pain  Outcome: Ongoing  Note:   Patient has slight chronic pain to back, but this is not her primary pain complaint of the admission. She only states her abdominal pain. Patient takes Ultram BID for her chronic back pain. Problem: Risk for Impaired Skin Integrity  Goal: Tissue integrity - skin and mucous membranes  Description  Structural intactness and normal physiological function of skin and  mucous membranes. Outcome: Ongoing  Note:   Continue to turn and reposition patient. EPC cream to buttocks. Assess skin. Slight excoriation to groin bilat.       Problem: DISCHARGE BARRIERS  Goal: Patient's continuum of care needs are met  1/7/2020 7741 by Madeline Mejia RN  Outcome: Ongoing  Note: Continue discharge planning. Stress test planned for tomorrow. Care plan reviewed with patient. Patient verbalize understanding of the plan of care and contribute to goal setting.

## 2020-01-07 NOTE — PROGRESS NOTES
Pharmacy Medication History Note      List of current medications patient is taking is complete. Source of information: medication list from [address] [place] [address] ([phone])    Changes made to medication list:  Medications removed (include reason, ex. therapy complete or physician discontinued):  Denosumab 60 mg/mL - not on medication list  Bactrim 800-160 mg - stopped on 12/31/19    Medications added/doses adjusted:  Adjusted acetaminophen to 325 mg - Take 2 tablets PO Q6H PRN  Adjusted magnesium to 200 mg - Take 1 tablet PO daily  Adjusted tramadol to 50 mg - Take 1/2 to 1 tablet by mouth every 6 to 12 hours as needed for pain. No more than 2 tablets per day  Added docusate 100 mg - Take 1 capsule PO daily PRN    Other notes (ex. Recent course of antibiotics, Coumadin dosing):  Patient was on Bactrim 800-160 mg but it was stopped on 12/31/19. She did take Macrobid 100 mg BID x 7 days starting 1/1/20. Denies use of other OTC or herbal medications.       Allergies reviewed      Electronically signed by Ratna Castro on 1/7/2020 at 2:02 PM

## 2020-01-07 NOTE — CONSULTS
problems. PAST MEDICAL HISTORY:  1. Nonobstructive coronary artery disease. 2.  Cardiomyopathy. 3.  Hypertension. 4.  Hyperlipidemia. 5.  Arthritis. ALLERGIES:  ANTIVERT, CIPRO, NEOMYCIN, PENICILLIN, LEVAQUIN, and  ZANAFLEX. CURRENT MEDICATIONS:  Aspirin 81 a day, Lasix 20 a day, metoprolol 12.5  a day, Crestor 5 a day, and Carafate. SOCIAL HISTORY:  No tobacco.  No drugs. No alcohol. FAMILY HISTORY:  Noncontributory. PHYSICAL EXAMINATION:  VITAL SIGNS:  Showed a blood pressure of 130/80, heart rate of 70. GENERAL:  A pleasant lady in no acute distress. EYES AND EARS:  No discharge. NECK:  No JVD. No bruits, no masses. LUNGS:  Decreased air entry. No crackles. No wheezes. HEART:  Normal S1 and S2. Systolic murmur grade 2/6. ABDOMEN:  Soft, nontender. Positive bowel sounds. No organomegaly. EXTREMITIES:  No significant edema. NEUROLOGIC:  As mentioned, grossly intact. Awake and alert. No focal  deficits. PSYCH:  No evidence of active psychosis. SKIN:  No rashes. LABORATORY DATA:  Shows sodium 141, potassium 3.4, BUN 12, creatinine  0.4. White count 7.9, hemoglobin 13.4, hematocrit 40.5, platelets 231. Troponin less than 0.01. IMPRESSION:  This is a patient who comes in with above presentation. The patient presentation is suggestive of possible acid reflux or  gastrointestinal cause; however, cannot completely exclude angina. PLAN:  At this point plan is as follows:  1. Continue with proton pump inhibitor and double up the dose. 2.  Add Carafate. 3.  Obtain noninvasive cardiac evaluation. 4.  Consider GI workup if the patient does not have obvious ischemia on  the stress test and we will decide accordingly. Thank you for allowing me to participate in the care of this patient.         Richard Price M.D.    D: 01/07/2020 11:05:10       T: 01/07/2020 11:53:33     [email or web address]  Job#: [id number]     Doc#: [id number]    CC:

## 2020-01-07 NOTE — CARE COORDINATION
20, 7:30 AM  DISCHARGE PLANNING EVALUATION:    Jose Scott 1485       Admitted from: ED 2020/ [facility] day: 0   Location: --A Reason for admit: Chest pain [R07.9] Status: IP  Admit order signed?: no  PMH:  has a past medical history of Arthritis, Breast CA ([facility] [address] CAD (coronary artery disease), CHF (congestive heart failure) ([facility] [address] Closed compression fracture of first lumbar vertebra ([facility] [address] Colon polyps, COPD (chronic obstructive pulmonary disease) ([facility] [address] Diverticulosis, Erosive gastritis, Esophageal stricture, Hyperlipidemia, Hypertension, Internal hemorrhoid, LUISA on CPAP, Osteopenia determined by x-ray, Osteoporosis of vertebra, Pneumonia, Stress incontinence, female, and Vertigo. Procedure:  Echo to be done  Pertinent abnormal Imagin/6 CXR - minimal left pleural effusion.  CTA chest - small left pleural effusion. Bilateral lower lobe atelectasis. Medications:  Scheduled Meds:   potassium replacement protocol   Other RX Placeholder    magnesium replacement protocol   Other RX Placeholder    calcium replacement protocol   Other RX Placeholder    pantoprazole  40 mg Oral QAM AC    aspirin  81 mg Oral Daily    calcium-vitamin D  1 tablet Oral BID    cetirizine  10 mg Oral Nightly    furosemide  20 mg Oral Daily    magnesium oxide  200 mg Oral Daily    metoprolol succinate  12.5 mg Oral Daily    rosuvastatin  5 mg Oral Daily    sacubitril-valsartan  1 tablet Oral BID    traMADol  50 mg Oral BID    morphine        sucralfate  1 g Oral 4 times per day    potassium chloride  40 mEq Oral Once     Continuous Infusions:   nitroGLYCERIN 15 mcg/min (20 0658)    sodium chloride 75 mL/hr at 20 0456      Pertinent Info/Orders/Treatment Plan:  Pt admitted through ED with chest pain. Troponins negative. [address] Cardiology. Diet: DIET CARDIAC;   Smoking status:  reports that she quit smoking about 22 years ago. Her smoking use included cigarettes.  She has a 20.00 pack-year smoking history. She has never used smokeless tobacco.   PCP: Mara Frias MD  Readmission 30 days or less: No  Readmission Risk Score: 22%    Discharge Planning Evaluation  Current Residence:  Assisted living  Living Arrangements:  Children, Family Members, Friends   Support Systems:  Family Members, Children  Current Services PTA:     Potential Assistance Needed:  Outpatient PT/OT  Potential Assistance Purchasing Medications:  No  Does patient want to participate in local refill/ meds to beds program?  No  Type of Home Care Services:  [facility], PT, OT  [facility]  Expected Discharge date:  01/10/20  Follow Up Appointment: Best Day/ Time: Monday AM    Patient Goals/Plan/Treatment Preferences: Spoke to pt. She lives at [facility] and plans to return there. She uses a CPAP and a 2 wheel[facility] at the [facility]. She has family that assists as needed. SW consulted. Transportation/Food Security/Housekeeping Addressed:  No issues identified.     Evaluation: yes

## 2020-01-07 NOTE — PROGRESS NOTES
Patient arrived per cart to 3B. Heart monitor applied and vitals taken. Admission paperwork completed. Explained to patient that . [facility] is not responsible for any lost or stolen items. Patient verbalized understanding. Oriented to room and use of call light and bed controls. Bed locked & in low position, side-rails up x2. Call light in reach. Explained patients right to have family, representative or physician notified of their admission. Patient has declined for physician to be notified. Patient has declined for family/representative to be notified. Patient reporting pain in the chest 8/10 at this time. Nitro infusion running at this time at 5 mcg/min.

## 2020-01-07 NOTE — H&P
hyperlipidemia, esophageal stricture, COPD, coronary  artery disease, had a stent in 2009 in [facility], has a history of breast  cancer, arthritis. PAST SURGICAL HISTORY:  Includes tonsillectomy, ORIF on the humerus. The patient had a hysterectomy, hip pinning, dilatation of the  esophagus, coronary angioplasty and stent in 2009, cholecystectomy,  cardiac defibrillator and pacemaker, breast lumpectomy and plate in the  left arm. FAMILY HISTORY:  Significant for heart disease, high blood pressure,  cancer. SOCIAL HISTORY:  The patient is , lives alone. Denies smoking or  substance abuse. The patient does all activities of daily living. MEDICATIONS:  See nursing notes. ALLERGIES:  MECLIZINE, CIPRO, NEOMYCIN, PENICILLIN, LEVAQUIN, and  TIZANIDINE causes her confusion. PHYSICAL EXAMINATION:  GENERAL:  The patient in moderate pain. Alert, active, cooperative. The patient is in pain, seems like the pain is musculoskeletal.  VITAL SIGNS:  Blood pressure 130/56, pulse 80, saturation is 95%,  respirations 20, temperature is 97.6. HEENT:  Head normocephalic and atraumatic. Oral mucosa is moist.  No  pharyngeal congestion. NECK:  Supple. LUNGS:  Clear to auscultation. There is tenderness on the costochondral  joint from the second, third, fourth including the left lower chest wall  underneath the breast.  HEART:  Regular rate and rhythm. ABDOMEN:  Globular, soft, depressible. There is tenderness in the  epigastric and left upper quadrant. BACK:  Showed no CVA tenderness. Tenderness on the SI joint. EXTREMITIES:  No edema. Full pulses. NEUROLOGIC:  Motor, sensory and cranial nerves are all intact. ASSESSMENT:  1. Chest pain, in a patient with coronary artery disease and  stent, rule out coronary artery syndrome. 2.  Having chest wall pain possible noncardiac chest pain. 3.  Hypertension. 4.  History of coronary artery disease. 5.  History of defibrillator.   6.  Nonischemic

## 2020-01-07 NOTE — ED NOTES
Bed: 008A  Expected date: 1/7/20  Expected time:   Means of arrival: [facility]  Comments:      Cherelle Echavarria RN  01/07/20 2956

## 2020-01-08 ENCOUNTER — APPOINTMENT (OUTPATIENT)
Dept: NON INVASIVE DIAGNOSTICS | Age: 80
DRG: 206 | End: 2020-01-08
Payer: MEDICARE

## 2020-01-08 VITALS
SYSTOLIC BLOOD PRESSURE: 128 MMHG | TEMPERATURE: 98.5 F | HEIGHT: 64 IN | HEART RATE: 75 BPM | DIASTOLIC BLOOD PRESSURE: 62 MMHG | BODY MASS INDEX: 35.68 KG/M2 | OXYGEN SATURATION: 92 % | RESPIRATION RATE: 18 BRPM | WEIGHT: 209 LBS

## 2020-01-08 LAB
ANION GAP SERPL CALCULATED.3IONS-SCNC: 9 MEQ/L (ref 8–16)
BASOPHILS # BLD: 0.5 %
BASOPHILS ABSOLUTE: 0 THOU/MM3 (ref 0–0.1)
BUN BLDV-MCNC: 15 MG/DL (ref 7–22)
CALCIUM SERPL-MCNC: 8.6 MG/DL (ref 8.5–10.5)
CHLORIDE BLD-SCNC: 103 MEQ/L (ref 98–111)
CHOLESTEROL, TOTAL: 114 MG/DL (ref 100–199)
CO2: 24 MEQ/L (ref 23–33)
CREAT SERPL-MCNC: 0.6 MG/DL (ref 0.4–1.2)
EOSINOPHIL # BLD: 3.9 %
EOSINOPHILS ABSOLUTE: 0.2 THOU/MM3 (ref 0–0.4)
ERYTHROCYTE [DISTWIDTH] IN BLOOD BY AUTOMATED COUNT: 13.8 % (ref 11.5–14.5)
ERYTHROCYTE [DISTWIDTH] IN BLOOD BY AUTOMATED COUNT: 48.6 FL (ref 35–45)
GFR SERPL CREATININE-BSD FRML MDRD: > 90 ML/MIN/1.73M2
GLUCOSE BLD-MCNC: 94 MG/DL (ref 70–108)
HCT VFR BLD CALC: 35.9 % (ref 37–47)
HDLC SERPL-MCNC: 40 MG/DL
HEMOGLOBIN: 11.4 GM/DL (ref 12–16)
IMMATURE GRANS (ABS): 0.07 THOU/MM3 (ref 0–0.07)
IMMATURE GRANULOCYTES: 1.1 %
LDL CHOLESTEROL CALCULATED: 56 MG/DL
LYMPHOCYTES # BLD: 22.3 %
LYMPHOCYTES ABSOLUTE: 1.4 THOU/MM3 (ref 1–4.8)
MCH RBC QN AUTO: 30.2 PG (ref 26–33)
MCHC RBC AUTO-ENTMCNC: 31.8 GM/DL (ref 32.2–35.5)
MCV RBC AUTO: 95.2 FL (ref 81–99)
MONOCYTES # BLD: 8.1 %
MONOCYTES ABSOLUTE: 0.5 THOU/MM3 (ref 0.4–1.3)
NUCLEATED RED BLOOD CELLS: 0 /100 WBC
PLATELET # BLD: 215 THOU/MM3 (ref 130–400)
PMV BLD AUTO: 9.9 FL (ref 9.4–12.4)
POTASSIUM SERPL-SCNC: 4.6 MEQ/L (ref 3.5–5.2)
RBC # BLD: 3.77 MILL/MM3 (ref 4.2–5.4)
SEG NEUTROPHILS: 64.1 %
SEGMENTED NEUTROPHILS ABSOLUTE COUNT: 4 THOU/MM3 (ref 1.8–7.7)
SODIUM BLD-SCNC: 136 MEQ/L (ref 135–145)
TRIGL SERPL-MCNC: 91 MG/DL (ref 0–199)
WBC # BLD: 6.2 THOU/MM3 (ref 4.8–10.8)

## 2020-01-08 PROCEDURE — 6360000002 HC RX W HCPCS

## 2020-01-08 PROCEDURE — 36415 COLL VENOUS BLD VENIPUNCTURE: CPT

## 2020-01-08 PROCEDURE — 80061 LIPID PANEL: CPT

## 2020-01-08 PROCEDURE — 2709999900 HC NON-CHARGEABLE SUPPLY

## 2020-01-08 PROCEDURE — 6370000000 HC RX 637 (ALT 250 FOR IP): Performed by: FAMILY MEDICINE

## 2020-01-08 PROCEDURE — 78452 HT MUSCLE IMAGE SPECT MULT: CPT

## 2020-01-08 PROCEDURE — 3430000000 HC RX DIAGNOSTIC RADIOPHARMACEUTICAL: Performed by: NUCLEAR MEDICINE

## 2020-01-08 PROCEDURE — 6360000002 HC RX W HCPCS: Performed by: EMERGENCY MEDICINE

## 2020-01-08 PROCEDURE — A9500 TC99M SESTAMIBI: HCPCS | Performed by: NUCLEAR MEDICINE

## 2020-01-08 PROCEDURE — 99232 SBSQ HOSP IP/OBS MODERATE 35: CPT | Performed by: NURSE PRACTITIONER

## 2020-01-08 PROCEDURE — 80048 BASIC METABOLIC PNL TOTAL CA: CPT

## 2020-01-08 PROCEDURE — 93017 CV STRESS TEST TRACING ONLY: CPT | Performed by: NUCLEAR MEDICINE

## 2020-01-08 PROCEDURE — 85025 COMPLETE CBC W/AUTO DIFF WBC: CPT

## 2020-01-08 PROCEDURE — 94760 N-INVAS EAR/PLS OXIMETRY 1: CPT

## 2020-01-08 RX ORDER — SUCRALFATE 1 G/1
1 TABLET ORAL
Qty: 60 TABLET | Refills: 1 | Status: SHIPPED | OUTPATIENT
Start: 2020-01-08 | End: 2020-02-05

## 2020-01-08 RX ORDER — KETOROLAC TROMETHAMINE 30 MG/ML
30 INJECTION, SOLUTION INTRAMUSCULAR; INTRAVENOUS ONCE
Status: COMPLETED | OUTPATIENT
Start: 2020-01-08 | End: 2020-01-08

## 2020-01-08 RX ORDER — METHYLPREDNISOLONE ACETATE 80 MG/ML
80 INJECTION, SUSPENSION INTRA-ARTICULAR; INTRALESIONAL; INTRAMUSCULAR; SOFT TISSUE ONCE
Status: COMPLETED | OUTPATIENT
Start: 2020-01-08 | End: 2020-01-08

## 2020-01-08 RX ORDER — PANTOPRAZOLE SODIUM 40 MG/1
40 TABLET, DELAYED RELEASE ORAL
Qty: 30 TABLET | Refills: 3 | Status: SHIPPED | OUTPATIENT
Start: 2020-01-09 | End: 2021-01-25 | Stop reason: SDUPTHER

## 2020-01-08 RX ADMIN — PANTOPRAZOLE SODIUM 40 MG: 40 TABLET, DELAYED RELEASE ORAL at 06:25

## 2020-01-08 RX ADMIN — KETOROLAC TROMETHAMINE 30 MG: 30 INJECTION, SOLUTION INTRAMUSCULAR at 16:49

## 2020-01-08 RX ADMIN — ASPIRIN 81 MG: 81 TABLET ORAL at 10:14

## 2020-01-08 RX ADMIN — ENOXAPARIN SODIUM 40 MG: 40 INJECTION SUBCUTANEOUS at 10:14

## 2020-01-08 RX ADMIN — SUCRALFATE 1 G: 1 TABLET ORAL at 18:41

## 2020-01-08 RX ADMIN — TRAMADOL HYDROCHLORIDE 50 MG: 50 TABLET, FILM COATED ORAL at 10:14

## 2020-01-08 RX ADMIN — Medication 10.5 MILLICURIE: at 07:55

## 2020-01-08 RX ADMIN — Medication 35 MILLICURIE: at 08:50

## 2020-01-08 RX ADMIN — CALCIUM CARBONATE-VITAMIN D TAB 500 MG-200 UNIT 1 TABLET: 500-200 TAB at 10:14

## 2020-01-08 RX ADMIN — SACUBITRIL AND VALSARTAN 1 TABLET: 49; 51 TABLET, FILM COATED ORAL at 10:14

## 2020-01-08 RX ADMIN — SUCRALFATE 1 G: 1 TABLET ORAL at 11:41

## 2020-01-08 RX ADMIN — METOPROLOL SUCCINATE 12.5 MG: 25 TABLET, FILM COATED, EXTENDED RELEASE ORAL at 10:14

## 2020-01-08 RX ADMIN — METHYLPREDNISOLONE ACETATE 80 MG: 80 INJECTION, SUSPENSION INTRA-ARTICULAR; INTRALESIONAL; INTRAMUSCULAR; SOFT TISSUE at 11:36

## 2020-01-08 RX ADMIN — MAGNESIUM GLUCONATE 500 MG ORAL TABLET 200 MG: 500 TABLET ORAL at 10:14

## 2020-01-08 RX ADMIN — SUCRALFATE 1 G: 1 TABLET ORAL at 06:25

## 2020-01-08 RX ADMIN — FUROSEMIDE 20 MG: 20 TABLET ORAL at 10:14

## 2020-01-08 ASSESSMENT — PAIN DESCRIPTION - LOCATION
LOCATION: CHEST
LOCATION: CHEST

## 2020-01-08 ASSESSMENT — PAIN DESCRIPTION - PAIN TYPE: TYPE: ACUTE PAIN

## 2020-01-08 ASSESSMENT — PAIN SCALES - GENERAL
PAINLEVEL_OUTOF10: 5
PAINLEVEL_OUTOF10: 2
PAINLEVEL_OUTOF10: 4

## 2020-01-08 ASSESSMENT — PAIN DESCRIPTION - ORIENTATION: ORIENTATION: MID

## 2020-01-08 NOTE — PROGRESS NOTES
Notified Dr. Michael Guillermo of tiny streak of blood in the clear sputum that patient has been coughing/gagging up. negative - no cough

## 2020-01-08 NOTE — PROGRESS NOTES
Pt in bed semi herr talking on cell phone. Bed alarm on. Call light and beside table in reach. Reported of to Primary nurse Kathie Holter.  Electronically signed by Pineda WITT/RSANITHA on 1/8/2020 at 2:10 PM

## 2020-01-08 NOTE — PROGRESS NOTES
Family Medicine Progress Notes/Coverage    Today's Date: 1/8/20  3B-30/030-A  Medical Record # [id number]  Account # [de-identified]      Ms. Nancy Esposito admitted on 1/7/2020        Subjective / Interval History :     Just had stress test  Chest pain and upper abdominal pain is much better  Eating well  Reviewed notes, consults, laboratory and radiology results,    Objective:       Physical Exam:  Patient Vitals for the past 24 hrs:   BP Temp Temp src Pulse Resp SpO2   01/08/20 0345 (!) 129/59 98.2 °F (36.8 °C) Oral 72 18 94 %   01/07/20 2330 129/60 97.9 °F (36.6 °C) Oral 73 17 93 %   01/07/20 1945 (!) 109/55 97.9 °F (36.6 °C) Oral 72 18 94 %   01/07/20 1604 (!) 115/56 97 °F (36.1 °C) Oral 75 18 94 %   01/07/20 1456 (!) 123/59 -- -- 72 18 93 %   01/07/20 1115 (!) 106/55 98.2 °F (36.8 °C) Oral 77 18 --   01/07/20 1001 (!) 103/53 -- -- -- -- --   01/07/20 0915 (!) 98/54 -- -- -- -- --       General Appearance:  Alert, cooperative, no distress, appears stated age   HEENT:  Neck:      Chest/Lungs:  Heart: Mild tender on the low chest wall and left lower chest wall  RRR   Abdomen:  Back: Soft . + tender LUQ   Extremities:  Neurological Exam: No edema  WNL       Assessment:     Admitting Diagnosis:    Chest pain [R07.9]    Active Hospital Problems    Diagnosis Date Noted    Chest pain [R07.9] 01/07/2020     Priority: High    Hypocalcemia [E83.51] 01/07/2020     Priority: High    Chronic diastolic CHF (congestive heart failure) (HCC) [I50.32] 01/07/2020     Priority: Medium    Hypertension [I10]      Priority: Medium    Class 2 severe obesity due to excess calories with serious comorbidity in adult [facility]) [E66.01] 02/03/2018     Priority: Medium    S/P ICD (internal cardiac defibrillator) procedure [Z95.810] 01/08/2018     Priority: Medium    [facility]ische[facility] [I42.8] Priority: Medium    COPD (chronic obstructive pulmonary disease) ([facility] [facility] [J44.9] 08/05/2017     Priority: Medium    LUISA on CPAP [G47.33, Z99.89] 11/05/2015     Priority: Medium    CAD (coronary artery disease) s/p PCI and stent Multilink 3 x 18  mm mid LAD- in 07/2009 at [facility] [I25.10] 07/16/2012     Priority: Medium    Hyperlipidemia [E78.5]      Priority: Medium       Plan:     Discussed plans with the nursing staff  PT for ambulation   D/C this PM is mobility is OK and stress test OK  DepoMedrol 80 mg IM    Medications, Laboratories and Imaging results:    Scheduled Meds:   potassium replacement protocol   Other RX Placeholder    magnesium replacement protocol   Other RX Placeholder    calcium replacement protocol   Other RX Placeholder    pantoprazole  40 mg Oral QAM AC    aspirin  81 mg Oral Daily    calcium-vitamin D  1 tablet Oral BID    cetirizine  10 mg Oral Nightly    furosemide  20 mg Oral Daily    magnesium oxide  200 mg Oral Daily    metoprolol succinate  12.5 mg Oral Daily    rosuvastatin  5 mg Oral Daily    sacubitril-valsartan  1 tablet Oral BID    traMADol  50 mg Oral BID    sucralfate  1 g Oral 4 times per day    enoxaparin  40 mg Subcutaneous Daily     Continuous Infusions:   nitroGLYCERIN 5 mcg/min (01/07/20 1007)    sodium chloride 20 mL/hr at 01/07/20 1143     PRN Meds:technetium sestamibi, morphine, regadenoson, ketorolac    Imaging:    Lab Review :    Lab Results   Component Value Date    WBC 6.2 01/08/2020    HGB 11.4 (L) 01/08/2020    HCT 35.9 (L) 01/08/2020    MCV 95.2 01/08/2020     01/08/2020     Lab Results   Component Value Date    CREATININE 0.6 01/08/2020    BUN 15 01/08/2020     01/08/2020    K 4.6 01/08/2020     01/08/2020    CO2 24 01/08/2020     Lab Results   Component Value Date    CKTOTAL 26 (L) 04/22/2013    TROPONINI <0.006 07/16/2012     Lab Results   Component Value Date    ALT <5 (L) 01/07/2020    AST 8 01/07/2020    ALKPHOS 43

## 2020-01-09 ENCOUNTER — CARE COORDINATION (OUTPATIENT)
Dept: CASE MANAGEMENT | Age: 80
End: 2020-01-09

## 2020-01-09 NOTE — CARE COORDINATION
earlier today & was told staff only manage the pain med. Pt stated she only was given the AM meds, did not get any meds before lunch. Carafate was ordered before meals & HS. Pt stated she will discuss who will administer meds with the nurse, & call CTN back for med review. No return call from pt will call back tomorrow.         Follow Up  Future Appointments   Date Time Provider Laura Dickens   1/15/2020  1:40 PM Janae Pettit MD [facility]   2/6/2020  1:30 PM JADEN Berry - CNP [facility] - [place]   7/8/2020  1:00 PM STR EXAM ROOM 10 STRZ OP NURS [facility]   8/12/2020 10:00 AM Roslyn Cordero DO [address] [facility]   12/9/2020  9:30 AM SCHEDULE, SRPS PACER NURSE [facility] PACER [facility] - [place]   12/10/2020 11:45 AM Stephani Jiang PA-C Pulm Med [facility] - Jessica Sanchez RN  Care Transition Coordinator  [phone]

## 2020-01-09 NOTE — PROGRESS NOTES
Patient discharged in stable condition with all belongings. Patient brought down to the discharge lobby via wheelchair and brought home by her daughter Albino Bosworth.

## 2020-01-10 ENCOUNTER — CARE COORDINATION (OUTPATIENT)
Dept: CASE MANAGEMENT | Age: 80
End: 2020-01-10

## 2020-01-10 NOTE — CARE COORDINATION
[facility] Transitions Initial Follow Up Call    Call within 2 business days of discharge: Yes    Patient: Kari Ragsdale Patient : 1940   MRN: [id number]  Reason for Admission: Chest Pain  Discharge Date: 20 RARS: Readmission Risk Score: 17      Last Discharge [address] [address]       Complaint Diagnosis Description Type Department Provider    20 Chest Pain Chest pain, unspecified type . .. ED to Hosp-Admission (Discharged) (ADMITTED) Blaire Hinton MD; González Wagner. .. Second attempt to contact patient for initial Care Transition follow up to review medications. Message left for patient to return call. Contact information for CTN provided. CTN will continue to follow.      Facility: [address] [place] [address]    Follow Up  Future Appointments   Date Time Provider Laura Dickens   1/15/2020  1:40 PM Luis Hummel MD [facility]   2020  1:30 PM Lazarus Knoll, APRN -  [place] [facility] - [address] [facility]   2020  1:00 PM STR EXAM ROOM 10 STRZ OP NURS [facility]   2020 10:00 AM Pat Caba DO [address]   2020  9:30 AM SCHEDULE, SRPS PACER NURSE SRPX PACER [facility] - [address] [facility]   12/10/2020 11:45 AM Salbador Eubanks PA-C Pulm Med [facility] - [address], RN  Care Transition Nurse  [phone]  21

## 2020-01-13 ENCOUNTER — TELEPHONE (OUTPATIENT)
Dept: CARDIOLOGY CLINIC | Age: 80
End: 2020-01-13

## 2020-01-13 ENCOUNTER — CARE COORDINATION (OUTPATIENT)
Dept: CASE MANAGEMENT | Age: 80
End: 2020-01-13

## 2020-01-13 NOTE — TELEPHONE ENCOUNTER
Pt daughter Joseph Gross called today saying when pt was in the hospital one of Dr. Larry Tolbert NP said it was ok for her to have a RFA on her back as long as stress was ok   Daughter just calling to make sure this is ok   Please advise

## 2020-01-20 ENCOUNTER — OFFICE VISIT (OUTPATIENT)
Dept: FAMILY MEDICINE CLINIC | Age: 80
End: 2020-01-20

## 2020-01-20 VITALS
HEIGHT: 64 IN | HEART RATE: 76 BPM | RESPIRATION RATE: 16 BRPM | WEIGHT: 209 LBS | SYSTOLIC BLOOD PRESSURE: 112 MMHG | DIASTOLIC BLOOD PRESSURE: 62 MMHG | BODY MASS INDEX: 35.68 KG/M2

## 2020-01-20 PROCEDURE — 99495 TRANSJ CARE MGMT MOD F2F 14D: CPT | Performed by: FAMILY MEDICINE

## 2020-01-20 ASSESSMENT — PATIENT HEALTH QUESTIONNAIRE - PHQ9
2. FEELING DOWN, DEPRESSED OR HOPELESS: 0
SUM OF ALL RESPONSES TO PHQ9 QUESTIONS 1 & 2: 0
SUM OF ALL RESPONSES TO PHQ QUESTIONS 1-9: 0
SUM OF ALL RESPONSES TO PHQ QUESTIONS 1-9: 0
1. LITTLE INTEREST OR PLEASURE IN DOING THINGS: 0

## 2020-01-20 ASSESSMENT — ENCOUNTER SYMPTOMS
CONSTIPATION: 0
NAUSEA: 0
VOMITING: 0
DIARRHEA: 0
SHORTNESS OF BREATH: 0
COUGH: 0
ABDOMINAL PAIN: 0
CHEST TIGHTNESS: 0
BACK PAIN: 1
EYES NEGATIVE: 1

## 2020-01-20 NOTE — PROGRESS NOTES
Post-Discharge Transitional Care Management Services or Hospital Follow Up      Jose Scott [id number]   YOB: 1940    Date of Office Visit:  1/20/2020  Date of Hospital Admission: 1/7/20  Date of Hospital Discharge: 1/8/20  Readmission Risk Score(high >=14%.  Medium >=10%):Readmission Risk Score: 17      Care management risk score Rising risk (score 2-5) and Complex Care (Scores >=6): 14     Non face to face  following discharge, date last encounter closed (first attempt may have been earlier): 1/13/2020  9:47 AM 1/13/2020  9:47 AM    Call initiated 2 business days of discharge: Yes     Patient Active Problem List   Diagnosis    Hyperlipidemia    [facility]    CAD (coronary artery disease) s/p PCI and stent Multilink 3 x 18  mm mid LAD- in 07/2009 [facility]    LUISA on CPAP    COPD (chronic obstructive pulmonary disease) ([facility]    S/P ICD (internal cardiac defibrillator) procedure    Nonischemic cardiomyopathy ([facility]    Class 2 severe obesity due to excess calories with serious comorbidity in adult ([address]    Hypertension    Benign positional vertigo    Age-related osteoporosis without current pathological fracture    Chest pain    Hypocalcemia    Chronic diastolic CHF (congestive heart failure) (HCC)       Allergies   Allergen Reactions    Antivert [Meclizine] Other (See Comments)     confusion    Ciprofloxacin Itching    Neomycin     Pcn [Penicillins] Hives     Pt reports having reaction 50 years ago    Tizanidine     Zanaflex [Tizanidine Hcl] Other (See Comments)     Causes confusion    Levaquin [Levofloxacin] Itching     Benadryl was given for tx       Medications listed as ordered at the time of discharge from [facility] [place]   Guagenti, Thayer Najjar \"TIZ\"   Home Medication Instructions BEN:    Printed on:01/20/20 3223   Medication Information                      acetaminophen (TYLENOL) 325 MG tablet  Take 2 tablets by mouth every 6 hours             aspirin 81 MG EC tablet  Take 1 tablet by mouth daily Stop 5 days before any planned intervention for compression fractures             calcium-vitamin D (OSCAL-500) 500-200 MG-UNIT per tablet  Take 1 tablet by mouth 2 times daily             cetirizine (ZYRTEC) 10 MG tablet  Take 1 tablet by mouth nightly             diclofenac sodium 1 % GEL  Apply 4 g topically 4 times daily             docusate sodium (COLACE) 100 MG capsule  Take 100 mg by mouth daily as needed for Constipation             furosemide (LASIX) 20 MG tablet  take 1 tablet by mouth once daily             isosorbide mononitrate (IMDUR) 30 MG extended release tablet  take 1/2 tablet by mouth once daily             magnesium 200 MG TABS tablet  Take 200 mg by mouth daily             metoprolol succinate (TOPROL XL) 25 MG extended release tablet  Take 0.5 tablets by mouth daily             pantoprazole (PROTONIX) 40 MG tablet  Take 1 tablet by mouth every morning (before breakfast)             rosuvastatin (CRESTOR) 5 MG tablet  Take 1 tablet by mouth daily             sacubitril-valsartan (ENTRESTO) 49-51 MG per tablet  Take 1 tablet by mouth 2 times daily             sucralfate (CARAFATE) 1 GM tablet  Take 1 tablet by mouth 4 times daily (before meals and nightly)             traMADol (ULTRAM) 50 MG tablet  Take 1/2 to 1 tablet by mouth every 6 to 12 hours as needed for pain.  No more than 2 tablets per day                   Medications marked \"taking\" at this time  Outpatient Medications Marked as Taking for the 1/20/20 encounter (Office Visit) with Lexi Gomez MD   Medication Sig Dispense Refill    pantoprazole (PROTONIX) 40 MG tablet Take 1 tablet by mouth every morning (before breakfast) 30 tablet 3    sucralfate (CARAFATE) 1 GM tablet Take 1 tablet by mouth 4 times daily (before meals and nightly) 60 tablet 1    magnesium 200 MG TABS tablet Take 200 mg by mouth daily      docusate sodium (COLACE) 100 MG capsule Take 100 mg by mouth daily as needed for Constipation Genitourinary: Negative. Musculoskeletal: Positive for arthralgias and back pain. Skin: Negative. Negative for rash. Neurological: Negative for dizziness, syncope, weakness, light-headedness, numbness and headaches. Psychiatric/Behavioral: Negative. Vitals:    01/20/20 1357   BP: 112/62   Site: Right Upper Arm   Position: Sitting   Cuff Size: Large Adult   Pulse: 76   Resp: 16   Weight: 209 lb (94.8 kg)   Height: 5' 4\" (1.626 m)     Body mass index is 35.87 kg/m². Wt Readings from Last 3 Encounters:   01/20/20 209 lb (94.8 kg)   01/07/20 209 lb (94.8 kg)   12/28/19 206 lb (93.4 kg)     BP Readings from Last 3 Encounters:   01/20/20 112/62   01/08/20 128/62   12/28/19 130/62       Physical Exam  Vitals signs and nursing note reviewed. Constitutional:       General: She is not in acute distress. Appearance: She is well-developed. She is not diaphoretic. HENT:      Head: Normocephalic and atraumatic. Eyes:      General: No scleral icterus. Right eye: No discharge. Left eye: No discharge. Conjunctiva/sclera: Conjunctivae normal.      Pupils: Pupils are equal, round, and reactive to light. Neck:      Musculoskeletal: Normal range of motion and neck supple. Thyroid: No thyromegaly. Vascular: No JVD. Cardiovascular:      Rate and Rhythm: Normal rate and regular rhythm. Heart sounds: Normal heart sounds. No murmur. Pulmonary:      Effort: No respiratory distress. Breath sounds: Normal breath sounds. No wheezing, rhonchi or rales. Abdominal:      General: Bowel sounds are normal. There is no distension. Palpations: Abdomen is soft. There is no mass. Tenderness: There is no tenderness. There is no guarding or rebound. Musculoskeletal: Normal range of motion. Lymphadenopathy:      Cervical: No cervical adenopathy. Skin:     General: Skin is warm and dry. Findings: No rash.    Neurological:      Mental Status: She is alert and oriented to person, place, and time. Psychiatric:         Behavior: Behavior normal.          Diagnosis Orders   1. Essential hypertension  Basic Metabolic Panel   2. Obstructive sleep apnea on CPAP     3. Hyperlipidemia, unspecified hyperlipidemia type       Requested Prescriptions      No prescriptions requested or ordered in this encounter     Current Outpatient Medications   Medication Sig Dispense Refill    pantoprazole (PROTONIX) 40 MG tablet Take 1 tablet by mouth every morning (before breakfast) 30 tablet 3    sucralfate (CARAFATE) 1 GM tablet Take 1 tablet by mouth 4 times daily (before meals and nightly) 60 tablet 1    magnesium 200 MG TABS tablet Take 200 mg by mouth daily      docusate sodium (COLACE) 100 MG capsule Take 100 mg by mouth daily as needed for Constipation      furosemide (LASIX) 20 MG tablet take 1 tablet by mouth once daily 60 tablet 0    traMADol (ULTRAM) 50 MG tablet Take 1/2 to 1 tablet by mouth every 6 to 12 hours as needed for pain.  No more than 2 tablets per day      rosuvastatin (CRESTOR) 5 MG tablet Take 1 tablet by mouth daily 30 tablet 5    cetirizine (ZYRTEC) 10 MG tablet Take 1 tablet by mouth nightly 30 tablet 5    metoprolol succinate (TOPROL XL) 25 MG extended release tablet Take 0.5 tablets by mouth daily 30 tablet 3    diclofenac sodium 1 % GEL Apply 4 g topically 4 times daily 1 Tube 3    sacubitril-valsartan (ENTRESTO) 49-51 MG per tablet Take 1 tablet by mouth 2 times daily 180 tablet 3    isosorbide mononitrate (IMDUR) 30 MG extended release tablet take 1/2 tablet by mouth once daily 45 tablet 3    acetaminophen (TYLENOL) 325 MG tablet Take 2 tablets by mouth every 6 hours 120 tablet 0    calcium-vitamin D (OSCAL-500) 500-200 MG-UNIT per tablet Take 1 tablet by mouth 2 times daily 30 tablet 3    aspirin 81 MG EC tablet Take 1 tablet by mouth daily Stop 5 days before any planned intervention for compression fractures 30 tablet 3     No current

## 2020-01-25 NOTE — DISCHARGE SUMMARY
anterior  chest, back, shoulder, and also in the breast and left shoulder. She  states that she had seen her pain doctor yesterday in [place] and did  not take any injections and procedure during the visit. Yesterday  morning, the patient had pain in the left chest underneath the left  breast.  Denies any nausea or vomiting. No fever. No chills. Pain has  been constant, sharp with feeling fatigue. Pain gets worse during the  day for which the patient presented to the emergency room and was hence  admitted. The patient has a known history of nonobstructive  cardiomyopathy and also pacemaker defibrillator. PHYSICAL EXAMINATION:  VITAL SIGNS:  Blood pressure 130/56, pulse 80, saturation is 95%,  respirations 20, temperature is 97.5. HEENT:  Head:  Normocephalic and atraumatic. Oral mucosa is moist.  No  tonsillopharyngeal congestion. NECK:  Supple. LUNGS:  Clear to auscultation. CHEST WALL:  There is tenderness it the costochondral junction on the  second, third, fourth on the left side of the chest and there is  tenderness also underneath the breast.  HEART:  Regular rate and rhythm. ABDOMEN:  Soft, globular, nontender. No hepatosplenomegaly. There is  tenderness in the epigastric and left upper quadrant. BACK:  Showed no CVA tenderness except tenderness on the SI joint. EXTREMITIES:  No edema. NEUROLOGIC:  Within normal limits. HOSPITAL COURSE:  The patient was admitted. The patient was placed on  CCU protocol and we trended her troponins which were all normal.  Home  medications resumed. I added Protonix and Carafate to her medication  regimen. Lovenox for DVT prophylaxis and I requested limited  echocardiogram and consult Cardiology. The patient was seen by Dr. Claudean Blew and as her troponins were negative, was given a dose of  Toradol and was further observed. The patient responded well. Her  chest pain and abdominal pain are much better.   Subsequently had a  stress test which was unremarkable. The patient was given Depo-Medrol  80 mg IM before she was discharged. The patient was subsequently  discharged in improved and stable condition as the stress tests were all  normal.  The patient was ambulatory. HIRAL Casey M.D.    D: 01/24/2020 17:29:21       T: 01/25/2020 6:15:31     [email or web address]  Job#: [id number]     Doc#: [id number]    CC:

## 2020-02-03 RX ORDER — ISOSORBIDE MONONITRATE 30 MG/1
TABLET, EXTENDED RELEASE ORAL
Qty: 45 TABLET | Refills: 0 | Status: SHIPPED | OUTPATIENT
Start: 2020-02-03 | End: 2020-04-27 | Stop reason: SDUPTHER

## 2020-02-05 RX ORDER — SUCRALFATE 1 G/1
TABLET ORAL
Qty: 60 TABLET | Refills: 1 | Status: SHIPPED | OUTPATIENT
Start: 2020-02-05 | End: 2020-04-24

## 2020-02-05 NOTE — TELEPHONE ENCOUNTER
Date of last visit:  1/20/2020  Date of next visit:  Visit date not found    Requested Prescriptions     Pending Prescriptions Disp Refills    sucralfate (CARAFATE) 1 GM tablet [Pharmacy Med Name: SUCRALFATE 1 GM TABLET] 60 tablet 1     Sig: take 1 tablet by mouth four times a day 1 hour before meals and at bedtime

## 2020-02-06 ENCOUNTER — OFFICE VISIT (OUTPATIENT)
Dept: CARDIOLOGY CLINIC | Age: 80
End: 2020-02-06
Payer: MEDICARE

## 2020-02-06 VITALS
WEIGHT: 208 LBS | HEIGHT: 66 IN | SYSTOLIC BLOOD PRESSURE: 130 MMHG | BODY MASS INDEX: 33.43 KG/M2 | HEART RATE: 64 BPM | DIASTOLIC BLOOD PRESSURE: 82 MMHG

## 2020-02-06 PROCEDURE — 1036F TOBACCO NON-USER: CPT | Performed by: NURSE PRACTITIONER

## 2020-02-06 PROCEDURE — G8399 PT W/DXA RESULTS DOCUMENT: HCPCS | Performed by: NURSE PRACTITIONER

## 2020-02-06 PROCEDURE — G8417 CALC BMI ABV UP PARAM F/U: HCPCS | Performed by: NURSE PRACTITIONER

## 2020-02-06 PROCEDURE — 4040F PNEUMOC VAC/ADMIN/RCVD: CPT | Performed by: NURSE PRACTITIONER

## 2020-02-06 PROCEDURE — 1123F ACP DISCUSS/DSCN MKR DOCD: CPT | Performed by: NURSE PRACTITIONER

## 2020-02-06 PROCEDURE — 99213 OFFICE O/P EST LOW 20 MIN: CPT | Performed by: NURSE PRACTITIONER

## 2020-02-06 PROCEDURE — 1111F DSCHRG MED/CURRENT MED MERGE: CPT | Performed by: NURSE PRACTITIONER

## 2020-02-06 PROCEDURE — G8482 FLU IMMUNIZE ORDER/ADMIN: HCPCS | Performed by: NURSE PRACTITIONER

## 2020-02-06 PROCEDURE — 1090F PRES/ABSN URINE INCON ASSESS: CPT | Performed by: NURSE PRACTITIONER

## 2020-02-06 PROCEDURE — G8427 DOCREV CUR MEDS BY ELIG CLIN: HCPCS | Performed by: NURSE PRACTITIONER

## 2020-02-06 NOTE — PROGRESS NOTES
[facility] PROFESSIONAL SERVICES  [facility] [place] [facility]   [address]   [address]   [address] [address]   Dept: [phone]   Dept Fax: [phone]: [phone]      Chief Complaint   Patient presents with    Follow-Up from Hospital     chest pain     Follow up from hospitalization for chest pain in 78 y/o female patient of Dr. Mona Singh with history of nonobstructive CAD per [address] 2014, NICMP EF 55 per echo 1/7/20, HTN, HLD, LUISA on CPAP, and arthritis. She had no further chest pain after her initial presentation. Her pain appeared to be musculoskeletal / arthritic/ GI and was reproducible, troponin negative, and stress test during this admission was negative for ischemia. She has some intermittent sob with exertion and mild swelling of bilateral lower extremities, neither of which are increased or have changed. Denies chest pain, palpitations, lightheadedness, dizziness or syncope.     Cardiologist:  Dr. Daryl Peralta:   No fever, no chills, No fatigue or weight loss  Pulmonary:    + intermittent dyspnea on exertion, no wheezing  Cardiac:    Denies recent chest pain   GI:     No nausea or vomiting, no abdominal pain  Neuro:    No dizziness or light headedness  Musculoskeletal:  No recent active issues  Extremities:   mild BLE edema, good peripheral pulses      Past Medical History:   Diagnosis Date    Arthritis     general    Breast CA ([address] 12/03    right    CAD (coronary artery disease) 2009    stent in [place]    CHF (congestive heart failure) ([address]     Closed compression fracture of first lumbar vertebra ([address] 2/7/2018    Colon polyps 8/97; 3/11    COPD (chronic obstructive pulmonary disease) ([address] 8/5/2017    Diverticulosis 1/06    Erosive gastritis 11/06    Esophageal stricture 03/2011    Three times, Dr. Suzy Jones    Hyperlipidemia     Hypertension     Internal hemorrhoid 1/06    LUISA on CPAP     Osteopenia determined by x-ray 1999    Osteoporosis of vertebra 6/2018 Per CT thoracolumbar    Pneumonia 2009    Stress incontinence, female     Vertigo        Allergies   Allergen Reactions    Antivert [Meclizine] Other (See Comments)     confusion    Ciprofloxacin Itching    Neomycin     Pcn [Penicillins] Hives     Pt reports having reaction 50 years ago    Tizanidine     Zanaflex [Tizanidine Hcl] Other (See Comments)     Causes confusion    Levaquin [Levofloxacin] Itching     Benadryl was given for tx       Current Outpatient Medications   Medication Sig Dispense Refill    sucralfate (CARAFATE) 1 GM tablet take 1 tablet by mouth four times a day 1 hour before meals and at bedtime 60 tablet 1    isosorbide mononitrate (IMDUR) 30 MG extended release tablet take 1/2 tablet by mouth once daily 45 tablet 0    pantoprazole (PROTONIX) 40 MG tablet Take 1 tablet by mouth every morning (before breakfast) 30 tablet 3    magnesium 200 MG TABS tablet Take 200 mg by mouth daily      furosemide (LASIX) 20 MG tablet take 1 tablet by mouth once daily 60 tablet 0    traMADol (ULTRAM) 50 MG tablet Take 1/2 to 1 tablet by mouth every 6 to 12 hours as needed for pain.  No more than 2 tablets per day      rosuvastatin (CRESTOR) 5 MG tablet Take 1 tablet by mouth daily 30 tablet 5    cetirizine (ZYRTEC) 10 MG tablet Take 1 tablet by mouth nightly 30 tablet 5    metoprolol succinate (TOPROL XL) 25 MG extended release tablet Take 0.5 tablets by mouth daily 30 tablet 3    diclofenac sodium 1 % GEL Apply 4 g topically 4 times daily 1 Tube 3    sacubitril-valsartan (ENTRESTO) 49-51 MG per tablet Take 1 tablet by mouth 2 times daily 180 tablet 3    acetaminophen (TYLENOL) 325 MG tablet Take 2 tablets by mouth every 6 hours 120 tablet 0    calcium-vitamin D (OSCAL-500) 500-200 MG-UNIT per tablet Take 1 tablet by mouth 2 times daily 30 tablet 3    aspirin 81 MG EC tablet Take 1 tablet by mouth daily Stop 5 days before any planned intervention for compression fractures 30 tablet 3     No

## 2020-02-14 ENCOUNTER — TELEPHONE (OUTPATIENT)
Dept: FAMILY MEDICINE CLINIC | Age: 80
End: 2020-02-14

## 2020-02-14 RX ORDER — NITROFURANTOIN 25; 75 MG/1; MG/1
100 CAPSULE ORAL 2 TIMES DAILY
Qty: 14 CAPSULE | Refills: 0 | Status: SHIPPED | OUTPATIENT
Start: 2020-02-14 | End: 2020-02-21

## 2020-02-14 NOTE — TELEPHONE ENCOUNTER
Please let her know that Since she is starting to have some symptoms  will go ahead and treat her with a course of antibiotics.   Rx for Macrobid done

## 2020-02-20 ENCOUNTER — PROCEDURE VISIT (OUTPATIENT)
Dept: CARDIOLOGY CLINIC | Age: 80
End: 2020-02-20
Payer: MEDICARE

## 2020-02-20 PROCEDURE — 93295 DEV INTERROG REMOTE 1/2/MLT: CPT | Performed by: INTERNAL MEDICINE

## 2020-02-20 PROCEDURE — 93296 REM INTERROG EVL PM/IDS: CPT | Performed by: INTERNAL MEDICINE

## 2020-02-24 RX ORDER — FUROSEMIDE 20 MG/1
TABLET ORAL
Qty: 60 TABLET | Refills: 1 | Status: SHIPPED | OUTPATIENT
Start: 2020-02-24 | End: 2020-06-15

## 2020-02-24 NOTE — TELEPHONE ENCOUNTER
Date of last visit:  1/20/2020  Date of next visit:  4/24/2020    Requested Prescriptions     Pending Prescriptions Disp Refills    furosemide (LASIX) 20 MG tablet 60 tablet 0     Sig: take 1 tablet by mouth once daily

## 2020-03-21 ENCOUNTER — TELEPHONE (OUTPATIENT)
Dept: FAMILY MEDICINE CLINIC | Age: 80
End: 2020-03-21

## 2020-03-24 ENCOUNTER — TELEPHONE (OUTPATIENT)
Dept: FAMILY MEDICINE CLINIC | Age: 80
End: 2020-03-24

## 2020-03-24 NOTE — TELEPHONE ENCOUNTER
He has patient needs to have a urine recheck, 2 days after all the antibiotic is done. Please call the patient or daughter.  She can bring the urine here in this office

## 2020-04-01 ENCOUNTER — TELEPHONE (OUTPATIENT)
Dept: FAMILY MEDICINE CLINIC | Age: 80
End: 2020-04-01

## 2020-04-01 NOTE — TELEPHONE ENCOUNTER
----- Message from Kassandra Mccabe MD sent at 4/1/2020 12:54 PM EDT -----  Please verify if her urine culture results are ready.

## 2020-04-24 ENCOUNTER — VIRTUAL VISIT (OUTPATIENT)
Dept: FAMILY MEDICINE CLINIC | Age: 80
End: 2020-04-24

## 2020-04-24 ENCOUNTER — OFFICE VISIT (OUTPATIENT)
Dept: FAMILY MEDICINE CLINIC | Age: 80
End: 2020-04-24

## 2020-04-24 VITALS — WEIGHT: 204 LBS | HEIGHT: 65 IN | BODY MASS INDEX: 33.99 KG/M2

## 2020-04-24 PROCEDURE — 4040F PNEUMOC VAC/ADMIN/RCVD: CPT | Performed by: FAMILY MEDICINE

## 2020-04-24 PROCEDURE — 99213 OFFICE O/P EST LOW 20 MIN: CPT | Performed by: FAMILY MEDICINE

## 2020-04-24 PROCEDURE — G0438 PPPS, INITIAL VISIT: HCPCS | Performed by: FAMILY MEDICINE

## 2020-04-24 PROCEDURE — 1123F ACP DISCUSS/DSCN MKR DOCD: CPT | Performed by: FAMILY MEDICINE

## 2020-04-24 ASSESSMENT — ENCOUNTER SYMPTOMS
EYES NEGATIVE: 1
DIARRHEA: 0
CHEST TIGHTNESS: 0
COUGH: 0
SHORTNESS OF BREATH: 0
NAUSEA: 0
ABDOMINAL PAIN: 0
VOMITING: 0
CONSTIPATION: 0

## 2020-04-24 ASSESSMENT — PATIENT HEALTH QUESTIONNAIRE - PHQ9
SUM OF ALL RESPONSES TO PHQ QUESTIONS 1-9: 0
SUM OF ALL RESPONSES TO PHQ QUESTIONS 1-9: 0

## 2020-04-24 ASSESSMENT — LIFESTYLE VARIABLES: HOW OFTEN DO YOU HAVE A DRINK CONTAINING ALCOHOL: 0

## 2020-04-24 NOTE — PROGRESS NOTES
Date: 4/24/2020    Fuad Hatch is a 79 y.o. female who presents today for:  Chief Complaint   Patient presents with    Hypertension     check up     Patient verified Video Chat was not encrypted, and agrees to the Video Exam in presence of nurse. Michael Murray LPN  Patient was at : home at [facility]  Present in the room was: alone    HPI:     Hypertension:  Home blood pressure monitoring: Yes - she states her B/P has been good, she has it monitored twice a day. She is adherent to a low sodium diet. Patient denies chest pain, shortness of breath, headache, lightheadedness, blurred vision, palpitations. Antihypertensive medication side effects: no medication side effects noted. Use of agents associated with hypertension: none. She walks at [facility]. Sodium (meq/L)       Date                     Value                 01/08/2020               136              ---------- BUN (mg/dL)       Date                     Value                 01/08/2020               15               ---------- Glucose (mg/dL)       Date                     Value                 01/08/2020               94               ----------  Potassium (meq/L)       Date                     Value                 01/08/2020               4.6              ----------  Potassium reflex Magnesium (meq/L)       Date                     Value                 06/04/2019               3.9              ---------- CREATININE (mg/dL)       Date                     Value                 01/08/2020               0.6              ----------     Hyperlipidemia:  No new myalgias or GI upset on rosuvastatin (Crestor). Medication compliance: compliant all of the time. Patient is  following a low fat, low cholesterol diet. She is not exercising regularly.      Lab Results       Component                Value               Date                       CHOL                     114                 01/08/2020                 TRIG

## 2020-04-24 NOTE — PROGRESS NOTES
Medicare Annual Wellness Visit  Name: Tara Barrera Date: 2020   MRN: [id number] Sex: Female   Age: 79 y.o. Ethnicity: Non-/Non    : 1940 Race: White      Patient verified Video Chat was not encrypted, and agrees to the Video Exam in presence of nurse. Cuca Coleman ADITHYAN  Patient was at : home at [facility]  Present in the room was: alone    Raffimark anthony Szymanski is here for Medicare AWV    Screenings for behavioral, psychosocial and functional/safety risks, and cognitive dysfunction are all negative except as indicated below. These results, as well as other patient data [facility] [address] [facility], are documented in Flowsheets linked to this Encounter. Allergies   Allergen Reactions    Antivert [Meclizine] Other (See Comments)     confusion    Ciprofloxacin Itching    Neomycin     Pcn [Penicillins] Hives     Pt reports having reaction 50 years ago    Tizanidine     Zanaflex [Tizanidine Hcl] Other (See Comments)     Causes confusion    Levaquin [Levofloxacin] Itching     Benadryl was given for tx       Prior to Visit Medications    Medication Sig Taking? Authorizing Provider   furosemide (LASIX) 20 MG tablet take 1 tablet by mouth once daily  Shilpi Cervantes MD   isosorbide mononitrate (IMDUR) 30 MG extended release tablet take 1/2 tablet by mouth once daily  JADEN Nunn CNP   pantoprazole (PROTONIX) 40 MG tablet Take 1 tablet by mouth every morning (before breakfast)  Elvira Turcios MD   magnesium 200 MG TABS tablet Take 200 mg by mouth daily  Historical Provider, MD   traMADol (ULTRAM) 50 MG tablet Take 1/2 to 1 tablet by mouth every 6 to 12 hours as needed for pain.  No more than 2 tablets per day  Historical Provider, MD   rosuvastatin (CRESTOR) 5 MG tablet Take 1 tablet by mouth daily  Shilpi Cervantes MD   cetirizine (ZYRTEC) 10 MG tablet Take 1 tablet by mouth nightly  Shilpi Cervantes MD   metoprolol succinate (TOPROL XL) 25 MG extended release VISIT,PROCEDURE ONLY N/A 8/2/2018    EGD DIL performed by Zehra Castro MD at [facility] OFFICE/OUTPT VISIT,PROCEDURE ONLY Left 11/26/2018    ORIF LEFT PERIPROSTHETIC DISTAL HUMERAL FRACTURE [place] [address] Right 2/12/2018    LEFT HUMERUS OPEN REDUCTION INTERNAL FIXATION performed by Corrine Dominguez MD at [address]  childhood    UPPER GASTROINTESTINAL ENDOSCOPY Left 8/2/2018    EGD BIOPSY performed by Zehra Castro MD at [address] [facility]       Family History   Problem Relation Age of Onset    Heart Disease Mother     High Blood Pressure Mother     Cancer Father     Heart Disease Sister     Cancer Brother     Heart Disease Brother        CareTeam (Including outside providers/suppliers regularly involved in providing care):   Patient Care Team:  Anne Mota MD as PCP - Vic Banks MD as PCP - [facility] Empaneled Provider  Maria Antonia Keith RN as [facility]    Wt Readings from Last 3 Encounters:   04/24/20 204 lb (92.5 kg)   02/06/20 208 lb (94.3 kg)   01/20/20 209 lb (94.8 kg)     There were no vitals filed for this visit. There is no height or weight on file to calculate BMI. Based upon direct observation of the patient, evaluation of cognition reveals recent and remote memory intact. Patient's complete Health Risk Assessment and screening values have been reviewed and are found in Flowsheets. The following problems were reviewed today and where indicated follow up appointments were made and/or referrals ordered. Positive Risk Factor Screenings with Interventions:     Fall Risk:  2 or more falls in past year?: (!) yes  Fall with injury in past year?: (!) yes  Fall Risk Interventions:    · Home safety tips provided  · she underwent PT after her falls.      Health Habits/Nutrition:  Health Habits/Nutrition  Do you exercise for at least 20 minutes 2-3 times per week?: Yes  Have you lost any weight without trying in the past 3 months?: No  Do you eat fewer than 2 meals per day?: No  Have you seen a dentist within the past year?: (!) No  There is no height or weight on file to calculate BMI. Health Habits/Nutrition Interventions:  · Dental exam overdue:  patient encouraged to make appointment with his/her dentist, she states that she will make appt as soon as she is able. Hearing/Vision:  No exam data present  Hearing/Vision  Do you or your family notice any trouble with your hearing?: No  Do you have difficulty driving, watching TV, or doing any of your daily activities because of your eyesight?: No  Have you had an eye exam within the past year?: (!) No  Hearing/Vision Interventions:  · Vision concerns:  patient encouraged to make appointment with his/her eye specialist, she states that she forgot that she did have one eye exam in June or July 2019    ADL:  ADLs  In the past 7 days, did you need help from others to perform any of the following everyday activities? Eating, dressing, grooming, bathing, toileting, or walking/balance?: (!) Bathing  In the past 7 days, did you need help from others to take care of any of the following? Laundry, housekeeping, banking/finances, shopping, telephone use, food preparation, transportation, or taking medications?: (!) Food Preparation, Transportation, Taking Medications, Laundry, Housekeeping  ADL Interventions:  · She has help with her bathing and she has an aid with her at all times because of her falls. She lives at [place] and they help her, she has a cleaning person.      Personalized Preventive Plan   Current Health Maintenance Status  Immunization History   Administered Date(s) Administered    Influenza Vaccine, unspecified formulation 10/19/2016    Influenza Virus Vaccine 11/29/2013, 10/08/2014, 11/02/2015    Influenza, MDCK Quadv, IM, PF (Flucelvax 4 yrs and older) 01/17/2020    Tdap (Boostrix, Adacel) 02/03/2018        Health Maintenance   Topic Date Due    Shingles Vaccine (1 of 2) 01/01/1989    Pneumococcal 65+ years Vaccine (1 of 1 - PPSV23) 01/01/2004    Annual Wellness Visit (AWV)  05/29/2019    Lipid screen  01/08/2021    Potassium monitoring  01/08/2021    Creatinine monitoring  01/08/2021    Colon cancer screen colonoscopy  06/08/2027    DTaP/Tdap/Td vaccine (2 - Td) 02/03/2028    DEXA (modify frequency per FRAX score)  Completed    Flu vaccine  Completed    Hepatitis A vaccine  Aged Out    Hepatitis B vaccine  Aged Out    Hib vaccine  Aged Out    Meningococcal (ACWY) vaccine  Aged Out     Recommendations for [employer] Due: see orders and patient instructions/AVS.  . Recommended screening schedule for the next 5-10 years is provided to the patient in written form: see Patient Instructions/AVS.    There are no diagnoses linked to this encounter. Cardiovascular Disease Risk Counseling: Assessed the patient's risk to develop cardiovascular disease and reviewed main risk factors. Reviewed steps to reduce disease risk including:   · Quitting tobacco use, reducing amount smoked, or not starting the habit  · Making healthy food choices  · Being physically active and gradualy increasing activity levels   · Reduce weight and determine a healthy BMI goal  · Monitor blood pressure and treat if higher than 140/90 mmHg  · Maintain blood total cholesterol levels under 5 mmol/l or 190 mg/dl  · Maintain LDL cholesterol levels under 3.0 mmol/l or 115 mg/dl   · Control blood glucose levels  · Continue taking aspirin (75 mg daily), once blood pressure is controlled   Provided a follow up plan.

## 2020-04-24 NOTE — PATIENT INSTRUCTIONS
Body Mass Index: Care Instructions  Your Care Instructions    Body mass index (BMI) can help you see if your weight is raising your risk for health problems. It uses a formula to compare how much you weigh with how tall you are. · A BMI lower than 18.5 is considered underweight. · A BMI between 18.5 and 24.9 is considered healthy. · A BMI between 25 and 29.9 is considered overweight. A BMI of 30 or higher is considered obese. If your BMI is in the normal range, it means that you have a lower risk for weight-related health problems. If your BMI is in the overweight or obese range, you may be at increased risk for weight-related health problems, such as high blood pressure, heart disease, stroke, arthritis or joint pain, and diabetes. If your BMI is in the underweight range, you may be at increased risk for health problems such as fatigue, lower protection (immunity) against illness, muscle loss, bone loss, hair loss, and hormone problems. BMI is just one measure of your risk for weight-related health problems. You may be at higher risk for health problems if you are not active, you eat an unhealthy diet, or you drink too much alcohol or use tobacco products. Follow-up care is a key part of your treatment and safety. Be sure to make and go to all appointments, and call your doctor if you are having problems. It's also a good idea to know your test results and keep a list of the medicines you take. How can you care for yourself at home? · Practice healthy eating habits. This includes eating plenty of fruits, vegetables, whole grains, lean protein, and low-fat dairy. · If your doctor recommends it, get more exercise. Walking is a good choice. Bit by bit, increase the amount you walk every day. Try for at least 30 minutes on most days of the week. · Do not smoke. Smoking can increase your risk for health problems. If you need help quitting, talk to your doctor about stop-smoking programs and medicines. pressure. The DASH diet focuses on eating foods that are high in calcium, potassium, and magnesium. These nutrients can lower blood pressure. The foods that are highest in these nutrients are fruits, vegetables, low-fat dairy products, nuts, seeds, and legumes. But taking calcium, potassium, and magnesium supplements instead of eating foods that are high in those nutrients does not have the same effect. The DASH diet also includes whole grains, fish, and poultry. The DASH diet is one of several lifestyle changes your doctor may recommend to lower your high blood pressure. Your doctor may also want you to decrease the amount of sodium in your diet. Lowering sodium while following the DASH diet can lower blood pressure even further than just the DASH diet alone. Follow-up care is a key part of your treatment and safety. Be sure to make and go to all appointments, and call your doctor if you are having problems. It's also a good idea to know your test results and keep a list of the medicines you take. How can you care for yourself at home? Following the DASH diet  · Eat 4 to 5 servings of fruit each day. A serving is 1 medium-sized piece of fruit, ½ cup chopped or canned fruit, 1/4 cup dried fruit, or 4 ounces (½ cup) of fruit juice. Choose fruit more often than fruit juice. · Eat 4 to 5 servings of vegetables each day. A serving is 1 cup of lettuce or raw leafy vegetables, ½ cup of chopped or cooked vegetables, or 4 ounces (½ cup) of vegetable juice. Choose vegetables more often than vegetable juice. · Get 2 to 3 servings of low-fat and fat-free dairy each day. A serving is 8 ounces of milk, 1 cup of yogurt, or 1 ½ ounces of cheese. · Eat 6 to 8 servings of grains each day. A serving is 1 slice of bread, 1 ounce of dry cereal, or ½ cup of cooked rice, pasta, or cooked cereal. Try to choose whole-grain products as much as possible. · Limit lean meat, poultry, and fish to 2 servings each day.  A serving is 3 account. Enter D148 in the [facility] box to learn more about \"DASH Diet: Care Instructions. \"     If you do not have an account, please click on the \"Sign Up Now\" link. Current as of: December 15, 2019Content Version: 12.4  © 4409-7864 Healthwise, Incorporated. Care instructions adapted under license by [facility] [address] If you have questions about a medical condition or this instruction, always ask your healthcare professional. [address] any warranty or liability for your use of this information. Eating Healthy Foods: Care Instructions  Your Care Instructions    Eating healthy foods can help lower your risk for disease. Healthy food gives you energy and keeps your heart strong, your brain active, your muscles working, and your bones strong. A healthy diet includes a variety of foods from the basic food groups: grains, vegetables, fruits, milk and milk products, and meat and beans. Some people may eat more of their favorite foods from only one food group and, as a result, miss getting the nutrients they need. So, it is important to pay attention not only to what you eat but also to what you are missing from your diet. You can eat a healthy, balanced diet by making a few small changes. Follow-up care is a key part of your treatment and safety. Be sure to make and go to all appointments, and call your doctor if you are having problems. It's also a good idea to know your test results and keep a list of the medicines you take. How can you care for yourself at home? Look at what you eat  · Keep a food diary for a week or two and record everything you eat or drink. Track the number of servings you eat from each food group. · For a balanced diet every day, eat a variety of:  ? 6 or more ounce-equivalents of grains, such as cereals, breads, crackers, rice, or pasta, every day.  An ounce-equivalent is 1 slice of bread, 1 cup of ready-to-eat cereal, or ½ cup of cooked rice, often you eat them, but do not cut them out entirely. · Eat a wide variety of foods. Make healthy choices when eating out  · The type of restaurant you choose can help you make healthy choices. Even fast-food chains are now offering more low-fat or healthier choices on the menu. · Choose smaller portions, or take half of your meal home. · When eating out, try:  ? A veggie pizza with a whole wheat crust or grilled chicken (instead of sausage or pepperoni). ? Pasta with roasted vegetables, grilled chicken, or marinara sauce instead of cream sauce. ? A vegetable wrap or grilled chicken wrap. ? Broiled or poached food instead of fried or breaded items. Make healthy choices easy  · Buy packaged, prewashed, ready-to-eat fresh vegetables and fruits, such as baby carrots, salad mixes, and chopped or shredded broccoli and cauliflower. · Buy packaged, presliced fruits, such as melon or pineapple. · Choose 100% fruit or vegetable juice instead of soda. Limit juice intake to 4 to 6 oz (½ to ¾ cup) a day. · Blend low-fat yogurt, fruit juice, and canned or frozen fruit to make a smoothie for breakfast or a snack. Where can you learn more? Go to https://BuzzFeedvivienneeb.Mingxieku. org and sign in to your bTendo account. Enter U032 in the [employer] box to learn more about \"Eating Healthy Foods: Care Instructions. \"     If you do not have an account, please click on the \"Sign Up Now\" link. Current as of: August 21, 2019Content Version: 12.4  © 5664-4335 Healthwise, Incorporated. Care instructions adapted under license by [facility] ([facility]). If you have questions about a medical condition or this instruction, always ask your healthcare professional. [address] any warranty or liability for your use of this information. Personalized Preventive Plan for Noé Wren - 4/24/2020  Medicare offers a range of preventive health benefits.  Some of the tests and screenings are paid in full while other may be subject to a deductible, co-insurance, and/or copay. Some of these benefits include a comprehensive review of your medical history including lifestyle, illnesses that may run in your family, and various assessments and screenings as appropriate. After reviewing your medical record and screening and assessments performed today your provider may have ordered immunizations, labs, imaging, and/or referrals for you. A list of these orders (if applicable) as well as your Preventive Care list are included within your After Visit Summary for your review. Other Preventive Recommendations:    · A preventive eye exam performed by an eye specialist is recommended every 1-2 years to screen for glaucoma; cataracts, macular degeneration, and other eye disorders. · A preventive dental visit is recommended every 6 months. · Try to get at least 150 minutes of exercise per week or 10,000 steps per day on a pedometer . · Order or download the FREE \"Exercise & Physical Activity: Your Everyday Guide\" from [employer]. Call 6-224.482.9529 or search [employer] online. · You need 8014-4570 mg of calcium and 0433-8709 IU of vitamin D per day. It is possible to meet your calcium requirement with diet alone, but a vitamin D supplement is usually necessary to meet this goal.  · When exposed to the sun, use a sunscreen that protects against both UVA and UVB radiation with an SPF of 30 or greater. Reapply every 2 to 3 hours or after sweating, drying off with a towel, or swimming. · Always wear a seat belt when traveling in a car. Always wear a helmet when riding a bicycle or motorcycle.

## 2020-04-27 RX ORDER — ISOSORBIDE MONONITRATE 30 MG/1
TABLET, EXTENDED RELEASE ORAL
Qty: 45 TABLET | Refills: 0 | Status: SHIPPED | OUTPATIENT
Start: 2020-04-27 | End: 2020-06-15

## 2020-05-27 ENCOUNTER — PROCEDURE VISIT (OUTPATIENT)
Dept: CARDIOLOGY CLINIC | Age: 80
End: 2020-05-27
Payer: MEDICARE

## 2020-05-27 PROCEDURE — 93295 DEV INTERROG REMOTE 1/2/MLT: CPT | Performed by: INTERNAL MEDICINE

## 2020-05-27 PROCEDURE — 93296 REM INTERROG EVL PM/IDS: CPT | Performed by: INTERNAL MEDICINE

## 2020-06-01 ENCOUNTER — TELEPHONE (OUTPATIENT)
Dept: FAMILY MEDICINE CLINIC | Age: 80
End: 2020-06-01

## 2020-06-01 RX ORDER — SULFAMETHOXAZOLE AND TRIMETHOPRIM 800; 160 MG/1; MG/1
1 TABLET ORAL 2 TIMES DAILY
Qty: 20 TABLET | Refills: 0 | Status: SHIPPED | OUTPATIENT
Start: 2020-06-01 | End: 2020-06-11

## 2020-06-01 NOTE — TELEPHONE ENCOUNTER
----- Message from Anna Mercado MD sent at 6/1/2020  4:32 PM EDT -----  We need to start bactrim DS po BID # 20  Needs referral to urology for recurrent UTI

## 2020-06-04 ENCOUNTER — TELEPHONE (OUTPATIENT)
Dept: FAMILY MEDICINE CLINIC | Age: 80
End: 2020-06-04

## 2020-06-04 RX ORDER — CEPHALEXIN 500 MG/1
500 CAPSULE ORAL 3 TIMES DAILY
Qty: 24 CAPSULE | Refills: 0 | Status: SHIPPED | OUTPATIENT
Start: 2020-06-04 | End: 2020-08-12

## 2020-06-05 ENCOUNTER — TELEPHONE (OUTPATIENT)
Dept: FAMILY MEDICINE CLINIC | Age: 80
End: 2020-06-05

## 2020-06-05 RX ORDER — DENOSUMAB 60 MG/ML
60 INJECTION SUBCUTANEOUS ONCE
Qty: 1 ML | Refills: 1 | Status: SHIPPED | OUTPATIENT
Start: 2020-06-05 | End: 2022-02-07

## 2020-06-15 RX ORDER — ISOSORBIDE MONONITRATE 30 MG/1
TABLET, EXTENDED RELEASE ORAL
Qty: 45 TABLET | Refills: 0 | Status: SHIPPED | OUTPATIENT
Start: 2020-06-15 | End: 2020-07-03 | Stop reason: SDUPTHER

## 2020-06-15 RX ORDER — FUROSEMIDE 20 MG/1
TABLET ORAL
Qty: 60 TABLET | Refills: 1 | Status: SHIPPED | OUTPATIENT
Start: 2020-06-15 | End: 2020-10-26 | Stop reason: SDUPTHER

## 2020-07-03 RX ORDER — ISOSORBIDE MONONITRATE 30 MG/1
TABLET, EXTENDED RELEASE ORAL
Qty: 45 TABLET | Refills: 0 | Status: SHIPPED | OUTPATIENT
Start: 2020-07-03 | End: 2020-10-15 | Stop reason: SDUPTHER

## 2020-07-03 RX ORDER — METOPROLOL SUCCINATE 25 MG/1
12.5 TABLET, EXTENDED RELEASE ORAL DAILY
Qty: 45 TABLET | Refills: 0 | Status: SHIPPED | OUTPATIENT
Start: 2020-07-03 | End: 2020-10-15 | Stop reason: SDUPTHER

## 2020-07-03 NOTE — TELEPHONE ENCOUNTER
Date of last visit:  4/24/2020  Date of next visit:  7/27/2020    Requested Prescriptions     Pending Prescriptions Disp Refills    isosorbide mononitrate (IMDUR) 30 MG extended release tablet 45 tablet 1     Sig: Take 1/2 tab PO QD    metoprolol succinate (TOPROL XL) 25 MG extended release tablet 45 tablet 1     Sig: Take 0.5 tablets by mouth daily

## 2020-07-08 ENCOUNTER — HOSPITAL ENCOUNTER (OUTPATIENT)
Dept: NURSING | Age: 80
Discharge: HOME | End: 2020-07-08
Payer: MEDICARE

## 2020-07-08 VITALS
TEMPERATURE: 97.7 F | DIASTOLIC BLOOD PRESSURE: 58 MMHG | OXYGEN SATURATION: 97 % | SYSTOLIC BLOOD PRESSURE: 122 MMHG | RESPIRATION RATE: 20 BRPM | HEART RATE: 80 BPM

## 2020-07-08 DIAGNOSIS — M81.0 AGE-RELATED OSTEOPOROSIS WITHOUT CURRENT PATHOLOGICAL FRACTURE: Primary | ICD-10-CM

## 2020-07-08 PROCEDURE — 96372 THER/PROPH/DIAG INJ SC/IM: CPT

## 2020-07-08 PROCEDURE — 6360000002 HC RX W HCPCS

## 2020-07-08 RX ADMIN — DENOSUMAB 60 MG: 60 INJECTION SUBCUTANEOUS at 13:31

## 2020-07-08 NOTE — PROGRESS NOTES
1301 Patient arrived to OPN ambulatory for prolia injection  PT RIGHTS AND RESPONSIBILITIES OFFERED TO PT.

## 2020-07-08 NOTE — PROGRESS NOTES
1310 pt resting quietly at present. [phone] discharge instructions given to patient. Verbalize understanding. 1331 pt discharged after shot. Stable. No c/o noted pt zen well.

## 2020-07-16 ENCOUNTER — PROCEDURE VISIT (OUTPATIENT)
Dept: CARDIOLOGY CLINIC | Age: 80
End: 2020-07-16

## 2020-08-10 ENCOUNTER — TELEPHONE (OUTPATIENT)
Dept: FAMILY MEDICINE CLINIC | Age: 80
End: 2020-08-10

## 2020-08-10 RX ORDER — ROSUVASTATIN CALCIUM 5 MG/1
TABLET, COATED ORAL
Qty: 30 TABLET | Refills: 1 | Status: SHIPPED | OUTPATIENT
Start: 2020-08-10 | End: 2020-09-30 | Stop reason: SDUPTHER

## 2020-08-10 RX ORDER — TRAMADOL HYDROCHLORIDE 50 MG/1
50 TABLET ORAL EVERY 6 HOURS PRN
Qty: 120 TABLET | Refills: 0 | Status: SHIPPED | OUTPATIENT
Start: 2020-08-10 | End: 2020-12-07 | Stop reason: SDUPTHER

## 2020-08-10 NOTE — TELEPHONE ENCOUNTER
Date of last visit:  4/24/2020  Date of next visit:  Visit date not found    Requested Prescriptions     Pending Prescriptions Disp Refills    rosuvastatin (CRESTOR) 5 MG tablet 30 tablet 5     Sig: take 1 tablet by mouth once daily

## 2020-08-10 NOTE — TELEPHONE ENCOUNTER
Cristina DwyerRose called for a refill of:    traMADol (ULTRAM) 50 MG tablet Take 1/2 to 1 tablet by mouth every 6 to 12 hours as needed for pain.  No more than 2 tablets per day    Send to [facility] on [address]

## 2020-08-11 NOTE — TELEPHONE ENCOUNTER
Spoke with [facility] and she is apparently transferring to Dr Vivienne Calderón because Dr Erika Irving doesn't see patients in the hospital.  Advised her I would call [facility] and make sure she had enough meds until her appt with Dr Vivienne Calderón tomorrow and if so would be cancelling the prescriptions for Crestor and Ultram at [facility] on [address],

## 2020-08-11 NOTE — TELEPHONE ENCOUNTER
Called [facility] and she has enough until she sees Dr Oralia Mcdowell tomorrow. I will be cancelling the RX's at [facility] on [address] to Whitney Walker at [facility] and he informed me that the RX's have already left the pharmacy and are on their way to [facility] and once they leave the pharmacy they cannot be returned.

## 2020-08-12 ENCOUNTER — OFFICE VISIT (OUTPATIENT)
Dept: FAMILY MEDICINE CLINIC | Age: 80
End: 2020-08-12
Payer: MEDICARE

## 2020-08-12 VITALS
DIASTOLIC BLOOD PRESSURE: 78 MMHG | WEIGHT: 219.8 LBS | BODY MASS INDEX: 36.62 KG/M2 | HEART RATE: 76 BPM | HEIGHT: 65 IN | SYSTOLIC BLOOD PRESSURE: 134 MMHG | RESPIRATION RATE: 18 BRPM | TEMPERATURE: 97.8 F

## 2020-08-12 PROCEDURE — 1123F ACP DISCUSS/DSCN MKR DOCD: CPT | Performed by: FAMILY MEDICINE

## 2020-08-12 PROCEDURE — 3023F SPIROM DOC REV: CPT | Performed by: FAMILY MEDICINE

## 2020-08-12 PROCEDURE — 1090F PRES/ABSN URINE INCON ASSESS: CPT | Performed by: FAMILY MEDICINE

## 2020-08-12 PROCEDURE — G8926 SPIRO NO PERF OR DOC: HCPCS | Performed by: FAMILY MEDICINE

## 2020-08-12 PROCEDURE — G8417 CALC BMI ABV UP PARAM F/U: HCPCS | Performed by: FAMILY MEDICINE

## 2020-08-12 PROCEDURE — G8427 DOCREV CUR MEDS BY ELIG CLIN: HCPCS | Performed by: FAMILY MEDICINE

## 2020-08-12 PROCEDURE — G8399 PT W/DXA RESULTS DOCUMENT: HCPCS | Performed by: FAMILY MEDICINE

## 2020-08-12 PROCEDURE — 99214 OFFICE O/P EST MOD 30 MIN: CPT | Performed by: FAMILY MEDICINE

## 2020-08-12 PROCEDURE — 4040F PNEUMOC VAC/ADMIN/RCVD: CPT | Performed by: FAMILY MEDICINE

## 2020-08-12 PROCEDURE — 1036F TOBACCO NON-USER: CPT | Performed by: FAMILY MEDICINE

## 2020-08-12 RX ORDER — LANOLIN ALCOHOL/MO/W.PET/CERES
0.5 CREAM (GRAM) TOPICAL DAILY
COMMUNITY
Start: 2020-07-13 | End: 2020-09-08 | Stop reason: SDUPTHER

## 2020-08-12 ASSESSMENT — ENCOUNTER SYMPTOMS
GASTROINTESTINAL NEGATIVE: 1
RESPIRATORY NEGATIVE: 1

## 2020-08-12 NOTE — PROGRESS NOTES
Subjective:      Patient ID: Nancy Sen is a 79 y.o. female. HPI:    Chief Complaint   Patient presents with    New Patient     NP to establish. Last PCP Dr. Kaity Hutton. Pt resides at [facility]. Hx of CAD and CHF, follows closely with Dr. Liya Brody. Denies CP, chest tightness, SOB. Pacer and defib in place. Hx of LUISA, compliant with CPAP. Also has hx of COPD, no inhalers needed at this time. Hx of HTN. BP Readings from Last 3 Encounters:   08/12/20 134/78   07/08/20 (!) 122/58   02/06/20 130/82     Hx of hyperlipidemia, on Crestor. Hx of GERD, controlled on Protonix. Sees Dr. Katie Boothe. Dx of \"motilty disorder\" and hiatal hernia. EGD 2018. Hx of osteoporosis, on Prolia therapy for this. Very unstable gait, hx of multiple falls. She walks with aid of walker. Hx of DDD and polyarthralgia, controlled on tramadol prn. Hx of allergies, controlled on the Zyrtec.       Patient Active Problem List   Diagnosis    Hyperlipidemia    Breast CA ([address]    CAD (coronary artery disease) s/p PCI and stent Multilink 3 x 18  mm mid LAD- in 07/2009 at [facility]    LUISA on CPAP    COPD (chronic obstructive pulmonary disease) ([address]    S/P ICD (internal cardiac defibrillator) procedure    Nonischemic cardiomyopathy ([address]    Class 2 severe obesity due to excess calories with serious comorbidity in adult ([address]    Hypertension    Benign positional vertigo    Age-related osteoporosis without current pathological fracture    Chest pain    Hypocalcemia    Chronic diastolic CHF (congestive heart failure) ([address]     Past Surgical History:   Procedure Laterality Date    ARM SURGERY Left 11/27/2018    plate in arm    BREAST LUMPECTOMY  1/04    wt axillary dissection    CARDIAC DEFIBRILLATOR PLACEMENT  01/08/2018    Biventricular pacemaker ICD, Dr. Riki Sumner  3/2011    Bhumi Mention  2009    DILATATION, ESOPHAGUS  2011  HIP PINNING Left 8/20/2019    LEFT HIP INTERTAIN performed by Isaías Greenwood MD at [address] 1978    bleeding    JOINT REPLACEMENT Left     Shoulder    MT OFFICE/OUTPT VISIT,PROCEDURE ONLY N/A 8/2/2018    EGD DIL performed by Mana Cárdenas MD at [facility] OFFICE/OUTPT VISIT,PROCEDURE ONLY Left 11/26/2018    ORIF LEFT PERIPROSTHETIC DISTAL HUMERAL FRACTURE performed by Isaías Greenwood MD at [place] [id number] Right 2/12/2018    LEFT HUMERUS OPEN REDUCTION INTERNAL FIXATION performed by Isaías Greenwood MD at [facility] ENDOSCOPY Left 8/2/2018    EGD BIOPSY performed by Mana Cárdenas MD at [facility]     Prior to Admission medications    Medication Sig Start Date End Date Taking? Authorizing Provider   magnesium oxide (MAG-OX) 400 (240 Mg) MG tablet Take 0.5 tablets by mouth daily 7/13/20  Yes Historical Provider, MD   rosuvastatin (CRESTOR) 5 MG tablet take 1 tablet by mouth once daily 8/10/20  Yes Cindy Liu MD   traMADol (ULTRAM) 50 MG tablet Take 1 tablet by mouth every 6 hours as needed for Pain for up to 30 days. Take 1/2 to 1 tablet by mouth every 6 to 12 hours as needed for pain.  No more than 2 tablets per day 8/10/20 9/9/20 Yes Cindy Liu MD   isosorbide mononitrate (IMDUR) 30 MG extended release tablet Take 1/2 tab PO QD 7/3/20  Yes Cindy Liu MD   metoprolol succinate (TOPROL XL) 25 MG extended release tablet Take 0.5 tablets by mouth daily 7/3/20  Yes Cindy Liu MD   furosemide (LASIX) 20 MG tablet take 1 tablet by mouth once daily 6/15/20  Yes Cindy Liu MD   cetirizine (ZYRTEC) 10 MG tablet Take 1 tablet by mouth nightly 5/14/20  Yes Jes Hamilton MD   pantoprazole (PROTONIX) 40 MG tablet Take 1 tablet by mouth every morning (before breakfast) 1/9/20  Yes Jes Hamilton MD   diclofenac sodium 1 % GEL Apply 4 g topically 4 times daily 9/10/19  Yes Miles Ortiz MD   sacubitril-valsartan [facility]) 49-51 MG per tablet Take 1 tablet by mouth 2 times daily 8/12/19  Yes John Cooper PA-C   acetaminophen (TYLENOL) 325 MG tablet Take 2 tablets by mouth every 6 hours 3/6/18  Yes Carline Chun MD   calcium-vitamin D ([place]) 500-200 MG-UNIT per tablet Take 1 tablet by mouth 2 times daily 3/6/18  Yes Carline Chun MD   aspirin 81 MG EC tablet Take 1 tablet by mouth daily Stop 5 days before any planned intervention for compression fractures 3/7/18  Yes Carline Chun MD   denosumab (PROLIA) 60 MG/ML SOSY SC injection Inject 1 mL into the skin once for 1 dose 6/5/20 6/5/20  Michelle Sparrow MD   cephALEXin (KEFLEX) 500 MG capsule Take 1 capsule by mouth 3 times daily 6/4/20   Miles Ortiz MD   magnesium 200 MG TABS tablet Take 200 mg by mouth daily    Historical Provider, MD       No results found for: LABA1C  No results found for: EAG    No components found for: CHLPL  Lab Results   Component Value Date    TRIG 91 01/08/2020    TRIG 137 05/31/2019    TRIG 149 03/24/2017     Lab Results   Component Value Date    HDL 40 01/08/2020    HDL 61 05/31/2019    HDL 58 03/24/2017     Lab Results   Component Value Date    LDLCALC 56 01/08/2020    [address] 90 05/31/2019    LDLCALC 65 03/24/2017     No results found for: LABVLDL      Chemistry        Component Value Date/Time     01/08/2020 0446    K 4.6 01/08/2020 0446    K 3.9 06/04/2019 0921     01/08/2020 0446    CO2 24 01/08/2020 0446    BUN 15 01/08/2020 0446    CREATININE 0.6 01/08/2020 0446        Component Value Date/Time    CALCIUM 8.6 01/08/2020 0446    ALKPHOS 43 01/07/2020 0220    AST 8 01/07/2020 0220    ALT <5 (L) 01/07/2020 0220    BILITOT 0.3 01/07/2020 0220    BILITOT NEGATIVE 06/08/2018 1205            Lab Results   Component Value Date    TSH 2.050 08/30/2019       Lab Results   Component Value Date    WBC 6.2 01/08/2020    HGB 11.4 (L) 01/08/2020    HCT 35.9 (L) 01/08/2020    MCV 95.2 01/08/2020     01/08/2020         Health Maintenance   Topic Date Due    Shingles Vaccine (1 of 2) 01/01/1989    Pneumococcal 65+ years Vaccine (1 of 1 - PPSV23) 01/01/2004    Flu vaccine (1) 09/01/2020    Lipid screen  01/08/2021    Potassium monitoring  01/08/2021    Creatinine monitoring  01/08/2021    Annual Wellness Visit (AWV)  04/25/2021    Colon cancer screen colonoscopy  06/08/2027    DTaP/Tdap/Td vaccine (2 - Td) 02/03/2028    DEXA (modify frequency per FRAX score)  Completed    Hepatitis A vaccine  Aged Out    Hepatitis B vaccine  Aged Out    Hib vaccine  Aged Out    Meningococcal (ACWY) vaccine  Aged [employer] History   Administered Date(s) Administered    Influenza Vaccine, unspecified formulation 10/19/2016    Influenza Virus Vaccine 11/29/2013, 10/08/2014, 11/02/2015    Influenza, MDCK Quadv, IM, PF (Flucelvax 4 yrs and older) 01/17/2020    Tdap (Boostrix, Adacel) 02/03/2018         Review of Systems   Constitutional: Negative. HENT: Negative. Respiratory: Negative. Cardiovascular: Negative. Gastrointestinal: Negative. Musculoskeletal: Negative. All other systems reviewed and are negative. Objective:   Physical Exam  Vitals signs and nursing note reviewed. Constitutional:       Appearance: She is well-developed. HENT:      Head: Normocephalic and atraumatic. Right Ear: Tympanic membrane normal.      Left Ear: Tympanic membrane normal.   Neck:      Musculoskeletal: Neck supple. Vascular: No carotid bruit. Cardiovascular:      Rate and Rhythm: Normal rate and regular rhythm. Heart sounds: Normal heart sounds. No murmur. Pulmonary:      Effort: Pulmonary effort is normal.      Breath sounds: Normal breath sounds. Abdominal:      General: Bowel sounds are normal.      Palpations: Abdomen is soft. Skin:     General: Skin is warm and dry.    Neurological:      Mental Status: She is alert and oriented to person, place, and time. Psychiatric:         Behavior: Behavior normal.         Assessment:       Diagnosis Orders   1. Coronary artery disease involving native coronary artery of native heart with angina pectoris ([facility]     2. Hyperlipidemia, unspecified hyperlipidemia type     3. Obstructive sleep apnea on CPAP     4. Essential hypertension     5. Chronic diastolic CHF (congestive heart failure) (HCC)     6. Age-related osteoporosis without current pathological fracture     7. Chronic obstructive pulmonary disease, unspecified COPD type ([facility]     8. Nonischemic cardiomyopathy ([facility]     9. S/P ICD (internal cardiac defibrillator) procedure     10. Obesity (BMI 30-39. 9)             Plan:      -  Chronic medical problems stable  -  Continue current medications  -  Follow up with specialists as scheduled  -  RTO  6 mos        Tori Jauregui DO

## 2020-09-01 ENCOUNTER — PROCEDURE VISIT (OUTPATIENT)
Dept: CARDIOLOGY CLINIC | Age: 80
End: 2020-09-01
Payer: MEDICARE

## 2020-09-01 PROCEDURE — 93296 REM INTERROG EVL PM/IDS: CPT | Performed by: NUCLEAR MEDICINE

## 2020-09-01 PROCEDURE — 93295 DEV INTERROG REMOTE 1/2/MLT: CPT | Performed by: NUCLEAR MEDICINE

## 2020-09-01 NOTE — PROGRESS NOTES
[facility] bi v icd  Battery 1.7yrs  A paced 22%  V paced >99%  p wave 0.6mV  r wave not performed  Atrial threshold 0.5v @ 0.5ms  Ventricle threshold 0.5V @ 0.5ms  lv threshold not performed  Atrial impedance 440ohms  Ventricle impedance 360ohms  lv impedance 450 ohms  Shock 43 ohms  Mode switch x2  A fib burden 0%  July 14th episode of ns vt  Rate 180 bpm 6 seconds -no therapies delivered  1 episode of what appears to be atrial flutter  4 seconds

## 2020-09-08 RX ORDER — LANOLIN ALCOHOL/MO/W.PET/CERES
200 CREAM (GRAM) TOPICAL DAILY
Qty: 45 TABLET | Refills: 3 | Status: SHIPPED | OUTPATIENT
Start: 2020-09-08 | End: 2021-02-12

## 2020-09-30 RX ORDER — ROSUVASTATIN CALCIUM 5 MG/1
TABLET, COATED ORAL
Qty: 90 TABLET | Refills: 3 | Status: SHIPPED | OUTPATIENT
Start: 2020-09-30 | End: 2021-09-27

## 2020-10-15 RX ORDER — ISOSORBIDE MONONITRATE 30 MG/1
TABLET, EXTENDED RELEASE ORAL
Qty: 45 TABLET | Refills: 3 | Status: SHIPPED | OUTPATIENT
Start: 2020-10-15 | End: 2021-09-27

## 2020-10-15 RX ORDER — METOPROLOL SUCCINATE 25 MG/1
12.5 TABLET, EXTENDED RELEASE ORAL DAILY
Qty: 45 TABLET | Refills: 3 | Status: SHIPPED | OUTPATIENT
Start: 2020-10-15 | End: 2021-09-27

## 2020-10-26 RX ORDER — FUROSEMIDE 20 MG/1
TABLET ORAL
Qty: 90 TABLET | Refills: 3 | Status: SHIPPED | OUTPATIENT
Start: 2020-10-26 | End: 2021-10-25 | Stop reason: SDUPTHER

## 2020-10-26 NOTE — TELEPHONE ENCOUNTER
Fax received from [facility] for refill of Furosemide 20mg to [facility] on [address].  Please refill if appropriate

## 2020-11-02 ENCOUNTER — HOSPITAL ENCOUNTER (OUTPATIENT)
Dept: PHYSICAL THERAPY | Age: 80
Setting detail: THERAPIES SERIES
Discharge: HOME | End: 2020-11-02
Payer: MEDICARE

## 2020-11-02 PROCEDURE — 95992 CANALITH REPOSITIONING PROC: CPT

## 2020-11-02 PROCEDURE — 97162 PT EVAL MOD COMPLEX 30 MIN: CPT

## 2020-11-02 NOTE — FLOWSHEET NOTE
of October 2020 started having dizziness again and not sure why. [facility] saw Dr Mary Ann Odom several weeks ago and he tried to get her crystals back in place but what he did put her in a lot of pain. Reports dizziness has not been as frequent since saw the doctor. [facility] was told to come to therapy to treat the remaining dizziness. Social/Functional History and Current Status:  Medications and Allergies have been reviewed and are listed on Medical History Questionnaire. Lilibeth Loving lives in assisted living in a single story home with a level surface to enter. Task Previous Current   ADLs  Assistance Required Assistance Required   IADL's Not Applicable Not Applicable   Ambulation Independent Independent   Transfers Independent Independent   Recreation Independent Independent   Community Integration Not Applicable Not Applicable   Driving Does not drive Does not drive   Work Retired  Retired     OBJECTIVE:    Pain: None   Palpation    Observation    Posture Fair       Range of Motion    Strength    Coordination    Sensation    Bed Mobility    Transfers    Ambulation Uses RW   Stairs    Balance Patient with several falls since 2018. Special Tests          TREATMENT   Precautions: None   Pain: None    X in shaded column indicates activity completed today   Modalities Parameters/  Location  Notes                     Manual Therapy Time/Technique  Notes                     Exercise/Intervention   Notes   T[place]-Hallpike: Negative to the right. Positive to the left without nystagmus symptoms lasting for 20 seconds   x    Roll test: Positive bilaterally without nystagmus symptoms lasting 15 seconds to right and 30 seconds to left   x Patient reported symptoms worse to the left. Treated with Appiani for left ear - 2 people needed.    Need to assess gaze stability as able                                                                 Specific Interventions Next Treatment: vestibular therapy for BPPV    Activity/Treatment Tolerance:  [x]  Patient tolerated treatment well  []  Patient limited by fatigue  []  Patient limited by pain   []  Patient limited by medical complications  []  Other:     Assessment: Patient with positive testing for BPPV for 3/4 tests. Symptoms worse with left ear in the horizontal canal today. Will continue to assess as and treat as needed until patient no longer has any positive testing. Body Structures/Functions/Activity Limitations: impaired activity tolerance and impaired balance  Prognosis: good    GOALS:  Patient Goal: To get the dizziness under control    Short Term Goals:  Time Frame: 8 weeks  See LTG      Long Term Goals:  Time Frame: 8 weeks   1) Patient to have negative [place]-Hallpike testing bilaterally to sit up from bed without any dizziness in the morning. 2) Patient to have negative Roll test bilaterally to look all directions without dizziness  3) Patient to have negative gaze stability testing to turn when walking without dizziness  4) Patient to have improved balance to prevent falls. Patient Education:   [x]  HEP/Education Completed: Plan of Care, Goals, 24 hour precautions   [facility] Access Code:  []  No new Education completed  []  Reviewed Prior HEP      [x]  Patient verbalized and/or demonstrated understanding of education provided. []  Patient unable to verbalize and/or demonstrate understanding of education provided. Will continue education. []  Barriers to learning: None    PLAN:  Treatment Recommendations: Balance Training, Neuromuscular Re-education and Vestibular Rehabilitation    [x]  Plan of care initiated. Plan to see patient 1 times per week for 8 weeks to address the treatment planned outlined above.   []  Continue with current plan of care  []  Modify plan of care as follows:    []  Hold pending physician visit  []  Discharge    Time In 1520   Time Out 1615   Timed Code Minutes: 8 min   Total Treatment Time: 55 min       Electronically

## 2020-11-09 ENCOUNTER — APPOINTMENT (OUTPATIENT)
Dept: PHYSICAL THERAPY | Age: 80
End: 2020-11-09
Payer: MEDICARE

## 2020-11-12 RX ORDER — CETIRIZINE HYDROCHLORIDE 10 MG/1
10 TABLET ORAL NIGHTLY
Qty: 90 TABLET | Refills: 3 | Status: SHIPPED | OUTPATIENT
Start: 2020-11-12 | End: 2021-11-15

## 2020-11-16 ENCOUNTER — HOSPITAL ENCOUNTER (OUTPATIENT)
Dept: PHYSICAL THERAPY | Age: 80
Setting detail: THERAPIES SERIES
Discharge: HOME | End: 2020-11-16
Payer: MEDICARE

## 2020-11-20 RX ORDER — SULFAMETHOXAZOLE AND TRIMETHOPRIM 800; 160 MG/1; MG/1
1 TABLET ORAL 2 TIMES DAILY
Qty: 6 TABLET | Refills: 0 | Status: SHIPPED | OUTPATIENT
Start: 2020-11-20 | End: 2020-11-22

## 2020-11-22 RX ORDER — CEFDINIR 300 MG/1
300 CAPSULE ORAL 2 TIMES DAILY
Qty: 20 CAPSULE | Refills: 0
Start: 2020-11-22 | End: 2020-12-02

## 2020-11-25 ENCOUNTER — HOSPITAL ENCOUNTER (OUTPATIENT)
Dept: PHYSICAL THERAPY | Age: 80
Setting detail: THERAPIES SERIES
Discharge: HOME | End: 2020-11-25
Payer: MEDICARE

## 2020-11-25 PROCEDURE — 97110 THERAPEUTIC EXERCISES: CPT

## 2020-11-25 PROCEDURE — 95992 CANALITH REPOSITIONING PROC: CPT

## 2020-11-25 NOTE — PROGRESS NOTES
[address] [facility]  PHYSICAL THERAPY  [] EVALUATION  [x] DAILY NOTE (LAND) [] DAILY NOTE (AQUATIC ) [] PROGRESS NOTE [] DISCHARGE NOTE    [x] OUTPATIENT REHABILITATION [facility]   [] [place] [address]    [] [facility]   [] [place]    Date: 2020  Patient Name:  Martha Mccann  : 1940  MRN: [id number]    Referring Practitioner Alfreida Boast, MD   Diagnosis Benign paroxysmal vertigo, right ear [H81.11]  History of falling [Z91.81]    Treatment Diagnosis Vertigo   Date of Evaluation 20    Additional Pertinent History See below      Functional Outcome Measure Used [facility]   Functional Outcome Score 12/100 (20)       Insurance: Primary: Payor: Braxton Palmer /  /  / ,   Secondary: [facility]   Authorization Information: Unlimited visits. Aquatics and modalities except Ionto and HP/CP covered. Telehealth covered. Visit # 2, 2/10 for progress note   Visits Allowed: Unlimited   Recertification Date:    Physician Follow-Up: One year follow up   Physician Orders:    History of Present Illness:      SUBJECTIVE: Patient reports her dizziness has been better. States still gets some dizziness when first sits up from laying down in bed but has not noticed any dizziness throughout the rest of the day. TREATMENT   Precautions: None   Pain: None    X in shaded column indicates activity completed today   Modalities Parameters/  Location  Notes                     Manual Therapy Time/Technique  Notes                     Exercise/Intervention   Notes   Mane-Hallpike: Positive to the right with nystagmus and symptoms lasting 30 seconds.    Patient felt like symptoms were coming on to the left but no spinning was produced   x Treated with Epley for right ear 1x - patient with increased back pain and had to sit up in middle of treatment then had to start treatment all over again   Roll test: Positive to the right without nystagmus and symptoms lasting <5 seconds. Patient reported felt like symptoms were coming on to the left but no spinning produced   x    Gaze stability: VOR, Smooth pursuit and Saccades - Negative for all testing    x                                                              Specific Interventions Next Treatment: vestibular therapy for BPPV    Activity/Treatment Tolerance:  [x]  Patient tolerated treatment well  []  Patient limited by fatigue  [x]  Patient limited by pain   []  Patient limited by medical complications  []  Other:     Assessment: Patient's back pain and fear significantly limit therapy testing and treatments. Patient moves very slowly and needs increased assist to get into and out of all positions. Patient had to stop treatment to sit up and relieve back pain 2x which elongated session and PT unsure how well able to actually treat patient due to having control her back pain and keep her head in the correct positions to get the greatest effect. Patient with improved testing to left today but not abolished. Patient with increased symptoms to right today with Mane-Hallpike. Will continue to test and treat until patient with all negative testing. May need to find a different position to relieve strain on back. Patient did have a large \"pop\" in back when laying still in supine for treatment with Epley. GOALS:  Patient Goal: To get the dizziness under control    Short Term Goals:  Time Frame: 8 weeks  See LTG      Long Term Goals:  Time Frame: 8 weeks   1) Patient to have negative Mane-Hallpike testing bilaterally to sit up from bed without any dizziness in the morning. 2) Patient to have negative Roll test bilaterally to look all directions without dizziness  3) Patient to have negative gaze stability testing to turn when walking without dizziness  4) Patient to have improved balance to prevent falls.       Patient Education:   [x]  HEP/Education Completed: Plan of Care, Goals, 24 hour precautions   [facility] Access Code:  []  No new Education completed  []  Reviewed Prior HEP      [x]  Patient verbalized and/or demonstrated understanding of education provided. []  Patient unable to verbalize and/or demonstrate understanding of education provided. Will continue education. []  Barriers to learning: None    PLAN:    [x]  Plan of care initiated. Plan to see patient 1 times per week for 8 weeks to address the treatment planned outlined above.   []  Continue with current plan of care  []  Modify plan of care as follows:    []  Hold pending physician visit  []  Discharge    Time In 0815   Time Out 0900   Timed Code Minutes: 45 min   Total Treatment Time: 45 min       Electronically Signed by: Melissa [place]ón, [place] [id number]

## 2020-12-03 ENCOUNTER — HOSPITAL ENCOUNTER (OUTPATIENT)
Dept: PHYSICAL THERAPY | Age: 80
Setting detail: THERAPIES SERIES
Discharge: HOME | End: 2020-12-03
Payer: MEDICARE

## 2020-12-03 PROCEDURE — 95992 CANALITH REPOSITIONING PROC: CPT

## 2020-12-03 PROCEDURE — 97110 THERAPEUTIC EXERCISES: CPT

## 2020-12-04 NOTE — PROGRESS NOTES
[address] [facility]  PHYSICAL THERAPY  [] EVALUATION  [x] DAILY NOTE (LAND) [] DAILY NOTE (AQUATIC ) [] PROGRESS NOTE [] DISCHARGE NOTE    [x] OUTPATIENT REHABILITATION [facility]   [] [place] [address]    [] [facility]   [] [place]    Date: 12/3/2020  Patient Name:  Silva Mathews  : 1940  MRN: [id number]    Referring Practitioner Quin Garcia MD   Diagnosis Benign paroxysmal vertigo, right ear [H81.11]  History of falling [Z91.81]    Treatment Diagnosis Vertigo   Date of Evaluation 20    Additional Pertinent History See below      Functional Outcome Measure Used [facility]   Functional Outcome Score 12100 (20)       Insurance: Primary: Payor: Ravin Priest /  /  / ,   Secondary: [facility]   Authorization Information: Unlimited visits. Aquatics and modalities except Ionto and HP/CP covered. Telehealth covered. Visit # 3, 3/10 for progress note   Visits Allowed: Unlimited   Recertification Date:    Physician Follow-Up: One year follow up   Physician Orders:    History of Present Illness:      SUBJECTIVE: Patient reports dizziness continues to get better but is still present. States spot in her back that \"popped\" at last session is still sore but no increased pain. States is wondering if should try some ice or anything else. States does not know if can get into position for testing and treatment today. TREATMENT   Precautions: None   Pain: Soreness right side low back    X in shaded column indicates activity completed today   Modalities Parameters/  Location  Notes                     Manual Therapy Time/Technique  Notes                     Exercise/Intervention   Notes   Mane-Hallpike: Positive to the right without nystagmus and symptoms lasting 10-15 seconds.    Patient positive to the left but no nystagmus and symptoms lasting 5-10 seconds   x    Roll test: Patient felt like symptoms were going to come but never did, just \"felt funny\"  Patient positive to the left without nystagmus but lasting 45 seconds   x Treated left ear with Appiani 2x   Gaze stability: VOR, Smooth pursuit and Saccades - Negative for all testing                                                                  Specific Interventions Next Treatment: vestibular therapy for BPPV    Activity/Treatment Tolerance:  [x]  Patient tolerated treatment well  []  Patient limited by fatigue  [x]  Patient limited by pain   []  Patient limited by medical complications  []  Other:     Assessment: Patient with less positive and lasting symptoms with testing to the right. Patient with increased symptoms to the left today and lasting longer. Tried new position for testing today to decrease strain on patient's back - had in bariatric room and inclined table back part way with 2 pillows under her back so could still extend her neck. Patient seemed to tolerate better with less complaints of back pain and no popping today. Patient continues to need therapy due to several positive tests for BPPV today. Sore spot on right side low back appeared to be some muscle tightness. GOALS:  Patient Goal: To get the dizziness under control    Short Term Goals:  Time Frame: 8 weeks  See LTG      Long Term Goals:  Time Frame: 8 weeks   1) Patient to have negative Mane-Hallpike testing bilaterally to sit up from bed without any dizziness in the morning. 2) Patient to have negative Roll test bilaterally to look all directions without dizziness  3) Patient to have negative gaze stability testing to turn when walking without dizziness  4) Patient to have improved balance to prevent falls. Patient Education:   [x]  HEP/Education Completed: Plan of Care, Goals, 24 hour precautions. Can use ice on back if feels good. Can try some muscle cream but not at same time as heat.    [facility] Access Code:  []  No new Education completed  [x]  Reviewed Prior HEP      [x]  Patient verbalized and/or demonstrated understanding of

## 2020-12-07 RX ORDER — TRAMADOL HYDROCHLORIDE 50 MG/1
50 TABLET ORAL EVERY 6 HOURS PRN
Qty: 120 TABLET | Refills: 0 | Status: SHIPPED | OUTPATIENT
Start: 2020-12-07 | End: 2020-12-09

## 2020-12-08 ENCOUNTER — NURSE ONLY (OUTPATIENT)
Dept: CARDIOLOGY CLINIC | Age: 80
End: 2020-12-08
Payer: MEDICARE

## 2020-12-08 PROCEDURE — 93284 PRGRMG EVAL IMPLANTABLE DFB: CPT | Performed by: NUCLEAR MEDICINE

## 2020-12-08 NOTE — PROGRESS NOTES
St [facility]el biv icd in office     . 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

## 2020-12-09 RX ORDER — TRAMADOL HYDROCHLORIDE 50 MG/1
25 TABLET ORAL EVERY 6 HOURS PRN
Qty: 60 TABLET | Refills: 0
Start: 2020-12-09 | End: 2021-04-08 | Stop reason: SDUPTHER

## 2020-12-11 ENCOUNTER — HOSPITAL ENCOUNTER (OUTPATIENT)
Dept: PHYSICAL THERAPY | Age: 80
Setting detail: THERAPIES SERIES
Discharge: HOME | End: 2020-12-11
Payer: MEDICARE

## 2020-12-11 PROCEDURE — 97110 THERAPEUTIC EXERCISES: CPT

## 2020-12-11 NOTE — PROGRESS NOTES
[facility]  PHYSICAL THERAPY  [] EVALUATION  [] DAILY NOTE (LAND) [] DAILY NOTE (AQUATIC ) [x] PROGRESS NOTE [] DISCHARGE NOTE    [x] OUTPATIENT REHABILITATION [facility]   [] [place] [address]    [] [facility]   [] Yuri Núñez    Date: 12/3/2020  Patient Name:  Elva Gitelman  : 1940  MRN: [id number]    Referring Practitioner Charlie Hernandez MD   Diagnosis Benign paroxysmal vertigo, right ear [H81.11]  History of falling [Z91.81]    Treatment Diagnosis Vertigo   Date of Evaluation 20    Additional Pertinent History See below      Functional Outcome Measure Used [facility]   Functional Outcome Score 12/100 (20) , 0/100 (2020)      Insurance: Primary: Payor: Sky Sepulveda /  /  / ,   Secondary: [facility]   Authorization Information: Unlimited visits. Aquatics and modalities except Ionto and HP/CP covered. Telehealth covered. Visit # 4, 4/10 for progress note   Visits Allowed: Unlimited   Recertification Date:    Physician Follow-Up: One year follow up   Physician Orders:    History of Present Illness:      SUBJECTIVE: Patient reports has not had any dizziness since last therapy session and states \"if she has, she has not noticed it. \" States right side low back is still sore but is not getting worse - sees doctor next week for injections. TREATMENT   Precautions: None   Pain: Soreness right side low back    X in shaded column indicates activity completed today   Modalities Parameters/  Location  Notes                     Manual Therapy Time/Technique  Notes                     Exercise/Intervention   Notes   Mane-Hallpike: Positive to the right without nystagmus and symptoms lasting 2-3 seconds.    Patient negative to the left   x    Roll test: Patient negative to the right  Patient felt like symptoms were going to come but did not for several seconds and then came and lasted for 15-20 seconds without nystagmus on the left    x    Gaze stability: VOR, Smooth pursuit and Saccades - Negative for all testing    x                                                              Specific Interventions Next Treatment: vestibular therapy for BPPV    Activity/Treatment Tolerance:  [x]  Patient tolerated treatment well  []  Patient limited by fatigue  [x]  Patient limited by pain   []  Patient limited by medical complications  []  Other:     Assessment: Patient has not had any dizziness with activity for the last week even though still had 2 positive tests today. Patient's back is still a major issue and problem and she cannot tolerate positions for testing and treatment of her dizziness, even with modified position used last 2 sessions. PT not able to fully test and treat due to her back so if having no daily symptoms and can keep her from increasing her back pain, then that will be the focus. Due to the fact that patient has not had any dizziness the last week, PT did not treat for positive tests today. Patient will be put on hold for one week and if no symptom return then will plan on discharging. If symptoms return then will get back in for therapy. GOALS:  Patient Goal: To get the dizziness under control    Short Term Goals:  Time Frame: 8 weeks  See LTG      Long Term Goals:  Time Frame: 8 weeks   1) Patient to have negative Mane-Hallpike testing bilaterally to sit up from bed without any dizziness in the morning.   [] Goal Met [x] Goal Not Met [x] Continue Goal [] Discontinue Goal  [] Revise Goal  Goal Assessment: Patient with positive test to the right but non nystagmus  2) Patient to have negative Roll test bilaterally to look all directions without dizziness  [] Goal Met [x] Goal Not Met [x] Continue Goal [] Discontinue Goal  [] Revise Goal  Goal Assessment: Patient with positive testing to the left without nystagmus  3) Patient to have negative gaze stability testing to turn when walking without dizziness  [x] Goal Met [] Goal Not Met [] Continue Goal [x] Discontinue Goal  [] Revise Goal  4) Patient to have improved balance to prevent falls. [x] Goal Met [] Goal Not Met [] Continue Goal [x] Discontinue Goal  [] Revise Goal      Patient Education:   [x]  HEP/Education Completed: Monitor any return of symptoms and call. If no symptoms then will call patient next week with possible discharge.  [employer] Access Code:  []  No new Education completed  []  Reviewed Prior HEP      [x]  Patient verbalized and/or demonstrated understanding of education provided. []  Patient unable to verbalize and/or demonstrate understanding of education provided. Will continue education. []  Barriers to learning: None    PLAN:    []  Plan of care initiated. Plan to see patient 1 times per week for 8 weeks to address the treatment planned outlined above.   []  Continue with current plan of care  [x]  Modify plan of care as follows: Patient on hold for one week and PT to call.   []  Hold pending physician visit  []  Discharge    Time In 1035   Time Out 1115   Timed Code Minutes: 40 min   Total Treatment Time: 40 min       Electronically Signed by: [place] [id number]

## 2020-12-14 ENCOUNTER — OFFICE VISIT (OUTPATIENT)
Dept: PULMONOLOGY | Age: 80
End: 2020-12-14
Payer: MEDICARE

## 2020-12-14 VITALS
HEART RATE: 80 BPM | HEIGHT: 65 IN | BODY MASS INDEX: 36.42 KG/M2 | DIASTOLIC BLOOD PRESSURE: 76 MMHG | OXYGEN SATURATION: 91 % | SYSTOLIC BLOOD PRESSURE: 132 MMHG | TEMPERATURE: 97.7 F | WEIGHT: 218.6 LBS

## 2020-12-14 PROCEDURE — G8399 PT W/DXA RESULTS DOCUMENT: HCPCS | Performed by: NURSE PRACTITIONER

## 2020-12-14 PROCEDURE — G8484 FLU IMMUNIZE NO ADMIN: HCPCS | Performed by: NURSE PRACTITIONER

## 2020-12-14 PROCEDURE — G8427 DOCREV CUR MEDS BY ELIG CLIN: HCPCS | Performed by: NURSE PRACTITIONER

## 2020-12-14 PROCEDURE — G8417 CALC BMI ABV UP PARAM F/U: HCPCS | Performed by: NURSE PRACTITIONER

## 2020-12-14 PROCEDURE — 1090F PRES/ABSN URINE INCON ASSESS: CPT | Performed by: NURSE PRACTITIONER

## 2020-12-14 PROCEDURE — 1036F TOBACCO NON-USER: CPT | Performed by: NURSE PRACTITIONER

## 2020-12-14 PROCEDURE — 4040F PNEUMOC VAC/ADMIN/RCVD: CPT | Performed by: NURSE PRACTITIONER

## 2020-12-14 PROCEDURE — 99213 OFFICE O/P EST LOW 20 MIN: CPT | Performed by: NURSE PRACTITIONER

## 2020-12-14 PROCEDURE — 1123F ACP DISCUSS/DSCN MKR DOCD: CPT | Performed by: NURSE PRACTITIONER

## 2020-12-14 ASSESSMENT — ENCOUNTER SYMPTOMS
VOMITING: 0
COUGH: 0
SHORTNESS OF BREATH: 0
DIARRHEA: 0
STRIDOR: 0
CHEST TIGHTNESS: 0
NAUSEA: 0
WHEEZING: 0

## 2020-12-14 NOTE — PROGRESS NOTES
[facility]      [facility]         [id number]  12/14/2020   Chief Complaint   Patient presents with    Follow-up     1yr sleep follow up, [address] Pt of Dr. Earnest Wright     PAP Download:   Original or initial AHI: 39.5     Date of initial study: 5/7/03      Compliant  90%     Noncompliant 10 %     PAP Type CPAP Level  9.5cmH2O   Avg Hrs/Day 7hrs 11min   AHI: 6.5   Recorded compliance dates , 11/9/20-12/8/20  Machine/Mfg:   [] ResMed    [x] Respironics/Dreamstation   Interface:   [x] Nasal    [] Nasal pillows   [] FFM      Provider:      [x] [facility]     []Gee     [] Lu    [] Denisa Rousseau    [] Gilberto               [] [facility]      [] Adaptive    [] [address]      [] Other    Neck Size: 16  Mallampati Mallampati 4  ESS:  7  SAQLI: 83  Here is a scan of the most recent download:                                    Presentation:   Adriana Johnson presents for sleep medicine follow up for obstructive sleep apnea  Since the last visit, Adriana Johnson reports she is doing well on CPAP. Reports she feels more rested with using. Reports no issue with mask or pressure setting    Equipment issues: The pressure is  acceptable, the mask is acceptable     Sleep issues:  Do you feel better? Yes  More rested? Yes   Better concentration? yes    Progress History:   Since last visit any new medical issues? No  New ER or hospital visits? Not breathing related  Any new or changes in medicines? No  Any new sleep medicines?  No        Past Medical History:   Diagnosis Date    Arthritis     general    Breast CA ([facility] 12/03    right    CAD (coronary artery disease) 2009    stent in [place]    CHF (congestive heart failure) (HCC)     Closed compression fracture of first lumbar vertebra ([facility] 2/7/2018    Colon polyps 8/97; 3/11    COPD (chronic obstructive pulmonary disease) ([facility] [facility] 8/5/2017    Diverticulosis 1/06    Erosive gastritis 11/06    Esophageal stricture 03/2011 Three times, Dr. Anupama Levy Hyperlipidemia     Hypertension     Internal hemorrhoid     LUISA on CPAP     Osteopenia determined by x-ray     Osteoporosis of vertebra 2018 Per CT thoracolumbar    Pneumonia     Stress incontinence, female     Vertigo        Past Surgical History:   Procedure Laterality Date    ARM SURGERY Left 2018    plate in arm    BREAST LUMPECTOMY      wtih axillary dissection    CARDIAC DEFIBRILLATOR PLACEMENT  2018    Biventricular pacemaker ICD, Dr. Kimberly Salazar  3/2011    CORONARY ANGIOPLASTY WITH STENT PLACEMENT      DILATATION, ESOPHAGUS      HIP PINNING Left 2019    LEFT HIP INTERTAIN performed by Mame Dueñas MD at [facility] or [facility]    bleeding    JOINT REPLACEMENT Left     Shoulder    HI OFFICE/OUTPT VISIT,PROCEDURE ONLY N/A 2018    EGD DIL performed by Kylah Rivera MD at [address] [place] [address] OFFICE/OUTPT VISIT,PROCEDURE ONLY Left 2018    ORIF LEFT PERIPROSTHETIC DISTAL HUMERAL FRACTURE performed by Mame Dueñas MD at [place] [address] Right 2018    LEFT HUMERUS OPEN REDUCTION INTERNAL FIXATION performed by [facility]ame [facility]as MD at [facility]    UPPER GASTROINTESTINAL ENDOSCOPY Left 2018    EGD BIOPSY performed by Kylah Rivera MD at [facility]       Social History     Tobacco Use    Smoking status: Former Smoker     Packs/day: 0.50     Years: 40.00     Pack years: 20.00     Types: Cigarettes     Quit date: 1998     Years since quittin.9    Smokeless tobacco: Never Used   Substance Use Topics    Alcohol use:  Yes     Alcohol/week: 2.0 standard drinks     Types: 2 Standard drinks or equivalent per week     Comment: occasional     Drug use: No       Allergies   Allergen Reactions    Antivert [Meclizine] Other (See Comments)     confusion    Ciprofloxacin Itching  Neomycin     Pcn [Penicillins] Hives     Pt reports having reaction 50 years ago    Tizanidine     Zanaflex [Tizanidine Hcl] Other (See Comments)     Causes confusion    Levaquin [Levofloxacin] Itching     Benadryl was given for tx       Current Outpatient Medications   Medication Sig Dispense Refill    CPAP Machine MISC by Does not apply route Please change CPAP pressure to 11.5 with C flex of 3 cm H20. 1 each 0    traMADol (ULTRAM) 50 MG tablet Take 0.5 tablets by mouth every 6 hours as needed for Pain for up to 30 days. 60 tablet 0    cetirizine (ZYRTEC) 10 MG tablet Take 1 tablet by mouth nightly 90 tablet 3    furosemide (LASIX) 20 MG tablet take 1 tablet by mouth once daily 90 tablet 3    isosorbide mononitrate (IMDUR) 30 MG extended release tablet Take 1/2 tab PO QD 45 tablet 3    metoprolol succinate (TOPROL XL) 25 MG extended release tablet Take 0.5 tablets by mouth daily 45 tablet 3    rosuvastatin (CRESTOR) 5 MG tablet take 1 tablet by mouth once daily 90 tablet 3    magnesium oxide (MAG-OX) 400 (240 Mg) MG tablet Take 0.5 tablets by mouth daily 45 tablet 3    sacubitril-valsartan (ENTRESTO) 49-51 MG per tablet Take 1 tablet by mouth 2 times daily 180 tablet 3    pantoprazole (PROTONIX) 40 MG tablet Take 1 tablet by mouth every morning (before breakfast) 30 tablet 3    diclofenac sodium 1 % GEL Apply 4 g topically 4 times daily 1 Tube 3    acetaminophen (TYLENOL) 325 MG tablet Take 2 tablets by mouth every 6 hours 120 tablet 0    calcium-vitamin D (OSCAL-500) 500-200 MG-UNIT per tablet Take 1 tablet by mouth 2 times daily 30 tablet 3    aspirin 81 MG EC tablet Take 1 tablet by mouth daily Stop 5 days before any planned intervention for compression fractures 30 tablet 3    denosumab (PROLIA) 60 MG/ML SOSY SC injection Inject 1 mL into the skin once for 1 dose 1 mL 1     No current facility-administered medications for this visit.         Family History   Problem Relation Age of Onset  Heart Disease Mother     High Blood Pressure Mother     Cancer Father     Heart Disease Sister     Cancer Brother     Heart Disease Brother         Review of Systems -   Review of Systems   Constitutional: Positive for unexpected weight change. Negative for chills and fever. Gained 14 pounds in 8 months   Respiratory: Negative for cough, chest tightness, shortness of breath, wheezing and stridor. Cardiovascular: Positive for leg swelling. Negative for chest pain. Gastrointestinal: Negative for diarrhea, nausea and vomiting. Genitourinary: Negative for dysuria. Physical Exam:    BMI:  Body mass index is 36.94 kg/m². Wt Readings from Last 3 Encounters:   12/14/20 218 lb 9.6 oz (99.2 kg)   08/12/20 219 lb 12.8 oz (99.7 kg)   04/24/20 204 lb (92.5 kg)     Weight gained 14 lbs over 8 months  Vitals: /76 (Site: Left Upper Arm, Position: Sitting, Cuff Size: Medium Adult)   Pulse 80   Temp 97.7 °F (36.5 °C)   Ht 5' 4.5\" (1.638 m)   Wt 218 lb 9.6 oz (99.2 kg)   SpO2 91% Comment: on RA  BMI 36.94 kg/m²       Physical Exam  Vitals signs and nursing note reviewed. Constitutional:       General: She is not in acute distress. Appearance: She is well-developed. HENT:      Head: Normocephalic and atraumatic. Neck:      Musculoskeletal: Neck supple. Trachea: No tracheal deviation. Cardiovascular:      Rate and Rhythm: Normal rate and regular rhythm. Heart sounds: Normal heart sounds. No murmur. Pulmonary:      Effort: Pulmonary effort is normal. No respiratory distress. Breath sounds: Normal breath sounds. No stridor. No wheezing or rales. Chest:      Chest wall: No tenderness. Abdominal:      General: Bowel sounds are normal. There is no distension. Palpations: Abdomen is soft. Skin:     General: Skin is warm and dry. Capillary Refill: Capillary refill takes less than 2 seconds.    Neurological:

## 2020-12-17 NOTE — DISCHARGE SUMMARY
[facility]    Patient Name: Miroslava Michelle        CSN: [id number]   YOB: 1940  Gender: female  Aroldo Martinez MD,    Benign paroxysmal vertigo, right ear [H81.11]  History of falling [Z91.81] ,      Patient is discharged from Physical Therapy services at this time. See last note for details related to results of therapy and goal achievement. Reason for discharge: Patient came for re-assess last week reporting that had not had any dizziness with any activity for one week prior. Upon testing patient still had positive testing for BPPV with right Mane-Hallpike and left Roll test. Patient has a history of increased back pain and does not tolerate the positions for testing or treatment of her BPPV very well at all, even with PT adjusting position as able to make her more comfortable. Due to patient's increased back pain and her report of no dizziness PT did not treat her and put her on hold for one week. PT called patient today and she reported still no dizziness with any activity. Since patient has not had dizziness for two weeks with any activity, even though still had positive testing, and she cannot tolerate therapy positions she is being discharged at this time. Patient agreeable to decision. PT instructed her to call doctor if any return of symptoms.   Patient discharged as of 12-      [facility] [id number]: 12/17/2020

## 2020-12-23 ENCOUNTER — OFFICE VISIT (OUTPATIENT)
Dept: FAMILY MEDICINE CLINIC | Age: 80
End: 2020-12-23
Payer: MEDICARE

## 2020-12-23 VITALS
WEIGHT: 218.3 LBS | TEMPERATURE: 96.7 F | BODY MASS INDEX: 36.89 KG/M2 | OXYGEN SATURATION: 96 % | HEART RATE: 78 BPM | DIASTOLIC BLOOD PRESSURE: 66 MMHG | RESPIRATION RATE: 20 BRPM | SYSTOLIC BLOOD PRESSURE: 118 MMHG

## 2020-12-23 PROCEDURE — 1036F TOBACCO NON-USER: CPT | Performed by: FAMILY MEDICINE

## 2020-12-23 PROCEDURE — G8399 PT W/DXA RESULTS DOCUMENT: HCPCS | Performed by: FAMILY MEDICINE

## 2020-12-23 PROCEDURE — 1090F PRES/ABSN URINE INCON ASSESS: CPT | Performed by: FAMILY MEDICINE

## 2020-12-23 PROCEDURE — G8427 DOCREV CUR MEDS BY ELIG CLIN: HCPCS | Performed by: FAMILY MEDICINE

## 2020-12-23 PROCEDURE — G8484 FLU IMMUNIZE NO ADMIN: HCPCS | Performed by: FAMILY MEDICINE

## 2020-12-23 PROCEDURE — G8417 CALC BMI ABV UP PARAM F/U: HCPCS | Performed by: FAMILY MEDICINE

## 2020-12-23 PROCEDURE — 99214 OFFICE O/P EST MOD 30 MIN: CPT | Performed by: FAMILY MEDICINE

## 2020-12-23 PROCEDURE — 3023F SPIROM DOC REV: CPT | Performed by: FAMILY MEDICINE

## 2020-12-23 PROCEDURE — 4040F PNEUMOC VAC/ADMIN/RCVD: CPT | Performed by: FAMILY MEDICINE

## 2020-12-23 PROCEDURE — G8926 SPIRO NO PERF OR DOC: HCPCS | Performed by: FAMILY MEDICINE

## 2020-12-23 PROCEDURE — 1123F ACP DISCUSS/DSCN MKR DOCD: CPT | Performed by: FAMILY MEDICINE

## 2020-12-23 ASSESSMENT — ENCOUNTER SYMPTOMS
GASTROINTESTINAL NEGATIVE: 1
CHEST TIGHTNESS: 1
COUGH: 1
SHORTNESS OF BREATH: 0

## 2020-12-23 NOTE — PROGRESS NOTES
Chronic Disease Visit Information    BP Readings from Last 3 Encounters:   12/23/20 118/66   12/14/20 132/76   08/12/20 134/78          LDL Calculated (mg/dL)   Date Value   01/08/2020 56     HDL (mg/dL)   Date Value   01/08/2020 40     BUN (mg/dL)   Date Value   01/08/2020 15     CREATININE (mg/dL)   Date Value   01/08/2020 0.6     Glucose (mg/dL)   Date Value   01/08/2020 94            Have you changed or started any medications since your last visit including any over-the-counter medicines, vitamins, or herbal medicines? no   Are you having any side effects from any of your medications? -  no  Have you stopped taking any of your medications? Is so, why? -  no    Have you seen any other physician or provider since your last visit? No  Have you had any other diagnostic tests since your last visit? No  Have you been seen in the emergency room and/or had an admission to a hospital since we last saw you? No  Have you had your annual diabetic retinal (eye) exam? Yes - Records Requested  Have you had your routine dental cleaning in the past 6 months? Yes     Have you activated your [employer] account? If not, what are your barriers?  No:      Patient Care Team:  Vick Hugo DO as PCP - General (Family Medicine)  Vick Hugo DO as PCP - [facility] Provider         Medical History Review  Past Medical, Family, and Social History reviewed and does contribute to the patient presenting condition    Health Maintenance   Topic Date Due    Shingles Vaccine (1 of 2) 01/01/1989    Pneumococcal 65+ years Vaccine (1 of 1 - PPSV23) 01/01/2004    Lipid screen  01/08/2021    Potassium monitoring  01/08/2021    Creatinine monitoring  01/08/2021    Annual Wellness Visit (AWV)  04/25/2021    Colon cancer screen colonoscopy  06/08/2027    DTaP/Tdap/Td vaccine (2 - Td) 02/03/2028    DEXA (modify frequency per FRAX score)  Completed    Flu vaccine  Completed    Hepatitis A vaccine  Aged Out  Hepatitis B vaccine  Aged Out    Hib vaccine  Aged Out    Meningococcal (ACWY) vaccine  Aged Out

## 2020-12-23 NOTE — PROGRESS NOTES
Subjective:      Patient ID: Birdie Tellez is a 79 y.o. female. HPI:    Chief Complaint   Patient presents with    Other      follow up after having COVID back in november     Immunizations     discuss pneumovax 23      Pt here for follow up on her COVID dx. She was dx'd in early November. She had developed a mild cough at that time, no fevers. No chest tightness or increased SOB. No recent fevers. Still with some chest tightness and discomfort when she coughs. Cough is non-productive. Pt has a hx of CAD, denies exertional CP at this time. BPs and weight stable. BP Readings from Last 3 Encounters:   12/23/20 118/66   12/14/20 132/76   08/12/20 134/78     Wt Readings from Last 3 Encounters:   12/23/20 218 lb 4.8 oz (99 kg)   12/14/20 218 lb 9.6 oz (99.2 kg)   08/12/20 219 lb 12.8 oz (99.7 kg)     Recently seen by Pulm on the 14th, did not mention her tightness at that time. She does have a hx of COPD, stable.     Patient Active Problem List   Diagnosis    Hyperlipidemia    CAD (coronary artery disease) s/p PCI and stent Multilink 3 x 18  mm mid LAD- in 07/2009 at [facility]    LUISA on CPAP    COPD (chronic obstructive pulmonary disease) ([address]    S/P ICD (internal cardiac defibrillator) procedure    Nonischemic cardiomyopathy ([address]    Class 2 severe obesity due to excess calories with serious comorbidity in adult ([address]    Hypertension    Benign positional vertigo    Age-related osteoporosis without current pathological fracture    Chest pain    Hypocalcemia    Chronic diastolic CHF (congestive heart failure) ([address]     Past Surgical History:   Procedure Laterality Date    ARM SURGERY Left 11/27/2018    plate in arm    BREAST LUMPECTOMY  1/04    wtih axillary dissection    CARDIAC DEFIBRILLATOR PLACEMENT  01/08/2018    Biventricular pacemaker ICD, Dr. Betty Jenkins  3/2011   [facility]  2009  DILATATION, ESOPHAGUS  2011    HIP PINNING Left 8/20/2019    LEFT HIP INTERTAIN performed by Sangita Pollock MD at [address] or [address]    bleeding    JOINT REPLACEMENT Left     Shoulder    TX OFFICE/OUTPT VISIT,PROCEDURE ONLY N/A 8/2/2018    EGD DIL performed by Roslyn Liu MD at [address] [facility] OFFICE/OUTPT VISIT,PROCEDURE ONLY Left 11/26/2018    ORIF LEFT PERIPROSTHETIC DISTAL HUMERAL FRACTURE performed by Sangita Pollock MD at [place] [address] Right 2/12/2018    LEFT HUMERUS OPEN REDUCTION INTERNAL FIXATION performed by Sangita Pollock MD at [facility] Left 8/2/2018    EGD BIOPSY performed by Roslyn Liu MD at [facility] End[facility]     Prior to Admission medications    Medication Sig Start Date End Date Taking? Authorizing Provider   CPAP Machine MISC by Does not apply route Please change CPAP pressure to 11.5 with C flex of 3 cm H20. 12/14/20   Brett Altman, APRN - CNP   traMADol (ULTRAM) 50 MG tablet Take 0.5 tablets by mouth every 6 hours as needed for Pain for up to 30 days.  12/9/20 1/8/21  Jose J Doyle, DO   cetirizine (ZYRTEC) 10 MG tablet Take 1 tablet by mouth nightly 11/12/20   Jose J Doyle, DO   furosemide (LASIX) 20 MG tablet take 1 tablet by mouth once daily 10/26/20   Jose J Doyle, DO   isosorbide mononitrate (IMDUR) 30 MG extended release tablet Take 1/2 tab PO QD 10/15/20   Jose J Doyle, DO   metoprolol succinate (TOPROL XL) 25 MG extended release tablet Take 0.5 tablets by mouth daily 10/15/20   Jose J Doyle, DO   rosuvastatin (CRESTOR) 5 MG tablet take 1 tablet by mouth once daily 9/30/20   Jose J Doyle, DO   magnesium oxide (MAG-OX) 400 (240 Mg) MG tablet Take 0.5 tablets by mouth daily 9/8/20   Jose J Doyle, DO sacubitril-valsartan (ENTRESTO) 49-51 MG per tablet Take 1 tablet by mouth 2 times daily 8/24/20   Abdullahi Ashby DO   denosumab (PROLIA) 60 MG/ML SOSY SC injection Inject 1 mL into the skin once for 1 dose 6/5/20 6/5/20  Sheri Up MD   pantoprazole (PROTONIX) 40 MG tablet Take 1 tablet by mouth every morning (before breakfast) 1/9/20   Christal Pal MD   diclofenac sodium 1 % GEL Apply 4 g topically 4 times daily 9/10/19   Christal Pal MD   acetaminophen (TYLENOL) 325 MG tablet Take 2 tablets by mouth every 6 hours 3/6/18   Kunal Lawler MD   calcium-vitamin D (OSCAL-500) 500-200 MG-UNIT per tablet Take 1 tablet by mouth 2 times daily 3/6/18   Kunal Lawler MD   aspirin 81 MG EC tablet Take 1 tablet by mouth daily Stop 5 days before any planned intervention for compression fractures 3/7/18   Kunal Lawler MD         Review of Systems   Constitutional: Negative. HENT: Negative. Respiratory: Positive for cough and chest tightness (with cough only). Negative for shortness of breath. Cardiovascular: Negative. Negative for chest pain and palpitations. Gastrointestinal: Negative. Musculoskeletal: Negative. All other systems reviewed and are negative. Objective:   Physical Exam  Vitals signs and nursing note reviewed. Constitutional:       General: She is not in acute distress. Appearance: Normal appearance. She is well-developed. HENT:      Head: Normocephalic and atraumatic. Right Ear: Tympanic membrane normal.      Left Ear: Tympanic membrane normal.   Eyes:      Conjunctiva/sclera: Conjunctivae normal.   Neck:      Musculoskeletal: Neck supple. Cardiovascular:      Rate and Rhythm: Normal rate and regular rhythm. Heart sounds: Normal heart sounds. No murmur. Pulmonary:      Effort: Pulmonary effort is normal.      Breath sounds: Normal breath sounds. No wheezing, rhonchi or rales. Abdominal:      General: There is no distension.    Skin: General: Skin is warm and dry. Findings: No rash (on exposed surfaces). Neurological:      General: No focal deficit present. Mental Status: She is alert. Psychiatric:         Attention and Perception: Attention normal.         Mood and Affect: Mood normal.         Speech: Speech normal.         Behavior: Behavior normal. Behavior is cooperative. Thought Content: Thought content normal.         Judgment: Judgment normal.         Assessment:       Diagnosis Orders   1. Feeling of chest tightness  XR CHEST (2 VW)   2. History of 2019 novel coronavirus disease (COVID-19)     3. Coronary artery disease involving native coronary artery of native heart with angina pectoris ([address]     4. Chronic obstructive pulmonary disease, unspecified COPD type ([address]     5. Chronic diastolic CHF (congestive heart failure) ([address]     6.  Essential hypertension             Plan:      -  Chronic medical problems stable  -  Continue current medications  -  Follow up with specialists as scheduled  -  Will check CXR at this time due to lingering symptoms from COVID-19  -  RTO prn for now, will call with results and recommendations        Marcelo Alaniz DO

## 2021-01-11 ENCOUNTER — HOSPITAL ENCOUNTER (OUTPATIENT)
Dept: NURSING | Age: 81
Discharge: HOME | End: 2021-01-11
Payer: MEDICARE

## 2021-01-11 DIAGNOSIS — M81.0 AGE-RELATED OSTEOPOROSIS WITHOUT CURRENT PATHOLOGICAL FRACTURE: Primary | ICD-10-CM

## 2021-01-11 PROCEDURE — 6360000002 HC RX W HCPCS: Performed by: FAMILY MEDICINE

## 2021-01-11 PROCEDURE — 96372 THER/PROPH/DIAG INJ SC/IM: CPT

## 2021-01-11 RX ADMIN — DENOSUMAB 60 MG: 60 INJECTION SUBCUTANEOUS at 13:50

## 2021-01-11 NOTE — PROGRESS NOTES
1330  Pt ambulated into room with roller walker for prolia injection. Pt rights and responsibilities offered to patient. Patient states she had the covid vaccine on Friday. I called pharmacist to see if prolia would be okay to give. Ivette Mcqueen pharmacist called me back and said she could not find any issues with going ahead to give the prolia. She said to give it the farthest away from the covid injection. Patient was told what I was told and she agreed to go ahead and get the prolia injection. Injection given and pt tolerated well. D/c instructions explained and pt verbalized understanding. No other needs at this time. Pt ambulated out for d/c            __m__ Safety:       (Environmental)  ? [place] to environment  ? Ensure ID band is correct and in place/ allergy band as needed  ? Assess for fall risk  ? Initiate fall precautions as applicable (fall band, side rails, etc.)  ? Call light within reach  ? Bed in low position/ wheels locked    _m___ Pain:       ? Assess pain level and characteristics  ? Administer analgesics as ordered  ? Assess effectiveness of pain management and report to MD as needed    __m__ Knowledge Deficit:  ? Assess baseline knowledge  ? Provide teaching at level of understanding  ? Provide teaching via preferred learning method  ? Evaluate teaching effectiveness    __m__ Hemodynamic/Respiratory Status:       (Pre and Post Procedure Monitoring)  ? Assess/Monitor vital signs and LOC  ? Assess Baseline SpO2 prior to any sedation  ? Obtain weight/height  ? Assess vital signs/ LOC until patient meets discharge criteria  ?  Monitor procedure site and notify MD of any issues

## 2021-01-25 RX ORDER — PANTOPRAZOLE SODIUM 40 MG/1
40 TABLET, DELAYED RELEASE ORAL
Qty: 90 TABLET | Refills: 3 | Status: SHIPPED | OUTPATIENT
Start: 2021-01-25 | End: 2022-01-17 | Stop reason: SDUPTHER

## 2021-01-25 NOTE — TELEPHONE ENCOUNTER
Requested Prescriptions     Pending Prescriptions Disp Refills    pantoprazole (PROTONIX) 40 MG tablet 30 tablet 3     Sig: Take 1 tablet by mouth every morning (before breakfast)

## 2021-02-08 ENCOUNTER — OFFICE VISIT (OUTPATIENT)
Dept: CARDIOLOGY CLINIC | Age: 81
End: 2021-02-08
Payer: MEDICARE

## 2021-02-08 VITALS
DIASTOLIC BLOOD PRESSURE: 78 MMHG | SYSTOLIC BLOOD PRESSURE: 132 MMHG | WEIGHT: 212.6 LBS | BODY MASS INDEX: 35.42 KG/M2 | HEIGHT: 65 IN | HEART RATE: 84 BPM

## 2021-02-08 DIAGNOSIS — I10 ESSENTIAL HYPERTENSION: ICD-10-CM

## 2021-02-08 DIAGNOSIS — I50.32 CHRONIC DIASTOLIC CHF (CONGESTIVE HEART FAILURE) (HCC): Primary | ICD-10-CM

## 2021-02-08 DIAGNOSIS — I25.10 CORONARY ARTERY DISEASE INVOLVING NATIVE CORONARY ARTERY OF NATIVE HEART WITHOUT ANGINA PECTORIS: ICD-10-CM

## 2021-02-08 PROCEDURE — G8399 PT W/DXA RESULTS DOCUMENT: HCPCS | Performed by: NUCLEAR MEDICINE

## 2021-02-08 PROCEDURE — 4040F PNEUMOC VAC/ADMIN/RCVD: CPT | Performed by: NUCLEAR MEDICINE

## 2021-02-08 PROCEDURE — G8427 DOCREV CUR MEDS BY ELIG CLIN: HCPCS | Performed by: NUCLEAR MEDICINE

## 2021-02-08 PROCEDURE — G8484 FLU IMMUNIZE NO ADMIN: HCPCS | Performed by: NUCLEAR MEDICINE

## 2021-02-08 PROCEDURE — G8417 CALC BMI ABV UP PARAM F/U: HCPCS | Performed by: NUCLEAR MEDICINE

## 2021-02-08 PROCEDURE — 1090F PRES/ABSN URINE INCON ASSESS: CPT | Performed by: NUCLEAR MEDICINE

## 2021-02-08 PROCEDURE — 93000 ELECTROCARDIOGRAM COMPLETE: CPT | Performed by: NUCLEAR MEDICINE

## 2021-02-08 PROCEDURE — 1123F ACP DISCUSS/DSCN MKR DOCD: CPT | Performed by: NUCLEAR MEDICINE

## 2021-02-08 PROCEDURE — 99213 OFFICE O/P EST LOW 20 MIN: CPT | Performed by: NUCLEAR MEDICINE

## 2021-02-08 PROCEDURE — 1036F TOBACCO NON-USER: CPT | Performed by: NUCLEAR MEDICINE

## 2021-02-08 RX ORDER — TRAMADOL HYDROCHLORIDE 50 MG/1
25 TABLET ORAL EVERY 6 HOURS PRN
COMMUNITY
End: 2021-08-12

## 2021-02-08 NOTE — PROGRESS NOTES
[address] [facility] [address]  [address] [place] [id number]  Dept: [phone]  Dept Fax: [phone]  Loc: [phone]    Visit Date: 2/8/2021    Renu Malin is a 79 y.o. female who presents todayfor:  Chief Complaint   Patient presents with    Check-Up    Coronary Artery Disease    Cardiomyopathy    Hypertension     Known CMP and ICD  No chest pain  No changes in breathing  Baseline dyspnea  BP is stable  No ICD shocks  No dizziness but has vertigo   No syncope      HPI:  HPI  Past Medical History:   Diagnosis Date    Arthritis     general    Breast CA ([facility] 12/03    right    CAD (coronary artery disease) 2009    stent in [place]    CHF (congestive heart failure) ([facility]     Closed compression fracture of first lumbar vertebra ([facility] 2/7/2018    Colon polyps 8/97; 3/11    COPD (chronic obstructive pulmonary disease) ([facility] 8/5/2017    Diverticulosis 1/06    Erosive gastritis 11/06    Esophageal stricture 03/2011    Three times, Dr. De [facility]    Hyperlipidemia     Hypertension     Internal hemorrhoid 1/06    LUISA on CPAP     Osteopenia determined by x-ray 1999    Osteoporosis of vertebra 6/2018 Per CT thoracolumbar    Pneumonia 2009    Stress incontinence, female     Vertigo       Past Surgical History:   Procedure Laterality Date    ARM SURGERY Left 11/27/2018    plate in arm    BREAST LUMPECTOMY  1/04    [facility] axillary dissection    CARDIAC DEFIBRILLATOR PLACEMENT  01/08/2018    Biventricular pacemaker ICD, Dr. Darian Issa  3/2011    CORONARY ANGIOPLASTY WITH STENT PLACEMENT  2009    DILATATION, ESOPHAGUS  2011    HIP PINNING Left 8/20/2019    LEFT HIP INTERTAIN performed by Jen Serrano MD at [address] [place] [address] or 1977    bleeding    JOINT REPLACEMENT Left     Shoulder    TX OFFICE/OUTPT VISIT,PROCEDURE ONLY N/A 8/2/2018 EGD DIL performed by Gregg Farley MD at [facility] OFFICE/OUTPT VISIT,PROCEDURE ONLY Left 2018    ORIF LEFT PERIPROSTHETIC DISTAL HUMERAL FRACTURE performed by Annabel La MD at [place] [address] Right 2018    LEFT HUMERUS OPEN REDUCTION INTERNAL FIXATION performed by Annabel La MD at [place] [address]  childhood    UPPER GASTROINTESTINAL ENDOSCOPY Left 2018    EGD BIOPSY performed by Gregg Farley MD at [facility]     Family History   Problem Relation Age of Onset    Heart Disease Mother     High Blood Pressure Mother     Cancer Father     Heart Disease Sister     Cancer Brother     Heart Disease Brother      Social History     Tobacco Use    Smoking status: Former Smoker     Packs/day: 0.50     Years: 40.00     Pack years: 20.00     Types: Cigarettes     Quit date: 1998     Years since quittin.1    Smokeless tobacco: Never Used   Substance Use Topics    Alcohol use: Yes     Alcohol/week: 2.0 standard drinks     Types: 2 Standard drinks or equivalent per week     Comment: occasional       Current Outpatient Medications   Medication Sig Dispense Refill    traMADol (ULTRAM) 50 MG tablet Take 25 mg by mouth every 6 hours as needed for Pain.       CRANBERRY PO Take 2 capsules by mouth 2 times daily      pantoprazole (PROTONIX) 40 MG tablet Take 1 tablet by mouth every morning (before breakfast) 90 tablet 3    CPAP Machine MISC by Does not apply route Please change CPAP pressure to 11.5 with C flex of 3 cm H20. 1 each 0    cetirizine (ZYRTEC) 10 MG tablet Take 1 tablet by mouth nightly 90 tablet 3    furosemide (LASIX) 20 MG tablet take 1 tablet by mouth once daily 90 tablet 3    isosorbide mononitrate (IMDUR) 30 MG extended release tablet Take 1/2 tab PO QD 45 tablet 3    metoprolol succinate (TOPROL XL) 25 MG extended release tablet Take 0.5 tablets by mouth daily 45 tablet 3  rosuvastatin (CRESTOR) 5 MG tablet take 1 tablet by mouth once daily 90 tablet 3    magnesium oxide (MAG-OX) 400 (240 Mg) MG tablet Take 0.5 tablets by mouth daily 45 tablet 3    sacubitril-valsartan (ENTRESTO) 49-51 MG per tablet Take 1 tablet by mouth 2 times daily 180 tablet 3    diclofenac sodium 1 % GEL Apply 4 g topically 4 times daily 1 Tube 3    acetaminophen (TYLENOL) 325 MG tablet Take 2 tablets by mouth every 6 hours 120 tablet 0    calcium-vitamin D (OSCAL-500) 500-200 MG-UNIT per tablet Take 1 tablet by mouth 2 times daily 30 tablet 3    aspirin 81 MG EC tablet Take 1 tablet by mouth daily Stop 5 days before any planned intervention for compression fractures 30 tablet 3    denosumab (PROLIA) 60 MG/ML SOSY SC injection Inject 1 mL into the skin once for 1 dose 1 mL 1     No current facility-administered medications for this visit.       Allergies   Allergen Reactions    Antivert [Meclizine] Other (See Comments)     confusion    Bactrim [Sulfamethoxazole-Trimethoprim]     Ciprofloxacin Itching    Neomycin     Pcn [Penicillins] Hives     Pt reports having reaction 50 years ago    Tizanidine     Zanaflex [Tizanidine Hcl] Other (See Comments)     Causes confusion    Levaquin [Levofloxacin] Itching     Benadryl was given for tx     Health Maintenance   Topic Date Due    Shingles Vaccine (1 of 2) 01/01/1989    Pneumococcal 65+ years Vaccine (1 of 1 - PPSV23) 01/01/2004    Lipid screen  01/08/2021    Potassium monitoring  01/08/2021    Creatinine monitoring  01/08/2021    COVID-19 Vaccine (2 of 2 - Pfizer series) 01/29/2021    Annual Wellness Visit (AWV)  04/25/2021    Colon cancer screen colonoscopy  06/08/2027    DTaP/Tdap/Td vaccine (2 - Td) 02/03/2028    DEXA (modify frequency per FRAX score)  Completed    Flu vaccine  Completed    Hepatitis A vaccine  Aged Out    Hepatitis B vaccine  Aged Out    Hib vaccine  Aged Out    Meningococcal (ACWY) vaccine  Aged Out Discussed use, benefit, and side effects of prescribed medications. All patient questions answered. Pt voicedunderstanding. Instructed to continue current medications, diet and exercise. Continue risk factor modification and medical management. Patient agreed with treatment plan. Follow up as directed.     Electronically signedby Florinda Javed MD on 2/8/2021 at 1:58 PM

## 2021-02-12 ENCOUNTER — OFFICE VISIT (OUTPATIENT)
Dept: FAMILY MEDICINE CLINIC | Age: 81
End: 2021-02-12
Payer: MEDICARE

## 2021-02-12 VITALS
BODY MASS INDEX: 35.81 KG/M2 | SYSTOLIC BLOOD PRESSURE: 120 MMHG | DIASTOLIC BLOOD PRESSURE: 68 MMHG | WEIGHT: 211.9 LBS | RESPIRATION RATE: 16 BRPM | TEMPERATURE: 97 F | HEART RATE: 68 BPM

## 2021-02-12 DIAGNOSIS — M81.0 AGE-RELATED OSTEOPOROSIS WITHOUT CURRENT PATHOLOGICAL FRACTURE: ICD-10-CM

## 2021-02-12 DIAGNOSIS — E66.01 CLASS 2 SEVERE OBESITY DUE TO EXCESS CALORIES WITH SERIOUS COMORBIDITY IN ADULT, UNSPECIFIED BMI (HCC): ICD-10-CM

## 2021-02-12 DIAGNOSIS — I25.119 CORONARY ARTERY DISEASE INVOLVING NATIVE CORONARY ARTERY OF NATIVE HEART WITH ANGINA PECTORIS (HCC): ICD-10-CM

## 2021-02-12 DIAGNOSIS — R19.5 LOOSE STOOLS: Primary | ICD-10-CM

## 2021-02-12 DIAGNOSIS — I10 ESSENTIAL HYPERTENSION: ICD-10-CM

## 2021-02-12 DIAGNOSIS — J44.9 CHRONIC OBSTRUCTIVE PULMONARY DISEASE, UNSPECIFIED COPD TYPE (HCC): ICD-10-CM

## 2021-02-12 DIAGNOSIS — K21.9 GASTROESOPHAGEAL REFLUX DISEASE, UNSPECIFIED WHETHER ESOPHAGITIS PRESENT: ICD-10-CM

## 2021-02-12 DIAGNOSIS — M46.1 SACROILIITIS, NOT ELSEWHERE CLASSIFIED (HCC): ICD-10-CM

## 2021-02-12 DIAGNOSIS — Z99.89 OBSTRUCTIVE SLEEP APNEA ON CPAP: ICD-10-CM

## 2021-02-12 DIAGNOSIS — I50.32 CHRONIC DIASTOLIC CHF (CONGESTIVE HEART FAILURE) (HCC): ICD-10-CM

## 2021-02-12 DIAGNOSIS — I42.8 NONISCHEMIC CARDIOMYOPATHY (HCC): ICD-10-CM

## 2021-02-12 DIAGNOSIS — E78.5 HYPERLIPIDEMIA, UNSPECIFIED HYPERLIPIDEMIA TYPE: ICD-10-CM

## 2021-02-12 DIAGNOSIS — G47.33 OBSTRUCTIVE SLEEP APNEA ON CPAP: ICD-10-CM

## 2021-02-12 DIAGNOSIS — R73.01 IFG (IMPAIRED FASTING GLUCOSE): ICD-10-CM

## 2021-02-12 PROCEDURE — 1036F TOBACCO NON-USER: CPT | Performed by: FAMILY MEDICINE

## 2021-02-12 PROCEDURE — 3023F SPIROM DOC REV: CPT | Performed by: FAMILY MEDICINE

## 2021-02-12 PROCEDURE — 1090F PRES/ABSN URINE INCON ASSESS: CPT | Performed by: FAMILY MEDICINE

## 2021-02-12 PROCEDURE — G8482 FLU IMMUNIZE ORDER/ADMIN: HCPCS | Performed by: FAMILY MEDICINE

## 2021-02-12 PROCEDURE — 1123F ACP DISCUSS/DSCN MKR DOCD: CPT | Performed by: FAMILY MEDICINE

## 2021-02-12 PROCEDURE — G8427 DOCREV CUR MEDS BY ELIG CLIN: HCPCS | Performed by: FAMILY MEDICINE

## 2021-02-12 PROCEDURE — G8417 CALC BMI ABV UP PARAM F/U: HCPCS | Performed by: FAMILY MEDICINE

## 2021-02-12 PROCEDURE — G8926 SPIRO NO PERF OR DOC: HCPCS | Performed by: FAMILY MEDICINE

## 2021-02-12 PROCEDURE — 99214 OFFICE O/P EST MOD 30 MIN: CPT | Performed by: FAMILY MEDICINE

## 2021-02-12 PROCEDURE — G8399 PT W/DXA RESULTS DOCUMENT: HCPCS | Performed by: FAMILY MEDICINE

## 2021-02-12 PROCEDURE — 4040F PNEUMOC VAC/ADMIN/RCVD: CPT | Performed by: FAMILY MEDICINE

## 2021-02-12 SDOH — ECONOMIC STABILITY: INCOME INSECURITY: HOW HARD IS IT FOR YOU TO PAY FOR THE VERY BASICS LIKE FOOD, HOUSING, MEDICAL CARE, AND HEATING?: NOT HARD AT ALL

## 2021-02-12 SDOH — ECONOMIC STABILITY: TRANSPORTATION INSECURITY
IN THE PAST 12 MONTHS, HAS THE LACK OF TRANSPORTATION KEPT YOU FROM MEDICAL APPOINTMENTS OR FROM GETTING MEDICATIONS?: NO

## 2021-02-12 SDOH — ECONOMIC STABILITY: TRANSPORTATION INSECURITY
IN THE PAST 12 MONTHS, HAS LACK OF TRANSPORTATION KEPT YOU FROM MEETINGS, WORK, OR FROM GETTING THINGS NEEDED FOR DAILY LIVING?: NO

## 2021-02-12 ASSESSMENT — PATIENT HEALTH QUESTIONNAIRE - PHQ9
1. LITTLE INTEREST OR PLEASURE IN DOING THINGS: 0
SUM OF ALL RESPONSES TO PHQ QUESTIONS 1-9: 0
2. FEELING DOWN, DEPRESSED OR HOPELESS: 0
SUM OF ALL RESPONSES TO PHQ QUESTIONS 1-9: 0
SUM OF ALL RESPONSES TO PHQ QUESTIONS 1-9: 0

## 2021-02-12 ASSESSMENT — ENCOUNTER SYMPTOMS
DIARRHEA: 1
RESPIRATORY NEGATIVE: 1

## 2021-02-12 NOTE — PROGRESS NOTES
CRANBERRY PO Take 2 capsules by mouth 2 times daily Yes Historical Provider, MD   pantoprazole (PROTONIX) 40 MG tablet Take 1 tablet by mouth every morning (before breakfast) Yes Cora Naylor DO   CPAP Machine MISC by Does not apply route Please change CPAP pressure to 11.5 with C flex of 3 cm H20.  Yes Unknown JADEN Harris - CNP   cetirizine (ZYRTEC) 10 MG tablet Take 1 tablet by mouth nightly Yes Cora Naylor, DO   furosemide (LASIX) 20 MG tablet take 1 tablet by mouth once daily Yes Cora Naylor, DO   isosorbide mononitrate (IMDUR) 30 MG extended release tablet Take 1/2 tab PO QD Yes Cora Naylor, DO   metoprolol succinate (TOPROL XL) 25 MG extended release tablet Take 0.5 tablets by mouth daily Yes Cora Naylor, DO   rosuvastatin (CRESTOR) 5 MG tablet take 1 tablet by mouth once daily Yes Coar Naylor DO   sacubitril-valsartan (ENTRESTO) 49-51 MG per tablet Take 1 tablet by mouth 2 times daily Yes Cora Naylor, DO   diclofenac sodium 1 % GEL Apply 4 g topically 4 times daily Yes Pa Caceres MD   acetaminophen (TYLENOL) 325 MG tablet Take 2 tablets by mouth every 6 hours Yes Herminia Chavez MD   calcium-vitamin D (OSCAL-500) 500-200 MG-UNIT per tablet Take 1 tablet by mouth 2 times daily Yes Herminia Chavez MD   aspirin 81 MG EC tablet Take 1 tablet by mouth daily Stop 5 days before any planned intervention for compression fractures Yes Herminia Chavez MD   denosumab (PROLIA) 60 MG/ML SOSY SC injection Inject 1 mL into the skin once for 1 dose  Liane Jordan MD        Allergies   Allergen Reactions    Antivert [Meclizine] Other (See Comments)     confusion    Bactrim [Sulfamethoxazole-Trimethoprim]     Ciprofloxacin Itching    Neomycin     Pcn [Penicillins] Hives     Pt reports having reaction 50 years ago    Tizanidine     Zanaflex [Tizanidine Hcl] Other (See Comments)     Causes confusion    Levaquin [Levofloxacin] Itching Benadryl was given for tx       Past Medical History:   Diagnosis Date    Arthritis     general    Breast CA ([facility] [address] 12/03    right    CAD (coronary artery disease) 2009    stent in [place]    CHF (congestive heart failure) (HCC)     Closed compression fracture of first lumbar vertebra ([facility] 2/7/2018    Colon polyps 8/97; 3/11    COPD (chronic obstructive pulmonary disease) ([facility] 8/5/2017    Diverticulosis 1/06    Erosive gastritis 11/06    Esophageal stricture 03/2011    Three times, Dr. Susi Osborn    Hyperlipidemia     Hypertension     Internal hemorrhoid 1/06    LUISA on CPAP     Osteopenia determined by x-ray 1999    Osteoporosis of vertebra 6/2018 Per CT thoracolumbar    Pneumonia 2009    Stress incontinence, female     Vertigo        Past Surgical History:   Procedure Laterality Date    ARM SURGERY Left 11/27/2018    plate in arm    BREAST LUMPECTOMY  1/04    wtih axillary dissection    CARDIAC DEFIBRILLATOR PLACEMENT  01/08/2018    Biventricular pacemaker ICD, Dr. Farhad Garcia  3/2011    CORONARY ANGIOPLASTY WITH STENT PLACEMENT  2009    DILATATION, ESOPHAGUS  2011    HIP PINNING Left 8/20/2019    LEFT HIP INTERTAIN performed by Amadeo Escobar MD at [address] [place] [place] [address] or [facility]    bleeding    JOINT REPLACEMENT Left     Shoulder    HI OFFICE/OUTPT VISIT,PROCEDURE ONLY N/A 8/2/2018    EGD DIL performed by Sarina Ackerman MD at [address] [place] [address] OFFICE/OUTPT VISIT,PROCEDURE ONLY Left 11/26/2018    ORIF LEFT PERIPROSTHETIC DISTAL HUMERAL FRACTURE performed by Amadeo Escobar MD at [place] [address] Right 2/12/2018    LEFT HUMERUS OPEN REDUCTION INTERNAL FIXATION performed by Amadeo Escobar MD at [address] Left 8/2/2018    EGD BIOPSY performed by Sarina Ackerman MD at [facility]       Social History     Socioeconomic History  Marital status:      Spouse name: Not on file    Number of children: 3    Years of education: 15    Highest education level: High school graduate   Occupational History    Not on file   Social Needs    Financial resource strain: Not hard at all   Anuel-Tracey insecurity     Worry: Never true     Inability: Never true   [employer] needs     Medical: No     Non-medical: No   Tobacco Use    Smoking status: Former Smoker     Packs/day: 0.50     Years: 40.00     Pack years: 20.00     Types: Cigarettes     Quit date: 1998     Years since quittin.1    Smokeless tobacco: Never Used   Substance and Sexual Activity    Alcohol use:  Yes     Alcohol/week: 2.0 standard drinks     Types: 2 Standard drinks or equivalent per week     Comment: occasional     Drug use: No    Sexual activity: Not Currently   Lifestyle    Physical activity     Days per week: Not on file     Minutes per session: Not on file    Stress: Not on file   Relationships    Social connections     Talks on phone: Not on file     Gets together: Not on file     Attends [employer] service: Not on file     Active member of club or organization: Not on file     Attends meetings of clubs or organizations: Not on file     Relationship status: Not on file    Intimate partner violence     Fear of current or ex partner: Not on file     Emotionally abused: Not on file     Physically abused: Not on file     Forced sexual activity: Not on file   Other Topics Concern    Not on file   Social History Narrative    Not on file        Family History   Problem Relation Age of Onset    Heart Disease Mother     High Blood Pressure Mother     Cancer Father     Heart Disease Sister     Cancer Brother     Heart Disease Brother        ADVANCE DIRECTIVE: N, <no information>    Vitals:    21 1011   BP: 120/68   Site: Left Upper Arm   Position: Sitting   Cuff Size: Large Adult   Pulse: 68   Resp: 16   Temp: 97 °F (36.1 °C)   TempSrc: Temporal Weight: 211 lb 14.4 oz (96.1 kg)     Estimated body mass index is 35.81 kg/m² as calculated from the following:    Height as of 2/8/21: 5' 4.5\" (1.638 m). Weight as of this encounter: 211 lb 14.4 oz (96.1 kg). Physical Exam  Vitals signs and nursing note reviewed. Constitutional:       General: She is not in acute distress. Appearance: Normal appearance. She is well-developed. HENT:      Head: Normocephalic and atraumatic. Right Ear: Tympanic membrane normal.      Left Ear: Tympanic membrane normal.   Eyes:      Conjunctiva/sclera: Conjunctivae normal.   Neck:      Musculoskeletal: Neck supple. Cardiovascular:      Rate and Rhythm: Normal rate and regular rhythm. Heart sounds: Normal heart sounds. No murmur. Pulmonary:      Effort: Pulmonary effort is normal.      Breath sounds: Normal breath sounds. No wheezing, rhonchi or rales. Abdominal:      General: There is no distension. Skin:     General: Skin is warm and dry. Findings: No rash (on exposed surfaces). Neurological:      General: No focal deficit present. Mental Status: She is alert. Psychiatric:         Attention and Perception: Attention normal.         Mood and Affect: Mood normal.         Speech: Speech normal.         Behavior: Behavior normal. Behavior is cooperative. Thought Content: Thought content normal.         Judgment: Judgment normal.         No flowsheet data found. Lab Results   Component Value Date    CHOL 114 01/08/2020    CHOL 178 05/31/2019    CHOL 153 03/24/2017    TRIG 91 01/08/2020    TRIG 137 05/31/2019    TRIG 149 03/24/2017    HDL 40 01/08/2020    HDL 61 05/31/2019    HDL 58 03/24/2017    LDLCALC 56 01/08/2020    LDLCALC 90 05/31/2019    LDLCALC 65 03/24/2017    GLUCOSE 94 01/08/2020       The ASCVD Risk score (Heladio Marte, et al., 2013) failed to calculate for the following reasons:     The 2013 ASCVD risk score is only valid for ages 36 to 78    Immunization History 13. Class 2 severe obesity due to excess calories with serious comorbidity in adult, unspecified BMI ([facility]    -  Chronic medical problems stable  -  Continue current medications with the exception of magnesium, question if this is causing her loose stools  -  Follow up with specialists as scheduled  -  Check labs, will call      Return in about 6 months (around 8/12/2021) for HTN. An electronic signature was used to authenticate this note.     --Karma Hall DO on 2/12/2021 at 10:48 AM

## 2021-02-12 NOTE — PATIENT INSTRUCTIONS
You may receive a survey about your visit with us today. The feedback from our patients helps us identify what is working well and where the service to all patients can be enhanced. Thank you! Patient Education        High Blood Pressure: Care Instructions  Overview     It's normal for blood pressure to go up and down throughout the day. But if it stays up, you have high blood pressure. Another name for high blood pressure is hypertension. Despite what a lot of people think, high blood pressure usually doesn't cause headaches or make you feel dizzy or lightheaded. It usually has no symptoms. But it does increase your risk of stroke, heart attack, and other problems. You and your doctor will talk about your risks of these problems based on your blood pressure. Your doctor will give you a goal for your blood pressure. Your goal will be based on your health and your age. Lifestyle changes, such as eating healthy and being active, are always important to help lower blood pressure. You might also take medicine to reach your blood pressure goal.  Follow-up care is a key part of your treatment and safety. Be sure to make and go to all appointments, and call your doctor if you are having problems. It's also a good idea to know your test results and keep a list of the medicines you take. How can you care for yourself at home? Medical treatment  · If you stop taking your medicine, your blood pressure will go back up. You may take one or more types of medicine to lower your blood pressure. Be safe with medicines. Take your medicine exactly as prescribed. Call your doctor if you think you are having a problem with your medicine. · Talk to your doctor before you start taking aspirin every day. Aspirin can help certain people lower their risk of a heart attack or stroke. But taking aspirin isn't right for everyone, because it can cause serious bleeding. · See your doctor regularly. You may need to see the doctor more often at first or until your blood pressure comes down. · If you are taking blood pressure medicine, talk to your doctor before you take decongestants or anti-inflammatory medicine, such as ibuprofen. Some of these medicines can raise blood pressure. · Learn how to check your blood pressure at home. Lifestyle changes  · Stay at a healthy weight. This is especially important if you put on weight around the waist. Losing even 10 pounds can help you lower your blood pressure. · If your doctor recommends it, get more exercise. Walking is a good choice. Bit by bit, increase the amount you walk every day. Try for at least 30 minutes on most days of the week. You also may want to swim, bike, or do other activities. · Avoid or limit alcohol. Talk to your doctor about whether you can drink any alcohol. · Try to limit how much sodium you eat to less than 2,300 milligrams (mg) a day. Your doctor may ask you to try to eat less than 1,500 mg a day. · Eat plenty of fruits (such as bananas and oranges), vegetables, legumes, whole grains, and low-fat dairy products. · Lower the amount of saturated fat in your diet. Saturated fat is found in animal products such as milk, cheese, and meat. Limiting these foods may help you lose weight and also lower your risk for heart disease. · Do not smoke. Smoking increases your risk for heart attack and stroke. If you need help quitting, talk to your doctor about stop-smoking programs and medicines. These can increase your chances of quitting for good. When should you call for help? Call  911 anytime you think you may need emergency care. This may mean having symptoms that suggest that your blood pressure is causing a serious heart or blood vessel problem. Your blood pressure may be over 180/120. For example, call 911 if:    · You have symptoms of a heart attack.  These may include: Care instructions adapted under license by [facility] ([facility]). If you have questions about a medical condition or this instruction, always ask your healthcare professional. [address] any warranty or liability for your use of this information.

## 2021-02-12 NOTE — PROGRESS NOTES
Chronic Disease Visit Information    BP Readings from Last 3 Encounters:   02/12/21 120/68   02/08/21 132/78   12/23/20 118/66          LDL Calculated (mg/dL)   Date Value   01/08/2020 56     HDL (mg/dL)   Date Value   01/08/2020 40     BUN (mg/dL)   Date Value   01/08/2020 15     CREATININE (mg/dL)   Date Value   01/08/2020 0.6     Glucose (mg/dL)   Date Value   01/08/2020 94            Have you changed or started any medications since your last visit including any over-the-counter medicines, vitamins, or herbal medicines? no   Are you having any side effects from any of your medications? -  no  Have you stopped taking any of your medications? Is so, why? -  no    Have you seen any other physician or provider since your last visit? Yes--Records obtained  Have you had any other diagnostic tests since your last visit? No  Have you been seen in the emergency room and/or had an admission to a hospital since we last saw you? No  Have you had your annual diabetic retinal (eye) exam? No  Have you had your routine dental cleaning in the past 6 months? No--has appt in April    Have you activated your [employer] account? If not, what are your barriers?  No: declines     Patient Care Team:  Jay Jay Hamlin, DO as PCP - General (Family Medicine)  Jay Jay Hamlin, DO as PCP - [facility] Provider         Medical History Review  Past Medical, Family, and Social History reviewed and does contribute to the patient presenting condition    Health Maintenance   Topic Date Due    Shingles Vaccine (1 of 2) 01/01/1989    Pneumococcal 65+ years Vaccine (1 of 1 - PPSV23) 01/01/2004    Lipid screen  01/08/2021    Potassium monitoring  01/08/2021    Creatinine monitoring  01/08/2021    Annual Wellness Visit (AWV)  04/25/2021    Colon cancer screen colonoscopy  06/08/2027    DTaP/Tdap/Td vaccine (2 - Td) 02/03/2028    DEXA (modify frequency per FRAX score)  Completed    Flu vaccine  Completed  COVID-19 Vaccine  Completed    Hepatitis A vaccine  Aged Out    Hepatitis B vaccine  Aged Out    Hib vaccine  Aged Out    Meningococcal (ACWY) vaccine  Aged Out

## 2021-02-23 LAB
AVERAGE GLUCOSE: 105
CHOLESTEROL, TOTAL: 143 MG/DL
CHOLESTEROL/HDL RATIO: 1.3
HBA1C MFR BLD: 5.3 %
HDLC SERPL-MCNC: 47 MG/DL (ref 35–70)
LDL CHOLESTEROL CALCULATED: 59 MG/DL (ref 0–160)
NONHDLC SERPL-MCNC: NORMAL MG/DL
TRIGL SERPL-MCNC: 184 MG/DL
TSH SERPL DL<=0.05 MIU/L-ACNC: 1.6 UIU/ML
VLDLC SERPL CALC-MCNC: 37 MG/DL

## 2021-02-25 NOTE — PROGRESS NOTES
Family Medicine Progress Notes/Coverage    Today's Date: 2/16/18  [id number]  Medical Record # [id number]  Account # [de-identified]      Ms. Christiano Cano admitted on 2/14/2018        Subjective / Interval History :     Constipated  BP is elevated  4/10 on the back  Reviewed notes, consults, laboratory and radiology results,    Objective:       Physical Exam:  Patient Vitals for the past 24 hrs:   BP Temp Temp src Pulse Resp SpO2 Weight   02/16/18 0807 (!) 182/80 98.3 °F (36.8 °C) Oral 76 16 92 % -   02/16/18 0041 - - - - - - 227 lb 1.6 oz (103 kg)   02/15/18 2155 (!) 178/87 - - - - - -   02/15/18 2151 (!) 183/88 98.3 °F (36.8 °C) Oral 76 16 93 % -       General Appearance:  Alert, cooperative, no distress, appears stated age   HEENT:  Neck:      Chest/Lungs:  Heart: Few crackles at bases , decrease BS  RRR   Abdomen:  Back: Soft  Tender at T12/L1 area   Extremities:  Neurological Exam: No edema  WNL       Assessment:     Active Hospital Problems    Diagnosis Date Noted    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 ([facility] [address] [S06.9X0A] 02/14/2018     Priority: High    Syncope and collapse [R55] 02/07/2018     Priority: High    Recurrent falls [R29.6] 02/07/2018     Priority: High    Closed fracture of shaft of left humerus [S42.302A] 02/07/2018     Priority: High    Closed head injury with concussion [S06.0X9A] 02/03/2018     Priority: High    Closed compression fracture of first lumbar vertebra ([facility] [address] [S32.010A] 02/07/2018     Priority: Medium    Traumatic ecchymosis of right upper arm [S40.021A] 02/03/2018     Priority: Medium    Hematoma of scalp [S00.03XA] 02/03/2018     Priority: Medium    Class 2 obesity due to excess calories with serious comorbidity and body mass index (BMI) of 35.0 to 35.9 in adult [E66.09, Z68.35] 02/03/2018     Priority: Medium    Contusion of Quality 111:Pneumonia Vaccination Status For Older Adults: Pneumococcal Vaccination Previously Received Detail Level: Detailed Quality 130: Documentation Of Current Medications In The Medical Record: Current Medications Documented Quality 110: Preventive Care And Screening: Influenza Immunization: Influenza Immunization previously received during influenza season Quality 47: Advance Care Plan: Advance Care Planning discussed and documented; advance care plan or surrogate decision maker documented in the medical record. Quality 226: Preventive Care And Screening: Tobacco Use: Screening And Cessation Intervention: Patient screened for tobacco use and is an ex/non-smoker

## 2021-04-05 ENCOUNTER — OFFICE VISIT (OUTPATIENT)
Dept: PULMONOLOGY | Age: 81
End: 2021-04-05
Payer: MEDICARE

## 2021-04-05 ENCOUNTER — PROCEDURE VISIT (OUTPATIENT)
Dept: CARDIOLOGY CLINIC | Age: 81
End: 2021-04-05
Payer: MEDICARE

## 2021-04-05 VITALS
WEIGHT: 217 LBS | HEART RATE: 80 BPM | BODY MASS INDEX: 36.15 KG/M2 | SYSTOLIC BLOOD PRESSURE: 122 MMHG | HEIGHT: 65 IN | TEMPERATURE: 97.8 F | DIASTOLIC BLOOD PRESSURE: 68 MMHG | OXYGEN SATURATION: 98 %

## 2021-04-05 DIAGNOSIS — Z95.810 S/P ICD (INTERNAL CARDIAC DEFIBRILLATOR) PROCEDURE: ICD-10-CM

## 2021-04-05 DIAGNOSIS — Z99.89 OSA ON CPAP: Primary | ICD-10-CM

## 2021-04-05 DIAGNOSIS — I50.32 CHRONIC DIASTOLIC CHF (CONGESTIVE HEART FAILURE) (HCC): Primary | ICD-10-CM

## 2021-04-05 DIAGNOSIS — G47.33 OSA ON CPAP: Primary | ICD-10-CM

## 2021-04-05 PROCEDURE — 93296 REM INTERROG EVL PM/IDS: CPT | Performed by: NUCLEAR MEDICINE

## 2021-04-05 PROCEDURE — 99213 OFFICE O/P EST LOW 20 MIN: CPT | Performed by: NURSE PRACTITIONER

## 2021-04-05 PROCEDURE — 1123F ACP DISCUSS/DSCN MKR DOCD: CPT | Performed by: NURSE PRACTITIONER

## 2021-04-05 PROCEDURE — 4040F PNEUMOC VAC/ADMIN/RCVD: CPT | Performed by: NURSE PRACTITIONER

## 2021-04-05 PROCEDURE — 93295 DEV INTERROG REMOTE 1/2/MLT: CPT | Performed by: NUCLEAR MEDICINE

## 2021-04-05 PROCEDURE — 1036F TOBACCO NON-USER: CPT | Performed by: NURSE PRACTITIONER

## 2021-04-05 PROCEDURE — G8399 PT W/DXA RESULTS DOCUMENT: HCPCS | Performed by: NURSE PRACTITIONER

## 2021-04-05 PROCEDURE — G8427 DOCREV CUR MEDS BY ELIG CLIN: HCPCS | Performed by: NURSE PRACTITIONER

## 2021-04-05 PROCEDURE — G8417 CALC BMI ABV UP PARAM F/U: HCPCS | Performed by: NURSE PRACTITIONER

## 2021-04-05 PROCEDURE — 1090F PRES/ABSN URINE INCON ASSESS: CPT | Performed by: NURSE PRACTITIONER

## 2021-04-05 ASSESSMENT — ENCOUNTER SYMPTOMS
COUGH: 0
NAUSEA: 0
CHEST TIGHTNESS: 0
SHORTNESS OF BREATH: 0
STRIDOR: 0
WHEEZING: 0
DIARRHEA: 0
VOMITING: 0

## 2021-04-05 NOTE — PROGRESS NOTES
DR Rosana Benavides PT  [address] [facility] BIV ICD REMOTE   BATTERY 1.1 YRS REMAINING  ATRIAL IMPEDENCE 400  RV IMPEDENCE 340  LV IMPEDENCE 400  HV 40  P WAVES 1.2  RV WAVES 4.5  ATRIAL THRESHOLD PER THE DEVICE 0.5 @ 0.5  RV THRESHOLD PER THE DEVICE 0.5 @ 0.5  LV THRESHOLD NOT OBTAINED PER THE DEVICE     DDDR   A PACED 23%  BV PACED 98%  1 AMS EPISODE ON 3/5/21 FOR 8 SECONDS   MODE SWITCH <1%  AFIB BURDEN <1%  CORVUE WNL

## 2021-04-05 NOTE — PROGRESS NOTES
[facility]      Iris Briceno         [id number]  4/5/2021   Chief Complaint   Patient presents with    Follow-up     LUISA 4 month sleep follow up with  [facility] download        Pt of Dr. Marjan Davies     PAP Download:   Original or initial AHI: 39.5     Date of initial study: 5/7/2003      Compliant  76.7%     Noncompliant 23.3 %     PAP Type cpap Level  11.5 cmh2o    Avg Hrs/Day 6:32  AHI: 3.9   Recorded compliance dates , 3/2/21-3/31/21  Machine/Mfg:   [] ResMed    [x] Respironics/Dreamstation   Interface:   [x] Nasal    [] Nasal pillows   [] FFM      Provider:      [x] [facility]     []Gee     [] [employer]    [] Margarita Bustle    [x] [place]               [] [facility]      [] [employer]    [] [address]      [] Other    Neck Size: 16  Mallampati Mallampati 3  ESS:  4  SAQLI:  85    Here is a scan of the most recent download:            Presentation:   [address] St presents for sleep medicine follow up for obstructive sleep apnea  Since the last visit, Asiya Gave sleeping well tolerating pressure change well. Reports sleeping well and is able to fall sleep without much difficulty. Equipment issues: The pressure is  acceptable, the mask is acceptable     Sleep issues:  Do you feel better? Yes  More rested? Yes   Better concentration? yes    Progress History:   Since last visit any new medical issues? No  New ER or hospitlal visits? No  Any new or changes in medicines? No  Any new sleep medicines? No    Review of Systems -   Review of Systems   Constitutional: Negative for chills, fever and unexpected weight change. Respiratory: Negative for cough, chest tightness, shortness of breath, wheezing and stridor. Cardiovascular: Negative for chest pain and leg swelling. Gastrointestinal: Negative for diarrhea, nausea and vomiting. Genitourinary: Negative for dysuria. Physical Exam:    BMI:  Body mass index is 36.67 kg/m².     Wt Readings from Last 3 Encounters: 04/05/21 217 lb (98.4 kg)   02/12/21 211 lb 14.4 oz (96.1 kg)   02/08/21 212 lb 9.6 oz (96.4 kg)     Weight stable / unchanged  Vitals: /68 (Site: Left Upper Arm, Position: Sitting)   Pulse 80   Temp 97.8 °F (36.6 °C)   Ht 5' 4.5\" (1.638 m)   Wt 217 lb (98.4 kg)   SpO2 98% Comment: on ra  BMI 36.67 kg/m²       Physical Exam  Vitals signs and nursing note reviewed. Constitutional:       General: She is not in acute distress. Appearance: She is well-developed. HENT:      Head: Normocephalic and atraumatic. Neck:      Musculoskeletal: Neck supple. Trachea: No tracheal deviation. Cardiovascular:      Rate and Rhythm: Normal rate and regular rhythm. Heart sounds: Normal heart sounds. No murmur. Pulmonary:      Effort: Pulmonary effort is normal. No respiratory distress. Breath sounds: Normal breath sounds. No stridor. No wheezing or rales. Comments: Faint rales at bases  Chest:      Chest wall: No tenderness. Abdominal:      General: Bowel sounds are normal. There is no distension. Palpations: Abdomen is soft. Musculoskeletal:      Right lower leg: Edema present. Left lower leg: Edema present. Comments: +2   Skin:     General: Skin is warm and dry. Capillary Refill: Capillary refill takes less than 2 seconds. Neurological:      Mental Status: She is alert and oriented to person, place, and time. Psychiatric:         Behavior: Behavior normal.         Thought Content: Thought content normal.           ASSESSMENT/DIAGNOSIS     Diagnosis Orders   1. LUISA on CPAP  DME Order for CPAP as OP            Plan   Do you need any equipment today? Yes PAP supplies    - Advised to continue current positive airway pressure therapy with above described pressure.    - Advised to keep good compliance with current recommended pressure to get optimal results and clinical improvement  - Recommend 7-9 hours of sleep with PAP  - Instructed to call DME company regarding supplies if needed.   -Patient to call my office for earlier appointment if needed for worsening of sleep symptoms.   -Discussed weight loss  - Educated about my impression and plan. Patient verbalizes understanding.   We will see back in: 1 year with download     Information added by my medical assistant/LPN was reviewed today     Electronically signed by JADEN Razo CNP on 4/5/2021 at 9:36 AM

## 2021-04-08 DIAGNOSIS — G89.29 OTHER CHRONIC PAIN: ICD-10-CM

## 2021-04-08 RX ORDER — TRAMADOL HYDROCHLORIDE 50 MG/1
25 TABLET ORAL EVERY 6 HOURS PRN
Qty: 60 TABLET | Refills: 5 | Status: SHIPPED | OUTPATIENT
Start: 2021-04-08 | End: 2021-11-29 | Stop reason: SDUPTHER

## 2021-05-28 ENCOUNTER — PROCEDURE VISIT (OUTPATIENT)
Dept: CARDIOLOGY CLINIC | Age: 81
End: 2021-05-28
Payer: MEDICARE

## 2021-05-28 DIAGNOSIS — I50.32 CHRONIC DIASTOLIC CHF (CONGESTIVE HEART FAILURE) (HCC): Primary | ICD-10-CM

## 2021-05-28 PROCEDURE — G2066 INTER DEVC REMOTE 30D: HCPCS | Performed by: NUCLEAR MEDICINE

## 2021-05-28 PROCEDURE — 93297 REM INTERROG DEV EVAL ICPMS: CPT | Performed by: NUCLEAR MEDICINE

## 2021-06-15 ENCOUNTER — PROCEDURE VISIT (OUTPATIENT)
Dept: CARDIOLOGY CLINIC | Age: 81
End: 2021-06-15

## 2021-06-15 DIAGNOSIS — I50.32 CHRONIC DIASTOLIC CHF (CONGESTIVE HEART FAILURE) (HCC): Primary | ICD-10-CM

## 2021-06-15 PROCEDURE — 93297 REM INTERROG DEV EVAL ICPMS: CPT | Performed by: NUCLEAR MEDICINE

## 2021-06-15 PROCEDURE — G2066 INTER DEVC REMOTE 30D: HCPCS | Performed by: NUCLEAR MEDICINE

## 2021-06-15 NOTE — PROGRESS NOTES
DR Reji Fernandez PT   [address] [facility] CORVUE REMOTE   BATTERY 1 YR REMAINING  NO EPISODES   DHARA KATZL

## 2021-07-27 ENCOUNTER — PROCEDURE VISIT (OUTPATIENT)
Dept: CARDIOLOGY CLINIC | Age: 81
End: 2021-07-27
Payer: MEDICARE

## 2021-07-27 DIAGNOSIS — Z95.810 S/P ICD (INTERNAL CARDIAC DEFIBRILLATOR) PROCEDURE: Primary | ICD-10-CM

## 2021-07-27 DIAGNOSIS — M46.1 SACROILIITIS, NOT ELSEWHERE CLASSIFIED (HCC): ICD-10-CM

## 2021-07-27 PROCEDURE — 93296 REM INTERROG EVL PM/IDS: CPT | Performed by: NUCLEAR MEDICINE

## 2021-07-27 PROCEDURE — 93295 DEV INTERROG REMOTE 1/2/MLT: CPT | Performed by: NUCLEAR MEDICINE

## 2021-07-27 NOTE — PROGRESS NOTES
[facility] [facility] chanel biv icd     . Rigo Glorufus Battery longevity:  12 months   Presenting rhythm  AP biv paced     Atrial impedance 400  RV impedance 340    Shock 42    P wave sensing 1.4  R wave sensing 4.9    31 % atrial paced  97 % RV paced     Atrial threshold 0.5 V  at 0.5ms  RV threshold 0.625 V at 0.5ms  LV not measured   Mode switches   0

## 2021-08-12 ENCOUNTER — OFFICE VISIT (OUTPATIENT)
Dept: FAMILY MEDICINE CLINIC | Age: 81
End: 2021-08-12
Payer: MEDICARE

## 2021-08-12 VITALS
WEIGHT: 214.4 LBS | RESPIRATION RATE: 22 BRPM | BODY MASS INDEX: 35.72 KG/M2 | DIASTOLIC BLOOD PRESSURE: 76 MMHG | SYSTOLIC BLOOD PRESSURE: 128 MMHG | HEART RATE: 93 BPM | HEIGHT: 65 IN | OXYGEN SATURATION: 96 %

## 2021-08-12 DIAGNOSIS — Z00.00 ROUTINE GENERAL MEDICAL EXAMINATION AT A HEALTH CARE FACILITY: Primary | ICD-10-CM

## 2021-08-12 DIAGNOSIS — I25.119 CORONARY ARTERY DISEASE INVOLVING NATIVE CORONARY ARTERY OF NATIVE HEART WITH ANGINA PECTORIS (HCC): ICD-10-CM

## 2021-08-12 DIAGNOSIS — M81.0 AGE-RELATED OSTEOPOROSIS WITHOUT CURRENT PATHOLOGICAL FRACTURE: ICD-10-CM

## 2021-08-12 DIAGNOSIS — Z23 NEED FOR 23-POLYVALENT PNEUMOCOCCAL POLYSACCHARIDE VACCINE: ICD-10-CM

## 2021-08-12 DIAGNOSIS — J44.9 CHRONIC OBSTRUCTIVE PULMONARY DISEASE, UNSPECIFIED COPD TYPE (HCC): ICD-10-CM

## 2021-08-12 DIAGNOSIS — E78.5 HYPERLIPIDEMIA, UNSPECIFIED HYPERLIPIDEMIA TYPE: ICD-10-CM

## 2021-08-12 DIAGNOSIS — Z99.89 OBSTRUCTIVE SLEEP APNEA ON CPAP: ICD-10-CM

## 2021-08-12 DIAGNOSIS — I50.32 CHRONIC DIASTOLIC CHF (CONGESTIVE HEART FAILURE) (HCC): ICD-10-CM

## 2021-08-12 DIAGNOSIS — I10 ESSENTIAL HYPERTENSION: ICD-10-CM

## 2021-08-12 DIAGNOSIS — R60.0 LOWER LEG EDEMA: ICD-10-CM

## 2021-08-12 DIAGNOSIS — G47.33 OBSTRUCTIVE SLEEP APNEA ON CPAP: ICD-10-CM

## 2021-08-12 DIAGNOSIS — K21.9 GASTROESOPHAGEAL REFLUX DISEASE, UNSPECIFIED WHETHER ESOPHAGITIS PRESENT: ICD-10-CM

## 2021-08-12 PROCEDURE — 4040F PNEUMOC VAC/ADMIN/RCVD: CPT | Performed by: FAMILY MEDICINE

## 2021-08-12 PROCEDURE — 90732 PPSV23 VACC 2 YRS+ SUBQ/IM: CPT | Performed by: FAMILY MEDICINE

## 2021-08-12 PROCEDURE — G0439 PPPS, SUBSEQ VISIT: HCPCS | Performed by: FAMILY MEDICINE

## 2021-08-12 PROCEDURE — 1123F ACP DISCUSS/DSCN MKR DOCD: CPT | Performed by: FAMILY MEDICINE

## 2021-08-12 PROCEDURE — G0009 ADMIN PNEUMOCOCCAL VACCINE: HCPCS | Performed by: FAMILY MEDICINE

## 2021-08-12 ASSESSMENT — PATIENT HEALTH QUESTIONNAIRE - PHQ9
SUM OF ALL RESPONSES TO PHQ QUESTIONS 1-9: 0
SUM OF ALL RESPONSES TO PHQ QUESTIONS 1-9: 0
1. LITTLE INTEREST OR PLEASURE IN DOING THINGS: 0
2. FEELING DOWN, DEPRESSED OR HOPELESS: 0
SUM OF ALL RESPONSES TO PHQ QUESTIONS 1-9: 0
SUM OF ALL RESPONSES TO PHQ9 QUESTIONS 1 & 2: 0

## 2021-08-12 ASSESSMENT — LIFESTYLE VARIABLES
AUDIT-C TOTAL SCORE: 1
AUDIT TOTAL SCORE: 1
HAS A RELATIVE, FRIEND, DOCTOR, OR ANOTHER HEALTH PROFESSIONAL EXPRESSED CONCERN ABOUT YOUR DRINKING OR SUGGESTED YOU CUT DOWN: 0
HOW OFTEN DO YOU HAVE SIX OR MORE DRINKS ON ONE OCCASION: 0
HOW OFTEN DO YOU HAVE A DRINK CONTAINING ALCOHOL: 1
HOW OFTEN DURING THE LAST YEAR HAVE YOU HAD A FEELING OF GUILT OR REMORSE AFTER DRINKING: 0
HAVE YOU OR SOMEONE ELSE BEEN INJURED AS A RESULT OF YOUR DRINKING: 0
HOW OFTEN DURING THE LAST YEAR HAVE YOU NEEDED AN ALCOHOLIC DRINK FIRST THING IN THE MORNING TO GET YOURSELF GOING AFTER A NIGHT OF HEAVY DRINKING: 0
HOW OFTEN DURING THE LAST YEAR HAVE YOU FAILED TO DO WHAT WAS NORMALLY EXPECTED FROM YOU BECAUSE OF DRINKING: 0
HOW OFTEN DURING THE LAST YEAR HAVE YOU FOUND THAT YOU WERE NOT ABLE TO STOP DRINKING ONCE YOU HAD STARTED: 0
HOW OFTEN DURING THE LAST YEAR HAVE YOU BEEN UNABLE TO REMEMBER WHAT HAPPENED THE NIGHT BEFORE BECAUSE YOU HAD BEEN DRINKING: 0
HOW MANY STANDARD DRINKS CONTAINING ALCOHOL DO YOU HAVE ON A TYPICAL DAY: 0

## 2021-08-12 ASSESSMENT — ENCOUNTER SYMPTOMS
RESPIRATORY NEGATIVE: 1
GASTROINTESTINAL NEGATIVE: 1

## 2021-08-12 NOTE — PROGRESS NOTES
After obtaining consent, ABN and per orders of Dr. Savanna Winslow, injection of Pneumovax 23 0.5 ML given IM in Left deltoid by Lord Ryan CMA ([facility]). Patient instructed to report any adverse reaction to me immediately. Patient tolerated well.     Immunizations Administered     Name Date Dose Route    Pneumococcal Polysaccharide ([id number]) 8/12/2021 0.5 mL Intramuscular    Site: Deltoid- Left    Lot: [id number]    NDC: 6363-0277-89

## 2021-08-12 NOTE — PATIENT INSTRUCTIONS
Personalized Preventive Plan for Scar Cobos - 8/12/2021  Medicare offers a range of preventive health benefits. Some of the tests and screenings are paid in full while other may be subject to a deductible, co-insurance, and/or copay. Some of these benefits include a comprehensive review of your medical history including lifestyle, illnesses that may run in your family, and various assessments and screenings as appropriate. After reviewing your medical record and screening and assessments performed today your provider may have ordered immunizations, labs, imaging, and/or referrals for you. A list of these orders (if applicable) as well as your Preventive Care list are included within your After Visit Summary for your review. Other Preventive Recommendations:    · A preventive eye exam performed by an eye specialist is recommended every 1-2 years to screen for glaucoma; cataracts, macular degeneration, and other eye disorders. · A preventive dental visit is recommended every 6 months. · Try to get at least 150 minutes of exercise per week or 10,000 steps per day on a pedometer . · Order or download the FREE \"Exercise & Physical Activity: Your Everyday Guide\" from [employer]. Call 7-580.433.6222 or search [employer] online. · You need 3151-6658 mg of calcium and 8861-5269 IU of vitamin D per day. It is possible to meet your calcium requirement with diet alone, but a vitamin D supplement is usually necessary to meet this goal.  · When exposed to the sun, use a sunscreen that protects against both UVA and UVB radiation with an SPF of 30 or greater. Reapply every 2 to 3 hours or after sweating, drying off with a towel, or swimming. · Always wear a seat belt when traveling in a car. Always wear a helmet when riding a bicycle or motorcycle.

## 2021-08-12 NOTE — PROGRESS NOTES
2021    Juhi Sparks (:  1940) is a 79 y.o. female, here for a preventive medicine evaluation. Chief Complaint   Patient presents with   Dossie Monica Medicare AWV     AWV. Doing well overall. BPs and weight stable. BP Readings from Last 3 Encounters:   21 128/76   21 122/68   21 120/68     Wt Readings from Last 3 Encounters:   21 214 lb 6.4 oz (97.3 kg)   21 217 lb (98.4 kg)   21 211 lb 14.4 oz (96.1 kg)     Main concern today is ankles are always swollen. Weight remains stable. This is chronic for Tiz. No recent med changes. Patient Active Problem List   Diagnosis    Hyperlipidemia    CAD (coronary artery disease) s/p PCI and stent Multilink 3 x 18  mm mid LAD- in 2009 at [facility]    LUISA on CPAP    COPD (chronic obstructive pulmonary disease) ([address]    S/P ICD (internal cardiac defibrillator) procedure    Nonischemic cardiomyopathy ([address]    Class 2 severe obesity due to excess calories with serious comorbidity in adult ([address]    Hypertension    Benign positional vertigo    Age-related osteoporosis without current pathological fracture    Chest pain    Hypocalcemia    Chronic diastolic CHF (congestive heart failure) (HCC)    Sacroiliitis, not elsewhere classified ([address]       Review of Systems   Constitutional: Negative. HENT: Negative. Respiratory: Negative. Cardiovascular: Positive for leg swelling. Gastrointestinal: Negative. Musculoskeletal: Negative. All other systems reviewed and are negative. Prior to Visit Medications    Medication Sig Taking? Authorizing Provider   traMADol (ULTRAM) 50 MG tablet Take 0.5 tablets by mouth every 6 hours as needed for Pain for up to 180 days.  Yes [place] Curet, DO   CRANBERRY PO Take 2 capsules by mouth 2 times daily Yes Historical Provider, MD   pantoprazole (PROTONIX) 40 MG tablet Take 1 tablet by mouth every morning (before breakfast) Yes [place] Curet, DO CPAP Machine MISC by Does not apply route Please change CPAP pressure to 11.5 with C flex of 3 cm H20.  Yes Hersey Pallas Neumeier, APRN - CNP   cetirizine (ZYRTEC) 10 MG tablet Take 1 tablet by mouth nightly Yes Yolie Jones DO   furosemide (LASIX) 20 MG tablet take 1 tablet by mouth once daily Yes Yolie Jones DO   isosorbide mononitrate (IMDUR) 30 MG extended release tablet Take 1/2 tab PO QD Yes Yolie Jones DO   metoprolol succinate (TOPROL XL) 25 MG extended release tablet Take 0.5 tablets by mouth daily Yes Yolie Jones DO   rosuvastatin (CRESTOR) 5 MG tablet take 1 tablet by mouth once daily Yes Yolie Jones DO   sacubitril-valsartan (ENTRESTO) 49-51 MG per tablet Take 1 tablet by mouth 2 times daily Yes Yolie Jones DO   diclofenac sodium 1 % GEL Apply 4 g topically 4 times daily Yes Kemar Graham MD   acetaminophen (TYLENOL) 325 MG tablet Take 2 tablets by mouth every 6 hours Yes Charma Holstein, MD   calcium-vitamin D (OSCAL-500) 500-200 MG-UNIT per tablet Take 1 tablet by mouth 2 times daily Yes Charma Holstein, MD   aspirin 81 MG EC tablet Take 1 tablet by mouth daily Stop 5 days before any planned intervention for compression fractures Yes Charma Holstein, MD   denosumab (PROLIA) 60 MG/ML SOSY SC injection Inject 1 mL into the skin once for 1 dose  Braulio Myles MD        Allergies   Allergen Reactions    Antivert [Meclizine] Other (See Comments)     confusion    Bactrim [Sulfamethoxazole-Trimethoprim]     Ciprofloxacin Itching    Neomycin     Pcn [Penicillins] Hives     Pt reports having reaction 50 years ago    Tizanidine     Zanaflex [Tizanidine Hcl] Other (See Comments)     Causes confusion    Levaquin [Levofloxacin] Itching     Benadryl was given for tx       Past Medical History:   Diagnosis Date    Arthritis     general    [facility] 12/03    right    CAD (coronary artery disease) 2009    stent in [place]    [facility]est[facility]) [facility]     Closed compression fracture of first lumbar vertebra ([facility] 2/7/2018    Colon polyps 8/97; 3/11    COPD (chronic obstructive pulmonary disease) ([facility] 8/5/2017    Diverticulosis 1/06    Erosive gastritis 11/06    Esophageal stricture 03/2011    Three times, Dr. Sarah Dumont Hyperlipidemia     Hypertension     Internal hemorrhoid 1/06    LUISA on CPAP     Osteopenia determined by x-ray 1999    Osteoporosis of vertebra 6/2018 Per CT thoracolumbar    Pneumonia 2009    Stress incontinence, female     Vertigo        Past Surgical History:   Procedure Laterality Date    ARM SURGERY Left 11/27/2018    plate in arm    BREAST LUMPECTOMY  1/04    wtih axillary dissection    CARDIAC DEFIBRILLATOR PLACEMENT  01/08/2018    Biventricular pacemaker ICD, Dr. Dennise Newberry  3/2011    CORONARY ANGIOPLASTY WITH STENT PLACEMENT  2009    DILATATION, ESOPHAGUS  2011    HIP PINNING Left 8/20/2019    [facility] [address] [facility] [facility]    bleeding    JOINT REPLACEMENT Left     Shoulder    CA OFFICE/OUTPT VISIT,PROCEDURE ONLY N/A 8/2/2018    EGD DIL performed by Lucien Montes MD at [facility] OFFICE/OUTPT VISIT,PROCEDURE ONLY Left 11/26/2018    ORIF LEFT PERIPROSTHETIC DISTAL HUMERAL FRACTURE performed by Geo Simmons MD at [place] [address] Right 2/12/2018    LEFT HUMERUS OPEN REDUCTION INTERNAL FIXATION performed by Geo Simmons MD at [address] Left 8/2/2018    EGD BIOPSY performed by Lucien Montes MD at [facility]       Social History     Socioeconomic History    Marital status:       Spouse name: Not on file    Number of children: 4    Years of education: 15    Highest education level: High school graduate   Occupational History    Not on file   Tobacco Use    Smoking status: Former Smoker     Packs/day: 0.50     Years: 40.00     Pack years: 20.00     Types: Cigarettes     Quit date: 1998     Years since quittin.6    Smokeless tobacco: Never Used   Vaping Use    Vaping Use: Never used   Substance and Sexual Activity    Alcohol use: Yes     Alcohol/week: 2.0 standard drinks     Types: 2 Standard drinks or equivalent per week     Comment: occasional     Drug use: No    Sexual activity: Not Currently   Other Topics Concern    Not on file   Social History Narrative    Not on file     Social Determinants of Health     Financial Resource Strain: Low Risk     Difficulty of Paying Living Expenses: Not hard at all   Food Insecurity: No Food Insecurity    Worried About Running Out of Food in the Last Year: Never true    Radha of Food in the Last Year: Never true   Transportation Needs: No Transportation Needs    Lack of Transportation (Medical): No    Lack of Transportation (Non-Medical):  No   Physical Activity:     Days of Exercise per Week:     Minutes of Exercise per Session:    Stress:     Feeling of Stress :    Social Connections:     Frequency of Communication with Friends and Family:     Frequency of Social Gatherings with Friends and Family:     Attends Zoroastrianism Services:     Active Member of Clubs or Organizations:     Attends Club or Organization Meetings:     Marital Status:    Intimate Partner Violence:     Fear of Current or Ex-Partner:     Emotionally Abused:     Physically Abused:     Sexually Abused:         Family History   Problem Relation Age of Onset    Heart Disease Mother     High Blood Pressure Mother     Cancer Father     Heart Disease Sister     Cancer Brother     Heart Disease Brother        ADVANCE DIRECTIVE: N, <no information>    Vitals:    21 1034   BP: 128/76   Pulse: 93   Resp: 22   SpO2: 96%   Weight: 214 lb 6.4 oz (97.3 kg)   Height: 5' 4.5\" (1.638 m)     Estimated body mass index is 36.23 kg/m² as calculated from the following:    Height as of this encounter: 5' 4.5\" (1.638 m). Weight as of this encounter: 214 lb 6.4 oz (97.3 kg). Physical Exam  Vitals and nursing note reviewed. Constitutional:       General: She is not in acute distress. Appearance: Normal appearance. She is well-developed. HENT:      Head: Normocephalic and atraumatic. Right Ear: Tympanic membrane normal.      Left Ear: Tympanic membrane normal.   Eyes:      Conjunctiva/sclera: Conjunctivae normal.   Cardiovascular:      Rate and Rhythm: Normal rate and regular rhythm. Heart sounds: Normal heart sounds. No murmur heard. Pulmonary:      Effort: Pulmonary effort is normal.      Breath sounds: Normal breath sounds. No wheezing, rhonchi or rales. Abdominal:      General: There is no distension. Musculoskeletal:      Cervical back: Neck supple. Right lower leg: Edema (bl non-pitting edema ankles) present. Left lower leg: Edema present. Skin:     General: Skin is warm and dry. Findings: No rash (on exposed surfaces). Neurological:      General: No focal deficit present. Mental Status: She is alert. Psychiatric:         Attention and Perception: Attention normal.         Mood and Affect: Mood normal.         Speech: Speech normal.         Behavior: Behavior normal. Behavior is cooperative. Thought Content: Thought content normal.         Judgment: Judgment normal.         No flowsheet data found. Lab Results   Component Value Date    CHOL 143 02/23/2021    CHOL 114 01/08/2020    CHOL 178 05/31/2019    TRIG 184 02/23/2021    TRIG 91 01/08/2020    TRIG 137 05/31/2019    HDL 47 02/23/2021    HDL 40 01/08/2020    HDL 61 05/31/2019    LDLCALC 59 02/23/2021    LDLCALC 56 01/08/2020    LDLCALC 90 05/31/2019    GLUCOSE 94 01/08/2020    LABA1C 5.3 02/23/2021       The ASCVD Risk score (Nae Aquino., et al., 2013) failed to calculate for the following reasons:     The 2013 ASCVD risk score is only valid for ages 36 to 78    Immunization History   Administered Date(s) Administered    COVID-19, Pfizer, PF, 30mcg/0.3mL 01/08/2021, 01/29/2021    Influenza Vaccine, unspecified formulation 10/19/2016    Influenza Virus Vaccine 11/29/2013, 10/08/2014, 11/02/2015    Influenza, MDCK Quadv, IM, PF (Flucelvax 4 yrs and older) 01/17/2020, 10/15/2020    Pneumococcal Polysaccharide ([id number]) 08/12/2021    Tdap (Boostrix, Adacel) 02/03/2018       Health Maintenance   Topic Date Due    Shingles Vaccine (1 of 2) Never done   [employer] Visit (AWV)  Never done    Potassium monitoring  01/08/2021    Creatinine monitoring  01/08/2021    Flu vaccine (1) 09/01/2021    Lipid screen  02/23/2022    Colon cancer screen colonoscopy  06/08/2027    DTaP/Tdap/Td vaccine (2 - Td or Tdap) 02/03/2028    DEXA (modify frequency per FRAX score)  Completed    Pneumococcal 65+ years Vaccine  Completed    COVID-19 Vaccine  Completed    Hepatitis A vaccine  Aged Out    Hepatitis B vaccine  Aged Out    Hib vaccine  Aged Out    Meningococcal (ACWY) vaccine  Aged Out          ASSESSMENT/PLAN:  1. Routine general medical examination at a health care facility  2. Need for 23-polyvalent pneumococcal polysaccharide vaccine  -     Pneumococcal polysaccharide vaccine 23-valent greater than or equal to 1yo subcutaneous/IM  3. Lower leg edema  4. Coronary artery disease involving native coronary artery of native heart with angina pectoris ([address]  5. Chronic obstructive pulmonary disease, unspecified COPD type ([address]  6. Essential hypertension  7. Chronic diastolic CHF (congestive heart failure) ([address]  8. Hyperlipidemia, unspecified hyperlipidemia type  9. Age-related osteoporosis without current pathological fracture  10. Obstructive sleep apnea on CPAP  11.  Gastroesophageal reflux disease, unspecified whether esophagitis present    -  Chronic medical problems stable  -  Continue current medications  -  Follow up with specialists as scheduled  -  Pneumovax-23 today    Return in 6 months (on 2022) for CAD. An electronic signature was used to authenticate this note. --Lang Lopez DO on 2021 at 12:21 PM    Medicare Annual Wellness Visit  Name: Gabby Cheng Date: 2021   MRN: [id number] Sex: Female   Age: 79 y.o. Ethnicity: Non- / Non    : 1940 Race: White (non-)      Geri Gutierrez is here for Medicare AWV    Screenings for behavioral, psychosocial and functional/safety risks, and cognitive dysfunction are all negative except as indicated below. These results, as well as other patient data from the [address] form, are documented in Flowsheets linked to this Encounter. Allergies   Allergen Reactions    Antivert [Meclizine] Other (See Comments)     confusion    Bactrim [Sulfamethoxazole-Trimethoprim]     Ciprofloxacin Itching    Neomycin     Pcn [Penicillins] Hives     Pt reports having reaction 50 years ago    Tizanidine     Zanaflex [Tizanidine Hcl] Other (See Comments)     Causes confusion    Levaquin [Levofloxacin] Itching     Benadryl was given for tx         Prior to Visit Medications    Medication Sig Taking? Authorizing Provider   traMADol (ULTRAM) 50 MG tablet Take 0.5 tablets by mouth every 6 hours as needed for Pain for up to 180 days. Yes Lang Lopez DO   CRANBERRY PO Take 2 capsules by mouth 2 times daily Yes Historical Provider, MD   pantoprazole (PROTONIX) 40 MG tablet Take 1 tablet by mouth every morning (before breakfast) Yes Lang Lopez DO   CPAP Machine MISC by Does not apply route Please change CPAP pressure to 11.5 with C flex of 3 cm H20.  Yes Wayne Yulisa Altman APRN - CNP   cetirizine (ZYRTEC) 10 MG tablet Take 1 tablet by mouth nightly Yes Lang Lopez DO   furosemide (LASIX) 20 MG tablet take 1 tablet by mouth once daily Yes Lang Lopez DO   isosorbide mononitrate (IMDUR) 30 MG extended release tablet Take 1/2 tab PO QD Yes Jayjay Curet, DO   metoprolol succinate (TOPROL XL) 25 MG extended release tablet Take 0.5 tablets by mouth daily Yes Jayjay Curet, DO   rosuvastatin (CRESTOR) 5 MG tablet take 1 tablet by mouth once daily Yes Jayjay Curet, DO   sacubitril-valsartan (ENTRESTO) 49-51 MG per tablet Take 1 tablet by mouth 2 times daily Yes Jayjay Curet, DO   diclofenac sodium 1 % GEL Apply 4 g topically 4 times daily Yes Jamison Crane MD   acetaminophen (TYLENOL) 325 MG tablet Take 2 tablets by mouth every 6 hours Yes Roshni Keller MD   calcium-vitamin D (OSCAL-500) 500-200 MG-UNIT per tablet Take 1 tablet by mouth 2 times daily Yes Roshni Keller MD   aspirin 81 MG EC tablet Take 1 tablet by mouth daily Stop 5 days before any planned intervention for compression fractures Yes Roshni Keller MD   denosumab (PROLIA) 60 MG/ML SOSY SC injection Inject 1 mL into the skin once for 1 dose  Benji Sorto MD         Past Medical History:   Diagnosis Date    Arthritis     general    [facility] CA ([facility] 12/03    right    CAD (coronary artery disease) 2009    stent in [place]    CH[facility]congestive heart failure) ([facility])     Closed compression fracture of first lumbar vertebra ([facility] 2/7/2018    Colon polyps 8/97; 3/11    COPD (chronic obstructive pulmonary disease) ([facility] 8/5/2017    Diverticulosis 1/06    Erosive gastritis 11/06    Esophageal stricture 03/2011    Three times, Dr. Herminia Noyola    Hyperlipidemia     Hypertension     Internal hemorrhoid 1/06    LUISA on CPAP     Osteopenia determined by x-ray 1999    Osteoporosis of vertebra 6/2018 Per CT thoracolumbar    Pneumonia 2009    Stress incontinence, female     Vertigo        Past Surgical History:   Procedure Laterality Date    ARM SURGERY Left 11/27/2018    plate in arm    BREAST LUMPECTOMY  1/04    [facility] axillary dissection    CARDIAC DEFIBRILLATOR PLACEMENT  01/08/2018    Biventricular pacemaker ICD, Dr. Betty Jenkins  3/2011    CORONARY ANGIOPLASTY WITH STENT PLACEMENT  2009    DILATATION, ESOPHAGUS  2011    HIP PINNING Left 8/20/2019    LEFT HIP INTERTAIN performed by Seamus Basurto MD at [facility] or 1977    bleeding    JOINT REPLACEMENT Left     Shoulder    MS OFFICE/OUTPT VISIT,PROCEDURE ONLY N/A 8/2/2018    EGD DIL performed by Philip Bae MD at [address] [place] [address] OFFICE/OUTPT VISIT,PROCEDURE ONLY Left 11/26/2018    ORIF LEFT PERIPROSTHETIC DISTAL HUMERAL FRACTURE performed by Seamus Basurto MD at [place] [address] Right 2/12/2018    LEFT HUMERUS OPEN REDUCTION INTERNAL FIXATION performed by [facility]to MD at [facility]    UPPER GASTROINTESTINAL ENDOSCOPY Left 8/2/2018    EGD BIOPSY performed by Philip Bae MD at [facility]         Family History   Problem Relation Age of Onset    Heart Disease Mother     High Blood Pressure Mother     Cancer Father     Heart Disease Sister     Cancer Brother     Heart Disease Brother        CareTeam (Including outside providers/suppliers regularly involved in providing care):   Patient Care Team:  Tesha Piedra DO as PCP - General (Family Medicine)  Tesha Piedra DO as PCP - [facility] Empaneled Provider    Wt Readings from Last 3 Encounters:   08/12/21 214 lb 6.4 oz (97.3 kg)   04/05/21 217 lb (98.4 kg)   02/12/21 211 lb 14.4 oz (96.1 kg)     Vitals:    08/12/21 1034   BP: 128/76   Pulse: 93   Resp: 22   SpO2: 96%   Weight: 214 lb 6.4 oz (97.3 kg)   Height: 5' 4.5\" (1.638 m)     Body mass index is 36.23 kg/m². Based upon direct observation of the patient, evaluation of cognition reveals recent and remote memory intact. Patient's complete Health Risk Assessment and screening values have been reviewed and are found in Flowsheets.  The following problems were reviewed today and where indicated follow up appointments were made and/or referrals ordered. Positive Risk Factor Screenings with Interventions:     Fall Risk:  Timed Up and Go Test > 12 seconds? (Complete if either Fall Risk answers are Yes): (!) yes  2 or more falls in past year?: no  Fall with injury in past year?: no  Fall Risk Interventions:    · Home safety tips provided  · Home exercises provided to promote strength and balance         Health Habits/Nutrition:  Health Habits/Nutrition  Do you exercise for at least 20 minutes 2-3 times per week?: (!) No  Have you lost any weight without trying in the past 3 months?: No  Do you eat only one meal per day?: No  Have you seen the dentist within the past year?: Appointment is scheduled  Body mass index: (!) 36.23  Health Habits/Nutrition Interventions:  · Inadequate physical activity:  patient is not ready to increase his/her physical activity level at this time      ADL:  ADLs  In the past 7 days, did you need help from others to perform any of the following everyday activities? Eating, dressing, grooming, bathing, toileting, or walking/balance?: (!) Walking/Balance  In the past 7 days, did you need help from others to take care of any of the following?  Laundry, housekeeping, banking/finances, shopping, telephone use, food preparation, transportation, or taking medications?: [employer], Housekeeping, Shopping, Transportation  ADL Interventions:  · Patient declines any further evaluation/treatment for this issue    Personalized Preventive Plan   Current Health Maintenance Status  Immunization History   Administered Date(s) Administered    COVID-19, Thurston Peter, PF, 30mcg/0.3mL 01/08/2021, 01/29/2021    Influenza Vaccine, unspecified formulation 10/19/2016    Influenza Virus Vaccine 11/29/2013, 10/08/2014, 11/02/2015    Influenza, MDCK Quadv, IM, PF (Flucelvax 4 yrs and older) 01/17/2020, 10/15/2020    Pneumococcal Polysaccharide ([id number]) 08/12/2021    Tdap (Boostrix, Adacel) 02/03/2018

## 2021-08-23 ENCOUNTER — PROCEDURE VISIT (OUTPATIENT)
Dept: CARDIOLOGY CLINIC | Age: 81
End: 2021-08-23
Payer: MEDICARE

## 2021-08-23 DIAGNOSIS — I50.32 CHRONIC DIASTOLIC CHF (CONGESTIVE HEART FAILURE) (HCC): Primary | ICD-10-CM

## 2021-08-23 DIAGNOSIS — Z95.810 S/P ICD (INTERNAL CARDIAC DEFIBRILLATOR) PROCEDURE: ICD-10-CM

## 2021-08-23 PROCEDURE — G2066 INTER DEVC REMOTE 30D: HCPCS | Performed by: NUCLEAR MEDICINE

## 2021-08-23 PROCEDURE — 93297 REM INTERROG DEV EVAL ICPMS: CPT | Performed by: NUCLEAR MEDICINE

## 2021-08-23 RX ORDER — ASPIRIN 81 MG/1
81 TABLET ORAL DAILY
Qty: 90 TABLET | Refills: 3 | Status: SHIPPED | OUTPATIENT
Start: 2021-08-23 | End: 2022-08-18 | Stop reason: SDUPTHER

## 2021-09-17 RX ORDER — NITROFURANTOIN 25; 75 MG/1; MG/1
100 CAPSULE ORAL 2 TIMES DAILY
Qty: 10 CAPSULE | Refills: 0 | Status: SHIPPED | OUTPATIENT
Start: 2021-09-17 | End: 2021-09-22

## 2021-09-27 ENCOUNTER — PROCEDURE VISIT (OUTPATIENT)
Dept: CARDIOLOGY CLINIC | Age: 81
End: 2021-09-27
Payer: MEDICARE

## 2021-09-27 DIAGNOSIS — I50.32 CHRONIC DIASTOLIC CHF (CONGESTIVE HEART FAILURE) (HCC): Primary | ICD-10-CM

## 2021-09-27 PROCEDURE — G2066 INTER DEVC REMOTE 30D: HCPCS | Performed by: NUCLEAR MEDICINE

## 2021-09-27 PROCEDURE — 93297 REM INTERROG DEV EVAL ICPMS: CPT | Performed by: NUCLEAR MEDICINE

## 2021-09-27 RX ORDER — METOPROLOL SUCCINATE 25 MG/1
TABLET, EXTENDED RELEASE ORAL
Qty: 45 TABLET | Refills: 3 | Status: SHIPPED | OUTPATIENT
Start: 2021-09-27 | End: 2022-09-28

## 2021-09-27 RX ORDER — ISOSORBIDE MONONITRATE 30 MG/1
TABLET, EXTENDED RELEASE ORAL
Qty: 45 TABLET | Refills: 3 | Status: SHIPPED | OUTPATIENT
Start: 2021-09-27 | End: 2022-09-28

## 2021-09-27 RX ORDER — ROSUVASTATIN CALCIUM 5 MG/1
TABLET, COATED ORAL
Qty: 90 TABLET | Refills: 3 | Status: SHIPPED | OUTPATIENT
Start: 2021-09-27 | End: 2022-09-28

## 2021-09-27 NOTE — PROGRESS NOTES
DR Megan Durand PT   [facility] CORVUE REMOTE  BATTERY 11.7 MONTHS REMAINING    CORVUE WAS ELEVATED BUT NOW BACK TO NORMAL LIMITS

## 2021-10-25 ENCOUNTER — HOSPITAL ENCOUNTER (EMERGENCY)
Age: 81
Discharge: HOME | End: 2021-10-25
Payer: MEDICARE

## 2021-10-25 VITALS
SYSTOLIC BLOOD PRESSURE: 128 MMHG | TEMPERATURE: 97.3 F | OXYGEN SATURATION: 95 % | HEART RATE: 74 BPM | RESPIRATION RATE: 18 BRPM | DIASTOLIC BLOOD PRESSURE: 62 MMHG

## 2021-10-25 DIAGNOSIS — I10 ESSENTIAL HYPERTENSION: ICD-10-CM

## 2021-10-25 DIAGNOSIS — N30.00 ACUTE CYSTITIS WITHOUT HEMATURIA: Primary | ICD-10-CM

## 2021-10-25 LAB
BILIRUBIN URINE: NEGATIVE
BLOOD, URINE: ABNORMAL
CHARACTER, URINE: CLEAR
COLOR: YELLOW
GLUCOSE URINE: NEGATIVE MG/DL
KETONES, URINE: NEGATIVE
LEUKOCYTE ESTERASE, URINE: ABNORMAL
NITRITE, URINE: NEGATIVE
PH UA: 6.5 (ref 5–9)
PROTEIN UA: NEGATIVE MG/DL
SPECIFIC GRAVITY UA: 1.01 (ref 1–1.03)
UROBILINOGEN, URINE: 0.2 EU/DL (ref 0.2–1)

## 2021-10-25 PROCEDURE — 99213 OFFICE O/P EST LOW 20 MIN: CPT

## 2021-10-25 PROCEDURE — 87186 SC STD MICRODIL/AGAR DIL: CPT

## 2021-10-25 PROCEDURE — 87077 CULTURE AEROBIC IDENTIFY: CPT

## 2021-10-25 PROCEDURE — 87086 URINE CULTURE/COLONY COUNT: CPT

## 2021-10-25 PROCEDURE — 99213 OFFICE O/P EST LOW 20 MIN: CPT | Performed by: NURSE PRACTITIONER

## 2021-10-25 PROCEDURE — 81003 URINALYSIS AUTO W/O SCOPE: CPT

## 2021-10-25 RX ORDER — FUROSEMIDE 20 MG/1
TABLET ORAL
Qty: 90 TABLET | Refills: 3 | Status: SHIPPED | OUTPATIENT
Start: 2021-10-25

## 2021-10-25 RX ORDER — NITROFURANTOIN 25; 75 MG/1; MG/1
100 CAPSULE ORAL 2 TIMES DAILY
Qty: 10 CAPSULE | Refills: 0 | Status: SHIPPED | OUTPATIENT
Start: 2021-10-25 | End: 2022-01-14 | Stop reason: SDUPTHER

## 2021-10-25 ASSESSMENT — ENCOUNTER SYMPTOMS
CONSTIPATION: 0
CHEST TIGHTNESS: 0
APNEA: 0
ABDOMINAL PAIN: 0
WHEEZING: 0
DIARRHEA: 0
VOMITING: 0
STRIDOR: 0
NAUSEA: 0
SHORTNESS OF BREATH: 0
COUGH: 0
CHOKING: 0

## 2021-10-25 NOTE — ED TRIAGE NOTES
Patient presents to [facility] by self from [place] [address] with complaints of UTI like symptoms.  Patient reports having frequent urination and discomfort and burning x2 days

## 2021-10-25 NOTE — TELEPHONE ENCOUNTER
Fax received from [facility] requesting a refill on the patients Lasix 20mg to go to [facility].  Order pended for your signature.

## 2021-10-25 NOTE — ED PROVIDER NOTES
[place] [address]  Urgent Care Encounter      CHIEF COMPLAINT       Chief Complaint   Patient presents with    Urinary Tract Infection       Nurses Notes reviewed and I agree except as noted in the HPI. HISTORY OFPRESENT ILLNESS   Yaa Brunson is a 79 y.o. The history is provided by the patient. No  was used. Dysuria   This is a recurrent (3rd time since moving into assisted living) problem. The current episode started 2 days ago. The problem occurs every urination. The problem has not changed since onset. The quality of the pain is described as burning. The pain is at a severity of 3/10. The pain is moderate. There has been no fever. She is not sexually active. There is no history of pyelonephritis. Associated symptoms include frequency and urgency. Pertinent negatives include no chills, no sweats, no nausea, no vomiting, no discharge, no hematuria, no hesitancy, no possible pregnancy and no flank pain. Associated symptoms comments: Stress incontinence  . She has tried increased fluids for the symptoms. Her past medical history is significant for recurrent UTIs. Her past medical history does not include kidney stones, single kidney, urological procedure, urinary stasis or catheterization. REVIEW OF SYSTEMS     Review of Systems   Constitutional: Negative for activity change, appetite change, chills, diaphoresis, fatigue, fever and unexpected weight change. Respiratory: Negative for apnea, cough, choking, chest tightness, shortness of breath, wheezing and stridor. Cardiovascular: Negative for chest pain, palpitations and leg swelling. Gastrointestinal: Negative for abdominal pain, constipation, diarrhea, nausea and vomiting. Genitourinary: Positive for dysuria, frequency and urgency.  Negative for decreased urine volume, difficulty urinating, dyspareunia, enuresis, flank pain, genital sores, hematuria, hesitancy, menstrual problem, pelvic pain, vaginal bleeding, vaginal discharge and vaginal pain. Neurological: Negative for dizziness, light-headedness and headaches. PAST MEDICAL HISTORY         Diagnosis Date    Arthritis     general    Breast CA ([facility] [address] 12/03    right    CAD (coronary artery disease) 2009    stent in [place]    CHF (congestive heart failure) (HCC)     Closed compression fracture of first lumbar vertebra ([facility] [address] 2/7/2018    Colon polyps 8/97; 3/11    COPD (chronic obstructive pulmonary disease) ([facility] [address] 8/5/2017    Diverticulosis 1/06    Erosive gastritis 11/06    Esophageal stricture 03/2011    Three times, Dr. Cullen Keller    Hyperlipidemia     Hypertension     Internal hemorrhoid 1/06    LUISA on CPAP     Osteopenia determined by x-ray 1999    Osteoporosis of vertebra 6/2018 Per CT thoracolumbar    Pneumonia 2009    Stress incontinence, female     Vertigo        SURGICAL HISTORY     Patient  has a past surgical history that includes Hysterectomy (1976 or 1977); Cholecystectomy (1993); Tonsillectomy (childhood); Coronary angioplasty with stent (2009); Breast lumpectomy (1/04); Colonoscopy (3/2011); joint replacement (Left); Dilatation, esophagus (2011); Cardiac defibrillator placement (01/08/2018); pr open fixatn mid humerus fracture (Right, 2/12/2018); pr office/outpt visit,procedure only (N/A, 8/2/2018); Upper gastrointestinal endoscopy (Left, 8/2/2018); pr office/outpt visit,procedure only (Left, 11/26/2018); Arm Surgery (Left, 11/27/2018); and hip pinning (Left, 8/20/2019).     CURRENT MEDICATIONS       Discharge Medication List as of 10/25/2021 12:46 PM      CONTINUE these medications which have NOT CHANGED    Details   rosuvastatin (CRESTOR) 5 MG tablet take 1 tablet by mouth once daily, Disp-90 tablet, R-3Normal      metoprolol succinate (TOPROL XL) 25 MG extended release tablet take 1/2 tablet by mouth once daily, Disp-45 tablet, R-3Normal      isosorbide mononitrate (IMDUR) 30 MG extended release tablet take 1/2 tablet by mouth once daily, Disp-45 tablet, R-3Normal      aspirin 81 MG EC tablet Take 1 tablet by mouth daily, Disp-90 tablet, R-3Normal      sacubitril-valsartan (ENTRESTO) 49-51 MG per tablet Take 1 tablet by mouth 2 times daily, Disp-180 tablet, R-3Normal      CRANBERRY PO Take 2 capsules by mouth 2 times dailyHistorical Med      pantoprazole (PROTONIX) 40 MG tablet Take 1 tablet by mouth every morning (before breakfast), Disp-90 tablet, R-3Normal      CPAP Machine MISC Starting Mon 12/14/2020, Disp-1 each, R-0, PrintPlease change CPAP pressure to 11.5 with C flex of 3 cm H20.      cetirizine (ZYRTEC) 10 MG tablet Take 1 tablet by mouth nightly, Disp-90 tablet,R-3Normal      furosemide (LASIX) 20 MG tablet take 1 tablet by mouth once daily, Disp-90 tablet,R-3Normal      denosumab (PROLIA) 60 MG/ML SOSY SC injection Inject 1 mL into the skin once for 1 dose, Disp-1 mL, R-1Print      diclofenac sodium 1 % GEL Apply 4 g topically 4 times daily, Topical, 4 TIMES DAILY Starting Tue 9/10/2019, Disp-1 Tube, R-3, Print      acetaminophen (TYLENOL) 325 MG tablet Take 2 tablets by mouth every 6 hours, Disp-120 tablet, R-0DC to SNF      calcium-vitamin D (OSCAL-500) 500-200 MG-UNIT per tablet Take 1 tablet by mouth 2 times daily, Disp-30 tablet, R-3DC to SNF             ALLERGIES     Patient is is allergic to antivert [meclizine], bactrim [sulfamethoxazole-trimethoprim], ciprofloxacin, neomycin, pcn [penicillins], tizanidine, zanaflex [tizanidine hcl], and levaquin [levofloxacin]. FAMILY HISTORY     Patient's family history includes Cancer in her brother and father; Heart Disease in her brother, mother, and sister; High Blood Pressure in her mother. SOCIAL HISTORY     Patient  reports that she quit smoking about 23 years ago. Her smoking use included cigarettes. She has a 20.00 pack-year smoking history. She has never used smokeless tobacco. She reports current alcohol use of about 2.0 standard drinks of alcohol per week.  She reports that she does not use drugs. PHYSICAL EXAM     ED TRIAGE VITALS  BP: 128/62, Temp: 97.3 °F (36.3 °C), Pulse: 74, Resp: 18, SpO2: 95 %  Physical Exam  Vitals and nursing note reviewed. Constitutional:       General: She is not in acute distress. Appearance: Normal appearance. She is obese. She is not ill-appearing, toxic-appearing or diaphoretic. HENT:      Head: Normocephalic and atraumatic. Right Ear: External ear normal.      Left Ear: External ear normal.   Eyes:      Extraocular Movements: Extraocular movements intact. Conjunctiva/sclera: Conjunctivae normal.   Pulmonary:      Effort: Pulmonary effort is normal.   Musculoskeletal:         General: Normal range of motion. Cervical back: Normal range of motion. Skin:     General: Skin is warm. Neurological:      General: No focal deficit present. Mental Status: She is alert and oriented to person, place, and time. Psychiatric:         Mood and Affect: Mood normal.         Behavior: Behavior normal.         Thought Content:  Thought content normal.         Judgment: Judgment normal.         DIAGNOSTIC RESULTS   Labs:  Results for orders placed or performed during the hospital encounter of 10/25/21   Urinalysis   Result Value Ref Range    Glucose, Ur Negative NEGATIVE mg/dl    Bilirubin Urine Negative NEGATIVE    Ketones, Urine Negative NEGATIVE    Specific Gravity, UA 1.015 1.002 - 1.030    Blood, Urine Trace-lysed NEGATIVE    pH, UA 6.50 5.0 - 9.0    Protein, UA Negative NEGATIVE mg/dl    Urobilinogen, Urine 0.20 0.2 - 1.0 eu/dl    Nitrite, Urine Negative NEGATIVE    Leukocyte Esterase, Urine Moderate (A) NEGATIVE    Color, UA Yellow STRAW-YELLOW    Character, Urine Clear CLEAR-SL CLOUD       IMAGING:  No orders to display     URGENT CARE COURSE:     Vitals:    10/25/21 1226   BP: 128/62   Pulse: 74   Resp: 18   Temp: 97.3 °F (36.3 °C)   TempSrc: Tympanic   SpO2: 95%       Medications - No data to display  PROCEDURES:  None  FINAL IMPRESSION      1. Acute cystitis without hematuria        DISPOSITION/PLAN   Decision To Discharge       Patient or Patient designated representative was advised to drink plenty of water or fluids and take medication as prescribed. The patient or Patient designated representative is advised to monitor for any changes in pain, development of high fever, chills, persistent vomiting, development of increasing back or flank pain or increase in hematuria the patient is advised to go to the emergency department for reevaluation and further follow-up if they wouldn't notice any of the above symptoms. The patient or Patient designated representative was also advised to follow up with family doctor or primary care provider after the antibiotic is completed for repeat urinalysis. The patient did verbalize understanding of discharge instructions and is agreeable to the treatment plan. The patient left ambulatory without any changes or concerns in stable condition.     PATIENT REFERRED TO:  [facility] [address], [address] [place] [address]  [phone]    Call   As needed    DISCHARGE MEDICATIONS:  Discharge Medication List as of 10/25/2021 12:46 PM      START taking these medications    Details   nitrofurantoin, macrocrystal-monohydrate, (MACROBID) 100 MG capsule Take 1 capsule by mouth 2 times daily for 5 days, Disp-10 capsule, R-0Normal           Discharge Medication List as of 10/25/2021 12:46 PM          JADEN Brito CNP, APRN - CNP  10/25/21 8899

## 2021-10-26 ENCOUNTER — TELEPHONE (OUTPATIENT)
Dept: FAMILY MEDICINE CLINIC | Age: 81
End: 2021-10-26

## 2021-10-26 LAB
ORGANISM: ABNORMAL
URINE CULTURE, ROUTINE: ABNORMAL

## 2021-10-26 NOTE — LETTER
[facility], [address]  Phone: [phone]  Fax: [phone]    October 26, 2021    [place] [address] [address] [facility] [address]      Dear Violeta Araujo,    Thank you for choosing our [facility] on 10/25/21. Your Provider wanted to make sure that you understand your discharge instructions and that you were able to fill any prescriptions that may have been ordered for you. Please contact the office at the above phone number if you were advised to follow up with your Provider, or if you have any further questions or needs. Also did you know -     [facility] ([facility]) practices can often offer you an appointment on the same day that you call for acute issues. *We have some [address] [facility] offices that offer Walk-in appointments; check our website for availability in your community, [email or web address].      *Evisits are now available for patients through [address] [facility] ([facility]) also offers video visits through [address] If you do not have MyChart and are interested, please contact the office and a staff member may assist you or go to [email or web address].     Sincerely,     Dante Carballo DO and your [facility]

## 2021-11-15 RX ORDER — CETIRIZINE HYDROCHLORIDE 10 MG/1
TABLET ORAL
Qty: 90 TABLET | Refills: 3 | Status: SHIPPED | OUTPATIENT
Start: 2021-11-15 | End: 2022-10-19

## 2021-11-19 ENCOUNTER — TELEPHONE (OUTPATIENT)
Dept: PULMONOLOGY | Age: 81
End: 2021-11-19

## 2021-11-19 NOTE — TELEPHONE ENCOUNTER
Calling regarding the recall from [employer]. Advised that it is her decision but the risks of not wearing it are greater than wearing it.

## 2021-11-29 DIAGNOSIS — G89.29 OTHER CHRONIC PAIN: ICD-10-CM

## 2021-11-29 RX ORDER — TRAMADOL HYDROCHLORIDE 50 MG/1
25 TABLET ORAL EVERY 6 HOURS PRN
Qty: 60 TABLET | Refills: 5 | Status: SHIPPED | OUTPATIENT
Start: 2021-11-29 | End: 2022-05-23 | Stop reason: SDUPTHER

## 2021-11-29 NOTE — TELEPHONE ENCOUNTER
Fax received from [facility] requesting refill of Ultram to [facility] on [facility] [facility].  Please refill if appropriate

## 2021-12-01 ENCOUNTER — PROCEDURE VISIT (OUTPATIENT)
Dept: CARDIOLOGY CLINIC | Age: 81
End: 2021-12-01
Payer: MEDICARE

## 2021-12-01 DIAGNOSIS — I50.32 CHRONIC DIASTOLIC CHF (CONGESTIVE HEART FAILURE) (HCC): Primary | ICD-10-CM

## 2021-12-01 PROCEDURE — G2066 INTER DEVC REMOTE 30D: HCPCS | Performed by: NUCLEAR MEDICINE

## 2021-12-01 PROCEDURE — 93297 REM INTERROG DEV EVAL ICPMS: CPT | Performed by: NUCLEAR MEDICINE

## 2021-12-01 NOTE — PROGRESS NOTES
Merlin [facility] Bigg BiV ICD [place]  Pt of Ino Ho WNL    Episodes  2 mode switches - ?afib - 4 & 8 seconds

## 2021-12-15 ENCOUNTER — NURSE ONLY (OUTPATIENT)
Dept: CARDIOLOGY CLINIC | Age: 81
End: 2021-12-15
Payer: MEDICARE

## 2021-12-15 DIAGNOSIS — Z95.810 S/P ICD (INTERNAL CARDIAC DEFIBRILLATOR) PROCEDURE: Primary | ICD-10-CM

## 2021-12-15 PROCEDURE — 93284 PRGRMG EVAL IMPLANTABLE DFB: CPT | Performed by: NUCLEAR MEDICINE

## 2021-12-15 NOTE — PROGRESS NOTES
St chanel biv icd in office   Known high LV thresholds   Elevated corvue  States she is feeling ok     . Debbie Leon Battery longevity:  7.8 months, will follow with merlin   Presenting rhythm  AS biv paced    Atrial impedance 41  RV impedance 340    Shock 42    P wave sensing 1.1  R wave sensing 4.8    39.9 % atrial paced  97 % RV paced     Atrial threshold 0.5 V  at 0.5ms  RV threshold 0.625 V at 0.5ms  LV 5.25 @ 1, amplitude increased to 5.5  Mode switches   0

## 2022-01-14 RX ORDER — NITROFURANTOIN 25; 75 MG/1; MG/1
100 CAPSULE ORAL 2 TIMES DAILY
Qty: 10 CAPSULE | Refills: 0 | Status: SHIPPED | OUTPATIENT
Start: 2022-01-14 | End: 2022-01-19

## 2022-01-17 ENCOUNTER — PROCEDURE VISIT (OUTPATIENT)
Dept: CARDIOLOGY CLINIC | Age: 82
End: 2022-01-17
Payer: MEDICARE

## 2022-01-17 DIAGNOSIS — I50.32 CHRONIC DIASTOLIC CHF (CONGESTIVE HEART FAILURE) (HCC): Primary | ICD-10-CM

## 2022-01-17 PROCEDURE — 93297 REM INTERROG DEV EVAL ICPMS: CPT | Performed by: NUCLEAR MEDICINE

## 2022-01-17 PROCEDURE — G2066 INTER DEVC REMOTE 30D: HCPCS | Performed by: NUCLEAR MEDICINE

## 2022-01-17 RX ORDER — PANTOPRAZOLE SODIUM 40 MG/1
40 TABLET, DELAYED RELEASE ORAL
Qty: 90 TABLET | Refills: 3 | Status: SHIPPED | OUTPATIENT
Start: 2022-01-17

## 2022-01-17 NOTE — PROGRESS NOTES
DR Sarath Walker PT   [address] [facility]   [facility]  MONTHLY BATTERY WATCH    NO DX OF AFIB  1 AMS EPISODE FOR 4 SECONDS       MODE SWITCH <1%  AFIB BURDEN 0%    DHARA KATZL

## 2022-02-07 ENCOUNTER — OFFICE VISIT (OUTPATIENT)
Dept: CARDIOLOGY CLINIC | Age: 82
End: 2022-02-07
Payer: MEDICARE

## 2022-02-07 VITALS
BODY MASS INDEX: 36.09 KG/M2 | HEART RATE: 87 BPM | WEIGHT: 211.4 LBS | DIASTOLIC BLOOD PRESSURE: 70 MMHG | SYSTOLIC BLOOD PRESSURE: 136 MMHG | HEIGHT: 64 IN

## 2022-02-07 DIAGNOSIS — I25.10 CORONARY ARTERY DISEASE INVOLVING NATIVE CORONARY ARTERY OF NATIVE HEART WITHOUT ANGINA PECTORIS: ICD-10-CM

## 2022-02-07 DIAGNOSIS — Z95.810 S/P ICD (INTERNAL CARDIAC DEFIBRILLATOR) PROCEDURE: ICD-10-CM

## 2022-02-07 DIAGNOSIS — I50.32 CHRONIC DIASTOLIC CHF (CONGESTIVE HEART FAILURE) (HCC): Primary | ICD-10-CM

## 2022-02-07 PROCEDURE — 93000 ELECTROCARDIOGRAM COMPLETE: CPT | Performed by: NUCLEAR MEDICINE

## 2022-02-07 PROCEDURE — G8399 PT W/DXA RESULTS DOCUMENT: HCPCS | Performed by: NUCLEAR MEDICINE

## 2022-02-07 PROCEDURE — 1036F TOBACCO NON-USER: CPT | Performed by: NUCLEAR MEDICINE

## 2022-02-07 PROCEDURE — G8484 FLU IMMUNIZE NO ADMIN: HCPCS | Performed by: NUCLEAR MEDICINE

## 2022-02-07 PROCEDURE — G8427 DOCREV CUR MEDS BY ELIG CLIN: HCPCS | Performed by: NUCLEAR MEDICINE

## 2022-02-07 PROCEDURE — 1123F ACP DISCUSS/DSCN MKR DOCD: CPT | Performed by: NUCLEAR MEDICINE

## 2022-02-07 PROCEDURE — G8417 CALC BMI ABV UP PARAM F/U: HCPCS | Performed by: NUCLEAR MEDICINE

## 2022-02-07 PROCEDURE — 99213 OFFICE O/P EST LOW 20 MIN: CPT | Performed by: NUCLEAR MEDICINE

## 2022-02-07 PROCEDURE — 4040F PNEUMOC VAC/ADMIN/RCVD: CPT | Performed by: NUCLEAR MEDICINE

## 2022-02-07 PROCEDURE — 1090F PRES/ABSN URINE INCON ASSESS: CPT | Performed by: NUCLEAR MEDICINE

## 2022-02-07 RX ORDER — MAGNESIUM 200 MG
200 TABLET ORAL DAILY
COMMUNITY

## 2022-02-07 NOTE — PROGRESS NOTES
[address]  [address] [place] [address]  Dept: [phone]  Dept Fax: [phone]  Loc: [phone]    Visit Date: 2/7/2022    Amy Holder is a 79 y.o. female who presents todayfor:  Chief Complaint   Patient presents with    Check-Up     EKG done today    Congestive Heart Failure    Hypertension    Coronary Artery Disease    Cardiomyopathy   known CMP and ICD  No chest pain   No changes in breathing  No dizziness  No syncope  Bp is stable   No dizziness   no syncope        HPI:  HPI  Past Medical History:   Diagnosis Date    Arthritis     general    Breast CA ([facility] [address] 12/03    right    CAD (coronary artery disease) 2009    stent in [place]    [facility] failure) [facility]     Closed compression fracture of first lumbar vertebra ([facility] [address] 2/7/2018    Colon polyps 8/97; 3/11    COPD (chronic obstructive pulmonary disease) ([facility] [address] [facility] 8/5/2017    Diverticulosis 1/06    Erosive gastritis 11/06    Esophageal stricture 03/2011    Three times, Dr. Kristine Ingram    Hyperlipidemia     Hypertension     Internal hemorrhoid 1/06    LUISA on CPAP     Osteopenia determined by x-ray 1999    Osteoporosis of vertebra 6/2018 Per CT thoracolumbar    Pneumonia 2009    Stress incontinence, female     Vertigo       Past Surgical History:   Procedure Laterality Date    ARM SURGERY Left 11/27/2018    plate in arm    BREAST LUMPECTOMY  1/04    wt axillary dissection    CARDIAC DEFIBRILLATOR PLACEMENT  01/08/2018    Biventricular pacemaker ICD, Dr. Warren Navarrete  3/2011    CORONARY ANGIOPLASTY WITH STENT PLACEMENT  2009    DILATATION, ESOPHAGUS  2011    HIP PINNING Left 8/20/2019    LEFT HIP INTERTAIN performed by Adam Solano MD at [facility] or 1977    bleeding    JOINT REPLACEMENT Left     Shoulder    AK OFFICE/OUTPT VISIT,PROCEDURE ONLY N/A 8/2/2018    EGD DIL performed [facility] at  [facility]    MD OFFICE/OUTPT [address] Left 2018    ORIF LEFT PERIPROSTHETIC DISTAL HUMERAL FRACTURE performed by Cristina Redding MD at [place] [address] Right 2018    LEFT HUMERUS OPEN REDUCTION INTERNAL FIXATION performed by Cristina Redding MD at [facility]    UPPER GASTROINTESTINAL ENDOSCOPY Left 2018    EGD BIOPSY performed by Anderson Rivera MD at  [facility]     Family History   Problem Relation Age of Onset    Heart Disease Mother     High Blood Pressure Mother     Cancer Father     Heart Disease Sister     Cancer Brother     Heart Disease Brother      Social History     Tobacco Use    Smoking status: Former Smoker     Packs/day: 0.50     Years: 40.00     Pack years: 20.00     Types: Cigarettes     Quit date: 1998     Years since quittin.1    Smokeless tobacco: Never Used   Substance Use Topics    Alcohol use: Yes     Alcohol/week: 2.0 standard drinks     Types: 2 Standard drinks or equivalent per week     Comment: occasional       Current Outpatient Medications   Medication Sig Dispense Refill    magnesium 200 MG TABS tablet Take 200 mg by mouth daily      pantoprazole (PROTONIX) 40 MG tablet Take 1 tablet by mouth every morning (before breakfast) 90 tablet 3    traMADol (ULTRAM) 50 MG tablet Take 0.5 tablets by mouth every 6 hours as needed for Pain for up to 180 days.  60 tablet 5    cetirizine (ZYRTEC) 10 MG tablet take 1 tablet by mouth at bedtime 90 tablet 3    furosemide (LASIX) 20 MG tablet take 1 tablet by mouth once daily 90 tablet 3    rosuvastatin (CRESTOR) 5 MG tablet take 1 tablet by mouth once daily 90 tablet 3    metoprolol succinate (TOPROL XL) 25 MG extended release tablet take 1/2 tablet by mouth once daily 45 tablet 3    isosorbide mononitrate (IMDUR) 30 MG extended release tablet take 1/2 tablet by mouth once daily 45 tablet 3    aspirin 81 MG EC tablet Take 1 tablet by mouth daily 90 tablet 3    sacubitril-valsartan (ENTRESTO) 49-51 MG per tablet Take 1 tablet by mouth 2 times daily 180 tablet 3    CRANBERRY PO Take 2 capsules by mouth 2 times daily      CPAP Machine MISC by Does not apply route Please change CPAP pressure to 11.5 with C flex of 3 cm H20. 1 each 0    diclofenac sodium 1 % GEL Apply 4 g topically 4 times daily 1 Tube 3    acetaminophen (TYLENOL) 325 MG tablet Take 2 tablets by mouth every 6 hours 120 tablet 0    calcium-vitamin D (OSCAL-500) 500-200 MG-UNIT per tablet Take 1 tablet by mouth 2 times daily 30 tablet 3     No current facility-administered medications for this visit.      Allergies   Allergen Reactions    Antivert [Meclizine] Other (See Comments)     confusion    Bactrim [Sulfamethoxazole-Trimethoprim]     Ciprofloxacin Itching    Neomycin     Pcn [Penicillins] Hives     Pt reports having reaction 50 years ago    Tizanidine     Zanaflex [Tizanidine Hcl] Other (See Comments)     Causes confusion    Levaquin [Levofloxacin] Itching     Benadryl was given for tx     Health Maintenance   Topic Date Due    Shingles Vaccine (1 of 2) Never done    Potassium monitoring  01/08/2021    Creatinine monitoring  01/08/2021    Flu vaccine (1) 09/01/2021    Lipid screen  02/23/2022    Depression Screen  08/12/2022    Annual Wellness Visit (AWV)  08/13/2022    Colon cancer screen colonoscopy  06/08/2027    DTaP/Tdap/Td vaccine (2 - Td or Tdap) 02/03/2028    DEXA (modify frequency per FRAX score)  Completed    Pneumococcal 65+ years Vaccine  Completed    COVID-19 Vaccine  Completed    Hepatitis A vaccine  Aged Out    Hepatitis B vaccine  Aged Out    Hib vaccine  Aged Out    Meningococcal (ACWY) vaccine  Aged Out       Subjective:  Review of Systems  General:   No fever, no chills, No fatigue or weight loss  Pulmonary:    No dyspnea, no wheezing  Cardiac:    Denies recent chest pain,   GI:     No nausea or vomiting, no abdominal pain  Neuro:    No dizziness or light headedness,   Musculoskeletal:  No recent active issues  Extremities:   No edema, no obvious claudication       Objective:  Physical Exam  /70   Pulse 87   Ht 5' 4\" (1.626 m)   Wt 211 lb 6.4 oz (95.9 kg)   BMI 36.29 kg/m²   General:   Well developed, well nourished  Lungs:   Clear to auscultation  Heart:    Normal S1 S2, Slight murmur. no rubs, no gallops  Abdomen:   Soft, non tender, no organomegalies, positive bowel sounds  Extremities:   No edema, no cyanosis, good peripheral pulses  Neurological:   Awake, alert, oriented. No obvious focal deficits  Musculoskelatal:  No obvious deformities    Assessment:      Diagnosis Orders   1. Chronic diastolic CHF (congestive heart failure) (HCC)  EKG 12 Lead   2. S/P ICD (internal cardiac defibrillator) procedure  EKG 12 Lead   3. Coronary artery disease involving native coronary artery of native heart without angina pectoris  EKG 12 Lead   as above  Cardiac fair for now   ICD near CONTRERAS   Plan:  No follow-ups on file. As above  Continue risk factor modification and medical management  Thank you for allowing me to participate in the care of your patient. Please don't hesitate to contact me regarding any further issues related to the patient care    Orders Placed:  Orders Placed This Encounter   Procedures    EKG 12 Lead     Order Specific Question:   Reason for Exam?     Answer: Other       Medications Prescribed:  No orders of the defined types were placed in this encounter. Discussed use, benefit, and side effects of prescribed medications. All patient questions answered. Pt voicedunderstanding. Instructed to continue current medications, diet and exercise. Continue risk factor modification and medical management. Patient agreed with treatment plan. Follow up as directed.     Electronically signedby Lacy Ramesh MD on 2/7/2022 at 1:30 PM

## 2022-02-11 ENCOUNTER — TELEPHONE (OUTPATIENT)
Dept: FAMILY MEDICINE CLINIC | Age: 82
End: 2022-02-11

## 2022-02-11 ENCOUNTER — OFFICE VISIT (OUTPATIENT)
Dept: FAMILY MEDICINE CLINIC | Age: 82
End: 2022-02-11
Payer: MEDICARE

## 2022-02-11 VITALS
SYSTOLIC BLOOD PRESSURE: 132 MMHG | HEART RATE: 72 BPM | WEIGHT: 214.1 LBS | BODY MASS INDEX: 36.55 KG/M2 | HEIGHT: 64 IN | RESPIRATION RATE: 16 BRPM | DIASTOLIC BLOOD PRESSURE: 70 MMHG

## 2022-02-11 DIAGNOSIS — Z99.89 OBSTRUCTIVE SLEEP APNEA ON CPAP: ICD-10-CM

## 2022-02-11 DIAGNOSIS — G47.33 OBSTRUCTIVE SLEEP APNEA ON CPAP: ICD-10-CM

## 2022-02-11 DIAGNOSIS — E78.5 HYPERLIPIDEMIA, UNSPECIFIED HYPERLIPIDEMIA TYPE: ICD-10-CM

## 2022-02-11 DIAGNOSIS — E66.01 SEVERE OBESITY (BMI 35.0-39.9) WITH COMORBIDITY (HCC): ICD-10-CM

## 2022-02-11 DIAGNOSIS — I10 ESSENTIAL HYPERTENSION: Primary | ICD-10-CM

## 2022-02-11 DIAGNOSIS — I50.32 CHRONIC DIASTOLIC CHF (CONGESTIVE HEART FAILURE) (HCC): ICD-10-CM

## 2022-02-11 DIAGNOSIS — I25.119 CORONARY ARTERY DISEASE INVOLVING NATIVE CORONARY ARTERY OF NATIVE HEART WITH ANGINA PECTORIS (HCC): ICD-10-CM

## 2022-02-11 DIAGNOSIS — M81.0 AGE-RELATED OSTEOPOROSIS WITHOUT CURRENT PATHOLOGICAL FRACTURE: ICD-10-CM

## 2022-02-11 DIAGNOSIS — R73.01 IFG (IMPAIRED FASTING GLUCOSE): ICD-10-CM

## 2022-02-11 DIAGNOSIS — R60.0 LOWER LEG EDEMA: ICD-10-CM

## 2022-02-11 DIAGNOSIS — M46.1 SACROILIITIS, NOT ELSEWHERE CLASSIFIED (HCC): ICD-10-CM

## 2022-02-11 DIAGNOSIS — J44.9 CHRONIC OBSTRUCTIVE PULMONARY DISEASE, UNSPECIFIED COPD TYPE (HCC): ICD-10-CM

## 2022-02-11 DIAGNOSIS — E55.9 VITAMIN D INSUFFICIENCY: ICD-10-CM

## 2022-02-11 PROCEDURE — 1090F PRES/ABSN URINE INCON ASSESS: CPT | Performed by: FAMILY MEDICINE

## 2022-02-11 PROCEDURE — 99214 OFFICE O/P EST MOD 30 MIN: CPT | Performed by: FAMILY MEDICINE

## 2022-02-11 PROCEDURE — 4040F PNEUMOC VAC/ADMIN/RCVD: CPT | Performed by: FAMILY MEDICINE

## 2022-02-11 PROCEDURE — G8484 FLU IMMUNIZE NO ADMIN: HCPCS | Performed by: FAMILY MEDICINE

## 2022-02-11 PROCEDURE — G8417 CALC BMI ABV UP PARAM F/U: HCPCS | Performed by: FAMILY MEDICINE

## 2022-02-11 PROCEDURE — 1036F TOBACCO NON-USER: CPT | Performed by: FAMILY MEDICINE

## 2022-02-11 PROCEDURE — 1123F ACP DISCUSS/DSCN MKR DOCD: CPT | Performed by: FAMILY MEDICINE

## 2022-02-11 PROCEDURE — G8399 PT W/DXA RESULTS DOCUMENT: HCPCS | Performed by: FAMILY MEDICINE

## 2022-02-11 PROCEDURE — 3023F SPIROM DOC REV: CPT | Performed by: FAMILY MEDICINE

## 2022-02-11 PROCEDURE — G8427 DOCREV CUR MEDS BY ELIG CLIN: HCPCS | Performed by: FAMILY MEDICINE

## 2022-02-11 RX ORDER — DENOSUMAB 60 MG/ML
60 INJECTION SUBCUTANEOUS ONCE
Qty: 1 ML | Refills: 1 | Status: SHIPPED | OUTPATIENT
Start: 2022-02-11 | End: 2022-08-11 | Stop reason: SDUPTHER

## 2022-02-11 ASSESSMENT — ENCOUNTER SYMPTOMS
GASTROINTESTINAL NEGATIVE: 1
RESPIRATORY NEGATIVE: 1

## 2022-02-11 NOTE — PROGRESS NOTES
Sen Pelaez (:  1940) is a 79 y.o. female,Established patient, here for evaluation of the following chief complaint(s):  6 Month Follow-Up        Subjective   SUBJECTIVE/OBJECTIVE:  HPI:    Chief Complaint   Patient presents with    6 Month Follow-Up     Doing well. BPs and weight stable. BP Readings from Last 3 Encounters:   22 132/70   22 136/70   10/25/21 128/62     Wt Readings from Last 3 Encounters:   22 214 lb 1.6 oz (97.1 kg)   22 211 lb 6.4 oz (95.9 kg)   21 214 lb 6.4 oz (97.3 kg)     Recently seen by Cardio, no changes. Last Prolia 2021, missed July shot. No acute concerns for me today.     Patient Active Problem List   Diagnosis    Hyperlipidemia    Coronary artery disease involving native coronary artery of native heart with angina pectoris (HCC)    LUISA on CPAP    COPD (chronic obstructive pulmonary disease) ([address]    S/P ICD (internal cardiac defibrillator) procedure    Nonischemic cardiomyopathy ([address]    Class 2 severe obesity due to excess calories with serious comorbidity in adult ([address]    Hypertension    Benign positional vertigo    Age-related osteoporosis without current pathological fracture    Chest pain    Hypocalcemia    Chronic diastolic CHF (congestive heart failure) (HCC)    Sacroiliitis, not elsewhere classified [facility])     Past Surgical History:   Procedure Laterality Date    ARM SURGERY Left 2018    plate in arm    BREAST LUMPECTOMY      wtih axillary dissection    CARDIAC DEFIBRILLATOR PLACEMENT  2018    Biventricular pacemaker ICD, Dr. Sparrow Earing  3/2011    CORONARY ANGIOPLASTY WITH STENT PLACEMENT  2009    DILATATION, ESOPHAGUS      HIP PINNING Left 2019    [facility]    ble[facility]    JOINT REPLACEMENT Left     Shoulder    OR OFFICE/OUTPT VISIT,PROCEDURE ONLY N/A 2018    EGD DIL performed by Arnoldo Ferguson MD at [address] [place] [address] OFFICE/OUTPT VISIT,PROCEDURE ONLY Left 2018    ORIF LEFT PERIPROSTHETIC DISTAL HUMERAL FRACTURE performed by Jason Murguia MD at [place] [address] Right 2018    LEFT HUMERUS OPEN REDUCTION INTERNAL FIXATION performed by [facility]urg[facility] at [facility]    UPPER GASTROINTESTINAL ENDOSCOPY Left 2018    EGD BIOPSY performed by Arnoldo Ferguson MD at [facility]     Social History     Tobacco Use    Smoking status: Former Smoker     Packs/day: 0.50     Years: 40.00     Pack years: 20.00     Types: Cigarettes     Quit date: 1998     Years since quittin.1    Smokeless tobacco: Never Used   Vaping Use    Vaping Use: Never used   Substance Use Topics    Alcohol use: Yes     Alcohol/week: 2.0 standard drinks     Types: 2 Standard drinks or equivalent per week     Comment: occasional     Drug use: No     Prior to Admission medications    Medication Sig Start Date End Date Taking? Authorizing Provider   denosumab (PROLIA) 60 MG/ML SOSY SC injection Inject 1 mL into the skin once for 1 dose 22 Yes Lianna Sal DO   magnesium 200 MG TABS tablet Take 200 mg by mouth daily   Yes Historical Provider, MD   pantoprazole (PROTONIX) 40 MG tablet Take 1 tablet by mouth every morning (before breakfast) 22  Yes Lianna Sal DO   traMADol (ULTRAM) 50 MG tablet Take 0.5 tablets by mouth every 6 hours as needed for Pain for up to 180 days.  21 Yes Lianna Sal DO   cetirizine (ZYRTEC) 10 MG tablet take 1 tablet by mouth at bedtime 11/15/21  Yes Lianna Sal DO   furosemide (LASIX) 20 MG tablet take 1 tablet by mouth once daily 10/25/21  Yes Lianna Sal DO   rosuvastatin (CRESTOR) 5 MG tablet take 1 tablet by mouth once daily 21  Yes Lianna Sal DO   metoprolol succinate (TOPROL XL) 25 MG extended release tablet take 1/2 tablet by mouth once daily 9/27/21  Yes Kalyani Araujo DO   isosorbide mononitrate (IMDUR) 30 MG extended release tablet take 1/2 tablet by mouth once daily 9/27/21  Yes Kalyani Araujo DO   aspirin 81 MG EC tablet Take 1 tablet by mouth daily 8/23/21  Yes Kalyani Araujo DO   sacubitril-valsartan (ENTRESTO) 49-51 MG per tablet Take 1 tablet by mouth 2 times daily 8/23/21  Yes Kalyani Araujo DO   CRANBERRY PO Take 2 capsules by mouth 2 times daily   Yes Historical Provider, MD   CPAP Machine MISC by Does not apply route Please change CPAP pressure to 11.5 with C flex of 3 cm H20. 12/14/20  Yes JADEN Brandon - CNP   diclofenac sodium 1 % GEL Apply 4 g topically 4 times daily 9/10/19  Yes Lita Perera MD   acetaminophen (TYLENOL) 325 MG tablet Take 2 tablets by mouth every 6 hours 3/6/18  Yes Guille Marinelli MD   calcium-vitamin D (OSCAL-500) 500-200 MG-UNIT per tablet Take 1 tablet by mouth 2 times daily 3/6/18  Yes Guille Marinelli MD         Review of Systems   Constitutional: Negative. HENT: Negative. Respiratory: Negative. Cardiovascular: Negative. Gastrointestinal: Negative. Musculoskeletal: Negative. All other systems reviewed and are negative. Objective   Physical Exam  Vitals and nursing note reviewed. Constitutional:       General: She is not in acute distress. Appearance: Normal appearance. She is well-developed. HENT:      Head: Normocephalic and atraumatic. Right Ear: Tympanic membrane normal.      Left Ear: Tympanic membrane normal.   Eyes:      Conjunctiva/sclera: Conjunctivae normal.   Cardiovascular:      Rate and Rhythm: Normal rate and regular rhythm. Heart sounds: Normal heart sounds. No murmur heard. Pulmonary:      Effort: Pulmonary effort is normal.      Breath sounds: Normal breath sounds. No wheezing, rhonchi or rales. Abdominal:      General: There is no distension. Musculoskeletal:      Cervical back: Neck supple. Right lower leg: Edema present. Left lower leg: Edema present. Skin:     General: Skin is warm and dry. Findings: No rash (on exposed surfaces). Neurological:      General: No focal deficit present. Mental Status: She is alert. Psychiatric:         Attention and Perception: Attention normal.         Mood and Affect: Mood normal.         Speech: Speech normal.         Behavior: Behavior normal. Behavior is cooperative. Thought Content: Thought content normal.         Judgment: Judgment normal.               ASSESSMENT/PLAN:  1. Essential hypertension  -     CBC Auto Differential; Future  2. Sacroiliitis, not elsewhere classified ([facility]  3. Chronic obstructive pulmonary disease, unspecified COPD type ([facility]  4. Coronary artery disease involving native coronary artery of native heart with angina pectoris ([address]  5. Lower leg edema  6. Hyperlipidemia, unspecified hyperlipidemia type  -     Lipid Panel w/ Reflex Direct LDL; Future  -     Comprehensive Metabolic Panel; Future  -     TSH with Reflex; Future  7. Chronic diastolic CHF (congestive heart failure) ([address]  8. Obstructive sleep apnea on CPAP  9. Age-related osteoporosis without current pathological fracture  -     denosumab (PROLIA) 60 MG/ML SOSY SC injection; Inject 1 mL into the skin once for 1 dose, Disp-1 mL, R-1Print  10. Vitamin D insufficiency  -     Vitamin D 25 Hydroxy; Future  11. IFG (impaired fasting glucose)  -     Comprehensive Metabolic Panel; Future  -     Hemoglobin A1C; Future  12. Severe obesity (BMI 35.0-39. 9) with comorbidity ([facility]    -  Chronic medical problems stable  -  Continue current medications  -  Follow up with specialists as scheduled  -  Check labs, will call  -  Schedule Prolia    Return in about 6 months (around 8/11/2022) for HTN. An electronic signature was used to authenticate this note.     --Enedina Chisholm,

## 2022-02-11 NOTE — TELEPHONE ENCOUNTER
Patient informed and scheduled at [facility] out patient nursing for prolia injection on 2/15/22 arrival at 9:50 am for 10:00 am injection. Orders faxed to out patient nursing at [phone]. Patient voiced understanding.

## 2022-02-14 ENCOUNTER — HOSPITAL ENCOUNTER (OUTPATIENT)
Dept: NURSING | Age: 82
Discharge: HOME | End: 2022-02-14
Payer: MEDICARE

## 2022-02-14 VITALS
SYSTOLIC BLOOD PRESSURE: 111 MMHG | OXYGEN SATURATION: 93 % | TEMPERATURE: 96.9 F | DIASTOLIC BLOOD PRESSURE: 61 MMHG | RESPIRATION RATE: 18 BRPM | HEART RATE: 86 BPM

## 2022-02-14 DIAGNOSIS — M81.0 AGE-RELATED OSTEOPOROSIS WITHOUT CURRENT PATHOLOGICAL FRACTURE: Primary | ICD-10-CM

## 2022-02-14 PROCEDURE — 6360000002 HC RX W HCPCS: Performed by: FAMILY MEDICINE

## 2022-02-14 PROCEDURE — 96372 THER/PROPH/DIAG INJ SC/IM: CPT

## 2022-02-14 RX ADMIN — DENOSUMAB 60 MG: 60 INJECTION SUBCUTANEOUS at 10:42

## 2022-02-14 ASSESSMENT — PAIN SCALES - GENERAL: PAINLEVEL_OUTOF10: 3

## 2022-02-14 ASSESSMENT — PAIN DESCRIPTION - LOCATION: LOCATION: BACK

## 2022-02-14 ASSESSMENT — PAIN DESCRIPTION - PAIN TYPE: TYPE: CHRONIC PAIN

## 2022-02-14 NOTE — PROGRESS NOTES
21  Patient ambulatory with walker to OPN for Prolia injection. Patient denies any recent changes. Patient verbalizes understanding of medication. PT RIGHTS AND RESPONSIBILITIES OFFERED TO PT.    1042 Injection given to patient tolerated well. AVS reviewed with patient. Verbalizes understanding. Patient ambulatory to Discharge lobby.      _M___ Safety:       (Environmental)   Reading to environment   Ensure ID band is correct and in place/ allergy band as needed   Assess for fall risk   Initiate fall precautions as applicable (fall band, side rails, etc.)   Call light within reach   Bed in low position/ wheels locked    __M__ Pain:        Assess pain level and characteristics   Administer analgesics as ordered   Assess effectiveness of pain management and report to MD as needed    _M___ Knowledge Deficit:   Assess baseline knowledge   Provide teaching at level of understanding   Provide teaching via preferred learning method   Evaluate teaching effectiveness    _M___ Hemodynamic/Respiratory Status:       (Pre and Post Procedure Monitoring)   Assess/Monitor vital signs and LOC   Assess Baseline SpO2 prior to any sedation   Obtain weight/height   Assess vital signs/ LOC until patient meets discharge criteria   Monitor procedure site and notify MD of any issues

## 2022-02-15 LAB
AVERAGE GLUCOSE: NORMAL
CHOLESTEROL, TOTAL: 147 MG/DL
CHOLESTEROL/HDL RATIO: NORMAL
HBA1C MFR BLD: 5.5 %
HDLC SERPL-MCNC: 56 MG/DL (ref 35–70)
LDL CHOLESTEROL CALCULATED: 67 MG/DL (ref 0–160)
NONHDLC SERPL-MCNC: NORMAL MG/DL
TRIGL SERPL-MCNC: 122 MG/DL
TSH SERPL DL<=0.05 MIU/L-ACNC: 1.99 UIU/ML
VLDLC SERPL CALC-MCNC: 24 MG/DL

## 2022-03-01 ENCOUNTER — PROCEDURE VISIT (OUTPATIENT)
Dept: CARDIOLOGY CLINIC | Age: 82
End: 2022-03-01
Payer: MEDICARE

## 2022-03-01 DIAGNOSIS — I50.32 CHRONIC DIASTOLIC CHF (CONGESTIVE HEART FAILURE) (HCC): Primary | ICD-10-CM

## 2022-03-01 PROCEDURE — 93297 REM INTERROG DEV EVAL ICPMS: CPT | Performed by: NUCLEAR MEDICINE

## 2022-03-01 PROCEDURE — G2066 INTER DEVC REMOTE 30D: HCPCS | Performed by: NUCLEAR MEDICINE

## 2022-03-01 NOTE — PROGRESS NOTES
DR Kareem Hampton PT   [facility] CORVUE REMOTE   BATTERY 5.3 MONTHS REMAINING    MONTHLY BATTERY WATCH     CORVUE ELEVATED / I CALLED THIS PT AND SHE SAID SHE IS NO MORE SOB THAN USUAL. SHE DENIES ANY EDEMA AND SHE SAYS SHE DOES NOT WEIGH HERSELF AT ALL BUT SAYS HER PANTS ARE NOT TIGHT.

## 2022-04-04 ENCOUNTER — PROCEDURE VISIT (OUTPATIENT)
Dept: CARDIOLOGY CLINIC | Age: 82
End: 2022-04-04
Payer: MEDICARE

## 2022-04-04 DIAGNOSIS — Z95.810 S/P ICD (INTERNAL CARDIAC DEFIBRILLATOR) PROCEDURE: Primary | ICD-10-CM

## 2022-04-04 PROCEDURE — 93295 DEV INTERROG REMOTE 1/2/MLT: CPT | Performed by: NUCLEAR MEDICINE

## 2022-04-04 PROCEDURE — 93296 REM INTERROG EVL PM/IDS: CPT | Performed by: NUCLEAR MEDICINE

## 2022-04-04 NOTE — PROGRESS NOTES
Remote [facility] Bigg BiV ICD ---    Patient of [employer]    Battery 4.6 months    Presenting rhythm AS BP    A Impedance 410  RV Impedance 340  LV Impedance 410    Shock 40    P wave sensing 0.5  R wave sensing 5.6    A Threshold 0.50 @ 0.50  RV Thresholds 0.625 @ 0.50  LV Thresholds - @ -    A Paced 25%  V Paced 97%    Programmed Mode DDDR     Afib Wayan 0%    Episodes :   none    Corvue WNL

## 2022-04-11 RX ORDER — CEPHALEXIN 500 MG/1
500 CAPSULE ORAL 4 TIMES DAILY
Qty: 12 CAPSULE | Refills: 0 | Status: SHIPPED | OUTPATIENT
Start: 2022-04-11 | End: 2022-04-14

## 2022-04-12 ENCOUNTER — TELEPHONE (OUTPATIENT)
Dept: FAMILY MEDICINE CLINIC | Age: 82
End: 2022-04-12

## 2022-04-12 RX ORDER — NITROFURANTOIN 25; 75 MG/1; MG/1
100 CAPSULE ORAL 2 TIMES DAILY
Qty: 10 CAPSULE | Refills: 0 | Status: SHIPPED | OUTPATIENT
Start: 2022-04-12 | End: 2022-06-23 | Stop reason: SDUPTHER

## 2022-04-12 NOTE — TELEPHONE ENCOUNTER
Cristina nurse with [facility], patient is having a allergic reaction to the Keflex 500 mg. This am patient has facial swelling with SOB. SOB is not to the point of needing the ED. Mitch Manley gave her 2 Benadryl. Please advise for a call back to [facility]. Allergy list updated.

## 2022-04-12 NOTE — TELEPHONE ENCOUNTER
Repeat benadryl 2 - 25 mg tabs 4 hours after last dose if no drowsiness issue - if last dose caused drowsiness push off to 6 hours to repeat.

## 2022-04-12 NOTE — TELEPHONE ENCOUNTER
Nurse [facility] notified and understanding voiced. Questioning if you want an alternative medication for Keflex?   Please advise

## 2022-04-28 ENCOUNTER — OFFICE VISIT (OUTPATIENT)
Dept: PULMONOLOGY | Age: 82
End: 2022-04-28
Payer: MEDICARE

## 2022-04-28 VITALS
WEIGHT: 217.4 LBS | TEMPERATURE: 97.8 F | DIASTOLIC BLOOD PRESSURE: 68 MMHG | OXYGEN SATURATION: 96 % | BODY MASS INDEX: 32.95 KG/M2 | SYSTOLIC BLOOD PRESSURE: 122 MMHG | HEIGHT: 68 IN | HEART RATE: 73 BPM

## 2022-04-28 DIAGNOSIS — E66.9 OBESITY (BMI 30-39.9): ICD-10-CM

## 2022-04-28 DIAGNOSIS — Z99.89 OSA ON CPAP: Primary | ICD-10-CM

## 2022-04-28 DIAGNOSIS — G47.33 OSA ON CPAP: Primary | ICD-10-CM

## 2022-04-28 PROCEDURE — G8417 CALC BMI ABV UP PARAM F/U: HCPCS | Performed by: NURSE PRACTITIONER

## 2022-04-28 PROCEDURE — G8427 DOCREV CUR MEDS BY ELIG CLIN: HCPCS | Performed by: NURSE PRACTITIONER

## 2022-04-28 PROCEDURE — 1123F ACP DISCUSS/DSCN MKR DOCD: CPT | Performed by: NURSE PRACTITIONER

## 2022-04-28 PROCEDURE — G8399 PT W/DXA RESULTS DOCUMENT: HCPCS | Performed by: NURSE PRACTITIONER

## 2022-04-28 PROCEDURE — 99213 OFFICE O/P EST LOW 20 MIN: CPT | Performed by: NURSE PRACTITIONER

## 2022-04-28 PROCEDURE — 4040F PNEUMOC VAC/ADMIN/RCVD: CPT | Performed by: NURSE PRACTITIONER

## 2022-04-28 PROCEDURE — 1036F TOBACCO NON-USER: CPT | Performed by: NURSE PRACTITIONER

## 2022-04-28 PROCEDURE — 1090F PRES/ABSN URINE INCON ASSESS: CPT | Performed by: NURSE PRACTITIONER

## 2022-04-28 ASSESSMENT — ENCOUNTER SYMPTOMS
STRIDOR: 0
WHEEZING: 0
VOMITING: 0
COUGH: 0
SHORTNESS OF BREATH: 0
DIARRHEA: 0
NAUSEA: 0
BACK PAIN: 1
CHEST TIGHTNESS: 0

## 2022-04-28 NOTE — PROGRESS NOTES
[place] [facility]      Joshua Gallardo         [id number]  4/28/2022   Chief Complaint   Patient presents with    Follow-up     kang 1 yr f/u srhme download        Pt of Dr. Allegra Pink    PAP Download:   [place] or initial AHI: 39.5     Date of initial study: 5/7/2003      Compliant  100%     Noncompliant 0 %     PAP Type cpap Level  11.5   Avg Hrs/Day 6 hours and 38 min  AHI: 3.6   Recorded compliance dates , 03/28/22  to 4/26/22   Machine/Mfg:   [] ResMed    [x] Respironics/Dreamstation   Interface:   [x] Nasal    [] Nasal pillows   [] FFM      Provider:      [x] [facility]     [][facility]     [] [employer]    [] [address]    [] [place]               [] [facility]      [] [employer]    [] [address]      [] Other    Neck Size: 16  Mallampati Mallampati 3  ESS:  3  SAQLI: 98    Here is a scan of the most recent download:            Presentation:   Anthony Knott presents for sleep medicine follow up for obstructive sleep apnea  Since the last visit, Anthony Knott reports that she is doing well on current pressure. Mask is fitting well but is due for new mask. Reports she falls asleep easily and wakes in the morning feeling refreshed is using humidification and feels benefit from use. Equipment issues: The pressure is  acceptable, the mask is acceptable     Sleep issues:  Do you feel better? Yes  More rested? Yes   Better concentration? yes    Progress History:   Since last visit any new medical issues? No  New ER or hospital visits? No  Any new or changes in medicines? No  Any new sleep medicines? No    Review of Systems -   Review of Systems   Constitutional: Negative for chills, fever and unexpected weight change. Respiratory: Negative for cough, chest tightness, shortness of breath, wheezing and stridor. Cardiovascular: Positive for leg swelling. Negative for chest pain. Gastrointestinal: Negative for diarrhea, nausea and vomiting. Genitourinary: Negative for dysuria. Musculoskeletal: Positive for back pain. Physical Exam:    BMI:  Body mass index is 33.06 kg/m². Wt Readings from Last 3 Encounters:   04/28/22 217 lb 6.4 oz (98.6 kg)   02/11/22 214 lb 1.6 oz (97.1 kg)   02/07/22 211 lb 6.4 oz (95.9 kg)     Weight stable / unchanged  Vitals: /68 (Site: Left Lower Arm, Position: Sitting, Cuff Size: Small Adult)   Pulse 73   Temp 97.8 °F (36.6 °C)   Ht 5' 8\" (1.727 m)   Wt 217 lb 6.4 oz (98.6 kg)   SpO2 96% Comment: on r/a  BMI 33.06 kg/m²       Physical Exam  Vitals and nursing note reviewed. Constitutional:       General: She is not in acute distress. Appearance: She is well-developed. HENT:      Head: Normocephalic and atraumatic. Neck:      Trachea: No tracheal deviation. Cardiovascular:      Rate and Rhythm: Normal rate and regular rhythm. Heart sounds: Normal heart sounds. No murmur heard. Pulmonary:      Effort: Pulmonary effort is normal. No respiratory distress. Breath sounds: Normal breath sounds. No stridor. No wheezing or rales. Chest:      Chest wall: No tenderness. Abdominal:      General: Bowel sounds are normal. There is no distension. Palpations: Abdomen is soft. Musculoskeletal:      Cervical back: Neck supple. Right lower leg: Edema present. Left lower leg: Edema present. Skin:     General: Skin is warm and dry. Capillary Refill: Capillary refill takes less than 2 seconds. Neurological:      Mental Status: She is alert and oriented to person, place, and time. Psychiatric:         Behavior: Behavior normal.         Thought Content: Thought content normal.           ASSESSMENT/DIAGNOSIS     Diagnosis Orders   1. LUISA on CPAP  DME Order for CPAP as OP   2. Obesity (BMI 30-39. 9)              Plan   Do you need any equipment today?  Yes PAP supplies  -AHI remains controlled despite 18 min large leak will have patient obtian new mask and continue on same pressure  - Advised to continue current positive airway pressure therapy with above described pressure. - Advised to keep good compliance with current recommended pressure to get optimal results and clinical improvement  - Recommend 7-9 hours of sleep with PAP  - Instructed to call [employer] company regarding supplies if needed.   -Patient to call my office for earlier appointment if needed for worsening of sleep symptoms.   -Discussed weight loss  - Educated about my impression and plan. Patient verbalizes understanding.   We will see back in: 1 year with download     Information added by my medical assistant/LPN was reviewed today     Electronically signed by JADEN Blake CNP on 4/28/2022 at 10:40 AM

## 2022-05-10 ENCOUNTER — TELEPHONE (OUTPATIENT)
Dept: CARDIOLOGY CLINIC | Age: 82
End: 2022-05-10

## 2022-05-10 ENCOUNTER — PROCEDURE VISIT (OUTPATIENT)
Dept: CARDIOLOGY CLINIC | Age: 82
End: 2022-05-10
Payer: MEDICARE

## 2022-05-10 DIAGNOSIS — I50.32 CHRONIC DIASTOLIC CHF (CONGESTIVE HEART FAILURE) (HCC): Primary | ICD-10-CM

## 2022-05-10 PROCEDURE — 93297 REM INTERROG DEV EVAL ICPMS: CPT | Performed by: NUCLEAR MEDICINE

## 2022-05-10 PROCEDURE — G2066 INTER DEVC REMOTE 30D: HCPCS | Performed by: NUCLEAR MEDICINE

## 2022-05-10 NOTE — TELEPHONE ENCOUNTER
PT WAS OUT OF TOWN AND WAS NOT TAKING ANY DIURETICS BUT NOW SHE IS BACK HOME AND SHE STARTED TAKING THEM AGAIN

## 2022-05-10 NOTE — PROGRESS NOTES
[facility] [facility] corvue     2.9 months on device, monthly downloads  Elevated corvue, will address in separate encounter

## 2022-05-23 DIAGNOSIS — G89.29 OTHER CHRONIC PAIN: ICD-10-CM

## 2022-05-23 RX ORDER — TRAMADOL HYDROCHLORIDE 50 MG/1
25 TABLET ORAL EVERY 6 HOURS PRN
Qty: 60 TABLET | Refills: 5 | Status: SHIPPED | OUTPATIENT
Start: 2022-05-23 | End: 2022-11-19

## 2022-06-13 ENCOUNTER — PROCEDURE VISIT (OUTPATIENT)
Dept: CARDIOLOGY CLINIC | Age: 82
End: 2022-06-13
Payer: MEDICARE

## 2022-06-13 DIAGNOSIS — I50.32 CHRONIC DIASTOLIC CHF (CONGESTIVE HEART FAILURE) (HCC): Primary | ICD-10-CM

## 2022-06-13 PROCEDURE — 93297 REM INTERROG DEV EVAL ICPMS: CPT | Performed by: NUCLEAR MEDICINE

## 2022-06-13 PROCEDURE — G2066 INTER DEVC REMOTE 30D: HCPCS | Performed by: NUCLEAR MEDICINE

## 2022-06-23 RX ORDER — NITROFURANTOIN 25; 75 MG/1; MG/1
100 CAPSULE ORAL 2 TIMES DAILY
Qty: 10 CAPSULE | Refills: 0 | Status: SHIPPED | OUTPATIENT
Start: 2022-06-23 | End: 2022-06-28

## 2022-07-01 RX ORDER — LOPERAMIDE HYDROCHLORIDE 2 MG/1
2 CAPSULE ORAL 3 TIMES DAILY PRN
Qty: 90 CAPSULE | Refills: 2 | Status: SHIPPED | OUTPATIENT
Start: 2022-07-01

## 2022-07-01 RX ORDER — GREEN TEA/HOODIA GORDONII 315-12.5MG
1 CAPSULE ORAL DAILY
Qty: 30 TABLET | Refills: 0 | Status: SHIPPED | OUTPATIENT
Start: 2022-07-01 | End: 2022-07-25 | Stop reason: SDUPTHER

## 2022-07-04 RX ORDER — CIPROFLOXACIN 250 MG/1
250 TABLET, FILM COATED ORAL 2 TIMES DAILY
Qty: 6 TABLET | Refills: 0 | Status: SHIPPED | OUTPATIENT
Start: 2022-07-04 | End: 2022-07-05 | Stop reason: SDUPTHER

## 2022-07-05 ENCOUNTER — TELEPHONE (OUTPATIENT)
Dept: FAMILY MEDICINE CLINIC | Age: 82
End: 2022-07-05

## 2022-07-05 RX ORDER — CIPROFLOXACIN 250 MG/1
250 TABLET, FILM COATED ORAL 2 TIMES DAILY
Qty: 6 TABLET | Refills: 0 | Status: SHIPPED | OUTPATIENT
Start: 2022-07-05 | End: 2022-07-08

## 2022-07-05 NOTE — TELEPHONE ENCOUNTER
Received a call from the patients daughter Shadia Bowie stating that yesterday a script for Cipro was sent into [facility] in [place] for a UTI. She stated that the pharmacy was closed yesterday and when she called to day she was told that they are so far behind due to the holiday and did not know what time they could till it. Shadia Sulmajosiah stated that they are leaving to come back to [facility] [facility] in an hour so she told [facility] to cancel that script and is requesting a new one be sent into [facility] so she can pick it up here in [facility]. Please advise. Shadia Sulmajosiah is aware that if no call back the script was sent.

## 2022-07-18 ENCOUNTER — PROCEDURE VISIT (OUTPATIENT)
Dept: CARDIOLOGY CLINIC | Age: 82
End: 2022-07-18

## 2022-07-18 DIAGNOSIS — Z95.810 S/P ICD (INTERNAL CARDIAC DEFIBRILLATOR) PROCEDURE: Primary | ICD-10-CM

## 2022-07-18 NOTE — PROGRESS NOTES
Dr Bakari Brown pt   [facility] [facility] chanel biv icd remote   Battery <3 mths remaining  Pt is on monthly battery watch    Dddr     A paced 26%  V paced 975    P waves 0.5  Rv waves 5.2    Atrial impedence 440  Rv impedence 380  Lv impedence 440  Hv 44    Atrial threshold 0.375 @ 0.5  Rv threshold 0.625 @ 0.5  Lv threshold not obtained per the device   Atrial and vent amplitudes auto      Corvue wnl  N/c d/t less than 90 days

## 2022-07-25 ENCOUNTER — TELEPHONE (OUTPATIENT)
Dept: CARDIOLOGY CLINIC | Age: 82
End: 2022-07-25

## 2022-07-25 DIAGNOSIS — Z45.02 ICD (IMPLANTABLE CARDIOVERTER-DEFIBRILLATOR) BATTERY DEPLETION: Primary | ICD-10-CM

## 2022-07-25 RX ORDER — GREEN TEA/HOODIA GORDONII 315-12.5MG
1 CAPSULE ORAL DAILY
Qty: 90 TABLET | Refills: 3 | Status: SHIPPED | OUTPATIENT
Start: 2022-07-25

## 2022-07-26 NOTE — TELEPHONE ENCOUNTER
Trena Tafoya called the office . I let her know her generator needs changed out. She is ok with dr Elbert Mendoza . She resides at [facility]. I aslo spoke to the nurse and informed her of this .  I told her scheduling will be calling her to get this set up       Orders placed and given to joie in scheduling

## 2022-07-27 NOTE — TELEPHONE ENCOUNTER
Procedure: Battery change-  SJM BIV ICD  Date: 08.05.2022  Arrival Time: 8am  Meds to Hold: none    Incision / device check 08.15.2022 at 900am      Instructions verbalized to the Radha RN at   Instructions faxed  to the Joel Horne- att: [facility]- [phone]

## 2022-08-04 ENCOUNTER — PREP FOR PROCEDURE (OUTPATIENT)
Dept: CARDIOLOGY | Age: 82
End: 2022-08-04

## 2022-08-04 RX ORDER — SODIUM CHLORIDE 0.9 % (FLUSH) 0.9 %
5-40 SYRINGE (ML) INJECTION EVERY 12 HOURS SCHEDULED
Status: CANCELLED | OUTPATIENT
Start: 2022-08-04

## 2022-08-04 RX ORDER — SODIUM CHLORIDE 9 MG/ML
INJECTION, SOLUTION INTRAVENOUS PRN
Status: CANCELLED | OUTPATIENT
Start: 2022-08-04

## 2022-08-04 RX ORDER — SODIUM CHLORIDE 0.9 % (FLUSH) 0.9 %
5-40 SYRINGE (ML) INJECTION PRN
Status: CANCELLED | OUTPATIENT
Start: 2022-08-04

## 2022-08-05 ENCOUNTER — HOSPITAL ENCOUNTER (OUTPATIENT)
Dept: INPATIENT UNIT | Age: 82
Discharge: HOME | End: 2022-08-05
Attending: INTERNAL MEDICINE | Admitting: INTERNAL MEDICINE
Payer: MEDICARE

## 2022-08-05 VITALS
HEIGHT: 64 IN | TEMPERATURE: 97.8 F | HEART RATE: 64 BPM | OXYGEN SATURATION: 95 % | BODY MASS INDEX: 36.7 KG/M2 | RESPIRATION RATE: 17 BRPM | WEIGHT: 215 LBS | DIASTOLIC BLOOD PRESSURE: 54 MMHG | SYSTOLIC BLOOD PRESSURE: 122 MMHG

## 2022-08-05 PROBLEM — Z45.02 IMPLANTABLE CARDIOVERTER-DEFIBRILLATOR (ICD) GENERATOR END OF LIFE: Status: ACTIVE | Noted: 2022-08-05

## 2022-08-05 LAB
ANION GAP SERPL CALCULATED.3IONS-SCNC: 9 MEQ/L (ref 8–16)
APTT: 31.3 SECONDS (ref 22–38)
BUN BLDV-MCNC: 19 MG/DL (ref 7–22)
CALCIUM SERPL-MCNC: 9.4 MG/DL (ref 8.5–10.5)
CHLORIDE BLD-SCNC: 105 MEQ/L (ref 98–111)
CO2: 29 MEQ/L (ref 23–33)
CREAT SERPL-MCNC: 1 MG/DL (ref 0.4–1.2)
ERYTHROCYTE [DISTWIDTH] IN BLOOD BY AUTOMATED COUNT: 13.1 % (ref 11.5–14.5)
ERYTHROCYTE [DISTWIDTH] IN BLOOD BY AUTOMATED COUNT: 44.8 FL (ref 35–45)
GFR SERPL CREATININE-BSD FRML MDRD: 53 ML/MIN/1.73M2
GLUCOSE BLD-MCNC: 101 MG/DL (ref 70–108)
HCT VFR BLD CALC: 39.9 % (ref 37–47)
HEMOGLOBIN: 12.9 GM/DL (ref 12–16)
INR BLD: 0.95 (ref 0.85–1.13)
MCH RBC QN AUTO: 30.2 PG (ref 26–33)
MCHC RBC AUTO-ENTMCNC: 32.3 GM/DL (ref 32.2–35.5)
MCV RBC AUTO: 93.4 FL (ref 81–99)
PLATELET # BLD: 182 THOU/MM3 (ref 130–400)
PMV BLD AUTO: 11.2 FL (ref 9.4–12.4)
POTASSIUM REFLEX MAGNESIUM: 4.4 MEQ/L (ref 3.5–5.2)
RBC # BLD: 4.27 MILL/MM3 (ref 4.2–5.4)
SODIUM BLD-SCNC: 143 MEQ/L (ref 135–145)
WBC # BLD: 5.5 THOU/MM3 (ref 4.8–10.8)

## 2022-08-05 PROCEDURE — 33264 RMVL & RPLCMT DFB GEN MLT LD: CPT

## 2022-08-05 PROCEDURE — 6360000002 HC RX W HCPCS

## 2022-08-05 PROCEDURE — 2500000003 HC RX 250 WO HCPCS

## 2022-08-05 PROCEDURE — A4217 STERILE WATER/SALINE, 500 ML: HCPCS | Performed by: NURSE PRACTITIONER

## 2022-08-05 PROCEDURE — 33264 RMVL & RPLCMT DFB GEN MLT LD: CPT | Performed by: INTERNAL MEDICINE

## 2022-08-05 PROCEDURE — 85027 COMPLETE CBC AUTOMATED: CPT

## 2022-08-05 PROCEDURE — 2580000003 HC RX 258: Performed by: INTERNAL MEDICINE

## 2022-08-05 PROCEDURE — C1882 AICD, OTHER THAN SING/DUAL: HCPCS

## 2022-08-05 PROCEDURE — 33223 RELOCATE POCKET FOR DEFIB: CPT

## 2022-08-05 PROCEDURE — 80048 BASIC METABOLIC PNL TOTAL CA: CPT

## 2022-08-05 PROCEDURE — 93005 ELECTROCARDIOGRAM TRACING: CPT | Performed by: INTERNAL MEDICINE

## 2022-08-05 PROCEDURE — 6360000002 HC RX W HCPCS: Performed by: NURSE PRACTITIONER

## 2022-08-05 PROCEDURE — 36415 COLL VENOUS BLD VENIPUNCTURE: CPT

## 2022-08-05 PROCEDURE — 99152 MOD SED SAME PHYS/QHP 5/>YRS: CPT

## 2022-08-05 PROCEDURE — 85610 PROTHROMBIN TIME: CPT

## 2022-08-05 PROCEDURE — 99153 MOD SED SAME PHYS/QHP EA: CPT

## 2022-08-05 PROCEDURE — 2580000003 HC RX 258: Performed by: NURSE PRACTITIONER

## 2022-08-05 PROCEDURE — 85730 THROMBOPLASTIN TIME PARTIAL: CPT

## 2022-08-05 RX ORDER — CEFAZOLIN SODIUM 1 G/50ML
1000 INJECTION, SOLUTION INTRAVENOUS
Status: COMPLETED | OUTPATIENT
Start: 2022-08-05 | End: 2022-08-05

## 2022-08-05 RX ORDER — ACETAMINOPHEN 325 MG/1
650 TABLET ORAL EVERY 4 HOURS PRN
Status: DISCONTINUED | OUTPATIENT
Start: 2022-08-05 | End: 2022-08-05 | Stop reason: HOSPADM

## 2022-08-05 RX ORDER — ONDANSETRON 2 MG/ML
4 INJECTION INTRAMUSCULAR; INTRAVENOUS EVERY 6 HOURS PRN
Status: DISCONTINUED | OUTPATIENT
Start: 2022-08-05 | End: 2022-08-05 | Stop reason: HOSPADM

## 2022-08-05 RX ORDER — SODIUM CHLORIDE 0.9 % (FLUSH) 0.9 %
5-40 SYRINGE (ML) INJECTION EVERY 12 HOURS SCHEDULED
Status: DISCONTINUED | OUTPATIENT
Start: 2022-08-05 | End: 2022-08-05 | Stop reason: HOSPADM

## 2022-08-05 RX ORDER — SODIUM CHLORIDE 9 MG/ML
INJECTION, SOLUTION INTRAVENOUS CONTINUOUS
Status: DISCONTINUED | OUTPATIENT
Start: 2022-08-05 | End: 2022-08-05 | Stop reason: HOSPADM

## 2022-08-05 RX ORDER — CLINDAMYCIN HYDROCHLORIDE 300 MG/1
300 CAPSULE ORAL 3 TIMES DAILY
Qty: 9 CAPSULE | Refills: 0 | Status: SHIPPED | OUTPATIENT
Start: 2022-08-05 | End: 2022-10-21 | Stop reason: SDUPTHER

## 2022-08-05 RX ADMIN — VANCOMYCIN HYDROCHLORIDE 250 MG: 1 INJECTION, POWDER, LYOPHILIZED, FOR SOLUTION INTRAVENOUS at 12:04

## 2022-08-05 RX ADMIN — SODIUM CHLORIDE: 9 INJECTION, SOLUTION INTRAVENOUS at 09:25

## 2022-08-05 RX ADMIN — CEFAZOLIN SODIUM 1000 MG: 1 INJECTION, SOLUTION INTRAVENOUS at 09:25

## 2022-08-05 NOTE — DISCHARGE INSTRUCTIONS
Activity     You should gradually return to your normal activities. For the first 4 weeks:         Do not lift more than 10 pounds         At your two-week follow-up visit, ask your doctor which  of the above activities you can resume         ALWAYS avoid any contact sports or hard blows to the chest or abdomen           Wound care      May remove pressure dressing in 24 hours      Remove gauze dressing in 48 hours, leave steri strips on . Keep your incision dry for at least 7 days, and then you may shower. Sponge bathes around the device insertion site for the first week, keeping the incision dry. Do not apply any ointment, talc, or lotion to the incision. Do not scratch, rub or touch the incision with your hands     The steri-strips (tape strips) will be removed at your follow-up visit, if they have not yet peeled off on their own     Call your doctor immediately if your incision looks re, becomes swollen or tender, or begins to ooze fluid. Also, notify your doctor at once if you develop A temperature  greater than 100 degrees Fahrenheit. What to do if you get a shock or pass out (Defibrillators only)     If you get a shock one time and feel normal afterwards, make an appointment to see your doctor. If you get a shock more than once over a few mnutes or if you do not feel normal after on shock, call 911 and notify your doctor. Magnets and electromagnetic interference     Some devices or equipment that we encounter send out electrical signals that can:     Cause  you to get a shock   (defibrillators only)     Prevent your defibrillator from detecting life-threatening rhythm abnormalities in your heart.       You must avoid:                Airport magnet security wants                Diathermy or other heart treatments                Arc or resistance welding                Electrical power generator plants                Electric cautery that is used for surgical procedures are at high risk of having one. The ICD is placed under the skin of the chest. It's attached to a wire (calleda lead). The lead is placed under the skin so that it lies near your heart. How does it work? An ICD is always checking your heart rate and rhythm. If it detects a life-threatening heart rhythm, the ICD sends an electric shock to the heart. This restores a normal rhythm. The device then goes back to its watchful mode. Your doctor will check the ICD regularly to make sure it is working right and isn't causing any problems. Your doctor will also check the battery to see ifit needs to be replaced. How is it placed? Placing a subcutaneous ICD is minor surgery. You will likely have medicine tomake you feel relaxed and sleepy during the surgery. Your doctor makes a small cut (incision) in the skin at the left side of your chest. The doctor puts a lead (wire) through the cut. The lead is placed under the skin near the breastbone so that it lies near your heart. The device is placed under the skin at the side of your chest. Your doctor connects the leadto the ICD. Then the incision is closed. Your doctor also programs the ICD. Most people spend the night in the hospital. This is just to make sure that thedevice is working and that there are no problems from the surgery. How does it feel to get a shock? The shock from an ICD hurts briefly. People feel it in different ways. It's been described as feeling like a punch in the chest. But the shock is a signthat the ICD is doing its job. It's there to save your life. There's no way to know how often a shock might occur. It might never happen. Not knowing when or if a shock might happen may make you nervous. Knowing what to do if you get shocked can help. Ask your doctor for an action plan. Raman Deal will guide you step-by-step if a shock happens. How can you live well with an ICD? You can live a normal life with your ICD.  Here are a few tips for living wellwith your ICD. Use certain electric devices with caution. Some electric devices have a strong electromagnetic field. This field can keep your ICD from working right for a short time. Check with your doctor about what you need to avoid and what you need to keep a short distance away from yourICD. Many household and office electronics do not affect your ICD. Be sure that your health professionals know that you have an ICD. This includes any doctor, dentist, or other health professional you see. Always carry a card that tells what kind of device you have. Wear medical alert jewelry that says you have an ICD. You can buy this at most [employer]. If you get a shock, follow your action plan for what to do. Ask your doctor what sort of activity and intensity is safe for you. You can lead an active life with an ICD. As you plan for your future and the end of life, be sure to include plans for your ICD. You can make the decision to turn off your ICD as part of the medicaltreatment you want at the end of life. Follow-up care is a key part of your treatment and safety. Be sure to make and go to all appointments, and call your doctor if you are having problems. It's also a good idea to know your test results and keep alist of the medicines you take. Where can you learn more? Go to https://Meshfire.[employer]. org and sign in to your [employer] account. Enter S195 in the [facility] box to learn more about \"Learning About a Subcutaneous ICD (Implantable Cardioverter-Defibrillator). \"     If you do not have an account, please click on the \"Sign Up Now\" link. Current as of: January 10, 2022               Content Version: 13.3  © 3841-3918 [employer]wise, Inc[employer]. Care instructions adapted under license by [facility] ([facility]).  If you have questions about a medical condition or this instruction, always ask your healthcare professional. Bobo Rubalcava disclaims any warranty or liability for your use of this information.

## 2022-08-05 NOTE — PLAN OF CARE
Discharge instructions given to pt and daughter, [facility]   Both \"voiced understanding\"    Discharged per transport team with personal belongings   Has cell phone, walker with her   Denies needs or complaints   Has been up in the room with one assist and doing well

## 2022-08-05 NOTE — H&P
[facility] ()  [address] [place] [id number]  H&P and SEDATION/ANALGESIA     Patient demographics:  Date:   8/5/2022  Patient name: Jorge A Busby  YOB: 1940  Sex: female   MRN:   [id number]    Reason for admission or planned procedure: BiV ICD generator change,     Code Status: Full Code    Consent:I have discussed with the patient and/or the patient representative the indication, alternatives, and the possible risks and/or complications of the planned procedure and the anesthesia methods. The patient and/or patient representative appear to understand and agree to proceed. Brief clinical summary:  82/F with CAD/PCI (2009), HFnlEF (LVEF 35%=>55%), primary prevention BiV ICD (1/8/2018) with pulse generator at [facility], presents electively for generator change. Underlying sinus and LBBB. Normal device parameters.      Past Medical History:  Past Medical History:   Diagnosis Date    Arthritis     general    Breast CA ([address] 12/03    right    CAD (coronary artery disease) 2009    stent in [place]    CHF (congestive heart failure) (HCC)     Closed compression fracture of first lumbar vertebra ([address] 2/7/2018    Colon polyps 8/97; 3/11    COPD (chronic obstructive pulmonary disease) ([address] 8/5/2017    Diverticulosis 1/06    Erosive gastritis 11/06    Esophageal stricture 03/2011    Three times, Dr. Deepika Terry    Hyperlipidemia     Hypertension     Internal hemorrhoid 1/06    LUISA on CPAP     Osteopenia determined by x-ray 1999    Osteoporosis of vertebra 6/2018 Per CT thoracolumbar    Pneumonia 2009    Stress incontinence, female     Vertigo        Past Surgical History:  Past Surgical History:   Procedure Laterality Date    ARM SURGERY Left 11/27/2018    plate in arm    BREAST LUMPECTOMY  1/04    wt axillary dissection    CARDIAC DEFIBRILLATOR PLACEMENT  01/08/2018    Biventricular pacemaker ICD, Dr. Yoav Hernandez  3/2011 CORONARY ANGIOPLASTY WITH STENT PLACEMENT  2009    DILATATION, ESOPHAGUS  2011    HIP PINNING Left 8/20/2019    LEFT HIP INTERTAIN performed by Norma Isaacs MD at [address] or 1977    bleeding    JOINT REPLACEMENT Left     Shoulder    AL OFFICE/OUTPT VISIT,PROCEDURE ONLY N/A 8/2/2018    EGD DIL performed by Sandi Lozano MD at [address] OFFICE/OUTPT VISIT,PROCEDURE ONLY Left 11/26/2018    ORIF LEFT PERIPROSTHETIC DISTAL HUMERAL FRACTURE performed by Norma Isaacs MD at [facility] Right 2/12/2018    LEFT HUMERUS OPEN REDUCTION INTERNAL FIXATION performed by Norma Isaacs MD at [facility]    UPPER GASTROINTESTINAL ENDOSCOPY Left 8/2/2018    EGD BIOPSY performed by Sandi L[facility] MD at [facility]        Allergies: Allergies as of 08/05/2022 - Fully Reviewed 08/05/2022   Allergen Reaction Noted    Keflex [cephalexin] Shortness Of Breath and Swelling 04/12/2022    Antivert [meclizine] Other (See Comments) 09/04/2019    Bactrim [sulfamethoxazole-trimethoprim]  12/23/2020    Ciprofloxacin Itching 08/06/2017    Neomycin  06/20/2012    Pcn [penicillins] Hives 06/20/2012    Tizanidine  01/06/2020    Zanaflex [tizanidine hcl] Other (See Comments) 03/05/2018    Levaquin [levofloxacin] Itching 08/05/2017     Additional information:       Medications     Current Facility-Administered Medications:     sodium chloride flush 0.9 % injection 5-40 mL, 5-40 mL, IntraVENous, 2 times per day, JADEN Hughes - CNP    vancomycin (VANCOCIN) 250 mg in sodium chloride 0.9 % 250 mL irrigation, 250 mg, Irrigation, Once, JADEN Hughes - CNP    0.9 % sodium chloride infusion, , IntraVENous, Continuous, Shantel Stanley MD, Last Rate: 50 mL/hr at 08/05/22 0925, New Bag at 08/05/22 0925  Prior to Admission medications    Medication Sig Start Date End Date Taking?  Authorizing Provider   Probiotic Acidophilus calcium-vitamin D (OSCAL-500) 500-200 MG-UNIT per tablet Take 1 tablet by mouth 2 times daily 3/6/18   Rebecca Moreira MD     Aspirin  [x] 81 mg  [] 325 mg  [] None  Antiplatelet drug therapy use last 5 days  [x]No []Yes  Coumadin Use Last 5 Days [x]No []Yes  Other anticoagulant use last 5 days  [x]No []Yes  Additional information: Per medical records       Vitals:   Vitals:    08/05/22 0930   BP: 139/66   Pulse: 63   Resp: 16   Temp: 97.8 °F (36.6 °C)   SpO2: 97%       Physical Examination:   GENERAL: Alert and oriented. No distress. EYES: No pallor or icterus. ENT: No central cyanosis. VESSELS: No jugular venous distension or carotid bruits. HEART: Normal S1/S2. No murmur, rub or gallop. LUNGS: Clear to auscultation. CHEST WALL: No erosions, discharge or swelling at device site. ABDOMEN: Soft and non-tender. EXTREMITIES: No lower extremity edema. Feet are warm. NEUROLOGICAL: Grossly intact. Procedure Plannd:   [] AF ablation [] Atrial Flutter ablation [] SVT ablation [] PVC/VT ablation [] EP study  [] Pacemaker implantation [] AICD implantation [] BiV PM/ICD implantation   [x] Generator change [] Loop recorder implantation [] Loop recorder explantation. [] Micra leadless pacemaker implantation. [] His bundle/Left bundle pacemaker implantation. [] Lead revision. [] Pocket Revision. [] NEAL. [] Cardioversion    []Other:       Planned Sedation/Analgesia:  [] General anesthesia. [x] Midazolam [x] Fentanyl [] Propofol [] Propofol with anesthesia team.    []Midazolam []Meperidine []Sublimaze []Morphine [] Diazepam    []Other:       ASA Classification  []1 []2 [x]3 []4 []5  Class 1: A normal healthy patient  Class 2: Pt with mild to moderate systemic disease  Class 3: Severe systemic disease or disturbance  Class 4: Severe systemic disorders that are already life threatening. Class 5: Moribund pt with little chances of survival, for more than 24 hours.   Mallampati Airway Classification:   []1 []2 [x]3 []4    [x]Pre-procedure diagnostic studies complete and results available. Comment:    [x]Previous sedation/anesthesia experiences assessed. Comment:    [x]The patient is an appropriate candidate to undergo the planned procedure sedation and anesthesia. (Refer to nursing sedation/analgesia documentation record)  [x]Formulation and discussion of sedation/procedure plan, risks, and expectations with patient and/or responsible adult completed. [x]Patient examined immediately prior to the procedure.  (Refer to nursing sedation/analgesia documentation record)        Cain Turcios MD MD [facility]  Electronically signed 8/5/2022 at 10:13 AM

## 2022-08-05 NOTE — BRIEF OP NOTE
Brief Postoperative Note    Date:   8/5/2022  Patient name: Deana Ni  YOB: 1940  Sex: female   MRN:   [id number]    PCP: Sundeep Hare DO     Procedure: BiV ICD generator change. Pre-Op Diagnosis: NICM and LBBB. Post-Op Diagnosis: Same    Surgeon: Flavia Akhtar MD, MRCP, [facility], [facility]    Assistant: Jose M Charlton. Anesthesia/sedation:  Local lidocaine/fentanyl and midazolam as needed. Estimated Blood Loss (mL): Minimal    Complications: None    Recommendations:  See Epic orders. Bed rest for 2 hours. Keep pocket site dry. No shower for 7 days ((sponge bath and tub bath OK). ICE packs locally. Monitor site for bleeding or swelling. Pressure dressing x 24 hours. Clindamycin 300 mg tid x 3 days  Follow up in device clinic in 1 week.        Electronically signed by Michela Patricio MD, Zheng Mckeon on 8/5/2022 at 12:27 PM

## 2022-08-05 NOTE — PROGRESS NOTES
0800  Pt admitted to  2E12 ambulatory for BiV ICD generator change. Pt NPO. Patient accompanied by daughter, Jameel Dee . Vital signs obtained. Assessment and data collection initiated. Oriented to room. Policies and procedures for 2E explained   All questions answered with no further questions at this time. Fall prevention and safety precautions discussed with patient. Care plan reviewed with patient and family. Patient and family verbalize understanding of the plan of care and contribute to goal setting.      1050  to procedure per bed

## 2022-08-05 NOTE — PROCEDURES
[facility]  Dept: [phone]  ELECTROPHYSIOLOGY PROCEDURE NOTE  Patients Demographics:  Date:   8/5/2022  Patient name:              Gregg Nicholson  YOB: 1940  Sex:    female   MRN:   [id number]    Cardial Electrophysiologist:   Eduardo Browne MD, [facility], [facility], [facility]    Primary Care Provider:    Gricelda Zendejas DO     Cardiologist:  Samia Hensley MD    Procedure Planned:  BiV ICD pulse generator change. Indication/s of the Procedure: BiV ICD pulse generator at [facility]. Brief Clinical Summary:   82/F with CAD/PCI (2009), HFnlEF (LVEF 35%=>55%), primary prevention BiV ICD (1/8/2018) with pulse generator at [facility], presents electively for generator change. Underlying sinus and LBBB. Chronic low RV sensing and high LV captures thresholds. Procedure/s Performed  BiV ICD pulse generator change. Pocket revision (relocation of pulse generator to lateral location). Description of the Procedure: The patient was brought to the electrophysiology lab in a fasting and non-sedated state. The device was noted to be location in left pectoral region close to sternal border with prominent device header. No erosions. The device was interrogated. The underlying was sinus with LBBB and normal AV conduction. VT/VF detection and therapies turned off. The patient was prepped and draped in a usual sterile fashion. Intravenous Fentanyl and Midazolam was used as needed for conscious sedation. The skin over the pulse generator was liberally infiltrated with 2% lidocaine for local anesthesia. A 4 cm long transverse incision was made over incision scar through the skin and subcutaneous tissue. Electrocautery was used to expose the pulse generator and to maintain good hemostasis. The pulse generator was adherent to pectoralis muscle with thick and heavily calcified scar tissue.  Using electric cautery the pulse generator was dissected out from the pectoralis muscle and taken the pocket. The leads were carefully freed from adhesions and inspected for insulation breaks. The bleeding was controlled by electric cautery. After disconnecting from the old pulse generator, the leads were checked with an external pacing system analyzer. The leads were connected to the new pulse generator. A new subcutaneous pocket lateral to old pocket was made using blunt dissection and electric cautery. The pocket was thoroughly irrigated with antibiotic solution. The leads were then wrapped carefully behind the generator and then placed in the pocket. Hemostasis was assured one last time, and the pocket was closed in 3 layers using Vicryl sutures. The incision was covered with steri-strips and sterile light dressing applied. The patient's hemodynamics including heart rate, blood pressure and oxygen saturation was monitored throughout the procedure. The patient tolerated the procedure well. Medications:  Midazolam 4 mg intravenously. Fentanyl 200 mcg intravenously. Cefazolin 2 gm intravenously for surgical prophylaxis. Lidocaine 2%, 20 cc ID/SQ for local anaesthesia. Fluoroscopy Time:  Zero. Blood Loss:  Minimal.    Complications:  None. Device Details:  Explanted pulse generator (1/8/2018): [email or web address], model [id number] and serial [id number],   Implanted pulse generator: [employer], model [id number] and serial [id number]. Right atrial lead (1/8/2018): [email or web address], model 2088TC-52 and serial [id number]. Right ventricular lead (1/8/2018): [email or web address], model [id number] and serial [id number]. Coronary sinus lead: [facility], model 1458Q and serial [id number]. Intraoperative Electric Parameters:  RA lead: 1.4 mV / 0.5 V at 0.5 ms / 380 ohms. RV lead: 3.2 mV (chronically low)/0.5 V at 0.5 ms / 340 ohms. Coronary sinus lead: 4.75 V at 1 ms / 400 ohms. Pacing vector LV 4 to RV coil (only configuration without phrenic nerve capture).     Bradycardia Settings  DDD  pulses per minute. Tachycardia Settings:  VT1 >150 bpm (monitor) and VF >214 (shock)    Summary:  Successful BiV ICD pulse generator change. Chronic low RV sensing and high LV capture thresholds. Defibrillation threshold testing not performed     Recommendations:    Observation for 1 hour. Cephalexin 500 mg every 8 hours for 5 days. Follow up in 1 week in device clinic.        Electronically signed by Michela Patricio MD, Zheng Mckeon on 8/5/2022 at 12:35 PM

## 2022-08-06 LAB
EKG ATRIAL RATE: 441 BPM
EKG P-R INTERVAL: 246 MS
EKG Q-T INTERVAL: 484 MS
EKG QRS DURATION: 150 MS
EKG QTC CALCULATION (BAZETT): 487 MS
EKG R AXIS: 165 DEGREES
EKG T AXIS: -10 DEGREES
EKG VENTRICULAR RATE: 61 BPM

## 2022-08-06 PROCEDURE — 93010 ELECTROCARDIOGRAM REPORT: CPT | Performed by: INTERNAL MEDICINE

## 2022-08-08 ENCOUNTER — TELEPHONE (OUTPATIENT)
Dept: FAMILY MEDICINE CLINIC | Age: 82
End: 2022-08-08

## 2022-08-08 NOTE — TELEPHONE ENCOUNTER
[address] Transitions Initial Follow Up Call    Outreach made within 2 business days of discharge: Yes    Patient: Dilia Strickland Patient : 1940   MRN: [id number]  Reason for Admission: There are no discharge diagnoses documented for the most recent discharge. Discharge Date: 22       Spoke with: Patient    [facility]    TCM Interactive Patient Contact:  Was patient able to fill all prescriptions: Yes  Was patient instructed to bring all medications to the follow-up visit: Yes  Is patient taking all medications as directed in the discharge summary?  Yes  Does patient understand their discharge instructions: Yes  Does patient have questions or concerns that need addressed prior to 7-14 day follow up office visit: no    Scheduled appointment with PCP within 7-14 days    Follow Up  Future Appointments   Date Time Provider Laura Dickens   2022 11:00 AM Edwardo Osuna DO [address] [facility]   8/15/2022  9:00 AM SCHEDULE, SRPS PACER NURSE N SRPX PACER [facility] - Jame Marquez   8/15/2022 11:00 AM STR EXAM ROOM 1 STRZ OP NURS Walsh HOD   2023  1:00 PM Tommy Gonzalez MD N SRPX Heart IRA Marquez   2023 10:30 AM JADEN Carrasco - [address]       Sheldon Ruiz LPN

## 2022-08-11 ENCOUNTER — OFFICE VISIT (OUTPATIENT)
Dept: FAMILY MEDICINE CLINIC | Age: 82
End: 2022-08-11
Payer: MEDICARE

## 2022-08-11 VITALS
RESPIRATION RATE: 16 BRPM | WEIGHT: 212.4 LBS | BODY MASS INDEX: 36.46 KG/M2 | OXYGEN SATURATION: 96 % | DIASTOLIC BLOOD PRESSURE: 80 MMHG | HEART RATE: 86 BPM | SYSTOLIC BLOOD PRESSURE: 118 MMHG

## 2022-08-11 DIAGNOSIS — Z99.89 OBSTRUCTIVE SLEEP APNEA ON CPAP: ICD-10-CM

## 2022-08-11 DIAGNOSIS — M81.0 AGE-RELATED OSTEOPOROSIS WITHOUT CURRENT PATHOLOGICAL FRACTURE: ICD-10-CM

## 2022-08-11 DIAGNOSIS — J44.9 CHRONIC OBSTRUCTIVE PULMONARY DISEASE, UNSPECIFIED COPD TYPE (HCC): ICD-10-CM

## 2022-08-11 DIAGNOSIS — I25.119 CORONARY ARTERY DISEASE INVOLVING NATIVE CORONARY ARTERY OF NATIVE HEART WITH ANGINA PECTORIS (HCC): ICD-10-CM

## 2022-08-11 DIAGNOSIS — E78.5 HYPERLIPIDEMIA, UNSPECIFIED HYPERLIPIDEMIA TYPE: ICD-10-CM

## 2022-08-11 DIAGNOSIS — N39.0 RECURRENT UTI: Primary | ICD-10-CM

## 2022-08-11 DIAGNOSIS — E55.9 VITAMIN D INSUFFICIENCY: ICD-10-CM

## 2022-08-11 DIAGNOSIS — G47.33 OBSTRUCTIVE SLEEP APNEA ON CPAP: ICD-10-CM

## 2022-08-11 DIAGNOSIS — I10 ESSENTIAL HYPERTENSION: ICD-10-CM

## 2022-08-11 DIAGNOSIS — N18.30 STAGE 3 CHRONIC KIDNEY DISEASE, UNSPECIFIED WHETHER STAGE 3A OR 3B CKD (HCC): ICD-10-CM

## 2022-08-11 DIAGNOSIS — R60.0 LOWER LEG EDEMA: ICD-10-CM

## 2022-08-11 DIAGNOSIS — I50.32 CHRONIC DIASTOLIC CHF (CONGESTIVE HEART FAILURE) (HCC): ICD-10-CM

## 2022-08-11 PROCEDURE — G8399 PT W/DXA RESULTS DOCUMENT: HCPCS | Performed by: FAMILY MEDICINE

## 2022-08-11 PROCEDURE — G8417 CALC BMI ABV UP PARAM F/U: HCPCS | Performed by: FAMILY MEDICINE

## 2022-08-11 PROCEDURE — 1036F TOBACCO NON-USER: CPT | Performed by: FAMILY MEDICINE

## 2022-08-11 PROCEDURE — 3023F SPIROM DOC REV: CPT | Performed by: FAMILY MEDICINE

## 2022-08-11 PROCEDURE — G8427 DOCREV CUR MEDS BY ELIG CLIN: HCPCS | Performed by: FAMILY MEDICINE

## 2022-08-11 PROCEDURE — 99214 OFFICE O/P EST MOD 30 MIN: CPT | Performed by: FAMILY MEDICINE

## 2022-08-11 PROCEDURE — 1123F ACP DISCUSS/DSCN MKR DOCD: CPT | Performed by: FAMILY MEDICINE

## 2022-08-11 PROCEDURE — 1090F PRES/ABSN URINE INCON ASSESS: CPT | Performed by: FAMILY MEDICINE

## 2022-08-11 RX ORDER — DENOSUMAB 60 MG/ML
60 INJECTION SUBCUTANEOUS ONCE
Qty: 1 ML | Refills: 1 | Status: SHIPPED | OUTPATIENT
Start: 2022-08-11 | End: 2022-08-11

## 2022-08-11 SDOH — ECONOMIC STABILITY: FOOD INSECURITY: WITHIN THE PAST 12 MONTHS, YOU WORRIED THAT YOUR FOOD WOULD RUN OUT BEFORE YOU GOT MONEY TO BUY MORE.: NEVER TRUE

## 2022-08-11 SDOH — ECONOMIC STABILITY: FOOD INSECURITY: WITHIN THE PAST 12 MONTHS, THE FOOD YOU BOUGHT JUST DIDN'T LAST AND YOU DIDN'T HAVE MONEY TO GET MORE.: NEVER TRUE

## 2022-08-11 ASSESSMENT — ENCOUNTER SYMPTOMS
RESPIRATORY NEGATIVE: 1
GASTROINTESTINAL NEGATIVE: 1

## 2022-08-11 ASSESSMENT — PATIENT HEALTH QUESTIONNAIRE - PHQ9
SUM OF ALL RESPONSES TO PHQ QUESTIONS 1-9: 0
SUM OF ALL RESPONSES TO PHQ9 QUESTIONS 1 & 2: 0
SUM OF ALL RESPONSES TO PHQ QUESTIONS 1-9: 0
1. LITTLE INTEREST OR PLEASURE IN DOING THINGS: 0
SUM OF ALL RESPONSES TO PHQ QUESTIONS 1-9: 0
SUM OF ALL RESPONSES TO PHQ QUESTIONS 1-9: 0
2. FEELING DOWN, DEPRESSED OR HOPELESS: 0

## 2022-08-11 ASSESSMENT — SOCIAL DETERMINANTS OF HEALTH (SDOH): HOW HARD IS IT FOR YOU TO PAY FOR THE VERY BASICS LIKE FOOD, HOUSING, MEDICAL CARE, AND HEATING?: NOT HARD AT ALL

## 2022-08-11 NOTE — PROGRESS NOTES
Jorge A Busby (:  1940) is a 79 y.o. female,Established patient, here for evaluation of the following chief complaint(s):  6 Month Follow-Up        Subjective   SUBJECTIVE/OBJECTIVE:  HPI:    Chief Complaint   Patient presents with    6 Month Follow-Up     Doing well overall. BPs fine. BP Readings from Last 3 Encounters:   22 118/80   22 (!) 122/54   22 122/68     Recently had her battery replaced in her ICD. Has follow up with Dr. Chris Munoz on the 8/15. Only concern today is recurrently UTI's. Asking what the next step is.     Patient Active Problem List   Diagnosis    Hyperlipidemia    Coronary artery disease involving native coronary artery of native heart with angina pectoris (HCC)    LUISA on CPAP    COPD (chronic obstructive pulmonary disease) (HCC)    S/P ICD (internal cardiac defibrillator) procedure    Nonischemic cardiomyopathy (HCC)    Class 2 severe obesity due to excess calories with serious comorbidity in adult [facility])    Hypertension    Benign positional vertigo    Age-related osteoporosis without current pathological fracture    Chest pain    Hypocalcemia    Chronic diastolic CHF (congestive heart failure) ([facility])    Sacroiliitis, not elsewhere classified ([address]    Implantable cardioverter-defibrillator (ICD) generator end of life    Chronic renal disease, stage III ([address] [772559]     Past Surgical History:   Procedure Laterality Date    ARM SURGERY Left 2018    plate in arm    BREAST LUMPECTOMY      [facility] axillary dissection    CARDIAC DEFIBRILLATOR PLACEMENT  2018    Biventricular pacemaker ICD, Dr. Yoav Hernandez  3/2011    Earlnie Santana  2009    DILATATION, ESOPHAGUS      HIP PINNING Left 2019    LEFT HIP INTERTAIN performed by Ezra Deras MD at [address] [place] [address] ([facility])   or     bleeding    JOINT REPLACEMENT Left     Shoulder    KS OFFICE/OUTPT VISIT,PROCEDURE ONLY N/A 2018    EGD DIL performed by Erin Sinha MD at [address] OFFICE/OUTPT VISIT,PROCEDURE ONLY Left 2018    ORIF LEFT PERIPROSTHETIC DISTAL HUMERAL FRACTURE performed by Kristine Landis MD at [address] Right 2018    LEFT HUMERUS OPEN REDUCTION INTERNAL FIXATION performed by Kristine Landis MD at [address]  childhood    UPPER GASTROINTESTINAL ENDOSCOPY Left 2018    EGD BIOPSY performed by Erin Sinha MD at [facility]     Social History     Tobacco Use    Smoking status: Former     Packs/day: 1.00     Years: 40.00     Pack years: 40.00     Types: Cigarettes     Quit date: 1998     Years since quittin.6    Smokeless tobacco: Never   Vaping Use    Vaping Use: Never used   Substance Use Topics    Alcohol use: Yes     Alcohol/week: 2.0 standard drinks     Types: 2 Standard drinks or equivalent per week     Comment: occasional     Drug use: No         Review of Systems   Constitutional: Negative. HENT: Negative. Respiratory: Negative. Cardiovascular: Negative. Gastrointestinal: Negative. Musculoskeletal: Negative. All other systems reviewed and are negative. Objective   Physical Exam  Vitals and nursing note reviewed. Constitutional:       General: She is not in acute distress. Appearance: Normal appearance. She is well-developed. HENT:      Head: Normocephalic and atraumatic. Right Ear: Tympanic membrane normal.      Left Ear: Tympanic membrane normal.   Eyes:      Conjunctiva/sclera: Conjunctivae normal.   Cardiovascular:      Rate and Rhythm: Normal rate and regular rhythm. Heart sounds: Normal heart sounds. No murmur heard. Pulmonary:      Effort: Pulmonary effort is normal.      Breath sounds: Normal breath sounds. No wheezing, rhonchi or rales. Abdominal:      General: There is no distension. Musculoskeletal:      Cervical back: Neck supple. Skin:     General: Skin is warm and dry. Findings: No rash (on exposed surfaces). Neurological:      General: No focal deficit present. Mental Status: She is alert. Psychiatric:         Attention and Perception: Attention normal.         Mood and Affect: Mood normal.         Speech: Speech normal.         Behavior: Behavior normal. Behavior is cooperative. Thought Content: Thought content normal.         Judgment: Judgment normal.             ASSESSMENT/PLAN:  1. Recurrent UTI  -     Holli Villa MD, [facility], [facility]  2. Age-related osteoporosis without current pathological fracture  -     denosumab (PROLIA) 60 MG/ML SOSY SC injection; Inject 1 mL into the skin once for 1 dose, Disp-1 mL, R-1Print  3. Stage 3 chronic kidney disease, unspecified whether stage 3a or 3b CKD ([address]  4. Essential hypertension  5. Chronic obstructive pulmonary disease, unspecified COPD type ([address]  6. Coronary artery disease involving native coronary artery of native heart with angina pectoris ([address]  7. Obstructive sleep apnea on CPAP  8. Chronic diastolic CHF (congestive heart failure) ([address]  9. Hyperlipidemia, unspecified hyperlipidemia type  10. Vitamin D insufficiency  11. Lower leg edema    -  Chronic medical problems stable  -  Continue current medications  -  Follow up with specialists as scheduled  -  Due for Prolia, this is scheduled on the 15th  -  Refer to Urology    Return in about 6 months (around 2/11/2023) for HTN. An electronic signature was used to authenticate this note.     --Alexander Alvarez DO

## 2022-08-11 NOTE — PROGRESS NOTES
Patient is scheduled at [facility] out patient [facility] on 8/15/22 at 11 am for a prolia injection. Order faxed to [address] [place] [address] at [phone] to fill and send to OP nursing.

## 2022-08-15 ENCOUNTER — HOSPITAL ENCOUNTER (OUTPATIENT)
Dept: NURSING | Age: 82
Discharge: HOME | End: 2022-08-15
Payer: MEDICARE

## 2022-08-15 ENCOUNTER — NURSE ONLY (OUTPATIENT)
Dept: CARDIOLOGY CLINIC | Age: 82
End: 2022-08-15

## 2022-08-15 VITALS
RESPIRATION RATE: 16 BRPM | HEART RATE: 70 BPM | TEMPERATURE: 98.4 F | SYSTOLIC BLOOD PRESSURE: 134 MMHG | OXYGEN SATURATION: 97 % | DIASTOLIC BLOOD PRESSURE: 80 MMHG

## 2022-08-15 DIAGNOSIS — Z45.02 ICD (IMPLANTABLE CARDIOVERTER-DEFIBRILLATOR) BATTERY DEPLETION: Primary | ICD-10-CM

## 2022-08-15 DIAGNOSIS — M81.0 AGE-RELATED OSTEOPOROSIS WITHOUT CURRENT PATHOLOGICAL FRACTURE: Primary | ICD-10-CM

## 2022-08-15 PROCEDURE — 6360000002 HC RX W HCPCS: Performed by: FAMILY MEDICINE

## 2022-08-15 PROCEDURE — 96372 THER/PROPH/DIAG INJ SC/IM: CPT

## 2022-08-15 RX ADMIN — DENOSUMAB 60 MG: 60 INJECTION SUBCUTANEOUS at 10:51

## 2022-08-15 ASSESSMENT — PAIN - FUNCTIONAL ASSESSMENT: PAIN_FUNCTIONAL_ASSESSMENT: NONE - DENIES PAIN

## 2022-08-15 NOTE — DISCHARGE INSTRUCTIONS
Next appointment is scheduled for February 16 at 11:00     ACTIVITY:  Continue usual care with your doctor. Call your doctor immediately if any severe problems or go to the nearest emergency room. I have been treated and hereby acknowledge receiving this instruction sheet.

## 2022-08-15 NOTE — PROGRESS NOTES
1040 Patient arrived to [facility] ambulatory for prolia injection. Oriented to room and call light  PT RIGHTS AND RESPONSIBILITIES OFFERED TO PT.     1051 Injection given and she denies complaints. Discharge instructions given and explained and she denies questions.   Discharged ambulatory     _M___ Safety:       (Environmental)  [place] to environment  Ensure ID band is correct and in place/ allergy band as needed  Assess for fall risk  Initiate fall precautions as applicable (fall band, side rails, etc.)  Call light within reach  Bed in low position/ wheels locked    __M__ Pain:       Assess pain level and characteristics  Administer analgesics as ordered  Assess effectiveness of pain management and report to MD as needed    _M___ Knowledge Deficit:  Assess baseline knowledge  Provide teaching at level of understanding  Provide teaching via preferred learning method  Evaluate teaching effectiveness    __M__ Hemodynamic/Respiratory Status:       (Pre and Post Procedure Monitoring)  Assess/Monitor vital signs and LOC  Assess Baseline SpO2 prior to any sedation  Obtain weight/height  Assess vital signs/ LOC until patient meets discharge criteria  Monitor procedure site and notify MD of any issues

## 2022-08-18 RX ORDER — ASPIRIN 81 MG/1
81 TABLET ORAL DAILY
Qty: 90 TABLET | Refills: 3 | Status: SHIPPED | OUTPATIENT
Start: 2022-08-18

## 2022-09-13 ENCOUNTER — OFFICE VISIT (OUTPATIENT)
Dept: UROLOGY | Age: 82
End: 2022-09-13
Payer: MEDICARE

## 2022-09-13 VITALS
HEIGHT: 64 IN | SYSTOLIC BLOOD PRESSURE: 138 MMHG | WEIGHT: 215 LBS | DIASTOLIC BLOOD PRESSURE: 80 MMHG | BODY MASS INDEX: 36.7 KG/M2

## 2022-09-13 DIAGNOSIS — N39.0 RECURRENT UTI: Primary | ICD-10-CM

## 2022-09-13 DIAGNOSIS — R32 URINARY INCONTINENCE, UNSPECIFIED TYPE: ICD-10-CM

## 2022-09-13 PROCEDURE — 1090F PRES/ABSN URINE INCON ASSESS: CPT | Performed by: UROLOGY

## 2022-09-13 PROCEDURE — 1123F ACP DISCUSS/DSCN MKR DOCD: CPT | Performed by: UROLOGY

## 2022-09-13 PROCEDURE — G8417 CALC BMI ABV UP PARAM F/U: HCPCS | Performed by: UROLOGY

## 2022-09-13 PROCEDURE — 99204 OFFICE O/P NEW MOD 45 MIN: CPT | Performed by: UROLOGY

## 2022-09-13 PROCEDURE — G8427 DOCREV CUR MEDS BY ELIG CLIN: HCPCS | Performed by: UROLOGY

## 2022-09-13 PROCEDURE — 0509F URINE INCON PLAN DOCD: CPT | Performed by: UROLOGY

## 2022-09-13 PROCEDURE — 1036F TOBACCO NON-USER: CPT | Performed by: UROLOGY

## 2022-09-13 PROCEDURE — G8399 PT W/DXA RESULTS DOCUMENT: HCPCS | Performed by: UROLOGY

## 2022-09-13 RX ORDER — METHENAMINE HIPPURATE 1000 MG/1
1 TABLET ORAL DAILY
Qty: 30 TABLET | Refills: 3 | Status: SHIPPED | OUTPATIENT
Start: 2022-09-13 | End: 2022-10-13

## 2022-09-13 NOTE — PROGRESS NOTES
Lian Healy MD.    [address] [place] [id number]  Dept: [phone]  Dept Fax: [phone]: [address] [facility] Office Note -     Patient:  Cedrick Euceda  YOB: 1940    The patient is a 79 y.o. female who presents today for evaluation of the following problems:   Chief Complaint   Patient presents with    New Patient    Urinary Tract Infection    referred/consultation requested by Karin Douglass DO. History of Present Illness:    Recurrent uti  Bothersome  Symptoms: confusion, some incontinence, some dysuria  Multiple allergies    Requested/reviewed records from Karin Douglass DO office and/or outside [de-identified]    (Patient's old records have been requested, reviewed and pertinent findings summarized in today's note.)    Procedures Today: N/A      Last several PSA's:  No results found for: PSA    Last total testosterone:  No results found for: TESTOSTERONE    Urinalysis today:  No results found for this visit on 09/13/22. Last BUN and creatinine:  Lab Results   Component Value Date    BUN 19 08/05/2022     Lab Results   Component Value Date    CREATININE 1.0 08/05/2022         Imaging Reviewed during this Office Visit:   Doc Peguero MD independently reviewed the images and verified the radiology reports from:    No results found.     PAST MEDICAL, FAMILY AND SOCIAL HISTORY:  Past Medical History:   Diagnosis Date    Arthritis     general    Breast CA ([address] 12/03    right    CAD (coronary artery disease) 2009    stent in [place]    CHF (congestive heart failure) (HCC)     Closed compression fracture of first lumbar vertebra ([address] 2/7/2018    Colon polyps 8/97; 3/11    COPD (chronic obstructive pulmonary disease) ([address] 8/5/2017    Diverticulosis 1/06    Erosive gastritis 11/06    Esophageal stricture 03/2011    Three times, Dr. Jovana Parekr    Hyperlipidemia Hypertension     Internal hemorrhoid 1/06    LUISA on CPAP     Osteopenia determined by x-ray 1999    Osteoporosis of vertebra 6/2018 Per CT thoracolumbar    Pneumonia 2009    Stress incontinence, female     Vertigo      Past Surgical History:   Procedure Laterality Date    ARM SURGERY Left 11/27/2018    plate in arm    BREAST LUMPECTOMY  1/04    wtih axillary dissection    CARDIAC DEFIBRILLATOR PLACEMENT  01/08/2018    Biventricular pacemaker ICD, Dr. Susan Goodwin  3/2011    Lacey Martin  2009    DILATATION, ESOPHAGUS  2011    HIP PINNING Left 8/20/2019    LEFT HIP INTERTAIN performed by Estefania Zayas MD at [address] [facility] ([facility])  1976 or 1977    bleeding    JOINT REPLACEMENT Left     Shoulder    [facility] OFFICE/OUTPT VISIT,PROCEDURE ONLY N/A 8/2/2018    EGD DIL performed by Dmitriy Pike MD at [address] [facility] OFFICE/OUTPT VISIT,PROCEDURE ONLY Left 11/26/2018    ORIF LEFT PERIPROSTHETIC DISTAL HUMERAL FRACTURE performed by Estefania Zayas MD at [facility] Right 2/12/2018    LEFT HUMERUS OPEN REDUCTION INTERNAL FIXATION performed by Estefania Zayas MD at [address] [facility] [facility]  childhood    UPPER GASTROINTESTINAL ENDOSCOPY Left 8/2/2018    EGD BIOPSY performed by Dmitriy Pike MD at [facility]     Family History   Problem Relation Age of Onset    Heart Disease Mother     High Blood Pressure Mother     Cancer Father     Heart Disease Sister     Cancer Brother     Heart Disease Brother      Outpatient Medications Marked as Taking for the 9/13/22 encounter (Office Visit) with Palmira Gonzalez MD   Medication Sig Dispense Refill    aspirin 81 MG EC tablet Take 1 tablet by mouth daily 90 tablet 3    sacubitril-valsartan (ENTRESTO) 49-51 MG per tablet Take 1 tablet by mouth 2 times daily 180 tablet 3    Probiotic Acidophilus (FLORANEX) TABS Take 1 tablet by mouth in the morning.  90 tablet 3 loperamide (RA ANTI-DIARRHEAL) 2 MG capsule Take 1 capsule by mouth 3 times daily as needed for Diarrhea 90 capsule 2    traMADol (ULTRAM) 50 MG tablet Take 0.5 tablets by mouth every 6 hours as needed for Pain for up to 180 days.  60 tablet 5    magnesium 200 MG TABS tablet Take 200 mg by mouth daily      pantoprazole (PROTONIX) 40 MG tablet Take 1 tablet by mouth every morning (before breakfast) 90 tablet 3    cetirizine (ZYRTEC) 10 MG tablet take 1 tablet by mouth at bedtime 90 tablet 3    furosemide (LASIX) 20 MG tablet take 1 tablet by mouth once daily 90 tablet 3    rosuvastatin (CRESTOR) 5 MG tablet take 1 tablet by mouth once daily 90 tablet 3    metoprolol succinate (TOPROL XL) 25 MG extended release tablet take 1/2 tablet by mouth once daily 45 tablet 3    isosorbide mononitrate (IMDUR) 30 MG extended release tablet take 1/2 tablet by mouth once daily 45 tablet 3    CRANBERRY PO Take 2 capsules by mouth 2 times daily      CPAP Machine MISC by Does not apply route Please change CPAP pressure to 11.5 with C flex of 3 cm H20. 1 each 0    diclofenac sodium 1 % GEL Apply 4 g topically 4 times daily 1 Tube 3    acetaminophen (TYLENOL) 325 MG tablet Take 2 tablets by mouth every 6 hours 120 tablet 0    calcium-vitamin D (OSCAL-500) 500-200 MG-UNIT per tablet Take 1 tablet by mouth 2 times daily 30 tablet 3       Keflex [cephalexin], Antivert [meclizine], Bactrim [sulfamethoxazole-trimethoprim], Ciprofloxacin, Neomycin, Pcn [penicillins], Tizanidine, Zanaflex [tizanidine hcl], and Levaquin [levofloxacin]  Social History     Tobacco Use   Smoking Status Former    Packs/day: 1.00    Years: 40.00    Pack years: 40.00    Types: Cigarettes    Quit date: 1998    Years since quittin.7   Smokeless Tobacco Never      (If patient a smoker, smoking cessation counseling offered)   Social History     Substance and Sexual Activity   Alcohol Use Yes    Alcohol/week: 2.0 standard drinks    Types: 2 Standard drinks or equivalent per week    Comment: occasional        REVIEW OF SYSTEMS:  Constitutional: negative  Eyes: negative  Respiratory: negative  Cardiovascular: negative  Gastrointestinal: negative  Genitourinary: see HPI  Musculoskeletal: negative  Skin: negative   Neurological: negative  Hematological/Lymphatic: negative  Psychological: negative    Physical Exam:    This a 79 y.o. female  Vitals:    09/13/22 1549   BP: 138/80     Body mass index is 36.9 kg/m². Constitutional: Patient in no acute distress;     Assessment and Plan        1. Recurrent UTI    2. Urinary incontinence, unspecified type               Plan:      Recurrent UTI- very confused when she gets UTI. Multiple diff organisms that she grown over the years. UTI every other month. This is worse symptom of all symptoms. Start hiprex daily. Incontinence- worsening. Discussed retention and confusion side effects    Discussed with daughter over phone in visit    CT scan  Cystoscopy for recurrent UTI            Prescriptions Ordered:  No orders of the defined types were placed in this encounter. Orders Placed:  No orders of the defined types were placed in this encounter.            Xavier Marshall MD

## 2022-09-20 ENCOUNTER — TELEPHONE (OUTPATIENT)
Dept: UROLOGY | Age: 82
End: 2022-09-20

## 2022-09-20 NOTE — TELEPHONE ENCOUNTER
Patient scheduled for ct abd & plevis wo cont  at [facility] on Oct 18 at 1 arrive at 1230. Order mailed with instructions or given to the patient in the office .  Pt voiced understanding

## 2022-09-26 ENCOUNTER — PROCEDURE VISIT (OUTPATIENT)
Dept: CARDIOLOGY CLINIC | Age: 82
End: 2022-09-26

## 2022-09-26 DIAGNOSIS — I50.32 CHRONIC DIASTOLIC CHF (CONGESTIVE HEART FAILURE) (HCC): Primary | ICD-10-CM

## 2022-09-28 RX ORDER — ISOSORBIDE MONONITRATE 30 MG/1
TABLET, EXTENDED RELEASE ORAL
Qty: 45 TABLET | Refills: 3 | Status: SHIPPED | OUTPATIENT
Start: 2022-09-28

## 2022-09-28 RX ORDER — METOPROLOL SUCCINATE 25 MG/1
TABLET, EXTENDED RELEASE ORAL
Qty: 45 TABLET | Refills: 3 | Status: SHIPPED | OUTPATIENT
Start: 2022-09-28

## 2022-09-28 RX ORDER — ROSUVASTATIN CALCIUM 5 MG/1
TABLET, COATED ORAL
Qty: 90 TABLET | Refills: 3 | Status: SHIPPED | OUTPATIENT
Start: 2022-09-28

## 2022-10-18 ENCOUNTER — HOSPITAL ENCOUNTER (OUTPATIENT)
Dept: CT IMAGING | Age: 82
Discharge: HOME | End: 2022-10-18
Payer: MEDICARE

## 2022-10-18 DIAGNOSIS — N39.0 RECURRENT UTI: ICD-10-CM

## 2022-10-18 PROCEDURE — 74176 CT ABD & PELVIS W/O CONTRAST: CPT

## 2022-10-19 RX ORDER — CETIRIZINE HYDROCHLORIDE 10 MG/1
TABLET ORAL
Qty: 90 TABLET | Refills: 3 | Status: SHIPPED | OUTPATIENT
Start: 2022-10-19

## 2022-10-21 ENCOUNTER — TELEPHONE (OUTPATIENT)
Dept: FAMILY MEDICINE CLINIC | Age: 82
End: 2022-10-21

## 2022-10-21 RX ORDER — CLINDAMYCIN HYDROCHLORIDE 300 MG/1
300 CAPSULE ORAL 3 TIMES DAILY
Qty: 9 CAPSULE | Refills: 0 | Status: SHIPPED | OUTPATIENT
Start: 2022-10-21 | End: 2022-10-24

## 2022-10-21 NOTE — TELEPHONE ENCOUNTER
Graciela Cornejo with [facility] called office stating they faxed over a UA result for your review yesterday. She said the family is wanting pt started on antibiotics before the weekend. See Media Tab dated today 10/21/22. Call Graciela Cornejo back at [phone]. Please advise.

## 2022-10-25 ENCOUNTER — PROCEDURE VISIT (OUTPATIENT)
Dept: UROLOGY | Age: 82
End: 2022-10-25

## 2022-10-25 VITALS
DIASTOLIC BLOOD PRESSURE: 74 MMHG | WEIGHT: 214 LBS | BODY MASS INDEX: 36.54 KG/M2 | SYSTOLIC BLOOD PRESSURE: 138 MMHG | HEIGHT: 64 IN

## 2022-10-25 DIAGNOSIS — R32 URINARY INCONTINENCE, UNSPECIFIED TYPE: ICD-10-CM

## 2022-10-25 DIAGNOSIS — N39.0 RECURRENT UTI: Primary | ICD-10-CM

## 2022-10-25 RX ORDER — NITROFURANTOIN 25; 75 MG/1; MG/1
100 CAPSULE ORAL 2 TIMES DAILY
Qty: 14 CAPSULE | Refills: 0 | Status: SHIPPED | OUTPATIENT
Start: 2022-10-25 | End: 2022-11-01

## 2022-10-25 NOTE — PROGRESS NOTES
Cystoscopy    Operative Note    Patient:  Zenobia Ohara  MRN: [id number]  YOB: 1940    Date: 10/25/22  Surgeon: Amisha Gould MD  Anesthesia: Billie Nicole Local  Indications:     Recurrent UTI- very confused when she gets UTI. Multiple diff organisms that she grown over the years. UTI every other month. This is worse symptom of all symptoms. Start hiprex daily. Incontinence- worsening. Discussed retention and confusion side effects    Discussed with daughter over phone in visit    CT scan    imaging independently reviewed by Amisha Gould MD and radiology report verified demonstrating     CT ABDOMEN PELVIS WO CONTRAST Additional Contrast? None    Result Date: 10/18/2022  PROCEDURE: CT ABDOMEN PELVIS WO CONTRAST CLINICAL INFORMATION: Recurrent UTI COMPARISON: CT abdomen and pelvis 70/5/6380 TECHNIQUE: Helical CT acquisition of the abdomen and pelvis was performed without intravenous contrast. Multiplanar reformats are provided. All CT scans at this facility use dose modulation, iterative reconstruction, and/or weight based dosing when appropriate to reduce the radiation dose to as low as reasonably achievable. FINDINGS: The noncontrast CT limits the evaluation for solid organ pathology, vascular pathology, and lymphadenopathy. Pacemaker leads are seen. The heart is not enlarged. Coronary calcifications. Very small hiatal hernia. A 2.1 cm left adrenal adenoma. A 1.4 cm right adrenal adenoma. The gallbladder is surgically absent. The spleen is not enlarged. No focal hepatic mass is seen. No obstructing ureteral calculus. Colonic diverticulosis. Marked atrophy of the pancreas. Hepatomegaly. Ectasia of the abdominal aorta at 2.9 cm. Aortoiliac calcifications. Circumferential bladder wall thickening. The uterus is surgically absent. Large amount of stool is seen within the colon. No bowel obstruction or acute inflammatory bowel process. The appendix is unremarkable.  No significantly enlarged lymph nodes are seen. Bones: The bones are markedly demineralized. Degenerative changes of the visualized thoracolumbar spine. Degenerative changes of the SI joints and the right hip. Left hip arthroplasty is seen. There is vertebrae plana deformity of T12. Mild wedge compression deformity of L3 which appears to be osteoporotic. 1. Circumferential bladder wall thickening as can be seen with chronic urinary retention or inflammatory states. Correlate with urinalysis. 2. No hydronephrosis. No obstructing ureteral calculus. 3. Ectasia of the infrarenal abdominal aorta at 2.9 cm. 4. Other findings as described above. **This report has been created using voice recognition software. It may contain minor errors which are inherent in voice recognition technology. ** Final report electronically signed by Dr Justina Barros on 10/18/2022 3:54 PM    Cystoscopy for recurrent UTI        Position: Dorsal Lithotomy  EBL: 0 ml    Findings:   The patient was prepped and draped in the usual sterile fashion. The cystoscope was advanced through the urethra and into the bladder. The bladder was thoroughly inspected and the following was noted:    Vagina: atrophic  Residual Urine: mild. Urine clear, with no obvious infection  Urethra: normal appearing urethra with no masses, tenderness or lesions urethral hypermobility not noted  Bladder: no tumor noted . no bladder diverticulum. Moderate trabeculation noted. Ureters: Orifices with normal configuration and location. The cystoscope was removed. The patient tolerated the procedure well.   Cont hiprex  Add dmannose-cranberry  Hold off proph due to multiple resistances  Cystoscopy and ct neg for path  Treat only symptomatic infections    F/u six months for uti check

## 2022-11-15 DIAGNOSIS — I10 ESSENTIAL HYPERTENSION: ICD-10-CM

## 2022-11-15 RX ORDER — FUROSEMIDE 20 MG/1
TABLET ORAL
Qty: 90 TABLET | Refills: 3 | Status: SHIPPED | OUTPATIENT
Start: 2022-11-15

## 2022-11-22 DIAGNOSIS — G89.29 OTHER CHRONIC PAIN: ICD-10-CM

## 2022-11-22 RX ORDER — TRAMADOL HYDROCHLORIDE 50 MG/1
25 TABLET ORAL EVERY 6 HOURS PRN
Qty: 60 TABLET | Refills: 5 | Status: SHIPPED | OUTPATIENT
Start: 2022-11-22 | End: 2023-05-21

## 2022-11-22 NOTE — TELEPHONE ENCOUNTER
Gabriella Preciado called requesting a refill of the below medication which has been pended for you:     Requested Prescriptions     Pending Prescriptions Disp Refills    traMADol (ULTRAM) 50 MG tablet 60 tablet 5     Sig: Take 0.5 tablets by mouth every 6 hours as needed for Pain for up to 180 days. Last Appointment Date: 8/11/2022  Next Appointment Date: 2/9/2023    Allergies   Allergen Reactions    Keflex [Cephalexin] Shortness Of Breath and Swelling    Antivert [Meclizine] Other (See Comments)     confusion    Bactrim [Sulfamethoxazole-Trimethoprim]     Ciprofloxacin Itching    Neomycin     Pcn [Penicillins] Hives     Pt reports having reaction 50 years ago    Tizanidine     Zanaflex [Tizanidine Hcl] Other (See Comments)     Causes confusion    Levaquin [Levofloxacin] Itching     Benadryl was given for tx       If no call back [facility] from [employer] will check with JOSE LUIS Gonsalves at 1 pm today.

## 2022-12-10 PROCEDURE — 93296 REM INTERROG EVL PM/IDS: CPT | Performed by: NUCLEAR MEDICINE

## 2022-12-10 PROCEDURE — 93295 DEV INTERROG REMOTE 1/2/MLT: CPT | Performed by: NUCLEAR MEDICINE

## 2022-12-12 ENCOUNTER — PROCEDURE VISIT (OUTPATIENT)
Dept: CARDIOLOGY CLINIC | Age: 82
End: 2022-12-12
Payer: MEDICARE

## 2022-12-12 DIAGNOSIS — Z45.02 ICD (IMPLANTABLE CARDIOVERTER-DEFIBRILLATOR) BATTERY DEPLETION: Primary | ICD-10-CM

## 2022-12-12 NOTE — PROGRESS NOTES
Dr Tash Diop pt   [facility] chan[facility] biv icd remote   Battery 2.6-3 yrs remaining      Dddr   A paced 25%  Bv paced 94%    Known afib / no oac's   Afib burden <1%    Ams episodes x 176 or mode swtich <1%    All noise noted on atrial lead / do not see any afib episodes

## 2022-12-14 ENCOUNTER — HOSPITAL ENCOUNTER (EMERGENCY)
Age: 82
Discharge: HOME | End: 2022-12-14
Payer: MEDICARE

## 2022-12-14 ENCOUNTER — TELEPHONE (OUTPATIENT)
Dept: FAMILY MEDICINE CLINIC | Age: 82
End: 2022-12-14

## 2022-12-14 VITALS
SYSTOLIC BLOOD PRESSURE: 123 MMHG | RESPIRATION RATE: 16 BRPM | HEART RATE: 70 BPM | TEMPERATURE: 97.4 F | WEIGHT: 204 LBS | HEIGHT: 65 IN | OXYGEN SATURATION: 97 % | DIASTOLIC BLOOD PRESSURE: 60 MMHG | BODY MASS INDEX: 33.99 KG/M2

## 2022-12-14 DIAGNOSIS — R35.0 URINARY FREQUENCY: ICD-10-CM

## 2022-12-14 DIAGNOSIS — R68.89 FORGETFULNESS: Primary | ICD-10-CM

## 2022-12-14 LAB
BILIRUBIN URINE: NEGATIVE
BLOOD, URINE: NEGATIVE
CHARACTER, URINE: CLEAR
COLOR: YELLOW
GLUCOSE URINE: NEGATIVE MG/DL
KETONES, URINE: NEGATIVE
LEUKOCYTE ESTERASE, URINE: NEGATIVE
NITRITE, URINE: NEGATIVE
PH UA: 5.5 (ref 5–9)
PROTEIN UA: NEGATIVE MG/DL
SPECIFIC GRAVITY UA: 1.01 (ref 1–1.03)
UROBILINOGEN, URINE: 0.2 EU/DL (ref 0.2–1)

## 2022-12-14 PROCEDURE — 99213 OFFICE O/P EST LOW 20 MIN: CPT

## 2022-12-14 PROCEDURE — 99213 OFFICE O/P EST LOW 20 MIN: CPT | Performed by: NURSE PRACTITIONER

## 2022-12-14 PROCEDURE — 81003 URINALYSIS AUTO W/O SCOPE: CPT

## 2022-12-14 ASSESSMENT — ENCOUNTER SYMPTOMS
SORE THROAT: 0
NAUSEA: 0
VOMITING: 0
COUGH: 0
SHORTNESS OF BREATH: 0

## 2022-12-14 ASSESSMENT — PAIN - FUNCTIONAL ASSESSMENT: PAIN_FUNCTIONAL_ASSESSMENT: NONE - DENIES PAIN

## 2022-12-14 NOTE — TELEPHONE ENCOUNTER
[facility] with [facility] [address] calling to state patient is there and wanting her urine checked. Aware she has not contacted office regarding this and there are no open orders for this. She will advise patient to call office regarding.

## 2022-12-14 NOTE — ED PROVIDER NOTES
[place]  Urgent Care Encounter       CHIEF COMPLAINT       Chief Complaint   Patient presents with    Urinary Tract Infection     Dark urine and confusion       Nurses Notes reviewed and I agree except as noted in the HPI. HISTORY OF PRESENT ILLNESS   Julee Abraham is a 79 y.o. female who presents for concerns of urinary tract infection. Patient states that for the past 3 to 4 days she has had symptoms of \"fogginess\". States that she has not had any true confusion but will lose things frequently and forget where she placed them. She states that she has had some urinary frequency but denies any significant dysuria but states that her urine has been dark and this has her concern for UTI. Patient denies any chest pain, shortness of breath or any other symptoms. The history is provided by the patient. REVIEW OF SYSTEMS     Review of Systems   Constitutional:  Negative for chills and fever. HENT:  Negative for congestion and sore throat. Respiratory:  Negative for cough and shortness of breath. Cardiovascular:  Negative for chest pain. Gastrointestinal:  Negative for nausea and vomiting. Genitourinary:  Positive for frequency. Negative for dysuria. Musculoskeletal:  Negative for arthralgias and myalgias. Skin:  Negative for rash. Allergic/Immunologic: Negative for environmental allergies. Neurological:  Negative for dizziness, weakness and headaches.      PAST MEDICAL HISTORY         Diagnosis Date    Arthritis     general    Breast CA ([facility] 12/03    right    CAD (coronary artery disease) 2009    stent in [place]    CHF (congestive heart failure) ([facility])     Closed compression fracture of first lumbar vertebra ([facility] 2/7/2018    Colon polyps 8/97; 3/11    COPD (chronic obstructive pulmonary disease) ([facility] 8/5/2017    Diverticulosis 1/06    Erosive gastritis 11/06    Esophageal stricture 03/2011    Three times, Dr. Evelyn Meraz    Hyperlipidemia     Hypertension Internal hemorrhoid 1/06    LUISA on CPAP     Osteopenia determined by x-ray 1999    Osteoporosis of vertebra 6/2018 Per CT thoracolumbar    Pneumonia 2009    Stress incontinence, female     Vertigo        SURGICALHISTORY     Patient  has a past surgical history that includes Hysterectomy (1976 or 1977); Cholecystectomy (1993); Tonsillectomy (childhood); Coronary angioplasty with stent (2009); Breast lumpectomy (1/04); Colonoscopy (3/2011); joint replacement (Left); Dilatation, esophagus (2011); Cardiac defibrillator placement (01/08/2018); pr open fixatn mid humerus fracture (Right, 2/12/2018); pr office/outpt visit,procedure only (N/A, 8/2/2018); Upper gastrointestinal endoscopy (Left, 8/2/2018); pr office/outpt visit,procedure only (Left, 11/26/2018); Arm Surgery (Left, 11/27/2018); and hip pinning (Left, 8/20/2019). CURRENT MEDICATIONS       Previous Medications    ACETAMINOPHEN (TYLENOL) 325 MG TABLET    Take 2 tablets by mouth every 6 hours    ASPIRIN 81 MG EC TABLET    Take 1 tablet by mouth daily    CALCIUM-VITAMIN D (OSCAL-500) 500-200 MG-UNIT PER TABLET    Take 1 tablet by mouth 2 times daily    CETIRIZINE (ZYRTEC) 10 MG TABLET    take 1 tablet by mouth at bedtime    CPAP MACHINE MISC    by Does not apply route Please change CPAP pressure to 11.5 with C flex of 3 cm H20.     CRANBERRY PO    Take 2 capsules by mouth 2 times daily    CRANBERRY-VITAMIN C-D MANNOSE 250-30-50 MG CHEW    Take 1 capsule by mouth daily    DENOSUMAB (PROLIA) 60 MG/ML SOSY SC INJECTION    Inject 1 mL into the skin once for 1 dose    DICLOFENAC SODIUM 1 % GEL    Apply 4 g topically 4 times daily    FUROSEMIDE (LASIX) 20 MG TABLET    take 1 tablet by mouth once daily    ISOSORBIDE MONONITRATE (IMDUR) 30 MG EXTENDED RELEASE TABLET    take 1/2 tablet by mouth once daily    LOPERAMIDE (RA ANTI-DIARRHEAL) 2 MG CAPSULE    Take 1 capsule by mouth 3 times daily as needed for Diarrhea    MAGNESIUM 200 MG TABS TABLET    Take 200 mg by mouth daily    METOPROLOL SUCCINATE (TOPROL XL) 25 MG EXTENDED RELEASE TABLET    take 1/2 tablet by mouth once daily    PANTOPRAZOLE (PROTONIX) 40 MG TABLET    Take 1 tablet by mouth every morning (before breakfast)    PROBIOTIC ACIDOPHILUS (FLORANEX) TABS    Take 1 tablet by mouth in the morning. ROSUVASTATIN (CRESTOR) 5 MG TABLET    take 1 tablet by mouth once daily    SACUBITRIL-VALSARTAN (ENTRESTO) 49-51 MG PER TABLET    Take 1 tablet by mouth 2 times daily    TRAMADOL (ULTRAM) 50 MG TABLET    Take 0.5 tablets by mouth every 6 hours as needed for Pain for up to 180 days. ALLERGIES     Patient is is allergic to keflex [cephalexin], antivert [meclizine], bactrim [sulfamethoxazole-trimethoprim], ciprofloxacin, neomycin, pcn [penicillins], tizanidine, zanaflex [tizanidine hcl], and levaquin [levofloxacin]. Patients   Immunization History   Administered Date(s) Administered    COVID-19, PFIZER GRAY top, DO NOT Dilute, (age 15 y+), IM, 30 mcg/0.3 mL 05/27/2022    COVID-19, PFIZER PURPLE top, DILUTE for use, (age 15 y+), 30mcg/0.3mL 01/08/2021, 01/29/2021, 11/11/2021    Influenza Vaccine, unspecified formulation 10/19/2016    Influenza Virus Vaccine 11/29/2013, 10/08/2014, 11/02/2015    Influenza, FLUCELVAX, (age 10 mo+), MDCK, PF, 0.5mL 01/17/2020, 10/15/2020    Pneumococcal Polysaccharide ([id number]) 08/12/2021    Tdap (Boostrix, Adacel) 02/03/2018       FAMILY HISTORY     Patient's family history includes Cancer in her brother and father; Heart Disease in her brother, mother, and sister; High Blood Pressure in her mother. SOCIAL HISTORY     Patient  reports that she quit smoking about 24 years ago. Her smoking use included cigarettes. She has a 40.00 pack-year smoking history. She has never used smokeless tobacco. She reports current alcohol use of about 2.0 standard drinks per week. She reports that she does not use drugs.     PHYSICAL EXAM     ED TRIAGE VITALS  BP: 123/60, Temp: 97.4 °F (36.3 °C), Heart Rate: 70, Resp: 16, SpO2: 97 %,Estimated body mass index is 34.48 kg/m² as calculated from the following:    Height as of this encounter: 5' 4.5\" (1.638 m). Weight as of this encounter: 204 lb (92.5 kg). ,No LMP recorded. Patient has had a hysterectomy. Physical Exam  Vitals and nursing note reviewed. Constitutional:       General: She is not in acute distress. Appearance: She is well-developed. She is not diaphoretic. Eyes:      Conjunctiva/sclera:      Right eye: Right conjunctiva is not injected. Left eye: Left conjunctiva is not injected. Pupils: Pupils are equal.   Cardiovascular:      Rate and Rhythm: Normal rate and regular rhythm. Heart sounds: No murmur heard. Pulmonary:      Effort: Pulmonary effort is normal. No respiratory distress. Breath sounds: Normal breath sounds. Musculoskeletal:      Cervical back: Normal range of motion. Skin:     General: Skin is warm. Findings: No rash. Neurological:      Mental Status: She is alert and oriented to person, place, and time.    Psychiatric:         Behavior: Behavior normal.       DIAGNOSTIC RESULTS     Labs:  Results for orders placed or performed during the hospital encounter of 12/14/22   Urinalysis   Result Value Ref Range    Glucose, Ur Negative NEGATIVE mg/dl    Bilirubin Urine Negative NEGATIVE    Ketones, Urine Negative NEGATIVE    Specific Gravity, UA 1.015 1.002 - 1.030    Blood, Urine Negative NEGATIVE    pH, UA 5.50 5.0 - 9.0    Protein, UA Negative NEGATIVE mg/dl    Urobilinogen, Urine 0.20 0.2 - 1.0 eu/dl    Nitrite, Urine Negative NEGATIVE    Leukocyte Esterase, Urine Negative NEGATIVE    Color, UA Yellow STRAW-YELLOW    Character, Urine Clear CLEAR-SL CLOUD       IMAGING:    No orders to display         EKG:      URGENT CARE COURSE:     Vitals:    12/14/22 1348   BP: 123/60   Pulse: 70   Resp: 16   Temp: 97.4 °F (36.3 °C)   TempSrc: Temporal   SpO2: 97%   Weight: 204 lb (92.5 kg)   Height: 5' 4.5\" (1.638 m)       Medications - No data to display         PROCEDURES:  None    FINAL IMPRESSION      1. Forgetfulness    2. Urinary frequency          DISPOSITION/ PLAN     I discussed with the patient that her urine sample is negative for any acute findings. I did discuss that her other symptoms are concerning and did discuss the possibility of transfer to the emergency department for further work-up, however patient states that she would prefer to follow-up with her PCP and I am agreeable to this plan as she does not have any other acute symptoms and is currently alert and oriented. She is advised to follow-up closely with her PCP or present directly to the ER if her symptoms worsen or change in any way. She is agreeable to the plan as discussed.       PATIENT REFERRED TO:  [facility], [address] / [facility] [address]      DISCHARGE MEDICATIONS:  New Prescriptions    No medications on file       Discontinued Medications    No medications on file       Current Discharge Medication List          JADEN Minaya CNP    (Please note that portions of this note were completed with a voice recognition program. Efforts were made to edit the dictations but occasionally words are mis-transcribed.)          JADEN Minaya CNP  12/14/22 1147

## 2022-12-14 NOTE — ED NOTES
Discharge assessment complete. No changes. All discharge education and information given. Instructed to go to ED for any worsening symptoms. Verbalized Understanding. Left in stable cond.      Iqra Metcalf RN  12/14/22 1203

## 2022-12-14 NOTE — TELEPHONE ENCOUNTER
[facility] ADI Dwyer calling and patient now there and requesting to have urine sample collected. Advised patient was just at  [facility] and was told to have patient contact office regarding.   Miri Cabrales will notify patient to contact office or she can be seen and evaluated at [facility]

## 2022-12-14 NOTE — ED NOTES
Presents with concern for possible UTI, st urine is dark and she has some confusion. St dropped off by staff at her nursing home community.      Shlomo Esposito RN  12/14/22 8460

## 2022-12-15 ENCOUNTER — TELEPHONE (OUTPATIENT)
Dept: FAMILY MEDICINE CLINIC | Age: 82
End: 2022-12-15

## 2022-12-27 ENCOUNTER — OFFICE VISIT (OUTPATIENT)
Dept: FAMILY MEDICINE CLINIC | Age: 82
End: 2022-12-27
Payer: MEDICARE

## 2022-12-27 VITALS
HEIGHT: 65 IN | DIASTOLIC BLOOD PRESSURE: 78 MMHG | WEIGHT: 211.2 LBS | SYSTOLIC BLOOD PRESSURE: 112 MMHG | RESPIRATION RATE: 18 BRPM | BODY MASS INDEX: 35.19 KG/M2 | HEART RATE: 99 BPM

## 2022-12-27 DIAGNOSIS — R41.3 MEMORY DIFFICULTIES: Primary | ICD-10-CM

## 2022-12-27 DIAGNOSIS — N39.0 RECURRENT UTI: ICD-10-CM

## 2022-12-27 DIAGNOSIS — I10 ESSENTIAL HYPERTENSION: ICD-10-CM

## 2022-12-27 DIAGNOSIS — F32.A DEPRESSION, UNSPECIFIED DEPRESSION TYPE: ICD-10-CM

## 2022-12-27 DIAGNOSIS — N18.30 STAGE 3 CHRONIC KIDNEY DISEASE, UNSPECIFIED WHETHER STAGE 3A OR 3B CKD (HCC): ICD-10-CM

## 2022-12-27 PROCEDURE — 1123F ACP DISCUSS/DSCN MKR DOCD: CPT | Performed by: FAMILY MEDICINE

## 2022-12-27 PROCEDURE — G8484 FLU IMMUNIZE NO ADMIN: HCPCS | Performed by: FAMILY MEDICINE

## 2022-12-27 PROCEDURE — G8427 DOCREV CUR MEDS BY ELIG CLIN: HCPCS | Performed by: FAMILY MEDICINE

## 2022-12-27 PROCEDURE — 3074F SYST BP LT 130 MM HG: CPT | Performed by: FAMILY MEDICINE

## 2022-12-27 PROCEDURE — 1090F PRES/ABSN URINE INCON ASSESS: CPT | Performed by: FAMILY MEDICINE

## 2022-12-27 PROCEDURE — 1036F TOBACCO NON-USER: CPT | Performed by: FAMILY MEDICINE

## 2022-12-27 PROCEDURE — 99214 OFFICE O/P EST MOD 30 MIN: CPT | Performed by: FAMILY MEDICINE

## 2022-12-27 PROCEDURE — G8399 PT W/DXA RESULTS DOCUMENT: HCPCS | Performed by: FAMILY MEDICINE

## 2022-12-27 PROCEDURE — G8417 CALC BMI ABV UP PARAM F/U: HCPCS | Performed by: FAMILY MEDICINE

## 2022-12-27 PROCEDURE — 3078F DIAST BP <80 MM HG: CPT | Performed by: FAMILY MEDICINE

## 2022-12-27 RX ORDER — METHENAMINE HIPPURATE 1000 MG/1
TABLET ORAL
COMMUNITY
Start: 2022-12-08

## 2022-12-27 RX ORDER — CRANBERRY CONC/C/BACILL COAG 250-30-15
TABLET ORAL
COMMUNITY
Start: 2022-10-25

## 2022-12-27 ASSESSMENT — ENCOUNTER SYMPTOMS
GASTROINTESTINAL NEGATIVE: 1
RESPIRATORY NEGATIVE: 1

## 2022-12-27 NOTE — PROGRESS NOTES
Megan De Jesus (:  1940) is a 79 y.o. female,Established patient, here for evaluation of the following chief complaint(s): Other (Patient was seen at [facility] on 2022. Patient states she had urine testing for UTI, her results were fine and she did not have a UTI. Patient states that she has been experiencing forgetfulness.)        Subjective   SUBJECTIVE/OBJECTIVE:  HPI:    Chief Complaint   Patient presents with    Other     Patient was seen at [facility] on 2022. Patient states she had urine testing for UTI, her results were fine and she did not have a UTI. Patient states that she has been experiencing forgetfulness. Pt presents today for UC follow up. CHIEF COMPLAINT             Chief Complaint   Patient presents with    Urinary Tract Infection       Dark urine and confusion         Nurses Notes reviewed and I agree except as noted in the HPI. HISTORY OF PRESENT ILLNESS   Megan De Jesus is a 79 y.o. female who presents for concerns of urinary tract infection. Patient states that for the past 3 to 4 days she has had symptoms of \"fogginess\". States that she has not had any true confusion but will lose things frequently and forget where she placed them. She states that she has had some urinary frequency but denies any significant dysuria but states that her urine has been dark and this has her concern for UTI. Patient denies any chest pain, shortness of breath or any other symptoms. UA at that time was wnl. She continues to have some issues with forgetfulness. She denies UTI symptoms at this time. Grieving loss of her son,  in a car accident. Feels this has a lot to do with it. Admittedly not as active in [place]. Does enjoy living [place].     BP Readings from Last 3 Encounters:   22 112/78   22 123/60   10/25/22 138/74     Wt Readings from Last 3 Encounters:   22 211 lb 3.2 oz (95.8 kg)   22 204 lb (92.5 kg) 10/25/22 214 lb (97.1 kg)       Patient Active Problem List   Diagnosis    Hyperlipidemia    Coronary artery disease involving native coronary artery of native heart with angina pectoris (HCC)    LUISA on CPAP    COPD (chronic obstructive pulmonary disease) ([facility] [address]    S/P ICD (internal cardiac defibrillator) procedure    Nonischemic cardiomyopathy (HCC)    Class 2 severe obesity due to excess calories with serious comorbidity in adult [facility])    Hypertension    Benign positional vertigo    Age-related osteoporosis without current pathological fracture    Chest pain    Hypocalcemia    Chronic diastolic CHF (congestive heart failure) ([facility])    Sacroiliitis, not elsewhere classified ([facility] [address]    Implantable cardioverter-defibrillator (ICD) generator end of life    Chronic renal disease, stage III ([facility] [address] [133250]     Past Surgical History:   Procedure Laterality Date    ARM SURGERY Left 11/27/2018    plate in arm    BREAST LUMPECTOMY  1/04    [facility] axillary dissection    CARDIAC DEFIBRILLATOR PLACEMENT  01/08/2018    Biventricular pacemaker ICD, Dr. Jerald Barros  3/2011    [facility]  2009    DILATATION, ESOPHAGUS  2011    HIP PINNING Left 8/20/2019    LEFT HIP INTERTAIN performed by Eulogio Carrel, MD at [place] [address] ([facility])  1976 or 1977    bleeding    JOINT REPLACEMENT Left     Shoulder    [place] OFFICE/OUTPT VISIT,PROCEDURE ONLY N/A 8/2/2018    EGD DIL performed by Anayeli Webb MD at [address] [facility] OFFICE/OUTPT [facility] Left 11/26/2018    ORIF LEFT PERIPROSTHETIC DISTAL HUMERAL FRACTURE performed by Eulogio Carrel, MD at [place] [address] Right 2/12/2018    LEFT HUMERUS OPEN REDUCTION INTERNAL FIXATION performed by Eulogio Carrel, MD at [address] Left 8/2/2018    EGD BIOPSY performed by Anayeli Webb MD at [facility] Social History     Tobacco Use    Smoking status: Former     Packs/day: 1.00     Years: 40.00     Pack years: 40.00     Types: Cigarettes     Quit date: 1998     Years since quittin.0    Smokeless tobacco: Never   Vaping Use    Vaping Use: Never used   Substance Use Topics    Alcohol use: Yes     Alcohol/week: 2.0 standard drinks     Types: 2 Standard drinks or equivalent per week     Comment: occasional     Drug use: No         Review of Systems   Constitutional: Negative. HENT: Negative. Respiratory: Negative. Cardiovascular: Negative. Gastrointestinal: Negative. Genitourinary:  Negative for dysuria, flank pain, frequency, hematuria and urgency. Musculoskeletal: Negative. Neurological:         Memory difficulties     All other systems reviewed and are negative. Objective   Physical Exam  Vitals and nursing note reviewed. Constitutional:       General: She is not in acute distress. Appearance: Normal appearance. She is well-developed. HENT:      Head: Normocephalic and atraumatic. Right Ear: Tympanic membrane normal.      Left Ear: Tympanic membrane normal.   Eyes:      Conjunctiva/sclera: Conjunctivae normal.   Cardiovascular:      Rate and Rhythm: Normal rate and regular rhythm. Heart sounds: Normal heart sounds. No murmur heard. Pulmonary:      Effort: Pulmonary effort is normal.      Breath sounds: Normal breath sounds. No wheezing, rhonchi or rales. Abdominal:      General: There is no distension. Musculoskeletal:      Cervical back: Neck supple. Skin:     General: Skin is warm and dry. Findings: No rash (on exposed surfaces). Neurological:      General: No focal deficit present. Mental Status: She is alert. Psychiatric:         Attention and Perception: Attention normal.         Mood and Affect: Mood normal.         Speech: Speech normal.         Behavior: Behavior normal. Behavior is cooperative. Thought Content:  Thought content normal.         Judgment: Judgment normal.             ASSESSMENT/PLAN:  1. Memory difficulties  2. Recurrent UTI  3. Depression, unspecified depression type  4. Stage 3 chronic kidney disease, unspecified whether stage 3a or 3b CKD ([facility] [address]  5. Essential hypertension    -  Chronic medical problems stable  -  Continue current medications  -  Follow up with specialists as scheduled  -  Long dw pt and her daughter regarding memory challenges and both decline further evaluation, imaging at this time    Return if symptoms worsen or fail to improve. An electronic signature was used to authenticate this note.     --Arlene Qureshi, DO

## 2023-01-07 DIAGNOSIS — N39.0 RECURRENT UTI: Primary | ICD-10-CM

## 2023-01-09 RX ORDER — METHENAMINE HIPPURATE 1000 MG/1
TABLET ORAL
Qty: 30 TABLET | Refills: 2 | Status: SHIPPED | OUTPATIENT
Start: 2023-01-09

## 2023-01-09 NOTE — TELEPHONE ENCOUNTER
Phyllistine Sue called requesting a refill on the following medications:  Requested Prescriptions     Pending Prescriptions Disp Refills    methenamine (HIPREX) 1 g tablet [Pharmacy Med Name: METHENAMINE YAKELIN 1 GM TABLET] 30 tablet      Sig: take 1 tablet by mouth daily     Pharmacy verified:  [email or web address]      Date of last visit: 10/25/2022  Date of next visit (if applicable): 7/60/2392

## 2023-01-09 NOTE — TELEPHONE ENCOUNTER
Prescription sent. Patient doing well on medication. Repeat BMP and Hepatic Function Panel prior to follow-up with Tomás Roblero on 4/28/23.

## 2023-01-10 NOTE — TELEPHONE ENCOUNTER
Isabella at [facility] advise to have the labs completed prior to appointment. Orders faxed. She voiced understanding.

## 2023-01-24 RX ORDER — PANTOPRAZOLE SODIUM 40 MG/1
TABLET, DELAYED RELEASE ORAL
Qty: 90 TABLET | Refills: 3 | Status: SHIPPED | OUTPATIENT
Start: 2023-01-24

## 2023-02-08 ASSESSMENT — LIFESTYLE VARIABLES
HOW MANY STANDARD DRINKS CONTAINING ALCOHOL DO YOU HAVE ON A TYPICAL DAY: 1
HOW OFTEN DO YOU HAVE A DRINK CONTAINING ALCOHOL: 2
HOW OFTEN DO YOU HAVE SIX OR MORE DRINKS ON ONE OCCASION: 1

## 2023-02-09 ENCOUNTER — OFFICE VISIT (OUTPATIENT)
Dept: FAMILY MEDICINE CLINIC | Age: 83
End: 2023-02-09

## 2023-02-09 VITALS
HEIGHT: 63 IN | HEART RATE: 76 BPM | BODY MASS INDEX: 38.15 KG/M2 | WEIGHT: 215.3 LBS | RESPIRATION RATE: 16 BRPM | DIASTOLIC BLOOD PRESSURE: 60 MMHG | SYSTOLIC BLOOD PRESSURE: 108 MMHG

## 2023-02-09 DIAGNOSIS — F32.A DEPRESSION, UNSPECIFIED DEPRESSION TYPE: ICD-10-CM

## 2023-02-09 DIAGNOSIS — I50.32 CHRONIC DIASTOLIC CHF (CONGESTIVE HEART FAILURE) (HCC): ICD-10-CM

## 2023-02-09 DIAGNOSIS — M81.0 AGE-RELATED OSTEOPOROSIS WITHOUT CURRENT PATHOLOGICAL FRACTURE: ICD-10-CM

## 2023-02-09 DIAGNOSIS — R41.3 MEMORY DIFFICULTIES: ICD-10-CM

## 2023-02-09 DIAGNOSIS — M46.1 SACROILIITIS, NOT ELSEWHERE CLASSIFIED (HCC): ICD-10-CM

## 2023-02-09 DIAGNOSIS — Z99.89 OBSTRUCTIVE SLEEP APNEA ON CPAP: ICD-10-CM

## 2023-02-09 DIAGNOSIS — I25.119 CORONARY ARTERY DISEASE INVOLVING NATIVE CORONARY ARTERY OF NATIVE HEART WITH ANGINA PECTORIS (HCC): ICD-10-CM

## 2023-02-09 DIAGNOSIS — E66.01 SEVERE OBESITY (BMI 35.0-39.9) WITH COMORBIDITY (HCC): ICD-10-CM

## 2023-02-09 DIAGNOSIS — N39.0 RECURRENT UTI: ICD-10-CM

## 2023-02-09 DIAGNOSIS — J44.9 CHRONIC OBSTRUCTIVE PULMONARY DISEASE, UNSPECIFIED COPD TYPE (HCC): ICD-10-CM

## 2023-02-09 DIAGNOSIS — E78.5 HYPERLIPIDEMIA, UNSPECIFIED HYPERLIPIDEMIA TYPE: ICD-10-CM

## 2023-02-09 DIAGNOSIS — G47.33 OBSTRUCTIVE SLEEP APNEA ON CPAP: ICD-10-CM

## 2023-02-09 DIAGNOSIS — Z00.00 MEDICARE ANNUAL WELLNESS VISIT, SUBSEQUENT: Primary | ICD-10-CM

## 2023-02-09 DIAGNOSIS — R04.0 EPISTAXIS: ICD-10-CM

## 2023-02-09 DIAGNOSIS — N18.30 STAGE 3 CHRONIC KIDNEY DISEASE, UNSPECIFIED WHETHER STAGE 3A OR 3B CKD (HCC): ICD-10-CM

## 2023-02-09 DIAGNOSIS — R73.01 IFG (IMPAIRED FASTING GLUCOSE): ICD-10-CM

## 2023-02-09 DIAGNOSIS — I10 ESSENTIAL HYPERTENSION: ICD-10-CM

## 2023-02-09 DIAGNOSIS — E55.9 VITAMIN D INSUFFICIENCY: ICD-10-CM

## 2023-02-09 RX ORDER — DIPHENHYDRAMINE HCL 50 MG
50 CAPSULE ORAL EVERY 6 HOURS PRN
COMMUNITY

## 2023-02-09 SDOH — ECONOMIC STABILITY: FOOD INSECURITY: WITHIN THE PAST 12 MONTHS, THE FOOD YOU BOUGHT JUST DIDN'T LAST AND YOU DIDN'T HAVE MONEY TO GET MORE.: NEVER TRUE

## 2023-02-09 SDOH — ECONOMIC STABILITY: INCOME INSECURITY: HOW HARD IS IT FOR YOU TO PAY FOR THE VERY BASICS LIKE FOOD, HOUSING, MEDICAL CARE, AND HEATING?: NOT HARD AT ALL

## 2023-02-09 SDOH — ECONOMIC STABILITY: FOOD INSECURITY: WITHIN THE PAST 12 MONTHS, YOU WORRIED THAT YOUR FOOD WOULD RUN OUT BEFORE YOU GOT MONEY TO BUY MORE.: NEVER TRUE

## 2023-02-09 SDOH — ECONOMIC STABILITY: HOUSING INSECURITY
IN THE LAST 12 MONTHS, WAS THERE A TIME WHEN YOU DID NOT HAVE A STEADY PLACE TO SLEEP OR SLEPT IN A SHELTER (INCLUDING NOW)?: NO

## 2023-02-09 ASSESSMENT — PATIENT HEALTH QUESTIONNAIRE - PHQ9
9. THOUGHTS THAT YOU WOULD BE BETTER OFF DEAD, OR OF HURTING YOURSELF: 0
6. FEELING BAD ABOUT YOURSELF - OR THAT YOU ARE A FAILURE OR HAVE LET YOURSELF OR YOUR FAMILY DOWN: 0
2. FEELING DOWN, DEPRESSED OR HOPELESS: 0
8. MOVING OR SPEAKING SO SLOWLY THAT OTHER PEOPLE COULD HAVE NOTICED. OR THE OPPOSITE, BEING SO FIGETY OR RESTLESS THAT YOU HAVE BEEN MOVING AROUND A LOT MORE THAN USUAL: 1
10. IF YOU CHECKED OFF ANY PROBLEMS, HOW DIFFICULT HAVE THESE PROBLEMS MADE IT FOR YOU TO DO YOUR WORK, TAKE CARE OF THINGS AT HOME, OR GET ALONG WITH OTHER PEOPLE: 0
7. TROUBLE CONCENTRATING ON THINGS, SUCH AS READING THE NEWSPAPER OR WATCHING TELEVISION: 2
SUM OF ALL RESPONSES TO PHQ QUESTIONS 1-9: 3
1. LITTLE INTEREST OR PLEASURE IN DOING THINGS: 0
5. POOR APPETITE OR OVEREATING: 0
SUM OF ALL RESPONSES TO PHQ9 QUESTIONS 1 & 2: 0
3. TROUBLE FALLING OR STAYING ASLEEP: 0
SUM OF ALL RESPONSES TO PHQ QUESTIONS 1-9: 3
4. FEELING TIRED OR HAVING LITTLE ENERGY: 0

## 2023-02-09 ASSESSMENT — LIFESTYLE VARIABLES
HOW MANY STANDARD DRINKS CONTAINING ALCOHOL DO YOU HAVE ON A TYPICAL DAY: 1 OR 2
HOW OFTEN DO YOU HAVE A DRINK CONTAINING ALCOHOL: MONTHLY OR LESS

## 2023-02-09 ASSESSMENT — ENCOUNTER SYMPTOMS
RESPIRATORY NEGATIVE: 1
GASTROINTESTINAL NEGATIVE: 1

## 2023-02-09 NOTE — PROGRESS NOTES
2023    Juhi Bran (:  1940) is a 79 y.o. female, here for a preventive medicine evaluation. Chief Complaint   Patient presents with    Medicare AW    6 Month Follow-Up    Epistaxis     Pt had a bloody nose yesterday and is wondering if it's from her Cpap?     AWV. Pt c/o bloody nose yesterday that lasted about 20 minutes. Questions CPAP induced. Continues to have issues with her memory. Daughter had several questions in this regard today. See previous encounter for additional info. BPs fine. BP Readings from Last 3 Encounters:   23 108/60   22 112/78   22 123/60     Weight is up. Does not feel this is fluid related. Has not been active over the winter months. Wt Readings from Last 3 Encounters:   23 215 lb 4.8 oz (97.7 kg)   22 211 lb 3.2 oz (95.8 kg)   22 204 lb (92.5 kg)         Patient Active Problem List   Diagnosis    Hyperlipidemia    Coronary artery disease involving native coronary artery of native heart with angina pectoris (HCC)    LUISA on CPAP    COPD (chronic obstructive pulmonary disease) ([facility]    S/P ICD (internal cardiac defibrillator) procedure    Nonischemic cardiomyopathy (HCC)    Class 2 severe obesity due to excess calories with serious comorbidity in adult [facility])    Hypertension    Benign positional vertigo    Age-related osteoporosis without current pathological fracture    Chest pain    Hypocalcemia    Chronic diastolic CHF (congestive heart failure) ([facility])    Sacroiliitis, not elsewhere classified ([address]    Implantable cardioverter-defibrillator (ICD) generator end of life    Chronic renal disease, stage III ([address] [229614]       Review of Systems   Constitutional: Negative. HENT:  Positive for nosebleeds. Respiratory: Negative. Cardiovascular: Negative. Gastrointestinal: Negative. Musculoskeletal: Negative.     Neurological:         Memory difficulties     All other systems reviewed and are negative. Prior to Visit Medications    Medication Sig Taking? Authorizing Provider   diphenhydrAMINE (BENADRYL) 50 MG capsule Take 50 mg by mouth every 6 hours as needed for Itching Yes Historical Provider, MD   pantoprazole (PROTONIX) 40 MG tablet take 1 tablet by mouth every morning Yes Enrique Elizabeth DO   methenamine (HIPREX) 1 g tablet take 1 tablet by mouth daily Yes Kaci Katz PA-C   traMADol (ULTRAM) 50 MG tablet Take 0.5 tablets by mouth every 6 hours as needed for Pain for up to 180 days. Yes Enrique Elizabeth DO   furosemide (LASIX) 20 MG tablet take 1 tablet by mouth once daily Yes Enrique Elizabeth DO   cetirizine (ZYRTEC) 10 MG tablet take 1 tablet by mouth at bedtime Yes Enrique Elizabeth DO   rosuvastatin (CRESTOR) 5 MG tablet take 1 tablet by mouth once daily Yes Enrique Elizabeth DO   metoprolol succinate (TOPROL XL) 25 MG extended release tablet take 1/2 tablet by mouth once daily Yes Enrique Elizabeth DO   isosorbide mononitrate (IMDUR) 30 MG extended release tablet take 1/2 tablet by mouth once daily Yes Enrique Elizabeth DO   aspirin 81 MG EC tablet Take 1 tablet by mouth daily Yes Enrique Elizabeth DO   sacubitril-valsartan (ENTRESTO) 49-51 MG per tablet Take 1 tablet by mouth 2 times daily Yes Enrique Elizabeth DO   denosumab (PROLIA) 60 MG/ML SOSY SC injection Inject 1 mL into the skin once for 1 dose Yes Enrique Elizabeth DO   [facility]otic [facility] TABS Take 1 tablet by mouth in the morning. Yes Enrique Elizabeth DO   loperamide (RA ANTI-DIARRHEAL) 2 MG capsule Take 1 capsule by mouth 3 times daily as needed for Diarrhea Yes Enrique Elizabeth DO   CPAP Machine MISC by Does not apply route Please change CPAP pressure to 11.5 with C flex of 3 cm H20.  Yes JADEN Rosas CNP   acetaminophen (TYLENOL) 325 MG tablet Take 2 tablets by mouth every 6 hours Yes Mark Anthony Painter MD        Allergies   Allergen Reactions    Keflex [Cephalexin] Shortness Of Breath and Swelling    Antivert [Meclizine] Other (See Comments)     confusion    Bactrim [Sulfamethoxazole-Trimethoprim]     Ciprofloxacin Itching    Neomycin     Pcn [Penicillins] Hives     Pt reports having reaction 50 years ago    Tizanidine     Zanaflex [Tizanidine Hcl] Other (See Comments)     Causes confusion    Levaquin [Levofloxacin] Itching     Benadryl was given for tx       Past Medical History:   Diagnosis Date    Arthritis     general    Breast CA ([facility] [address] 12/03    right    CAD (coronary artery disease) 2009    stent in [place]    CHF (congestive heart failure) ([facility])     Closed compression fracture of first lumbar vertebra ([facility] [address] 2/7/2018    Colon polyps 8/97; 3/11    COPD (chronic obstructive pulmonary disease) ([facility] [address] 8/5/2017    Diverticulosis 1/06    Erosive gastritis 11/06    Esophageal stricture 03/2011    Three times, Dr. Ann Uriarte    Hyperlipidemia     Hypertension     Internal hemorrhoid 1/06    LUISA on CPAP     Osteopenia determined by x-ray 1999    Osteoporosis of vertebra 6/2018 Per CT thoracolumbar    Pneumonia 2009    Stress incontinence, female     Vertigo        Past Surgical History:   Procedure Laterality Date    ARM SURGERY Left 11/27/2018    plate in arm    BREAST LUMPECTOMY  1/04    wtih axillary dissection    CARDIAC DEFIBRILLATOR PLACEMENT  01/08/2018    Biventricular pacemaker ICD, Dr. Mariah Briceno  3/2011    Elvin Strange  2009    DILATATION, ESOPHAGUS  2011    HIP PINNING Left 8/20/2019    LEFT HIP INTERTAIN performed by Donna Figueredo MD at [facility] ([address] [place] [address])  1976 or 1977    bleeding    JOINT REPLACEMENT Left     Shoulder    CT OFFICE/OUTPT VISIT,PROCEDURE ONLY N/A 8/2/2018    EGD DIL performed by Iveth Lopez MD at [address] [place] [address] OFFICE/OUTPT VISIT,PROCEDURE ONLY Left 11/26/2018    ORIF LEFT PERIPROSTHETIC DISTAL HUMERAL FRACTURE performed by Saadia Garcia Maxwell Rivera MD at [address] FX W/PLATE/SCREWS W/WOCERCLAGE Right 2018    LEFT HUMERUS OPEN REDUCTION INTERNAL FIXATION performed by Merissa Arzate MD at [facility]  childhood    UPPER GASTROINTESTINAL ENDOSCOPY Left 2018    EGD BIOPSY performed by Jimmy Hamman, MD at [facility]       Social History     Socioeconomic History    Marital status:      Spouse name: Not on file    Number of children: 4    Years of education: 15    Highest education level: High school graduate   Occupational History    Not on file   Tobacco Use    Smoking status: Former     Packs/day: 1.00     Years: 40.00     Pack years: 40.00     Types: Cigarettes     Quit date: 1998     Years since quittin.1    Smokeless tobacco: Never   Vaping Use    Vaping Use: Never used   Substance and Sexual Activity    Alcohol use: Yes     Alcohol/week: 2.0 standard drinks     Types: 2 Standard drinks or equivalent per week     Comment: occasional     Drug use: No    Sexual activity: Not Currently   Other Topics Concern    Not on file   Social History Narrative    Not on file     Social Determinants of Health     Financial Resource Strain: Low Risk     Difficulty of Paying Living Expenses: Not hard at all   Food Insecurity: No Food Insecurity    Worried About [facility] in the Last Year: Never true    [address] in the Last Year: Never true   Transportation Needs: Unknown    Lack of Transportation (Medical): Not on file    Lack of Transportation (Non-Medical):  No   Physical Activity: Not on file   Stress: Not on file   Social Connections: Not on file   Intimate Partner Violence: Not on file   Housing Stability: Unknown    Unable to Pay for Housing in the Last Year: Not on file    Number of Places Lived in the Last Year: Not on file    Unstable Housing in the Last Year: No        Family History   Problem Relation Age of Onset    Heart Disease Mother     High Blood Pressure Mother Cancer Father     Heart Disease Sister     Cancer Brother     Heart Disease Brother        ADVANCE DIRECTIVE: N, <no information>    Vitals:    02/09/23 1111   BP: 108/60   Site: Left Upper Arm   Position: Sitting   Cuff Size: Large Adult   Pulse: 76   Resp: 16   Weight: 215 lb 4.8 oz (97.7 kg)   Height: 5' 3\" (1.6 m)     Estimated body mass index is 38.14 kg/m² as calculated from the following:    Height as of this encounter: 5' 3\" (1.6 m). Weight as of this encounter: 215 lb 4.8 oz (97.7 kg). Physical Exam  Vitals and nursing note reviewed. Constitutional:       General: She is not in acute distress. Appearance: Normal appearance. She is well-developed. HENT:      Head: Normocephalic and atraumatic. Right Ear: Tympanic membrane normal.      Left Ear: Tympanic membrane normal.   Eyes:      Conjunctiva/sclera: Conjunctivae normal.   Cardiovascular:      Rate and Rhythm: Normal rate and regular rhythm. Heart sounds: Normal heart sounds. No murmur heard. Pulmonary:      Effort: Pulmonary effort is normal.      Breath sounds: Normal breath sounds. No wheezing, rhonchi or rales. Abdominal:      General: There is no distension. Musculoskeletal:      Cervical back: Neck supple. Right lower leg: Edema (trace LE edema bl) present. Left lower leg: Edema present. Skin:     General: Skin is warm and dry. Findings: No rash (on exposed surfaces). Neurological:      General: No focal deficit present. Mental Status: She is alert. Psychiatric:         Attention and Perception: Attention normal.         Mood and Affect: Mood normal.         Speech: Speech normal.         Behavior: Behavior normal. Behavior is cooperative. Thought Content: Thought content normal.         Cognition and Memory: She exhibits impaired recent memory. Judgment: Judgment normal.       No flowsheet data found.     Lab Results   Component Value Date/Time    CHOL 147 02/15/2022 12:00 AM CHOL 143 02/23/2021 12:00 AM    CHOL 114 01/08/2020 04:46 AM    TRIG 122 02/15/2022 12:00 AM    TRIG 184 02/23/2021 12:00 AM    TRIG 91 01/08/2020 04:46 AM    HDL 56 02/15/2022 12:00 AM    HDL 47 02/23/2021 12:00 AM    HDL 40 01/08/2020 04:46 AM    LDLCALC 67 02/15/2022 12:00 AM    LDLCALC 59 02/23/2021 12:00 AM    LDLCALC 56 01/08/2020 04:46 AM    GLUCOSE 101 08/05/2022 08:25 AM    LABA1C 5.5 02/15/2022 12:00 AM    LABA1C 5.3 02/23/2021 12:00 AM       The ASCVD Risk score (Angie RINCON, et al., 2019) failed to calculate for the following reasons: The 2019 ASCVD risk score is only valid for ages 36 to 78    Immunization History   Administered Date(s) Administered    COVID-19, PFIZER Bivalent BOOSTER, DO NOT Dilute, (age 12y+), IM, 30 mcg/0.3 mL 11/10/2022    COVID-19, PFIZER GRAY top, DO NOT Dilute, (age 15 y+), IM, 30 mcg/0.3 mL 05/27/2022    COVID-19, PFIZER PURPLE top, DILUTE for use, (age 15 y+), 30mcg/0.3mL 01/08/2021, 01/29/2021, 11/11/2021    Influenza Vaccine, unspecified formulation 10/19/2016    Influenza Virus Vaccine 11/29/2013, 10/08/2014, 11/02/2015    Influenza, FLUCELVAX, (age 10 mo+), MDCK, PF, 0.5mL 01/17/2020, 10/15/2020    Pneumococcal Polysaccharide ([id number]) 08/12/2021    Tdap (Boostrix, Adacel) 02/03/2018       Health Maintenance   Topic Date Due    Shingles vaccine (1 of 2) Never done    Flu vaccine (1) 08/01/2022    Lipids  02/15/2023    Depression Monitoring  02/08/2024    Annual Wellness Visit (AWV)  02/10/2024    Colorectal Cancer Screen  06/08/2027    DTaP/Tdap/Td vaccine (2 - Td or Tdap) 02/03/2028    DEXA (modify frequency per FRAX score)  Completed    Pneumococcal 65+ years Vaccine  Completed    COVID-19 Vaccine  Completed    Hepatitis A vaccine  Aged Out    Hib vaccine  Aged Out    Meningococcal (ACWY) vaccine  Aged Out       Assessment & Plan   Medicare annual wellness visit, subsequent  Essential hypertension  Severe obesity (BMI 35.0-39. 9) with comorbidity (HCC)  Sacroiliitis, not elsewhere classified ([address]  Chronic obstructive pulmonary disease, unspecified COPD type ([address]  Chronic diastolic CHF (congestive heart failure) ([address]  Coronary artery disease involving native coronary artery of native heart with angina pectoris (HCC)  Stage 3 chronic kidney disease, unspecified whether stage 3a or 3b CKD ([address]  -     Comprehensive Metabolic Panel; Future  -     CBC with Auto Differential; Future  Obstructive sleep apnea on CPAP  Age-related osteoporosis without current pathological fracture  Memory difficulties  Depression, unspecified depression type  Recurrent UTI  Hyperlipidemia, unspecified hyperlipidemia type  -     Lipid Panel w/ Reflex Direct LDL; Future  -     Comprehensive Metabolic Panel; Future  -     TSH with Reflex; Future  IFG (impaired fasting glucose)  -     Hemoglobin A1C; Future  Vitamin D insufficiency  -     Vitamin D 25 Hydroxy; Future  Epistaxis    -  Chronic medical problems stable  -  Continue current medications  -  Follow up with specialists as scheduled  -  Check labs, will call  -  Several acute concerns, each addressed at length  -  Opted at this time to not pursue dementia work up and/or start Aricept    Return in 6 months (on 8/9/2023) for HTN.         --Hunter Nguyễn, DO      Medicare Annual Wellness Visit    [employer] is here for Medicare AWV, 6 Month Follow-Up, and Epistaxis (Pt had a bloody nose yesterday and is wondering if it's from her Cpap?)    Assessment & Plan   Medicare annual wellness visit, subsequent  Essential hypertension  Severe obesity (BMI 35.0-39. 9) with comorbidity (HCC)  Sacroiliitis, not elsewhere classified ([address]  Chronic obstructive pulmonary disease, unspecified COPD type ([address]  Chronic diastolic CHF (congestive heart failure) ([address]  Coronary artery disease involving native coronary artery of native heart with angina pectoris (HCC)  Stage 3 chronic kidney disease, unspecified whether stage 3a or 3b CKD ([address]  - Comprehensive Metabolic Panel; Future  -     CBC with Auto Differential; Future  Obstructive sleep apnea on CPAP  Age-related osteoporosis without current pathological fracture  Memory difficulties  Depression, unspecified depression type  Recurrent UTI  Hyperlipidemia, unspecified hyperlipidemia type  -     Lipid Panel w/ Reflex Direct LDL; Future  -     Comprehensive Metabolic Panel; Future  -     TSH with Reflex; Future  IFG (impaired fasting glucose)  -     Hemoglobin A1C; Future  Vitamin D insufficiency  -     Vitamin D 25 Hydroxy; Future  Epistaxis      Recommendations for Preventive Services Due: see orders and patient instructions/AVS.  Recommended screening schedule for the next 5-10 years is provided to the patient in written form: see Patient Instructions/AVS.     Return in 6 months (on 8/9/2023) for HTN. Subjective       Patient's complete Health Risk Assessment and screening values have been reviewed and are found in Flowsheets. The following problems were reviewed today and where indicated follow up appointments were made and/or referrals ordered. Positive Risk Factor Screenings with Interventions:       Cognitive: Words recalled: 1 Word Recalled   Clock Drawing Test (CDT): (!) Abnormal   Total Score: (!) 1   Total Score Interpretation: Abnormal Mini-Cog      Interventions:  Patient declines any further evaluation or treatment            Opioid Risk: (Low risk score <55) Opioid risk score: 4    Patient is low risk for opioid use disorder or overdose. Last PDMP Andrew Ribeiro as Reviewed:  Review User Review Instant Review Result   JAYCE BURNS 9/10/2019 10:50 AM     Reviewed PDMP [1]     Last Controlled Substance Monitoring Documentation      Flowsheet Row Orders Only from 12/7/2020 in [address] [place] [address]   Periodic Controlled Substance Monitoring Possible medication side effects, risk of tolerance/dependence & alternative treatments discussed. , Potential drug abuse or diversion identified, see note documentation., Obtaining appropriate analgesic effect of treatment. filed at 12/07/2020 0853                Weight and Activity:  Physical Activity: Not on file        Have you lost any weight without trying in the past 3 months?: No  Body mass index: (!) 38.13    Obesity Interventions:  See AVS for additional education material            Vision Screen:  Do you have difficulty driving, watching TV, or doing any of your daily activities because of your eyesight?: (!) Yes  Have you had an eye exam within the past year?: Yes  No results found. Interventions:   Patient encouraged to make appointment with their eye specialist     ADL's:   Patient reports needing help with:  Select all that apply: (!) Bathing  Select all that apply: [employer], Transportation, Taking Medications  Interventions:  See AVS for additional education material                    Objective   Vitals:    02/09/23 1111   BP: 108/60   Site: Left Upper Arm   Position: Sitting   Cuff Size: Large Adult   Pulse: 76   Resp: 16   Weight: 215 lb 4.8 oz (97.7 kg)   Height: 5' 3\" (1.6 m)      Body mass index is 38.14 kg/m². Allergies   Allergen Reactions    Keflex [Cephalexin] Shortness Of Breath and Swelling    Antivert [Meclizine] Other (See Comments)     confusion    Bactrim [Sulfamethoxazole-Trimethoprim]     Ciprofloxacin Itching    Neomycin     Pcn [Penicillins] Hives     Pt reports having reaction 50 years ago    Tizanidine     Zanaflex [Tizanidine Hcl] Other (See Comments)     Causes confusion    Levaquin [Levofloxacin] Itching     Benadryl was given for tx     Prior to Visit Medications    Medication Sig Taking?  Authorizing Provider   diphenhydrAMINE (BENADRYL) 50 MG capsule Take 50 mg by mouth every 6 hours as needed for Itching Yes Historical Provider, MD   pantoprazole (PROTONIX) 40 MG tablet take 1 tablet by mouth every morning Yes Parker Mcfarland,    methenamine (HIPREX) 1 g tablet take 1 tablet by mouth daily Yes Joel Katz PA-C   traMADol (ULTRAM) 50 MG tablet Take 0.5 tablets by mouth every 6 hours as needed for Pain for up to 180 days. Yes Krzysztof Quiroz DO   furosemide (LASIX) 20 MG tablet take 1 tablet by mouth once daily Yes Krzysztof Quiroz DO   cetirizine (ZYRTEC) 10 MG tablet take 1 tablet by mouth at bedtime Yes Krzysztof Quiroz DO   rosuvastatin (CRESTOR) 5 MG tablet take 1 tablet by mouth once daily Yes Krzysztof Quiroz DO   metoprolol succinate (TOPROL XL) 25 MG extended release tablet take 1/2 tablet by mouth once daily Yes Krzysztof Quiroz DO   isosorbide mononitrate (IMDUR) 30 MG extended release tablet take 1/2 tablet by mouth once daily Yes Krzysztof Quiroz DO   aspirin 81 MG EC tablet Take 1 tablet by mouth daily Yes Krzysztof Quiroz DO   sacubitril-valsartan (ENTRESTO) 49-51 MG per tablet Take 1 tablet by mouth 2 times daily Yes Krzysztof Quiroz DO   denosumab (PROLIA) 60 MG/ML SOSY SC injection Inject 1 mL into the skin once for 1 dose Yes Krzysztof Quiroz DO   Probiotic Acidophilus [facility]) TABS Take 1 tablet by mouth in the morning. Yes Krzysztof Quiroz DO   loperamide (RA ANTI-DIARRHEAL) 2 MG capsule Take 1 capsule by mouth 3 times daily as needed for Diarrhea Yes Krzysztof Quiroz DO   CPAP Machine MISC by Does not apply route Please change CPAP pressure to 11.5 with C flex of 3 cm H20.  Yes JADEN Hall - CNP   acetaminophen (TYLENOL) 325 MG tablet Take 2 tablets by mouth every 6 hours Yes Vanessa Limon MD       [facility] (Including outside providers/suppliers regularly involved in providing care):   Patient Care Team:  Krzysztof Quiroz DO as PCP - General (Family Medicine)  Krzysztof Quiroz DO as PCP - Empaneled Provider  Lincoln Collet, RN as Ambulatory Care Manager     Reviewed and updated this visit:  Tobacco  Allergies  Meds  Med Hx  Surg Hx  Soc Hx  Fam Hx             Krzysztof Quiroz DO

## 2023-02-09 NOTE — PATIENT INSTRUCTIONS
You may receive a survey regarding the care you received during your visit. Your input is valuable to us. We encourage you to complete and return your survey. We hope you will choose us in the future for your healthcare needs. Learning About Mild Cognitive Impairment (MCI)  What is mild cognitive impairment (MCI)? It's common to forget things sometimes as we get older. But some older people have memory loss that's more than normal aging. It's called mild cognitive impairment, or MCI. It is not the same as dementia. People with the condition often know that their memory or mental function has changed. Tests may show some loss. But their minds work well overall. They can carry out daily tasks that are normal for them. People with MCI have a higher chance of one day getting dementia. But not all people who have it will get dementia. Some people may stay the same over time. What are the symptoms? People with MCI have more memory loss than what occurs with normal aging. They may have increasing trouble with recalling words and keeping up with conversations. They may also have trouble remembering important events and making decisions. What puts you at risk? The risk of getting MCI increases with age. Having high blood pressure or having a family history of MCI may also increase your risk. How is it diagnosed? Your doctor will do a physical exam.  You may be asked questions to check your memory and other mental skills. Your doctor may also talk to close friends and family members. This can help the doctor figure out how your memory and other mental skills have changed. You may get blood tests and tests that look at your brain. These questions and tests can make sure you don't have other conditions that can cause symptoms like MCI. These include depression, sleep problems, and side effects from medicines. How is it treated?   There are no medicines to treat MCI or to keep it from progressing to dementia. But treating conditions like high blood pressure and diabetes may help. A person with MCI needs routine follow-up visits with their doctor to check on changes in the person's mental skills. How can you care for yourself at home? Keeping your body active can help slow MCI. Exercises like walking can help. Try to stay active mentally too. Read or do things like crossword puzzles if you enjoy doing them. If you need help coping with MCI, you may want to get support from family, friends, a support group, or a counselor who works with people who have [address] Though the future isn't always clear, it can be good to plan ahead with instructions for your care. These are called advanced directives. Having a plan can help make sure that you get the care you want. Current as of: August 25, 2022               Content Version: 13.5  © 2006-2022 Healthwise, [employer]. Care instructions adapted under license by [facility] ([facility]). If you have questions about a medical condition or this instruction, always ask your healthcare professional. [place] [address] any warranty or liability for your use of this information. Learning About Vision Tests  What are vision tests? The four most common vision tests are visual acuity tests, refraction, visual field tests, and color vision tests. Visual acuity (sharpness) tests  These tests are used: To see if you need glasses or contact lenses. To monitor an eye problem. To check an eye injury. Visual acuity tests are done as part of routine exams. You may also have this test when you get your 's license or apply for some types of jobs. Visual field tests  These tests are used: To check for vision loss in any area of your range of vision. To screen for certain eye diseases. To look for nerve damage after a stroke, head injury, or other problem that could reduce blood flow to the brain.   Refraction and color tests  A refraction test is done to find the right prescription for glasses and contact lenses. A color vision test is done to check for color blindness. Color vision is often tested as part of a routine exam. You may also have this test when you apply for a job where recognizing different colors is important, such as , electronics, or the [employer]. How are vision tests done? Visual acuity test   You cover one eye at a time. You read aloud from a wall chart across the room. You read aloud from a small card that you hold in your hand. Refraction   You look into a special device. The device puts lenses of different strengths in front of each eye to see how strong your glasses or contact lenses need to be. Visual field tests   Your doctor may have you look through special machines. Or your doctor may simply have you stare straight ahead while they move a finger into and out of your field of vision. Color vision test   You look at pieces of printed test patterns in various colors. You say what number or symbol you see. Your doctor may have you trace the number or symbol using a pointer. How do these tests feel? There is very little chance of having a problem from this test. If dilating drops are used for a vision test, they may make the eyes sting and cause a medicine taste in the mouth. Follow-up care is a key part of your treatment and safety. Be sure to make and go to all appointments, and call your doctor if you are having problems. It's also a good idea to know your test results and keep a list of the medicines you take. Where can you learn more? Go to http://www.barrett.com/ and enter G551 to learn more about \"Learning About Vision Tests. \"  Current as of: October 12, 2022               Content Version: 13.5  © 6855-9633 Healthwise, [employer]. Care instructions adapted under license by [facility] ([facility]).  If you have questions about a medical condition or this instruction, always ask your healthcare professional. [address] any warranty or liability for your use of this information. Learning About Activities of Daily Living  What are activities of daily living? Activities of daily living (ADLs) are the basic self-care tasks you do every day. As you age, and if you have health problems, you may find that it's harder to do these things for yourself. That's when you may need some help. Your doctor uses ADLs to measure how much help you need. Knowing what you can and can't do for yourself is an important first step to getting help. And when you have the help you need, you can stay as independent as possible. Your doctor will want to know if you are able to do tasks such as: Take a bath or shower without help. Go to the bathroom by yourself. Dress and undress without help. Shave, comb your hair, and brush teeth on your own. Get in and out of bed or a chair without help. Feed yourself without help. If you are having trouble doing basic self-care tasks, talk with your doctor. You may want to bring a caregiver or family member who can help the doctor understand your needs and abilities. How will a doctor assess your ADLs? Asking about ADLs is part of a routine health checkup your doctor will likely do as you age. Your health check might be done in a doctor's office, in your home, or at a hospital. The goal is to find out if you are having any problems that could make your health problems worse or that make it unsafe for you to be on your own. To measure your ADLs, your doctor will ask how hard it is for you to do routine tasks. He or she may also want to know if you have changed the way you do a task because of a health problem. He or she may watch how you:  Walk back and forth. Keep your balance while you stand or walk. Move from sitting to standing or from a bed to a chair. Button or unbutton a shirt or sweater. Remove and put on your shoes.   It's normal to feel a little worried or anxious if you find you can't do all the things you used to be able to do. Talking with your doctor about ADLs isn't a test that you either pass or fail. It's just a way to get more information about your health and safety. Follow-up care is a key part of your treatment and safety. Be sure to make and go to all appointments, and call your doctor if you are having problems. It's also a good idea to know your test results and keep a list of the medicines you take. Current as of: October 6, 2021               Content Version: 13.5  © 2190-3190 [employer]. Care instructions adapted under license by [facility] ([facility]). If you have questions about a medical condition or this instruction, always ask your healthcare professional. [address] any warranty or liability for your use of this information. Advance Directives: Care Instructions  Overview  An advance directive is a legal way to state your wishes at the end of your life. It tells your family and your doctor what to do if you can't say what you want. There are two main types of advance directives. You can change them any time your wishes change. Living will. This form tells your family and your doctor your wishes about life support and other treatment. The form is also called a declaration. Medical power of . This form lets you name a person to make treatment decisions for you when you can't speak for yourself. This person is called a health care agent (health care proxy, health care surrogate). The form is also called a durable power of  for health care. If you do not have an advance directive, decisions about your medical care may be made by a family member, or by a doctor or a  who doesn't know you. It may help to think of an advance directive as a gift to the people who care for you. If you have one, they won't have to make tough decisions by themselves.   For more information, including forms for your state, see the [address] website (www.caringinfo.org/planning/advance-directives/). Follow-up care is a key part of your treatment and safety. Be sure to make and go to all appointments, and call your doctor if you are having problems. It's also a good idea to know your test results and keep a list of the medicines you take. What should you include in an advance directive? Many states have a unique advance directive form. (It may ask you to address specific issues.) Or you might use a universal form that's approved by many states. If your form doesn't tell you what to address, it may be hard to know what to include in your advance directive. Use the questions below to help you get started. Who do you want to make decisions about your medical care if you are not able to? What life-support measures do you want if you have a serious illness that gets worse over time or can't be cured? What are you most afraid of that might happen? (Maybe you're afraid of having pain, losing your independence, or being kept alive by machines.)  Where would you prefer to die? (Your home? A hospital? A nursing home?)  Do you want to donate your organs when you die? Do you want certain Latter-day practices performed before you die? When should you call for help? Be sure to contact your doctor if you have any questions. Where can you learn more? Go to http://www.barrett.com/ and enter R264 to learn more about \"Advance Directives: Care Instructions. \"  Current as of: June 16, 2022               Content Version: 13.5  © 2665-7300 Healthwise, Incorporated. Care instructions adapted under license by [facility] [employer] [facility] ([facility]). If you have questions about a medical condition or this instruction, always ask your healthcare professional. [place] [address] any warranty or liability for your use of this information.            Starting a Weight Loss Plan: Care Instructions  Overview If you're thinking about losing weight, it can be hard to know where to start. Your doctor can help you set up a weight loss plan that best meets your needs. You may want to take a class on nutrition or exercise, or you could join a weight loss support group. If you have questions about how to make changes to your eating or exercise habits, ask your doctor about seeing a registered dietitian or an exercise specialist.  It can be a big challenge to lose weight. But you don't have to make huge changes at once. Make small changes, and stick with them. When those changes become habit, add a few more changes. If you don't think you're ready to make changes right now, try to pick a date in the future. Make an appointment to see your doctor to discuss whether the time is right for you to start a plan. Follow-up care is a key part of your treatment and safety. Be sure to make and go to all appointments, and call your doctor if you are having problems. It's also a good idea to know your test results and keep a list of the medicines you take. How can you care for yourself at home? Set realistic goals. Many people expect to lose much more weight than is likely. A weight loss of 5% to 10% of your body weight may be enough to improve your health. Get family and friends involved to provide support. Talk to them about why you are trying to lose weight, and ask them to help. They can help by participating in exercise and having meals with you, even if they may be eating something different. Find what works best for you. If you do not have time or do not like to cook, a program that offers meal replacement bars or shakes may be better for you. Or if you like to prepare meals, finding a plan that includes daily menus and recipes may be best.  Ask your doctor about other health professionals who can help you achieve your weight loss goals. A dietitian can help you make healthy changes in your diet.   An exercise specialist or  can help you develop a safe and effective exercise program.  A counselor or psychiatrist can help you cope with issues such as depression, anxiety, or family problems that can make it hard to focus on weight loss. Consider joining a support group for people who are trying to lose weight. Your doctor can suggest groups in your area. Where can you learn more? Go to http://www.woods.com/ and enter U357 to learn more about \"Starting a Weight Loss Plan: Care Instructions. \"  Current as of: August 25, 2022               Content Version: 13.5  © 6020-9561 [employer]. Care instructions adapted under license by [facility] ([facility]). If you have questions about a medical condition or this instruction, always ask your healthcare professional. [address] any warranty or liability for your use of this information. A Healthy Heart: Care Instructions  Your Care Instructions     Coronary artery disease, also called heart disease, occurs when a substance called plaque builds up in the vessels that supply oxygen-rich blood to your heart muscle. This can narrow the blood vessels and reduce blood flow. A heart attack happens when blood flow is completely blocked. A high-fat diet, smoking, and other factors increase the risk of heart disease. Your doctor has found that you have a chance of having heart disease. You can do lots of things to keep your heart healthy. It may not be easy, but you can change your diet, exercise more, and quit smoking. These steps really work to lower your chance of heart disease. Follow-up care is a key part of your treatment and safety. Be sure to make and go to all appointments, and call your doctor if you are having problems. It's also a good idea to know your test results and keep a list of the medicines you take. How can you care for yourself at home? Diet    Use less salt when you cook and eat.  This helps lower your blood pressure. Taste food before salting. Add only a little salt when you think you need it. With time, your taste buds will adjust to less salt.     Eat fewer snack items, fast foods, canned soups, and other high-salt, high-fat, processed foods.     Read food labels and try to avoid saturated and trans fats. They increase your risk of heart disease by raising cholesterol levels.     Limit the amount of solid fat-butter, margarine, and shortening-you eat. Use olive, peanut, or canola oil when you cook. Bake, broil, and steam foods instead of frying them.     Eat a variety of fruit and vegetables every day. Dark green, deep orange, red, or yellow fruits and vegetables are especially good for you. Examples include spinach, carrots, peaches, and berries.     Foods high in fiber can reduce your cholesterol and provide important vitamins and minerals. High-fiber foods include whole-grain cereals and breads, oatmeal, beans, brown rice, citrus fruits, and apples.     Eat lean proteins. Heart-healthy proteins include seafood, lean meats and poultry, eggs, beans, peas, nuts, seeds, and soy products.     Limit drinks and foods with added sugar. These include candy, desserts, and soda pop. Lifestyle changes    If your doctor recommends it, get more exercise. Walking is a good choice. Bit by bit, increase the amount you walk every day. Try for at least 30 minutes on most days of the week. You also may want to swim, bike, or do other activities.     Do not smoke. If you need help quitting, talk to your doctor about stop-smoking programs and medicines. These can increase your chances of quitting for good. Quitting smoking may be the most important step you can take to protect your heart. It is never too late to quit.     Limit alcohol to 2 drinks a day for men and 1 drink a day for women. Too much alcohol can cause health problems.     Manage other health problems such as diabetes, high blood pressure, and high cholesterol.  If you think you may have a problem with alcohol or drug use, talk to your doctor. Medicines    Take your medicines exactly as prescribed. Call your doctor if you think you are having a problem with your medicine.     If your doctor recommends aspirin, take the amount directed each day. Make sure you take aspirin and not another kind of pain reliever, such as acetaminophen (Tylenol). When should you call for help? Call 911 if you have symptoms of a heart attack. These may include:    Chest pain or pressure, or a strange feeling in the chest.     Sweating.     Shortness of breath.     Pain, pressure, or a strange feeling in the back, neck, jaw, or upper belly or in one or both shoulders or arms.     Lightheadedness or sudden weakness.     A fast or irregular heartbeat. After you call 911, the  may tell you to chew 1 adult-strength or 2 to 4 low-dose aspirin. Wait for an ambulance. Do not try to drive yourself. Watch closely for changes in your health, and be sure to contact your doctor if you have any problems. Where can you learn more? Go to http://www.barrett.com/ and enter F075 to learn more about \"A Healthy Heart: Care Instructions. \"  Current as of: September 7, 2022               Content Version: 13.5  © 2006-2022 Healthwise, [employer]. Care instructions adapted under license by [facility] ([facility]). If you have questions about a medical condition or this instruction, always ask your healthcare professional. [place] [address] any warranty or liability for your use of this information. Personalized Preventive Plan for Cedrick Sewell - 2/9/2023  Medicare offers a range of preventive health benefits. Some of the tests and screenings are paid in full while other may be subject to a deductible, co-insurance, and/or copay.     Some of these benefits include a comprehensive review of your medical history including lifestyle, illnesses that may run in your family, and various assessments and screenings as appropriate. After reviewing your medical record and screening and assessments performed today your provider may have ordered immunizations, labs, imaging, and/or referrals for you. A list of these orders (if applicable) as well as your Preventive Care list are included within your After Visit Summary for your review. Other Preventive Recommendations:    A preventive eye exam performed by an eye specialist is recommended every 1-2 years to screen for glaucoma; cataracts, macular degeneration, and other eye disorders. A preventive dental visit is recommended every 6 months. Try to get at least 150 minutes of exercise per week or 10,000 steps per day on a pedometer . Order or download the FREE \"Exercise & Physical Activity: Your Everyday Guide\" from [employer]. Call 6-163.507.2147 or search [employer] online. You need 2553-6655 mg of calcium and 0856-8020 IU of vitamin D per day. It is possible to meet your calcium requirement with diet alone, but a vitamin D supplement is usually necessary to meet this goal.  When exposed to the sun, use a sunscreen that protects against both UVA and UVB radiation with an SPF of 30 or greater. Reapply every 2 to 3 hours or after sweating, drying off with a towel, or swimming. Always wear a seat belt when traveling in a car. Always wear a helmet when riding a bicycle or motorcycle.

## 2023-02-13 ENCOUNTER — OFFICE VISIT (OUTPATIENT)
Dept: CARDIOLOGY CLINIC | Age: 83
End: 2023-02-13
Payer: MEDICARE

## 2023-02-13 VITALS
BODY MASS INDEX: 37.74 KG/M2 | HEART RATE: 84 BPM | SYSTOLIC BLOOD PRESSURE: 132 MMHG | HEIGHT: 63 IN | WEIGHT: 213 LBS | DIASTOLIC BLOOD PRESSURE: 68 MMHG

## 2023-02-13 DIAGNOSIS — I10 PRIMARY HYPERTENSION: ICD-10-CM

## 2023-02-13 DIAGNOSIS — Z95.810 ICD (IMPLANTABLE CARDIOVERTER-DEFIBRILLATOR) IN PLACE: ICD-10-CM

## 2023-02-13 DIAGNOSIS — I42.0 DILATED CARDIOMYOPATHY (HCC): Primary | ICD-10-CM

## 2023-02-13 PROCEDURE — G8399 PT W/DXA RESULTS DOCUMENT: HCPCS | Performed by: NUCLEAR MEDICINE

## 2023-02-13 PROCEDURE — 1036F TOBACCO NON-USER: CPT | Performed by: NUCLEAR MEDICINE

## 2023-02-13 PROCEDURE — G8417 CALC BMI ABV UP PARAM F/U: HCPCS | Performed by: NUCLEAR MEDICINE

## 2023-02-13 PROCEDURE — 1123F ACP DISCUSS/DSCN MKR DOCD: CPT | Performed by: NUCLEAR MEDICINE

## 2023-02-13 PROCEDURE — G8427 DOCREV CUR MEDS BY ELIG CLIN: HCPCS | Performed by: NUCLEAR MEDICINE

## 2023-02-13 PROCEDURE — 99213 OFFICE O/P EST LOW 20 MIN: CPT | Performed by: NUCLEAR MEDICINE

## 2023-02-13 PROCEDURE — 3078F DIAST BP <80 MM HG: CPT | Performed by: NUCLEAR MEDICINE

## 2023-02-13 PROCEDURE — 1090F PRES/ABSN URINE INCON ASSESS: CPT | Performed by: NUCLEAR MEDICINE

## 2023-02-13 PROCEDURE — G8484 FLU IMMUNIZE NO ADMIN: HCPCS | Performed by: NUCLEAR MEDICINE

## 2023-02-13 PROCEDURE — 3075F SYST BP GE 130 - 139MM HG: CPT | Performed by: NUCLEAR MEDICINE

## 2023-02-13 NOTE — PROGRESS NOTES
[address] [facility]  [address]  [address]  [facility] [address]  Dept: [phone]  Dept Fax: [phone]  Loc: [phone]    Visit Date: 2/13/2023    Hyacinth Pozo is a 79 y.o. female who presents todayfor:  Chief Complaint   Patient presents with    Check-Up    Cardiomyopathy    Coronary Artery Disease    Hypertension   Know CMP and ICD  No chest pain   No changes in breathing  BP is stable   Limited patient  Some baseline dyspnea  BP is stable       HPI:  HPI  Past Medical History:   Diagnosis Date    Arthritis     general    Breast CA ([facility] 12/03    right    CAD (coronary artery disease) 2009    stent in [place]    CHF (congestive heart failure) ([facility])     Closed compression fracture of first lumbar vertebra ([facility] 2/7/2018    Colon polyps 8/97; 3/11    COPD (chronic obstructive pulmonary disease) ([facility] 8/5/2017    Diverticulosis 1/06    Erosive gastritis 11/06    Esophageal stricture 03/2011    Three times, Dr. Wong    Hyperlipidemia     Hypertension     Internal hemorrhoid 1/06    LUISA on CPAP     Osteopenia determined by x-ray 1999    Osteoporosis of vertebra 6/2018 Per CT thoracolumbar    Pneumonia 2009    Stress incontinence, female     Vertigo       Past Surgical History:   Procedure Laterality Date    ARM SURGERY Left 11/27/2018    plate in arm    BREAST LUMPECTOMY  1/04    [facility] axillary dissection    CARDIAC DEFIBRILLATOR PLACEMENT  01/08/2018    Biventricular pacemaker ICD, Dr. Tino Ortiz  3/2011    [address] [place] [address]  2009    DILATATION, ESOPHAGUS  2011    HIP PINNING Left 8/20/2019    LEFT HIP INTERTAIN performed by Susan Tirado MD at [address] [place] [address] ([address] [place] [address])  1976 or 1977    bleeding    JOINT REPLACEMENT Left     Shoulder    NM OFFICE/OUTPT VISIT,PROCEDURE ONLY N/A 8/2/2018    EGD DIL performed by Salena Villa MD at [address] [facility] OFFICE/OUTPT VISIT,PROCEDURE ONLY Left 2018    ORIF LEFT PERIPROSTHETIC DISTAL HUMERAL FRACTURE performed by Talisha Murry MD at [address] [facility] W/PLATE/SCREWS W/WOCERCLAGE Right 2018    LEFT HUMERUS OPEN REDUCTION INTERNAL FIXATION performed by Talisha Murry MD at [address]  childhood    UPPER GASTROINTESTINAL ENDOSCOPY Left 2018    EGD BIOPSY performed by Telly Zavala MD at [address] [facility]     Family History   Problem Relation Age of Onset    Heart Disease Mother     High Blood Pressure Mother     Cancer Father     Heart Disease Sister     Cancer Brother     Heart Disease Brother      Social History     Tobacco Use    Smoking status: Former     Packs/day: 1.00     Years: 40.00     Pack years: 40.00     Types: Cigarettes     Quit date: 1998     Years since quittin.1    Smokeless tobacco: Never   Substance Use Topics    Alcohol use: Yes     Alcohol/week: 2.0 standard drinks     Types: 2 Standard drinks or equivalent per week     Comment: occasional       Current Outpatient Medications   Medication Sig Dispense Refill    diphenhydrAMINE (BENADRYL) 50 MG capsule Take 50 mg by mouth every 6 hours as needed for Itching      pantoprazole (PROTONIX) 40 MG tablet take 1 tablet by mouth every morning 90 tablet 3    traMADol (ULTRAM) 50 MG tablet Take 0.5 tablets by mouth every 6 hours as needed for Pain for up to 180 days.  60 tablet 5    furosemide (LASIX) 20 MG tablet take 1 tablet by mouth once daily 90 tablet 3    cetirizine (ZYRTEC) 10 MG tablet take 1 tablet by mouth at bedtime 90 tablet 3    rosuvastatin (CRESTOR) 5 MG tablet take 1 tablet by mouth once daily 90 tablet 3    metoprolol succinate (TOPROL XL) 25 MG extended release tablet take 1/2 tablet by mouth once daily 45 tablet 3    isosorbide mononitrate (IMDUR) 30 MG extended release tablet take 1/2 tablet by mouth once daily 45 tablet 3    aspirin 81 MG EC tablet Take 1 tablet by mouth daily 90 tablet 3    sacubitril-valsartan (ENTRESTO) 49-51 MG per tablet Take 1 tablet by mouth 2 times daily 180 tablet 3    Probiotic Acidophilus (FLORANEX) TABS Take 1 tablet by mouth in the morning. 90 tablet 3    loperamide (RA ANTI-DIARRHEAL) 2 MG capsule Take 1 capsule by mouth 3 times daily as needed for Diarrhea 90 capsule 2    CPAP Machine MISC by Does not apply route Please change CPAP pressure to 11.5 with C flex of 3 cm H20. 1 each 0    acetaminophen (TYLENOL) 325 MG tablet Take 2 tablets by mouth every 6 hours 120 tablet 0    methenamine (HIPREX) 1 g tablet take 1 tablet by mouth daily 30 tablet 2    denosumab (PROLIA) 60 MG/ML SOSY SC injection Inject 1 mL into the skin once for 1 dose 1 mL 1     No current facility-administered medications for this visit.      Allergies   Allergen Reactions    Keflex [Cephalexin] Shortness Of Breath and Swelling    Antivert [Meclizine] Other (See Comments)     confusion    Bactrim [Sulfamethoxazole-Trimethoprim]     Ciprofloxacin Itching    Neomycin     Pcn [Penicillins] Hives     Pt reports having reaction 50 years ago    Tizanidine     Zanaflex [Tizanidine Hcl] Other (See Comments)     Causes confusion    Levaquin [Levofloxacin] Itching     Benadryl was given for tx     Health Maintenance   Topic Date Due    Shingles vaccine (1 of 2) Never done    Flu vaccine (1) 08/01/2022    Lipids  02/15/2023    Depression Monitoring  02/09/2024    Annual Wellness Visit (AWV)  02/10/2024    Colorectal Cancer Screen  06/08/2027    DTaP/Tdap/Td vaccine (2 - Td or Tdap) 02/03/2028    DEXA (modify frequency per FRAX score)  Completed    Pneumococcal 65+ years Vaccine  Completed    COVID-19 Vaccine  Completed    Hepatitis A vaccine  Aged Out    Hib vaccine  Aged Out    Meningococcal (ACWY) vaccine  Aged Out       Subjective:  General:   No fever, no chills, some fatigue or weight loss  Pulmonary:    some dyspnea, no wheezing  Cardiac:    Denies recent chest pain,   GI:     No nausea or vomiting, no abdominal pain  Neuro:     No dizziness or light headedness,   Musculoskeletal:  No recent active issues  Extremities:   No edema, no obvious claudication       Objective:  General:   Well developed, well nourished  Lungs:    Clear to auscultation  Heart:    Normal S1 S2, Slight murmur. no rubs, no gallops  Abdomen:   Soft, non tender, no organomegalies, positive bowel sounds  Extremities:   No edema, no cyanosis, good peripheral pulses  Neurological:   Awake, alert, oriented. No obvious focal deficits  Musculoskelatal:  No obvious deformities   /68   Pulse 84   Ht 5' 3\" (1.6 m)   Wt 213 lb (96.6 kg)   BMI 37.73 kg/m²     Assessment:      Diagnosis Orders   1. Dilated cardiomyopathy ([address]        2. ICD (implantable cardioverter-defibrillator) in place        3. Primary hypertension        As above  Cardiac fair for now     Plan:  No follow-ups on file. As above  Cardiac fair for now   Continue risk factor modification and medical management  Thank you for allowing me to participate in the care of your patient. Please don't hesitate to contact me regarding any further issues related to the patient care      Orders Placed:  No orders of the defined types were placed in this encounter. Prescribed:  No orders of the defined types were placed in this encounter. Discussed use, benefit, and side effects of prescribed medications. All patient questions answered. Pt voicedunderstanding. Instructed to continue current medications, diet and exercise. Continue risk factor modification and medical management. Patient agreed with treatment plan. Follow up as directed.     Electronically signedby Liliane Dean MD on 2/13/2023 at 12:33 PM

## 2023-02-14 LAB
AVERAGE GLUCOSE: NORMAL
CHOLESTEROL, TOTAL: 155 MG/DL
CHOLESTEROL/HDL RATIO: NORMAL
HBA1C MFR BLD: 5.4 %
HDLC SERPL-MCNC: 56 MG/DL (ref 35–70)
LDL CHOLESTEROL CALCULATED: 72 MG/DL (ref 0–160)
NONHDLC SERPL-MCNC: NORMAL MG/DL
TRIGL SERPL-MCNC: 134 MG/DL
TSH SERPL DL<=0.05 MIU/L-ACNC: 1.6 UIU/ML
VLDLC SERPL CALC-MCNC: 27 MG/DL

## 2023-02-16 ENCOUNTER — HOSPITAL ENCOUNTER (OUTPATIENT)
Dept: NURSING | Age: 83
Discharge: HOME | End: 2023-02-16
Payer: MEDICARE

## 2023-02-16 VITALS
SYSTOLIC BLOOD PRESSURE: 128 MMHG | DIASTOLIC BLOOD PRESSURE: 63 MMHG | HEART RATE: 75 BPM | TEMPERATURE: 97 F | RESPIRATION RATE: 16 BRPM | OXYGEN SATURATION: 94 %

## 2023-02-16 DIAGNOSIS — M81.0 AGE-RELATED OSTEOPOROSIS WITHOUT CURRENT PATHOLOGICAL FRACTURE: Primary | ICD-10-CM

## 2023-02-16 PROCEDURE — 96372 THER/PROPH/DIAG INJ SC/IM: CPT

## 2023-02-16 PROCEDURE — 6360000002 HC RX W HCPCS: Performed by: FAMILY MEDICINE

## 2023-02-16 RX ADMIN — DENOSUMAB 60 MG: 60 INJECTION SUBCUTANEOUS at 11:04

## 2023-02-16 ASSESSMENT — PAIN SCALES - GENERAL: PAINLEVEL_OUTOF10: 0

## 2023-02-16 NOTE — DISCHARGE INSTRUCTIONS
ACTIVITY:  Continue usual care with your doctor. Call your doctor immediately if any severe problems or go to the nearest emergency room. I have been treated and hereby acknowledge receiving this instruction sheet. This medication is given every 6 months. We will need a new order from your physician before we schedule next dose.  Due around August.

## 2023-02-16 NOTE — PROGRESS NOTES
1100: Patient arrived ambulatory for Prolia injection. Patient rights and responsibilities offered to patient. Vitals as charted. Injection administered, patient tolerated well. AVS reviewed with patient, voiced understanding. Patient discharged ambulatory.                     _m___ Safety:       (Environmental)  Freer to environment  Ensure ID band is correct and in place/ allergy band as needed  Assess for fall risk  Initiate fall precautions as applicable (fall band, side rails, etc.)  Call light within reach  Bed in low position/ wheels locked    _m___ Pain:       Assess pain level and characteristics  Administer analgesics as ordered  Assess effectiveness of pain management and report to MD as needed    _m___ Knowledge Deficit:  Assess baseline knowledge  Provide teaching at level of understanding  Provide teaching via preferred learning method  Evaluate teaching effectiveness    _m___ Hemodynamic/Respiratory Status:       (Pre and Post Procedure Monitoring)  Assess/Monitor vital signs and LOC  Assess Baseline SpO2 prior to any sedation  Obtain weight/height  Assess vital signs/ LOC until patient meets discharge criteria  Monitor procedure site and notify MD of any issues

## 2023-03-11 ENCOUNTER — APPOINTMENT (OUTPATIENT)
Dept: GENERAL RADIOLOGY | Age: 83
End: 2023-03-11
Payer: MEDICARE

## 2023-03-11 ENCOUNTER — HOSPITAL ENCOUNTER (EMERGENCY)
Age: 83
Discharge: HOME | End: 2023-03-11
Payer: MEDICARE

## 2023-03-11 VITALS
DIASTOLIC BLOOD PRESSURE: 78 MMHG | SYSTOLIC BLOOD PRESSURE: 123 MMHG | WEIGHT: 213 LBS | HEART RATE: 74 BPM | RESPIRATION RATE: 20 BRPM | TEMPERATURE: 98.7 F | OXYGEN SATURATION: 95 % | BODY MASS INDEX: 37.73 KG/M2

## 2023-03-11 DIAGNOSIS — S60.221A CONTUSION OF RIGHT HAND, INITIAL ENCOUNTER: Primary | ICD-10-CM

## 2023-03-11 PROCEDURE — 99213 OFFICE O/P EST LOW 20 MIN: CPT

## 2023-03-11 PROCEDURE — 99213 OFFICE O/P EST LOW 20 MIN: CPT | Performed by: NURSE PRACTITIONER

## 2023-03-11 PROCEDURE — 73130 X-RAY EXAM OF HAND: CPT

## 2023-03-11 ASSESSMENT — PAIN DESCRIPTION - ORIENTATION: ORIENTATION: RIGHT

## 2023-03-11 ASSESSMENT — ENCOUNTER SYMPTOMS
CONSTIPATION: 0
DIARRHEA: 0
NAUSEA: 0
WHEEZING: 0
RHINORRHEA: 0
EYES NEGATIVE: 1
SINUS PAIN: 0
SHORTNESS OF BREATH: 0
SORE THROAT: 0
SINUS PRESSURE: 0
VOMITING: 0
ABDOMINAL PAIN: 0

## 2023-03-11 ASSESSMENT — PAIN - FUNCTIONAL ASSESSMENT
PAIN_FUNCTIONAL_ASSESSMENT: 0-10
PAIN_FUNCTIONAL_ASSESSMENT: ACTIVITIES ARE NOT PREVENTED

## 2023-03-11 ASSESSMENT — PAIN DESCRIPTION - LOCATION: LOCATION: HAND

## 2023-03-11 ASSESSMENT — PAIN DESCRIPTION - PAIN TYPE: TYPE: ACUTE PAIN

## 2023-03-11 ASSESSMENT — PAIN DESCRIPTION - FREQUENCY: FREQUENCY: CONTINUOUS

## 2023-03-11 ASSESSMENT — PAIN DESCRIPTION - DESCRIPTORS: DESCRIPTORS: ACHING;DISCOMFORT

## 2023-03-11 NOTE — ED PROVIDER NOTES
[address] [facility] Encounter      [address] [facility]       Chief Complaint   Patient presents with    Hand Pain     Right         Nurses Notes reviewed and I agree except as noted in the HPI. HISTORY OF PRESENT ILLNESS   Dilia Strickland is a 79 y.o. female who presents to urgent care today complaining of right hand pain. She is in assisted living resident. States that she had a ground-level fall yesterday hurting her right hand. She denies any head injury. Denies head pain, neck pain. Only complains of pain to the right hand. REVIEW OF SYSTEMS     Review of Systems   Constitutional:  Negative for chills, fatigue, fever and unexpected weight change. HENT:  Negative for congestion, postnasal drip, rhinorrhea, sinus pressure, sinus pain, sneezing and sore throat. Eyes: Negative. Respiratory:  Negative for shortness of breath and wheezing. Cardiovascular:  Negative for chest pain. Gastrointestinal:  Negative for abdominal pain, constipation, diarrhea, nausea and vomiting. Endocrine: Negative. Genitourinary:  Negative for difficulty urinating, dyspareunia, dysuria, hematuria, urgency, vaginal bleeding, vaginal discharge and vaginal pain. Musculoskeletal:  Negative for myalgias. Right hand pain   Skin:  Negative for rash. Neurological:  Negative for dizziness, light-headedness and headaches. Hematological:  Negative for adenopathy. Psychiatric/Behavioral:  Negative for agitation.       PAST MEDICAL HISTORY         Diagnosis Date    Arthritis     general    Breast CA ([address] 12/03    right    CAD (coronary artery disease) 2009    stent in [place]    CH[facility]congestive [facility] failure) [facility])     Closed compression fracture of first lumbar vertebra ([address] 2/7/2018    Colon polyps 8/97; 3/11    COPD (chronic obstructive pulmonary disease) ([address] 8/5/2017    Diverticulosis 1/06    Erosive gastritis 11/06    Esophageal stricture 03/2011    Three times, Dr. Virgilio Nielson Hyperlipidemia     Hypertension     Internal hemorrhoid 1/06    LUISA on CPAP     Osteopenia determined by x-ray 1999    Osteoporosis of vertebra 6/2018 Per CT thoracolumbar    Pneumonia 2009    Stress incontinence, female     Vertigo        SURGICAL HISTORY     Patient  has a past surgical history that includes Hysterectomy (1976 or 1977); Cholecystectomy (1993); Tonsillectomy (childhood); Coronary angioplasty with stent (2009); Breast lumpectomy (1/04); Colonoscopy (3/2011); joint replacement (Left); Dilatation, esophagus (2011); Cardiac defibrillator placement (01/08/2018); pr optx humeral shft fx w/plate/screws w/wocerclage (Right, 2/12/2018); pr office/outpt visit,procedure only (N/A, 8/2/2018); Upper gastrointestinal endoscopy (Left, 8/2/2018); pr office/outpt visit,procedure only (Left, 11/26/2018); Arm Surgery (Left, 11/27/2018); and hip pinning (Left, 8/20/2019). CURRENT MEDICATIONS       Discharge Medication List as of 3/11/2023  1:39 PM        CONTINUE these medications which have NOT CHANGED    Details   diphenhydrAMINE (BENADRYL) 50 MG capsule Take 50 mg by mouth every 6 hours as needed for ItchingHistorical Med      pantoprazole (PROTONIX) 40 MG tablet take 1 tablet by mouth every morning, Disp-90 tablet, R-3Normal      methenamine (HIPREX) 1 g tablet take 1 tablet by mouth daily, Disp-30 tablet, R-2Normal      traMADol (ULTRAM) 50 MG tablet Take 0.5 tablets by mouth every 6 hours as needed for Pain for up to 180 days. , Disp-60 tablet, R-5Normal      furosemide (LASIX) 20 MG tablet take 1 tablet by mouth once daily, Disp-90 tablet, R-3Normal      cetirizine (ZYRTEC) 10 MG tablet take 1 tablet by mouth at bedtime, Disp-90 tablet, R-3Normal      rosuvastatin (CRESTOR) 5 MG tablet take 1 tablet by mouth once daily, Disp-90 tablet, R-3Normal      metoprolol succinate (TOPROL XL) 25 MG extended release tablet take 1/2 tablet by mouth once daily, Disp-45 tablet, R-3Normal      isosorbide mononitrate (IMDUR) 30 MG extended release tablet take 1/2 tablet by mouth once daily, Disp-45 tablet, R-3Normal      aspirin 81 MG EC tablet Take 1 tablet by mouth daily, Disp-90 tablet, R-3Normal      sacubitril-valsartan (ENTRESTO) 49-51 MG per tablet Take 1 tablet by mouth 2 times daily, Disp-180 tablet, R-3Normal      denosumab (PROLIA) 60 MG/ML SOSY SC injection Inject 1 mL into the skin once for 1 dose, Disp-1 mL, R-1Print      Probiotic Acidophilus (FLORANEX) TABS Take 1 tablet by mouth in the morning., Disp-90 tablet, R-3Normal      loperamide (RA ANTI-DIARRHEAL) 2 MG capsule Take 1 capsule by mouth 3 times daily as needed for Diarrhea, Disp-90 capsule, R-2Normal      CPAP Machine MISC Starting Mon 12/14/2020, Disp-1 each, R-0, PrintPlease change CPAP pressure to 11.5 with C flex of 3 cm H20.      acetaminophen (TYLENOL) 325 MG tablet Take 2 tablets by mouth every 6 hours, Disp-120 tablet, R-0DC to SNF             ALLERGIES     Patient is is allergic to keflex [cephalexin], antivert [meclizine], bactrim [sulfamethoxazole-trimethoprim], ciprofloxacin, neomycin, pcn [penicillins], tizanidine, zanaflex [tizanidine hcl], and levaquin [levofloxacin]. FAMILY HISTORY     Patient'sfamily history includes Cancer in her brother and father; Heart Disease in her brother, mother, and sister; High Blood Pressure in her mother. SOCIAL HISTORY     Patient  reports that she quit smoking about 25 years ago. Her smoking use included cigarettes. She has a 40.00 pack-year smoking history. She has never used smokeless tobacco. She reports current alcohol use of about 2.0 standard drinks per week. She reports that she does not use drugs. PHYSICAL EXAM     ED TRIAGE VITALS  BP: 123/78, Temp: 98.7 °F (37.1 °C), Heart Rate: 74, Resp: 20, SpO2: 95 %  Physical Exam  Vitals and nursing note reviewed. Constitutional:       General: She is not in acute distress. Appearance: Normal appearance.  She is not ill-appearing or toxic-appearing. HENT:      Head: Normocephalic and atraumatic. Nose: No congestion or rhinorrhea. Mouth/Throat:      Mouth: Mucous membranes are moist.      Pharynx: Oropharynx is clear. Eyes:      Extraocular Movements: Extraocular movements intact. Conjunctiva/sclera: Conjunctivae normal.      Pupils: Pupils are equal, round, and reactive to light. Cardiovascular:      Rate and Rhythm: Normal rate and regular rhythm. Pulses: Normal pulses. Heart sounds: Normal heart sounds. No murmur heard. Pulmonary:      Effort: Pulmonary effort is normal. No respiratory distress. Breath sounds: Normal breath sounds. No wheezing. Abdominal:      General: Abdomen is flat. Palpations: Abdomen is soft. Tenderness: There is no abdominal tenderness. Musculoskeletal:         General: No swelling or deformity. Normal range of motion. Cervical back: Normal range of motion and neck supple. Comments: Patient has bruising and swelling to the right lateral hand on the palm and dorsal aspect. No snuffbox tenderness. Skin:     General: Skin is warm and dry. Capillary Refill: Capillary refill takes less than 2 seconds. Findings: No rash. Neurological:      General: No focal deficit present. Mental Status: She is alert and oriented to person, place, and time. Mental status is at baseline. Psychiatric:         Mood and Affect: Mood normal.         Behavior: Behavior normal.         Thought Content: Thought content normal.         Judgment: Judgment normal.       DIAGNOSTIC RESULTS   Labs:No results found for this visit on 03/11/23. IMAGING:  XR HAND RIGHT (MIN 3 VIEWS)   Final Result      1. No evidence of acute osseous injury of the right hand. 2. 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 3. Mild osteoarthritis of the hand. **This report has been created using voice recognition software.  It may contain minor errors which are inherent in voice recognition technology. **      Final report electronically signed by Dr. Andres Lee MD on 3/11/2023 1:34 PM        URGENT CARE COURSE:         Medications - No data to display  PROCEDURES:  FINALIMPRESSION      1. Contusion of right hand, initial encounter        DISPOSITION/PLAN   DISPOSITION Decision To Discharge 03/11/2023 01:39:10 PM    No fracture seen on x-ray. Recommend rest, ice, compression, elevation. Will Ace wrap in urgent care. Take Tylenol as needed for pain. Patient denies questions.     PATIENT REFERRED TO:  DO Aroldo Oropeza, [address]  [phone]        DISCHARGE MEDICATIONS:  Discharge Medication List as of 3/11/2023  1:39 PM        Discharge Medication List as of 3/11/2023  1:39 PM          JADEN Dewey - CNP         JADEN Campbell - CNP  03/11/23 3000

## 2023-03-11 NOTE — PROGRESS NOTES
Patient ambulated to room with use of walker. Alert. C/o injury to right hand after falling yesterday at assisted living facility. Bruising noted to hand below 4th and 5th digits and on palm of hand.

## 2023-03-13 ENCOUNTER — TELEPHONE (OUTPATIENT)
Dept: FAMILY MEDICINE CLINIC | Age: 83
End: 2023-03-13

## 2023-03-13 RX ORDER — METHENAMINE HIPPURATE 1000 MG/1
TABLET ORAL
Qty: 30 TABLET | Refills: 2 | Status: SHIPPED | OUTPATIENT
Start: 2023-03-13 | End: 2023-04-10 | Stop reason: SDUPTHER

## 2023-03-20 ENCOUNTER — PROCEDURE VISIT (OUTPATIENT)
Dept: CARDIOLOGY CLINIC | Age: 83
End: 2023-03-20
Payer: MEDICARE

## 2023-03-20 DIAGNOSIS — Z95.810 ICD (IMPLANTABLE CARDIOVERTER-DEFIBRILLATOR) IN PLACE: Primary | ICD-10-CM

## 2023-03-20 PROCEDURE — 93295 DEV INTERROG REMOTE 1/2/MLT: CPT | Performed by: INTERNAL MEDICINE

## 2023-03-20 PROCEDURE — 93296 REM INTERROG EVL PM/IDS: CPT | Performed by: INTERNAL MEDICINE

## 2023-03-20 NOTE — PROGRESS NOTES
DR Higgins  PT   MR PrestaTRONIC BIV ICD REMOTE   BATTERY 2.3-2.6 YRS REMAINING      DDDR     KNOWN NOISE ON ATRIAL LEAD.  NO TRUE EPISODES OF AFIB    AMS X 372 OR MODE SWITCH <1%  AFIB BURDEN <1%      A PACED 22%  BV PACED 95%    P WAVES 0.5  RV WAVES 2.2  ATRIAL IMPEDENCE 330  RV IMPEDENCE 350  LV IMPEDENCE 450  HV IMPEDENCE 42    ATRIAL THRESHOLD 0.375 @ 0.5  RV THRESHOLD 0.5 @ 0.5  LV THRESHOLD NOT OBTAINED PER THE DEVICE

## 2023-04-27 ENCOUNTER — OFFICE VISIT (OUTPATIENT)
Dept: UROLOGY | Age: 83
End: 2023-04-27
Payer: MEDICARE

## 2023-04-27 VITALS — WEIGHT: 213 LBS | RESPIRATION RATE: 16 BRPM | HEIGHT: 63 IN | BODY MASS INDEX: 37.74 KG/M2

## 2023-04-27 DIAGNOSIS — R32 URINARY INCONTINENCE, UNSPECIFIED TYPE: ICD-10-CM

## 2023-04-27 DIAGNOSIS — N39.0 RECURRENT UTI: Primary | ICD-10-CM

## 2023-04-27 PROCEDURE — G8427 DOCREV CUR MEDS BY ELIG CLIN: HCPCS

## 2023-04-27 PROCEDURE — 0509F URINE INCON PLAN DOCD: CPT

## 2023-04-27 PROCEDURE — 1036F TOBACCO NON-USER: CPT

## 2023-04-27 PROCEDURE — 1123F ACP DISCUSS/DSCN MKR DOCD: CPT

## 2023-04-27 PROCEDURE — G8417 CALC BMI ABV UP PARAM F/U: HCPCS

## 2023-04-27 PROCEDURE — 99214 OFFICE O/P EST MOD 30 MIN: CPT

## 2023-04-27 PROCEDURE — 1090F PRES/ABSN URINE INCON ASSESS: CPT

## 2023-04-27 PROCEDURE — G8399 PT W/DXA RESULTS DOCUMENT: HCPCS

## 2023-04-27 RX ORDER — PYRIDOXINE HCL (VITAMIN B6) 100 MG
500 TABLET ORAL 2 TIMES DAILY
Qty: 60 CAPSULE | Refills: 0 | Status: SHIPPED | OUTPATIENT
Start: 2023-04-27

## 2023-04-27 NOTE — PATIENT INSTRUCTIONS
-Take D-Mannose, Cranberry. I sent a script  -Continue Probiotic supplements  -Stop Daily Hiprex   -avoid saturating pads  -avoid constipation, recommend bowel regimen  -Time voiding, attempt to use restroom every 2 hours   -check urine micro and culture.   -Follow-up in 1 month   -Call with questions, comments, or concerns. I recommend going to the ED for further evaluation if you develop fever, chills, nausea, vomiting, chest pain, SOB, or calf pain.

## 2023-04-27 NOTE — PROGRESS NOTES
Patient:  Mati Medeiros  YOB: 1940  Date: 4/27/2023    HISTORY OF PRESENT ILLNESS:   The patient is a 79 y.o. female who presents today for evaluation of the following problems:     4/27/23   Ms. Felicitas Dong is an 34-year-old female that follows in the office for recurrent UTIs and incontinence. She had a cystoscopy with Dr. Caleb Bob, as well as a CT abdomen and pelvis, in 10/2022 which was unremarkable. She presents today for a UTI check. Overall the problem(s) : are improving. Associated Symptoms: No dysuria, gross hematuria. Would like urine checked. No fevers or chills. No nausea or vomiting.   UA: unable to void        Last BUN and creatinine:  Lab Results   Component Value Date    BUN 19 08/05/2022     Lab Results   Component Value Date    CREATININE 1.0 08/05/2022       PAST MEDICAL, FAMILY AND SOCIAL HISTORY UPDATE:  Past Medical History:   Diagnosis Date    Arthritis     general    Breast CA ([address] 12/03    right    CAD (coronary artery disease) 2009    stent in [place]    CHF (congestive heart failure) ([facility])     Closed compression fracture of first lumbar vertebra ([address] 2/7/2018    Colon polyps 8/97; 3/11    COPD (chronic obstructive pulmonary disease) ([address] 8/5/2017    Diverticulosis 1/06    Erosive gastritis 11/06    Esophageal stricture 03/2011    Three times, Dr. Rachael Whittaker    Hyperlipidemia     Hypertension     Internal hemorrhoid 1/06    LUISA on CPAP     Osteopenia determined by x-ray 1999    Osteoporosis of vertebra 6/2018 Per CT thoracolumbar    Pneumonia 2009    Stress incontinence, female     Vertigo      Past Surgical History:   Procedure Laterality Date    ARM SURGERY Left 11/27/2018    plate in arm    BREAST LUMPECTOMY  1/04    [facility] axillary dissection    CARDIAC DEFIBRILLATOR PLACEMENT  01/08/2018    Biventricular pacemaker ICD, Dr. Ayala Jordan  3/2011    Anahy Alanis  2009    DILATATION, ESOPHAGUS  2011    HIP

## 2023-04-27 NOTE — PROGRESS NOTES
[facility] received all information in the packet and patient gets prescriptions from [facility]. If unable to get D Mannose will have family bring it in.

## 2023-04-28 ENCOUNTER — OFFICE VISIT (OUTPATIENT)
Dept: PULMONOLOGY | Age: 83
End: 2023-04-28
Payer: MEDICARE

## 2023-04-28 VITALS
SYSTOLIC BLOOD PRESSURE: 118 MMHG | TEMPERATURE: 96.9 F | HEART RATE: 73 BPM | OXYGEN SATURATION: 93 % | DIASTOLIC BLOOD PRESSURE: 68 MMHG | WEIGHT: 214.6 LBS | BODY MASS INDEX: 38.02 KG/M2 | HEIGHT: 63 IN

## 2023-04-28 DIAGNOSIS — G47.33 OSA ON CPAP: Primary | ICD-10-CM

## 2023-04-28 DIAGNOSIS — Z99.89 OSA ON CPAP: Primary | ICD-10-CM

## 2023-04-28 DIAGNOSIS — E66.9 OBESITY (BMI 30-39.9): ICD-10-CM

## 2023-04-28 PROCEDURE — 1123F ACP DISCUSS/DSCN MKR DOCD: CPT

## 2023-04-28 PROCEDURE — G8417 CALC BMI ABV UP PARAM F/U: HCPCS

## 2023-04-28 PROCEDURE — G8427 DOCREV CUR MEDS BY ELIG CLIN: HCPCS

## 2023-04-28 PROCEDURE — 3074F SYST BP LT 130 MM HG: CPT

## 2023-04-28 PROCEDURE — 3078F DIAST BP <80 MM HG: CPT

## 2023-04-28 PROCEDURE — G8399 PT W/DXA RESULTS DOCUMENT: HCPCS

## 2023-04-28 PROCEDURE — 1090F PRES/ABSN URINE INCON ASSESS: CPT

## 2023-04-28 PROCEDURE — 1036F TOBACCO NON-USER: CPT

## 2023-04-28 PROCEDURE — 99214 OFFICE O/P EST MOD 30 MIN: CPT

## 2023-04-28 ASSESSMENT — ENCOUNTER SYMPTOMS
RESPIRATORY NEGATIVE: 1
RHINORRHEA: 1

## 2023-04-28 NOTE — PATIENT INSTRUCTIONS
Continue current positive airway pressure therapy. Keep good compliance with current recommended pressure to get optimal results and clinical improvement    Make sure to get 7-9 hours of sleep with PAP    Call DME company regarding supplies if needed. Call office for earlier appointment if needed for worsening of sleep symptoms.      Do not drive if feeling sleepy    Make sure to work on weight loss

## 2023-04-28 NOTE — PROGRESS NOTES
drip.    Respiratory: Negative. Cardiovascular:  Positive for leg swelling (on diuretics). Allergic/Immunologic: Positive for environmental allergies. Physical Exam:    BMI:  Body mass index is 38.01 kg/m². Wt Readings from Last 3 Encounters:   04/28/23 214 lb 9.6 oz (97.3 kg)   04/27/23 213 lb (96.6 kg)   03/11/23 213 lb (96.6 kg)     Vitals: /68   Pulse 73   Temp 96.9 °F (36.1 °C)   Ht 5' 3\" (1.6 m)   Wt 214 lb 9.6 oz (97.3 kg)   SpO2 93%   BMI 38.01 kg/m²       Physical Exam  Nursing note reviewed. Constitutional:       General: She is not in acute distress. Appearance: She is well-developed. She is obese. Comments: BMI 38   HENT:      Head: Normocephalic and atraumatic. Right Ear: External ear normal.      Left Ear: External ear normal.      Mouth/Throat:      Mouth: Mucous membranes are moist.      Pharynx: Oropharynx is clear. No oropharyngeal exudate. Eyes:      General:         Right eye: No discharge. Left eye: No discharge. Cardiovascular:      Rate and Rhythm: Normal rate. Pulmonary:      Effort: Pulmonary effort is normal. No respiratory distress. Breath sounds: Wheezing (faint expiratory bilateral upper lungs) present. No rhonchi or rales. Musculoskeletal:      Cervical back: Neck supple. Right lower leg: Edema present. Left lower leg: Edema present. Comments: +2-3 pitting edema   Neurological:      General: No focal deficit present. Mental Status: She is alert. Psychiatric:         Mood and Affect: Mood normal.         Behavior: Behavior normal.         Thought Content: Thought content normal.         Judgment: Judgment normal.         ASSESSMENT/DIAGNOSIS     Diagnosis Orders   1. LUISA on CPAP  DME Order for CPAP as OP    CPAP Machine MISC      2. Obesity (BMI 30-39. 9)                 Plan   Do you need any equipment today?  Yes - yearly supply order  -Patient's symptoms and AHI are well controlled with current settings

## 2023-05-04 RX ORDER — NYSTATIN 100000 [USP'U]/G
POWDER TOPICAL
Qty: 60 G | Refills: 1 | Status: SHIPPED | OUTPATIENT
Start: 2023-05-04

## 2023-05-08 ENCOUNTER — TELEPHONE (OUTPATIENT)
Dept: UROLOGY | Age: 83
End: 2023-05-08

## 2023-05-08 RX ORDER — DOXYCYCLINE HYCLATE 100 MG
100 TABLET ORAL 2 TIMES DAILY
Qty: 20 TABLET | Refills: 0 | Status: SHIPPED | OUTPATIENT
Start: 2023-05-08 | End: 2023-05-18

## 2023-05-08 NOTE — TELEPHONE ENCOUNTER
Isabella confirmed no allergies to doxycycline. Nursing communication order faxed to [facility]. She voiced understanding.

## 2023-05-08 NOTE — TELEPHONE ENCOUNTER
Culture remarkable for growth.  Start doxycyline, confirm no allergy    The patient should go to the ED should they develop fever, chills, nausea, vomiting, chest pain, SOB, calf pain, feelings of incomplete emptying, or should they otherwise feel they need evaluated

## 2023-05-22 ENCOUNTER — TELEPHONE (OUTPATIENT)
Dept: FAMILY MEDICINE CLINIC | Age: 83
End: 2023-05-22

## 2023-05-22 DIAGNOSIS — I50.32 CHRONIC DIASTOLIC CHF (CONGESTIVE HEART FAILURE) (HCC): Primary | ICD-10-CM

## 2023-05-22 NOTE — TELEPHONE ENCOUNTER
The patients daughter Jez Patrick (José Miguel Naylor) called in to request a new script for the patients [employer] [facility]. Jez Patrick states that hers expires at the end of the month.   Jez Patrick is requesting the script be mailed to her at:    [facility] [place]

## 2023-05-30 RX ORDER — POTASSIUM &MAGNESIUM ASPARTATE 250-250 MG
CAPSULE ORAL
Qty: 60 CAPSULE | Refills: 0 | Status: SHIPPED | OUTPATIENT
Start: 2023-05-30 | End: 2023-06-26 | Stop reason: SDUPTHER

## 2023-06-02 ENCOUNTER — OFFICE VISIT (OUTPATIENT)
Dept: UROLOGY | Age: 83
End: 2023-06-02
Payer: MEDICARE

## 2023-06-02 VITALS
SYSTOLIC BLOOD PRESSURE: 114 MMHG | DIASTOLIC BLOOD PRESSURE: 68 MMHG | WEIGHT: 211.1 LBS | HEIGHT: 63 IN | BODY MASS INDEX: 37.4 KG/M2

## 2023-06-02 DIAGNOSIS — R32 URINARY INCONTINENCE, UNSPECIFIED TYPE: ICD-10-CM

## 2023-06-02 DIAGNOSIS — N39.0 RECURRENT UTI: Primary | ICD-10-CM

## 2023-06-02 LAB
BACTERIA: ABNORMAL
BILIRUB UR QL STRIP: NEGATIVE
BILIRUBIN URINE: NEGATIVE
BLOOD URINE, POC: ABNORMAL
CASTS #/AREA URNS LPF: ABNORMAL /LPF
CASTS #/AREA URNS LPF: ABNORMAL /LPF
CHARACTER UR: ABNORMAL
CHARACTER, URINE: CLEAR
CHARCOAL URNS QL MICRO: ABNORMAL
COLOR UR: YELLOW
COLOR, URINE: YELLOW
CRYSTALS URNS QL MICRO: ABNORMAL
EPITHELIAL CELLS, UA: ABNORMAL /HPF
GLUCOSE UR QL STRIP.AUTO: NEGATIVE MG/DL
GLUCOSE URINE: NEGATIVE MG/DL
HGB UR QL STRIP.AUTO: ABNORMAL
KETONES UR QL STRIP.AUTO: NEGATIVE
KETONES, URINE: NEGATIVE
LEUKOCYTE CLUMPS, URINE: ABNORMAL
LEUKOCYTE ESTERASE UR QL STRIP.AUTO: ABNORMAL
NITRITE UR QL STRIP.AUTO: NEGATIVE
NITRITE, URINE: NEGATIVE
PH UR STRIP.AUTO: 6.5 [PH] (ref 5–9)
PH, URINE: 5 (ref 5–9)
PROT UR STRIP.AUTO-MCNC: NEGATIVE MG/DL
PROTEIN, URINE: NEGATIVE MG/DL
RBC #/AREA URNS HPF: ABNORMAL /HPF
RENAL EPI CELLS #/AREA URNS HPF: ABNORMAL /[HPF]
SPECIFIC GRAVITY UA: 1.01 (ref 1–1.03)
SPECIFIC GRAVITY, URINE: 1.01 (ref 1–1.03)
UROBILINOGEN, URINE: 1 EU/DL (ref 0–1)
UROBILINOGEN, URINE: 1 EU/DL (ref 0–1)
WBC #/AREA URNS HPF: > 100 /HPF
YEAST LIKE FUNGI URNS QL MICRO: ABNORMAL

## 2023-06-02 PROCEDURE — 3074F SYST BP LT 130 MM HG: CPT

## 2023-06-02 PROCEDURE — 1036F TOBACCO NON-USER: CPT

## 2023-06-02 PROCEDURE — 3078F DIAST BP <80 MM HG: CPT

## 2023-06-02 PROCEDURE — G8399 PT W/DXA RESULTS DOCUMENT: HCPCS

## 2023-06-02 PROCEDURE — G8417 CALC BMI ABV UP PARAM F/U: HCPCS

## 2023-06-02 PROCEDURE — 81003 URINALYSIS AUTO W/O SCOPE: CPT

## 2023-06-02 PROCEDURE — 1123F ACP DISCUSS/DSCN MKR DOCD: CPT

## 2023-06-02 PROCEDURE — 1090F PRES/ABSN URINE INCON ASSESS: CPT

## 2023-06-02 PROCEDURE — G8427 DOCREV CUR MEDS BY ELIG CLIN: HCPCS

## 2023-06-02 PROCEDURE — 0509F URINE INCON PLAN DOCD: CPT

## 2023-06-02 PROCEDURE — 99214 OFFICE O/P EST MOD 30 MIN: CPT

## 2023-06-02 RX ORDER — TRAMADOL HYDROCHLORIDE 50 MG/1
TABLET ORAL
COMMUNITY
Start: 2023-05-21

## 2023-06-02 RX ORDER — GUAIFENESIN 600 MG/1
1200 TABLET, EXTENDED RELEASE ORAL 2 TIMES DAILY
COMMUNITY

## 2023-06-02 NOTE — PATIENT INSTRUCTIONS
-Get CMP  -I will send your urine off  -Continue D-Mannose, Cranberry, and probiotics   -Follow-up in 6 months or sooner if needed

## 2023-06-02 NOTE — PROGRESS NOTES
Patient:  Colton Crockett  YOB: 1940  Date: 6/2/2023    HISTORY OF PRESENT ILLNESS:   The patient is a 79 y.o. female who presents today for evaluation of the following problems:       6/2/23   Ms. Lilibeth Cabral is an 27-year-old female that follows in the office for recurrent UTIs and incontinence. She had a cystoscopy with Dr. Mine Jauregui, as well as a CT abdomen and pelvis, in 10/2022 which was unremarkable. Her medical history is remarkable for diarrhea. Urine Culture obtained 5/2023: E.Coli. This culture was obtained as the patient was unable to give an office sample. Overall the problem(s) : are improving. Associated Symptoms: No gross hematuria. Denies feelings of a UTI at this time  No fevers or chills. No nausea or vomiting. Pain Controlled. UA: trace blood, large leukocytes.  Improved today   Pain Scale 0      Last BUN and creatinine:  Lab Results   Component Value Date    BUN 19 08/05/2022     Lab Results   Component Value Date    CREATININE 1.0 08/05/2022       PAST MEDICAL, FAMILY AND SOCIAL HISTORY UPDATE:  Past Medical History:   Diagnosis Date    Arthritis     general    Breast CA ([facility] [address] 12/03    right    CAD (coronary artery disease) 2009    stent in [place]    CHF (congestive heart failure) ([facility])     Closed compression fracture of first lumbar vertebra ([facility] [address] 2/7/2018    Colon polyps 8/97; 3/11    COPD (chronic obstructive pulmonary disease) ([facility] [address] 8/5/2017    Diverticulosis 1/06    Erosive gastritis 11/06    Esophageal stricture 03/2011    Three times, Dr. Kole Taylor    Hyperlipidemia     Hypertension     Internal hemorrhoid 1/06    LUISA on CPAP     Osteopenia determined by x-ray 1999    Osteoporosis of vertebra 6/2018 Per CT thoracolumbar    Pneumonia 2009    Stress incontinence, female     Vertigo      Past Surgical History:   Procedure Laterality Date    ARM SURGERY Left 11/27/2018    plate in arm    BREAST LUMPECTOMY  1/04    [facility] axillary dissection    CARDIAC DEFIBRILLATOR

## 2023-06-04 LAB
BACTERIA UR CULT: ABNORMAL
ORGANISM: ABNORMAL

## 2023-06-06 LAB
ALBUMIN SERPL-MCNC: 3.5 G/DL
ALP BLD-CCNC: 36 U/L
ALT SERPL-CCNC: 5 U/L
ANION GAP SERPL CALCULATED.3IONS-SCNC: NORMAL MMOL/L
AST SERPL-CCNC: 15 U/L
BILIRUB SERPL-MCNC: 0.5 MG/DL (ref 0.1–1.4)
BUN BLDV-MCNC: 19 MG/DL
CALCIUM SERPL-MCNC: 8.4 MG/DL
CHLORIDE BLD-SCNC: 107 MMOL/L
CO2: 24 MMOL/L
CREAT SERPL-MCNC: 1.1 MG/DL
EGFR: NORMAL
GLUCOSE BLD-MCNC: 63 MG/DL
POTASSIUM SERPL-SCNC: 4.7 MMOL/L
SODIUM BLD-SCNC: 143 MMOL/L
TOTAL PROTEIN: 5.2

## 2023-06-26 RX ORDER — PYRIDOXINE HCL (VITAMIN B6) 100 MG
1 TABLET ORAL 2 TIMES DAILY
Qty: 60 CAPSULE | Refills: 5 | Status: SHIPPED | OUTPATIENT
Start: 2023-06-26

## 2023-07-03 ENCOUNTER — TELEPHONE (OUTPATIENT)
Dept: FAMILY MEDICINE CLINIC | Age: 83
End: 2023-07-03

## 2023-07-03 DIAGNOSIS — I50.32 CHRONIC DIASTOLIC CHF (CONGESTIVE HEART FAILURE) (HCC): Primary | ICD-10-CM

## 2023-07-03 NOTE — TELEPHONE ENCOUNTER
----- Message from Sher Montenegro sent at 7/3/2023 10:25 AM EDT -----  Subject: Message to Provider    QUESTIONS  Information for Provider? Corinne Smith is calling to check on patient's handicap   sticker. I told her it was mailed back in may but she did not receive it.   please mail again [address], [address]  ---------------------------------------------------------------------------  --------------  [address] [place]  [phone]; OK to leave message on voicemail  ---------------------------------------------------------------------------  --------------  SCRIPT ANSWERS  Relationship to Patient? Other/Third Party  Representative Name? Corinne Borrow  Is the representative on the Communication Release of Information (CARLOTA)   form in Epic?  Yes

## 2023-07-05 ENCOUNTER — PROCEDURE VISIT (OUTPATIENT)
Dept: CARDIOLOGY CLINIC | Age: 83
End: 2023-07-05
Payer: MEDICARE

## 2023-07-05 DIAGNOSIS — Z95.810 ICD (IMPLANTABLE CARDIOVERTER-DEFIBRILLATOR) IN PLACE: Primary | ICD-10-CM

## 2023-07-05 PROCEDURE — 93296 REM INTERROG EVL PM/IDS: CPT | Performed by: INTERNAL MEDICINE

## 2023-07-05 PROCEDURE — 93295 DEV INTERROG REMOTE 1/2/MLT: CPT | Performed by: INTERNAL MEDICINE

## 2023-07-16 DIAGNOSIS — M46.1 SACROILIITIS, NOT ELSEWHERE CLASSIFIED (HCC): Primary | ICD-10-CM

## 2023-07-17 RX ORDER — TRAMADOL HYDROCHLORIDE 50 MG/1
TABLET ORAL
Qty: 60 TABLET | Refills: 2 | Status: SHIPPED | OUTPATIENT
Start: 2023-07-17 | End: 2023-10-15

## 2023-07-25 RX ORDER — ASPIRIN 81 MG/1
81 TABLET ORAL DAILY
Qty: 90 TABLET | Refills: 3 | Status: SHIPPED | OUTPATIENT
Start: 2023-07-25

## 2023-07-25 RX ORDER — SAW/VIT E/SOD SEL/LYC/BETA/PYG 160-100
TABLET ORAL
Qty: 90 CAPSULE | Refills: 3 | Status: SHIPPED | OUTPATIENT
Start: 2023-07-25

## 2023-08-03 ENCOUNTER — TELEPHONE (OUTPATIENT)
Dept: PULMONOLOGY | Age: 83
End: 2023-08-03

## 2023-08-04 ENCOUNTER — OFFICE VISIT (OUTPATIENT)
Dept: PULMONOLOGY | Age: 83
End: 2023-08-04
Payer: MEDICARE

## 2023-08-04 VITALS
TEMPERATURE: 97.5 F | HEART RATE: 72 BPM | HEIGHT: 63 IN | SYSTOLIC BLOOD PRESSURE: 114 MMHG | DIASTOLIC BLOOD PRESSURE: 62 MMHG | BODY MASS INDEX: 38.06 KG/M2 | WEIGHT: 214.8 LBS | OXYGEN SATURATION: 96 %

## 2023-08-04 DIAGNOSIS — G47.33 OSA ON CPAP: Primary | ICD-10-CM

## 2023-08-04 DIAGNOSIS — Z99.89 OSA ON CPAP: Primary | ICD-10-CM

## 2023-08-04 DIAGNOSIS — E66.9 OBESITY (BMI 30-39.9): ICD-10-CM

## 2023-08-04 PROCEDURE — G8427 DOCREV CUR MEDS BY ELIG CLIN: HCPCS

## 2023-08-04 PROCEDURE — 1036F TOBACCO NON-USER: CPT

## 2023-08-04 PROCEDURE — 3074F SYST BP LT 130 MM HG: CPT

## 2023-08-04 PROCEDURE — 3078F DIAST BP <80 MM HG: CPT

## 2023-08-04 PROCEDURE — 99214 OFFICE O/P EST MOD 30 MIN: CPT

## 2023-08-04 PROCEDURE — G8417 CALC BMI ABV UP PARAM F/U: HCPCS

## 2023-08-04 PROCEDURE — G8399 PT W/DXA RESULTS DOCUMENT: HCPCS

## 2023-08-04 PROCEDURE — 1090F PRES/ABSN URINE INCON ASSESS: CPT

## 2023-08-04 PROCEDURE — 1123F ACP DISCUSS/DSCN MKR DOCD: CPT

## 2023-08-04 ASSESSMENT — ENCOUNTER SYMPTOMS
SINUS PRESSURE: 0
RHINORRHEA: 1
SINUS PAIN: 0
CHEST TIGHTNESS: 0
SHORTNESS OF BREATH: 0
WHEEZING: 0
COUGH: 0

## 2023-08-04 NOTE — PROGRESS NOTES
[facility]      José Luis Kincaid         [id number]  8/4/2023   Chief Complaint   Patient presents with    Follow-up     3 mo kang           Pt of Dr. Susi Gaviria     PAP Download:   Original or initial AHI: 39.5     Date of initial study: 5/7/03      Compliant  86.7%     Noncompliant 13.3 %     PAP Type CPAP Level  11.5 cmH2O  Avg Hrs/Day 6 hours and 29 minutes   AHI: 8.3   Recorded compliance dates: 7/5/23 to 8/3/23  Machine/Mfg:   [] ResMed    [x] Resp[employer]ics/Dreamstation   Interface:   [x] Nasal    [] Nasal pillows   [] FFM        Provider:      [x] [facility]     []Gee     [] [employer]    [] Margarita Freshwater    [] [place]                           [] [facility]      [] [employer]    [] [facility] [address]:      [] Other    Neck Size: 16  Mallampati Mallampati 3  ESS:  6  SAQLI: 83    Here is a scan of the most recent download:            Presentation:   Yulissa Bush presents for sleep medicine follow up for obstructive sleep apnea  Since the last visit, Yulissa Bush is doing fairly well on CPAP. Patient received replacement machine from [employer] and therefore did not receive new machine from [facility]. She reports no issue with her new machine. She does note benefit from her CPAP. She reports that sometimes she cannot sleep at nighttime if her neighbors are noisy at nighttime or if work is being done near her apartment at nighttime. She will sometimes wake up and go to her chair and fall back asleep without her CPAP in place. She resides at [address]. She believes she sleeps for 4-6 hours of sleep per night. Her machine shows about 6.5 hours of use per night. She reports she is sometimes more comfortable in her recliner. She does sometimes have dry mouth in the AM.     Patient is a poor historian and her daughter is not with her today    Weight stable / unchanged    Equipment issues: The pressure is  acceptable, the mask is acceptable     Sleep issues:  Do you feel better?

## 2023-08-04 NOTE — PATIENT INSTRUCTIONS
Your events per hour are a little higher than our goal. Currently your events per hour are at 8.3. Our goal is less than 5. Do to the increase in events, I am going to increase your pressure setting from 11.5 cmH2O to 13 cmH2O. We will need to pull a download from your machine in about 2 weeks to check on your events and to also see how you are feeling on your new pressure setting. If all is well, we will keep the pressure setting at 13 cmH2O and I will plan to see you back in about 3 months to make sure that setting is still working well. Keep good compliance with current recommended pressure to get optimal results and clinical improvement    Make sure to get 7-9 hours of sleep with PAP    Call DME company regarding supplies if needed. Call office for earlier appointment if needed for worsening of sleep symptoms.      Do not drive if feeling sleepy    Make sure to work on weight loss

## 2023-08-09 ENCOUNTER — OFFICE VISIT (OUTPATIENT)
Dept: FAMILY MEDICINE CLINIC | Age: 83
End: 2023-08-09
Payer: MEDICARE

## 2023-08-09 VITALS
RESPIRATION RATE: 16 BRPM | DIASTOLIC BLOOD PRESSURE: 70 MMHG | BODY MASS INDEX: 38.11 KG/M2 | WEIGHT: 215.1 LBS | SYSTOLIC BLOOD PRESSURE: 128 MMHG | HEIGHT: 63 IN | HEART RATE: 76 BPM

## 2023-08-09 DIAGNOSIS — I25.119 CORONARY ARTERY DISEASE INVOLVING NATIVE CORONARY ARTERY OF NATIVE HEART WITH ANGINA PECTORIS (HCC): ICD-10-CM

## 2023-08-09 DIAGNOSIS — N18.30 STAGE 3 CHRONIC KIDNEY DISEASE, UNSPECIFIED WHETHER STAGE 3A OR 3B CKD (HCC): ICD-10-CM

## 2023-08-09 DIAGNOSIS — M25.562 CHRONIC PAIN OF BOTH KNEES: ICD-10-CM

## 2023-08-09 DIAGNOSIS — G89.29 CHRONIC PAIN OF BOTH KNEES: ICD-10-CM

## 2023-08-09 DIAGNOSIS — J44.9 CHRONIC OBSTRUCTIVE PULMONARY DISEASE, UNSPECIFIED COPD TYPE (HCC): ICD-10-CM

## 2023-08-09 DIAGNOSIS — R26.81 UNSTABLE GAIT: ICD-10-CM

## 2023-08-09 DIAGNOSIS — R29.898 WEAKNESS OF BOTH LOWER EXTREMITIES: ICD-10-CM

## 2023-08-09 DIAGNOSIS — M25.561 CHRONIC PAIN OF BOTH KNEES: ICD-10-CM

## 2023-08-09 DIAGNOSIS — I10 ESSENTIAL HYPERTENSION: ICD-10-CM

## 2023-08-09 DIAGNOSIS — I50.32 CHRONIC DIASTOLIC CHF (CONGESTIVE HEART FAILURE) (HCC): Primary | ICD-10-CM

## 2023-08-09 PROCEDURE — 3074F SYST BP LT 130 MM HG: CPT | Performed by: FAMILY MEDICINE

## 2023-08-09 PROCEDURE — 99214 OFFICE O/P EST MOD 30 MIN: CPT | Performed by: FAMILY MEDICINE

## 2023-08-09 PROCEDURE — 1123F ACP DISCUSS/DSCN MKR DOCD: CPT | Performed by: FAMILY MEDICINE

## 2023-08-09 PROCEDURE — G8399 PT W/DXA RESULTS DOCUMENT: HCPCS | Performed by: FAMILY MEDICINE

## 2023-08-09 PROCEDURE — G8427 DOCREV CUR MEDS BY ELIG CLIN: HCPCS | Performed by: FAMILY MEDICINE

## 2023-08-09 PROCEDURE — G8417 CALC BMI ABV UP PARAM F/U: HCPCS | Performed by: FAMILY MEDICINE

## 2023-08-09 PROCEDURE — 3023F SPIROM DOC REV: CPT | Performed by: FAMILY MEDICINE

## 2023-08-09 PROCEDURE — 3078F DIAST BP <80 MM HG: CPT | Performed by: FAMILY MEDICINE

## 2023-08-09 PROCEDURE — 1036F TOBACCO NON-USER: CPT | Performed by: FAMILY MEDICINE

## 2023-08-09 PROCEDURE — 1090F PRES/ABSN URINE INCON ASSESS: CPT | Performed by: FAMILY MEDICINE

## 2023-08-09 ASSESSMENT — ENCOUNTER SYMPTOMS
RESPIRATORY NEGATIVE: 1
GASTROINTESTINAL NEGATIVE: 1

## 2023-08-09 NOTE — PROGRESS NOTES
[facility] -Follow up Visit      Patient Name: Calvin Zavala  MRN#: [id number]   Date: 2022    : 1957   Cardiologist-   PCP     SUBJECTIVE:   Calvin Zavala is a 64-year-old gentleman h/o CAD s/p PCI to RCA  and LAD ; STEMI s/p PCI to LAD 19; s/p 3v CABG (LIMA-LAD, SVG-Diag, SVG-OM) 2/15/19 (Dr. Lambert), ischemic cardiomyopathy EF 30% (NYHA Class II), HTN, HLD, DM, and PE 2019.   He is here today to follow-up for heart failure.    He was seen by Dr. Castillo on 3/22/2022.  Since then he had an echocardiogram that shows low ejection fraction, EF of 30 to 35%.  He states that he has been taking his medications, however unaware of what medications he is on.  He did not bring his medication bottles to the visit.  He states that he fell recently and broke his front 4 teeth while remodeling the house.  He did go to the emergency room and was treated with antibiotics.  He states that he is feeling well.  He has been taking his blood thinners, again not sure of his medications.  Denies of having chest pain, SOB, palpitations. Denies of waking up at night due to chest pain, SOB, palpitations. Denies of noticing swelling in his legs, no PND or orthopnea.   Since he is remodeling his house currently he has been eating out more often.    He went to the emergency room in 2021 at [facility] after a fall, sustained to nasal bone fractures.Patient was then admitted to the hospital for CHF on 2021 at [facility] for anemia and community-acquired pneumonia.  Once again on 2021 he was at the emergency room by for shortness of breath, had trouble sleeping due to severe Slutsky restless leg syndrome. He was then given diazepam for sleep.  He was admitted to [facility] on 10/2021 after a fall and laceration.refused to stay in the hospital and signed out against medical advice.    He was admitted to [facility]  on 12/2221-12/26/21 for CHF. He also had multiple episodes of syncope. He was having syncopal episodes back in November. He was planning to move to [place] and was driving back and forth, finally bought a house in [place].he states that he last passed out 2 months ago,however, has not had any recent episode of syncope. He did wear a monitor for 7-10 days,had to rturn it  Since he was going back to [place].   Denies of having chest pain, SOB, palpitations. Denies of waking up at night due to chest pain, SOB, palpitations. Denies of noticing swelling in his legs, no PND or orthopnea. He does check his weight at home.   He brought in his pill bottles and has been taking all his medications, both metoprolol and carvedilol, as well as xarelto (which was stopped after his last CTA chest which was negative for PE) as well as plavix. I did not see eliquis, which was given to him at the recent hospital visit in December for apical blood clot. He does not know if he finished it, it was supposed to be for 3 months.        He presented to the ED 5/26/21 for shortness of breath for the last 7 to 10days.  Oxygen saturation on room air was 97%.  Chest x-ray  showed his upper sternotomy wire is fractured but unchanged.  Pulmonary vessels were within normal  limits.  Troponin was negative.  CTA showed no evidence of pulmonary embolism.  He was given a dose  of IV Lasix and was discharged.   He returned to the ER at [facility] for SOB.  His BNP was further  elevated to 890. Because this was his third ER visit he will be admitted for observation and cardiology evaluation  as the patient has not followed up in the outpatient setting as instructed. He was noted to be taking 2 different beta-blockers as well as 2 different diuretics.  He is also taking aspirin, Plavix and Xarelto.  He is taking large doses of Ativan and Norco.    He was discharged on 40 mg of Lasix daily. Patient had been on multiple blood thinning  agents.  CARDIAC DEFIBRILLATOR PLACEMENT  01/08/2018    Biventricular pacemaker ICD, Dr. Bush Flank  3/2011    David   2009    DILATATION, ESOPHAGUS  2011    HIP PINNING Left 8/20/2019    LEFT HIP INTERTAIN performed by Leatha Billings MD at [address] ([address] [facility])  1976 or 1977    bleeding    JOINT REPLACEMENT Left     Shoulder    MS OFFICE/OUTPT VISIT,PROCEDURE ONLY N/A 8/2/2018    EGD DIL performed by Edward Sewell MD at [facility]    MS OFFICE/OUTPT VISIT,PROCEDURE ONLY Left 11/26/2018    ORIF LEFT PERIPROSTHETIC DISTAL HUMERAL FRACTURE performed by Leatha Billings MD at [address] FX W/PLATE/SCREWS W/WOCERCLAGE Right 2/12/2018    LEFT HUMERUS OPEN REDUCTION INTERNAL FIXATION performed by Leatha Billings MD at [address] [facility]    UPPER GASTROINTESTINAL ENDOSCOPY Left 8/2/2018    EGD BIOPSY performed by Edward Sewell MD at [facility]       Review of Systems   Constitutional: Negative. HENT: Negative. Respiratory: Negative. Cardiovascular: Negative. Gastrointestinal: Negative. Genitourinary:  Positive for frequency. Musculoskeletal: Negative. All other systems reviewed and are negative. Objective   Physical Exam  Vitals and nursing note reviewed. Constitutional:       General: She is not in acute distress. Appearance: Normal appearance. She is well-developed. HENT:      Head: Normocephalic and atraumatic. Right Ear: Tympanic membrane normal.      Left Ear: Tympanic membrane normal.   Eyes:      Conjunctiva/sclera: Conjunctivae normal.   Cardiovascular:      Rate and Rhythm: Normal rate and regular rhythm. Heart sounds: Normal heart sounds. No murmur heard. Pulmonary:      Effort: Pulmonary effort is normal.      Breath sounds: Normal breath sounds. No wheezing, rhonchi or rales. Abdominal:      General: There is no distension. Cardiology recommends aspirin 81 and Xarelto for now and stopping Plavix. Patient has a  history of PE in 2019 and treated with Xarelto. Last CTA in May 2021 showed no PE. At the time of  his CABG there was question of if he had HIT however he was SIN negative. He will need to follow-up  with hematology to see if long-term Xarelto is needed. Hemoglobin A1c is pending. Patient needs diet modification and will likely need to start  medication. He needs follow-up with his primary care physician. He should take daily blood sugars x  4.        resting dyspnea: No  Exertional dyspnea:No  Orthopnea:  No  PND: zero times/wk  Fatigue:No  Leg swelling: No  Devices: none    Obstructive Sleep Apnea: No  CPAP: NA    Oxygen: No     Wt Readings from Last 4 Encounters:   04/22/22 88 kg (194 lb)   04/22/22 88 kg (194 lb)   03/22/22 86.6 kg (191 lb)   02/21/22 87.3 kg (192 lb 6.4 oz)   Home weight 192lbs.  Weight gain / loss: stable      Last echo 3/20/22    1. Left ventricle: The cavity size is moderately dilated. Wall thickness     is mildly increased. There is mild concentric hypertrophy. The ejection     fraction was measured by biplane method of disks. Doppler parameters     are consistent with abnormal left ventricular relaxation and increased     filling pressure (grade 2 diastolic dysfunction). The ejection fraction     is 35%.  2. Regional wall motion abnormalities: Akinesis of the basal inferoseptal,     basal inferior, and apical myocardium; hypokinesis of the mid     inferoseptal, apical inferior, and apical septal myocardium.  3. Left atrium: The atrium is mildly dilated.       Echo 12/23/2021  -------------------------------------------------------------------   Study Conclusions   1. Left ventricle: Possible apical clot The cavity size was      moderately dilated. Wall thickness was increased in a pattern of      mild LVH. Systolic function was moderately reduced. The      estimated ejection fraction was 35-40%, by  visual assessment.      Hypokinesis of the anteroseptal and apical myocardium.      Hypokinesis of the basalinferior myocardium. Doppler parameters      are consistent with a reversible restrictive pattern, indicative      of decreased left ventricular diastolic compliance and/or      increased left atrial pressure (grade 3 diastolic dysfunction).   2. Aortic valve: There is sclerosis without stenosis. Mild      regurgitation.   3. Mitral valve: Moderate to severe regurgitation. Valve area by      continuity equation (using LVOT flow): 2.5cm^2.   4. Left atrium: The atrium was mildly to moderately dilated.   5. Right ventricle: Systolic function was reduced.   6. Right atrium: The atrium was mildly dilated.   7. Tricuspid valve: Moderate regurgitation.   8. Pulmonary arteries: Systolic pressure was moderately increased.   No previous study was available for comparison.         Echo: 6/10/2019  Definity contrast was administered due to non-visualization of    multiple contiguous myocardial segments.    Mild left ventricular chamber enlargement.    Moderate left ventricular dysfunction with akinesis of the apex and    mid to distal septum and anterior walls. LVEF 30-35%.    No intracardiac thrombus.    Mild to moderate mitral regurgitation.        Echocardiogram 4/2019  Definity contrast was administered due to non-visualization of    multiple contiguous myocardial segments.         Mild left ventricular chamber enlargement.    Moderate left ventricular dysfunction with akinesis of the apex and    mid to distal septum. LVEF 35-40%.    No intracardiac thrombus.             Last Hospitalization: 6/2019  Past Medical History:   Diagnosis Date   • Benign essential HTN    • BPH (benign prostatic hyperplasia)    • CAD (coronary artery disease)     s/p PCI to RCA 2002 and LAD 2003; STEMI s/p PCI to LAD 1/30/19; s/p 3v CABG (LIMA-LAD, SVG-Diag, SVG-OM) 2/15/19 (Dr. Lambert)   • CHF (congestive heart failure), NYHA class II,  chronic, systolic (CMS/[facility])    • Chronic back pain    • Chronic systolic CHF (congestive heart failure), NYHA class 2 (CMS/[facility])    • Depression    • Dyslipidemia    • Gout    • Ischemic cardiomyopathy     EF 30%, NYHA Class II   • Myocardial infarct, old     Fixed inferior defect by prior nuclear stress   • Pulmonary embolism (CMS/[facility])     19   • Restless leg syndrome    • Retinal detachment 2017   • T2DM (type 2 diabetes mellitus) (CMS/[facility])      Past Surgical History:   Procedure Laterality Date   • Cabg, arterial, three     • Coronary stent placement      RCA   • Coronary stent placement      LAD   • Inguinal hernia repair Left 2016   • Vitrectomy Left 2017     Family History   Problem Relation Age of Onset   • Dementia/Alzheimers Mother    • Kidney disease Father 60     Social History     Socioeconomic History   • Marital status: Unknown     Spouse name: Not on file   • Number of children: Not on file   • Years of education: Not on file   • Highest education level: Not on file   Occupational History   • Not on file   Tobacco Use   • Smoking status: Former Smoker     Packs/day: 0.00     Types: Cigarettes     Quit date: 1991     Years since quittin.4   • Smokeless tobacco: Never Used   Substance and Sexual Activity   • Alcohol use: Not Currently   • Drug use: Never   • Sexual activity: Not on file   Other Topics Concern   • Not on file   Social History Narrative   • Not on file     Social Determinants of Health     Financial Resource Strain: Not on file   Food Insecurity: Not on file   Transportation Needs: Not on file   Physical Activity: Not on file   Stress: Not on file   Social Connections: Not on file   Intimate Partner Violence: Not on file       MEDICATIONS:   Current Outpatient Medications   Medication Sig Dispense Refill   • aspirin 81 MG EC tablet Take 1 tablet by mouth daily. 30 tablet 11   • metoPROLOL succinate (TOPROL-XL) 100 MG 24 hr tablet Take 2 tablets by  mouth daily. 180 tablet 1   • bumetanide (BUMEX) 2 MG tablet Take 2 mg by mouth daily.     • spironolactone (ALDACTONE) 25 MG tablet Take 25 mg by mouth daily.     • apixaBAN (Eliquis) 5 MG Tab Take 1 tablet by mouth every 12 hours. 60 tablet 5   • diazePAM (VALIUM) 5 MG tablet Take 5 mg by mouth every 12 hours.     • rOPINIRole (REQUIP) 1 MG tablet Take 1 mg by mouth daily.     • atorvastatin (LIPITOR) 80 MG tablet Take 1 tablet by mouth daily. 30 tablet 2   • ramipril (ALTACE) 10 MG capsule Take 1 capsule by mouth daily. 30 capsule 2   • tamsulosin (FLOMAX) 0.4 MG Cap Take 0.4 mg by mouth daily.     • metFORMIN (GLUCOPHAGE) 500 MG tablet Take 1 tablet by mouth daily (with breakfast).     • HYDROcodone-acetaminophen (NORCO)  MG per tablet Take 1 tablet by mouth 4 times daily.     • allopurinol (ZYLOPRIM) 300 MG tablet Take 1 tablet by mouth daily. 90 tablet 1   • PARoxetine (PAXIL) 40 MG tablet Take 1 tablet by mouth every morning. 90 tablet 0   • ezetimibe (ZETIA) 10 MG tablet Take 1 tablet by mouth daily. 90 tablet 3     No current facility-administered medications for this visit.       There were no vitals taken for this visit.       OBJECTIVE:  HEENT: Jugular Venous Distention: None  CV: Rhythm:Regular a72 beats per minute            Murmurs: No  Resp:clear to auscultation in all lung fields  ABD: soft, + BS x4, non tender  Ext:  PTE: No edema    LABS:   Lab Services on 04/22/2022   Component Date Value Ref Range Status   • MG (MAGNESIUM, SERUM) 04/22/2022 1.7  1.7 - 2.6 mg/dL Final   • PROTHROMBIN TIME, THERAPEUTIC 04/22/2022 11.2  9.8 - 12.1 s Final   • INTERNATIONAL NORMALIZED RATIO 04/22/2022 1.0   Final   • NA (SODIUM, SERUM) 04/22/2022 138  136 - 146 mmol/L Final   • K (POTASSIUM, SERUM) 04/22/2022 4.3  3.5 - 5.3 mmol/L Final   • GLUCOSE, RANDOM 04/22/2022 165  70 - 200 mg/dL Final   • CREATININE, SERUM 04/22/2022 1.2  0.6 - 1.6 mg/dL Final   • CO2 VENOUS 04/22/2022 30  22 - 32 mmol/L Final   •  CHLORIDE, SERUM 04/22/2022 101  96 - 107 mmol/L Final   • CALCIUM, SERUM 04/22/2022 9.8  8.6 - 10.6 mg/dL Final   • BLOOD UREA NITROGEN 04/22/2022 25  6 - 27 mg/dL Final   • EGFR -R 04/22/2022 >60  >60 mL/min/[1.73m2] Final   • EGFR NON--R 04/22/2022 >60  >60 mL/min/[1.73m2] Final   • WHITE BLOOD CELL COUNT 04/22/2022 6.2  4.0 - 10.0 10*3/uL Final   • RED BLOOD CELL COUNT 04/22/2022 4.24  3.90 - 5.70 10*6/uL Final   • HEMOGLOBIN 04/22/2022 12.3 (A) 13.7 - 17.5 g/dL Final   • HEMATOCRIT 04/22/2022 36.5 (A) 40.0 - 51.0 % Final   • MEAN CORPUSCULAR VOLUME 04/22/2022 86.1  79.0 - 95.0 fL Final   • MEAN CORPUSCULAR HGB 04/22/2022 29.0  27.0 - 34.0 pg Final   • MEAN CORPUSCULAR HGB CONCENTRATION 04/22/2022 33.7  32.0 - 36.0 % Final   • PLATELET COUNT 04/22/2022 174  150 - 400 10*3/uL Final   • MEAN PLATELET VOLUME 04/22/2022 9.8  8.6 - 12.4 fL Final   • RED CELL DISTRIBUTION WIDTH 04/22/2022 15.1 (A) 11.3 - 14.8 % Final   • NEUTROPHIL PERCENT 04/22/2022 62.0  34.0 - 73.5 % Final   • NEUTROPHIL ABSOLUTE # 04/22/2022 3.8  1.4 - 6.5 10*3/uL Final   • IMMATURE GRANULOCYTE PERCENT 04/22/2022 0.3  0.0 - 0.5 % Final   • IMMATURE GRANULOCYTE ABSOLUTE 04/22/2022 0.02  0.00 - 0.05 10*3/uL Final   • LYMPH PERCENT 04/22/2022 25.7  20.5 - 51.1 % Final   • LYMPHOCYTE ABSOLUTE # 04/22/2022 1.6  1.2 - 3.4 10*3/uL Final   • MONOCYTE PERCENT 04/22/2022 9.2  4.3 - 12.9 % Final   • MONOCYTE ABSOLUTE # 04/22/2022 0.6  0.2 - 0.9 10*3/uL Final   • EOSINOPHIL % 04/22/2022 2.3  0.0 - 10.0 % Final   • EOSINOPHIL ABSOLUTE # 04/22/2022 0.1  0.0 - 0.5 10*3/uL Final   • BASOPHIL % 04/22/2022 0.5  0.0 - 1.2 % Final   • BASOPHIL ABSOLUTE # 04/22/2022 0.0  0.0 - 0.1 10*3/uL Final   • DIFFERENTIAL TYPE 04/22/2022 AUTO DIFF   Final   Ancillary Procedure on 03/21/2022   Component Date Value Ref Range Status   • AV Stenosis Severity Text 03/21/2022 Absent   Corrected   • Aortic Valve Area 03/21/2022 2.15   Corrected   • AV  Peak Gradient 03/21/2022 ---   Corrected   • AV Mean Gradient 03/21/2022 3.48   Corrected   • AV Peak Velocity 03/21/2022 1.25   Corrected   • AV Mean Velocity 03/21/2022 ---   Corrected   • Ejection Fraction 03/21/2022 35%   Corrected   Lab Services on 02/21/2022   Component Date Value Ref Range Status   • ALBUMIN, SERUM 02/21/2022 4.1  3.6 - 5.1 g/dL Final   • ALKALINE PHOSPHATASE([id number]) 02/21/2022 104  45 - 115 U/L Final   • ALANINE AMINOTRANSFERASE(SGPT) 02/21/2022 7 (A) 10 - 35 U/L Final   • ASPARTATE AMINOTRNSFRASE(SGOT) 02/21/2022 8 (A) 9 - 37 U/L Final   • BILIRUBIN, TOTAL 02/21/2022 0.7  0.0 - 1.0 mg/dL Final   • BLOOD UREA NITROGEN 02/21/2022 13  6 - 27 mg/dL Final   • CALCIUM, SERUM 02/21/2022 8.7  8.6 - 10.6 mg/dL Final   • CHLORIDE, SERUM 02/21/2022 105  96 - 107 mmol/L Final   • CO2 VENOUS 02/21/2022 23  22 - 32 mmol/L Final   • CREATININE, SERUM 02/21/2022 1.1  0.6 - 1.6 mg/dL Final   • GLUCOSE, FASTING 02/21/2022 154 (A) 60 - 100 mg/dL Final   • K (POTASSIUM, SERUM) 02/21/2022 4.2  3.5 - 5.3 mmol/L Final   • NA (SODIUM, SERUM) 02/21/2022 141  136 - 146 mmol/L Final   • PROTEIN, TOTAL SERUM 02/21/2022 6.1 (A) 6.2 - 8.1 g/dL Final   • EGFR -R 02/21/2022 >60  >60 mL/min/[1.73m2] Final   • EGFR NON--R 02/21/2022 >60  >60 mL/min/[1.73m2] Final   • WHITE BLOOD CELL COUNT 02/21/2022 5.3  4.0 - 10.0 10*3/uL Final   • RED BLOOD CELL COUNT 02/21/2022 4.14  3.90 - 5.70 10*6/uL Final   • HEMOGLOBIN 02/21/2022 11.4 (A) 13.7 - 17.5 g/dL Final   • HEMATOCRIT 02/21/2022 35.4 (A) 40.0 - 51.0 % Final   • MEAN CORPUSCULAR VOLUME 02/21/2022 85.5  79.0 - 95.0 fL Final   • MEAN CORPUSCULAR HGB 02/21/2022 27.5  27.0 - 34.0 pg Final   • MEAN CORPUSCULAR HGB CONCENTRATION 02/21/2022 32.2  32.0 - 36.0 % Final   • PLATELET COUNT 02/21/2022 187  150 - 400 10*3/uL Final   • MEAN PLATELET VOLUME 02/21/2022 12.1  8.6 - 12.4 fL Final   • RED CELL DISTRIBUTION WIDTH 02/21/2022 14.9 (A) 11.3 - 14.8 %  Final   • NEUTROPHIL PERCENT 02/21/2022 60.4  34.0 - 73.5 % Final   • NEUTROPHIL ABSOLUTE # 02/21/2022 3.2  1.4 - 6.5 10*3/uL Final   • IMMATURE GRANULOCYTE PERCENT 02/21/2022 0.2  0.0 - 0.5 % Final   • IMMATURE GRANULOCYTE ABSOLUTE 02/21/2022 0.01  0.00 - 0.05 10*3/uL Final   • LYMPH PERCENT 02/21/2022 23.4  20.5 - 51.1 % Final   • LYMPHOCYTE ABSOLUTE # 02/21/2022 1.2  1.2 - 3.4 10*3/uL Final   • MONOCYTE PERCENT 02/21/2022 11.1  4.3 - 12.9 % Final   • MONOCYTE ABSOLUTE # 02/21/2022 0.6  0.2 - 0.9 10*3/uL Final   • EOSINOPHIL % 02/21/2022 4.3  0.0 - 10.0 % Final   • EOSINOPHIL ABSOLUTE # 02/21/2022 0.2  0.0 - 0.5 10*3/uL Final   • BASOPHIL % 02/21/2022 0.6  0.0 - 1.2 % Final   • BASOPHIL ABSOLUTE # 02/21/2022 0.0  0.0 - 0.1 10*3/uL Final   • DIFFERENTIAL TYPE 02/21/2022 AUTO DIFF   Final   • CHOLESTEROL, TOTAL 02/21/2022 207 (A) 140 - 200 mg/dL Final   • HDL CHOLESTEROL 02/21/2022 25 (A) >40 mg/dL Final   • TRIGLYCERIDES 02/21/2022 271 (A) 0 - 200 mg/dL Final   • LDL CHOL, DIRECT MEASUREMENT 02/21/2022 147 (A) 0 - 100 mg/dL Final   Orders Only on 05/31/2021   Component Date Value Ref Range Status   • HEMOGLOBIN A1C (GLYCOSYLATED) 05/31/2021 8.7 (A) <5.7 % Final   • ESTIMATED AVERAGE GLUCOSE 05/31/2021 203  mg/dL Final   Orders Only on 05/31/2021   Component Date Value Ref Range Status   • BEDSIDE GLUCOSE 05/31/2021 243 (A) 70 - 110 mg/dL Final   Orders Only on 05/31/2021   Component Date Value Ref Range Status   • BEDSIDE GLUCOSE 05/31/2021 192 (A) 70 - 110 mg/dL Final   Orders Only on 05/31/2021   Component Date Value Ref Range Status   • GLUCOSE 05/31/2021 212 (A) 70 - 99 mg/dL Final   • BLOOD UREA NITROGEN (BUN) 05/31/2021 40.0 (A) 7 - 25 mg/dL Final   • CREATININE 05/31/2021 1.57 (A) 0.7 - 1.3 mg/dL Final   • EST GLOMERULAR FILTRATION RATE 05/31/2021 45 (A) >60 mL/min mL/min Final   • CHRONIC KIDNEY DISEASE STAGE 05/31/2021 STAGE 3   Final   • BUN/CREATININE RATIO 05/31/2021 25.5 (A) 7.4 - 23.0 Final   • NA  (SODIUM, SERUM) 05/31/2021 135  133 - 144 mmol/L Final   • K (POTASSIUM, SERUM) 05/31/2021 4.0  3.5 - 5.2 mmol/L Final   • CHLORIDE, SERUM 05/31/2021 100  98 - 107 mmol/L Final   • CARBON DIOXIDE 05/31/2021 25  21 - 31 MEQ/L Final   • ANION GAP 05/31/2021 10  6.2 - 14.7 mEq/L Final   • CALCIUM, SERUM 05/31/2021 8.9  8.6 - 10.3 mg/dL Final   Orders Only on 05/31/2021   Component Date Value Ref Range Status   • WHITE BLOOD CELL COU 05/31/2021 7.9  4.0 - 11.0 K/mm3 Final   • RED BLOOD CELL COUNT 05/31/2021 3.50 (A) 4.34 - 5.60 M/mm3 Final   • HEMOGLOBIN 05/31/2021 9.8 (A) 13.0 - 17.0 gm/dL Final   • HEMATOCRIT 05/31/2021 29.4 (A) 38.6 - 49.2 % Final   • MEAN CORPUSCULAR VOL 05/31/2021 84.0  80 - 100 fl Final   • MEAN CORPUSCULAR HGB 05/31/2021 28.0  26 - 34 pg Final   • *MEAN CORPUSCULAR HGB CONC 05/31/2021 33.3  32.5 - 35.8 g/dL Final   • RED CELL DISTRIBUTIO 05/31/2021 14.7  11.9 - 15.9 % Final   • PLATELET COUNT 05/31/2021 193  150 - 450 K/mm3 Final   • MEAN PLATELET VOLUME 05/31/2021 9.6  6.8 - 10.2 fl Final   • NEUTROPHIL AUTOMATED 05/31/2021 66.8  % Final   • LYMPHOCYTE AUTOMATED 05/31/2021 21.8  % Final   • MONOCYTE AUTOMATED 05/31/2021 7.3  % Final   • EOSINOPHIL AUTOMATED 05/31/2021 3.5  % Final   • BASOPHIL AUTOMATED 05/31/2021 0.6  % Final   • NEUTROPHIL ABSOLUTE 05/31/2021 5.3  1.91 - 7.60 Final   • LYMPHOCYTES 05/31/2021 1.7  0.78 - 3.73 Final   • MONOCYTES 05/31/2021 0.6  0.17 - 1.0 Final   • EOSINOPHILS 05/31/2021 0.3  0.0 - 0.6 Final   • BASOPHILS 05/31/2021 0.0  0.0 - 0.14 Final   Orders Only on 05/30/2021   Component Date Value Ref Range Status   • BEDSIDE GLUCOSE 05/30/2021 198 (A) 70 - 110 mg/dL Final   Orders Only on 05/30/2021   Component Date Value Ref Range Status   • BEDSIDE GLUCOSE 05/30/2021 159 (A) 70 - 110 mg/dL Final   There may be more visits with results that are not included.         ASSESSMENT:  1.Chronic systolic congestive heart failure - NYHA 3, EF 30 to 35%  2 ischemic  cardiomyopathy, EF of 30 to 35%  3.  CAD, status post PCI to RCA in 2002 and LAD in 2003, STEMI status post PCI to LAD in January 30, 2019, status post three-vessel CABG in February 2019  4.  Pulmonary embolism, treated with Xarelto in April 2019.    5.  Hypertension, blood pressure stable  6.  Hyperlipidemia  7.  Diabetes type 2  8 Apical blood clot per last echo 12/2021-on Eliquis 5 mg twice daily  9.Multiple episodes of syncope  10Hyperlipidemia    PLAN:  1) Patient appears euvolemic on today's exam. Diuretics: bumex 2mg qd  Aldactone 25mg qd    2) ACE/ARBS: Ramipril 10mg once a day, once his medications have been verified will consider switching to Entresto.     Anticoagulants: Stop plavix 75mg qd.  Start aspirin 81 mg daily and continue eliquis 5mg bid.    BB: toprol xl 100mg 2 tabs qd qd   Statins: lipitor 80mg qd, zetia 10mg qd    I will continue to uptitrate his heart failure meds.    3) Counseled on low sodium diet <2gms/day and restrict fluid intake to 2L/day. Keep legs elevated to alleviate swelling. Do daily weight check and will call if weight more than 3lbs overnite or 5lbs in 1 week.   Reviewed his recent echocardiogram, EF still at 30 to 35%.  Reviewed his recent echocardiogram which does not show apical thrombus.  Given his complex coronary artery disease, ischemic cardiomyopathy and pulmonary embolism, will continue Eliquis 5 mg twice daily and stop Plavix.  He will also start aspirin 81 mg daily.    Highly encouraged to have a permanent ICD to prevent SCD given his ICM.  He agreed to have permanent cardiac defibrillator implanted, seen by  today.  I have discussed with the patient to bring in all his medication bottles at all his office visits.      4) Next blood draw: None today.  5) Other recommendations: daily weight log   Case discussed with Dr. Castillo and he agreed with the above plan and treatment.  Follow up in CHF clinic in 1 month.  Follow up with primary cardiologist,  in  4months.      Electronically signed by: Dawn Sandoval PA-C  5/19/2022

## 2023-08-14 RX ORDER — SACUBITRIL AND VALSARTAN 49; 51 MG/1; MG/1
TABLET, FILM COATED ORAL
Qty: 180 TABLET | Refills: 3 | Status: SHIPPED | OUTPATIENT
Start: 2023-08-14

## 2023-08-14 RX ORDER — METOPROLOL SUCCINATE 25 MG/1
12.5 TABLET, EXTENDED RELEASE ORAL DAILY
Qty: 45 TABLET | Refills: 3 | Status: SHIPPED | OUTPATIENT
Start: 2023-08-14

## 2023-08-21 NOTE — PROGRESS NOTES
clicks, or gallops, distal pulses intact, no carotid bruits, no JVD  Pulmonary/Chest: clear to auscultation bilaterally- no wheezes, rales or rhonchi, normal air movement, no respiratory distress  Abdomen: soft, non-tender, non-distended, normal bowel sounds, no masses Extremities: no cyanosis, clubbing or edema, pulse   Skin: warm and dry, no rash or erythema  Head: normocephalic and atraumatic  Eyes: pupils equal, round, and reactive to light  Neck: supple and non-tender without mass, no thyromegaly   Musculoskeletal: normal range of motion, no joint swelling, deformity or tenderness  Neurological: alert, oriented, normal speech, no focal findings or movement disorder noted    Medications:    methylPREDNISolone acetate  80 mg Intramuscular Once    potassium replacement protocol   Other RX Placeholder    magnesium replacement protocol   Other RX Placeholder    calcium replacement protocol   Other RX Placeholder    pantoprazole  40 mg Oral QAM AC    aspirin  81 mg Oral Daily    calcium-vitamin D  1 tablet Oral BID    cetirizine  10 mg Oral Nightly    furosemide  20 mg Oral Daily    magnesium oxide  200 mg Oral Daily    metoprolol succinate  12.5 mg Oral Daily    rosuvastatin  5 mg Oral Daily    sacubitril-valsartan  1 tablet Oral BID    traMADol  50 mg Oral BID    sucralfate  1 g Oral 4 times per day    enoxaparin  40 mg Subcutaneous Daily      nitroGLYCERIN 5 mcg/min (20 1007)    sodium chloride 20 mL/hr at 20 1143     morphine, 2 mg, Q2H PRN  regadenoson, 0.4 mg, ONCE PRN  ketorolac, 15 mg, Q6H PRN        Diagnostics:  EK20  Poor data quality, interpretation may be adversely affected  Atrial-sensed ventricular-paced rhythm  Biventricular pacemaker detected  Abnormal ECG  When compared with ECG of 2020 01:07,  Ventricular rate has decreased BY  11 BPM  Confirmed by Nara Lucas MD [id number]) on 2020 5:33:04 PM  Echo: 20  Summary   limited echo   Ejection fraction is Overnight events noted      VITAL:  T(C): , Max: 37.1 (08-21-23 @ 04:00)  T(F): , Max: 98.8 (08-21-23 @ 04:00)  HR: 69 (08-21-23 @ 08:10)  BP: 128/32  RR: 20 (08-21-23 @ 08:00)  SpO2: 100% (08-21-23 @ 08:10)      PHYSICAL EXAM:  Constitutional: intubated/following simple commands off sedation  HEENT: (+)ETT/OGT  Neck: Supple, No JVD  Respiratory: coarse BS b/l/decreased at bases  Cardiovascular: RRR s1s2, no m/r/g  Gastrointestinal: BS+, soft, NT/ND  Extremities: No peripheral edema b/l  Back: no CVAT b/l  Skin: No rashes, no nevi    LABS:                        7.7    16.20 )-----------( 301      ( 21 Aug 2023 06:50 )             24.5     Na(131)/K(4.4)/Cl(97)/HCO3(27)/BUN(63)/Cr(2.82)Glu(340)/Ca(9.1)/Mg(2.50)/PO4(3.9)    08-21 @ 00:30  Na(139)/K(3.1)/Cl(101)/HCO3(28)/BUN(47)/Cr(2.42)Glu(213)/Ca(9.2)/Mg(2.50)/PO4(2.8)    08-20 @ 00:41  Na(133)/K(4.0)/Cl(96)/HCO3(24)/BUN(100)/Cr(4.80)Glu(251)/Ca(10.9)/Mg(3.30)/PO4(3.4)    08-19 @ 00:20      IMPRESSION: 74F w/ HTN, DM2, CVA, breast CA-bilateral mastectomy, recurrent aspiration pneumonia/respiratory failure, and CKD, 8/11/23 p/w acute hypercapnic respiratory failure; c/b RUSS    (1)Renal - CKD4     (2)RUSS - most likely ischemic ATN; less likely but possibly AIN. Anuric of late and requiring HD. On Prednisone 40mg po qd as empiric therapy against AIN. Off ASA since 8/17, in anticipation of renal biopsy    (3)Lytes - acceptable    (4)Pulm- hypercapnic respiratory failure on admission - remains intubated;    (5)CV - normotensive; hypervolemic    (6)ID - PNA - s/p IV abx        RECOMMEND:  (1)HD today- 2kg UF as able  (2)Prednisone as ordered  (3)No ASA for now in anticipation of potential IR-guided renal biopsy this  (4)Renal biopsy with IR within next 1-2 days  (5)Dose new meds for GFR<10/HD        Jimmy Colon MD  [facility]  Office/on call physician: [phone]  Cell (7a-7p): [phone]       visually estimated at 55%. Overall left ventricular function is normal.   The aortic valve leaflets were not well visualized. Aortic valve appears tricuspid. Thickened aortic valve leaflets noted. Signature      ----------------------------------------------------------------   Electronically signed by Rio Celaya MD  Stress: Completed today, results pending    Left Heart Cath: 2014  HEMODYNAMIC RESULTS AND LEFT VENTRICULOGRAM:  Left ventricular end diastolic  pressure was 16 mmHg with no significant changes before and after contrast  injection. There was no significant gradient across the aortic valve to  signify aortic stenosis. Left ventricular function was dilated and globally  hypokinetic with ejection fraction of 35-40%. CORONARY ARTERIOGRAM RESULTS:      1. Left main is patent, gives rise to left anterior descending and  left   circumflex arteries. 2.    Left anterior descending artery has nonobstructive disease with a  patent stent. Diagonal artery has ostial 50% nonobstructive disease. 3.  Left circumflex artery has nonobstructive 40% stenosis. 4.    Right coronary artery has ostia 30-40% nonobstructive disease,  otherwise patent. CONCLUSION:      1. Nonobstructive coronary artery disease. 2.    Cardiomyopathy of 35-40% with left bundle branch block and  mildly to moderately dilated ventricle. 3.    No complications. Carol Cárdenas M.D. Lab Data:    Cardiac Enzymes:  No results for input(s): CKTOTAL, CKMB, CKMBINDEX, TROPONINI in the last 72 hours.     CBC:   Lab Results   Component Value Date    WBC 6.2 01/08/2020    RBC 3.77 01/08/2020    HGB 11.4 01/08/2020    HCT 35.9 01/08/2020     01/08/2020       CMP:    Lab Results   Component Value Date     01/08/2020    K 4.6 01/08/2020    K 3.9 06/04/2019     01/08/2020    CO2 24 01/08/2020    BUN 15 01/08/2020    CREATININE 0.6 01/08/2020    LABGLOM >90 01/08/2020    GLUCOSE 94 01/08/2020    CALCIUM 8.6 01/08/2020       Hepatic Function Panel:    Lab Results   Component Value Date    ALKPHOS 43 01/07/2020    ALT <5 01/07/2020    AST 8 01/07/2020    PROT 3.9 01/07/2020    BILITOT 0.3 01/07/2020    BILITOT NEGATIVE 06/08/2018    BILIDIR <0.2 01/07/2020    LABALBU 2.4 01/07/2020       Magnesium:    Lab Results   Component Value Date    MG 1.8 01/07/2020       PT/INR:    Lab Results   Component Value Date    INR 1.00 01/07/2020       HgBA1c:  No results found for: LABA1C    FLP:    Lab Results   Component Value Date    TRIG 91 01/08/2020    HDL 40 01/08/2020    LDLCALC 56 01/08/2020       TSH:    Lab Results   Component Value Date    TSH 2.050 08/30/2019         Assessment:  · Left side/under left breast pain: ? GI or musculoskeletal/arthritic pain related vs underlying CAD-troponin negatve, increases with deep breath, reproducible with palpation, relief with carafate  · CAD: nonobstructive per [facility] 2014  · H/o NICMP: S/P ICD,  EF 55 per echo 1/7/20  · HTN  · HLP  · LUISA on CPAP  · Arthritis      Plan:  · Ok for discharge from cardiology standpoint if stress test without ischemia and remains stable cardiac wise  · Recommend GI follow up  · F/U with Dr. Angela Díaz in 4-6 weeks.          Electronically signed by JADEN Santiago CNP on 1/8/2020 at 11:00 AM Remains intubated  On Precedex and Nicardipine gtts      VITAL:  T(C): , Max: 37.1 (08-21-23 @ 04:00)  T(F): , Max: 98.8 (08-21-23 @ 04:00)  HR: 69 (08-21-23 @ 08:10)  BP: 128/32  RR: 20 (08-21-23 @ 08:00)  SpO2: 100% (08-21-23 @ 08:10)      PHYSICAL EXAM:  Constitutional: intubated/sedated  HEENT: (+)ETT/OGT  Neck: Supple, No JVD  Respiratory: coarse BS b/l/decreased at bases  Cardiovascular: RRR s1s2, no m/r/g  Gastrointestinal: BS+, soft, NT/ND  Extremities: No peripheral edema b/l  Back: no CVAT b/l  Skin: No rashes, no nevi    LABS:                        7.7    16.20 )-----------( 301      ( 21 Aug 2023 06:50 )             24.5     Na(131)/K(4.4)/Cl(97)/HCO3(27)/BUN(63)/Cr(2.82)Glu(340)/Ca(9.1)/Mg(2.50)/PO4(3.9)    08-21 @ 00:30  Na(139)/K(3.1)/Cl(101)/HCO3(28)/BUN(47)/Cr(2.42)Glu(213)/Ca(9.2)/Mg(2.50)/PO4(2.8)    08-20 @ 00:41  Na(133)/K(4.0)/Cl(96)/HCO3(24)/BUN(100)/Cr(4.80)Glu(251)/Ca(10.9)/Mg(3.30)/PO4(3.4)    08-19 @ 00:20      IMPRESSION: 74F w/ HTN, DM2, CVA, breast CA-bilateral mastectomy, recurrent aspiration pneumonia/respiratory failure, and CKD, 8/11/23 p/w acute hypercapnic respiratory failure; c/b RUSS    (1)Renal - CKD4     (2)RUSS - most likely ischemic ATN; less likely but possibly AIN. Anuric of late and requiring HD. On Prednisone 40mg po qd as empiric therapy against AIN. Off ASA since 8/17, in anticipation of renal biopsy    (3)Lytes - acceptable    (4)Pulm- hypercapnic respiratory failure on admission - remains intubated    (5)CV - hypertensive; on Nicardipine gtt. Hypervolemic    (6)ID - PNA - s/p IV abx        RECOMMEND:  (1)HD today- 2kg UF as able  (2)Prednisone as ordered  (3)No ASA for now in anticipation of potential IR-guided renal biopsy this  (4)Renal biopsy with IR within next 1-2 days  (5)Dose new meds for GFR<10/HD        Jimmy Colon MD  [facility]  Office/on call physician: [phone]  Cell (7a-7p): [phone]       Remains intubated  On Precedex and Nicardipine gtts  Family at bedside    VITAL:  T(C): , Max: 37.1 (08-21-23 @ 04:00)  T(F): , Max: 98.8 (08-21-23 @ 04:00)  HR: 69 (08-21-23 @ 08:10)  BP: 128/32  RR: 20 (08-21-23 @ 08:00)  SpO2: 100% (08-21-23 @ 08:10)      PHYSICAL EXAM:  Constitutional: intubated/sedated  HEENT: (+)ETT/OGT  Neck: Supple, No JVD  Respiratory: coarse BS b/l/decreased at bases  Cardiovascular: RRR s1s2, no m/r/g  Gastrointestinal: BS+, soft, NT/ND  Extremities: No peripheral edema b/l  Back: no CVAT b/l  Skin: No rashes, no nevi  Access: [email or web address]    LABS:                        7.7    16.20 )-----------( 301      ( 21 Aug 2023 06:50 )             24.5     Na(131)/K(4.4)/Cl(97)/HCO3(27)/BUN(63)/Cr(2.82)Glu(340)/Ca(9.1)/Mg(2.50)/PO4(3.9)    08-21 @ 00:30  Na(139)/K(3.1)/Cl(101)/HCO3(28)/BUN(47)/Cr(2.42)Glu(213)/Ca(9.2)/Mg(2.50)/PO4(2.8)    08-20 @ 00:41  Na(133)/K(4.0)/Cl(96)/HCO3(24)/BUN(100)/Cr(4.80)Glu(251)/Ca(10.9)/Mg(3.30)/PO4(3.4)    08-19 @ 00:20      IMPRESSION: 74F w/ HTN, DM2, CVA, breast CA-bilateral mastectomy, recurrent aspiration pneumonia/respiratory failure, and CKD, 8/11/23 p/w acute hypercapnic respiratory failure; c/b RUSS    (1)Renal - CKD4     (2)RUSS - most likely ischemic ATN; less likely but possibly AIN. Anuric of late and requiring HD. On Prednisone 40mg po qd as empiric therapy against AIN. Off ASA since 8/17, in anticipation of renal biopsy. On HD now    (3)Lytes - acceptable    (4)Pulm- hypercapnic respiratory failure on admission - remains intubated    (5)CV - hypertensive; on Nicardipine gtt. Hypervolemic    (6)ID - PNA - s/p IV abx        RECOMMEND:  (1)HD today- 2kg UF as able  (2)Prednisone as ordered  (3)No ASA for now in anticipation of potential IR-guided renal biopsy this  (4)Renal biopsy with IR within next 1-2 days  (5)Dose new meds for GFR<10/HD        Jimmy Colon MD  [facility]  Office/on call physician: [phone]  Cell (7a-7p): [phone]

## 2023-08-25 ENCOUNTER — TELEPHONE (OUTPATIENT)
Dept: PULMONOLOGY | Age: 83
End: 2023-08-25

## 2023-08-25 DIAGNOSIS — G47.33 OSA ON CPAP: Primary | ICD-10-CM

## 2023-08-25 DIAGNOSIS — Z99.89 OSA ON CPAP: Primary | ICD-10-CM

## 2023-08-25 NOTE — TELEPHONE ENCOUNTER
Please notify patient AHI is not well controlled on CPAP 13. Will increase pressure setting to 15 cmH2O and pull DL in about 2 weeks. Thanks!

## 2023-08-29 ENCOUNTER — NURSE ONLY (OUTPATIENT)
Dept: CARDIOLOGY CLINIC | Age: 83
End: 2023-08-29
Payer: MEDICARE

## 2023-08-29 DIAGNOSIS — Z45.02 ICD (IMPLANTABLE CARDIOVERTER-DEFIBRILLATOR) BATTERY DEPLETION: Primary | ICD-10-CM

## 2023-08-29 PROCEDURE — 93283 PRGRMG EVAL IMPLANTABLE DFB: CPT | Performed by: NUCLEAR MEDICINE

## 2023-08-29 NOTE — PROGRESS NOTES
[address] [place] [address] ICD ---  has Merlin at home  Patient of [facility]    Battery 1.9-2.1 years    Presenting rhythm AS BV  Underlying AS VS    A Impedance 340  RV Impedance 360  LV Impedance 440    Shock 39    P wave sensing 0.3-- atrial auto sensitivity turned off - programed to 0.2   R wave sensing 2.7    A Threshold 0.5 @ 0.50  RV Thresholds 0.50 @ 0.50  LV Thresholds 4 @ 1    A Paced 24%  V Paced 95%    Programmed Mode DDDR     Afib [place] <1%    Episodes : Mode switches - known atrial lead noise  10/11/22- VT    Corvue -- turned on.

## 2023-09-05 ENCOUNTER — TELEPHONE (OUTPATIENT)
Dept: FAMILY MEDICINE CLINIC | Age: 83
End: 2023-09-05

## 2023-09-05 DIAGNOSIS — R41.82 ALTERED MENTAL STATUS, UNSPECIFIED ALTERED MENTAL STATUS TYPE: Primary | ICD-10-CM

## 2023-09-05 NOTE — TELEPHONE ENCOUNTER
[facility] called and stated that family is requesting for patient to have an UA completed. They stated that patient was having \"off\" behavior. She stated that we can fax the order to them at [phone].      If we have any questions we can call them back at [phone]

## 2023-09-06 ENCOUNTER — NURSE ONLY (OUTPATIENT)
Dept: LAB | Age: 83
End: 2023-09-06

## 2023-09-06 DIAGNOSIS — R41.82 ALTERED MENTAL STATUS, UNSPECIFIED ALTERED MENTAL STATUS TYPE: ICD-10-CM

## 2023-09-06 LAB
BACTERIA URNS QL MICRO: ABNORMAL /HPF
BILIRUB UR QL STRIP.AUTO: NEGATIVE
CASTS #/AREA URNS LPF: ABNORMAL /LPF
CASTS 2: ABNORMAL /LPF
CHARACTER UR: ABNORMAL
COLOR: YELLOW
CRYSTALS URNS MICRO: ABNORMAL
EPITHELIAL CELLS, UA: ABNORMAL /HPF
GLUCOSE UR QL STRIP.AUTO: NEGATIVE MG/DL
HGB UR QL STRIP.AUTO: ABNORMAL
KETONES UR QL STRIP.AUTO: ABNORMAL
MISCELLANEOUS 2: ABNORMAL
NITRITE UR QL STRIP: POSITIVE
PH UR STRIP.AUTO: 5.5 [PH] (ref 5–9)
PROT UR STRIP.AUTO-MCNC: ABNORMAL MG/DL
RBC URINE: ABNORMAL /HPF
RENAL EPI CELLS #/AREA URNS HPF: ABNORMAL /[HPF]
SP GR UR REFRACT.AUTO: 1.02 (ref 1–1.03)
UROBILINOGEN, URINE: 1 EU/DL (ref 0–1)
WBC #/AREA URNS HPF: > 100 /HPF
WBC #/AREA URNS HPF: ABNORMAL /[HPF]
YEAST LIKE FUNGI URNS QL MICRO: ABNORMAL

## 2023-09-06 RX ORDER — ROSUVASTATIN CALCIUM 5 MG/1
TABLET, COATED ORAL
Qty: 90 TABLET | Refills: 3 | Status: SHIPPED | OUTPATIENT
Start: 2023-09-06

## 2023-09-07 LAB
BACTERIA UR CULT: ABNORMAL
ORGANISM: ABNORMAL

## 2023-09-07 RX ORDER — NITROFURANTOIN 25; 75 MG/1; MG/1
100 CAPSULE ORAL 2 TIMES DAILY
Qty: 10 CAPSULE | Refills: 0 | Status: SHIPPED | OUTPATIENT
Start: 2023-09-07 | End: 2023-09-12

## 2023-10-02 DIAGNOSIS — R30.0 DYSURIA: Primary | ICD-10-CM

## 2023-10-02 RX ORDER — ISOSORBIDE MONONITRATE 30 MG/1
TABLET, EXTENDED RELEASE ORAL
Qty: 45 TABLET | Refills: 3 | Status: SHIPPED | OUTPATIENT
Start: 2023-10-02

## 2023-10-06 RX ORDER — NITROFURANTOIN 25; 75 MG/1; MG/1
100 CAPSULE ORAL 2 TIMES DAILY
Qty: 10 CAPSULE | Refills: 0 | Status: SHIPPED | OUTPATIENT
Start: 2023-10-06 | End: 2023-10-11

## 2023-10-09 RX ORDER — CETIRIZINE HYDROCHLORIDE 10 MG/1
10 TABLET ORAL NIGHTLY
Qty: 90 TABLET | Refills: 3 | Status: SHIPPED | OUTPATIENT
Start: 2023-10-09

## 2023-10-11 ENCOUNTER — TELEPHONE (OUTPATIENT)
Dept: FAMILY MEDICINE CLINIC | Age: 83
End: 2023-10-11

## 2023-10-11 NOTE — TELEPHONE ENCOUNTER
The patients daughter/POA called asking if the patient has had the shingles immunization and if not would you recemmed that she gets it?

## 2023-10-16 ENCOUNTER — OFFICE VISIT (OUTPATIENT)
Dept: UROLOGY | Age: 83
End: 2023-10-16
Payer: MEDICARE

## 2023-10-16 VITALS
DIASTOLIC BLOOD PRESSURE: 78 MMHG | HEIGHT: 63 IN | BODY MASS INDEX: 37.56 KG/M2 | SYSTOLIC BLOOD PRESSURE: 132 MMHG | WEIGHT: 212 LBS

## 2023-10-16 DIAGNOSIS — N39.0 RECURRENT UTI: Primary | ICD-10-CM

## 2023-10-16 DIAGNOSIS — R32 URINARY INCONTINENCE, UNSPECIFIED TYPE: ICD-10-CM

## 2023-10-16 LAB
BACTERIA: ABNORMAL
BILIRUB UR QL STRIP: NEGATIVE
BILIRUBIN URINE: NEGATIVE
BLOOD URINE, POC: ABNORMAL
CASTS #/AREA URNS LPF: ABNORMAL /LPF
CASTS #/AREA URNS LPF: ABNORMAL /LPF
CHARACTER UR: ABNORMAL
CHARACTER, URINE: CLEAR
CHARCOAL URNS QL MICRO: ABNORMAL
COLOR UR: YELLOW
COLOR, URINE: YELLOW
CRYSTALS URNS QL MICRO: ABNORMAL
EPITHELIAL CELLS, UA: ABNORMAL /HPF
GLUCOSE UR QL STRIP.AUTO: NEGATIVE MG/DL
GLUCOSE URINE: NEGATIVE MG/DL
HGB UR QL STRIP.AUTO: NEGATIVE
KETONES UR QL STRIP.AUTO: ABNORMAL
KETONES, URINE: ABNORMAL
LEUKOCYTE CLUMPS, URINE: ABNORMAL
LEUKOCYTE ESTERASE UR QL STRIP.AUTO: ABNORMAL
NITRITE UR QL STRIP.AUTO: NEGATIVE
NITRITE, URINE: NEGATIVE
PH UR STRIP.AUTO: 6 [PH] (ref 5–9)
PH, URINE: 6 (ref 5–9)
POST VOID RESIDUAL (PVR): 86 ML
PROT UR STRIP.AUTO-MCNC: NEGATIVE MG/DL
PROTEIN, URINE: NEGATIVE MG/DL
RBC #/AREA URNS HPF: ABNORMAL /HPF
RENAL EPI CELLS #/AREA URNS HPF: ABNORMAL /[HPF]
SPECIFIC GRAVITY UA: 1.01 (ref 1–1.03)
SPECIFIC GRAVITY, URINE: 1.01 (ref 1–1.03)
UROBILINOGEN, URINE: 1 EU/DL (ref 0–1)
UROBILINOGEN, URINE: 1 EU/DL (ref 0–1)
WBC #/AREA URNS HPF: ABNORMAL /HPF
YEAST LIKE FUNGI URNS QL MICRO: ABNORMAL

## 2023-10-16 PROCEDURE — 99214 OFFICE O/P EST MOD 30 MIN: CPT

## 2023-10-16 PROCEDURE — G8484 FLU IMMUNIZE NO ADMIN: HCPCS

## 2023-10-16 PROCEDURE — G8417 CALC BMI ABV UP PARAM F/U: HCPCS

## 2023-10-16 PROCEDURE — G8399 PT W/DXA RESULTS DOCUMENT: HCPCS

## 2023-10-16 PROCEDURE — 3078F DIAST BP <80 MM HG: CPT

## 2023-10-16 PROCEDURE — G8427 DOCREV CUR MEDS BY ELIG CLIN: HCPCS

## 2023-10-16 PROCEDURE — 1036F TOBACCO NON-USER: CPT

## 2023-10-16 PROCEDURE — 1123F ACP DISCUSS/DSCN MKR DOCD: CPT

## 2023-10-16 PROCEDURE — 51798 US URINE CAPACITY MEASURE: CPT

## 2023-10-16 PROCEDURE — 0509F URINE INCON PLAN DOCD: CPT

## 2023-10-16 PROCEDURE — 81003 URINALYSIS AUTO W/O SCOPE: CPT

## 2023-10-16 PROCEDURE — 1090F PRES/ABSN URINE INCON ASSESS: CPT

## 2023-10-16 PROCEDURE — 3075F SYST BP GE 130 - 139MM HG: CPT

## 2023-10-16 RX ORDER — ESTRADIOL 0.1 MG/G
2 CREAM VAGINAL DAILY
Qty: 50 G | Refills: 3 | Status: SHIPPED | OUTPATIENT
Start: 2023-10-16 | End: 2023-10-16

## 2023-10-16 RX ORDER — TRAMADOL HYDROCHLORIDE 50 MG/1
50 TABLET ORAL 2 TIMES DAILY
COMMUNITY

## 2023-10-16 RX ORDER — ESTRADIOL 0.1 MG/G
2 CREAM VAGINAL DAILY
Qty: 50 G | Refills: 3 | Status: SHIPPED | OUTPATIENT
Start: 2023-10-16

## 2023-10-16 NOTE — PROGRESS NOTES
[address] [facility] [address]  [facility] [address]  Dept: [phone]  Loc: [phone]    Visit Date: 10/16/2023        HPI:     HPI  Ms. Gt Washington is an 69-year-old female that follows in the office for recurrent UTIs and incontinence. She had a cystoscopy with Dr. Ab Pink, as well as a CT abdomen and pelvis, in 10/2022 which was unremarkable. Was taking Hiprex for UTI prevention. This was discontinued due to GFR <60. Patient also was taking cranberry pills but was ultimately unable to swallow them. Would need cranberry gummies. Her medical history is remarkable for diarrhea. Current Outpatient Medications   Medication Sig Dispense Refill    D-MANNOSE PO Take by mouth daily      traMADol (ULTRAM) 50 MG tablet Take 1 tablet by mouth in the morning and at bedtime. Max Daily Amount: 100 mg      estradiol (ESTRACE VAGINAL) 0.1 MG/GM vaginal cream Place 2 g vaginally daily 2 to 4 g (marked on the applicator) daily for one or two weeks, then gradually reduced to one half initial dosage for a similar period. A maintenance dosage of 1 g, one to three times a week, may be used after restoration of the vaginal mucosa has been achieved. 50 g 3    cetirizine (ZYRTEC) 10 MG tablet Take 1 tablet by mouth nightly at bedtime. 90 tablet 3    isosorbide mononitrate (IMDUR) 30 MG extended release tablet take 1/2 tablet by mouth once daily 45 tablet 3    rosuvastatin (CRESTOR) 5 MG tablet take 1 tablet by mouth once daily 90 tablet 3    Misc.  Devices (CPAP MACHINE) MISC by Does not apply route Please change her current CPAP/BiPAP pressure to a CPAP 15 cm H2O    Please pull download in 2 weeks 1 each 0    ENTRESTO 49-51 MG per tablet take 1 tablet by mouth twice a day 180 tablet 3    metoprolol succinate (TOPROL XL) 25 MG extended release tablet Take 0.5 tablets by mouth daily 45 tablet 3    aspirin 81 MG EC tablet Take 1 tablet by mouth daily 90 tablet 3

## 2023-10-16 NOTE — PATIENT INSTRUCTIONS
-Treat only symptomatic infections.  -Continue D-Mannose, and Probiotic supplements. Can take cranberry Gummies  -Stop Daily Hiprex due to GFR <60  -avoid saturating pads  -avoid constipation. Discuss concerns of diarrhea with your PCP.    -Time voiding, attempt to use restroom every 2 hours   -Consume plenty of fluids   -start estrace cream. Can have in her bathroom    -UA with trace blood, will send for micro and culture

## 2023-10-17 LAB
BACTERIA UR CULT: ABNORMAL
ORGANISM: ABNORMAL

## 2023-10-20 ENCOUNTER — APPOINTMENT (OUTPATIENT)
Dept: CT IMAGING | Age: 83
End: 2023-10-20
Payer: MEDICARE

## 2023-10-20 ENCOUNTER — TELEPHONE (OUTPATIENT)
Dept: UROLOGY | Age: 83
End: 2023-10-20

## 2023-10-20 ENCOUNTER — HOSPITAL ENCOUNTER (EMERGENCY)
Age: 83
Discharge: HOME | End: 2023-10-20
Attending: EMERGENCY MEDICINE
Payer: MEDICARE

## 2023-10-20 ENCOUNTER — TELEPHONE (OUTPATIENT)
Dept: FAMILY MEDICINE CLINIC | Age: 83
End: 2023-10-20

## 2023-10-20 ENCOUNTER — APPOINTMENT (OUTPATIENT)
Dept: GENERAL RADIOLOGY | Age: 83
End: 2023-10-20
Payer: MEDICARE

## 2023-10-20 VITALS
RESPIRATION RATE: 17 BRPM | TEMPERATURE: 98.5 F | OXYGEN SATURATION: 98 % | DIASTOLIC BLOOD PRESSURE: 73 MMHG | HEART RATE: 76 BPM | SYSTOLIC BLOOD PRESSURE: 138 MMHG
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
## 2023-10-20 DIAGNOSIS — R41.81 AGE-RELATED COGNITIVE DECLINE: Primary | ICD-10-CM

## 2023-10-20 LAB
ALBUMIN SERPL BCG-MCNC: 3.7 G/DL (ref 3.5–5.1)
ALP SERPL-CCNC: 47 U/L (ref 38–126)
ALT SERPL W/O P-5'-P-CCNC: 6 U/L (ref 11–66)
ANION GAP SERPL CALC-SCNC: 9 MEQ/L (ref 8–16)
AST SERPL-CCNC: 11 U/L (ref 5–40)
BASOPHILS ABSOLUTE: 0 THOU/MM3 (ref 0–0.1)
BASOPHILS NFR BLD AUTO: 0.5 %
BILIRUB SERPL-MCNC: 0.5 MG/DL (ref 0.3–1.2)
BILIRUB UR QL STRIP.AUTO: NEGATIVE
BUN SERPL-MCNC: 25 MG/DL (ref 7–22)
CALCIUM SERPL-MCNC: 9.3 MG/DL (ref 8.5–10.5)
CHARACTER UR: CLEAR
CHLORIDE SERPL-SCNC: 102 MEQ/L (ref 98–111)
CO2 SERPL-SCNC: 30 MEQ/L (ref 23–33)
COLOR: YELLOW
CREAT SERPL-MCNC: 1.3 MG/DL (ref 0.4–1.2)
DEPRECATED RDW RBC AUTO: 45.5 FL (ref 35–45)
EOSINOPHIL NFR BLD AUTO: 0.5 %
EOSINOPHILS ABSOLUTE: 0 THOU/MM3 (ref 0–0.4)
ERYTHROCYTE [DISTWIDTH] IN BLOOD BY AUTOMATED COUNT: 13 % (ref 11.5–14.5)
GFR SERPL CREATININE-BSD FRML MDRD: 40 ML/MIN/1.73M2
GLUCOSE SERPL-MCNC: 104 MG/DL (ref 70–108)
GLUCOSE UR QL STRIP.AUTO: NEGATIVE MG/DL
HCT VFR BLD AUTO: 38.6 % (ref 37–47)
HGB BLD-MCNC: 12.4 GM/DL (ref 12–16)
HGB UR QL STRIP.AUTO: NEGATIVE
IMM GRANULOCYTES # BLD AUTO: 0.03 THOU/MM3 (ref 0–0.07)
IMM GRANULOCYTES NFR BLD AUTO: 0.4 %
KETONES UR QL STRIP.AUTO: NEGATIVE
LYMPHOCYTES ABSOLUTE: 1.3 THOU/MM3 (ref 1–4.8)
LYMPHOCYTES NFR BLD AUTO: 16.6 %
MCH RBC QN AUTO: 30.5 PG (ref 26–33)
MCHC RBC AUTO-ENTMCNC: 32.1 GM/DL (ref 32.2–35.5)
MCV RBC AUTO: 94.8 FL (ref 81–99)
MONOCYTES ABSOLUTE: 0.4 THOU/MM3 (ref 0.4–1.3)
MONOCYTES NFR BLD AUTO: 4.7 %
NEUTROPHILS NFR BLD AUTO: 77.3 %
NITRITE UR QL STRIP: NEGATIVE
NRBC BLD AUTO-RTO: 0 /100 WBC
OSMOLALITY SERPL CALC.SUM OF ELEC: 286 MOSMOL/KG (ref 275–300)
PH UR STRIP.AUTO: 6 [PH] (ref 5–9)
PLATELET # BLD AUTO: 183 THOU/MM3 (ref 130–400)
PMV BLD AUTO: 10.8 FL (ref 9.4–12.4)
POTASSIUM SERPL-SCNC: 3.7 MEQ/L (ref 3.5–5.2)
PROT SERPL-MCNC: 5.9 G/DL (ref 6.1–8)
PROT UR STRIP.AUTO-MCNC: NEGATIVE MG/DL
RBC # BLD AUTO: 4.07 MILL/MM3 (ref 4.2–5.4)
SEGMENTED NEUTROPHILS ABSOLUTE COUNT: 6.1 THOU/MM3 (ref 1.8–7.7)
SODIUM SERPL-SCNC: 141 MEQ/L (ref 135–145)
SP GR UR REFRACT.AUTO: 1.01 (ref 1–1.03)
UROBILINOGEN, URINE: 1 EU/DL (ref 0–1)
WBC # BLD AUTO: 7.9 THOU/MM3 (ref 4.8–10.8)
WBC #/AREA URNS HPF: NEGATIVE /[HPF]

## 2023-10-20 PROCEDURE — 93005 ELECTROCARDIOGRAM TRACING: CPT | Performed by: EMERGENCY MEDICINE

## 2023-10-20 PROCEDURE — 80053 COMPREHEN METABOLIC PANEL: CPT

## 2023-10-20 PROCEDURE — 70450 CT HEAD/BRAIN W/O DYE: CPT

## 2023-10-20 PROCEDURE — 71101 X-RAY EXAM UNILAT RIBS/CHEST: CPT

## 2023-10-20 PROCEDURE — 81003 URINALYSIS AUTO W/O SCOPE: CPT

## 2023-10-20 PROCEDURE — 85025 COMPLETE CBC W/AUTO DIFF WBC: CPT

## 2023-10-20 PROCEDURE — 36415 COLL VENOUS BLD VENIPUNCTURE: CPT

## 2023-10-20 PROCEDURE — 99285 EMERGENCY DEPT VISIT HI MDM: CPT

## 2023-10-20 NOTE — DISCHARGE INSTRUCTIONS
----- Message from Your Healthcare Team sent at 2/3/2017  6:54 AM PST -----  Regarding: Non-Urgent Medical Question  Contact: [phone]  I saw the Dermotologist yesterday and he like to see me for a few more treatments.His name is Gene Fatima at the [facility] in [place]. Can I get a referral? Hair is sorta of growing back. Last appointment on this referral is March 7th. Thanks   Your evaluated emergency department today for concern for altered mental status. Your exam shows no focal neurologic deficits. I suspect your memory issues are related to age-related cognitive decline. CT of the head shows chronic volume loss with no acute findings such as stroke, mass, or bleed. There are no rib fractures. Please follow-up with your primary care provider for further management of your memory issues.

## 2023-10-20 NOTE — TELEPHONE ENCOUNTER
Daughter Gt Beatty on HIPAA called office stating pt has been very confused. She saw Urology 10/16/23 and has not heard back from them regarding her UA. Says pt has frequent UTIs, almost monthly \"except for July\". Gt Beatty will contact Urology regarding those results. She just spoke with [facility] who said pt fell off the toilet yesterday, she did not hit her head at all. But she is asking what to do from here for the confusion. Please advise.

## 2023-10-20 NOTE — ED NOTES
Pt to the ED via personal  transport. Pt presents with complaints of chronic utis and worsening coginition. EKG completed and Telemetry applied. Patient is alert and oriented x 4. Respirations are regular and unlabored. Patient provided blanket. Call light within reach.       Gibson Skelton RN  10/20/23 [id number]

## 2023-10-20 NOTE — TELEPHONE ENCOUNTER
Daughter Jackie Maldonado was notified and agrees. Says she just spoke with Urology and they advised the same thing.  She is taking pt to the ED for eval.

## 2023-10-20 NOTE — TELEPHONE ENCOUNTER
Patient daughter called stating patient is more confused and finished antibiotics. She is not aware of any other symptoms. She did fall off the toilet yesterday but did not get hurt. She was calling for the culture results. Advised the culture showed a growth of contaminants and not usually an indication for antibiotics. Advised to be evaluated in the ER for confusion and recent fall. She voiced understanding.     Patient stays at [facility]

## 2023-10-20 NOTE — ED PROVIDER NOTES
[place] ENCOUNTER          Pt Name: Gianna Oliva  MRN: [id number]  [address] [facility] [place] 1940  Date of evaluation: 10/20/2023  Treating Resident Physician: Ellen Santiago DO  Supervising Physician: Terrie Alva DO    History obtained from the patient and her daughter. CHIEF COMPLAINT       Chief Complaint   Patient presents with    Fall    Altered Mental Status           HISTORY OF PRESENT ILLNESS    HPI  Gianna Oliva is a 79 y.o. female who presents to the emergency department for evaluation of mental decline. 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 Her daughter reports that over the last year the patient has become progressively more forgetful and confused about things. She reportedly has had monthly UTIs for approximately last 6 months, and they were blaming a lot of her symptoms on the UTIs. She reports there have been no acute changes in mentation over the last month, just that it has been getting worse. She is aware that she is more forgetful and is currently A&O x4. She does report having a fall after standing up from the toilet yesterday. She does not exactly recall the entire events that transpired, only that she fell, and did not hit her head. Her only reported symptom is left posterior rib pain that is only present on palpation. She denies headache, vision changes, chest pain, abdominal pain, dysuria, shortness of breath, or worsening leg edema. She is not on blood thinners. The patient has no other acute complaints at this time. REVIEW OF SYSTEMS   As per HPI.       PAST MEDICAL AND SURGICAL HISTORY     Past Medical History:   Diagnosis Date    Arthritis     general    Breast CA ([address]) 12/03    right    CAD (coronary artery disease) 2009    stent in [place]    CHF (congestive heart failure) (HCC)     Closed compression fracture of first lumbar vertebra ([address]) 2/7/2018    Colon polyps 8/97; 3/11    COPD

## 2023-10-21 LAB
EKG ATRIAL RATE: 73 BPM
EKG P AXIS: 69 DEGREES
EKG P-R INTERVAL: 198 MS
EKG Q-T INTERVAL: 394 MS
EKG QRS DURATION: 108 MS
EKG QTC CALCULATION (BAZETT): 434 MS
EKG R AXIS: -110 DEGREES
EKG T AXIS: 37 DEGREES
EKG VENTRICULAR RATE: 73 BPM

## 2023-10-21 PROCEDURE — 93010 ELECTROCARDIOGRAM REPORT: CPT | Performed by: INTERNAL MEDICINE

## 2023-10-23 ENCOUNTER — TELEPHONE (OUTPATIENT)
Dept: FAMILY MEDICINE CLINIC | Age: 83
End: 2023-10-23

## 2023-10-28 DIAGNOSIS — I10 ESSENTIAL HYPERTENSION: ICD-10-CM

## 2023-10-30 RX ORDER — FUROSEMIDE 20 MG/1
TABLET ORAL
Qty: 90 TABLET | Refills: 3 | Status: SHIPPED | OUTPATIENT
Start: 2023-10-30

## 2023-11-01 ENCOUNTER — TELEPHONE (OUTPATIENT)
Dept: FAMILY MEDICINE CLINIC | Age: 83
End: 2023-11-01

## 2023-11-01 DIAGNOSIS — R41.3 MEMORY DIFFICULTIES: Primary | ICD-10-CM

## 2023-11-01 NOTE — TELEPHONE ENCOUNTER
[facility]. and daughter calling and requesting speech therapy eval and treat at [facility] for cognitive therapy.   Please advise and call [facility]

## 2023-11-02 NOTE — TELEPHONE ENCOUNTER
Called and updated POA on referral to ST being placed. Attempted to speak with someone at [facility] for the fax number. Call went to .

## 2023-11-03 ENCOUNTER — OFFICE VISIT (OUTPATIENT)
Dept: PULMONOLOGY | Age: 83
End: 2023-11-03
Payer: MEDICARE

## 2023-11-03 ENCOUNTER — PATIENT MESSAGE (OUTPATIENT)
Dept: PULMONOLOGY | Age: 83
End: 2023-11-03

## 2023-11-03 ENCOUNTER — TELEPHONE (OUTPATIENT)
Dept: PULMONOLOGY | Age: 83
End: 2023-11-03

## 2023-11-03 VITALS
BODY MASS INDEX: 36.75 KG/M2 | OXYGEN SATURATION: 98 % | DIASTOLIC BLOOD PRESSURE: 70 MMHG | SYSTOLIC BLOOD PRESSURE: 130 MMHG | WEIGHT: 207.4 LBS | TEMPERATURE: 98.5 F | HEART RATE: 82 BPM | HEIGHT: 63 IN

## 2023-11-03 DIAGNOSIS — E66.9 OBESITY (BMI 30-39.9): ICD-10-CM

## 2023-11-03 DIAGNOSIS — G47.33 OSA ON CPAP: Primary | ICD-10-CM

## 2023-11-03 PROCEDURE — G8417 CALC BMI ABV UP PARAM F/U: HCPCS

## 2023-11-03 PROCEDURE — 1090F PRES/ABSN URINE INCON ASSESS: CPT

## 2023-11-03 PROCEDURE — 1036F TOBACCO NON-USER: CPT

## 2023-11-03 PROCEDURE — G8427 DOCREV CUR MEDS BY ELIG CLIN: HCPCS

## 2023-11-03 PROCEDURE — 3078F DIAST BP <80 MM HG: CPT

## 2023-11-03 PROCEDURE — G8484 FLU IMMUNIZE NO ADMIN: HCPCS

## 2023-11-03 PROCEDURE — 3075F SYST BP GE 130 - 139MM HG: CPT

## 2023-11-03 PROCEDURE — 99214 OFFICE O/P EST MOD 30 MIN: CPT

## 2023-11-03 PROCEDURE — 1123F ACP DISCUSS/DSCN MKR DOCD: CPT

## 2023-11-03 PROCEDURE — G8399 PT W/DXA RESULTS DOCUMENT: HCPCS

## 2023-11-03 ASSESSMENT — ENCOUNTER SYMPTOMS
COUGH: 0
WHEEZING: 0
SHORTNESS OF BREATH: 0
RHINORRHEA: 0
SINUS PRESSURE: 0
SINUS PAIN: 0

## 2023-11-03 NOTE — TELEPHONE ENCOUNTER
Patient had difficulty completing her SAQLI/ESS questionnaire today in office. Per Chris Matter , the patient will complete this prior to her next sleep office visit 5.3.23 to bring filled out (with assistance) to appt. Patient's daughter is aware of this and was on phone during visit . Patients daughter would like to be on phone call as well during next visit. I have also included a blank copy of this form with a note explaining why it was being sent with the patient , in her information folder sent back to [facility]. I have attatched the form to this message.

## 2023-11-14 DIAGNOSIS — M25.562 CHRONIC PAIN OF BOTH KNEES: Primary | ICD-10-CM

## 2023-11-14 DIAGNOSIS — G89.29 CHRONIC PAIN OF BOTH KNEES: Primary | ICD-10-CM

## 2023-11-14 DIAGNOSIS — M25.561 CHRONIC PAIN OF BOTH KNEES: Primary | ICD-10-CM

## 2023-11-14 RX ORDER — TRAMADOL HYDROCHLORIDE 50 MG/1
25 TABLET ORAL 2 TIMES DAILY
Qty: 30 TABLET | Refills: 0 | Status: SHIPPED | OUTPATIENT
Start: 2023-11-14 | End: 2023-12-14

## 2023-11-15 ENCOUNTER — TELEPHONE (OUTPATIENT)
Dept: FAMILY MEDICINE CLINIC | Age: 83
End: 2023-11-15

## 2023-11-15 NOTE — TELEPHONE ENCOUNTER
Nelly with [facility] called office stating she is doing pt's annual paperwork and they need an updated H&P. Advised last time pt was in office was 8/9/2023 for a follow up, but the last Medicare Annual appt was 2/9/2023. Nelly says the AWV is too old for what she is needing. She asked that I fax over the office notes from 8/9/2023 for her to take a look at. Advised if that is not what is needed, pt is scheduled for her next AWV on 2/13/2024, she voiced understanding. Notes from 8/9/2023 were faxed to [address] at [phone].

## 2023-11-24 ENCOUNTER — PROCEDURE VISIT (OUTPATIENT)
Dept: CARDIOLOGY CLINIC | Age: 83
End: 2023-11-24

## 2023-11-24 DIAGNOSIS — Z45.02 IMPLANTABLE CARDIOVERTER-DEFIBRILLATOR (ICD) GENERATOR END OF LIFE: Primary | ICD-10-CM

## 2023-11-24 NOTE — PROGRESS NOTES
[facility] [facility] chanel biv icd     . Stephanie [employer] [place] Battery longevity:  1.7 years   Presenting rhythm  AP biv paced   Corvue WNl    Atrial impedance 350  RV impedance 380    Shock 42    P wave sensing 0.5  R wave sensing 11.6    30 % atrial paced  97 % RV paced     Atrial threshold 0.375 V  at 0.5ms  RV threshold 0.5 V at 0.5ms  LV not measured   Mode switches   noise on atrial lead

## 2023-12-03 ENCOUNTER — TELEPHONE (OUTPATIENT)
Dept: PULMONOLOGY | Age: 83
End: 2023-12-03

## 2023-12-04 NOTE — TELEPHONE ENCOUNTER
I do not see that we have received 2 week download from [facility]. Please pull 14 day download for review, thanks!

## 2023-12-04 NOTE — TELEPHONE ENCOUNTER
Called and notified patient, she states she is doing fine on current pressures and no concerns at this time!

## 2023-12-04 NOTE — TELEPHONE ENCOUNTER
Reviewed download. She has had some improvement in AHI. Would like to continue current pressure settings for another month and pull a download. Please see how her symptoms are doing. Thanks!

## 2023-12-12 DIAGNOSIS — M25.561 CHRONIC PAIN OF BOTH KNEES: ICD-10-CM

## 2023-12-12 DIAGNOSIS — G89.29 CHRONIC PAIN OF BOTH KNEES: ICD-10-CM

## 2023-12-12 DIAGNOSIS — M25.562 CHRONIC PAIN OF BOTH KNEES: ICD-10-CM

## 2023-12-12 RX ORDER — TRAMADOL HYDROCHLORIDE 50 MG/1
25 TABLET ORAL 2 TIMES DAILY
Qty: 30 TABLET | Refills: 0 | Status: SHIPPED | OUTPATIENT
Start: 2023-12-12 | End: 2024-01-11

## 2023-12-13 ENCOUNTER — TELEPHONE (OUTPATIENT)
Dept: FAMILY MEDICINE CLINIC | Age: 83
End: 2023-12-13

## 2023-12-13 DIAGNOSIS — R30.0 DYSURIA: Primary | ICD-10-CM

## 2023-12-13 NOTE — TELEPHONE ENCOUNTER
Enzo Nurse with [facility] calling and requesting order for u/a, c&s for patient. Patient with confusion and not feeling herself. Denies urgency, frequency, hematuria, dysuria. Please advise.   Please fax order to #[phone]

## 2023-12-14 DIAGNOSIS — R30.0 DYSURIA: ICD-10-CM

## 2023-12-14 LAB
BACTERIA URNS QL MICRO: ABNORMAL /HPF
BILIRUB UR QL STRIP.AUTO: NEGATIVE
CASTS #/AREA URNS LPF: ABNORMAL /LPF
CASTS 2: ABNORMAL /LPF
CHARACTER UR: ABNORMAL
COLOR: YELLOW
CRYSTALS URNS MICRO: ABNORMAL
EPITHELIAL CELLS, UA: ABNORMAL /HPF
GLUCOSE UR QL STRIP.AUTO: NEGATIVE MG/DL
HGB UR QL STRIP.AUTO: NEGATIVE
KETONES UR QL STRIP.AUTO: NEGATIVE
MISCELLANEOUS 2: ABNORMAL
NITRITE UR QL STRIP: POSITIVE
PH UR STRIP.AUTO: 6 [PH] (ref 5–9)
PROT UR STRIP.AUTO-MCNC: NEGATIVE MG/DL
RBC URINE: ABNORMAL /HPF
RENAL EPI CELLS #/AREA URNS HPF: ABNORMAL /[HPF]
SP GR UR REFRACT.AUTO: 1.01 (ref 1–1.03)
UROBILINOGEN, URINE: 1 EU/DL (ref 0–1)
WBC #/AREA URNS HPF: ABNORMAL /HPF
WBC #/AREA URNS HPF: ABNORMAL /[HPF]
YEAST LIKE FUNGI URNS QL MICRO: ABNORMAL

## 2023-12-14 RX ORDER — NITROFURANTOIN 25; 75 MG/1; MG/1
100 CAPSULE ORAL 2 TIMES DAILY
Qty: 10 CAPSULE | Refills: 0 | Status: SHIPPED | OUTPATIENT
Start: 2023-12-14 | End: 2023-12-19

## 2023-12-15 LAB
BACTERIA UR CULT: ABNORMAL
ORGANISM: ABNORMAL

## 2023-12-26 RX ORDER — PANTOPRAZOLE SODIUM 40 MG/1
TABLET, DELAYED RELEASE ORAL
Qty: 90 TABLET | Refills: 3 | Status: SHIPPED | OUTPATIENT
Start: 2023-12-26

## 2024-01-10 DIAGNOSIS — M25.562 CHRONIC PAIN OF BOTH KNEES: ICD-10-CM

## 2024-01-10 DIAGNOSIS — G89.29 CHRONIC PAIN OF BOTH KNEES: ICD-10-CM

## 2024-01-10 DIAGNOSIS — M25.561 CHRONIC PAIN OF BOTH KNEES: ICD-10-CM

## 2024-01-10 RX ORDER — TRAMADOL HYDROCHLORIDE 50 MG/1
25 TABLET ORAL 2 TIMES DAILY
Qty: 30 TABLET | Refills: 0 | Status: SHIPPED | OUTPATIENT
Start: 2024-01-10 | End: 2024-02-09

## 2024-01-15 ENCOUNTER — TELEPHONE (OUTPATIENT)
Dept: FAMILY MEDICINE CLINIC | Age: 84
End: 2024-01-15

## 2024-01-15 DIAGNOSIS — R82.998 DARK URINE: Primary | ICD-10-CM

## 2024-01-15 NOTE — TELEPHONE ENCOUNTER
Anna a nurse with [facility] called office stating pt c/o urine dark in color and increased urgency and frequency. [facility]'s lab day is tomorrow, so Anna is asking if they can get an order for a UA. Pt recently was treated for a UTI and put on antibiotics 12/22/23. Call her back at [phone]. Please advise.    Fax order to [facility] at [phone].

## 2024-01-23 RX ORDER — ESTRADIOL 0.1 MG/G
CREAM VAGINAL
Qty: 42.5 G | OUTPATIENT
Start: 2024-01-23

## 2024-01-23 NOTE — TELEPHONE ENCOUNTER
She did have breast cancer approximately in 2003, hysterectomy and blood clot in 1976.    Does she need the prescription any more? I can leave a voicemail for Sumi.

## 2024-01-23 NOTE — TELEPHONE ENCOUNTER
Ju[facility] Diane called requesting a refill on the following medications:  Requested Prescriptions     Pending Prescriptions Disp Refills    estradiol (ESTRACE) 0.1 MG/GM vaginal cream [Pharmacy Med Name: ESTRADIOL 0.01% CREAM] 42.5 g      Sig: PLACE 2 GRAMS VAGINALLY DAILY FOR 1 TO 2 WEEKS, THEN GRADUALLY REDUCED TO 1/2 INITIAL DOSE FOR A SIMILAR PERIOD, A MAINTENANCE DOSE OF 1 GRAM 1 TO 3 TIMES A WEEK MAY BE USED AFTER RESTORATION OF THE VAGINAL MUCOSA HAS BEEN ACHIEVED     Pharmacy verified:  .pv      Date of last visit: 10/16/2023  Date of next visit (if applicable): 4/25/2024

## 2024-01-23 NOTE — TELEPHONE ENCOUNTER
Although cream is local vs systemic, lets trial without.       -Continue D-Mannose, and Probiotic supplements. Can take cranberry gummies   -avoid saturating pads  -Time voiding, attempt to use restroom every 2 hours     F/u as scheduled     The patient should go to the ED should they develop fever, chills, nausea, vomiting, chest pain, SOB, calf pain, feelings of incomplete emptying, or should they otherwise feel they need evaluated

## 2024-02-12 ENCOUNTER — OFFICE VISIT (OUTPATIENT)
Dept: CARDIOLOGY CLINIC | Age: 84
End: 2024-02-12
Payer: MEDICARE

## 2024-02-12 VITALS
SYSTOLIC BLOOD PRESSURE: 124 MMHG | DIASTOLIC BLOOD PRESSURE: 62 MMHG | HEIGHT: 63 IN | HEART RATE: 74 BPM | BODY MASS INDEX: 34.27 KG/M2 | WEIGHT: 193.4 LBS

## 2024-02-12 DIAGNOSIS — I50.32 CHRONIC DIASTOLIC CHF (CONGESTIVE HEART FAILURE) (HCC): ICD-10-CM

## 2024-02-12 DIAGNOSIS — I10 PRIMARY HYPERTENSION: Primary | ICD-10-CM

## 2024-02-12 DIAGNOSIS — I25.119 CORONARY ARTERY DISEASE INVOLVING NATIVE CORONARY ARTERY OF NATIVE HEART WITH ANGINA PECTORIS (HCC): ICD-10-CM

## 2024-02-12 PROCEDURE — 3078F DIAST BP <80 MM HG: CPT | Performed by: NUCLEAR MEDICINE

## 2024-02-12 PROCEDURE — G8417 CALC BMI ABV UP PARAM F/U: HCPCS | Performed by: NUCLEAR MEDICINE

## 2024-02-12 PROCEDURE — 1123F ACP DISCUSS/DSCN MKR DOCD: CPT | Performed by: NUCLEAR MEDICINE

## 2024-02-12 PROCEDURE — 1090F PRES/ABSN URINE INCON ASSESS: CPT | Performed by: NUCLEAR MEDICINE

## 2024-02-12 PROCEDURE — 1036F TOBACCO NON-USER: CPT | Performed by: NUCLEAR MEDICINE

## 2024-02-12 PROCEDURE — 99214 OFFICE O/P EST MOD 30 MIN: CPT | Performed by: NUCLEAR MEDICINE

## 2024-02-12 PROCEDURE — 3074F SYST BP LT 130 MM HG: CPT | Performed by: NUCLEAR MEDICINE

## 2024-02-12 PROCEDURE — G8484 FLU IMMUNIZE NO ADMIN: HCPCS | Performed by: NUCLEAR MEDICINE

## 2024-02-12 PROCEDURE — G8427 DOCREV CUR MEDS BY ELIG CLIN: HCPCS | Performed by: NUCLEAR MEDICINE

## 2024-02-12 PROCEDURE — G8399 PT W/DXA RESULTS DOCUMENT: HCPCS | Performed by: NUCLEAR MEDICINE

## 2024-02-12 RX ORDER — TRAMADOL HYDROCHLORIDE 50 MG/1
50 TABLET ORAL EVERY 6 HOURS PRN
COMMUNITY
End: 2024-02-13 | Stop reason: SDUPTHER

## 2024-02-12 NOTE — PROGRESS NOTES
[facility] PHYSICIANS [place] [facility]  [facility] CARDIOLOGY  [address]  [address]  [facility] [address]  Dept: [phone]  Dept Fax: [phone]  Loc: [phone]    Visit Date: 2/12/2024    Juhi Diane is a 84 y.o. female who presents todayfor:  Chief Complaint   Patient presents with    Check-Up    Cardiomyopathy    Hypertension     Known CMP and ICD  No chest pain   No changes in breathing  No ICD shocks  No dizziness  No syncope  BP is stable  Does have more leg edema   More than usual     HPI:  HPI  Past Medical History:   Diagnosis Date    Arthritis     general    Breast CA ([facility]) 12/03    right    CAD (coronary artery disease) 2009    stent in [place]    CHF (congestive heart failure) ([facility])     Closed compression fracture of first lumbar vertebra ([facility]) 2/7/2018    Colon polyps 8/97; 3/11    COPD (chronic obstructive pulmonary disease) ([facility]) 8/5/2017    Diverticulosis 1/06    Erosive gastritis 11/06    Esophageal stricture 03/2011    Three times, Dr. Law    Hyperlipidemia     Hypertension     Internal hemorrhoid 1/06    LUISA on CPAP     Osteopenia determined by x-ray 1999    Osteoporosis of vertebra 6/2018 Per CT thoracolumbar    Pneumonia 2009    Stress incontinence, female     Vertigo       Past Surgical History:   Procedure Laterality Date    ARM SURGERY Left 11/27/2018    plate in arm    BREAST LUMPECTOMY  1/04    [facility] axillary dissection    CARDIAC DEFIBRILLATOR PLACEMENT  01/08/2018    Biventricular pacemaker ICD, Dr. Lomax    CHOLECYSTECTOMY  1993    COLONOSCOPY  3/2011    CORONARY ANGIOPLASTY WITH STENT PLACEMENT  2009    DILATATION, ESOPHAGUS  2011    HIP PINNING Left 8/20/2019    LEFT HIP INTERTAIN performed by David Blanchard MD at [facility] OR    HYSTERECTOMY (CERVIX STATUS UNKNOWN)  1976 or 1977    bleeding    JOINT REPLACEMENT Left     Shoulder    [place] OFFICE/OUTPT VISIT,PROCEDURE ONLY N/A 8/2/2018    EGD DIL performed by Michele Law MD at [facility] Endoscopy    [place]

## 2024-02-13 ENCOUNTER — OFFICE VISIT (OUTPATIENT)
Dept: FAMILY MEDICINE CLINIC | Age: 84
End: 2024-02-13
Payer: MEDICARE

## 2024-02-13 VITALS
HEIGHT: 64 IN | BODY MASS INDEX: 32.95 KG/M2 | SYSTOLIC BLOOD PRESSURE: 100 MMHG | RESPIRATION RATE: 16 BRPM | WEIGHT: 193 LBS | HEART RATE: 72 BPM | DIASTOLIC BLOOD PRESSURE: 60 MMHG

## 2024-02-13 DIAGNOSIS — I10 ESSENTIAL HYPERTENSION: ICD-10-CM

## 2024-02-13 DIAGNOSIS — G89.29 CHRONIC PAIN OF BOTH KNEES: Primary | ICD-10-CM

## 2024-02-13 DIAGNOSIS — M25.562 CHRONIC PAIN OF BOTH KNEES: Primary | ICD-10-CM

## 2024-02-13 DIAGNOSIS — M25.561 CHRONIC PAIN OF BOTH KNEES: Primary | ICD-10-CM

## 2024-02-13 DIAGNOSIS — Z00.00 MEDICARE ANNUAL WELLNESS VISIT, SUBSEQUENT: Primary | ICD-10-CM

## 2024-02-13 DIAGNOSIS — M46.1 SACROILIITIS, NOT ELSEWHERE CLASSIFIED (HCC): ICD-10-CM

## 2024-02-13 DIAGNOSIS — I25.119 CORONARY ARTERY DISEASE INVOLVING NATIVE CORONARY ARTERY OF NATIVE HEART WITH ANGINA PECTORIS (HCC): ICD-10-CM

## 2024-02-13 DIAGNOSIS — K21.9 GASTROESOPHAGEAL REFLUX DISEASE, UNSPECIFIED WHETHER ESOPHAGITIS PRESENT: ICD-10-CM

## 2024-02-13 DIAGNOSIS — I50.32 CHRONIC DIASTOLIC CHF (CONGESTIVE HEART FAILURE) (HCC): ICD-10-CM

## 2024-02-13 DIAGNOSIS — J44.9 CHRONIC OBSTRUCTIVE PULMONARY DISEASE, UNSPECIFIED COPD TYPE (HCC): ICD-10-CM

## 2024-02-13 DIAGNOSIS — N18.30 STAGE 3 CHRONIC KIDNEY DISEASE, UNSPECIFIED WHETHER STAGE 3A OR 3B CKD (HCC): ICD-10-CM

## 2024-02-13 PROCEDURE — G8417 CALC BMI ABV UP PARAM F/U: HCPCS | Performed by: FAMILY MEDICINE

## 2024-02-13 PROCEDURE — G0439 PPPS, SUBSEQ VISIT: HCPCS | Performed by: FAMILY MEDICINE

## 2024-02-13 PROCEDURE — G8399 PT W/DXA RESULTS DOCUMENT: HCPCS | Performed by: FAMILY MEDICINE

## 2024-02-13 PROCEDURE — 3074F SYST BP LT 130 MM HG: CPT | Performed by: FAMILY MEDICINE

## 2024-02-13 PROCEDURE — G8484 FLU IMMUNIZE NO ADMIN: HCPCS | Performed by: FAMILY MEDICINE

## 2024-02-13 PROCEDURE — G8427 DOCREV CUR MEDS BY ELIG CLIN: HCPCS | Performed by: FAMILY MEDICINE

## 2024-02-13 PROCEDURE — 3023F SPIROM DOC REV: CPT | Performed by: FAMILY MEDICINE

## 2024-02-13 PROCEDURE — 1090F PRES/ABSN URINE INCON ASSESS: CPT | Performed by: FAMILY MEDICINE

## 2024-02-13 PROCEDURE — 99214 OFFICE O/P EST MOD 30 MIN: CPT | Performed by: FAMILY MEDICINE

## 2024-02-13 PROCEDURE — 1123F ACP DISCUSS/DSCN MKR DOCD: CPT | Performed by: FAMILY MEDICINE

## 2024-02-13 PROCEDURE — 1036F TOBACCO NON-USER: CPT | Performed by: FAMILY MEDICINE

## 2024-02-13 PROCEDURE — 3078F DIAST BP <80 MM HG: CPT | Performed by: FAMILY MEDICINE

## 2024-02-13 RX ORDER — TRAMADOL HYDROCHLORIDE 50 MG/1
25 TABLET ORAL 2 TIMES DAILY
Qty: 90 TABLET | Refills: 0 | Status: SHIPPED | OUTPATIENT
Start: 2024-02-13 | End: 2024-05-13

## 2024-02-13 RX ORDER — FAMOTIDINE 20 MG/1
20 TABLET, FILM COATED ORAL 2 TIMES DAILY
Qty: 60 TABLET | Refills: 0 | Status: SHIPPED | OUTPATIENT
Start: 2024-02-13

## 2024-02-13 NOTE — PATIENT INSTRUCTIONS
dementia. But treating conditions like high blood pressure and diabetes may help. A person with MCI needs routine follow-up visits with their doctor to check on changes in the person's mental skills.  How can you care for yourself at home?  Keeping your body active can help slow MCI. Exercises like walking can help. Try to stay active mentally too. Read or do things like crossword puzzles if you enjoy doing them.  If you need help coping with MCI, you may want to get support from family, friends, a support group, or a counselor who works with people who have MCI.  Though the future isn't always clear, it can be good to plan ahead with instructions for your care. These are called advanced directives. Having a plan can help make sure that you get the care you want.  Current as of: May 1, 2023               Content Version: 13.9  © 2057-3756 [employer].   Care instructions adapted under license by [employer]. If you have questions about a medical condition or this instruction, always ask your healthcare professional. [employer] disclaims any warranty or liability for your use of this information.           Hearing Loss: Care Instructions  Overview     Hearing loss is a sudden or slow decrease in how well you hear. It can range from slight to profound. Permanent hearing loss can occur with aging. It also can happen when you are exposed long-term to loud noise. Examples include listening to loud music, riding motorcycles, or being around other loud machines.  Hearing loss can affect your work and home life. It can make you feel lonely or depressed. You may feel that you have lost your independence. But hearing aids and other devices can help you hear better and feel connected to others.  Follow-up care is a key part of your treatment and safety. Be sure to make and go to all appointments, and call your doctor if you are having problems. It's also a good idea to know your test results and

## 2024-02-13 NOTE — TELEPHONE ENCOUNTER
[facility] called and stated that patient needs a refill on her tramadol. Patient has 4 pills left.     Patient is on Tramadol 50 mg, .5 tab in the morning and .5 tab at night.     They will check with the pharmacy around 1:00 pm today.

## 2024-02-13 NOTE — PROGRESS NOTES
to strenuous exercise (like a brisk walk)?: 5 days  On average, how many minutes do you engage in exercise at this level?: 30 min    Do you eat balanced/healthy meals regularly?: (!) No    Body mass index is 33.65 kg/m². (!) Abnormal    Do you eat balanced/healthy meals regularly Interventions:  See AVS for additional education material  Obesity Interventions:  See AVS for additional education material             Hearing Screen:  Do you or your family notice any trouble with your hearing that hasn't been managed with hearing aids?: (!) Yes    Interventions:  Patient declines any further evaluation or treatment      ADL's:   Patient reports needing help with:  Select all that apply: (!) Walking/Balance, Bathing  Select all that apply: (!) Housekeeping, Banking/Finances, Food Preparation, Transportation, Taking Medications  Interventions:  See AVS for additional education material                  Objective   Vitals:    02/13/24 1119   BP: 100/60   Site: Left Upper Arm   Position: Sitting   Cuff Size: Large Adult   Pulse: 72   Resp: 16   Weight: 87.5 kg (193 lb)   Height: 1.613 m (5' 3.5\")      Body mass index is 33.65 kg/m².             Allergies   Allergen Reactions    Keflex [Cephalexin] Shortness Of Breath and Swelling    Antivert [Meclizine] Other (See Comments)     confusion    Bactrim [Sulfamethoxazole-Trimethoprim]     Ciprofloxacin Itching    Neomycin     Pcn [Penicillins] Hives     Pt reports having reaction 50 years ago    Tizanidine     Zanaflex [Tizanidine Hcl] Other (See Comments)     Causes confusion    Levaquin [Levofloxacin] Itching     Benadryl was given for tx     Prior to Visit Medications    Medication Sig Taking? Authorizing Provider   traMADol (ULTRAM) 50 MG tablet Take 0.5 tablets by mouth in the morning and at bedtime for 90 days. Max Daily Amount: 50 mg Yes Chilango Caceres, DO   famotidine (PEPCID) 20 MG tablet Take 1 tablet by mouth 2 times daily Yes Chilango Caceres, DO

## 2024-02-18 ENCOUNTER — HOSPITAL ENCOUNTER (EMERGENCY)
Age: 84
Discharge: HOME | DRG: 689 | End: 2024-02-18
Payer: MEDICARE

## 2024-02-18 VITALS
RESPIRATION RATE: 20 BRPM | OXYGEN SATURATION: 95 % | TEMPERATURE: 98.8 F | BODY MASS INDEX: 34.18 KG/M2 | SYSTOLIC BLOOD PRESSURE: 133 MMHG | HEART RATE: 88 BPM | DIASTOLIC BLOOD PRESSURE: 77 MMHG | WEIGHT: 196 LBS

## 2024-02-18 DIAGNOSIS — N30.01 ACUTE CYSTITIS WITH HEMATURIA: Primary | ICD-10-CM

## 2024-02-18 LAB
BILIRUB UR STRIP.AUTO-MCNC: NEGATIVE MG/DL
CHARACTER UR: ABNORMAL
COLOR: YELLOW
GLUCOSE UR QL STRIP.AUTO: NEGATIVE MG/DL
KETONES UR QL STRIP.AUTO: NEGATIVE
NITRITE UR QL STRIP.AUTO: POSITIVE
PH UR STRIP.AUTO: 5.5 [PH] (ref 5–9)
PROT UR STRIP.AUTO-MCNC: NEGATIVE MG/DL
RBC #/AREA URNS HPF: ABNORMAL /[HPF]
SP GR UR STRIP.AUTO: 1.01 (ref 1–1.03)
UROBILINOGEN, URINE: 0.2 EU/DL (ref 0.2–1)
WBC #/AREA URNS HPF: ABNORMAL /[HPF]

## 2024-02-18 PROCEDURE — 87077 CULTURE AEROBIC IDENTIFY: CPT

## 2024-02-18 PROCEDURE — 87086 URINE CULTURE/COLONY COUNT: CPT

## 2024-02-18 PROCEDURE — 99214 OFFICE O/P EST MOD 30 MIN: CPT | Performed by: NURSE PRACTITIONER

## 2024-02-18 PROCEDURE — 81003 URINALYSIS AUTO W/O SCOPE: CPT

## 2024-02-18 PROCEDURE — 87186 SC STD MICRODIL/AGAR DIL: CPT

## 2024-02-18 RX ORDER — NITROFURANTOIN 25; 75 MG/1; MG/1
100 CAPSULE ORAL 2 TIMES DAILY
Qty: 14 CAPSULE | Refills: 0 | Status: SHIPPED | OUTPATIENT
Start: 2024-02-18 | End: 2024-02-18 | Stop reason: ALTCHOICE

## 2024-02-18 RX ORDER — NITROFURANTOIN 25; 75 MG/1; MG/1
100 CAPSULE ORAL 2 TIMES DAILY
Qty: 14 CAPSULE | Refills: 0 | Status: ON HOLD | OUTPATIENT
Start: 2024-02-18 | End: 2024-02-26 | Stop reason: HOSPADM

## 2024-02-18 ASSESSMENT — PAIN DESCRIPTION - LOCATION: LOCATION: BACK

## 2024-02-18 ASSESSMENT — PAIN - FUNCTIONAL ASSESSMENT
PAIN_FUNCTIONAL_ASSESSMENT: ACTIVITIES ARE NOT PREVENTED
PAIN_FUNCTIONAL_ASSESSMENT: WONG-BAKER FACES

## 2024-02-18 ASSESSMENT — PAIN DESCRIPTION - ORIENTATION: ORIENTATION: LOWER

## 2024-02-18 ASSESSMENT — PAIN SCALES - WONG BAKER: WONGBAKER_NUMERICALRESPONSE: 2

## 2024-02-18 ASSESSMENT — PAIN DESCRIPTION - DESCRIPTORS: DESCRIPTORS: ACHING

## 2024-02-18 ASSESSMENT — PAIN DESCRIPTION - PAIN TYPE: TYPE: ACUTE PAIN

## 2024-02-18 ASSESSMENT — PAIN DESCRIPTION - FREQUENCY: FREQUENCY: CONTINUOUS

## 2024-02-18 NOTE — ED PROVIDER NOTES
[facility] URGENT CARE  Urgent Care Encounter       CHIEF COMPLAINT       Chief Complaint   Patient presents with    \"possible UTI\" per son,she lives at [facility]     Altered Mental Status       Nurses Notes reviewed and I agree except as noted in the HPI.  HISTORY OF PRESENT ILLNESS   Juhi Diane is a 84 y.o. female who presents to the [place] urgent care for evaluation of altered mental status.  Son at bedside reports that he visited his mother today.  He reports that she did seem mildly confused and he is concerned for urinary tract infection.  He does report that she has had previous urinary tract infections where she had urinary tract infections.  She denies symptoms at this time.  She is alert.    The history is provided by the patient. No  was used.       REVIEW OF SYSTEMS     Review of Systems   Constitutional:  Negative for activity change, appetite change, chills, fatigue and fever.   HENT:  Negative for ear discharge, ear pain, rhinorrhea and sore throat.    Respiratory:  Negative for cough, chest tightness and shortness of breath.    Cardiovascular:  Negative for chest pain.   Gastrointestinal:  Negative for diarrhea, nausea and vomiting.   Genitourinary:  Negative for dysuria.   Skin:  Negative for rash.   Allergic/Immunologic: Negative for environmental allergies and food allergies.   Neurological:  Negative for dizziness and headaches.   Psychiatric/Behavioral:  Positive for confusion.        PAST MEDICAL HISTORY         Diagnosis Date    Arthritis     general    Breast CA ([facility]) 12/03    right    CAD (coronary artery disease) 2009    stent in [place]    CHF (congestive heart failure) ([facility])     Closed compression fracture of first lumbar vertebra ([facility]) 2/7/2018    Colon polyps 8/97; 3/11    COPD (chronic obstructive pulmonary disease) ([facility]) 8/5/2017    Diverticulosis 1/06    Erosive gastritis 11/06    Esophageal stricture 03/2011    Three times, Dr. Law    BP: 133/77   Pulse: 88   Resp: 20   SpO2: 95%   Weight: 88.9 kg (196 lb)       Medications - No data to display         PROCEDURES:  None    FINAL IMPRESSION      1. Acute cystitis with hematuria          DISPOSITION/ PLAN     Patient seen and evaluated for confusion/altered mental status.  A urinalysis was obtained revealing large leukocytes, positive nitrites, and moderate blood.  A urine culture has been ordered.  Patient is provided a prescription for Macrobid due to the extensive allergies that the patient has.  Instructed to use over-the-counter Tylenol for pain or fever.  Instructed to have close follow-up with PCP in 3 to 5 days and worsening symptoms.  Son and patient agreeable the above plan and denies questions or concerns at this time.      PATIENT REFERRED TO:  Chilango Caceres, DO  [address], [address] / [place] [address]      DISCHARGE MEDICATIONS:  New Prescriptions    NITROFURANTOIN, MACROCRYSTAL-MONOHYDRATE, (MACROBID) 100 MG CAPSULE    Take 1 capsule by mouth 2 times daily for 7 days       Discontinued Medications    No medications on file       Current Discharge Medication List          JADEN Barahona CNP    (Please note that portions of this note were completed with a voice recognition program. Efforts were made to edit the dictations but occasionally words are mis-transcribed.)            Lamberto Ely, JADEN - NIKKO  02/18/24 2053

## 2024-02-19 ENCOUNTER — TELEPHONE (OUTPATIENT)
Dept: FAMILY MEDICINE CLINIC | Age: 84
End: 2024-02-19

## 2024-02-19 ENCOUNTER — HOSPITAL ENCOUNTER (INPATIENT)
Age: 84
LOS: 7 days | Discharge: NURSING, REHAB OR LONG-TERM CARE | DRG: 689 | End: 2024-02-26
Attending: STUDENT IN AN ORGANIZED HEALTH CARE EDUCATION/TRAINING PROGRAM
Payer: MEDICARE

## 2024-02-19 ENCOUNTER — APPOINTMENT (OUTPATIENT)
Dept: GENERAL RADIOLOGY | Age: 84
DRG: 689 | End: 2024-02-19
Payer: MEDICARE

## 2024-02-19 ENCOUNTER — APPOINTMENT (OUTPATIENT)
Dept: CT IMAGING | Age: 84
DRG: 689 | End: 2024-02-19
Payer: MEDICARE

## 2024-02-19 ENCOUNTER — APPOINTMENT (OUTPATIENT)
Dept: INTERVENTIONAL RADIOLOGY/VASCULAR | Age: 84
DRG: 689 | End: 2024-02-19
Payer: MEDICARE

## 2024-02-19 DIAGNOSIS — Z51.5 PALLIATIVE CARE PATIENT: ICD-10-CM

## 2024-02-19 DIAGNOSIS — G93.41 METABOLIC ENCEPHALOPATHY: ICD-10-CM

## 2024-02-19 DIAGNOSIS — M79.661 BILATERAL CALF PAIN: ICD-10-CM

## 2024-02-19 DIAGNOSIS — M79.662 BILATERAL CALF PAIN: ICD-10-CM

## 2024-02-19 DIAGNOSIS — R31.9 URINARY TRACT INFECTION WITH HEMATURIA, SITE UNSPECIFIED: Primary | ICD-10-CM

## 2024-02-19 DIAGNOSIS — I42.8 NONISCHEMIC CARDIOMYOPATHY (HCC): ICD-10-CM

## 2024-02-19 DIAGNOSIS — N18.31 STAGE 3A CHRONIC KIDNEY DISEASE (HCC): ICD-10-CM

## 2024-02-19 DIAGNOSIS — N39.0 URINARY TRACT INFECTION WITH HEMATURIA, SITE UNSPECIFIED: Primary | ICD-10-CM

## 2024-02-19 LAB
ANION GAP SERPL CALC-SCNC: 16 MEQ/L (ref 8–16)
BACTERIA: NORMAL
BASOPHILS ABSOLUTE: 0 THOU/MM3 (ref 0–0.1)
BASOPHILS NFR BLD AUTO: 0.2 %
BILIRUB UR QL STRIP.AUTO: NEGATIVE
BILIRUB UR QL STRIP: NEGATIVE
BUN SERPL-MCNC: 26 MG/DL (ref 7–22)
CALCIUM SERPL-MCNC: 9.6 MG/DL (ref 8.5–10.5)
CASTS #/AREA URNS LPF: NORMAL /LPF
CASTS #/AREA URNS LPF: NORMAL /LPF
CHARACTER UR: CLEAR
CHARACTER UR: CLEAR
CHARCOAL URNS QL MICRO: NORMAL
CHLORIDE SERPL-SCNC: 100 MEQ/L (ref 98–111)
CK SERPL-CCNC: 63 U/L (ref 30–135)
CO2 SERPL-SCNC: 27 MEQ/L (ref 23–33)
COLOR UR: YELLOW
COLOR: YELLOW
CREAT SERPL-MCNC: 1.5 MG/DL (ref 0.4–1.2)
CRYSTALS URNS QL MICRO: NORMAL
DEPRECATED RDW RBC AUTO: 43.3 FL (ref 35–45)
EOSINOPHIL NFR BLD AUTO: 1.1 %
EOSINOPHILS ABSOLUTE: 0.1 THOU/MM3 (ref 0–0.4)
EPITHELIAL CELLS, UA: NORMAL /HPF
ERYTHROCYTE [DISTWIDTH] IN BLOOD BY AUTOMATED COUNT: 12.8 % (ref 11.5–14.5)
GFR SERPL CREATININE-BSD FRML MDRD: 34 ML/MIN/1.73M2
GLUCOSE SERPL-MCNC: 85 MG/DL (ref 70–108)
GLUCOSE UR QL STRIP.AUTO: NEGATIVE MG/DL
GLUCOSE UR QL STRIP.AUTO: NEGATIVE MG/DL
HCT VFR BLD AUTO: 40.6 % (ref 37–47)
HGB BLD-MCNC: 13.7 GM/DL (ref 12–16)
HGB UR QL STRIP.AUTO: NEGATIVE
HGB UR QL STRIP.AUTO: NEGATIVE
IMM GRANULOCYTES # BLD AUTO: 0.03 THOU/MM3 (ref 0–0.07)
IMM GRANULOCYTES NFR BLD AUTO: 0.5 %
KETONES UR QL STRIP.AUTO: NEGATIVE
KETONES UR QL STRIP.AUTO: NEGATIVE
LACTATE SERPL-SCNC: 1.6 MMOL/L (ref 0.5–2)
LEUKOCYTE ESTERASE UR QL STRIP.AUTO: NEGATIVE
LYMPHOCYTES ABSOLUTE: 0.6 THOU/MM3 (ref 1–4.8)
LYMPHOCYTES NFR BLD AUTO: 9.1 %
MCH RBC QN AUTO: 31.3 PG (ref 26–33)
MCHC RBC AUTO-ENTMCNC: 33.7 GM/DL (ref 32.2–35.5)
MCV RBC AUTO: 92.7 FL (ref 81–99)
MONOCYTES ABSOLUTE: 0.2 THOU/MM3 (ref 0.4–1.3)
MONOCYTES NFR BLD AUTO: 3.6 %
NEUTROPHILS NFR BLD AUTO: 85.5 %
NITRITE UR QL STRIP.AUTO: NEGATIVE
NITRITE UR QL STRIP: NEGATIVE
NRBC BLD AUTO-RTO: 0 /100 WBC
NT-PROBNP SERPL IA-MCNC: 482.9 PG/ML (ref 0–449)
OSMOLALITY SERPL CALC.SUM OF ELEC: 289 MOSMOL/KG (ref 275–300)
PH UR STRIP.AUTO: 5.5 [PH] (ref 5–9)
PH UR STRIP.AUTO: 5.5 [PH] (ref 5–9)
PLATELET # BLD AUTO: 173 THOU/MM3 (ref 130–400)
PMV BLD AUTO: 11.4 FL (ref 9.4–12.4)
POTASSIUM SERPL-SCNC: 3.5 MEQ/L (ref 3.5–5.2)
PROT UR STRIP.AUTO-MCNC: NEGATIVE MG/DL
PROT UR STRIP.AUTO-MCNC: NEGATIVE MG/DL
RBC # BLD AUTO: 4.38 MILL/MM3 (ref 4.2–5.4)
RBC #/AREA URNS HPF: NORMAL /HPF
RENAL EPI CELLS #/AREA URNS HPF: NORMAL /[HPF]
SEGMENTED NEUTROPHILS ABSOLUTE COUNT: 5.6 THOU/MM3 (ref 1.8–7.7)
SODIUM SERPL-SCNC: 143 MEQ/L (ref 135–145)
SODIUM UR-SCNC: 85 MEQ/L
SP GR UR REFRACT.AUTO: 1.01 (ref 1–1.03)
SPECIFIC GRAVITY UA: 1.01 (ref 1–1.03)
TROPONIN, HIGH SENSITIVITY: 14 NG/L (ref 0–12)
TROPONIN, HIGH SENSITIVITY: 15 NG/L (ref 0–12)
UROBILINOGEN, URINE: 0.2 EU/DL (ref 0–1)
UROBILINOGEN, URINE: 0.2 EU/DL (ref 0–1)
UUN 24H UR-MCNC: 191 MG/DL
WBC # BLD AUTO: 6.6 THOU/MM3 (ref 4.8–10.8)
WBC #/AREA URNS HPF: NEGATIVE /[HPF]
WBC #/AREA URNS HPF: NORMAL /HPF
YEAST LIKE FUNGI URNS QL MICRO: NORMAL

## 2024-02-19 PROCEDURE — 73564 X-RAY EXAM KNEE 4 OR MORE: CPT

## 2024-02-19 PROCEDURE — 1200000003 HC TELEMETRY R&B

## 2024-02-19 PROCEDURE — 87040 BLOOD CULTURE FOR BACTERIA: CPT

## 2024-02-19 PROCEDURE — 72125 CT NECK SPINE W/O DYE: CPT

## 2024-02-19 PROCEDURE — 6360000004 HC RX CONTRAST MEDICATION

## 2024-02-19 PROCEDURE — 84484 ASSAY OF TROPONIN QUANT: CPT

## 2024-02-19 PROCEDURE — 83880 ASSAY OF NATRIURETIC PEPTIDE: CPT

## 2024-02-19 PROCEDURE — 80048 BASIC METABOLIC PNL TOTAL CA: CPT

## 2024-02-19 PROCEDURE — 70450 CT HEAD/BRAIN W/O DYE: CPT

## 2024-02-19 PROCEDURE — 85025 COMPLETE CBC W/AUTO DIFF WBC: CPT

## 2024-02-19 PROCEDURE — 83605 ASSAY OF LACTIC ACID: CPT

## 2024-02-19 PROCEDURE — 6360000002 HC RX W HCPCS

## 2024-02-19 PROCEDURE — 2580000003 HC RX 258

## 2024-02-19 PROCEDURE — 99285 EMERGENCY DEPT VISIT HI MDM: CPT

## 2024-02-19 PROCEDURE — 81003 URINALYSIS AUTO W/O SCOPE: CPT

## 2024-02-19 PROCEDURE — 36415 COLL VENOUS BLD VENIPUNCTURE: CPT

## 2024-02-19 PROCEDURE — 74177 CT ABD & PELVIS W/CONTRAST: CPT

## 2024-02-19 PROCEDURE — 76376 3D RENDER W/INTRP POSTPROCES: CPT

## 2024-02-19 PROCEDURE — 70486 CT MAXILLOFACIAL W/O DYE: CPT

## 2024-02-19 PROCEDURE — 84540 ASSAY OF URINE/UREA-N: CPT

## 2024-02-19 PROCEDURE — 84300 ASSAY OF URINE SODIUM: CPT

## 2024-02-19 PROCEDURE — 73610 X-RAY EXAM OF ANKLE: CPT

## 2024-02-19 PROCEDURE — 81001 URINALYSIS AUTO W/SCOPE: CPT

## 2024-02-19 PROCEDURE — 71045 X-RAY EXAM CHEST 1 VIEW: CPT

## 2024-02-19 PROCEDURE — 93970 EXTREMITY STUDY: CPT

## 2024-02-19 PROCEDURE — 93005 ELECTROCARDIOGRAM TRACING: CPT | Performed by: STUDENT IN AN ORGANIZED HEALTH CARE EDUCATION/TRAINING PROGRAM

## 2024-02-19 PROCEDURE — 93010 ELECTROCARDIOGRAM REPORT: CPT | Performed by: INTERNAL MEDICINE

## 2024-02-19 PROCEDURE — 82550 ASSAY OF CK (CPK): CPT

## 2024-02-19 PROCEDURE — 6370000000 HC RX 637 (ALT 250 FOR IP)

## 2024-02-19 RX ORDER — ISOSORBIDE MONONITRATE 30 MG/1
15 TABLET, EXTENDED RELEASE ORAL DAILY
Status: DISCONTINUED | OUTPATIENT
Start: 2024-02-20 | End: 2024-02-26 | Stop reason: HOSPADM

## 2024-02-19 RX ORDER — LACTOBACILLUS RHAMNOSUS GG 10B CELL
1 CAPSULE ORAL DAILY
Status: DISCONTINUED | OUTPATIENT
Start: 2024-02-20 | End: 2024-02-26 | Stop reason: HOSPADM

## 2024-02-19 RX ORDER — ASPIRIN 81 MG/1
81 TABLET ORAL DAILY
Status: DISCONTINUED | OUTPATIENT
Start: 2024-02-20 | End: 2024-02-26 | Stop reason: HOSPADM

## 2024-02-19 RX ORDER — METOPROLOL SUCCINATE 25 MG/1
12.5 TABLET, EXTENDED RELEASE ORAL DAILY
Status: DISCONTINUED | OUTPATIENT
Start: 2024-02-20 | End: 2024-02-26 | Stop reason: HOSPADM

## 2024-02-19 RX ORDER — ENOXAPARIN SODIUM 100 MG/ML
30 INJECTION SUBCUTANEOUS EVERY 24 HOURS
Status: DISCONTINUED | OUTPATIENT
Start: 2024-02-19 | End: 2024-02-20

## 2024-02-19 RX ORDER — ROSUVASTATIN CALCIUM 10 MG/1
5 TABLET, COATED ORAL DAILY
Status: DISCONTINUED | OUTPATIENT
Start: 2024-02-20 | End: 2024-02-26 | Stop reason: HOSPADM

## 2024-02-19 RX ORDER — ONDANSETRON 4 MG/1
4 TABLET, ORALLY DISINTEGRATING ORAL EVERY 8 HOURS PRN
Status: DISCONTINUED | OUTPATIENT
Start: 2024-02-19 | End: 2024-02-26 | Stop reason: HOSPADM

## 2024-02-19 RX ORDER — SODIUM CHLORIDE 9 MG/ML
INJECTION, SOLUTION INTRAVENOUS PRN
Status: DISCONTINUED | OUTPATIENT
Start: 2024-02-19 | End: 2024-02-26 | Stop reason: HOSPADM

## 2024-02-19 RX ORDER — SODIUM CHLORIDE 9 MG/ML
INJECTION, SOLUTION INTRAVENOUS CONTINUOUS
Status: DISCONTINUED | OUTPATIENT
Start: 2024-02-19 | End: 2024-02-19

## 2024-02-19 RX ORDER — PANTOPRAZOLE SODIUM 40 MG/1
40 TABLET, DELAYED RELEASE ORAL EVERY MORNING
Status: DISCONTINUED | OUTPATIENT
Start: 2024-02-20 | End: 2024-02-26 | Stop reason: HOSPADM

## 2024-02-19 RX ORDER — POLYETHYLENE GLYCOL 3350 17 G/17G
17 POWDER, FOR SOLUTION ORAL DAILY PRN
Status: DISCONTINUED | OUTPATIENT
Start: 2024-02-19 | End: 2024-02-26 | Stop reason: HOSPADM

## 2024-02-19 RX ORDER — ACETAMINOPHEN 650 MG/1
650 SUPPOSITORY RECTAL EVERY 6 HOURS PRN
Status: DISCONTINUED | OUTPATIENT
Start: 2024-02-19 | End: 2024-02-26 | Stop reason: HOSPADM

## 2024-02-19 RX ORDER — ONDANSETRON 2 MG/ML
4 INJECTION INTRAMUSCULAR; INTRAVENOUS EVERY 6 HOURS PRN
Status: DISCONTINUED | OUTPATIENT
Start: 2024-02-19 | End: 2024-02-26 | Stop reason: HOSPADM

## 2024-02-19 RX ORDER — TRAMADOL HYDROCHLORIDE 50 MG/1
25 TABLET ORAL 2 TIMES DAILY
Status: DISCONTINUED | OUTPATIENT
Start: 2024-02-19 | End: 2024-02-26 | Stop reason: HOSPADM

## 2024-02-19 RX ORDER — SODIUM CHLORIDE 0.9 % (FLUSH) 0.9 %
5-40 SYRINGE (ML) INJECTION PRN
Status: DISCONTINUED | OUTPATIENT
Start: 2024-02-19 | End: 2024-02-26 | Stop reason: HOSPADM

## 2024-02-19 RX ORDER — DIPHENHYDRAMINE HCL 25 MG
25 TABLET ORAL EVERY 6 HOURS PRN
Status: DISCONTINUED | OUTPATIENT
Start: 2024-02-19 | End: 2024-02-26 | Stop reason: HOSPADM

## 2024-02-19 RX ORDER — SODIUM CHLORIDE 9 MG/ML
INJECTION, SOLUTION INTRAVENOUS CONTINUOUS
Status: DISCONTINUED | OUTPATIENT
Start: 2024-02-19 | End: 2024-02-23

## 2024-02-19 RX ORDER — SODIUM CHLORIDE 0.9 % (FLUSH) 0.9 %
5-40 SYRINGE (ML) INJECTION EVERY 12 HOURS SCHEDULED
Status: DISCONTINUED | OUTPATIENT
Start: 2024-02-19 | End: 2024-02-26 | Stop reason: HOSPADM

## 2024-02-19 RX ORDER — ACETAMINOPHEN 325 MG/1
650 TABLET ORAL EVERY 6 HOURS PRN
Status: DISCONTINUED | OUTPATIENT
Start: 2024-02-19 | End: 2024-02-26 | Stop reason: HOSPADM

## 2024-02-19 RX ADMIN — CEFTRIAXONE SODIUM 1000 MG: 1 INJECTION, POWDER, FOR SOLUTION INTRAMUSCULAR; INTRAVENOUS at 21:41

## 2024-02-19 RX ADMIN — TRAMADOL HYDROCHLORIDE 25 MG: 50 TABLET, COATED ORAL at 21:32

## 2024-02-19 RX ADMIN — SODIUM CHLORIDE, PRESERVATIVE FREE 10 ML: 5 INJECTION INTRAVENOUS at 21:32

## 2024-02-19 RX ADMIN — SODIUM CHLORIDE: 9 INJECTION, SOLUTION INTRAVENOUS at 17:02

## 2024-02-19 RX ADMIN — ENOXAPARIN SODIUM 30 MG: 100 INJECTION SUBCUTANEOUS at 21:32

## 2024-02-19 RX ADMIN — IOPAMIDOL 80 ML: 755 INJECTION, SOLUTION INTRAVENOUS at 16:03

## 2024-02-19 NOTE — TELEPHONE ENCOUNTER
Daughter Sumi NAYLOR was notified and voiced understanding. Says she will head over now to pt home and take her to the ED.   Initial (On Arrival)

## 2024-02-19 NOTE — TELEPHONE ENCOUNTER
Patient daughter Eugenia called whom is on HIPAA, she stated that patient was seen at urgent care yesterday for a UTI. She stated that patient was given and antibiotic. Which she took one last night and then one this morning so far but the patient isn't seeming to be getting better. Daughter is concerned that patient may need to go to the ER for IV antibiotics. She wanted to know your thoughts.She stated that patient has had a UTI in the past and it ended up in the blood stream.     Daughter stated that she was not making sense yesterday and this morning when she spoke with her mother she wasn't making sense at all. She was talking about her \"daughter\" was trying to grab at her, however Eugenia is the only daughter, eugenia stated that she doesn't even have a sister. She also stated that patient doesn't even understand simple task. She stated that the nursing home gave her medication however she wasn't able to take the medication from the nurse and place them into her mouth. She stated that the nurse had to place the medication in her mouth for her to take.

## 2024-02-19 NOTE — H&P
PM      CT LUMBAR RECONSTRUCTION WO POST PROCESS   Final Result       1. Diffuse osteopenia and old compression fracture involving the T12 vertebral body with 90% compression.   2. No acute fracture noted.               **This report has been created using voice recognition software. It may contain minor errors which are inherent in voice recognition technology.**      Final report electronically signed by DR JORDEN PLASENCIA on 2/19/2024 4:30 PM      XR KNEE LEFT (MIN 4 VIEWS)   Final Result      No fracture or dislocation.      Final report electronically signed by Dr. Shahid Cali on 2/19/2024 2:31 PM      XR KNEE RIGHT (MIN 4 VIEWS)   Final Result      No fracture or dislocation.      Final report electronically signed by Dr. Shahid Cali on 2/19/2024 2:29 PM      XR CHEST PORTABLE   Final Result      No acute intrathoracic process.               **This report has been created using voice recognition software. It may contain minor errors which are inherent in voice recognition technology.**      Final report electronically signed by Dr. Shahid Cali on 2/19/2024 2:26 PM      XR ANKLE LEFT (MIN 3 VIEWS)   Final Result      No fracture or dislocation.      Final report electronically signed by Dr. Shahid Cali on 2/19/2024 2:27 PM      Vascular duplex lower extremity venous bilateral    (Results Pending)          Past Social History  The patient currently lives at assisted care living.   Tobacco use:   reports that she quit smoking about 26 years ago. Her smoking use included cigarettes. She started smoking about 66 years ago. She has a 40.0 pack-year smoking history. She has been exposed to tobacco smoke. She has never used smokeless tobacco.  Alcohol use:   reports current alcohol use of about 2.0 standard drinks of alcohol per week.  Drug use:  reports no history of drug use.     Medical Hx      Diagnosis Date    Arthritis     general    Breast CA (HCC) 12/03    right    CAD (coronary artery disease) 2009

## 2024-02-19 NOTE — ED PROVIDER NOTES
[facility] EMERGENCY DEPARTMENT    EMERGENCY MEDICINE     Patient Name: Juhi Diane  MRN: [id number]  YOB: 1940  Date of Evaluation: 2/19/2024  Treating Resident Physician: Venita Carver DO  Supervising Physician: Dr. Gustavo Zhang DO    CHIEF COMPLAINT       Chief Complaint   Patient presents with    Fall       HISTORY OF PRESENT ILLNESS      History obtained from child, chart review, and the patient.    Juhi Diane is a 84 y.o. female who presents to the emergency department from nursing home brought in by EMS for evaluation of fall.  Patient was sitting on her chair this morning when she had a syncopal episode and fell on her face, daughter called her phone went to voicemail.  She called the nurses just to go evaluate her mother where they found her on the floor.  Uncertain at this time how long she was down.  Does recall falling on the floor.  Patient has a UTI diagnosed yesterday was started on nitrofurantoin.  Daughter reports she usually gets confused and delirious with her UTIs.  She has been having hallucinations and memory loss since yesterday.  Patient took her dose of nitrofurantoin last night and this morning.  For her fall she has facial flush, abdominal pain and joint pain.  No chest pain or shortness of breath.  Patient does have urinary incontinence wears a pad.  Past medical history significant for COPD, CHF with defibrillator cardiologist [place].      Pertinent previous and/or external records reviewed:  Recent notes from PCP    PAST MEDICAL AND SURGICAL HISTORY     Past Medical History:   Diagnosis Date    Arthritis     general    Breast CA ([facility]) 12/03    right    CAD (coronary artery disease) 2009    stent in [place]    CHF (congestive heart failure) ([facility])     Closed compression fracture of first lumbar vertebra ([facility]) 2/7/2018    Colon polyps 8/97; 3/11    COPD (chronic obstructive pulmonary disease) ([facility]) 8/5/2017    Diverticulosis 1/06    Erosive gastritis

## 2024-02-19 NOTE — ED NOTES
Pt to er. Pt coming from [facility] for fall. Pt states was sitting in her chair and then next thing she was on the floor. Pt has redness noted to face. Pt c/o \"pain all over.\" Pt was found face down on the floor. Pt was alert when found. Pt a & o x 4 at this time. Pt dx UTI yesterday and ios currently taking ATB for it.

## 2024-02-19 NOTE — ED NOTES
ED to inpatient nurses report    Chief Complaint   Patient presents with    Fall      Present to ED from assisted living  LOC: alert and orientated to name, place, date  Vital signs   Vitals:    02/19/24 1345 02/19/24 1458 02/19/24 1643 02/19/24 1731   BP: 123/79 116/66 (!) 145/79 (!) 145/79   Pulse: 70 69 70 72   Resp: 16 16 16 16   Temp:       TempSrc:       SpO2: 95% 95% 94% 95%      Oxygen Baseline 94    Current needs required ra Bipap/Cpap No  LDAs:   Peripheral IV 02/19/24 Left Forearm (Active)   Site Assessment Clean, dry & intact 02/19/24 1731   Dressing Status Clean, dry & intact 02/19/24 1731     Mobility: Requires assistance * 1  Pending ED orders: none  Present condition: stable    C-SSRS Risk of Suicide: No Risk  Swallow Screening    Preferred Language: English     Electronically signed by TRUONG MOBLEY RN on 2/19/2024 at 6:29 PM

## 2024-02-20 ENCOUNTER — APPOINTMENT (OUTPATIENT)
Age: 84
DRG: 689 | End: 2024-02-20
Payer: MEDICARE

## 2024-02-20 LAB
ANION GAP SERPL CALC-SCNC: 15 MEQ/L (ref 8–16)
BACTERIA UR CULT: ABNORMAL
BASOPHILS ABSOLUTE: 0 THOU/MM3 (ref 0–0.1)
BASOPHILS NFR BLD AUTO: 0.3 %
BUN SERPL-MCNC: 23 MG/DL (ref 7–22)
CALCIUM SERPL-MCNC: 9.4 MG/DL (ref 8.5–10.5)
CHLORIDE SERPL-SCNC: 101 MEQ/L (ref 98–111)
CO2 SERPL-SCNC: 27 MEQ/L (ref 23–33)
CREAT SERPL-MCNC: 1.3 MG/DL (ref 0.4–1.2)
DEPRECATED RDW RBC AUTO: 43 FL (ref 35–45)
ECHO AO ASC DIAM: 3.3 CM
ECHO AO ASCENDING AORTA INDEX: 1.71 CM/M2
ECHO AV CUSP MM: 1.2 CM
ECHO AV PEAK GRADIENT: 9 MMHG
ECHO AV PEAK VELOCITY: 1.5 M/S
ECHO AV VELOCITY RATIO: 0.47
ECHO BSA: 1.99 M2
ECHO LA AREA 2C: 10.5 CM2
ECHO LA AREA 4C: 15.4 CM2
ECHO LA DIAMETER INDEX: 1.81 CM/M2
ECHO LA DIAMETER: 3.5 CM
ECHO LA MAJOR AXIS: 5.7 CM
ECHO LA MINOR AXIS: 3.8 CM
ECHO LA VOL MOD A2C: 23 ML (ref 22–52)
ECHO LA VOL MOD A4C: 32 ML (ref 22–52)
ECHO LA VOLUME INDEX MOD A2C: 12 ML/M2 (ref 16–34)
ECHO LA VOLUME INDEX MOD A4C: 17 ML/M2 (ref 16–34)
ECHO LV E' LATERAL VELOCITY: 5 CM/S
ECHO LV E' SEPTAL VELOCITY: 5 CM/S
ECHO LV FRACTIONAL SHORTENING: 27 % (ref 28–44)
ECHO LV INTERNAL DIMENSION DIASTOLE INDEX: 1.92 CM/M2
ECHO LV INTERNAL DIMENSION DIASTOLIC: 3.7 CM (ref 3.9–5.3)
ECHO LV INTERNAL DIMENSION SYSTOLIC INDEX: 1.4 CM/M2
ECHO LV INTERNAL DIMENSION SYSTOLIC: 2.7 CM
ECHO LV ISOVOLUMETRIC RELAXATION TIME (IVRT): 109 MS
ECHO LV IVSD: 1.2 CM (ref 0.6–0.9)
ECHO LV MASS 2D: 147.3 G (ref 67–162)
ECHO LV MASS INDEX 2D: 76.3 G/M2 (ref 43–95)
ECHO LV POSTERIOR WALL DIASTOLIC: 1.2 CM (ref 0.6–0.9)
ECHO LV RELATIVE WALL THICKNESS RATIO: 0.65
ECHO LVOT PEAK GRADIENT: 2 MMHG
ECHO LVOT PEAK VELOCITY: 0.7 M/S
ECHO MV A VELOCITY: 0.71 M/S
ECHO MV E DECELERATION TIME (DT): 380 MS
ECHO MV E VELOCITY: 0.48 M/S
ECHO MV E/A RATIO: 0.68
ECHO MV E/E' LATERAL: 9.6
ECHO MV E/E' RATIO (AVERAGED): 9.6
ECHO PULMONARY ARTERY END DIASTOLIC PRESSURE: 4 MMHG
ECHO PV MAX VELOCITY: 0.7 M/S
ECHO PV PEAK GRADIENT: 2 MMHG
ECHO PV REGURGITANT MAX VELOCITY: 1 M/S
ECHO RV INTERNAL DIMENSION: 3.3 CM
ECHO TV E WAVE: 0.4 M/S
ECHO TV REGURGITANT MAX VELOCITY: 2.35 M/S
ECHO TV REGURGITANT PEAK GRADIENT: 22 MMHG
EOSINOPHIL NFR BLD AUTO: 3.8 %
EOSINOPHILS ABSOLUTE: 0.2 THOU/MM3 (ref 0–0.4)
ERYTHROCYTE [DISTWIDTH] IN BLOOD BY AUTOMATED COUNT: 12.7 % (ref 11.5–14.5)
GFR SERPL CREATININE-BSD FRML MDRD: 40 ML/MIN/1.73M2
GLUCOSE SERPL-MCNC: 77 MG/DL (ref 70–108)
HCT VFR BLD AUTO: 40.5 % (ref 37–47)
HGB BLD-MCNC: 13.5 GM/DL (ref 12–16)
IMM GRANULOCYTES # BLD AUTO: 0.02 THOU/MM3 (ref 0–0.07)
IMM GRANULOCYTES NFR BLD AUTO: 0.3 %
LYMPHOCYTES ABSOLUTE: 0.9 THOU/MM3 (ref 1–4.8)
LYMPHOCYTES NFR BLD AUTO: 14.9 %
MCH RBC QN AUTO: 30.9 PG (ref 26–33)
MCHC RBC AUTO-ENTMCNC: 33.3 GM/DL (ref 32.2–35.5)
MCV RBC AUTO: 92.7 FL (ref 81–99)
MONOCYTES ABSOLUTE: 0.3 THOU/MM3 (ref 0.4–1.3)
MONOCYTES NFR BLD AUTO: 4.8 %
NEUTROPHILS NFR BLD AUTO: 75.9 %
NRBC BLD AUTO-RTO: 0 /100 WBC
ORGANISM: ABNORMAL
OSMOLALITY SERPL CALC.SUM OF ELEC: 287.5 MOSMOL/KG (ref 275–300)
PLATELET # BLD AUTO: 164 THOU/MM3 (ref 130–400)
PMV BLD AUTO: 11.5 FL (ref 9.4–12.4)
POTASSIUM SERPL-SCNC: 3.7 MEQ/L (ref 3.5–5.2)
RBC # BLD AUTO: 4.37 MILL/MM3 (ref 4.2–5.4)
SEGMENTED NEUTROPHILS ABSOLUTE COUNT: 4.4 THOU/MM3 (ref 1.8–7.7)
SODIUM SERPL-SCNC: 143 MEQ/L (ref 135–145)
WBC # BLD AUTO: 5.8 THOU/MM3 (ref 4.8–10.8)

## 2024-02-20 PROCEDURE — 36415 COLL VENOUS BLD VENIPUNCTURE: CPT

## 2024-02-20 PROCEDURE — 93306 TTE W/DOPPLER COMPLETE: CPT

## 2024-02-20 PROCEDURE — 80048 BASIC METABOLIC PNL TOTAL CA: CPT

## 2024-02-20 PROCEDURE — 85025 COMPLETE CBC W/AUTO DIFF WBC: CPT

## 2024-02-20 PROCEDURE — 6360000002 HC RX W HCPCS

## 2024-02-20 PROCEDURE — 6370000000 HC RX 637 (ALT 250 FOR IP)

## 2024-02-20 PROCEDURE — 99233 SBSQ HOSP IP/OBS HIGH 50: CPT

## 2024-02-20 PROCEDURE — 93306 TTE W/DOPPLER COMPLETE: CPT | Performed by: NUCLEAR MEDICINE

## 2024-02-20 PROCEDURE — 1200000003 HC TELEMETRY R&B

## 2024-02-20 RX ORDER — ENOXAPARIN SODIUM 100 MG/ML
40 INJECTION SUBCUTANEOUS EVERY 24 HOURS
Status: DISCONTINUED | OUTPATIENT
Start: 2024-02-20 | End: 2024-02-26 | Stop reason: HOSPADM

## 2024-02-20 RX ADMIN — ISOSORBIDE MONONITRATE 15 MG: 30 TABLET, EXTENDED RELEASE ORAL at 08:29

## 2024-02-20 RX ADMIN — TRAMADOL HYDROCHLORIDE 25 MG: 50 TABLET, COATED ORAL at 20:49

## 2024-02-20 RX ADMIN — ROSUVASTATIN 5 MG: 10 TABLET, FILM COATED ORAL at 08:28

## 2024-02-20 RX ADMIN — TRAMADOL HYDROCHLORIDE 25 MG: 50 TABLET, COATED ORAL at 08:28

## 2024-02-20 RX ADMIN — Medication 1 CAPSULE: at 08:29

## 2024-02-20 RX ADMIN — ASPIRIN 81 MG: 81 TABLET, COATED ORAL at 08:28

## 2024-02-20 RX ADMIN — ENOXAPARIN SODIUM 40 MG: 100 INJECTION SUBCUTANEOUS at 19:26

## 2024-02-20 RX ADMIN — METOPROLOL SUCCINATE 12.5 MG: 25 TABLET, EXTENDED RELEASE ORAL at 08:29

## 2024-02-20 RX ADMIN — PANTOPRAZOLE SODIUM 40 MG: 40 TABLET, DELAYED RELEASE ORAL at 08:28

## 2024-02-20 ASSESSMENT — PAIN SCALES - GENERAL
PAINLEVEL_OUTOF10: 0
PAINLEVEL_OUTOF10: 0

## 2024-02-20 NOTE — PLAN OF CARE
Problem: Discharge Planning  Goal: Discharge to home or other facility with appropriate resources  2/20/2024 1521 by Megan King, RN  Outcome: Progressing  Note: Plans for return to [place].   on case.     Problem: Skin/Tissue Integrity  Goal: Absence of new skin breakdown  Description: 1.  Monitor for areas of redness and/or skin breakdown  2.  Assess vascular access sites hourly  3.  Every 4-6 hours minimum:  Change oxygen saturation probe site  4.  Every 4-6 hours:  If on nasal continuous positive airway pressure, respiratory therapy assess nares and determine need for appliance change or resting period.  2/20/2024 1521 by Megan King, RN  Outcome: Progressing  Note: No new breakdown this shift.   Pillow support.  Encourage repositioning.     Problem: ABCDS Injury Assessment  Goal: Absence of physical injury  2/20/2024 1521 by Megan King, RN  Outcome: Progressing  Note: No injury this shift.     Problem: Safety - Adult  Goal: Free from fall injury  2/20/2024 1521 by Megan King, RN  Outcome: Progressing  Note: Patient hallucinating and confused, daughter and sitter at bedside due to elopement concerns.     Problem: Chronic Conditions and Co-morbidities  Goal: Patient's chronic conditions and co-morbidity symptoms are monitored and maintained or improved  2/20/2024 1521 by Megan King, RN  Outcome: Progressing  Note: At baseline.     Problem: Genitourinary - Adult  Goal: Absence of urinary retention  Outcome: Progressing  Note: External catheter in place.     Problem: Infection - Adult  Goal: Absence of infection at discharge  Outcome: Progressing  Note: Rocephin daily for known UTI.  Blood cultures drawn.     Problem: Infection - Adult  Goal: Absence of infection during hospitalization  Outcome: Progressing  Note: Current UTI.     Problem: Infection - Adult  Goal: Absence of fever/infection during anticipated neutropenic period  Outcome: Progressing  Note: Afebrile.    Care plan

## 2024-02-20 NOTE — CARE COORDINATION
2/20/24, 9:00 AM EST  Discharge Planning Evaluation  Social work consult received, patient from [facility].   Patient/Family preference is to return to [facility].    Would patient be willing to go to a skilled facility if needed: no.  Is there skilled care available at current facility: no.  Barriers to return to current living situation: none noted  Spoke with RN at the facility.  Patient bed hold: yes  Anticipated transport plan: daughter Emily  Patient's Healthcare Decision Maker: Named in Scanned ACP Document    Readmission Risk Low 0-14, Mod 15-19), High > 20: Readmission Risk Score: 14.6    Current PCP: Chilango Caceres, DO  PCP verified by CM? Yes    Patient Orientation: Alert and Oriented, Person, Place, Situation    Patient Cognition: Alert  History Provided by: Patient, Child/Family (xiomara Jonas)    Advance Directives:      Code Status: Full Code   Patient's Primary Decision Maker is: Named in Scanned ACP Document    Primary Decision Maker: EMILY HCAPA - Child - [phone]     Discharge Planning:    Patient lives with: Other (Comment) ([place]) Type of Home: Assisted living  Primary Care Giver:    Patient Support Systems include: Children   Current Financial resources: Medicare  Current community resources: Assisted Living ([facility])  Current services prior to admission: Other (Comment) ([place])            Current DME:              Type of Home Care services:  None    ADLS  Prior functional level: Bathing, Dressing, Toileting, Feeding, Mobility, Independent in ADLs/IADLs  Current functional level: Mobility, Feeding, Toileting, Dressing, Bathing, Independent in ADLs/IADLs    Family can provide assistance at DC: No  Would you like Case Management to discuss the discharge plan with any other family members/significant others, and if so, who? Yes (xiomara NAYLOR)  Plans to Return to Present Housing: Yes  Other Identified Issues/Barriers to RETURNING to current housing: none noted  Potential

## 2024-02-20 NOTE — CARE COORDINATION
2/20/24, 7:29 AM EST      DISCHARGE PLANNING EVALUATION    Juhi Diane  Admitted: 2/19/2024  Hospital Day: 1    Location: [facility]15/015-A Reason for admit: Metabolic encephalopathy [G93.41]  UTI (urinary tract infection) [N39.0]  Nonischemic cardiomyopathy (HCC) [I42.8]  Bilateral calf pain [M79.661, M79.662]  Urinary tract infection with hematuria, site unspecified [N39.0, R31.9]    Past Medical History:   Diagnosis Date    Arthritis     general    Breast CA ([facility]) 12/03    right    CAD (coronary artery disease) 2009    stent in [place]    CHF (congestive heart failure) ([facility])     Closed compression fracture of first lumbar vertebra ([facility]) 2/7/2018    Colon polyps 8/97; 3/11    COPD (chronic obstructive pulmonary disease) ([facility]) 8/5/2017    Diverticulosis 1/06    Erosive gastritis 11/06    Esophageal stricture 03/2011    Three times, Dr. Law    Hyperlipidemia     Hypertension     Internal hemorrhoid 1/06    LUISA on CPAP     Osteopenia determined by x-ray 1999    Osteoporosis of vertebra 6/2018 Per CT thoracolumbar    Pneumonia 2009    Stress incontinence, female     Vertigo        Procedure: 2/19 CT lumbar: old compression fracture of T12  2/19 CT cervical: spondylosis at C5-6, C6-7  Barriers to Discharge: creat 1.3, trops mildly elev, BC pending. IVF, IV rocephin. Sitter in use at bedside.     PCP: Chilango Caceres,     Readmission Risk Low 0-14, Mod 15-19), High > 20: Readmission Risk Score: 14.5      Advance Directives:      Code Status: Full Code   Patient's Primary Decision Maker is:      Primary Decision Maker: EMILY CHAPA POA - Child - [phone]    Patient Goals/Plan/Treatment Preferences: Juhi Gómez lives at [facility]. SW consult placed.     Transportation/Food Security/Housekeeping Addressed: No issues identified.     If patient is discharged prior to next notation, then this note serves as note for discharge by case management.

## 2024-02-21 LAB
ANION GAP SERPL CALC-SCNC: 15 MEQ/L (ref 8–16)
BUN SERPL-MCNC: 20 MG/DL (ref 7–22)
CALCIUM SERPL-MCNC: 9.2 MG/DL (ref 8.5–10.5)
CHLORIDE SERPL-SCNC: 103 MEQ/L (ref 98–111)
CO2 SERPL-SCNC: 26 MEQ/L (ref 23–33)
CREAT SERPL-MCNC: 1.1 MG/DL (ref 0.4–1.2)
DEPRECATED RDW RBC AUTO: 42.7 FL (ref 35–45)
ERYTHROCYTE [DISTWIDTH] IN BLOOD BY AUTOMATED COUNT: 12.7 % (ref 11.5–14.5)
GFR SERPL CREATININE-BSD FRML MDRD: 49 ML/MIN/1.73M2
GLUCOSE SERPL-MCNC: 87 MG/DL (ref 70–108)
HCT VFR BLD AUTO: 39.4 % (ref 37–47)
HGB BLD-MCNC: 13.6 GM/DL (ref 12–16)
MCH RBC QN AUTO: 31.8 PG (ref 26–33)
MCHC RBC AUTO-ENTMCNC: 34.5 GM/DL (ref 32.2–35.5)
MCV RBC AUTO: 92.1 FL (ref 81–99)
PLATELET # BLD AUTO: 167 THOU/MM3 (ref 130–400)
PMV BLD AUTO: 11.1 FL (ref 9.4–12.4)
POTASSIUM SERPL-SCNC: 3.3 MEQ/L (ref 3.5–5.2)
RBC # BLD AUTO: 4.28 MILL/MM3 (ref 4.2–5.4)
SODIUM SERPL-SCNC: 144 MEQ/L (ref 135–145)
WBC # BLD AUTO: 7.1 THOU/MM3 (ref 4.8–10.8)

## 2024-02-21 PROCEDURE — 80048 BASIC METABOLIC PNL TOTAL CA: CPT

## 2024-02-21 PROCEDURE — 1200000003 HC TELEMETRY R&B

## 2024-02-21 PROCEDURE — 6370000000 HC RX 637 (ALT 250 FOR IP)

## 2024-02-21 PROCEDURE — 6360000002 HC RX W HCPCS

## 2024-02-21 PROCEDURE — 97162 PT EVAL MOD COMPLEX 30 MIN: CPT

## 2024-02-21 PROCEDURE — 36415 COLL VENOUS BLD VENIPUNCTURE: CPT

## 2024-02-21 PROCEDURE — 97166 OT EVAL MOD COMPLEX 45 MIN: CPT

## 2024-02-21 PROCEDURE — 97116 GAIT TRAINING THERAPY: CPT

## 2024-02-21 PROCEDURE — 85027 COMPLETE CBC AUTOMATED: CPT

## 2024-02-21 PROCEDURE — 2580000003 HC RX 258

## 2024-02-21 PROCEDURE — 97530 THERAPEUTIC ACTIVITIES: CPT

## 2024-02-21 PROCEDURE — 6370000000 HC RX 637 (ALT 250 FOR IP): Performed by: PHYSICIAN ASSISTANT

## 2024-02-21 RX ORDER — POTASSIUM CHLORIDE 20 MEQ/1
40 TABLET, EXTENDED RELEASE ORAL PRN
Status: DISCONTINUED | OUTPATIENT
Start: 2024-02-21 | End: 2024-02-26 | Stop reason: HOSPADM

## 2024-02-21 RX ORDER — POTASSIUM CHLORIDE 7.45 MG/ML
10 INJECTION INTRAVENOUS PRN
Status: DISCONTINUED | OUTPATIENT
Start: 2024-02-21 | End: 2024-02-26 | Stop reason: HOSPADM

## 2024-02-21 RX ORDER — TRAZODONE HYDROCHLORIDE 50 MG/1
50 TABLET ORAL NIGHTLY PRN
Status: DISCONTINUED | OUTPATIENT
Start: 2024-02-21 | End: 2024-02-26 | Stop reason: HOSPADM

## 2024-02-21 RX ADMIN — ROSUVASTATIN 5 MG: 10 TABLET, FILM COATED ORAL at 08:31

## 2024-02-21 RX ADMIN — CEFTRIAXONE SODIUM 1000 MG: 1 INJECTION, POWDER, FOR SOLUTION INTRAMUSCULAR; INTRAVENOUS at 02:43

## 2024-02-21 RX ADMIN — SACUBITRIL AND VALSARTAN 1 TABLET: 49; 51 TABLET, FILM COATED ORAL at 19:42

## 2024-02-21 RX ADMIN — ENOXAPARIN SODIUM 40 MG: 100 INJECTION SUBCUTANEOUS at 18:37

## 2024-02-21 RX ADMIN — PANTOPRAZOLE SODIUM 40 MG: 40 TABLET, DELAYED RELEASE ORAL at 08:30

## 2024-02-21 RX ADMIN — ASPIRIN 81 MG: 81 TABLET, COATED ORAL at 08:30

## 2024-02-21 RX ADMIN — CEFTRIAXONE SODIUM 1000 MG: 1 INJECTION, POWDER, FOR SOLUTION INTRAMUSCULAR; INTRAVENOUS at 18:36

## 2024-02-21 RX ADMIN — TRAZODONE HYDROCHLORIDE 50 MG: 50 TABLET ORAL at 21:45

## 2024-02-21 RX ADMIN — METOPROLOL SUCCINATE 12.5 MG: 25 TABLET, EXTENDED RELEASE ORAL at 08:30

## 2024-02-21 RX ADMIN — TRAMADOL HYDROCHLORIDE 25 MG: 50 TABLET, COATED ORAL at 08:30

## 2024-02-21 RX ADMIN — Medication 1 CAPSULE: at 08:30

## 2024-02-21 RX ADMIN — ISOSORBIDE MONONITRATE 15 MG: 30 TABLET, EXTENDED RELEASE ORAL at 08:30

## 2024-02-21 RX ADMIN — SACUBITRIL AND VALSARTAN 1 TABLET: 49; 51 TABLET, FILM COATED ORAL at 12:29

## 2024-02-21 RX ADMIN — POTASSIUM BICARBONATE 40 MEQ: 782 TABLET, EFFERVESCENT ORAL at 12:29

## 2024-02-21 RX ADMIN — TRAMADOL HYDROCHLORIDE 25 MG: 50 TABLET, COATED ORAL at 19:42

## 2024-02-21 ASSESSMENT — PAIN DESCRIPTION - DESCRIPTORS: DESCRIPTORS: PATIENT UNABLE TO DESCRIBE

## 2024-02-21 ASSESSMENT — PAIN DESCRIPTION - FREQUENCY: FREQUENCY: INTERMITTENT

## 2024-02-21 ASSESSMENT — PAIN DESCRIPTION - ORIENTATION: ORIENTATION: RIGHT

## 2024-02-21 ASSESSMENT — PAIN DESCRIPTION - LOCATION: LOCATION: PELVIS

## 2024-02-21 ASSESSMENT — PAIN SCALES - GENERAL
PAINLEVEL_OUTOF10: 3
PAINLEVEL_OUTOF10: 0

## 2024-02-21 NOTE — PLAN OF CARE
Problem: Discharge Planning  Goal: Discharge to home or other facility with appropriate resources  2/20/2024 1521 by Megan King RN  Outcome: Progressing  Note: Plans for return to AL.   on case.  2/20/2024 1521 by Megan King RN  Outcome: Progressing  2/20/2024 0901 by Subha Blackburn LSW  Outcome: Progressing     Problem: Skin/Tissue Integrity  Goal: Absence of new skin breakdown  Description: 1.  Monitor for areas of redness and/or skin breakdown  2.  Assess vascular access sites hourly  3.  Every 4-6 hours minimum:  Change oxygen saturation probe site  4.  Every 4-6 hours:  If on nasal continuous positive airway pressure, respiratory therapy assess nares and determine need for appliance change or resting period.  2/20/2024 2123 by Paola Edwards RN  Outcome: Progressing  2/20/2024 1521 by Megan King RN  Outcome: Progressing  Note: No new breakdown this shift.   Pillow support.  Encourage repositioning.  2/20/2024 1521 by Megan King RN  Outcome: Progressing     Problem: ABCDS Injury Assessment  Goal: Absence of physical injury  2/20/2024 2123 by Paola Edwards RN  Outcome: Progressing  2/20/2024 1521 by Megan King RN  Outcome: Progressing  Note: No injury this shift.  2/20/2024 1521 by Megan King RN  Outcome: Progressing     Problem: Safety - Adult  Goal: Free from fall injury  2/20/2024 2123 by Paola Edwards RN  Outcome: Progressing  2/20/2024 1521 by Megan King RN  Outcome: Progressing  Note: Patient hallucinating and confused, daughter and sitter at bedside due to elopement concerns.  2/20/2024 1521 by Megan King RN  Outcome: Progressing     Problem: Chronic Conditions and Co-morbidities  Goal: Patient's chronic conditions and co-morbidity symptoms are monitored and maintained or improved  2/20/2024 2123 by Paola Edwards RN  Outcome: Progressing  2/20/2024 1521 by Megan King RN  Outcome: Progressing  Note: At baseline.  2/20/2024 1521 by

## 2024-02-21 NOTE — CARE COORDINATION
2/21/24, 9:10 AM EST    DISCHARGE ON GOING EVALUATION    [facility] day: 2  Location: -15/015-A Reason for admit: Metabolic encephalopathy [G93.41]  UTI (urinary tract infection) [N39.0]  Nonischemic cardiomyopathy (HCC) [I42.8]  Bilateral calf pain [M79.661, M79.662]  Urinary tract infection with hematuria, site unspecified [N39.0, R31.9]   Procedure: 2/20 echo: EF 50-55%  Barriers to Discharge: K+3.3. IVF, IV rocephin. Plan discharge after last dose of atb tomorrow, if stable.   PCP: Chilango Caceres,   Readmission Risk Score: 15.3%  Patient Goals/Plan/Treatment Preferences: return to [facility].                 Advancement-Rotation Flap Text: The defect edges were debeveled with a #15 scalpel blade.  Given the location of the defect, shape of the defect and the proximity to free margins an advancement-rotation flap was deemed most appropriate.  Using a sterile surgical marker, an appropriate flap was drawn incorporating the defect and placing the expected incisions within the relaxed skin tension lines where possible. The area thus outlined was incised deep to adipose tissue with a #15 scalpel blade.  The skin margins were undermined to an appropriate distance in all directions utilizing iris scissors.

## 2024-02-22 LAB
EKG ATRIAL RATE: 61 BPM
EKG P AXIS: 59 DEGREES
EKG P-R INTERVAL: 198 MS
EKG Q-T INTERVAL: 434 MS
EKG QRS DURATION: 106 MS
EKG QTC CALCULATION (BAZETT): 436 MS
EKG R AXIS: -142 DEGREES
EKG T AXIS: -27 DEGREES
EKG VENTRICULAR RATE: 61 BPM

## 2024-02-22 PROCEDURE — 97530 THERAPEUTIC ACTIVITIES: CPT

## 2024-02-22 PROCEDURE — 6360000002 HC RX W HCPCS

## 2024-02-22 PROCEDURE — 94761 N-INVAS EAR/PLS OXIMETRY MLT: CPT

## 2024-02-22 PROCEDURE — 1200000003 HC TELEMETRY R&B

## 2024-02-22 PROCEDURE — 2580000003 HC RX 258

## 2024-02-22 PROCEDURE — 6370000000 HC RX 637 (ALT 250 FOR IP): Performed by: PHYSICIAN ASSISTANT

## 2024-02-22 PROCEDURE — 99232 SBSQ HOSP IP/OBS MODERATE 35: CPT | Performed by: PHYSICIAN ASSISTANT

## 2024-02-22 RX ORDER — QUETIAPINE FUMARATE 25 MG/1
50 TABLET, FILM COATED ORAL ONCE
Status: COMPLETED | OUTPATIENT
Start: 2024-02-22 | End: 2024-02-22

## 2024-02-22 RX ADMIN — QUETIAPINE FUMARATE 50 MG: 25 TABLET ORAL at 01:01

## 2024-02-22 RX ADMIN — SODIUM CHLORIDE: 9 INJECTION, SOLUTION INTRAVENOUS at 09:51

## 2024-02-22 RX ADMIN — ENOXAPARIN SODIUM 40 MG: 100 INJECTION SUBCUTANEOUS at 20:13

## 2024-02-22 NOTE — CARE COORDINATION
2/22/24, 2:06 PM EST    DISCHARGE ON GOING EVALUATION    [facility] day: 3  Location: -15/015-A Reason for admit: Metabolic encephalopathy [G93.41]  UTI (urinary tract infection) [N39.0]  Nonischemic cardiomyopathy (HCC) [I42.8]  Bilateral calf pain [M79.661, M79.662]  Urinary tract infection with hematuria, site unspecified [N39.0, R31.9]   Procedure: n/a    Barriers to Discharge: Hospitalist following. PT/OT. SLP to eval-concerns for aspiration. IV abx complete. IVF@50. Increased confusion and agitation. Sitter transitioned to telesitter.     PCP: Chilango Caceres,   Readmission Risk Score: 15.2%  Patient Goals/Plan/Treatment Preferences: Plans to return to [facility]. Family to provide transport.

## 2024-02-22 NOTE — PALLIATIVE CARE
Initial Evaluation        Patient:   Juhi Diane  YOB: 1940  Age:  84 y.o.  Room:  [facility]/015  MRN:  [id number]   Acct: [id number]    Date of Admission:  2/19/2024 12:23 PM  Date of Service:  2/22/2024  Completed By:  Shila Orta RN                 Reason for Palliative Care Evaluation:-               [x] Code Status Discussion              [] Goals of Care              [] Pain/Symptom Management               [] Emotional Support              [x] Other:  family request                  Current Issues:-   []  Pain  []  Fatigue  []  Nausea  []  Anxiety  []  Depression  []  Shortness of Breath  []  Constipation  []  Appetite  [x]  Other:patient with confusion              Advance Directives:-    [] [place] DNR Form  [x] Living Will  [] Medical POA              Current Code Status:-  changed to DNR CCA after conversation   [x] Full Resuscitation  [] DNR-Comfort Care-Arrest  [] DNR-Comfort Care       [] Limited Resuscitation             [] No CPR            [] No shock            [] No ET intubation/reintubation            [] No resuscitative medications            [] Other limitation:               Palliative Performance Status:-      [] 100%  Full ambulation; normal activity and work; no evidence of disease; able to do own self care; normal intake; fully conscious     [] 90%   Full ambulation; normal activity and work; some evidence of disease; able to do own self care; normal intake; fully conscious    [] 80%   Full ambulation; normal activity with effort; some evidence of disease; able to do own self care; normal or reduced intake; fully conscious    [] 70%  Ambulation reduced; unable to perform normal job/work; significant  disease; able to do own self care; normal or reduced intake; fully conscious    [x] 60%  Ambulation reduced; Significant disease;Can't do hobbies/housework; intake normal or reduced; occasional assist; LOC full/confusion    [] 50%  Mainly sit/lie; Extensive

## 2024-02-22 NOTE — PLAN OF CARE
Problem: Discharge Planning  Goal: Discharge to home or other facility with appropriate resources  Flowsheets (Taken 2/22/2024 0222)  Discharge to home or other facility with appropriate resources:   Identify barriers to discharge with patient and caregiver   Arrange for needed discharge resources and transportation as appropriate     Problem: Skin/Tissue Integrity  Goal: Absence of new skin breakdown  Description: 1.  Monitor for areas of redness and/or skin breakdown  2.  Assess vascular access sites hourly  3.  Every 4-6 hours minimum:  Change oxygen saturation probe site  4.  Every 4-6 hours:  If on nasal continuous positive airway pressure, respiratory therapy assess nares and determine need for appliance change or resting period.  Note: No new skin breakdown noted.  Encouraged patient to reposition in bed.        Problem: ABCDS Injury Assessment  Goal: Absence of physical injury  Flowsheets (Taken 2/22/2024 0222)  Absence of Physical Injury: Implement safety measures based on patient assessment     Problem: Safety - Adult  Goal: Free from fall injury  Flowsheets (Taken 2/22/2024 0222)  Free From Fall Injury: Instruct family/caregiver on patient safety  Note: Human sitter at bedside.     Problem: Chronic Conditions and Co-morbidities  Goal: Patient's chronic conditions and co-morbidity symptoms are monitored and maintained or improved  Flowsheets (Taken 2/22/2024 0222)  Care Plan - Patient's Chronic Conditions and Co-Morbidity Symptoms are Monitored and Maintained or Improved: Monitor and assess patient's chronic conditions and comorbid symptoms for stability, deterioration, or improvement     Problem: Genitourinary - Adult  Goal: Absence of urinary retention  Flowsheets (Taken 2/22/2024 0222)  Absence of urinary retention:   Assess patient’s ability to void and empty bladder   Monitor intake/output and perform bladder scan as needed     Problem: Infection - Adult  Goal: Absence of infection at

## 2024-02-23 LAB
ANION GAP SERPL CALC-SCNC: 14 MEQ/L (ref 8–16)
BUN SERPL-MCNC: 16 MG/DL (ref 7–22)
CALCIUM SERPL-MCNC: 9.3 MG/DL (ref 8.5–10.5)
CHLORIDE SERPL-SCNC: 107 MEQ/L (ref 98–111)
CO2 SERPL-SCNC: 25 MEQ/L (ref 23–33)
CREAT SERPL-MCNC: 1 MG/DL (ref 0.4–1.2)
GFR SERPL CREATININE-BSD FRML MDRD: 55 ML/MIN/1.73M2
GLUCOSE SERPL-MCNC: 80 MG/DL (ref 70–108)
POTASSIUM SERPL-SCNC: 3.9 MEQ/L (ref 3.5–5.2)
SODIUM SERPL-SCNC: 146 MEQ/L (ref 135–145)

## 2024-02-23 PROCEDURE — 80048 BASIC METABOLIC PNL TOTAL CA: CPT

## 2024-02-23 PROCEDURE — 6370000000 HC RX 637 (ALT 250 FOR IP)

## 2024-02-23 PROCEDURE — 92523 SPEECH SOUND LANG COMPREHEN: CPT

## 2024-02-23 PROCEDURE — 6360000002 HC RX W HCPCS

## 2024-02-23 PROCEDURE — 97116 GAIT TRAINING THERAPY: CPT

## 2024-02-23 PROCEDURE — 92526 ORAL FUNCTION THERAPY: CPT

## 2024-02-23 PROCEDURE — 2580000003 HC RX 258: Performed by: PHYSICIAN ASSISTANT

## 2024-02-23 PROCEDURE — 1200000003 HC TELEMETRY R&B

## 2024-02-23 PROCEDURE — 2580000003 HC RX 258

## 2024-02-23 PROCEDURE — 97110 THERAPEUTIC EXERCISES: CPT

## 2024-02-23 PROCEDURE — 92610 EVALUATE SWALLOWING FUNCTION: CPT

## 2024-02-23 PROCEDURE — 99233 SBSQ HOSP IP/OBS HIGH 50: CPT | Performed by: PHYSICIAN ASSISTANT

## 2024-02-23 PROCEDURE — 36415 COLL VENOUS BLD VENIPUNCTURE: CPT

## 2024-02-23 PROCEDURE — 97129 THER IVNTJ 1ST 15 MIN: CPT

## 2024-02-23 PROCEDURE — 97530 THERAPEUTIC ACTIVITIES: CPT

## 2024-02-23 RX ORDER — DEXTROSE, SODIUM CHLORIDE, SODIUM LACTATE, POTASSIUM CHLORIDE, AND CALCIUM CHLORIDE 5; .6; .31; .03; .02 G/100ML; G/100ML; G/100ML; G/100ML; G/100ML
INJECTION, SOLUTION INTRAVENOUS CONTINUOUS
Status: DISCONTINUED | OUTPATIENT
Start: 2024-02-23 | End: 2024-02-24

## 2024-02-23 RX ADMIN — ROSUVASTATIN 5 MG: 10 TABLET, FILM COATED ORAL at 08:15

## 2024-02-23 RX ADMIN — SODIUM CHLORIDE: 9 INJECTION, SOLUTION INTRAVENOUS at 04:35

## 2024-02-23 RX ADMIN — SODIUM CHLORIDE, PRESERVATIVE FREE 10 ML: 5 INJECTION INTRAVENOUS at 08:19

## 2024-02-23 RX ADMIN — SACUBITRIL AND VALSARTAN 1 TABLET: 49; 51 TABLET, FILM COATED ORAL at 20:21

## 2024-02-23 RX ADMIN — ENOXAPARIN SODIUM 40 MG: 100 INJECTION SUBCUTANEOUS at 20:20

## 2024-02-23 RX ADMIN — TRAMADOL HYDROCHLORIDE 25 MG: 50 TABLET, COATED ORAL at 08:15

## 2024-02-23 RX ADMIN — SODIUM CHLORIDE, SODIUM LACTATE, POTASSIUM CHLORIDE, CALCIUM CHLORIDE AND DEXTROSE MONOHYDRATE: 5; 600; 310; 30; 20 INJECTION, SOLUTION INTRAVENOUS at 14:40

## 2024-02-23 RX ADMIN — SACUBITRIL AND VALSARTAN 1 TABLET: 49; 51 TABLET, FILM COATED ORAL at 08:15

## 2024-02-23 RX ADMIN — Medication 1 CAPSULE: at 08:15

## 2024-02-23 RX ADMIN — TRAMADOL HYDROCHLORIDE 25 MG: 50 TABLET, COATED ORAL at 20:20

## 2024-02-23 ASSESSMENT — PAIN DESCRIPTION - LOCATION: LOCATION: GENERALIZED

## 2024-02-23 NOTE — PLAN OF CARE
Problem: Discharge Planning  Goal: Discharge to home or other facility with appropriate resources  Outcome: Progressing  Flowsheets (Taken 2/23/2024 0815)  Discharge to home or other facility with appropriate resources: Identify barriers to discharge with patient and caregiver     Problem: Skin/Tissue Integrity  Goal: Absence of new skin breakdown  Description: 1.  Monitor for areas of redness and/or skin breakdown  2.  Assess vascular access sites hourly  3.  Every 4-6 hours minimum:  Change oxygen saturation probe site  4.  Every 4-6 hours:  If on nasal continuous positive airway pressure, respiratory therapy assess nares and determine need for appliance change or resting period.  Outcome: Progressing     Problem: ABCDS Injury Assessment  Goal: Absence of physical injury  Outcome: Progressing  Flowsheets (Taken 2/22/2024 0222 by Yudy Yost, RN)  Absence of Physical Injury: Implement safety measures based on patient assessment     Problem: Safety - Adult  Goal: Free from fall injury  Outcome: Progressing  Flowsheets (Taken 2/22/2024 0222 by Yudy Yost, RN)  Free From Fall Injury: Instruct family/caregiver on patient safety     Problem: Chronic Conditions and Co-morbidities  Goal: Patient's chronic conditions and co-morbidity symptoms are monitored and maintained or improved  Outcome: Progressing  Flowsheets (Taken 2/23/2024 0815)  Care Plan - Patient's Chronic Conditions and Co-Morbidity Symptoms are Monitored and Maintained or Improved: Monitor and assess patient's chronic conditions and comorbid symptoms for stability, deterioration, or improvement     Problem: Genitourinary - Adult  Goal: Absence of urinary retention  Outcome: Progressing  Flowsheets (Taken 2/23/2024 0815)  Absence of urinary retention: Assess patient’s ability to void and empty bladder     Problem: Infection - Adult  Goal: Absence of infection at discharge  Outcome: Progressing  Flowsheets (Taken 2/23/2024 0815)  Absence of infection

## 2024-02-23 NOTE — CARE COORDINATION
2/23/24, 2:30 PM EST    DISCHARGE PLANNING EVALUATION       SW had long conversation with daughter and granddaughter regarding a possible need for transition from AL to rehab stay.  Reviewed options locally and discussion of ECF's in [place]. Family is thinking of [facility] ( [phone] ) as Patient has been in the the past but do want to review with family and friends before making a determination today.  SW to follow up with family on Monday if needs rehab stay.  Patient is able to discharge to [facility] if appropriate over the weekend if a rehab stay is not preferred from family.

## 2024-02-23 NOTE — CARE COORDINATION
2/23/24, 3:01 PM EST    DISCHARGE ON GOING EVALUATION    [facility] day: 4  Location: -15/015-A Reason for admit: Metabolic encephalopathy [G93.41]  UTI (urinary tract infection) [N39.0]  Nonischemic cardiomyopathy (HCC) [I42.8]  Bilateral calf pain [M79.661, M79.662]  Urinary tract infection with hematuria, site unspecified [N39.0, R31.9]   Barriers to Discharge: Decreased alertness, concerns for aspiration. SLP eval ordered.   PCP: Chilango Caceres DO  Readmission Risk Score: 14.7%  Patient Goals/Plan/Treatment Preferences: Family now considering SNF in [place] vs return to [facility].

## 2024-02-24 ENCOUNTER — APPOINTMENT (OUTPATIENT)
Dept: GENERAL RADIOLOGY | Age: 84
DRG: 689 | End: 2024-02-24
Payer: MEDICARE

## 2024-02-24 LAB
ANION GAP SERPL CALC-SCNC: 10 MEQ/L (ref 8–16)
BACTERIA BLD AEROBE CULT: NORMAL
BACTERIA BLD AEROBE CULT: NORMAL
BUN SERPL-MCNC: 15 MG/DL (ref 7–22)
CALCIUM SERPL-MCNC: 9.3 MG/DL (ref 8.5–10.5)
CHLORIDE SERPL-SCNC: 110 MEQ/L (ref 98–111)
CO2 SERPL-SCNC: 26 MEQ/L (ref 23–33)
CREAT SERPL-MCNC: 0.9 MG/DL (ref 0.4–1.2)
GFR SERPL CREATININE-BSD FRML MDRD: > 60 ML/MIN/1.73M2
GLUCOSE SERPL-MCNC: 95 MG/DL (ref 70–108)
MAGNESIUM SERPL-MCNC: 1.7 MG/DL (ref 1.6–2.4)
POTASSIUM SERPL-SCNC: 3.5 MEQ/L (ref 3.5–5.2)
SODIUM SERPL-SCNC: 146 MEQ/L (ref 135–145)

## 2024-02-24 PROCEDURE — 74230 X-RAY XM SWLNG FUNCJ C+: CPT

## 2024-02-24 PROCEDURE — 6370000000 HC RX 637 (ALT 250 FOR IP)

## 2024-02-24 PROCEDURE — 80048 BASIC METABOLIC PNL TOTAL CA: CPT

## 2024-02-24 PROCEDURE — 92526 ORAL FUNCTION THERAPY: CPT

## 2024-02-24 PROCEDURE — 2580000003 HC RX 258

## 2024-02-24 PROCEDURE — 36415 COLL VENOUS BLD VENIPUNCTURE: CPT

## 2024-02-24 PROCEDURE — 92611 MOTION FLUOROSCOPY/SWALLOW: CPT

## 2024-02-24 PROCEDURE — 83735 ASSAY OF MAGNESIUM: CPT

## 2024-02-24 PROCEDURE — 6370000000 HC RX 637 (ALT 250 FOR IP): Performed by: PHYSICIAN ASSISTANT

## 2024-02-24 PROCEDURE — 99232 SBSQ HOSP IP/OBS MODERATE 35: CPT | Performed by: PHYSICIAN ASSISTANT

## 2024-02-24 PROCEDURE — 2500000003 HC RX 250 WO HCPCS: Performed by: PHYSICIAN ASSISTANT

## 2024-02-24 PROCEDURE — 1200000003 HC TELEMETRY R&B

## 2024-02-24 PROCEDURE — 6360000002 HC RX W HCPCS

## 2024-02-24 RX ORDER — FUROSEMIDE 20 MG/1
20 TABLET ORAL DAILY
Status: DISCONTINUED | OUTPATIENT
Start: 2024-02-24 | End: 2024-02-26 | Stop reason: HOSPADM

## 2024-02-24 RX ADMIN — BARIUM SULFATE 30 ML: 400 PASTE ORAL at 08:55

## 2024-02-24 RX ADMIN — ENOXAPARIN SODIUM 40 MG: 100 INJECTION SUBCUTANEOUS at 19:43

## 2024-02-24 RX ADMIN — PANTOPRAZOLE SODIUM 40 MG: 40 TABLET, DELAYED RELEASE ORAL at 10:14

## 2024-02-24 RX ADMIN — SACUBITRIL AND VALSARTAN 1 TABLET: 49; 51 TABLET, FILM COATED ORAL at 10:15

## 2024-02-24 RX ADMIN — Medication 1 CAPSULE: at 10:14

## 2024-02-24 RX ADMIN — SACUBITRIL AND VALSARTAN 1 TABLET: 49; 51 TABLET, FILM COATED ORAL at 19:43

## 2024-02-24 RX ADMIN — FUROSEMIDE 20 MG: 20 TABLET ORAL at 15:25

## 2024-02-24 RX ADMIN — BARIUM SULFATE 100 ML: 0.81 POWDER, FOR SUSPENSION ORAL at 08:55

## 2024-02-24 RX ADMIN — TRAMADOL HYDROCHLORIDE 25 MG: 50 TABLET, COATED ORAL at 19:43

## 2024-02-24 RX ADMIN — METOPROLOL SUCCINATE 12.5 MG: 25 TABLET, EXTENDED RELEASE ORAL at 10:15

## 2024-02-24 RX ADMIN — ISOSORBIDE MONONITRATE 15 MG: 30 TABLET, EXTENDED RELEASE ORAL at 10:15

## 2024-02-24 RX ADMIN — ROSUVASTATIN 5 MG: 10 TABLET, FILM COATED ORAL at 10:16

## 2024-02-24 RX ADMIN — TRAMADOL HYDROCHLORIDE 25 MG: 50 TABLET, COATED ORAL at 10:14

## 2024-02-24 RX ADMIN — ASPIRIN 81 MG: 81 TABLET, COATED ORAL at 10:14

## 2024-02-24 RX ADMIN — SODIUM CHLORIDE, PRESERVATIVE FREE 10 ML: 5 INJECTION INTRAVENOUS at 19:43

## 2024-02-24 ASSESSMENT — PAIN SCALES - GENERAL: PAINLEVEL_OUTOF10: 3

## 2024-02-25 LAB
ANION GAP SERPL CALC-SCNC: 13 MEQ/L (ref 8–16)
BUN SERPL-MCNC: 15 MG/DL (ref 7–22)
CALCIUM SERPL-MCNC: 9 MG/DL (ref 8.5–10.5)
CHLORIDE SERPL-SCNC: 103 MEQ/L (ref 98–111)
CO2 SERPL-SCNC: 26 MEQ/L (ref 23–33)
CREAT SERPL-MCNC: 0.9 MG/DL (ref 0.4–1.2)
GFR SERPL CREATININE-BSD FRML MDRD: > 60 ML/MIN/1.73M2
GLUCOSE SERPL-MCNC: 89 MG/DL (ref 70–108)
MAGNESIUM SERPL-MCNC: 1.6 MG/DL (ref 1.6–2.4)
POTASSIUM SERPL-SCNC: 3.5 MEQ/L (ref 3.5–5.2)
SODIUM SERPL-SCNC: 142 MEQ/L (ref 135–145)

## 2024-02-25 PROCEDURE — 6370000000 HC RX 637 (ALT 250 FOR IP)

## 2024-02-25 PROCEDURE — 83735 ASSAY OF MAGNESIUM: CPT

## 2024-02-25 PROCEDURE — 51798 US URINE CAPACITY MEASURE: CPT

## 2024-02-25 PROCEDURE — 80048 BASIC METABOLIC PNL TOTAL CA: CPT

## 2024-02-25 PROCEDURE — 2580000003 HC RX 258

## 2024-02-25 PROCEDURE — 6370000000 HC RX 637 (ALT 250 FOR IP): Performed by: PHYSICIAN ASSISTANT

## 2024-02-25 PROCEDURE — 6360000002 HC RX W HCPCS

## 2024-02-25 PROCEDURE — 36415 COLL VENOUS BLD VENIPUNCTURE: CPT

## 2024-02-25 PROCEDURE — 1200000003 HC TELEMETRY R&B

## 2024-02-25 PROCEDURE — 99232 SBSQ HOSP IP/OBS MODERATE 35: CPT | Performed by: PHYSICIAN ASSISTANT

## 2024-02-25 RX ADMIN — TRAMADOL HYDROCHLORIDE 25 MG: 50 TABLET, COATED ORAL at 23:30

## 2024-02-25 RX ADMIN — SODIUM CHLORIDE, PRESERVATIVE FREE 10 ML: 5 INJECTION INTRAVENOUS at 23:23

## 2024-02-25 RX ADMIN — ROSUVASTATIN 5 MG: 10 TABLET, FILM COATED ORAL at 09:20

## 2024-02-25 RX ADMIN — PANTOPRAZOLE SODIUM 40 MG: 40 TABLET, DELAYED RELEASE ORAL at 09:19

## 2024-02-25 RX ADMIN — ISOSORBIDE MONONITRATE 15 MG: 30 TABLET, EXTENDED RELEASE ORAL at 09:20

## 2024-02-25 RX ADMIN — Medication 1 CAPSULE: at 09:20

## 2024-02-25 RX ADMIN — METOPROLOL SUCCINATE 12.5 MG: 25 TABLET, EXTENDED RELEASE ORAL at 09:20

## 2024-02-25 RX ADMIN — SODIUM CHLORIDE, PRESERVATIVE FREE 10 ML: 5 INJECTION INTRAVENOUS at 09:21

## 2024-02-25 RX ADMIN — TRAMADOL HYDROCHLORIDE 25 MG: 50 TABLET, COATED ORAL at 09:20

## 2024-02-25 RX ADMIN — ENOXAPARIN SODIUM 40 MG: 100 INJECTION SUBCUTANEOUS at 23:23

## 2024-02-25 RX ADMIN — FUROSEMIDE 20 MG: 20 TABLET ORAL at 09:20

## 2024-02-25 RX ADMIN — SACUBITRIL AND VALSARTAN 1 TABLET: 49; 51 TABLET, FILM COATED ORAL at 09:20

## 2024-02-25 RX ADMIN — ASPIRIN 81 MG: 81 TABLET, COATED ORAL at 09:20

## 2024-02-25 ASSESSMENT — PAIN SCALES - GENERAL: PAINLEVEL_OUTOF10: 0

## 2024-02-25 NOTE — PROCEDURES
Bladder scan was completed by J leopold at 1610. The residual amount of 205 ml was resulted to [facility]

## 2024-02-25 NOTE — PLAN OF CARE
Problem: Discharge Planning  Goal: Discharge to home or other facility with appropriate resources  Outcome: Progressing     Problem: Skin/Tissue Integrity  Goal: Absence of new skin breakdown  Outcome: Progressing     Problem: ABCDS Injury Assessment  Goal: Absence of physical injury  Outcome: Progressing     Problem: Safety - Adult  Goal: Free from fall injury  Outcome: Progressing     Problem: Chronic Conditions and Co-morbidities  Goal: Patient's chronic conditions and co-morbidity symptoms are monitored and maintained or improved  Outcome: Progressing     Problem: Genitourinary - Adult  Goal: Absence of urinary retention  Outcome: Progressing  Note: With external catheter     Problem: Infection - Adult  Goal: Absence of infection at discharge  Outcome: Progressing     Problem: Infection - Adult  Goal: Absence of infection during hospitalization  Outcome: Progressing     Problem: Infection - Adult  Goal: Absence of fever/infection during anticipated neutropenic period  Outcome: Progressing     Problem: Pain  Goal: Verbalizes/displays adequate comfort level or baseline comfort level  Outcome: Progressing   Care plan reviewed with patient and verbalize understanding of the plan of care and contribute to goal setting.

## 2024-02-26 VITALS
RESPIRATION RATE: 18 BRPM | OXYGEN SATURATION: 92 % | HEIGHT: 64 IN | WEIGHT: 197.75 LBS | SYSTOLIC BLOOD PRESSURE: 111 MMHG | TEMPERATURE: 98 F | HEART RATE: 78 BPM | DIASTOLIC BLOOD PRESSURE: 64 MMHG | BODY MASS INDEX: 33.76 KG/M2

## 2024-02-26 PROBLEM — Z51.5 PALLIATIVE CARE PATIENT: Status: ACTIVE | Noted: 2024-02-26

## 2024-02-26 PROCEDURE — 6370000000 HC RX 637 (ALT 250 FOR IP)

## 2024-02-26 PROCEDURE — 2580000003 HC RX 258

## 2024-02-26 PROCEDURE — 99238 HOSP IP/OBS DSCHRG MGMT 30/<: CPT | Performed by: PHYSICIAN ASSISTANT

## 2024-02-26 PROCEDURE — 97530 THERAPEUTIC ACTIVITIES: CPT

## 2024-02-26 PROCEDURE — 94761 N-INVAS EAR/PLS OXIMETRY MLT: CPT

## 2024-02-26 PROCEDURE — 99223 1ST HOSP IP/OBS HIGH 75: CPT | Performed by: STUDENT IN AN ORGANIZED HEALTH CARE EDUCATION/TRAINING PROGRAM

## 2024-02-26 PROCEDURE — 97116 GAIT TRAINING THERAPY: CPT

## 2024-02-26 RX ORDER — FUROSEMIDE 20 MG/1
10 TABLET ORAL EVERY OTHER DAY
Qty: 60 TABLET | Refills: 3 | DISCHARGE
Start: 2024-02-26

## 2024-02-26 RX ORDER — ACETAMINOPHEN 325 MG/1
650 TABLET ORAL EVERY 6 HOURS PRN
DISCHARGE
Start: 2024-02-26

## 2024-02-26 RX ORDER — NITROFURANTOIN MACROCRYSTALS 50 MG/1
50 CAPSULE ORAL NIGHTLY
Refills: 0 | DISCHARGE
Start: 2024-02-26

## 2024-02-26 RX ADMIN — ISOSORBIDE MONONITRATE 15 MG: 30 TABLET, EXTENDED RELEASE ORAL at 10:29

## 2024-02-26 RX ADMIN — SODIUM CHLORIDE, PRESERVATIVE FREE 10 ML: 5 INJECTION INTRAVENOUS at 10:30

## 2024-02-26 RX ADMIN — PANTOPRAZOLE SODIUM 40 MG: 40 TABLET, DELAYED RELEASE ORAL at 10:29

## 2024-02-26 RX ADMIN — SACUBITRIL AND VALSARTAN 1 TABLET: 49; 51 TABLET, FILM COATED ORAL at 10:28

## 2024-02-26 RX ADMIN — ONDANSETRON 4 MG: 4 TABLET, ORALLY DISINTEGRATING ORAL at 15:39

## 2024-02-26 RX ADMIN — Medication 1 CAPSULE: at 10:28

## 2024-02-26 RX ADMIN — ASPIRIN 81 MG: 81 TABLET, COATED ORAL at 10:28

## 2024-02-26 RX ADMIN — ROSUVASTATIN 5 MG: 10 TABLET, FILM COATED ORAL at 10:28

## 2024-02-26 RX ADMIN — METOPROLOL SUCCINATE 12.5 MG: 25 TABLET, EXTENDED RELEASE ORAL at 10:28

## 2024-02-26 RX ADMIN — TRAMADOL HYDROCHLORIDE 25 MG: 50 TABLET, COATED ORAL at 10:28

## 2024-02-26 NOTE — CONSULTS
Physician Consult Note        Patient:   Juhi Diane  YOB: 1940  Age:  84 y.o.  Room:  [facility]15/015-  MRN:  [id number]   Acct: [id number]  PCP: Chilango Caceres DO    Date of Admission:  2/19/2024 12:23 PM  Date of Service:  2/26/2024    Reason for Consult: Goals of Care             Subjective   Chief Complaint:-    Chief Complaint   Patient presents with    Fall        History Obtained From:-  Patient, Electronic Medical Record, Patient's RN, Family    History of Present Illness:-                   Juhi Diane is a 84 y.o. female who  has a past medical history of Arthritis, Breast CA ([facility]), CAD (coronary artery disease), CHF (congestive heart failure) ([facility]), Closed compression fracture of first lumbar vertebra ([facility]), Colon polyps, COPD (chronic obstructive pulmonary disease) ([facility]), Diverticulosis, Erosive gastritis, Esophageal stricture, Hyperlipidemia, Hypertension, Internal hemorrhoid, LUISA on CPAP, Osteopenia determined by x-ray, Osteoporosis of vertebra, Pneumonia, Stress incontinence, female, and Vertigo. They present to the hospital with Fall and are admitted for UTI (urinary tract infection). Patient initially presented to the emergency department on February 19, 2024 with a chief complaint of altered mental status secondary to urinary tract infection.  She has not had symptoms for approximately 2 days prior to presentation occluding hallucinations and confusion.  She was started on antibiotics prior to presentation.  She had a fall from her chair on February 19 and then was sent to the emergency department.  She was then admitted to [facility] for further care. Palliative care was consulted for Goals of Care.    Symptoms:  Pain: Unable to report pain.  Shortness of breath: They are unable to report if they are having any shortness of breath, difficulty breathing or cough.  Sleep: They are not having issues with

## 2024-02-26 NOTE — PALLIATIVE CARE
Follow Up / Progress Note        Patient:   Juhi Diane  YOB: 1940  Age:  84 y.o.  Room:  [address] [place] [address]  MRN:  [id number]         Family/Patient Discussion:  Received patient's medical POA paperwork via email from patient's daughter, Sumi.  Per POA paperwork, patient's son, Morris Diane is the patient's medical POA- he lives in [place] and an updated phone number is [phone].  The agent listed as first alternate (Johnny) has passed away.  The agent listed as second alternate is Sumi Cheikhmaik.  Updated the family that this paperwork has been received.      Plan/Follow-Up:  Copy of medical POA placed in patient's paper chart and copy faxed to medical records.  Healthcare POA updated in demographics of patient chart.         Electronically signed by Shila Orta RN on 2/26/2024 at 3:29 PM             Palliative Care Office: [phone]

## 2024-02-26 NOTE — CARE COORDINATION
2/26/24, 12:10 PM EST    DISCHARGE PLANNING EVALUATION    SW was advised that pt and daughter, Sumi would like referral to [facility] in [place], [place].  SW completed referral with admission, sent requested clinicals.  Await their decion.  Sumi stated she has been in touch with [facility] regarding discharge POC.  Pt is going to continue to pay for AL.

## 2024-02-26 NOTE — DISCHARGE INSTR - COC
Electronically signed by Gem Horner RN on 2/26/24 at 2:19 PM EST    CASE MANAGEMENT/SOCIAL WORK SECTION    Inpatient Status Date: 2/19/24    Readmission Risk Assessment Score:  Readmission Risk              Risk of Unplanned Readmission:  13           Discharging to Facility/ Agency   Name: [facility]  Address:[place], [place]  Phone:[phone]  Fax:[phone]    Dialysis Facility (if applicable)   Name:  Address:  Dialysis Schedule:  Phone:  Fax:    / signature: Electronically signed by MINA Maurice on 2/26/24 at 12:41 PM EST    PHYSICIAN SECTION    Prognosis: Fair    Condition at Discharge: Stable    Rehab Potential (if transferring to Rehab): Fair    Recommended Labs or Other Treatments After Discharge: BMP in 1 week. If CrCl less than 40- consider stopping macrobid.   Daily weights. If greater than 3 lb weight gain in 24 hours or 5 lb weight gain in 1 week, take lasix daily until back to dry weight.       Physician Certification: I certify the above information and transfer of Juhi Diane  is necessary for the continuing treatment of the diagnosis listed and that she requires Skilled Nursing Facility for greater 30 days.     Update Admission H&P: No change in H&P    PHYSICIAN SIGNATURE:  Electronically signed by Celine Agee PA-C on 2/26/24 at 1:00 PM EST

## 2024-02-26 NOTE — PLAN OF CARE
Problem: Discharge Planning  Goal: Discharge to home or other facility with appropriate resources  2/26/2024 1700 by Gem Horner RN  Outcome: Completed  2/26/2024 0823 by Felicity Barrera RN  Outcome: Progressing  Flowsheets (Taken 2/26/2024 0600)  Discharge to home or other facility with appropriate resources:   Identify barriers to discharge with patient and caregiver   Arrange for needed discharge resources and transportation as appropriate   Identify discharge learning needs (meds, wound care, etc)   Refer to discharge planning if patient needs post-hospital services based on physician order or complex needs related to functional status, cognitive ability or social support system     Problem: Skin/Tissue Integrity  Goal: Absence of new skin breakdown  Description: 1.  Monitor for areas of redness and/or skin breakdown  2.  Assess vascular access sites hourly  3.  Every 4-6 hours minimum:  Change oxygen saturation probe site  4.  Every 4-6 hours:  If on nasal continuous positive airway pressure, respiratory therapy assess nares and determine need for appliance change or resting period.  Outcome: Completed     Problem: ABCDS Injury Assessment  Goal: Absence of physical injury  2/26/2024 1700 by Gem Horner RN  Outcome: Completed  2/26/2024 0823 by Felicity Barrera RN  Outcome: Progressing  Flowsheets (Taken 2/26/2024 0600)  Absence of Physical Injury: Implement safety measures based on patient assessment     Problem: Safety - Adult  Goal: Free from fall injury  2/26/2024 1700 by Gem Horner RN  Outcome: Completed  2/26/2024 0823 by Felicity Barrera RN  Outcome: Progressing  Flowsheets (Taken 2/26/2024 0600)  Free From Fall Injury:   Instruct family/caregiver on patient safety   Based on caregiver fall risk screen, instruct family/caregiver to ask for assistance with transferring infant if caregiver noted to have fall risk factors     Problem: Chronic Conditions and Co-morbidities  Goal: Patient's chronic

## 2024-02-26 NOTE — PLAN OF CARE
Problem: Discharge Planning  Goal: Discharge to home or other facility with appropriate resources  Outcome: Progressing  Flowsheets (Taken 2/26/2024 0600)  Discharge to home or other facility with appropriate resources:   Identify barriers to discharge with patient and caregiver   Arrange for needed discharge resources and transportation as appropriate   Identify discharge learning needs (meds, wound care, etc)   Refer to discharge planning if patient needs post-hospital services based on physician order or complex needs related to functional status, cognitive ability or social support system     Problem: ABCDS Injury Assessment  Goal: Absence of physical injury  Outcome: Progressing  Flowsheets (Taken 2/26/2024 0600)  Absence of Physical Injury: Implement safety measures based on patient assessment     Problem: Safety - Adult  Goal: Free from fall injury  Outcome: Progressing  Flowsheets (Taken 2/26/2024 0600)  Free From Fall Injury:   Instruct family/caregiver on patient safety   Based on caregiver fall risk screen, instruct family/caregiver to ask for assistance with transferring infant if caregiver noted to have fall risk factors     Problem: Chronic Conditions and Co-morbidities  Goal: Patient's chronic conditions and co-morbidity symptoms are monitored and maintained or improved  Outcome: Progressing  Flowsheets (Taken 2/26/2024 0600)  Care Plan - Patient's Chronic Conditions and Co-Morbidity Symptoms are Monitored and Maintained or Improved:   Monitor and assess patient's chronic conditions and comorbid symptoms for stability, deterioration, or improvement   Collaborate with multidisciplinary team to address chronic and comorbid conditions and prevent exacerbation or deterioration   Update acute care plan with appropriate goals if chronic or comorbid symptoms are exacerbated and prevent overall improvement and discharge     Problem: Genitourinary - Adult  Goal: Absence of urinary retention  Outcome:

## 2024-02-26 NOTE — PROGRESS NOTES
Hospitalist Progress Note    Patient:  Juhi Diane      Unit/Bed:6K-15/015-A    YOB: 1940    MRN: [id number]       Acct: [id number]     PCP: Chilango Caceres DO    Date of Admission: 2/19/2024    Assessment/Plan:    Urinary tract infection- resolved  Hx of frequent UTI.  Follows with urology outpatient.    Diagnosed on 2/18 and received 2 doses of Macrobid.    Urine culture from 2/18 shows E. Coli sensitive to rocephin -completed three doses of rocephin   Blood culture negative   Lengthy discussion with family at bedside. Previously was on Hiprex for UTI prophylaxsis but had to be DC due to reduced GFR.    Start low dose 50mg macrobid on DC- reviewed risks and benefits with this therapy.  Could possible cause kidney injury and would recommend for Cr monitoring.  Family is in agreement to trial macrobid as the burden on patient's quality of life with frequent UTIs is great.  Would DC if CrCl drops below 40ml/min  Follows with urology OP     Acute Metabolic encephalopathy -  improving  Secondary to UTI and possibly delirium.  Daughter reports symptoms began 2/17.  This is common behavior with UTI. CT head (-) for acute process.   SLP eval  due to concerns for dysphagia- pending    Fall precautions.  Encourage proper sleep wake cycle.  2/22- had to be given seroquel  more somnolent following AM     Mild dysphagia  Follow up with GI on DC for history of esophageal dilation  SLP completed MBS- okay for easy to chew and thin liquids     Goals of Care  Palliative met with family and code status changed to DNR CCA     Fall:   Patient states she was sitting in her chair when she fell asleep, fell forward and hit her face on the ground.    Head CT, CT A/P,  CT cervical spine and facial bones no acute findings.   Echo unremarkable  Obtain orthostatics if able.  Patient has been confused and impulsive.     ELVIA on CKD: Resolved.   Baseline creatinine around 1.0 Admitted with creatinine 
              Hospitalist Progress Note    Patient:  Juhi Diane      Unit/Bed:6K-15/015-A    YOB: 1940    MRN: [id number]       Acct: [id number]     PCP: Chilango Caceres DO    Date of Admission: 2/19/2024    Assessment/Plan:    Urinary tract infection:   Hx of frequent UTI.  Follows with urology outpatient.    Diagnosed on 2/18 and received 2 doses of Macrobid.    Urine culture from 2/18 shows E. Coli sensitive to rocephin - continue   Blood culture negative      Acute Metabolic encephalopathy   Secondary to UTI and possibly delirium.  Daughter reports symptoms began 2/17.  This is common behavior with UTI.  Reportedly pulled out IV overnight and was trying to leave.  CT head (-) for acute process.   SLP eval  due to concerns for dysphagia Fall precautions.  Encourage proper sleep wake cycle.  2/22- had to be given seroquel last night- more somnolent this AM     Goals of Care  Palliative met with family and code status changed to DNR CCA     Fall:   Patient states she was sitting in her chair when she fell asleep, fell forward and hit her face on the ground.    Head CT, CT A/P,  CT cervical spine and facial bones no acute findings.   Echo unremarkable  Obtain orthostatics if able.  Patient has been confused and impulsive.     ELVIA on CKD: Resolved.   Baseline creatinine around 1.0 Admitted with creatinine 1.5.  Back to 1.1  Continue IVF hydration once IV access reestablished (patient removed overnight).  Daily BMP.      Elevated troponin:   Likely secondary to demand and CKD.  Troponin with flat trend. EKG (-) for acute ischemic changes.  No chest pain.    Bilateral lower extremity pain:   Physical exam reveals pain to palpation over patient's calves.  No swelling.   Venous doppler (-) for DVT    HLD:   Continue Statin     NICM: s/p AICD placement.   Lasix held due to ELVIA- resume tomorrow if stable . Continue entresto   Continue home Imdur.  Continue ASA    LUISA: CPAP    Hx of 
    Pharmacist Review and Automatic Dose Adjustment of Prophylactic Enoxaparin    Reviewed reason for admission/hospital problem list    The reviewing pharmacist has made an adjustment to the ordered enoxaparin dose or converted to UFH per the approved [facility] protocol and table as identified below.      Recent Labs     02/19/24  1454 02/20/24  0519   CREATININE 1.5* 1.3*     Estimated Creatinine Clearance: 34 mL/min (A) (based on SCr of 1.3 mg/dL (H)).    Recent Labs     02/19/24  1454 02/20/24  0519   HGB 13.7 13.5   HCT 40.6 40.5    164     No results for input(s): \"INR\" in the last 72 hours.    Height:   Ht Readings from Last 1 Encounters:   02/19/24 1.613 m (5' 3.5\")     Weight:  Wt Readings from Last 1 Encounters:   02/19/24 88.7 kg (195 lb 8.8 oz)         Plan: Based upon the patient's weight and renal function, enoxaparin 30 mg subq daily will be changed/converted to enoxaparin 40 mg subq daily.     Thank you,  Ene Cortés, PharmD, BCPS, BCCCP  2/20/2024 12:00 PM      
  Physician Progress Note      PATIENT:               ERMELINDA BERMAN  CSN #:                  [id number]  :                       1940  ADMIT DATE:       2024 12:23 PM  DISCH DATE:  RESPONDING  PROVIDER #:        Celine Agee PA-C          QUERY TEXT:    Pt admitted with UTI and has AMS documented. If possible, please document in   progress notes and discharge summary further specificity regarding the type of   encephalopathy:    The medical record reflects the following:  Risk Factors: F 84, UTI    Clinical Indicators:  In IM PN Noted ?Altered mental status: Secondary to   UTI?.  This is common behavior with UTI.  Reportedly pulled out IV overnight   and was   neurolotrying to leave.  CT head (-) for acute process. Fall   precautions. Sitter at bedside. Patient has had periods of hallucinations and   confusion.    Mental Status: She is alert and oriented to person, place, and time    Treatment: IVPB Rocephin, IV Fluids, Fall precautions.    Thank You!  Annette Arnold RN, CRCR  RN Clinical Documentation Integrity  Options provided:  -- Metabolic encephalopathy  -- Other - I will add my own diagnosis  -- Disagree - Not applicable / Not valid  -- Disagree - Clinically unable to determine / Unknown  -- Refer to Clinical Documentation Reviewer    PROVIDER RESPONSE TEXT:    This patient has metabolic encephalopathy.    Query created by: Annette Arnold on 2024 10:54 AM      Electronically signed by:  Celine Agee PA-C 2024 11:34 AM          
 [facility]  INPATIENT PHYSICAL THERAPY  DAILY NOTE  STRZ RENAL TELEMETRY 6K - 6K-15/015-A    Time In: 1418  Time Out: 1436  Timed Code Treatment Minutes: 18 Minutes  Minutes: 18          Date: 2024  Patient Name: Juhi Diane,  Gender:  female        MRN: [id number]  : 1940  (84 y.o.)     Referring Practitioner: Rupinder Zaidi APRN - CNP  Diagnosis: UTI (urinary tract infection)  Additional Pertinent Hx: 84 y.o. female with PMHx of urinary tract infections, CAD, diverticulosis, hyperlipidemia, hypertension, vertigo who presents to [facility] with altered mental status secondary to UTI.  History of was obtained via patient and her daughter at bedside, patient's daughter reports that patient has acted altered since 2024 reporting that her mother has had periods of hallucinations and confusion.  Patient was evaluated in the emergency department yesterday 24 where she was discovered to have positive urine suggestive of urinary tract infection, culture positive for gram-negative bacilli.  Patient was given 2 doses of Macrobid prior to presenting to the emergency department today.  Patient presented to the emergency department again today 2024 after having a fall from her chair, patient is ANO x 4 states that she fell asleep in her chair and fell forward landing on her face CT imaging negative for any acute abnormalities.     Prior Level of Function:  Lives With: Alone  Type of Home: Assisted living ([facility])  Home Layout: One level  Home Access: Level entry  Home Equipment: Walker, rolling   Bathroom Shower/Tub: Walk-in shower  Bathroom Toilet: Standard  Bathroom Equipment: Grab bars in shower, Grab bars around toilet  Bathroom Accessibility: Accessible    Receives Help From: Other (comment) (staff at assisted living)  ADL Assistance: Independent  Homemaking Assistance: Needs assistance  Homemaking Responsibilities: No  Ambulation 
 [facility]  INPATIENT PHYSICAL THERAPY  DAILY NOTE  STRZ RENAL TELEMETRY 6K - 6K-15/015-A    Time In: 1017  Time Out: 1043  Timed Code Treatment Minutes: 26 Minutes  Minutes: 26          Date: 2024  Patient Name: Juhi Diane,  Gender:  female        MRN: [id number]  : 1940  (84 y.o.)     Referring Practitioner: Rupinder Zaidi APRN - CNP  Diagnosis: UTI (urinary tract infection)  Additional Pertinent Hx: 84 y.o. female with PMHx of urinary tract infections, CAD, diverticulosis, hyperlipidemia, hypertension, vertigo who presents to [facility] with altered mental status secondary to UTI.  History of was obtained via patient and her daughter at bedside, patient's daughter reports that patient has acted altered since 2024 reporting that her mother has had periods of hallucinations and confusion.  Patient was evaluated in the emergency department yesterday 24 where she was discovered to have positive urine suggestive of urinary tract infection, culture positive for gram-negative bacilli.  Patient was given 2 doses of Macrobid prior to presenting to the emergency department today.  Patient presented to the emergency department again today 2024 after having a fall from her chair, patient is ANO x 4 states that she fell asleep in her chair and fell forward landing on her face CT imaging negative for any acute abnormalities.     Prior Level of Function:  Lives With: Alone  Type of Home: Assisted living ([facility])  Home Layout: One level  Home Access: Level entry  Home Equipment: Walker, rolling   Bathroom Shower/Tub: Walk-in shower  Bathroom Toilet: Standard  Bathroom Equipment: Grab bars in shower, Grab bars around toilet  Bathroom Accessibility: Accessible    Receives Help From: Other (comment) (staff at assisted living)  ADL Assistance: Independent  Homemaking Assistance: Needs assistance  Homemaking Responsibilities: No  Ambulation 
1513- Patient has only voided on this shift this AM. Patient is taking in plenty of PO fluids. Requested an order for a bladder scan, as patient is dry and no urge to go.     1619- Bladder scan was 205 and patient was able to void several minutes after.     
[facility]  SPEECH THERAPY MISSED TREATMENT NOTE  STRZ RENAL TELEMETRY 6K      Date: 2024  Patient Name: Juhi Diane        MRN: [id number]    : 1940  (84 y.o.)    REASON FOR MISSED TREATMENT:  Attempted to see patient at 1045 for completion of CSE/cog eval. LIZ Long reports patient is not appropriate for interventions this date given high levels of confusion and decreased alertness. Will follow up as patient is available/appropriate.     Fior Cruz MA CFY-SLP [id number]            
[facility]  OCCUPATIONAL THERAPY MISSED TREATMENT NOTE  STRZ RENAL TELEMETRY 6K  6K-15/015-A      Date: 2024  Patient Name: Juhi Diane        CSN: [id number]   : 1940  (84 y.o.)  Gender: female   Referring Practitioner: SHANON Tapia  Diagnosis: Metabolic Encephalopathy         REASON FOR MISSED TREATMENT: Hold Treatment per Nursing Patient has been up for 48 hours, is confused, and extremely tired. Will attempt back as time allows.                
Message to provider: Patient BP is 98/43 manual. This AM previous was 112/53. Encouraging patient to keep her drinking plenty of fluids and she is not as fond of water. Asked about possibly ordering a supplement. Provider okayed the supplement. Clear oral supplement ordered.   
Patient remains without iv access. Multiple attempts made by daytime resource nurse and also nighttime resource nurse. Nighttime resource nurse has reached for assistance to ER staff and they advise they will attempt as time allows. APRN additionally notified. Family member voices agitation regarding same.   
Report called to receiving facility. Paperwork was faxed and also sent with family.   
Oriented to time, Oriented to person, Disoriented to situation, Disoriented to place  Vision: Within Functional Limits  Hearing: Within functional limits       Pain: 0/10: denies pain     Vitals: Vitals not assessed per clinical judgement, see nursing flowsheet    Social/Functional History:    Lives With: Other (comment) (AL)  Type of Home: Assisted living  Home Layout: One level  Home Access: Level entry  Home Equipment: Walker, rolling          Receives Help From: Other (comment), Family ([facility] Staff)        Homemaking Responsibilities: No  Ambulation Assistance: Independent  Transfer Assistance: Independent    Active : No  Mode of Transportation: Car  Occupation: Retired  Additional Comments: ind with RW    OBJECTIVE:  Range of Motion:  Bilateral Lower Extremity: WFL    Strength:  Bilateral Lower Extremity: Impaired - deconditioned grossly     Balance:  Static Sitting Balance:  Contact Guard Assistance  Static Standing Balance: Minimal Assistance, X 1, with RW     Bed Mobility:  Supine to Sit: Moderate Assistance, X 1, with head of bed raised, with rail    Transfers:  Sit to Stand: Minimal Assistance, X 1, with verbal cues, from raised EOB  Stand to Sit:Minimal Assistance, X 1, cues for hand placement, with verbal cues, to/from chair with arms, uncontrolled descent     Ambulation:  Minimal Assistance  Distance: 14'   Surface: Level Tile  Device:Rolling Walker  Gait Deviations:  Slow Lynn, Decreased Step Length Bilaterally, Decreased Gait Speed, and Decreased Foot Clearance  Verbal and tactile cues for sequencing and navigation of RW     Functional Outcome Measures: Completed  AM-PAC Inpatient Mobility without Stair Climbing Raw Score : 15  AM-PAC Inpatient without Stair Climbing T-Scale Score : 43.03  Modified [place] Scale:  Not Applicable    ASSESSMENT:  Activity Tolerance:  Patient tolerance of  treatment: good. Pt is extremely confused and requires maximal verbal cues for safety and sequencing and 
Access: Level entry  Home Equipment: Walker, rolling    SPEECH / VOICE:  Speech: frequent dysfluencies  Voice: Wet vocal quality    LANGUAGE:  Receptive:  1 Step Commands: 3/3  2 Step Commands: 1/3  Simple Yes/No Questions: 3/3  Complex Yes/No Questions: 3/3  Identify Objects/Pictures: 3/3    Expressive:  Sentence Completion (automatic): 3/3  Sentence Completion (open-ended): 3/3  Confrontational Naming: 3/3  Responsive Namin/3 indep, 1/3 min cue  Divergent Naming (abstract): 6, N=11  Conversational Speech: impaired; poor thought organization and word finding difficulties  Paraphasias: present  Anomia: present    COGNITION:  Burak Cognitive Assessment (MOCA) version 7.2 completed.  Patient scored an adjusted score 10/25.  Normal is greater than or equal to 21/25. Inclusion of +1 point given highest level of education achieved less than/equal to 12th grade or GED with limited-0 post-secondary schooling       Orientation: 2/6  Immediate Recall: 2/5 both trials  Short-Term Recall: 1/5 indep, 2/5 FO2   Divergent Namin, N=11  Reasonin/2  Sequencing: poor, lacks detail  Thought Organization: impaired  Insight: fair  Attention: 2/3  Math Computation: /3  Executive Functioning: could not complete given present deficits    SWALLOWING:    Respiratory Status: Room Air      Behavioral Observation: Alert and Confused    CRANIAL NERVE ASSESSMENT   CN V (Trigeminal) Closes and Opens Mandible WFL    Rotary Jaw Movement WFL      CN VII (Facial) Cheeks Hold Food out of Sulci WFL    Opens, Closes/Seals, Protrudes, Retracts Lips WFL    General Appearance WFL    Sensation WFL      CN X (Vagus - Pharyngeal) Raises Back of Tongue Not Tested      CN XI (Accessory) Lifts Soft Palate Not Tested      CN XII (Hypoglossal) Elevates Tongue Up and Back WFL    Protrusion   WFL    Lateralizes Tongue WFL    Sensation Not Tested      Other Observations Dentition WFL    Vocal Quality wet    Cough Not tested     PATIENT WAS EVALUATED 
tolerance of  treatment: good. Pt demonstrates impairments with strength, balance, endurance, activity tolerance, cognition/alertness, independence, requires hands on assistance for safety with mobility and would benefit from continued skilled PT to maximize independence, to prevent falls and ensure safety with mobility.       Equipment Recommendations:Equipment Needed: No  Discharge Recommendations: 24 hour assistance or supervision  Plan: Current Treatment Recommendations: Strengthening, Balance training, Functional mobility training, Transfer training, Endurance training, Gait training, Neuromuscular re-education, Home exercise program, Safety education & training, Therapeutic activities  General Plan:  (5x GM)    Education  Education:  Learners: Patient  Patient Education: Transfers, Gait, Verbal Exercise Instruction    Goals:  Patient Goals : none stated  Short Term Goals  Time Frame for Short Term Goals: by discharge  Short Term Goal 1: bed mobility with HOB flat, no rails, mod I for increased functional ind  Short Term Goal 2: sit<>stand from various surfaces with LRD mod I for safe transfers  Short Term Goal 3: ambulate 30' with LRD mod I for safe household distancse  Long Term Goals  Time Frame for Long Term Goals : NA d/t short ELOS    Following session, patient left in safe position with all fall risk precautions in place.    
social and functional history, completion of standardized testing, formal and informal observation of tasks, assessment of data and development of plan of care and goals.  Treatment time included skilled education and facilitation of tasks to increase safety and independence with ADL's for improved functional independence and quality of life.  Pt could describe her previous level of functioning while at the assisted living.  She was using a rolling walker and doing her own self care. She needed help to manage her medications.  Pt had difficulty recalling whether she had eaten lunch today and what she might have eaten.  She was oriented to February.   She stated her priority was to be strong enough to attend the wedding of her granddaughter in June.   Pt was reminded that this was 4 months away.  Discussed with pt and family member pt's thinking.  Pt appears to be \"in the fog\".  Pt acknowledged having more difficulty than usual with word finding. She also stated her family member was telling her that she has been hallucinating.  The effect on her body of fighting off infection and that this can especially effect her clarity of thinking was discussed.  Pt verbalized understanding.  Pt was encouraged to take it day by day.     Discharge Recommendations:  Continue to assess pending progress    Patient Education:        Patient Education  Education Given To: Patient  Education Provided: Role of Therapy;Plan of Care;Orientation  Education Provided Comments: Effect of stress of fighting off infection on her clarity of thought  Education Method: Verbal  Barriers to Learning: None  Education Outcome: Verbalized understanding    Equipment Recommendations:  Equipment Needed: No    Plan:  Times Per Week: 5x  Current Treatment Recommendations: Cognitive reorientation, Functional mobility training, Strengthening, Endurance training, Safety education & training, Self-Care / ADL  Additional Comments: Pt would benefit from continued 
biographics, 1-3 units) with 60% success and mod cues to assist with orientation during hospitalization.  Goal 3: Patient will complete basic reasoning, problem solving and executive functioning tasks (basic hospital/ home safety, planning, basic finances) with 60% success and mod cues to assist with safety during hospitalization.  Goal 4: Patient will complete selective and divided attention tasks with no more than 3 redirects in 5 minutes to assist with potential safe return to ADL completetion.  Goal 5: Patient will complete basic thought organization and sequencing tasks with 60% success and mod cues to assist with mental flexibilty.  Goal 6: Patient will complete basic expressive language tasks (phrase repetition, divergent naming, verbal fluency) with 60% success and mod cues to assist with communication of wants/needs.      LONG TERM GOALS:  No long term goals established due to short ELOS.     Jeannette Middleton M.A. CCC-SLP         
Liver/gallbladder/bilary tree: The gallbladder is absent. Mild extrahepatic biliary ductal prominence is stable and likely postsurgical. Hepatic steatosis is unchanged. Pancreas: Remains atrophic. Spleen : Normal. Adrenal glands: Stable bilateral thickening/nodularity. Kidneys/ ureters/ bladder: Bilateral renal cortical thinning is stable. No renal calculus, hydronephrosis, or hydroureter is observed. The urinary bladder is unremarkable. Gastrointestinal:  Colonic diverticulosis without diverticulitis is visualized. Mild residual fecal material seen throughout the colon. No bowel obstruction, fluid collection, or free air is observed. No secondary signs of acute appendicitis are visualized. Retroperitoneum / lymph nodes: The aorta remains mildly ectatic. The infrarenal abdominal aorta measures up to 28 mm, unchanged. No lymphadenopathy is observed. Pelvis: A 15 mm cyst in the left adnexa is stable (series 302, image 68). Musculoskeletal: A severe T12 compression fracture deformity is stable. Mild to moderate L3 compression fracture deformity is unchanged. Diffuse osteopenia and multilevel degenerative disc disease is observed. Postoperative changes of left hip fixation appear intact. Chronic arthritic changes are noted in both hips.     1. No acute findings are noted within the abdomen/pelvis. No solid organ injury visualized. No acute fracture observed. Please see the additional CT examinations performed the same day and dictated separately for further assessment. 2. Colonic diverticulosis without diverticulitis. Multiple chronic findings are discussed. **This report has been created using voice recognition software.  It may contain minor errors which are inherent in voice recognition technology.** Final report electronically signed by Dr Thais Robins on 2/19/2024 4:21 PM    CT HEAD WO CONTRAST    Result Date: 2/19/2024  PROCEDURE: CT HEAD WO CONTRAST CLINICAL INFORMATION: fall. COMPARISON: CT scan of the brain 
are present.     No fracture or dislocation. Final report electronically signed by Dr. Shahid Cali on 2/19/2024 2:31 PM    XR KNEE RIGHT (MIN 4 VIEWS)    Result Date: 2/19/2024  PROCEDURE: XR KNEE RIGHT (MIN 4 VIEWS) CLINICAL INFORMATION: Pain, fall TECHNIQUE: 4 views of the right knee COMPARISON: None FINDINGS: There is no fracture or dislocation. Mild joint space narrowing is present at the medial compartment of the tibial femoral joint and at the patellofemoral joint. There is a chronic soft tissue calcification inferior to the patella. Atherosclerotic vascular calcifications are present. Bones are osteopenic.     No fracture or dislocation. Final report electronically signed by Dr. Shahid Cali on 2/19/2024 2:29 PM    XR ANKLE LEFT (MIN 3 VIEWS)    Result Date: 2/19/2024  PROCEDURE: XR ANKLE LEFT (MIN 3 VIEWS) CLINICAL INFORMATION: Pain, fall TECHNIQUE: 3 views of the left ankle COMPARISON: None FINDINGS: There is no fracture or dislocation. Joint spaces are preserved. There are osteophytes at the plantar and posterior surfaces of the calcaneus. Bones are osteopenic. Atherosclerotic vascular calcifications are present. There is diffuse soft tissue swelling at the ankle.     No fracture or dislocation. Final report electronically signed by Dr. Shahid Cali on 2/19/2024 2:27 PM    XR CHEST PORTABLE    Result Date: 2/19/2024  PROCEDURE: XR CHEST PORTABLE CLINICAL INFORMATION: Fall TECHNIQUE: Mobile AP chest radiograph. COMPARISON: AP supine chest radiograph 10/20/2023 FINDINGS: A left-sided cardiac device is in stable position. Cardiac silhouette is at the upper limits of normal. Atherosclerotic calcifications are present in the thoracic aorta. Lung volumes are low. There are no lung consolidations. Degenerative changes in the thoracic spine are poorly visualized. There is a partially visualized left shoulder prosthesis. Surgical clips are present in the right axilla and right breast.     No acute 
been created using voice recognition software. It may contain minor errors which are inherent in voice recognition technology.** Final report electronically signed by Dr. Shahid Cali on 2/19/2024 2:26 PM      DVT prophylaxis: [x] Lovenox                                 [] SCDs                                 [] SQ Heparin                                 [] Encourage ambulation           [] Already on Anticoagulation     Code Status: DNR-CCA    PT/OT Eval Status: ordered    Tele:   [x] yes             [] no    Active Hospital Problems    Diagnosis Date Noted    UTI (urinary tract infection) [N39.0] 02/19/2024       Electronically signed by Celine Agee PA-C on 2/25/2024 at 10:02 AM

## 2024-02-26 NOTE — CARE COORDINATION
Pt verbalized understanding and gave permission for possible discharge within 4 hours of receiving IMM.

## 2024-02-27 NOTE — DISCHARGE SUMMARY
Hospital Medicine Discharge Summary      Patient Identification:   Juhi Diane   : 1940  MRN: [id number]   Account: [id number]      Patient's PCP: Chilango Caceres DO    Admit Date: 2024     Discharge Date: 2024      Admitting Physician: No admitting provider for patient encounter.     Discharging Physician Assistant: Celine Agee PA-C     Discharge Diagnoses with Assessment/Plan:    Urinary tract infection- resolved  Hx of frequent UTI.  Follows with urology outpatient.    Diagnosed on  and received 2 doses of Macrobid.    Urine culture from  shows E. Coli sensitive to rocephin -completed three doses of rocephin   Blood culture negative   Lengthy discussion with family at bedside. Previously was on Hiprex for UTI prophylaxsis but had to be DC due to reduced GFR.    Start low dose 50mg macrobid on DC- reviewed risks and benefits with this therapy.  Could possible cause kidney injury and would recommend for Cr monitoring.  Family is in agreement to trial macrobid as the burden on patient's quality of life with frequent UTIs is great.  Would DC if CrCl drops below 40ml/min. Follow up BMP ordered  Follows with urology OP     Acute Metabolic encephalopathy -  improving  Secondary to UTI and possibly delirium.  Daughter reports symptoms began .  This is common behavior with UTI.   CT head (-) for acute process.    Fall precautions.  Encourage proper sleep wake cycle.       Mild dysphagia  Follow up with GI on DC for history of esophageal dilation  SLP completed MBS- okay for easy to chew and thin liquids      Goals of Care  Palliative met with family and code status changed to DNR CCA      Fall:   Patient states she was sitting in her chair when she fell asleep, fell forward and hit her face on the ground.    Head CT, CT A/P,  CT cervical spine and facial bones no acute findings.   Echo unremarkable  PT/OT- DC to SNF        ELVIA on CKD: Resolved.   Baseline creatinine

## 2024-03-01 ENCOUNTER — PROCEDURE VISIT (OUTPATIENT)
Dept: CARDIOLOGY CLINIC | Age: 84
End: 2024-03-01

## 2024-03-01 DIAGNOSIS — Z95.810 ICD (IMPLANTABLE CARDIOVERTER-DEFIBRILLATOR) IN PLACE: Primary | ICD-10-CM

## 2024-03-01 NOTE — PROGRESS NOTES
DR HUDSON PT   [facility] ICD REMOTE      KNOWN NOISE ON ATRIAL LEAD  NO TRUE AFIB SEEN  NO DX OF AFIB/ NO OACS  AMS EPISODES X 3541  MODE SWITCH <1%  AFIB BURDEN <1%    P WAVES 1  RV WAVES 3.5    ATRIAL IMPEDENCE 300  RV IMPEDENCE 340  LV IMPEDENCE 390    HV IMPEDENCE 39    ATRIAL THRESHOLD 0.5 @ 0.5  RV THRESHOLD 0.5 @ 0.4  LV THRESHOLD NOT MEASURED PER THE DEVICE     ATRIAL AND VENT AMPLITUDES AUTO

## 2024-03-03 DIAGNOSIS — K21.9 GASTROESOPHAGEAL REFLUX DISEASE, UNSPECIFIED WHETHER ESOPHAGITIS PRESENT: ICD-10-CM

## 2024-03-04 RX ORDER — FAMOTIDINE 20 MG/1
20 TABLET, FILM COATED ORAL 2 TIMES DAILY
Qty: 60 TABLET | Refills: 2 | Status: SHIPPED | OUTPATIENT
Start: 2024-03-04

## 2024-03-07 ENCOUNTER — TELEPHONE (OUTPATIENT)
Dept: PALLATIVE CARE | Age: 84
End: 2024-03-07

## 2024-03-07 NOTE — TELEPHONE ENCOUNTER
Pt's daughter Sumi called to give Dr Wade an update on the patient.     Sumi states that the patient is now living at the [facility] in [place], [place]. Daughter states that the patient has declined since Dr Wade saw her in the hospital. Daughter states that the pt is eating very little, drinking about 2oz of water a day, she is up all night wandering around, sleeping all day, more confused than normal and the confusion is off and on, Pt is forgetting family members names. Daughter thinks Pt has Dementia that has not been diagnosed.     Pt has a virtual visit scheduled on 4/2/24 with Dr Wade.

## 2024-03-08 NOTE — TELEPHONE ENCOUNTER
Spoke with Pt's Daughter Sumi. Sumi states that the pt had a good night last night, she got lots of rest and is some what back to her normal self, still little confused. This nurse encouraged Sumi to give the office a call if Dr Wade and staff can be of any assistance. Sumi verbalized understanding.

## 2024-03-12 ENCOUNTER — TELEPHONE (OUTPATIENT)
Dept: PALLATIVE CARE | Age: 84
End: 2024-03-12

## 2024-03-12 NOTE — TELEPHONE ENCOUNTER
Pt's daughter Sumi called to give Dr Wade an update on the patient. Sumi states that the patient tried to get up on her own at the nursing home yesterday and fell. Today the nursing home did x-rays, Pt has a broken right leg just above the ankle. Pt is being transported to [facility] to determine if it is operable or not.

## 2024-03-14 NOTE — TELEPHONE ENCOUNTER
Pt's daughter Sumi called states that the patient is needed to have surgery on her right leg to have a raman placed. The surgery is scheduled for Saturday at [facility] in [place], [place] to be done by Dr Gautam Wynne. Sumi was informed by Dr Wynne that the surgery will suspend the patients DNRCCA until after the surgery. Sumi also states that the patient has a really bad UTI that has the patient even more confused than normal. Patient will be started on Antibiotics today at the nursing home. Daughter voiced concern about the surgery suspending the DNRCCA. Encouraged Daughter to talk it over with the surgeon Dr Wynne prior to surgery. Sumi states that she already has a call out to Dr Wynne to talk everything over with her.

## 2024-03-20 PROBLEM — N39.0 UTI (URINARY TRACT INFECTION): Status: RESOLVED | Noted: 2024-01-01 | Resolved: 2024-01-01

## 2024-10-21 NOTE — PROGRESS NOTES
NPO after midnight  Bring drivers license and insurance information  Wear comfortable clean clothes  Shower morning of and night before with liquid antibacterial soap  Remove jewelry   May have to stay overnight if have PTCA/stent  Bring medications in original bottles  Made aware of visitors limit to 1 at a time  Follow all instructions given by your physician   needed at discharge headache and chest pain

## 2025-01-29 NOTE — PROGRESS NOTES
In [address] ---  has [employer] at home  Patient of [facility] [employer], [address], here for [employer] Dr Carmelina Prater in to review optimization EKGs and changes and assess site. Ventricular pacing changed to RV to LV 65 Ms. Generator changed 8/5/22. Steri strips removed, site showing shadow of bruising, no drainage, tender to touch. Site cleaned and new steri strips applied. Leave on for a total of 14 days. Okay to shower.      If, at any point, there is any increased redness, swelling, increased discomfort, fever, or the incision opens up, the patient is aware to go to the emergency room      Battery 3 years    Presenting rhythm AS BP  Underlying AS VS    A Impedance 360  RV Impedance 380  LV Impedance 440    Shock 41    P wave sensing 0.6  R wave sensing 3.1    A Threshold 0.375 @ 0.5  RV Thresholds 0.625 @ 0.5  LV Thresholds 4.75 @ 1.0    A Paced 24%  V Paced 88%    Programmed Mode DDDR     Afib [place] 0%    Episodes :   none Spoke to pharmacist. She stated directions need to read change every 72 hours per manufactory directions. Gave verbal approved over phone.

## (undated) DEVICE — IMPREGNATED GAUZE DRESSING: Brand: CUTICERIN 7.5X20CM CTN 50

## (undated) DEVICE — GLOVE ORANGE PI 8 1/2   MSG9085

## (undated) DEVICE — GLOVE ORANGE PI 7   MSG9070

## (undated) DEVICE — TUBING, SUCTION, 1/4" X 20', STRAIGHT: Brand: MEDLINE INDUSTRIES, INC.

## (undated) DEVICE — SET LNR RED GRN W/ BASE CLEANASCOPE

## (undated) DEVICE — SUTURE ETHLN SZ 3-0 L18IN NONABSORBABLE BLK FS-1 L24MM 3/8 663H

## (undated) DEVICE — GOWN,SIRUS,NON REINFRCD,LARGE,SET IN SL: Brand: MEDLINE

## (undated) DEVICE — 2.7MM SHORT DRILL BIT W/QUICK CONNECT: Brand: PERI-LOC

## (undated) DEVICE — Device

## (undated) DEVICE — YANKAUER,BULB TIP,W/O VENT,RIGID,STERILE: Brand: MEDLINE

## (undated) DEVICE — GLOVE SURG SZ 65 THK91MIL LTX FREE SYN POLYISOPRENE

## (undated) DEVICE — COVER ARMBRD W13XL28.5IN IMPERV BLU FOR OP RM

## (undated) DEVICE — 3M™ TEGADERM™ TRANSPARENT FILM DRESSING FRAME STYLE, 1627, 4 IN X 10 IN (10 CM X 25 CM), 20/CT 4CT/CASE: Brand: 3M™ TEGADERM™

## (undated) DEVICE — PADDING CAST W6INXL4YD COT LO LINTING WYTEX

## (undated) DEVICE — PAD,ABDOMINAL,5"X9",ST,LF,25/BX: Brand: MEDLINE INDUSTRIES, INC.

## (undated) DEVICE — DRAPE C ARM W36XL30IN RECTANG BND BG AND TAPE

## (undated) DEVICE — BUR TPS BALL 6.0

## (undated) DEVICE — SOLUTION IV IRRIG POUR BRL 0.9% SODIUM CHL 2F7124

## (undated) DEVICE — LINER SUCT CANSTR 1500CC SEMI RIG W/ POR HYDROPHOBIC SHUT

## (undated) DEVICE — 3M™ MICROFOAM™ TAPE 1528-4: Brand: 3M™ MICROFOAM™

## (undated) DEVICE — PREP SOL PVP IODINE 4%  4 OZ/BTL

## (undated) DEVICE — PACK PROCEDURE SURG ORTH BASIC SRHP LF

## (undated) DEVICE — SOLUTION IV 1000ML 0.9% SOD CHL PH 5 INJ USP VIAFLX PLAS

## (undated) DEVICE — SPONGE LAP W18XL18IN WHT COT 4 PLY FLD STRUNG RADPQ DISP ST

## (undated) DEVICE — IV START KIT: Brand: MEDLINE INDUSTRIES, INC.

## (undated) DEVICE — GLOVE ORANGE PI 8   MSG9080

## (undated) DEVICE — SYRINGE IRRIG 60ML SFT PLIABLE BLB EZ TO GRP 1 HND USE W/

## (undated) DEVICE — GUIDE PIN 3.2MM X 343MM: Brand: TRIGEN

## (undated) DEVICE — DEVICE CLOSURE SKIN SURG

## (undated) DEVICE — SPONGE GZ W4XL4IN COT 12 PLY TYP VII WVN C FLD DSGN

## (undated) DEVICE — ENDO KIT: Brand: MEDLINE INDUSTRIES, INC.

## (undated) DEVICE — 3M™ TEGADERM™ TRANSPARENT FILM DRESSING FRAME STYLE, 1626W, 4 IN X 4-3/4 IN (10 CM X 12 CM), 50/CT 4CT/CASE: Brand: 3M™ TEGADERM™

## (undated) DEVICE — 3M™ BAIR HUGGER® MULTI ACCESS BLANKET, PEDIATRIC, FULL BODY, 10 PER CASE 31000: Brand: BAIR HUGGER™

## (undated) DEVICE — TRIGEN LOW PROFILE SCREW 5.0MM X 35MM
Type: IMPLANTABLE DEVICE | Site: HIP | Status: NON-FUNCTIONAL
Brand: TRIGEN
Removed: 2019-08-20

## (undated) DEVICE — SYSTEM SKIN CLSR 22CM DERMBND PRINEO

## (undated) DEVICE — Z DISCONTINUED USE 2717541 SUTURE STRATAFIX SZ 3-0 L30CM NONABSORBABLE UD L26MM FS 3/8

## (undated) DEVICE — LOOP VES W25MM THK1MM MAXI RED SIL FLD REPELLENT 100 PER

## (undated) DEVICE — SUTURE ETHBND SZ 1 L30IN NONABSORBABLE GRN OS-6 L36MM 1/2 X538H

## (undated) DEVICE — GARMENT COMPR STD FOR 17IN CALF UNIF THER FLOTRN

## (undated) DEVICE — BLADE LARYNSCP SZ 3 ENH DIR INTUB GLIDESCOPE MCGRATH MAC

## (undated) DEVICE — 4.0MM SHORT PILOT DRILL WITH AO CONNECTOR: Brand: TRIGEN

## (undated) DEVICE — CONMED SCOPE SAVER BITE BLOCK, 20X27 MM: Brand: SCOPE SAVER

## (undated) DEVICE — BANDAGE,GAUZE,4.5"X4.1YD,STERILE,LF: Brand: MEDLINE

## (undated) DEVICE — SUREFIT, DUAL DISPERSIVE ELECTRODE, CONTACT QUALITY MONITOR: Brand: SUREFIT

## (undated) DEVICE — POSITIONER HD W8XH4XL8.5IN RASPBERRY FOAM SLT

## (undated) DEVICE — SOLUTION IV 1000ML 0.45% SOD CHL PH 5 INJ USP VIAFLX PLAS

## (undated) DEVICE — SUTURE VCRL SZ 2-0 L27IN ABSRB UD L26MM SH 1/2 CIR J417H

## (undated) DEVICE — TOWEL,OR,DSP,ST,BLUE,DLX,4/PK,20PK/CS: Brand: MEDLINE

## (undated) DEVICE — SUTURE STRATAFIX SPRL SZ 2-0 L9IN ABSRB VLT MH L36MM 1/2 SXPD2B408

## (undated) DEVICE — 3M™ WARMING BLANKET, UPPER BODY, 10 PER CASE, 42268: Brand: BAIR HUGGER™

## (undated) DEVICE — CANISTER, RIGID, 2000CC: Brand: MEDLINE INDUSTRIES, INC.

## (undated) DEVICE — SET ADMIN 25ML L117IN PMP MOD CK VLV RLER CLMP 2 SMRTSITE

## (undated) DEVICE — FORCEP RAD JAW W/NEEDLE 160CM

## (undated) DEVICE — SUTURE MCRYL SZ 4-0 L27IN ABSRB UD L19MM PS-2 1/2 CIR PRIM Y426H

## (undated) DEVICE — PATIENT RETURN ELECTRODE, SINGLE-USE, CONTACT QUALITY MONITORING, ADULT, WITH 9FT CORD, FOR PATIENTS WEIGING OVER 33LBS. (15KG): Brand: MEGADYNE

## (undated) DEVICE — SYRINGE MED 50ML LUERLOCK TIP

## (undated) DEVICE — NEEDLE SPNL L3.5IN PNK HUB S STL REG WALL FIT STYL W/ QNCKE

## (undated) DEVICE — EVOS SMALL 2.0MM DRILL W/AO QC LONG: Brand: EVOS

## (undated) DEVICE — INTENDED FOR TISSUE SEPARATION, AND OTHER PROCEDURES THAT REQUIRE A SHARP SURGICAL BLADE TO PUNCTURE OR CUT.: Brand: BARD-PARKER ® CARBON RIB-BACK BLADES

## (undated) DEVICE — DRESSING,GAUZE,XEROFORM,CURAD,5"X9",ST: Brand: CURAD

## (undated) DEVICE — SUTURE VCRL SZ 2-0 L27IN ABSRB UD L36MM CP-1 1/2 CIR REV J266H

## (undated) DEVICE — 4-PORT MANIFOLD: Brand: NEPTUNE 2

## (undated) DEVICE — CATHETER ETER IV 22GA L1IN POLYUR STR RADPQ INTROCAN SFTY

## (undated) DEVICE — ARM SLING: Brand: DEROYAL

## (undated) DEVICE — TETRA-FLEX CF WOVEN LATEX FREE ELASTIC BANDAGE 6" X 5.5 YD: Brand: TETRA-FLEX™CF

## (undated) DEVICE — CLOSURE SKIN FLX NONINVASIVE PRELOC TECHNOLOGY FOR 24IN

## (undated) DEVICE — GLOVE SURG SZ 9 THK91MIL LTX FREE SYN POLYISOPRENE ANTI

## (undated) DEVICE — SUTURE VCRL SZ 2-0 L18IN ABSRB VLT POLYGLACTIN 910 BRAID J105T

## (undated) DEVICE — BASIC SINGLE BASIN BTC-LF: Brand: MEDLINE INDUSTRIES, INC.

## (undated) DEVICE — C-ARM: Brand: UNBRANDED

## (undated) DEVICE — ROYAL SILK SURGICAL GOWN, XXL: Brand: CONVERTORS

## (undated) DEVICE — GLOVE ORANGE PI 7 1/2   MSG9075

## (undated) DEVICE — TUBING IV STOPCOCK 48 CM 3 W

## (undated) DEVICE — SKIN AFFIX SURG ADHESIVE 72/CS 0.55ML: Brand: MEDLINE

## (undated) DEVICE — BLANKET THER AD W24XL60IN FAB COVERING SUP SFT ULT THN LTWT

## (undated) DEVICE — DRAPE,U/SHT,SPLIT,FILM,60X84,STERILE: Brand: MEDLINE

## (undated) DEVICE — Device: Brand: DEFENDO VALVE AND CONNECTOR KIT

## (undated) DEVICE — BANDAGE COMPR W6INXL5YD SELF ADH COHESIVE CO FLX